# Patient Record
Sex: MALE | Race: BLACK OR AFRICAN AMERICAN | NOT HISPANIC OR LATINO
[De-identification: names, ages, dates, MRNs, and addresses within clinical notes are randomized per-mention and may not be internally consistent; named-entity substitution may affect disease eponyms.]

---

## 2018-06-19 ENCOUNTER — APPOINTMENT (OUTPATIENT)
Dept: UROLOGY | Facility: CLINIC | Age: 68
End: 2018-06-19
Payer: MEDICARE

## 2018-06-19 VITALS
SYSTOLIC BLOOD PRESSURE: 138 MMHG | HEART RATE: 68 BPM | OXYGEN SATURATION: 98 % | DIASTOLIC BLOOD PRESSURE: 84 MMHG | TEMPERATURE: 98.5 F

## 2018-06-19 PROCEDURE — 99204 OFFICE O/P NEW MOD 45 MIN: CPT

## 2018-06-28 ENCOUNTER — MESSAGE (OUTPATIENT)
Age: 68
End: 2018-06-28

## 2018-06-28 LAB
APPEARANCE: ABNORMAL
BACTERIA UR CULT: ABNORMAL
BACTERIA: ABNORMAL
BILIRUBIN URINE: NEGATIVE
BLOOD URINE: NEGATIVE
COLOR: YELLOW
GLUCOSE QUALITATIVE U: NEGATIVE MG/DL
HYALINE CASTS: 1 /LPF
KETONES URINE: NEGATIVE
LEUKOCYTE ESTERASE URINE: NEGATIVE
MICROSCOPIC-UA: NORMAL
NITRITE URINE: POSITIVE
PH URINE: 5.5
PROTEIN URINE: 30 MG/DL
RED BLOOD CELLS URINE: 1 /HPF
SPECIFIC GRAVITY URINE: 1.02
SQUAMOUS EPITHELIAL CELLS: 1 /HPF
URINE COMMENTS: NORMAL
UROBILINOGEN URINE: 1 MG/DL
WHITE BLOOD CELLS URINE: 6 /HPF

## 2018-06-29 ENCOUNTER — MESSAGE (OUTPATIENT)
Age: 68
End: 2018-06-29

## 2018-09-05 ENCOUNTER — APPOINTMENT (OUTPATIENT)
Dept: UROLOGY | Facility: CLINIC | Age: 68
End: 2018-09-05
Payer: MEDICARE

## 2018-09-05 VITALS
SYSTOLIC BLOOD PRESSURE: 134 MMHG | DIASTOLIC BLOOD PRESSURE: 76 MMHG | TEMPERATURE: 98.1 F | HEART RATE: 84 BPM | OXYGEN SATURATION: 98 %

## 2018-09-05 LAB
APPEARANCE: CLEAR
BACTERIA: ABNORMAL
BILIRUBIN URINE: NEGATIVE
BLOOD URINE: NEGATIVE
COLOR: YELLOW
GLUCOSE QUALITATIVE U: NEGATIVE MG/DL
HYALINE CASTS: 3 /LPF
KETONES URINE: NEGATIVE
LEUKOCYTE ESTERASE URINE: ABNORMAL
MICROSCOPIC-UA: NORMAL
NITRITE URINE: POSITIVE
PH URINE: 5.5
PROTEIN URINE: 30 MG/DL
RED BLOOD CELLS URINE: 7 /HPF
SPECIFIC GRAVITY URINE: 1.02
SQUAMOUS EPITHELIAL CELLS: 0 /HPF
UROBILINOGEN URINE: NEGATIVE MG/DL
WHITE BLOOD CELLS URINE: 97 /HPF

## 2018-09-05 PROCEDURE — 99214 OFFICE O/P EST MOD 30 MIN: CPT

## 2018-09-12 LAB — BACTERIA UR CULT: ABNORMAL

## 2019-03-12 ENCOUNTER — APPOINTMENT (OUTPATIENT)
Dept: UROLOGY | Facility: CLINIC | Age: 69
End: 2019-03-12
Payer: MEDICARE

## 2019-03-12 VITALS
HEIGHT: 67 IN | WEIGHT: 193 LBS | DIASTOLIC BLOOD PRESSURE: 80 MMHG | HEART RATE: 70 BPM | BODY MASS INDEX: 30.29 KG/M2 | SYSTOLIC BLOOD PRESSURE: 140 MMHG

## 2019-03-12 PROCEDURE — 99214 OFFICE O/P EST MOD 30 MIN: CPT

## 2019-03-12 NOTE — ASSESSMENT
[FreeTextEntry1] : Very pleasant 68-year-old gentleman presents for followup of BPH with urinary obstruction, urinary tract infections, dysuria\par -prior labs reviewed\par -Urine culture\par -Urinalysis\par -We discussed different potential etiologies of his symptoms\par -Cystoscopy at next visit\par -Will call with results of urine culture

## 2019-03-12 NOTE — HISTORY OF PRESENT ILLNESS
[FreeTextEntry1] : Very pleasant 68-year-old gentleman who presents for followup of BPH with urinary obstruction, frequent urinary tract infections, dysuria. Patient has been treated with antibiotics multiple times in the past which resolved his symptoms. Soon after treatment, however, he again experienced his dysuria. He currently denies fevers or chills. No hematuria. No flank pain or suprapubic pain. No other complaints. [Urinary Retention] : no urinary retention [Urinary Urgency] : no urinary urgency [Urinary Frequency] : urinary frequency [Nocturia] : nocturia [Straining] : no straining [Weak Stream] : no weak stream [Dysuria] : no dysuria [Hematuria - Gross] : no gross hematuria [Bladder Spasm] : no bladder spasm [Abdominal Pain] : no abdominal pain [Flank Pain] : no flank pain [Fever] : no fever [Fatigue] : no fatigue [Nausea] : no nausea [Anorexia] : no anorexia

## 2019-03-18 LAB — BACTERIA UR CULT: NORMAL

## 2019-04-01 ENCOUNTER — RX RENEWAL (OUTPATIENT)
Age: 69
End: 2019-04-01

## 2019-05-07 ENCOUNTER — APPOINTMENT (OUTPATIENT)
Dept: UROLOGY | Facility: CLINIC | Age: 69
End: 2019-05-07

## 2019-06-03 ENCOUNTER — CLINICAL ADVICE (OUTPATIENT)
Age: 69
End: 2019-06-03

## 2019-06-07 LAB
APPEARANCE: ABNORMAL
BACTERIA UR CULT: ABNORMAL
BACTERIA: ABNORMAL
BILIRUBIN URINE: NEGATIVE
BLOOD URINE: ABNORMAL
COLOR: YELLOW
GLUCOSE QUALITATIVE U: NEGATIVE
HYALINE CASTS: 0 /LPF
KETONES URINE: NEGATIVE
LEUKOCYTE ESTERASE URINE: ABNORMAL
MICROSCOPIC-UA: NORMAL
NITRITE URINE: POSITIVE
PH URINE: 6
PROTEIN URINE: ABNORMAL
RED BLOOD CELLS URINE: 76 /HPF
SPECIFIC GRAVITY URINE: 1.02
SQUAMOUS EPITHELIAL CELLS: 3 /HPF
UROBILINOGEN URINE: NORMAL
WHITE BLOOD CELLS URINE: >720 /HPF

## 2019-07-09 ENCOUNTER — APPOINTMENT (OUTPATIENT)
Dept: UROLOGY | Facility: CLINIC | Age: 69
End: 2019-07-09

## 2019-08-08 ENCOUNTER — RX RENEWAL (OUTPATIENT)
Age: 69
End: 2019-08-08

## 2019-08-09 ENCOUNTER — RX RENEWAL (OUTPATIENT)
Age: 69
End: 2019-08-09

## 2019-08-16 ENCOUNTER — APPOINTMENT (OUTPATIENT)
Dept: UROLOGY | Facility: CLINIC | Age: 69
End: 2019-08-16
Payer: MEDICARE

## 2019-08-16 VITALS
HEIGHT: 67 IN | RESPIRATION RATE: 16 BRPM | WEIGHT: 190 LBS | BODY MASS INDEX: 29.82 KG/M2 | SYSTOLIC BLOOD PRESSURE: 136 MMHG | DIASTOLIC BLOOD PRESSURE: 78 MMHG | HEART RATE: 92 BPM

## 2019-08-16 PROCEDURE — 99214 OFFICE O/P EST MOD 30 MIN: CPT

## 2019-08-16 NOTE — HISTORY OF PRESENT ILLNESS
[FreeTextEntry1] : Very pleasant 68-year-old gentleman who presents for followup of BPH, recurrent urinary tract infections, detrusor dysfunction. Patient currently reports dysuria and rectal pain. That when he uses antibiotics history for urinary tract infection that his symptoms significantly improved. Symptoms are exacerbated by urination. He denies flank pain or suprapubic pain. He denies fevers or chills. No specific timing to his symptoms. No aggravating or alleviating factors that he knows of. Today, PVR is 80 cc. He reports that VESIcare has helped.

## 2019-08-16 NOTE — ASSESSMENT
[FreeTextEntry1] : Very pleasant 68-year-old gentleman who presents for followup of BPH, detrusor dysfunction, recurrent urinary tract infections\par -Will attempt to obtain approval for VESIcare, as insurance company reportedly will no longer cover this\par -He has tried other anticholinergics in the past which have not worked, wears VESIcare has worked for him.\par -Urine culture\par -Will treat urinary tract infection based off of urine culture\par -I have recommended that he see Dr. Cervantes for evaluation given recurrent symptoms and recurrent UTIs

## 2019-08-19 LAB — BACTERIA UR CULT: NORMAL

## 2021-02-16 ENCOUNTER — RX RENEWAL (OUTPATIENT)
Age: 71
End: 2021-02-16

## 2021-02-23 ENCOUNTER — APPOINTMENT (OUTPATIENT)
Dept: UROLOGY | Facility: CLINIC | Age: 71
End: 2021-02-23
Payer: MEDICARE

## 2021-02-23 VITALS
DIASTOLIC BLOOD PRESSURE: 80 MMHG | HEIGHT: 60 IN | SYSTOLIC BLOOD PRESSURE: 140 MMHG | RESPIRATION RATE: 15 BRPM | HEART RATE: 82 BPM

## 2021-02-23 PROCEDURE — 99072 ADDL SUPL MATRL&STAF TM PHE: CPT

## 2021-02-23 PROCEDURE — 99213 OFFICE O/P EST LOW 20 MIN: CPT

## 2021-02-23 NOTE — ASSESSMENT
[FreeTextEntry1] : Very pleasant 70-year-old gentleman presents for follow-up of BPH, incomplete bladder emptying, nocturnal incontinence\par -Continue tamsulosin\par -Start finasteride\par -Discussed the risks and benefits of tamsulosin and finasteride\par -We will attempt to obtain insurance approval for diapers\par -Follow-up in 6 months

## 2021-02-23 NOTE — HISTORY OF PRESENT ILLNESS
[FreeTextEntry1] : Very pleasant 70-year-old gentleman who presents for follow-up of BPH with urinary obstruction, urinary incontinence, nocturia.  He reports increased urinary frequency and nocturia.  He also reports nocturnal enuresis.  He has been wearing a diaper to change it frequently at night.  Today PVR was 146.  He is using tamsulosin but has not started using finasteride yet.

## 2021-02-23 NOTE — PHYSICAL EXAM
[General Appearance - Well Developed] : well developed [General Appearance - Well Nourished] : well nourished [Normal Appearance] : normal appearance [Well Groomed] : well groomed [General Appearance - In No Acute Distress] : no acute distress [Abdomen Soft] : soft [Abdomen Tenderness] : non-tender [Costovertebral Angle Tenderness] : no ~M costovertebral angle tenderness [Urinary Bladder Findings] : the bladder was normal on palpation [Urethral Meatus] : meatus normal [Scrotum] : the scrotum was normal [Testes Mass (___cm)] : there were no testicular masses [FreeTextEntry1] :  [Edema] : no peripheral edema [] : no respiratory distress [Respiration, Rhythm And Depth] : normal respiratory rhythm and effort [Exaggerated Use Of Accessory Muscles For Inspiration] : no accessory muscle use [Oriented To Time, Place, And Person] : oriented to person, place, and time [Affect] : the affect was normal [Mood] : the mood was normal [Not Anxious] : not anxious [Normal Station and Gait] : the gait and station were normal for the patient's age [No Focal Deficits] : no focal deficits [No Palpable Adenopathy] : no palpable adenopathy

## 2021-03-01 LAB — BACTERIA UR CULT: ABNORMAL

## 2021-09-11 ENCOUNTER — APPOINTMENT (OUTPATIENT)
Dept: DISASTER EMERGENCY | Facility: OTHER | Age: 71
End: 2021-09-11
Payer: MEDICARE

## 2021-09-11 PROCEDURE — 0001A: CPT

## 2021-10-02 ENCOUNTER — APPOINTMENT (OUTPATIENT)
Dept: DISASTER EMERGENCY | Facility: OTHER | Age: 71
End: 2021-10-02
Payer: MEDICARE

## 2021-10-02 ENCOUNTER — RX RENEWAL (OUTPATIENT)
Age: 71
End: 2021-10-02

## 2021-10-02 PROCEDURE — 0002A: CPT

## 2021-10-26 ENCOUNTER — TRANSCRIPTION ENCOUNTER (OUTPATIENT)
Age: 71
End: 2021-10-26

## 2021-10-26 ENCOUNTER — APPOINTMENT (OUTPATIENT)
Dept: UROLOGY | Facility: CLINIC | Age: 71
End: 2021-10-26
Payer: MEDICARE

## 2021-10-26 DIAGNOSIS — E11.9 TYPE 2 DIABETES MELLITUS W/OUT COMPLICATIONS: ICD-10-CM

## 2021-10-26 DIAGNOSIS — N40.0 BENIGN PROSTATIC HYPERPLASIA WITHOUT LOWER URINARY TRACT SYMPMS: ICD-10-CM

## 2021-10-26 PROCEDURE — 99213 OFFICE O/P EST LOW 20 MIN: CPT | Mod: 95

## 2021-10-26 RX ORDER — SULFAMETHOXAZOLE AND TRIMETHOPRIM 800; 160 MG/1; MG/1
800-160 TABLET ORAL TWICE DAILY
Qty: 28 | Refills: 0 | Status: DISCONTINUED | COMMUNITY
Start: 2018-09-12 | End: 2021-10-26

## 2021-10-26 RX ORDER — SOLIFENACIN SUCCINATE 10 MG/1
10 TABLET ORAL
Qty: 90 | Refills: 1 | Status: DISCONTINUED | COMMUNITY
Start: 2019-08-19 | End: 2021-10-26

## 2021-10-26 RX ORDER — CIPROFLOXACIN HYDROCHLORIDE 500 MG/1
500 TABLET, FILM COATED ORAL TWICE DAILY
Qty: 14 | Refills: 0 | Status: DISCONTINUED | COMMUNITY
Start: 2021-03-01 | End: 2021-10-26

## 2021-10-26 RX ORDER — SULFAMETHOXAZOLE AND TRIMETHOPRIM 800; 160 MG/1; MG/1
800-160 TABLET ORAL TWICE DAILY
Qty: 14 | Refills: 0 | Status: DISCONTINUED | COMMUNITY
Start: 2019-06-05 | End: 2021-10-26

## 2021-10-26 RX ORDER — AMOXICILLIN AND CLAVULANATE POTASSIUM 875; 125 MG/1; MG/1
875-125 TABLET, COATED ORAL
Qty: 14 | Refills: 0 | Status: DISCONTINUED | COMMUNITY
Start: 2018-06-29 | End: 2021-10-26

## 2021-10-26 NOTE — HISTORY OF PRESENT ILLNESS
[Home] : at home, [unfilled] , at the time of the visit. [Medical Office: ([facility])___] : at the medical office located in  [FreeTextEntry1] : The patient-doctor relationship has been established in a face to face fashion via real time video/audio HIPAA compliant communication using telemedicine software.  He has requested care to be assessed and treated through telemedicine. He understands that there maybe limitations in this process and that he may need further follow up care in the office and/or hospital setting.\par \par Very pleasant 71-year-old gentleman who presents for follow-up of BPH with urinary obstruction, urinary incontinence, nocturia.  He reports that his urinary symptoms are improved with tamsulosin and finasteride. Reports persistent nocturia but improved after the addition of finasteride.

## 2021-10-26 NOTE — ASSESSMENT
[FreeTextEntry1] : Very pleasant 71 year old gentleman who presents for follow up of BPH with urinary obstruction, nocturia\par -continue tamsulosin- refill sent to the pharmacy\par -continue finasteride- refill sent to the pharmacy\par -f/u in 6 months

## 2021-11-26 ENCOUNTER — INPATIENT (INPATIENT)
Facility: HOSPITAL | Age: 71
LOS: 2 days | Discharge: HOME | End: 2021-11-29
Attending: GENERAL ACUTE CARE HOSPITAL | Admitting: GENERAL ACUTE CARE HOSPITAL
Payer: MEDICARE

## 2021-11-26 VITALS
RESPIRATION RATE: 18 BRPM | OXYGEN SATURATION: 100 % | TEMPERATURE: 99 F | HEIGHT: 67 IN | DIASTOLIC BLOOD PRESSURE: 80 MMHG | SYSTOLIC BLOOD PRESSURE: 148 MMHG | HEART RATE: 104 BPM

## 2021-11-26 DIAGNOSIS — I82.409 ACUTE EMBOLISM AND THROMBOSIS OF UNSPECIFIED DEEP VEINS OF UNSPECIFIED LOWER EXTREMITY: ICD-10-CM

## 2021-11-26 DIAGNOSIS — Z98.89 OTHER SPECIFIED POSTPROCEDURAL STATES: Chronic | ICD-10-CM

## 2021-11-26 DIAGNOSIS — E11.9 TYPE 2 DIABETES MELLITUS WITHOUT COMPLICATIONS: ICD-10-CM

## 2021-11-26 DIAGNOSIS — D64.9 ANEMIA, UNSPECIFIED: ICD-10-CM

## 2021-11-26 DIAGNOSIS — I10 ESSENTIAL (PRIMARY) HYPERTENSION: ICD-10-CM

## 2021-11-26 LAB
ALBUMIN SERPL ELPH-MCNC: 3.2 G/DL — LOW (ref 3.3–5)
ALP SERPL-CCNC: 71 U/L — SIGNIFICANT CHANGE UP (ref 40–120)
ALT FLD-CCNC: 39 U/L — SIGNIFICANT CHANGE UP (ref 4–41)
ANION GAP SERPL CALC-SCNC: 4 MMOL/L — LOW (ref 7–14)
ANION GAP SERPL CALC-SCNC: 6 MMOL/L — LOW (ref 7–14)
ANISOCYTOSIS BLD QL: SIGNIFICANT CHANGE UP
AST SERPL-CCNC: 85 U/L — HIGH (ref 4–40)
BASOPHILS # BLD AUTO: 0 K/UL — SIGNIFICANT CHANGE UP (ref 0–0.2)
BASOPHILS NFR BLD AUTO: 0 % — SIGNIFICANT CHANGE UP (ref 0–2)
BILIRUB SERPL-MCNC: <0.2 MG/DL — SIGNIFICANT CHANGE UP (ref 0.2–1.2)
BLD GP AB SCN SERPL QL: NEGATIVE — SIGNIFICANT CHANGE UP
BUN SERPL-MCNC: 23 MG/DL — SIGNIFICANT CHANGE UP (ref 7–23)
BUN SERPL-MCNC: 24 MG/DL — HIGH (ref 7–23)
CALCIUM SERPL-MCNC: 8.5 MG/DL — SIGNIFICANT CHANGE UP (ref 8.4–10.5)
CALCIUM SERPL-MCNC: 8.8 MG/DL — SIGNIFICANT CHANGE UP (ref 8.4–10.5)
CHLORIDE SERPL-SCNC: 104 MMOL/L — SIGNIFICANT CHANGE UP (ref 98–107)
CHLORIDE SERPL-SCNC: 107 MMOL/L — SIGNIFICANT CHANGE UP (ref 98–107)
CO2 SERPL-SCNC: 23 MMOL/L — SIGNIFICANT CHANGE UP (ref 22–31)
CO2 SERPL-SCNC: 25 MMOL/L — SIGNIFICANT CHANGE UP (ref 22–31)
CREAT SERPL-MCNC: 1.15 MG/DL — SIGNIFICANT CHANGE UP (ref 0.5–1.3)
CREAT SERPL-MCNC: 1.24 MG/DL — SIGNIFICANT CHANGE UP (ref 0.5–1.3)
EOSINOPHIL # BLD AUTO: 0.04 K/UL — SIGNIFICANT CHANGE UP (ref 0–0.5)
EOSINOPHIL NFR BLD AUTO: 0.9 % — SIGNIFICANT CHANGE UP (ref 0–6)
GIANT PLATELETS BLD QL SMEAR: PRESENT — SIGNIFICANT CHANGE UP
GLUCOSE SERPL-MCNC: 128 MG/DL — HIGH (ref 70–99)
GLUCOSE SERPL-MCNC: 175 MG/DL — HIGH (ref 70–99)
HCT VFR BLD CALC: 21.4 % — LOW (ref 39–50)
HGB BLD-MCNC: 6.3 G/DL — CRITICAL LOW (ref 13–17)
HYPOCHROMIA BLD QL: SLIGHT — SIGNIFICANT CHANGE UP
IANC: 1.81 K/UL — SIGNIFICANT CHANGE UP (ref 1.5–8.5)
LYMPHOCYTES # BLD AUTO: 1.55 K/UL — SIGNIFICANT CHANGE UP (ref 1–3.3)
LYMPHOCYTES # BLD AUTO: 38.1 % — SIGNIFICANT CHANGE UP (ref 13–44)
MACROCYTES BLD QL: SLIGHT — SIGNIFICANT CHANGE UP
MAGNESIUM SERPL-MCNC: 2.2 MG/DL — SIGNIFICANT CHANGE UP (ref 1.6–2.6)
MANUAL SMEAR VERIFICATION: SIGNIFICANT CHANGE UP
MCHC RBC-ENTMCNC: 25.2 PG — LOW (ref 27–34)
MCHC RBC-ENTMCNC: 29.4 GM/DL — LOW (ref 32–36)
MCV RBC AUTO: 85.6 FL — SIGNIFICANT CHANGE UP (ref 80–100)
MICROCYTES BLD QL: SLIGHT — SIGNIFICANT CHANGE UP
MONOCYTES # BLD AUTO: 0.12 K/UL — SIGNIFICANT CHANGE UP (ref 0–0.9)
MONOCYTES NFR BLD AUTO: 2.9 % — SIGNIFICANT CHANGE UP (ref 2–14)
NEUTROPHILS # BLD AUTO: 2.01 K/UL — SIGNIFICANT CHANGE UP (ref 1.8–7.4)
NEUTROPHILS NFR BLD AUTO: 45.7 % — SIGNIFICANT CHANGE UP (ref 43–77)
NEUTS BAND # BLD: 3.8 % — SIGNIFICANT CHANGE UP (ref 0–6)
NRBC # BLD: 11 /100 — HIGH (ref 0–0)
PHOSPHATE SERPL-MCNC: 4.1 MG/DL — SIGNIFICANT CHANGE UP (ref 2.5–4.5)
PLAT MORPH BLD: ABNORMAL
PLATELET # BLD AUTO: 209 K/UL — SIGNIFICANT CHANGE UP (ref 150–400)
PLATELET COUNT - ESTIMATE: NORMAL — SIGNIFICANT CHANGE UP
POLYCHROMASIA BLD QL SMEAR: SLIGHT — SIGNIFICANT CHANGE UP
POTASSIUM SERPL-MCNC: 4.1 MMOL/L — SIGNIFICANT CHANGE UP (ref 3.5–5.3)
POTASSIUM SERPL-MCNC: 5.8 MMOL/L — HIGH (ref 3.5–5.3)
POTASSIUM SERPL-SCNC: 4.1 MMOL/L — SIGNIFICANT CHANGE UP (ref 3.5–5.3)
POTASSIUM SERPL-SCNC: 5.8 MMOL/L — HIGH (ref 3.5–5.3)
PROT SERPL-MCNC: 12.4 G/DL — HIGH (ref 6–8.3)
RBC # BLD: 2.5 M/UL — LOW (ref 4.2–5.8)
RBC # BLD: 2.5 M/UL — LOW (ref 4.2–5.8)
RBC # FLD: 23 % — HIGH (ref 10.3–14.5)
RBC BLD AUTO: ABNORMAL
RETICS #: 28.7 K/UL — SIGNIFICANT CHANGE UP (ref 25–125)
RETICS/RBC NFR: 1.1 % — SIGNIFICANT CHANGE UP (ref 0.5–2.5)
RH IG SCN BLD-IMP: POSITIVE — SIGNIFICANT CHANGE UP
RH IG SCN BLD-IMP: POSITIVE — SIGNIFICANT CHANGE UP
ROULEAUX BLD QL SMEAR: PRESENT
SARS-COV-2 RNA SPEC QL NAA+PROBE: SIGNIFICANT CHANGE UP
SMUDGE CELLS # BLD: PRESENT — SIGNIFICANT CHANGE UP
SODIUM SERPL-SCNC: 133 MMOL/L — LOW (ref 135–145)
SODIUM SERPL-SCNC: 136 MMOL/L — SIGNIFICANT CHANGE UP (ref 135–145)
VARIANT LYMPHS # BLD: 8.6 % — HIGH (ref 0–6)
WBC # BLD: 4.06 K/UL — SIGNIFICANT CHANGE UP (ref 3.8–10.5)
WBC # FLD AUTO: 4.06 K/UL — SIGNIFICANT CHANGE UP (ref 3.8–10.5)

## 2021-11-26 PROCEDURE — 99223 1ST HOSP IP/OBS HIGH 75: CPT

## 2021-11-26 PROCEDURE — 71046 X-RAY EXAM CHEST 2 VIEWS: CPT | Mod: 26

## 2021-11-26 PROCEDURE — 99285 EMERGENCY DEPT VISIT HI MDM: CPT | Mod: 25

## 2021-11-26 PROCEDURE — 93010 ELECTROCARDIOGRAM REPORT: CPT

## 2021-11-26 RX ORDER — ACETAMINOPHEN 500 MG
650 TABLET ORAL EVERY 6 HOURS
Refills: 0 | Status: DISCONTINUED | OUTPATIENT
Start: 2021-11-26 | End: 2021-11-29

## 2021-11-26 RX ORDER — DEXTROSE 50 % IN WATER 50 %
25 SYRINGE (ML) INTRAVENOUS ONCE
Refills: 0 | Status: DISCONTINUED | OUTPATIENT
Start: 2021-11-26 | End: 2021-11-29

## 2021-11-26 RX ORDER — LANOLIN ALCOHOL/MO/W.PET/CERES
3 CREAM (GRAM) TOPICAL AT BEDTIME
Refills: 0 | Status: DISCONTINUED | OUTPATIENT
Start: 2021-11-26 | End: 2021-11-29

## 2021-11-26 RX ORDER — GLUCAGON INJECTION, SOLUTION 0.5 MG/.1ML
1 INJECTION, SOLUTION SUBCUTANEOUS ONCE
Refills: 0 | Status: DISCONTINUED | OUTPATIENT
Start: 2021-11-26 | End: 2021-11-29

## 2021-11-26 RX ORDER — DEXTROSE 50 % IN WATER 50 %
15 SYRINGE (ML) INTRAVENOUS ONCE
Refills: 0 | Status: DISCONTINUED | OUTPATIENT
Start: 2021-11-26 | End: 2021-11-29

## 2021-11-26 RX ORDER — DEXTROSE 50 % IN WATER 50 %
12.5 SYRINGE (ML) INTRAVENOUS ONCE
Refills: 0 | Status: DISCONTINUED | OUTPATIENT
Start: 2021-11-26 | End: 2021-11-29

## 2021-11-26 RX ORDER — ONDANSETRON 8 MG/1
4 TABLET, FILM COATED ORAL EVERY 8 HOURS
Refills: 0 | Status: DISCONTINUED | OUTPATIENT
Start: 2021-11-26 | End: 2021-11-29

## 2021-11-26 RX ORDER — INSULIN LISPRO 100/ML
VIAL (ML) SUBCUTANEOUS AT BEDTIME
Refills: 0 | Status: DISCONTINUED | OUTPATIENT
Start: 2021-11-26 | End: 2021-11-29

## 2021-11-26 RX ORDER — INSULIN LISPRO 100/ML
VIAL (ML) SUBCUTANEOUS
Refills: 0 | Status: DISCONTINUED | OUTPATIENT
Start: 2021-11-26 | End: 2021-11-29

## 2021-11-26 RX ORDER — SODIUM CHLORIDE 9 MG/ML
1000 INJECTION, SOLUTION INTRAVENOUS
Refills: 0 | Status: DISCONTINUED | OUTPATIENT
Start: 2021-11-26 | End: 2021-11-29

## 2021-11-26 NOTE — ED PROVIDER NOTE - NSICDXPASTMEDICALHX_GEN_ALL_CORE_FT
PAST MEDICAL HISTORY:  Anemia of unknown etiology in 2012     Benign essential hypertension     BPH (benign prostatic hypertrophy)     CVA (cerebral vascular accident)     Diabetes mellitus     DVT (deep venous thrombosis)     Hx of hyperlipidemia     Obesity     Obstructive uropathy

## 2021-11-26 NOTE — ED PROVIDER NOTE - PROGRESS NOTE DETAILS
Manny Haque M.D. (PGY-1): hgb = 6.3, consent and transfuse. k elevated on hemolyzed sample, will repeat. admission for acute anemia. Dr. Mckoy endorses admission.

## 2021-11-26 NOTE — H&P ADULT - NSHPREVIEWOFSYSTEMS_GEN_ALL_CORE
REVIEW OF SYSTEMS:    CONSTITUTIONAL: No weakness, fevers or chills  EYES/ENT: No visual changes;  No dysphagia; No sore throat; No rhinorrhea; No sinus pain/pressure  NECK: No pain or stiffness  RESPIRATORY: No cough, wheezing, hemoptysis; +MURPHY  CARDIOVASCULAR: No chest pain or palpitations; No lower extremity edema  GASTROINTESTINAL: No abdominal or epigastric pain. No nausea, vomiting, or hematemesis; No diarrhea or constipation. No melena or hematochezia.- reports dark stool but not black  GENITOURINARY: No dysuria, frequency or hematuria  NEUROLOGICAL: No numbness or weakness, + dizziness/lightheadedness  MSK: ambulates independently   SKIN: No itching, burning, rashes, or lesions   All other review of systems is negative unless indicated above.

## 2021-11-26 NOTE — ED PROVIDER NOTE - PHYSICAL EXAMINATION
Gen: NAD, non-toxic appearing  Head: normal appearing  HEENT: normal conjunctiva  Lung: no respiratory distress, speaking in full sentences     CV: regular rate and rhythm, no murmurs  Abd: soft, non distended, non tender  Chaperone: Dr. Gosia Lau MD. There is no ulceration of the soft tissue over the sacrum. The anus is visually unremarkable. There is no blood or discharge. A lubricated digit is introduced into the rectum. There is no impacted stool. No palpable internal hemorrhoids.   MSK: no visible deformities, there is a bandaid over the L hip posterior from an injection done at the PCP's office. no midline tenderness. no CVA tenderness.    Neuro: alert and grossly oriented, no gross motor deficits  Skin: No kaitlyn rashes

## 2021-11-26 NOTE — ED ADULT NURSE NOTE - OBJECTIVE STATEMENT
received pt in room 22, 71 yr/o male A+Ox4 creole speaking/ able to make needs known in english, ambulatory at baseline. PMH HTN, HLD, CVA, CKD, not on dialysis, DMII , DVT on Eliquis. sent to ED by PCP for low hgb. pt states that he feels intermittent SOB and has been passing black stools. VSS, denies chest pain and SOB at this time, RR even and unlabored. denies abdominal pain N/V/D. abdomen is soft nontender. skin is clean dry and intact. left had 20g IV placed, labs drawn and sent. pending MD orders will continue to monitor.

## 2021-11-26 NOTE — H&P ADULT - PROBLEM SELECTOR PLAN 2
Chronic moderate exacerbation  /85  Patient unsure of medication  pharmacy emailed for [employer]

## 2021-11-26 NOTE — ED PROVIDER NOTE - CLINICAL SUMMARY MEDICAL DECISION MAKING FREE TEXT BOX
70 yo male on AC presenting w/ anemia per lab work done at PCP's office, tachycardia here. physical exam unremarkable. will repeat cbc and sent for occult stool testing.

## 2021-11-26 NOTE — ED PROVIDER NOTE - OBJECTIVE STATEMENT
72 yo M, hx of DVT on Eliquis, CKD not on dialysis, CVA w/ R sided deficit and DM, presenting after referral from PCP for anemia. Pt w/ exertional dyspnea and dark stools over the past few weeks. Seen by PCP today, hgb = 6.6, referred here. Denies other symptomology. Denies episodes of kaitlyn bleeding. No recent changes to medications.

## 2021-11-26 NOTE — ED PROVIDER NOTE - ATTENDING CONTRIBUTION TO CARE
Attending Statement: I have personally seen and examined this patient. I have fully participated in the care of this patient. I have reviewed all pertinent clinical information, including history physical exam, plan and the Resident's note and agree except as noted    used   72 yo M, hx of DVT on Eliquis, CKD not on dialysis, CVA w/ R sided deficit and DM sent in for anemia. States labs showed a hemoglobin 6.6  Endorse  dark stool without any bright red blood. Endorse MURPHY and SOB. no chest pain. no abdominal pain. no n/v/d   Vital signs noted. well appearing male.   no distress. no work of breathing. soft nt abdomen. rectal done by resident no pedal edema. no calf tenderness. normal pulses bilateral feet.  plan labs, ekg, cxr, occult stool, re assess

## 2021-11-26 NOTE — ED ADULT NURSE REASSESSMENT NOTE - NS ED NURSE REASSESS COMMENT FT1
Transfusion complete. Pt resting comfortable. Denies CP, SOB, N/V, itching, any pain. Resp even and unlabored.

## 2021-11-26 NOTE — ED PROVIDER NOTE - NS ED ROS FT
GENERAL: no fever  EYES: no eye pain  HEENT: no neck pain  CARDIAC: no chest pain  PULMONARY: + SOB  GI: no abdominal pain  : no dysuria  SKIN: no rashes  NEURO: no headache  MSK: no new joint pain

## 2021-11-26 NOTE — ED ADULT TRIAGE NOTE - CHIEF COMPLAINT QUOTE
pt sent by MD Goldberg for blood transfusion. no hx blood transfusion. pt c/o fatigue found to have HgB 6.6. denies abnormal bleeding. PMH HTN, HLD, CVA, CKD, T2DM, DVT on Eliquis.

## 2021-11-26 NOTE — ED ADULT NURSE REASSESSMENT NOTE - NS ED NURSE REASSESS COMMENT FT1
Report given to floor. CBC label attached. Awaiting txp. Pt A&OX4, no acute distress. Resp even and unlabored. Denies CP, SOB, N/V, BMs, abd/ back pain.

## 2021-11-26 NOTE — H&P ADULT - PROBLEM SELECTOR PLAN 1
New  Hb 6.3 on admission with dizziness/lightheadedness/MURPHY  morphology: rouleaux formation   BM biopsy today  s/p 1 unit PRBC- repeat CBC q6 for 4 occurances  reports dark stools - near black- POCT FOBT result not found- will reorder  Hematology consult in AM  In the meantime will hold eliquis until above results- will likely be able to restart in AM

## 2021-11-26 NOTE — ED ADULT NURSE NOTE - NSIMPLEMENTINTERV_GEN_ALL_ED
Implemented All Fall Risk Interventions:  [place] to call system. Call bell, personal items and telephone within reach. Instruct patient to call for assistance. Room bathroom lighting operational. Non-slip footwear when patient is off stretcher. Physically safe environment: no spills, clutter or unnecessary equipment. Stretcher in lowest position, wheels locked, appropriate side rails in place. Provide visual cue, wrist band, yellow gown, etc. Monitor gait and stability. Monitor for mental status changes and reorient to person, place, and time. Review medications for side effects contributing to fall risk. Reinforce activity limits and safety measures with patient and family.

## 2021-11-26 NOTE — H&P ADULT - PROBLEM SELECTOR PLAN 3
Chronic moderate exacerbation    in ED  Holding home oral agents  LDCS with diabetic diet Chronic moderate exacerbation    in ED  Holding home oral agents  LDCS with diabetic diet  a1c and lipid panel in ED

## 2021-11-26 NOTE — H&P ADULT - HISTORY OF PRESENT ILLNESS
71 yr old male with a pmh of DVT on Eliquis, CKD not on dialysis, CVA w/ R sided deficit and T2DM who presents from his Hematologist office with anemia Hb 6.6 - of note he had a bone marrow biopsy today for persistently low Hb/WBC levels  Patient reports over the past week he has felt lightheaded and dizzy with MURPHY. He reports dark brown stool denies black stools.     Denies  headache, chest pain, palpitations,  abdominal pain, joint pain, diarrhea/constipation, urinary symptoms.     Vitals in the ED: T 98.6, HR 88, /85, RR 17 satting 100% RA

## 2021-11-26 NOTE — ED ADULT NURSE REASSESSMENT NOTE - NS ED NURSE REASSESS COMMENT FT1
blood transfusion- spoke with ACP. transfusion was initially started at a rate of 100 to infuse over 3 hours. Order is for 30min as per provider rate was increased to complete transfusion over a half hour. rate is now 300cc/hr. pt is tolerating transfusion, VSS. no s/s of reaction noted.

## 2021-11-26 NOTE — H&P ADULT - NSHPLABSRESULTS_GEN_ALL_CORE
6.3    4.06  )-----------( 209      ( 26 Nov 2021 14:40 )             21.4       11-26    136  |  107  |  24<H>  ----------------------------<  128<H>  4.1   |  25  |  1.24    Ca    8.5      26 Nov 2021 16:06  Phos  4.1     11-26  Mg     2.20     11-26    TPro  12.4<H>  /  Alb  3.2<L>  /  TBili  <0.2  /  DBili  x   /  AST  85<H>  /  ALT  39  /  AlkPhos  71  11-26    EKG interpreted by myself: NSR  CXR interpreted by myself: clear lungs

## 2021-11-27 LAB
A1C WITH ESTIMATED AVERAGE GLUCOSE RESULT: 6.2 % — HIGH (ref 4–5.6)
ANION GAP SERPL CALC-SCNC: 5 MMOL/L — LOW (ref 7–14)
BASOPHILS # BLD AUTO: 0.01 K/UL — SIGNIFICANT CHANGE UP (ref 0–0.2)
BASOPHILS NFR BLD AUTO: 0.2 % — SIGNIFICANT CHANGE UP (ref 0–2)
BUN SERPL-MCNC: 23 MG/DL — SIGNIFICANT CHANGE UP (ref 7–23)
CALCIUM SERPL-MCNC: 8.7 MG/DL — SIGNIFICANT CHANGE UP (ref 8.4–10.5)
CHLORIDE SERPL-SCNC: 104 MMOL/L — SIGNIFICANT CHANGE UP (ref 98–107)
CHOLEST SERPL-MCNC: 141 MG/DL — SIGNIFICANT CHANGE UP
CO2 SERPL-SCNC: 24 MMOL/L — SIGNIFICANT CHANGE UP (ref 22–31)
COVID-19 NUCLEOCAPSID GAM AB INTERP: POSITIVE
COVID-19 NUCLEOCAPSID TOTAL GAM ANTIBODY RESULT: 9.43 INDEX — HIGH
COVID-19 SPIKE DOMAIN AB INTERP: POSITIVE
COVID-19 SPIKE DOMAIN ANTIBODY RESULT: >250 U/ML — HIGH
CREAT SERPL-MCNC: 1.25 MG/DL — SIGNIFICANT CHANGE UP (ref 0.5–1.3)
EOSINOPHIL # BLD AUTO: 0.02 K/UL — SIGNIFICANT CHANGE UP (ref 0–0.5)
EOSINOPHIL NFR BLD AUTO: 0.5 % — SIGNIFICANT CHANGE UP (ref 0–6)
ESTIMATED AVERAGE GLUCOSE: 131 — SIGNIFICANT CHANGE UP
GLUCOSE SERPL-MCNC: 119 MG/DL — HIGH (ref 70–99)
HCT VFR BLD CALC: 23.1 % — LOW (ref 39–50)
HCT VFR BLD CALC: 24.7 % — LOW (ref 39–50)
HCT VFR BLD CALC: 26.3 % — LOW (ref 39–50)
HDLC SERPL-MCNC: 27 MG/DL — LOW
HGB BLD-MCNC: 7.2 G/DL — LOW (ref 13–17)
HGB BLD-MCNC: 7.3 G/DL — LOW (ref 13–17)
HGB BLD-MCNC: 8 G/DL — LOW (ref 13–17)
IANC: 1.52 K/UL — SIGNIFICANT CHANGE UP (ref 1.5–8.5)
IMM GRANULOCYTES NFR BLD AUTO: 1 % — SIGNIFICANT CHANGE UP (ref 0–1.5)
LIPID PNL WITH DIRECT LDL SERPL: 90 MG/DL — SIGNIFICANT CHANGE UP
LYMPHOCYTES # BLD AUTO: 2.05 K/UL — SIGNIFICANT CHANGE UP (ref 1–3.3)
LYMPHOCYTES # BLD AUTO: 50.7 % — HIGH (ref 13–44)
MCHC RBC-ENTMCNC: 24.9 PG — LOW (ref 27–34)
MCHC RBC-ENTMCNC: 25.7 PG — LOW (ref 27–34)
MCHC RBC-ENTMCNC: 26 PG — LOW (ref 27–34)
MCHC RBC-ENTMCNC: 29.6 GM/DL — LOW (ref 32–36)
MCHC RBC-ENTMCNC: 30.4 GM/DL — LOW (ref 32–36)
MCHC RBC-ENTMCNC: 31.2 GM/DL — LOW (ref 32–36)
MCV RBC AUTO: 83.4 FL — SIGNIFICANT CHANGE UP (ref 80–100)
MCV RBC AUTO: 84.3 FL — SIGNIFICANT CHANGE UP (ref 80–100)
MCV RBC AUTO: 84.6 FL — SIGNIFICANT CHANGE UP (ref 80–100)
MONOCYTES # BLD AUTO: 0.4 K/UL — SIGNIFICANT CHANGE UP (ref 0–0.9)
MONOCYTES NFR BLD AUTO: 9.9 % — SIGNIFICANT CHANGE UP (ref 2–14)
NEUTROPHILS # BLD AUTO: 1.52 K/UL — LOW (ref 1.8–7.4)
NEUTROPHILS NFR BLD AUTO: 37.7 % — LOW (ref 43–77)
NON HDL CHOLESTEROL: 114 MG/DL — SIGNIFICANT CHANGE UP
NRBC # BLD: 8 /100 WBCS — SIGNIFICANT CHANGE UP
NRBC # BLD: 9 /100 WBCS — SIGNIFICANT CHANGE UP
NRBC # BLD: 9 /100 WBCS — SIGNIFICANT CHANGE UP
NRBC # FLD: 0.32 K/UL — HIGH
NRBC # FLD: 0.32 K/UL — HIGH
NRBC # FLD: 0.34 K/UL — HIGH
PLATELET # BLD AUTO: 171 K/UL — SIGNIFICANT CHANGE UP (ref 150–400)
PLATELET # BLD AUTO: 177 K/UL — SIGNIFICANT CHANGE UP (ref 150–400)
PLATELET # BLD AUTO: 186 K/UL — SIGNIFICANT CHANGE UP (ref 150–400)
POTASSIUM SERPL-MCNC: 4 MMOL/L — SIGNIFICANT CHANGE UP (ref 3.5–5.3)
POTASSIUM SERPL-SCNC: 4 MMOL/L — SIGNIFICANT CHANGE UP (ref 3.5–5.3)
RBC # BLD: 2.77 M/UL — LOW (ref 4.2–5.8)
RBC # BLD: 2.93 M/UL — LOW (ref 4.2–5.8)
RBC # BLD: 3.11 M/UL — LOW (ref 4.2–5.8)
RBC # FLD: 21 % — HIGH (ref 10.3–14.5)
RBC # FLD: 21.1 % — HIGH (ref 10.3–14.5)
RBC # FLD: 21.2 % — HIGH (ref 10.3–14.5)
SARS-COV-2 IGG+IGM SERPL QL IA: 9.43 INDEX — HIGH
SARS-COV-2 IGG+IGM SERPL QL IA: >250 U/ML — HIGH
SARS-COV-2 IGG+IGM SERPL QL IA: POSITIVE
SARS-COV-2 IGG+IGM SERPL QL IA: POSITIVE
SODIUM SERPL-SCNC: 133 MMOL/L — LOW (ref 135–145)
TRIGL SERPL-MCNC: 121 MG/DL — SIGNIFICANT CHANGE UP
WBC # BLD: 3.55 K/UL — LOW (ref 3.8–10.5)
WBC # BLD: 3.72 K/UL — LOW (ref 3.8–10.5)
WBC # BLD: 4.04 K/UL — SIGNIFICANT CHANGE UP (ref 3.8–10.5)
WBC # FLD AUTO: 3.55 K/UL — LOW (ref 3.8–10.5)
WBC # FLD AUTO: 3.72 K/UL — LOW (ref 3.8–10.5)
WBC # FLD AUTO: 4.04 K/UL — SIGNIFICANT CHANGE UP (ref 3.8–10.5)

## 2021-11-27 RX ADMIN — Medication 650 MILLIGRAM(S): at 21:20

## 2021-11-27 RX ADMIN — Medication 650 MILLIGRAM(S): at 11:51

## 2021-11-27 RX ADMIN — Medication 650 MILLIGRAM(S): at 20:37

## 2021-11-27 RX ADMIN — Medication 650 MILLIGRAM(S): at 12:51

## 2021-11-27 NOTE — CHART NOTE - NSCHARTNOTEFT_GEN_A_CORE
Med list Provided to me by pt's family, meds were reviewed, Pt listed to be on Eliquis 2.5 mg BID.  H&P has coumadin listed. spoke to Pt and confirmed that he was switched from Coumadin to Eliquis by his doctor.

## 2021-11-27 NOTE — CONSULT NOTE ADULT - ASSESSMENT
1) anemia and neutropenia. Elevated Total protein level and monoclonal paraprotein.  Bone marrow biopsy done in office on [place]- Sample was small but hopefully adequate.  Patient was on eliquis for bx, and bone was brittle, dry tap.  Hope for initial results by monday  - myeloma panel sent from office.  transfusional support as needed.

## 2021-11-27 NOTE — CONSULT NOTE ADULT - SUBJECTIVE AND OBJECTIVE BOX
LENNY CHILDERS  MRN-[id number]    Patient is a 71y old  Male who presents with a chief complaint of anemia (26 Nov 2021 21:27)      HPI:  71 yr old male with a pmh of DVT on Eliquis, CKD not on dialysis, CVA w/ R sided deficit and T2DM who presents from my office with anemia Hb 6.6 - of note he had a bone marrow biopsy on friday for persistently low Hb/WBC levels  Patient reports over the past weak he has felt lightheaded and dizzy with MURPHY. He reports dark brown stool denies black stools.     Denies  headache, chest pain, palpitations,  abdominal pain, joint pain, diarrhea/constipation, urinary symptoms.     Vitals in the ED: T 98.6, HR 88, /85, RR 17 satting 100% RA (26 Nov 2021 21:27)      Past Medical History  PAST MEDICAL & SURGICAL HISTORY:  Benign essential hypertension  Hx of hyperlipidemia  DVT (deep venous thrombosis)  Obstructive uropathy  Obesity  Anemia of unknown etiology in 2012  BPH (benign prostatic hypertrophy)  Diabetes mellitus  CVA (cerebral vascular accident)  H/O tooth extraction  2012  S/P prostatectomy        Current Meds  MEDICATIONS  (STANDING):  dextrose 40% Gel 15 Gram(s) Oral once  dextrose 5%. 1000 milliLiter(s) (50 mL/Hr) IV Continuous <Continuous>  dextrose 5%. 1000 milliLiter(s) (100 mL/Hr) IV Continuous <Continuous>  dextrose 50% Injectable 25 Gram(s) IV Push once  dextrose 50% Injectable 12.5 Gram(s) IV Push once  dextrose 50% Injectable 25 Gram(s) IV Push once  glucagon  Injectable 1 milliGRAM(s) IntraMuscular once  insulin lispro (ADMELOG) corrective regimen sliding scale   SubCutaneous three times a day before meals  insulin lispro (ADMELOG) corrective regimen sliding scale   SubCutaneous at bedtime    MEDICATIONS  (PRN):  acetaminophen     Tablet .. 650 milliGRAM(s) Oral every 6 hours PRN Temp greater or equal to 38C (100.4F), Mild Pain (1 - 3)  aluminum hydroxide/magnesium hydroxide/simethicone Suspension 30 milliLiter(s) Oral every 4 hours PRN Dyspepsia  melatonin 3 milliGRAM(s) Oral at bedtime PRN Insomnia  ondansetron Injectable 4 milliGRAM(s) IV Push every 8 hours PRN Nausea and/or Vomiting      Allergies  Allergies    No Known Allergies    Intolerances        Social History  , Retired. No tob.  No etoh    Family History  FAMILY HISTORY:  Family history of hypertension (Father)        Review of System  REVIEW OF SYSTEMS      General:	Denies fatigue, fevers, chills, sweats, decreased appetite.    Skin/Breast: denies pruritis, rash  	  Ophthalmologic: no change in vision or blurring  	  HEENT	Denies dry mouth, oral sores, dysphagia,  change in hearing.    Respiratory and Thorax:  cough, sob, wheeze, hemoptysis  	  Cardiovascular:	no cp , palp, orthopnea    Gastrointestinal:	no n/v/d constipation    Genitourinary:	no dysuria of frequency, no hematuria, no flank pain    Musculoskeletal:	no bone or joint pain. no muscle aches.     Neurological:	no change in sensory or motor function. no headache. no weakness.     Psychiatric:	no depression, no anxiety, insomnia.     Hematology/Lymphatics:	no bleeding or bruising        Vitals  Vital Signs Last 24 Hrs  T(C): 36.7 (27 Nov 2021 17:58), Max: 37.2 (26 Nov 2021 20:31)  T(F): 98 (27 Nov 2021 17:58), Max: 99 (26 Nov 2021 20:31)  HR: 99 (27 Nov 2021 17:58) (87 - 99)  BP: 143/85 (27 Nov 2021 17:58) (140/90 - 162/90)  BP(mean): --  RR: 18 (27 Nov 2021 17:58) (17 - 18)  SpO2: 100% (27 Nov 2021 17:58) (100% - 100%)    Physical Exam  PHYSICAL EXAM:      Constitutional: NAD    Eyes: PERRLA EOMI, anicteric sclera    Heent :No oral sores, no pharyngeal injection. moist mucosa.    Neck: supple, no jvd, no LAD    Respiratory: CTA b/l     Cardiovascular: s1s2, no m/g/r    Gastrointestinal: soft, nt, nd, + BS    Extremities: no c/c/e    Neurological:A&O x 3 moves all ext.    Skin: no rash on exposed skin    Lymph Nodes: no lymphadenopathy.              Lab  CBC Full  -  ( 27 Nov 2021 17:22 )  WBC Count : 3.55 K/uL  RBC Count : 2.77 M/uL  Hemoglobin : 7.2 g/dL  Hematocrit : 23.1 %  Platelet Count - Automated : 177 K/uL  Mean Cell Volume : 83.4 fL  Mean Cell Hemoglobin : 26.0 pg  Mean Cell Hemoglobin Concentration : 31.2 gm/dL  Auto Neutrophil # : x  Auto Lymphocyte # : x  Auto Monocyte # : x  Auto Eosinophil # : x  Auto Basophil # : x  Auto Neutrophil % : x  Auto Lymphocyte % : x  Auto Monocyte % : x  Auto Eosinophil % : x  Auto Basophil % : x    11-27    133<L>  |  104  |  23  ----------------------------<  119<H>  4.0   |  24  |  1.25    Ca    8.7      27 Nov 2021 02:59  Phos  4.1     11-26  Mg     2.20     11-26    TPro  12.4<H>  /  Alb  3.2<L>  /  TBili  <0.2  /  DBili  x   /  AST  85<H>  /  ALT  39  /  AlkPhos  71  11-26        Rad:    Assessment/Plan

## 2021-11-27 NOTE — PHARMACOTHERAPY INTERVENTION NOTE - COMMENTS
Medication history is complete. Medication list updated in Outpatient Medication Record (OMR). Please call spectra [id number] if you have any questions. Medication history is complete. Medication list updated in Outpatient Medication Record (OMR). Please call spectra [id number] if you have any questions.  Spoke with ACP provider to notify OMR updated.

## 2021-11-28 LAB
ANION GAP SERPL CALC-SCNC: 3 MMOL/L — LOW (ref 7–14)
BUN SERPL-MCNC: 28 MG/DL — HIGH (ref 7–23)
CALCIUM SERPL-MCNC: 9.1 MG/DL — SIGNIFICANT CHANGE UP (ref 8.4–10.5)
CHLORIDE SERPL-SCNC: 103 MMOL/L — SIGNIFICANT CHANGE UP (ref 98–107)
CO2 SERPL-SCNC: 26 MMOL/L — SIGNIFICANT CHANGE UP (ref 22–31)
CREAT SERPL-MCNC: 1.21 MG/DL — SIGNIFICANT CHANGE UP (ref 0.5–1.3)
GLUCOSE SERPL-MCNC: 137 MG/DL — HIGH (ref 70–99)
HCT VFR BLD CALC: 28.2 % — LOW (ref 39–50)
HCT VFR BLD CALC: 30.3 % — LOW (ref 39–50)
HGB BLD-MCNC: 8.5 G/DL — LOW (ref 13–17)
HGB BLD-MCNC: 8.8 G/DL — LOW (ref 13–17)
IRON SATN MFR SERPL: 43 % — SIGNIFICANT CHANGE UP (ref 14–50)
IRON SATN MFR SERPL: 84 UG/DL — SIGNIFICANT CHANGE UP (ref 45–165)
MAGNESIUM SERPL-MCNC: 2 MG/DL — SIGNIFICANT CHANGE UP (ref 1.6–2.6)
MCHC RBC-ENTMCNC: 25.3 PG — LOW (ref 27–34)
MCHC RBC-ENTMCNC: 26.1 PG — LOW (ref 27–34)
MCHC RBC-ENTMCNC: 29 GM/DL — LOW (ref 32–36)
MCHC RBC-ENTMCNC: 30.1 GM/DL — LOW (ref 32–36)
MCV RBC AUTO: 86.5 FL — SIGNIFICANT CHANGE UP (ref 80–100)
MCV RBC AUTO: 87.1 FL — SIGNIFICANT CHANGE UP (ref 80–100)
NRBC # BLD: 8 /100 WBCS — SIGNIFICANT CHANGE UP
NRBC # BLD: 8 /100 WBCS — SIGNIFICANT CHANGE UP
NRBC # FLD: 0.31 K/UL — HIGH
NRBC # FLD: 0.36 K/UL — HIGH
OB PNL STL: NEGATIVE — SIGNIFICANT CHANGE UP
PHOSPHATE SERPL-MCNC: 4.2 MG/DL — SIGNIFICANT CHANGE UP (ref 2.5–4.5)
PLATELET # BLD AUTO: 151 K/UL — SIGNIFICANT CHANGE UP (ref 150–400)
PLATELET # BLD AUTO: 183 K/UL — SIGNIFICANT CHANGE UP (ref 150–400)
POTASSIUM SERPL-MCNC: 4.1 MMOL/L — SIGNIFICANT CHANGE UP (ref 3.5–5.3)
POTASSIUM SERPL-SCNC: 4.1 MMOL/L — SIGNIFICANT CHANGE UP (ref 3.5–5.3)
RBC # BLD: 3.26 M/UL — LOW (ref 4.2–5.8)
RBC # BLD: 3.48 M/UL — LOW (ref 4.2–5.8)
RBC # FLD: 19.6 % — HIGH (ref 10.3–14.5)
RBC # FLD: 19.8 % — HIGH (ref 10.3–14.5)
SODIUM SERPL-SCNC: 132 MMOL/L — LOW (ref 135–145)
TIBC SERPL-MCNC: 194 UG/DL — LOW (ref 220–430)
UIBC SERPL-MCNC: 110 UG/DL — SIGNIFICANT CHANGE UP (ref 110–370)
WBC # BLD: 4.16 K/UL — SIGNIFICANT CHANGE UP (ref 3.8–10.5)
WBC # BLD: 4.58 K/UL — SIGNIFICANT CHANGE UP (ref 3.8–10.5)
WBC # FLD AUTO: 4.16 K/UL — SIGNIFICANT CHANGE UP (ref 3.8–10.5)
WBC # FLD AUTO: 4.58 K/UL — SIGNIFICANT CHANGE UP (ref 3.8–10.5)

## 2021-11-28 RX ORDER — AMLODIPINE BESYLATE 2.5 MG/1
5 TABLET ORAL DAILY
Refills: 0 | Status: DISCONTINUED | OUTPATIENT
Start: 2021-11-28 | End: 2021-11-29

## 2021-11-28 RX ORDER — METOPROLOL TARTRATE 50 MG
25 TABLET ORAL
Refills: 0 | Status: DISCONTINUED | OUTPATIENT
Start: 2021-11-28 | End: 2021-11-29

## 2021-11-28 RX ORDER — HYDRALAZINE HCL 50 MG
25 TABLET ORAL THREE TIMES A DAY
Refills: 0 | Status: DISCONTINUED | OUTPATIENT
Start: 2021-11-28 | End: 2021-11-29

## 2021-11-28 RX ADMIN — Medication 25 MILLIGRAM(S): at 22:20

## 2021-11-28 RX ADMIN — AMLODIPINE BESYLATE 5 MILLIGRAM(S): 2.5 TABLET ORAL at 09:47

## 2021-11-28 RX ADMIN — Medication 25 MILLIGRAM(S): at 17:41

## 2021-11-28 RX ADMIN — Medication 25 MILLIGRAM(S): at 12:47

## 2021-11-29 ENCOUNTER — TRANSCRIPTION ENCOUNTER (OUTPATIENT)
Age: 71
End: 2021-11-29

## 2021-11-29 VITALS
SYSTOLIC BLOOD PRESSURE: 129 MMHG | DIASTOLIC BLOOD PRESSURE: 73 MMHG | HEART RATE: 75 BPM | OXYGEN SATURATION: 99 % | TEMPERATURE: 98 F | RESPIRATION RATE: 18 BRPM

## 2021-11-29 LAB
ALBUMIN SERPL ELPH-MCNC: 3.1 G/DL — LOW (ref 3.3–5)
ALP SERPL-CCNC: 77 U/L — SIGNIFICANT CHANGE UP (ref 40–120)
ALT FLD-CCNC: 24 U/L — SIGNIFICANT CHANGE UP (ref 4–41)
ANION GAP SERPL CALC-SCNC: 5 MMOL/L — LOW (ref 7–14)
AST SERPL-CCNC: 31 U/L — SIGNIFICANT CHANGE UP (ref 4–40)
BILIRUB SERPL-MCNC: 0.3 MG/DL — SIGNIFICANT CHANGE UP (ref 0.2–1.2)
BLD GP AB SCN SERPL QL: NEGATIVE — SIGNIFICANT CHANGE UP
BUN SERPL-MCNC: 29 MG/DL — HIGH (ref 7–23)
CALCIUM SERPL-MCNC: 8.8 MG/DL — SIGNIFICANT CHANGE UP (ref 8.4–10.5)
CHLORIDE SERPL-SCNC: 103 MMOL/L — SIGNIFICANT CHANGE UP (ref 98–107)
CO2 SERPL-SCNC: 23 MMOL/L — SIGNIFICANT CHANGE UP (ref 22–31)
CREAT SERPL-MCNC: 1.32 MG/DL — HIGH (ref 0.5–1.3)
GLUCOSE SERPL-MCNC: 129 MG/DL — HIGH (ref 70–99)
HCT VFR BLD CALC: 26.5 % — LOW (ref 39–50)
HCT VFR BLD CALC: 28.3 % — LOW (ref 39–50)
HGB BLD-MCNC: 8.1 G/DL — LOW (ref 13–17)
HGB BLD-MCNC: 8.4 G/DL — LOW (ref 13–17)
INR BLD: 1.16 RATIO — SIGNIFICANT CHANGE UP (ref 0.88–1.16)
MAGNESIUM SERPL-MCNC: 2 MG/DL — SIGNIFICANT CHANGE UP (ref 1.6–2.6)
MCHC RBC-ENTMCNC: 25.7 PG — LOW (ref 27–34)
MCHC RBC-ENTMCNC: 26.1 PG — LOW (ref 27–34)
MCHC RBC-ENTMCNC: 29.7 GM/DL — LOW (ref 32–36)
MCHC RBC-ENTMCNC: 30.6 GM/DL — LOW (ref 32–36)
MCV RBC AUTO: 84.1 FL — SIGNIFICANT CHANGE UP (ref 80–100)
MCV RBC AUTO: 87.9 FL — SIGNIFICANT CHANGE UP (ref 80–100)
NRBC # BLD: 7 /100 WBCS — SIGNIFICANT CHANGE UP
NRBC # BLD: 8 /100 WBCS — SIGNIFICANT CHANGE UP
NRBC # FLD: 0.32 K/UL — HIGH
NRBC # FLD: 0.35 K/UL — HIGH
PHOSPHATE SERPL-MCNC: 4.1 MG/DL — SIGNIFICANT CHANGE UP (ref 2.5–4.5)
PLATELET # BLD AUTO: 161 K/UL — SIGNIFICANT CHANGE UP (ref 150–400)
PLATELET # BLD AUTO: 199 K/UL — SIGNIFICANT CHANGE UP (ref 150–400)
POTASSIUM SERPL-MCNC: 4.1 MMOL/L — SIGNIFICANT CHANGE UP (ref 3.5–5.3)
POTASSIUM SERPL-SCNC: 4.1 MMOL/L — SIGNIFICANT CHANGE UP (ref 3.5–5.3)
PROT SERPL-MCNC: 11.9 G/DL — HIGH (ref 6–8.3)
PROTHROM AB SERPL-ACNC: 13.3 SEC — SIGNIFICANT CHANGE UP (ref 10.6–13.6)
RBC # BLD: 3.15 M/UL — LOW (ref 4.2–5.8)
RBC # BLD: 3.22 M/UL — LOW (ref 4.2–5.8)
RBC # FLD: 19.9 % — HIGH (ref 10.3–14.5)
RBC # FLD: 20.2 % — HIGH (ref 10.3–14.5)
RH IG SCN BLD-IMP: POSITIVE — SIGNIFICANT CHANGE UP
SODIUM SERPL-SCNC: 131 MMOL/L — LOW (ref 135–145)
VIT B12 SERPL-MCNC: 902 PG/ML — HIGH (ref 200–900)
WBC # BLD: 4.32 K/UL — SIGNIFICANT CHANGE UP (ref 3.8–10.5)
WBC # BLD: 4.39 K/UL — SIGNIFICANT CHANGE UP (ref 3.8–10.5)
WBC # FLD AUTO: 4.32 K/UL — SIGNIFICANT CHANGE UP (ref 3.8–10.5)
WBC # FLD AUTO: 4.39 K/UL — SIGNIFICANT CHANGE UP (ref 3.8–10.5)

## 2021-11-29 RX ORDER — METOPROLOL TARTRATE 50 MG
1 TABLET ORAL
Qty: 60 | Refills: 0
Start: 2021-11-29 | End: 2021-12-28

## 2021-11-29 RX ORDER — AMLODIPINE BESYLATE 2.5 MG/1
1 TABLET ORAL
Qty: 30 | Refills: 0
Start: 2021-11-29 | End: 2021-12-28

## 2021-11-29 RX ORDER — ACETAMINOPHEN 500 MG
2 TABLET ORAL
Qty: 0 | Refills: 0 | DISCHARGE
Start: 2021-11-29

## 2021-11-29 RX ORDER — APIXABAN 2.5 MG/1
2 TABLET, FILM COATED ORAL
Qty: 120 | Refills: 0
Start: 2021-11-29 | End: 2021-12-28

## 2021-11-29 RX ORDER — TAMSULOSIN HYDROCHLORIDE 0.4 MG/1
1 CAPSULE ORAL
Qty: 30 | Refills: 0
Start: 2021-11-29 | End: 2021-12-28

## 2021-11-29 RX ORDER — FINASTERIDE 5 MG/1
1 TABLET, FILM COATED ORAL
Qty: 30 | Refills: 0
Start: 2021-11-29 | End: 2021-12-28

## 2021-11-29 RX ORDER — METOPROLOL TARTRATE 50 MG
1 TABLET ORAL
Qty: 0 | Refills: 0 | DISCHARGE

## 2021-11-29 RX ORDER — FINASTERIDE 5 MG/1
1 TABLET, FILM COATED ORAL
Qty: 0 | Refills: 0 | DISCHARGE

## 2021-11-29 RX ORDER — HYDRALAZINE HCL 50 MG
1 TABLET ORAL
Qty: 90 | Refills: 0
Start: 2021-11-29 | End: 2021-12-28

## 2021-11-29 RX ORDER — AMLODIPINE BESYLATE 2.5 MG/1
1 TABLET ORAL
Qty: 0 | Refills: 0 | DISCHARGE

## 2021-11-29 RX ORDER — HYDRALAZINE HCL 50 MG
1 TABLET ORAL
Qty: 0 | Refills: 0 | DISCHARGE

## 2021-11-29 RX ORDER — TAMSULOSIN HYDROCHLORIDE 0.4 MG/1
1 CAPSULE ORAL
Qty: 0 | Refills: 0 | DISCHARGE

## 2021-11-29 RX ORDER — APIXABAN 2.5 MG/1
2 TABLET, FILM COATED ORAL
Qty: 0 | Refills: 0 | DISCHARGE

## 2021-11-29 RX ADMIN — Medication 25 MILLIGRAM(S): at 05:29

## 2021-11-29 RX ADMIN — AMLODIPINE BESYLATE 5 MILLIGRAM(S): 2.5 TABLET ORAL at 05:29

## 2021-11-29 RX ADMIN — Medication 25 MILLIGRAM(S): at 13:26

## 2021-11-29 NOTE — DISCHARGE NOTE PROVIDER - HOSPITAL COURSE
71 yr old male presenting with symptomatic anemia; Hx Of DVT ( eliquis on Hold for Now). Pt received 1u PRBC on 11/26 and Hg remained stable after. Pt already had bone marrow bx prior to hospital and will f/u with heme/onc outpt for results. Eliquis was on hold however Discussed with Dr. Mckoy that patient will be discharged on Eliquis 5mg BID since hg has been stable. Pt will f/u with hematologist/oncologist Dr. Mcdaniel.     Case discussed with Dr. Mckoy on 11/29, pt medically stable for discharge home.

## 2021-11-29 NOTE — DISCHARGE NOTE PROVIDER - NSFOLLOWUPCLINICS_GEN_ALL_ED_FT
[facility] General Internal Medicine  General Internal Medicine  [address] [place] [address]  Phone: [phone]  Fax:

## 2021-11-29 NOTE — DISCHARGE NOTE NURSING/CASE MANAGEMENT/SOCIAL WORK - PATIENT PORTAL LINK FT
You can access the FollowMyHealth Patient Portal offered by [facility] by registering at the following website: [email or web address]. By joining [employer]’s FollowMyHealth portal, you will also be able to view your health information using other applications (apps) compatible with our system.

## 2021-11-29 NOTE — DISCHARGE NOTE PROVIDER - NSDCCPCAREPLAN_GEN_ALL_CORE_FT
PRINCIPAL DISCHARGE DIAGNOSIS  Diagnosis: Anemia  Assessment and Plan of Treatment: You were treated for anemia. Your hemoglobin was 6.3 upon admission. You received 1 unit of blood on 11/26. Bone marrow biopsy was completed. Eliquis was held. but you may resume upon discharge because your blood level has been stable. Please monitor for bleeding. If anty bleeding noted, please return to ER. Please follow up with Dr. Mcdaniel.      SECONDARY DISCHARGE DIAGNOSES  Diagnosis: Benign essential HTN  Assessment and Plan of Treatment: You were treated for high blood pressure with Amlodipine, Hydralazine and Metoprolol.    Diagnosis: DVT (deep venous thrombosis)  Assessment and Plan of Treatment: In light of recent symptoms, your Eliquis, which you typically take for DVT prophylaxis, was held but you may continue upon discharge.    Diagnosis: Pre-diabetes  Assessment and Plan of Treatment: Your hemoglobin A1c is 6.2. Please monitor and maintain diabetic diet.

## 2021-11-29 NOTE — DISCHARGE NOTE PROVIDER - CARE PROVIDER_API CALL
Fish Mcdaniel  HEMATOLOGY  [address], [address]  [place] [address]  Phone: [phone]  Fax: [phone]  Established Patient  Follow Up Time: 1 week

## 2021-11-29 NOTE — DISCHARGE NOTE PROVIDER - NSDCMRMEDTOKEN_GEN_ALL_CORE_FT
acetaminophen 325 mg oral tablet: 2 tab(s) orally every 6 hours, As needed, Temp greater or equal to 38C (100.4F), Mild Pain (1 - 3)  amLODIPine 5 mg oral tablet: 1 tab(s) orally once a day   Eliquis 2.5 mg oral tablet: 2 tab(s) orally 2 times a day  finasteride 5 mg oral tablet: 1 tab(s) orally once a day (at bedtime)  hydrALAZINE 25 mg oral tablet: 1 tab(s) orally 3 times a day  metoprolol tartrate 25 mg oral tablet: 1 tab(s) orally 2 times a day   tamsulosin 0.4 mg oral capsule: 1 cap(s) orally once a day (at bedtime)

## 2021-11-29 NOTE — DISCHARGE NOTE NURSING/CASE MANAGEMENT/SOCIAL WORK - NSTOBACCONEVERSMOKERY/N_GEN_A
No 64 y/o female presented to the ED c/o multiple episodes of diarrhea since taking bowel prep for Colonoscopy yesterday at 2pm.  Vomited x 3 today.  Feels weak, lightheaded.  Labs sent.  IVF started.  Will F/U results and re-eval.  raffy Jansen I Ivette Snell attest that this documentation has been prepared under the direction and in the presence of Doctor Blevins. Pt initial Na was 126 and K+ was 2.9.  EKG was ordered and reviewed by Dr. Blevins.  Neg acute changes.  PO KCl was ordered.  Repeat CMP following 1 liter of IVF.  Na increased to 131, K+ 3.3.  2nd dose of PO KCl given.  Pt color improved.  No further nausea, vomiting, or diarrhea in the ED.  tolerated sips of liquid, bite of sandwich and crackers.  Will dc home.  Send with PO zofran.  Cont. PO hydration.  F/U with PMD.  Rach Gurrola PA-C

## 2021-11-29 NOTE — PROGRESS NOTE ADULT - ASSESSMENT
71 yr old male presenting with symptomatic anemia    Problem/Plan - 1:  ·  Problem: Symptomatic anemia.   ·  Plan: New  Hb 6.3 on admission with dizziness/lightheadedness/MURPHY  morphology: rouleaux formation   Status post transfusion with stable H&H  reports dark stools - Negative  Discussed with Dr. Mcdaniel:Bone marrow biopsy done as an outpatientPreliminary biopsy results seem to be consistent with myeloma.  Will follow up as an outpatient    Problem/Plan - 2:  ·  Problem: Benign essential HTN.   ·  Plan: Chronic moderate exacerbation  /85  Patient unsure of medication  pharmacy emailed for medrec.    Problem/Plan - 3:  ·  Problem: T2DM (type 2 diabetes mellitus).   ·  Plan: Chronic moderate exacerbation    in ED  Holding home oral agents  LDCS with diabetic diet  a1c and lipid panel in ED.    Problem/Plan - 4:  ·  Problem: DVT (deep venous thrombosis).   ·  Plan: Will restart Eliquis, Follow with Dr. Mcdaniel RegardingContinuation of anticoagulation    ACD discussed 'ull code
Anemia and monoclonal igG and likely has multiple myeloma. Had a bone marrow biopsy and those results will likely take a few days to be completed. he has been transfused and the Hgb is higher and adequate today.  Kidney function is normal today. He is not hypercalcemic.  No urgency to starting steroids at this time. Also per Dr. Mcdaniel bone marrow may have been an inadequate specimen and may need to be repeated so another reason why would not start steroids at this time.    If Hgb stable tomorrow then would consider c/c with out-pt f/u with Dr. Mcdaniel for further management.  Discussed plan with patient and his daughters and all questions answered.  
71 yr old male presenting with symptomatic anemia    Problem/Plan - 1:  ·  Problem: Symptomatic anemia.   ·  Plan: New  Hb 6.3 on admission with dizziness/lightheadedness/MURPHY  morphology: rouleaux formation   BM biopsy   s/p 1 unit PRBC- repeat CBC q6 for 4 occurances: will give one more unit   reports dark stools - near black- POCT FOBT result not found- will reorder  Hematology consult : dr. Mcdaniel appreciated   In the meantime will hold eliquis until above results- will likely be able to restart in AM.    Problem/Plan - 2:  ·  Problem: Benign essential HTN.   ·  Plan: Chronic moderate exacerbation  /85  Patient unsure of medication  pharmacy emailed for medrec.    Problem/Plan - 3:  ·  Problem: T2DM (type 2 diabetes mellitus).   ·  Plan: Chronic moderate exacerbation    in ED  Holding home oral agents  LDCS with diabetic diet  a1c and lipid panel in ED.    Problem/Plan - 4:  ·  Problem: DVT (deep venous thrombosis).   ·  Plan: Chronic stable  on eliquis  holding until above results.  
Anemia, CKD, paraprotein likely with MM. Await bone marrow biopsy results. S/p transfusion and Hgb stable last couple of days. Creatinine relatively stable as well. patient okay for d/c from hem/onc perspective and f/u with Dr. Mcdaniel as out-pt for further management.  
71 yr old male presenting with symptomatic anemia    Problem/Plan - 1:  ·  Problem: Symptomatic anemia.   ·  Plan: New  Hb 6.3 on admission with dizziness/lightheadedness/MURPHY  morphology: rouleaux formation   BM biopsy   s/p 1 unit PRBC- repeat CBC q6 for 4 occurances: will give one more unit   reports dark stools - near black- POCT FOBT result not found- will reorder  Hematology consult : dr. Mcdaniel appreciated   In the meantime will hold eliquis until above results- will likely be able to restart in AM.    Problem/Plan - 2:  ·  Problem: Benign essential HTN.   ·  Plan: Chronic moderate exacerbation  /85  Patient unsure of medication  pharmacy emailed for medrec.    Problem/Plan - 3:  ·  Problem: T2DM (type 2 diabetes mellitus).   ·  Plan: Chronic moderate exacerbation    in ED  Holding home oral agents  LDCS with diabetic diet  a1c and lipid panel in ED.    Problem/Plan - 4:  ·  Problem: DVT (deep venous thrombosis).   ·  Plan: Chronic stable  on eliquis  holding until above results.

## 2021-11-29 NOTE — PROGRESS NOTE ADULT - SUBJECTIVE AND OBJECTIVE BOX
Date of service: 11-28-21 @ 21:47      Patient is a 71y old  Male who presents with a chief complaint of anemia (28 Nov 2021 16:02)                                                               INTERVAL HPI/OVERNIGHT EVENTS:    REVIEW OF SYSTEMS:     CONSTITUTIONAL: No weakness, fevers or chills  EYES/ENT: No visual changes , no ear ache   NECK: No pain or stiffness  RESPIRATORY: No cough, wheezing,  No shortness of breath  CARDIOVASCULAR: No chest pain or palpitations  GASTROINTESTINAL: No abdominal pain  . No nausea, vomiting, or hematemesis; No diarrhea or constipation. No melena or hematochezia.  GENITOURINARY: No dysuria, frequency or hematuria  NEUROLOGICAL: No numbness or weakness  SKIN: No itching, burning, rashes, or lesions                                                                                                                                                                                                                                                                                 Medications:  MEDICATIONS  (STANDING):  amLODIPine   Tablet 5 milliGRAM(s) Oral daily  dextrose 40% Gel 15 Gram(s) Oral once  dextrose 5%. 1000 milliLiter(s) (50 mL/Hr) IV Continuous <Continuous>  dextrose 5%. 1000 milliLiter(s) (100 mL/Hr) IV Continuous <Continuous>  dextrose 50% Injectable 25 Gram(s) IV Push once  dextrose 50% Injectable 12.5 Gram(s) IV Push once  dextrose 50% Injectable 25 Gram(s) IV Push once  glucagon  Injectable 1 milliGRAM(s) IntraMuscular once  hydrALAZINE 25 milliGRAM(s) Oral three times a day  insulin lispro (ADMELOG) corrective regimen sliding scale   SubCutaneous three times a day before meals  insulin lispro (ADMELOG) corrective regimen sliding scale   SubCutaneous at bedtime  metoprolol tartrate 25 milliGRAM(s) Oral two times a day    MEDICATIONS  (PRN):  acetaminophen     Tablet .. 650 milliGRAM(s) Oral every 6 hours PRN Temp greater or equal to 38C (100.4F), Mild Pain (1 - 3)  aluminum hydroxide/magnesium hydroxide/simethicone Suspension 30 milliLiter(s) Oral every 4 hours PRN Dyspepsia  melatonin 3 milliGRAM(s) Oral at bedtime PRN Insomnia  ondansetron Injectable 4 milliGRAM(s) IV Push every 8 hours PRN Nausea and/or Vomiting       Allergies    No Known Allergies    Intolerances      Vital Signs Last 24 Hrs  T(C): 36.6 (28 Nov 2021 17:10), Max: 36.7 (28 Nov 2021 00:55)  T(F): 97.9 (28 Nov 2021 17:10), Max: 98.1 (28 Nov 2021 00:55)  HR: 88 (28 Nov 2021 17:10) (88 - 95)  BP: 138/64 (28 Nov 2021 17:10) (127/87 - 159/88)  BP(mean): --  RR: 17 (28 Nov 2021 17:10) (16 - 18)  SpO2: 98% (28 Nov 2021 17:10) (97% - 99%)  CAPILLARY BLOOD GLUCOSE      POCT Blood Glucose.: 121 mg/dL (28 Nov 2021 21:12)  POCT Blood Glucose.: 99 mg/dL (28 Nov 2021 17:51)  POCT Blood Glucose.: 118 mg/dL (28 Nov 2021 12:16)  POCT Blood Glucose.: 100 mg/dL (28 Nov 2021 08:45)      Physical Exam:    Daily     Daily   General:  Well appearing, NAD, not cachetic  HEENT:  Nonicteric, PERRLA  CV:  RRR, S1S2   Lungs:  CTA B/L, no wheezes, rales, rhonchi  Abdomen:  Soft, non-tender, no distended, positive BS  Extremities:  2+ pulses, no c/c, no edema  Skin:  Warm and dry, no rashes  :  No matute  Neuro:  AAOx3, non-focal, grossly intact                                                                                                                                                                                                                                                                                                LABS:                               8.8    4.58  )-----------( 183      ( 28 Nov 2021 10:02 )             30.3                      11-28    132<L>  |  103  |  28<H>  ----------------------------<  137<H>  4.1   |  26  |  1.21    Ca    9.1      28 Nov 2021 10:02  Phos  4.2     11-28  Mg     2.00     11-28                         RADIOLOGY & ADDITIONAL TESTS         I personally reviewed: [  ]EKG   [  ]CXR    [  ] CT      A/P:         Discussed with :     [facility] consultants' Notes   Time spent :  
note based upon encounter from this morning.      Patient is a 71y old  Male who presents with a chief complaint of anemia (29 Nov 2021 18:00)    says that he feels okay. No pain. Denies SOB and cough. No N/V/D. No bleeding. No HA or dizziness. No fevers or chills.    Medication:   acetaminophen     Tablet .. 650 milliGRAM(s) Oral every 6 hours PRN  aluminum hydroxide/magnesium hydroxide/simethicone Suspension 30 milliLiter(s) Oral every 4 hours PRN  amLODIPine   Tablet 5 milliGRAM(s) Oral daily  dextrose 40% Gel 15 Gram(s) Oral once  dextrose 5%. 1000 milliLiter(s) IV Continuous <Continuous>  dextrose 5%. 1000 milliLiter(s) IV Continuous <Continuous>  dextrose 50% Injectable 25 Gram(s) IV Push once  dextrose 50% Injectable 12.5 Gram(s) IV Push once  dextrose 50% Injectable 25 Gram(s) IV Push once  glucagon  Injectable 1 milliGRAM(s) IntraMuscular once  hydrALAZINE 25 milliGRAM(s) Oral three times a day  insulin lispro (ADMELOG) corrective regimen sliding scale   SubCutaneous three times a day before meals  insulin lispro (ADMELOG) corrective regimen sliding scale   SubCutaneous at bedtime  melatonin 3 milliGRAM(s) Oral at bedtime PRN  metoprolol tartrate 25 milliGRAM(s) Oral two times a day  ondansetron Injectable 4 milliGRAM(s) IV Push every 8 hours PRN      Physical exam    T(C): 36.5 (11-29-21 @ 13:25), Max: 36.8 (11-29-21 @ 10:31)  HR: 75 (11-29-21 @ 13:25) (67 - 86)  BP: 129/73 (11-29-21 @ 13:25) (113/68 - 145/79)  RR: 18 (11-29-21 @ 13:25) (16 - 18)  SpO2: 99% (11-29-21 @ 13:25) (96% - 99%)  Wt(kg): --    alert NAD  EOMI anicteric sclera  Cv RRR  Lungs clear B/L  abd soft NT   No LE edema or tenderness    Labs                      8.1    4.32  )-----------( 161      ( 29 Nov 2021 14:31 )             26.5       11-29    131<L>  |  103  |  29<H>  ----------------------------<  129<H>  4.1   |  23  |  1.32<H>    Ca    8.8      29 Nov 2021 10:21  Phos  4.1     11-29  Mg     2.00     11-29    TPro  11.9<H>  /  Alb  3.1<L>  /  TBili  0.3  /  DBili  x   /  AST  31  /  ALT  24  /  AlkPhos  77  11-29      LIVER FUNCTIONS - ( 29 Nov 2021 10:21 )  Alb: 3.1 g/dL / Pro: 11.9 g/dL / ALK PHOS: 77 U/L / ALT: 24 U/L / AST: 31 U/L / GGT: x             [id number]
  Patient is a 71y old  Male who presents with a chief complaint of anemia (27 Nov 2021 21:55)    patient seen and daughters at bedside. Denies pain. Appetite fair. No N/V/D/C or abd pain. No CP or SOB> No HA or dizziness.  No bleeding noted. No dysuria. No fevers or chills.    Medication:   acetaminophen     Tablet .. 650 milliGRAM(s) Oral every 6 hours PRN  aluminum hydroxide/magnesium hydroxide/simethicone Suspension 30 milliLiter(s) Oral every 4 hours PRN  amLODIPine   Tablet 5 milliGRAM(s) Oral daily  dextrose 40% Gel 15 Gram(s) Oral once  dextrose 5%. 1000 milliLiter(s) IV Continuous <Continuous>  dextrose 5%. 1000 milliLiter(s) IV Continuous <Continuous>  dextrose 50% Injectable 25 Gram(s) IV Push once  dextrose 50% Injectable 12.5 Gram(s) IV Push once  dextrose 50% Injectable 25 Gram(s) IV Push once  glucagon  Injectable 1 milliGRAM(s) IntraMuscular once  hydrALAZINE 25 milliGRAM(s) Oral three times a day  insulin lispro (ADMELOG) corrective regimen sliding scale   SubCutaneous three times a day before meals  insulin lispro (ADMELOG) corrective regimen sliding scale   SubCutaneous at bedtime  melatonin 3 milliGRAM(s) Oral at bedtime PRN  metoprolol tartrate 25 milliGRAM(s) Oral two times a day  ondansetron Injectable 4 milliGRAM(s) IV Push every 8 hours PRN      Physical exam    T(C): 36.7 (11-28-21 @ 12:30), Max: 36.7 (11-27-21 @ 17:58)  HR: 88 (11-28-21 @ 12:30) (88 - 99)  BP: 150/84 (11-28-21 @ 12:30) (127/87 - 159/88)  RR: 16 (11-28-21 @ 12:30) (16 - 18)  SpO2: 97% (11-28-21 @ 12:30) (97% - 100%)  Wt(kg): --    alert NAD  EOMI anicteric sclera  Neck Supple No LNA  Cv s1 S2 RRR  Respirations non labored  abd soft NT ND  No LE edema or tenderness    Labs                      8.8    4.58  )-----------( 183      ( 28 Nov 2021 10:02 )             30.3       11-28    132<L>  |  103  |  28<H>  ----------------------------<  137<H>  4.1   |  26  |  1.21    Ca    9.1      28 Nov 2021 10:02  Phos  4.2     11-28  Mg     2.00     11-28            [id number]
Addendum: Markedly elevated IgG level suggests Myeloma.  Await bx results.  Plan for outpt pulse dex, and then rx with rev/velcade/dex.  Case and plan d/w his dtr Linda at length on Sunday
Date of service: 11-27-21 @ 21:55      Patient is a 71y old  Male who presents with a chief complaint of anemia (27 Nov 2021 19:39)                                                               INTERVAL HPI/OVERNIGHT EVENTS:    REVIEW OF SYSTEMS:     CONSTITUTIONAL: No weakness, fevers or chills  RESPIRATORY: No cough, wheezing,  No shortness of breath  CARDIOVASCULAR: No chest pain or palpitations  GASTROINTESTINAL: No abdominal pain  . No nausea, vomiting, or hematemesis; No diarrhea or constipation. No melena or hematochezia.  GENITOURINARY: No dysuria, frequency or hematuria  NEUROLOGICAL: No numbness or weakness                                                                                                                                                                                                                                                                                Medications:  MEDICATIONS  (STANDING):  dextrose 40% Gel 15 Gram(s) Oral once  dextrose 5%. 1000 milliLiter(s) (50 mL/Hr) IV Continuous <Continuous>  dextrose 5%. 1000 milliLiter(s) (100 mL/Hr) IV Continuous <Continuous>  dextrose 50% Injectable 25 Gram(s) IV Push once  dextrose 50% Injectable 12.5 Gram(s) IV Push once  dextrose 50% Injectable 25 Gram(s) IV Push once  glucagon  Injectable 1 milliGRAM(s) IntraMuscular once  insulin lispro (ADMELOG) corrective regimen sliding scale   SubCutaneous three times a day before meals  insulin lispro (ADMELOG) corrective regimen sliding scale   SubCutaneous at bedtime    MEDICATIONS  (PRN):  acetaminophen     Tablet .. 650 milliGRAM(s) Oral every 6 hours PRN Temp greater or equal to 38C (100.4F), Mild Pain (1 - 3)  aluminum hydroxide/magnesium hydroxide/simethicone Suspension 30 milliLiter(s) Oral every 4 hours PRN Dyspepsia  melatonin 3 milliGRAM(s) Oral at bedtime PRN Insomnia  ondansetron Injectable 4 milliGRAM(s) IV Push every 8 hours PRN Nausea and/or Vomiting       Allergies    No Known Allergies    Intolerances      Vital Signs Last 24 Hrs  T(C): 36.7 (27 Nov 2021 21:30), Max: 37 (26 Nov 2021 22:28)  T(F): 98.1 (27 Nov 2021 21:30), Max: 98.6 (26 Nov 2021 22:28)  HR: 98 (27 Nov 2021 21:30) (87 - 99)  BP: 139/72 (27 Nov 2021 21:30) (139/72 - 162/90)  BP(mean): --  RR: 18 (27 Nov 2021 21:30) (17 - 18)  SpO2: 98% (27 Nov 2021 21:30) (98% - 100%)  CAPILLARY BLOOD GLUCOSE      POCT Blood Glucose.: 138 mg/dL (27 Nov 2021 21:09)  POCT Blood Glucose.: 92 mg/dL (27 Nov 2021 17:15)  POCT Blood Glucose.: 112 mg/dL (27 Nov 2021 12:20)  POCT Blood Glucose.: 99 mg/dL (27 Nov 2021 08:48)  POCT Blood Glucose.: 117 mg/dL (26 Nov 2021 23:40)  POCT Blood Glucose.: 119 mg/dL (26 Nov 2021 22:24)      Physical Exam:    Daily Height in cm: 170.18 (27 Nov 2021 21:13)    Daily   General:  NAD  HEENT:  Nonicteric, PERRLA  CV:  RRR, S1S2   Lungs:  CTA B/L, no wheezes, rales, rhonchi  Abdomen:  Soft, non-tender, no distended, positive BS  Extremities: no edema  Neuro:  AAOx3, non-focal, grossly intact                                                                                                                                                                                                                                                                                                LABS:                               7.2    3.55  )-----------( 177      ( 27 Nov 2021 17:22 )             23.1                      11-27    133<L>  |  104  |  23  ----------------------------<  119<H>  4.0   |  24  |  1.25    Ca    8.7      27 Nov 2021 02:59  Phos  4.1     11-26  Mg     2.20     11-26    TPro  12.4<H>  /  Alb  3.2<L>  /  TBili  <0.2  /  DBili  x   /  AST  85<H>  /  ALT  39  /  AlkPhos  71  11-26                       
Date of service: 11-29-21 @ 18:00      Patient is a 71y old  Male who presents with a chief complaint of anemia (29 Nov 2021 15:48)                                                               INTERVAL HPI/OVERNIGHT EVENTS:    REVIEW OF SYSTEMS:     CONSTITUTIONAL: No weakness, fevers or chills  EYES/ENT: No visual changes , no ear ache   NECK: No pain or stiffness  RESPIRATORY: No cough, wheezing,  No shortness of breath  CARDIOVASCULAR: No chest pain or palpitations  GASTROINTESTINAL: No abdominal pain  . No nausea, vomiting, or hematemesis; No diarrhea or constipation. No melena or hematochezia.  GENITOURINARY: No dysuria, frequency or hematuria  NEUROLOGICAL: No numbness or weakness  SKIN: No itching, burning, rashes, or lesions                                                                                                                                                                                                                                                                                 Medications:  MEDICATIONS  (STANDING):  amLODIPine   Tablet 5 milliGRAM(s) Oral daily  dextrose 40% Gel 15 Gram(s) Oral once  dextrose 5%. 1000 milliLiter(s) (50 mL/Hr) IV Continuous <Continuous>  dextrose 5%. 1000 milliLiter(s) (100 mL/Hr) IV Continuous <Continuous>  dextrose 50% Injectable 25 Gram(s) IV Push once  dextrose 50% Injectable 12.5 Gram(s) IV Push once  dextrose 50% Injectable 25 Gram(s) IV Push once  glucagon  Injectable 1 milliGRAM(s) IntraMuscular once  hydrALAZINE 25 milliGRAM(s) Oral three times a day  insulin lispro (ADMELOG) corrective regimen sliding scale   SubCutaneous three times a day before meals  insulin lispro (ADMELOG) corrective regimen sliding scale   SubCutaneous at bedtime  metoprolol tartrate 25 milliGRAM(s) Oral two times a day    MEDICATIONS  (PRN):  acetaminophen     Tablet .. 650 milliGRAM(s) Oral every 6 hours PRN Temp greater or equal to 38C (100.4F), Mild Pain (1 - 3)  aluminum hydroxide/magnesium hydroxide/simethicone Suspension 30 milliLiter(s) Oral every 4 hours PRN Dyspepsia  melatonin 3 milliGRAM(s) Oral at bedtime PRN Insomnia  ondansetron Injectable 4 milliGRAM(s) IV Push every 8 hours PRN Nausea and/or Vomiting       Allergies    No Known Allergies    Intolerances      Vital Signs Last 24 Hrs  T(C): 36.5 (29 Nov 2021 13:25), Max: 36.8 (29 Nov 2021 10:31)  T(F): 97.7 (29 Nov 2021 13:25), Max: 98.2 (29 Nov 2021 10:31)  HR: 75 (29 Nov 2021 13:25) (67 - 86)  BP: 129/73 (29 Nov 2021 13:25) (113/68 - 145/79)  BP(mean): --  RR: 18 (29 Nov 2021 13:25) (16 - 18)  SpO2: 99% (29 Nov 2021 13:25) (96% - 99%)  CAPILLARY BLOOD GLUCOSE      POCT Blood Glucose.: 123 mg/dL (29 Nov 2021 12:06)  POCT Blood Glucose.: 100 mg/dL (29 Nov 2021 08:40)  POCT Blood Glucose.: 121 mg/dL (28 Nov 2021 21:12)      Physical Exam:    Daily     Daily   General:  Well appearing, NAD, not cachetic  HEENT:  Nonicteric, PERRLA  CV:  RRR, S1S2   Lungs:  CTA B/L, no wheezes, rales, rhonchi  Abdomen:  Soft, non-tender, no distended, positive BS  Extremities:  2+ pulses, no c/c, no edema  Skin:  Warm and dry, no rashes  :  No matute  Neuro:  AAOx3, non-focal, grossly intact                                                                                                                                                                                                                                                                                                LABS:                               8.1    4.32  )-----------( 161      ( 29 Nov 2021 14:31 )             26.5                      11-29    131<L>  |  103  |  29<H>  ----------------------------<  129<H>  4.1   |  23  |  1.32<H>    Ca    8.8      29 Nov 2021 10:21  Phos  4.1     11-29  Mg     2.00     11-29    TPro  11.9<H>  /  Alb  3.1<L>  /  TBili  0.3  /  DBili  x   /  AST  31  /  ALT  24  /  AlkPhos  77  11-29                       RADIOLOGY & ADDITIONAL TESTS         I personally reviewed: [  ]EKG   [  ]CXR    [  ] CT      A/P:         Discussed with :     [facility] consultants' Notes   Time spent :

## 2022-01-08 ENCOUNTER — INPATIENT (INPATIENT)
Facility: HOSPITAL | Age: 72
LOS: 11 days | Discharge: HOME | End: 2022-01-20
Attending: GENERAL ACUTE CARE HOSPITAL | Admitting: GENERAL ACUTE CARE HOSPITAL
Payer: MEDICARE

## 2022-01-08 VITALS
RESPIRATION RATE: 22 BRPM | TEMPERATURE: 100 F | HEART RATE: 139 BPM | DIASTOLIC BLOOD PRESSURE: 48 MMHG | HEIGHT: 67 IN | SYSTOLIC BLOOD PRESSURE: 109 MMHG | OXYGEN SATURATION: 100 %

## 2022-01-08 DIAGNOSIS — Z86.718 PERSONAL HISTORY OF OTHER VENOUS THROMBOSIS AND EMBOLISM: ICD-10-CM

## 2022-01-08 DIAGNOSIS — A41.9 SEPSIS, UNSPECIFIED ORGANISM: ICD-10-CM

## 2022-01-08 DIAGNOSIS — C90.00 MULTIPLE MYELOMA NOT HAVING ACHIEVED REMISSION: ICD-10-CM

## 2022-01-08 DIAGNOSIS — Z86.73 PERSONAL HISTORY OF TRANSIENT ISCHEMIC ATTACK (TIA), AND CEREBRAL INFARCTION WITHOUT RESIDUAL DEFICITS: ICD-10-CM

## 2022-01-08 DIAGNOSIS — Z98.89 OTHER SPECIFIED POSTPROCEDURAL STATES: Chronic | ICD-10-CM

## 2022-01-08 DIAGNOSIS — I10 ESSENTIAL (PRIMARY) HYPERTENSION: ICD-10-CM

## 2022-01-08 DIAGNOSIS — R50.9 FEVER, UNSPECIFIED: ICD-10-CM

## 2022-01-08 DIAGNOSIS — U07.1 COVID-19: ICD-10-CM

## 2022-01-08 DIAGNOSIS — R74.01 ELEVATION OF LEVELS OF LIVER TRANSAMINASE LEVELS: ICD-10-CM

## 2022-01-08 DIAGNOSIS — R21 RASH AND OTHER NONSPECIFIC SKIN ERUPTION: ICD-10-CM

## 2022-01-08 DIAGNOSIS — N17.9 ACUTE KIDNEY FAILURE, UNSPECIFIED: ICD-10-CM

## 2022-01-08 LAB
ALBUMIN SERPL ELPH-MCNC: 3.1 G/DL — LOW (ref 3.3–5)
ALP SERPL-CCNC: 171 U/L — HIGH (ref 40–120)
ALT FLD-CCNC: 245 U/L — HIGH (ref 4–41)
ANION GAP SERPL CALC-SCNC: 15 MMOL/L — HIGH (ref 7–14)
ANISOCYTOSIS BLD QL: SLIGHT — SIGNIFICANT CHANGE UP
APTT BLD: 39.4 SEC — HIGH (ref 27–36.3)
AST SERPL-CCNC: 178 U/L — HIGH (ref 4–40)
B PERT DNA SPEC QL NAA+PROBE: SIGNIFICANT CHANGE UP
B PERT+PARAPERT DNA PNL SPEC NAA+PROBE: SIGNIFICANT CHANGE UP
BASE EXCESS BLDV CALC-SCNC: -3.2 MMOL/L — LOW (ref -2–3)
BASE EXCESS BLDV CALC-SCNC: -5.4 MMOL/L — LOW (ref -2–3)
BASOPHILS # BLD AUTO: 0 K/UL — SIGNIFICANT CHANGE UP (ref 0–0.2)
BASOPHILS NFR BLD AUTO: 0 % — SIGNIFICANT CHANGE UP (ref 0–2)
BILIRUB SERPL-MCNC: 0.6 MG/DL — SIGNIFICANT CHANGE UP (ref 0.2–1.2)
BLOOD GAS VENOUS COMPREHENSIVE RESULT: SIGNIFICANT CHANGE UP
BLOOD GAS VENOUS COMPREHENSIVE RESULT: SIGNIFICANT CHANGE UP
BORDETELLA PARAPERTUSSIS (RAPRVP): SIGNIFICANT CHANGE UP
BUN SERPL-MCNC: 50 MG/DL — HIGH (ref 7–23)
C PNEUM DNA SPEC QL NAA+PROBE: SIGNIFICANT CHANGE UP
CALCIUM SERPL-MCNC: 8.5 MG/DL — SIGNIFICANT CHANGE UP (ref 8.4–10.5)
CHLORIDE BLDV-SCNC: 101 MMOL/L — SIGNIFICANT CHANGE UP (ref 96–108)
CHLORIDE BLDV-SCNC: 104 MMOL/L — SIGNIFICANT CHANGE UP (ref 96–108)
CHLORIDE SERPL-SCNC: 99 MMOL/L — SIGNIFICANT CHANGE UP (ref 98–107)
CO2 BLDV-SCNC: 21.9 MMOL/L — LOW (ref 22–26)
CO2 BLDV-SCNC: 21.9 MMOL/L — LOW (ref 22–26)
CO2 SERPL-SCNC: 19 MMOL/L — LOW (ref 22–31)
CREAT SERPL-MCNC: 2.63 MG/DL — HIGH (ref 0.5–1.3)
EOSINOPHIL # BLD AUTO: 0.56 K/UL — HIGH (ref 0–0.5)
EOSINOPHIL NFR BLD AUTO: 6.2 % — HIGH (ref 0–6)
FLUAV SUBTYP SPEC NAA+PROBE: SIGNIFICANT CHANGE UP
FLUBV RNA SPEC QL NAA+PROBE: SIGNIFICANT CHANGE UP
GAS PNL BLDV: 132 MMOL/L — LOW (ref 136–145)
GAS PNL BLDV: 132 MMOL/L — LOW (ref 136–145)
GAS PNL BLDV: SIGNIFICANT CHANGE UP
GAS PNL BLDV: SIGNIFICANT CHANGE UP
GIANT PLATELETS BLD QL SMEAR: PRESENT — SIGNIFICANT CHANGE UP
GLUCOSE BLDV-MCNC: 157 MG/DL — HIGH (ref 70–99)
GLUCOSE BLDV-MCNC: 181 MG/DL — HIGH (ref 70–99)
GLUCOSE SERPL-MCNC: 174 MG/DL — HIGH (ref 70–99)
HADV DNA SPEC QL NAA+PROBE: DETECTED
HCO3 BLDV-SCNC: 21 MMOL/L — LOW (ref 22–29)
HCO3 BLDV-SCNC: 21 MMOL/L — LOW (ref 22–29)
HCOV 229E RNA SPEC QL NAA+PROBE: SIGNIFICANT CHANGE UP
HCOV HKU1 RNA SPEC QL NAA+PROBE: SIGNIFICANT CHANGE UP
HCOV NL63 RNA SPEC QL NAA+PROBE: SIGNIFICANT CHANGE UP
HCOV OC43 RNA SPEC QL NAA+PROBE: SIGNIFICANT CHANGE UP
HCT VFR BLD CALC: 32.8 % — LOW (ref 39–50)
HCT VFR BLDA CALC: 30 % — LOW (ref 39–51)
HCT VFR BLDA CALC: 41 % — SIGNIFICANT CHANGE UP (ref 39–51)
HGB BLD CALC-MCNC: 10 G/DL — LOW (ref 13–17)
HGB BLD CALC-MCNC: 13.7 G/DL — SIGNIFICANT CHANGE UP (ref 13–17)
HGB BLD-MCNC: 10 G/DL — LOW (ref 13–17)
HMPV RNA SPEC QL NAA+PROBE: SIGNIFICANT CHANGE UP
HPIV1 RNA SPEC QL NAA+PROBE: SIGNIFICANT CHANGE UP
HPIV2 RNA SPEC QL NAA+PROBE: SIGNIFICANT CHANGE UP
HPIV3 RNA SPEC QL NAA+PROBE: SIGNIFICANT CHANGE UP
HPIV4 RNA SPEC QL NAA+PROBE: SIGNIFICANT CHANGE UP
HYPOCHROMIA BLD QL: SLIGHT — SIGNIFICANT CHANGE UP
IANC: 4.33 K/UL — SIGNIFICANT CHANGE UP (ref 1.5–8.5)
INR BLD: 2.33 RATIO — HIGH (ref 0.88–1.16)
LACTATE BLDV-MCNC: 2.6 MMOL/L — HIGH (ref 0.5–2)
LACTATE BLDV-MCNC: 4.2 MMOL/L — CRITICAL HIGH (ref 0.5–2)
LYMPHOCYTES # BLD AUTO: 2.87 K/UL — SIGNIFICANT CHANGE UP (ref 1–3.3)
LYMPHOCYTES # BLD AUTO: 31.9 % — SIGNIFICANT CHANGE UP (ref 13–44)
M PNEUMO DNA SPEC QL NAA+PROBE: SIGNIFICANT CHANGE UP
MACROCYTES BLD QL: SLIGHT — SIGNIFICANT CHANGE UP
MAGNESIUM SERPL-MCNC: 2 MG/DL — SIGNIFICANT CHANGE UP (ref 1.6–2.6)
MCHC RBC-ENTMCNC: 25.7 PG — LOW (ref 27–34)
MCHC RBC-ENTMCNC: 30.5 GM/DL — LOW (ref 32–36)
MCV RBC AUTO: 84.3 FL — SIGNIFICANT CHANGE UP (ref 80–100)
MONOCYTES # BLD AUTO: 0.32 K/UL — SIGNIFICANT CHANGE UP (ref 0–0.9)
MONOCYTES NFR BLD AUTO: 3.5 % — SIGNIFICANT CHANGE UP (ref 2–14)
NEUTROPHILS # BLD AUTO: 4.38 K/UL — SIGNIFICANT CHANGE UP (ref 1.8–7.4)
NEUTROPHILS NFR BLD AUTO: 40.7 % — LOW (ref 43–77)
NEUTS BAND # BLD: 8 % — HIGH (ref 0–6)
NRBC # BLD: 4 /100 — HIGH (ref 0–0)
OVALOCYTES BLD QL SMEAR: SLIGHT — SIGNIFICANT CHANGE UP
PCO2 BLDV: 33 MMHG — LOW (ref 42–55)
PCO2 BLDV: 41 MMHG — LOW (ref 42–55)
PH BLDV: 7.31 — LOW (ref 7.32–7.43)
PH BLDV: 7.41 — SIGNIFICANT CHANGE UP (ref 7.32–7.43)
PHOSPHATE SERPL-MCNC: 2.3 MG/DL — LOW (ref 2.5–4.5)
PLAT MORPH BLD: NORMAL — SIGNIFICANT CHANGE UP
PLATELET # BLD AUTO: 386 K/UL — SIGNIFICANT CHANGE UP (ref 150–400)
PLATELET COUNT - ESTIMATE: NORMAL — SIGNIFICANT CHANGE UP
PO2 BLDV: 22 MMHG — SIGNIFICANT CHANGE UP
PO2 BLDV: 39 MMHG — SIGNIFICANT CHANGE UP
POIKILOCYTOSIS BLD QL AUTO: SLIGHT — SIGNIFICANT CHANGE UP
POLYCHROMASIA BLD QL SMEAR: SLIGHT — SIGNIFICANT CHANGE UP
POTASSIUM BLDV-SCNC: 4.6 MMOL/L — SIGNIFICANT CHANGE UP (ref 3.5–5.1)
POTASSIUM BLDV-SCNC: 4.8 MMOL/L — SIGNIFICANT CHANGE UP (ref 3.5–5.1)
POTASSIUM SERPL-MCNC: 4.9 MMOL/L — SIGNIFICANT CHANGE UP (ref 3.5–5.3)
POTASSIUM SERPL-SCNC: 4.9 MMOL/L — SIGNIFICANT CHANGE UP (ref 3.5–5.3)
PROT SERPL-MCNC: 7 G/DL — SIGNIFICANT CHANGE UP (ref 6–8.3)
PROTHROM AB SERPL-ACNC: 25.5 SEC — HIGH (ref 10.6–13.6)
RAPID RVP RESULT: DETECTED
RBC # BLD: 3.89 M/UL — LOW (ref 4.2–5.8)
RBC # FLD: 21.9 % — HIGH (ref 10.3–14.5)
RBC BLD AUTO: ABNORMAL
RSV RNA SPEC QL NAA+PROBE: SIGNIFICANT CHANGE UP
RV+EV RNA SPEC QL NAA+PROBE: SIGNIFICANT CHANGE UP
SAO2 % BLDV: 32.4 % — SIGNIFICANT CHANGE UP
SAO2 % BLDV: 62.7 % — SIGNIFICANT CHANGE UP
SARS-COV-2 RNA SPEC QL NAA+PROBE: DETECTED
SODIUM SERPL-SCNC: 133 MMOL/L — LOW (ref 135–145)
TROPONIN T, HIGH SENSITIVITY RESULT: 29 NG/L — SIGNIFICANT CHANGE UP
TROPONIN T, HIGH SENSITIVITY RESULT: 32 NG/L — SIGNIFICANT CHANGE UP
VARIANT LYMPHS # BLD: 9.7 % — HIGH (ref 0–6)
WBC # BLD: 9 K/UL — SIGNIFICANT CHANGE UP (ref 3.8–10.5)
WBC # FLD AUTO: 9 K/UL — SIGNIFICANT CHANGE UP (ref 3.8–10.5)

## 2022-01-08 PROCEDURE — 71045 X-RAY EXAM CHEST 1 VIEW: CPT | Mod: 26

## 2022-01-08 PROCEDURE — 99291 CRITICAL CARE FIRST HOUR: CPT

## 2022-01-08 PROCEDURE — 78580 LUNG PERFUSION IMAGING: CPT | Mod: 26

## 2022-01-08 PROCEDURE — 76705 ECHO EXAM OF ABDOMEN: CPT | Mod: 26

## 2022-01-08 PROCEDURE — 99223 1ST HOSP IP/OBS HIGH 75: CPT

## 2022-01-08 RX ORDER — METOPROLOL TARTRATE 50 MG
25 TABLET ORAL ONCE
Refills: 0 | Status: COMPLETED | OUTPATIENT
Start: 2022-01-08 | End: 2022-01-08

## 2022-01-08 RX ORDER — METOPROLOL TARTRATE 50 MG
25 TABLET ORAL
Refills: 0 | Status: DISCONTINUED | OUTPATIENT
Start: 2022-01-08 | End: 2022-01-10

## 2022-01-08 RX ORDER — TAMSULOSIN HYDROCHLORIDE 0.4 MG/1
0.4 CAPSULE ORAL AT BEDTIME
Refills: 0 | Status: DISCONTINUED | OUTPATIENT
Start: 2022-01-08 | End: 2022-01-20

## 2022-01-08 RX ORDER — CEFEPIME 1 G/1
1000 INJECTION, POWDER, FOR SOLUTION INTRAMUSCULAR; INTRAVENOUS EVERY 24 HOURS
Refills: 0 | Status: DISCONTINUED | OUTPATIENT
Start: 2022-01-09 | End: 2022-01-10

## 2022-01-08 RX ORDER — POTASSIUM PHOSPHATE, MONOBASIC POTASSIUM PHOSPHATE, DIBASIC 236; 224 MG/ML; MG/ML
15 INJECTION, SOLUTION INTRAVENOUS ONCE
Refills: 0 | Status: COMPLETED | OUTPATIENT
Start: 2022-01-08 | End: 2022-01-08

## 2022-01-08 RX ORDER — CEFEPIME 1 G/1
1000 INJECTION, POWDER, FOR SOLUTION INTRAMUSCULAR; INTRAVENOUS ONCE
Refills: 0 | Status: COMPLETED | OUTPATIENT
Start: 2022-01-08 | End: 2022-01-08

## 2022-01-08 RX ORDER — ACETAMINOPHEN 500 MG
650 TABLET ORAL EVERY 6 HOURS
Refills: 0 | Status: DISCONTINUED | OUTPATIENT
Start: 2022-01-08 | End: 2022-01-11

## 2022-01-08 RX ORDER — SODIUM CHLORIDE 9 MG/ML
1000 INJECTION, SOLUTION INTRAVENOUS ONCE
Refills: 0 | Status: DISCONTINUED | OUTPATIENT
Start: 2022-01-08 | End: 2022-01-08

## 2022-01-08 RX ORDER — HEPARIN SODIUM 5000 [USP'U]/ML
6500 INJECTION INTRAVENOUS; SUBCUTANEOUS ONCE
Refills: 0 | Status: COMPLETED | OUTPATIENT
Start: 2022-01-08 | End: 2022-01-08

## 2022-01-08 RX ORDER — HEPARIN SODIUM 5000 [USP'U]/ML
6500 INJECTION INTRAVENOUS; SUBCUTANEOUS EVERY 6 HOURS
Refills: 0 | Status: DISCONTINUED | OUTPATIENT
Start: 2022-01-08 | End: 2022-01-09

## 2022-01-08 RX ORDER — FINASTERIDE 5 MG/1
5 TABLET, FILM COATED ORAL DAILY
Refills: 0 | Status: DISCONTINUED | OUTPATIENT
Start: 2022-01-08 | End: 2022-01-20

## 2022-01-08 RX ORDER — HEPARIN SODIUM 5000 [USP'U]/ML
INJECTION INTRAVENOUS; SUBCUTANEOUS
Qty: 25000 | Refills: 0 | Status: DISCONTINUED | OUTPATIENT
Start: 2022-01-08 | End: 2022-01-09

## 2022-01-08 RX ORDER — DIPHENHYDRAMINE HCL 50 MG
50 CAPSULE ORAL ONCE
Refills: 0 | Status: COMPLETED | OUTPATIENT
Start: 2022-01-08 | End: 2022-01-08

## 2022-01-08 RX ORDER — HEPARIN SODIUM 5000 [USP'U]/ML
3000 INJECTION INTRAVENOUS; SUBCUTANEOUS EVERY 6 HOURS
Refills: 0 | Status: DISCONTINUED | OUTPATIENT
Start: 2022-01-08 | End: 2022-01-09

## 2022-01-08 RX ORDER — ACETAMINOPHEN 500 MG
1000 TABLET ORAL ONCE
Refills: 0 | Status: COMPLETED | OUTPATIENT
Start: 2022-01-08 | End: 2022-01-08

## 2022-01-08 RX ORDER — ACETAMINOPHEN 500 MG
975 TABLET ORAL ONCE
Refills: 0 | Status: DISCONTINUED | OUTPATIENT
Start: 2022-01-08 | End: 2022-01-08

## 2022-01-08 RX ORDER — SODIUM CHLORIDE 9 MG/ML
1000 INJECTION INTRAMUSCULAR; INTRAVENOUS; SUBCUTANEOUS ONCE
Refills: 0 | Status: COMPLETED | OUTPATIENT
Start: 2022-01-08 | End: 2022-01-08

## 2022-01-08 RX ORDER — ACETAMINOPHEN 500 MG
650 TABLET ORAL ONCE
Refills: 0 | Status: DISCONTINUED | OUTPATIENT
Start: 2022-01-08 | End: 2022-01-08

## 2022-01-08 RX ORDER — SODIUM CHLORIDE 9 MG/ML
1000 INJECTION, SOLUTION INTRAVENOUS ONCE
Refills: 0 | Status: COMPLETED | OUTPATIENT
Start: 2022-01-08 | End: 2022-01-08

## 2022-01-08 RX ORDER — SODIUM CHLORIDE 9 MG/ML
1000 INJECTION INTRAMUSCULAR; INTRAVENOUS; SUBCUTANEOUS
Refills: 0 | Status: DISCONTINUED | OUTPATIENT
Start: 2022-01-08 | End: 2022-01-10

## 2022-01-08 RX ORDER — CEFEPIME 1 G/1
INJECTION, POWDER, FOR SOLUTION INTRAMUSCULAR; INTRAVENOUS
Refills: 0 | Status: DISCONTINUED | OUTPATIENT
Start: 2022-01-08 | End: 2022-01-10

## 2022-01-08 RX ADMIN — FINASTERIDE 5 MILLIGRAM(S): 5 TABLET, FILM COATED ORAL at 21:59

## 2022-01-08 RX ADMIN — CEFEPIME 1000 MILLIGRAM(S): 1 INJECTION, POWDER, FOR SOLUTION INTRAMUSCULAR; INTRAVENOUS at 18:27

## 2022-01-08 RX ADMIN — TAMSULOSIN HYDROCHLORIDE 0.4 MILLIGRAM(S): 0.4 CAPSULE ORAL at 21:56

## 2022-01-08 RX ADMIN — HEPARIN SODIUM 1400 UNIT(S)/HR: 5000 INJECTION INTRAVENOUS; SUBCUTANEOUS at 15:06

## 2022-01-08 RX ADMIN — SODIUM CHLORIDE 1000 MILLILITER(S): 9 INJECTION INTRAMUSCULAR; INTRAVENOUS; SUBCUTANEOUS at 13:14

## 2022-01-08 RX ADMIN — Medication 400 MILLIGRAM(S): at 12:41

## 2022-01-08 RX ADMIN — SODIUM CHLORIDE 1000 MILLILITER(S): 9 INJECTION, SOLUTION INTRAVENOUS at 14:54

## 2022-01-08 RX ADMIN — HEPARIN SODIUM 6500 UNIT(S): 5000 INJECTION INTRAVENOUS; SUBCUTANEOUS at 15:06

## 2022-01-08 RX ADMIN — POTASSIUM PHOSPHATE, MONOBASIC POTASSIUM PHOSPHATE, DIBASIC 62.5 MILLIMOLE(S): 236; 224 INJECTION, SOLUTION INTRAVENOUS at 13:14

## 2022-01-08 RX ADMIN — Medication 50 MILLIGRAM(S): at 12:53

## 2022-01-08 RX ADMIN — Medication 25 MILLIGRAM(S): at 17:40

## 2022-01-08 RX ADMIN — SODIUM CHLORIDE 75 MILLILITER(S): 9 INJECTION INTRAMUSCULAR; INTRAVENOUS; SUBCUTANEOUS at 18:28

## 2022-01-08 NOTE — H&P ADULT - HISTORY OF PRESENT ILLNESS
Pt is 71M HTN remote Hx DVT (eliquis 2.5 bid), CKD2-3, hx CVA with R sided deficit, DM2, MM undergoing chemotherapy (scheduled for 1/4/22)  which has been delayed by COVID-19 positive test 12/31/21.  Vaccinated for COVID.  Pt developed diffuse itchy rash on arms, trunk, face, with lip swelling, no sensation of throat closing up or change in ability to swallow.  For past two days fevers and difficulty breathing, sensation of chest tightness.  Febrile to 104 in ED.  Pt is 71M HTN remote Hx DVT (eliquis 2.5 bid), CKD2-3, hx CVA with R sided deficit, prediabetes, MM undergoing chemotherapy (scheduled for 1/4/22)  which has been delayed by COVID-19 positive test 12/31/21.  Vaccinated for COVID.  Pt developed diffuse itchy rash on arms, trunk, face, with lip swelling, no sensation of throat closing up or change in ability to swallow.  For past two days fevers and difficulty breathing, sensation of chest tightness.  Reports poor appetite.  Febrile to 104 in ED. Treated with tylenol.

## 2022-01-08 NOTE — ED ADULT TRIAGE NOTE - CHIEF COMPLAINT QUOTE
tested + for covid on home kit Dec 31 st c/o some lip swelling top and bottom (pt on Eliquis ) has hx of multiple myeloma

## 2022-01-08 NOTE — ED PROVIDER NOTE - ATTENDING CONTRIBUTION TO CARE
Dr. Marquez:  I have personally performed a face to face bedside history and physical examination of this patient. I have discussed the history, examination, review of systems, assessment and plan of management with the resident. I have reviewed the electronic medical record and amended it to reflect my history, review of systems, physical exam, assessment and plan.    71M h/o recently diagnosed multiple myeloma, anemia, DVT on Eliquis, CKD, CVA with R deficit, DM, presents with worsening shortness of breath.  Home covid test positive 12/31/21.  +Associated chest pain with SOB.  Additionally, patient notes diffuse erythematous pruritis rash to upper extremities and torso, as well as two days of lip swelling.  Denies new substance/food.    Exam:  - nad  - tachy, regular  - ctab  - abd soft ntnd  - +erythematous non-blanching purpuric rash to forearms, +erythema to torso with some urticaria on hands; no palmar involvement    A/P  - fever, SOB, likely related to COVID; rash, possibly covid related, consider low platelet  - sepsis work up, CXR to eval superimposed pneumonia

## 2022-01-08 NOTE — ED PROVIDER NOTE - CLINICAL SUMMARY MEDICAL DECISION MAKING FREE TEXT BOX
71 yr old male with PMH of multiple myeloma, anemia, DVT on Eliquis, CKD not on dialysis, CVA w/ R sided deficit and T2DM presents with shortness of breath. Given diffuse papules UE, erythema trunk/back w lip swelling could consider thrombocytopenia vs complication of MM vs allergic reaction vs other etiology. Not on an ACE-i. Rectal temp 104. Plan to obtain basic blood work. give IVF, sepsis w/u, tylenol, trop, reassess.

## 2022-01-08 NOTE — H&P ADULT - PROBLEM SELECTOR PLAN 2
No hypoxia, does not meet criteria for dex/remdes  Vaccinated  Monitor O2 sats  Positive at least 1 week

## 2022-01-08 NOTE — H&P ADULT - NSHPREVIEWOFSYSTEMS_GEN_ALL_CORE
CONSTITUTIONAL: + weight loss, fever  EYES: No eye pain, visual disturbances, or discharge  ENMT:  No difficulty hearing, tinnitus, vertigo; No sinus or throat pain  NECK: No pain or stiffness  RESPIRATORY: see HPI  CARDIOVASCULAR: see HPI  GASTROINTESTINAL: No abdominal or epigastric pain. No nausea, vomiting, or hematemesis; No diarrhea or constipation. No melena or hematochezia.  GENITOURINARY: No dysuria, frequency, hematuria, or incontinence  NEUROLOGICAL: No headaches, memory loss, loss of strength, numbness, or tremors  SKIN: see HPI  LYMPH NODES: No enlarged glands  ENDOCRINE: No heat or cold intolerance; No hair loss  MUSCULOSKELETAL: No joint pain or swelling; No muscle, back, or extremity pain  PSYCHIATRIC: No depression, anxiety, mood swings, or difficulty sleeping  HEME/LYMPH: No easy bruising, or bleeding gums  ALLERY AND IMMUNOLOGIC: No hives or eczema

## 2022-01-08 NOTE — H&P ADULT - PROBLEM SELECTOR PLAN 6
On prophylactic dose eliquis  Heparin gtt pending r/o VTE  d-dimer not significantly elevated, VTE suspicion less likely  Can resume prophylactic dose heparin or lovenox if VTE ruled out, (choice depends on recovery of renal function)

## 2022-01-08 NOTE — H&P ADULT - ASSESSMENT
71M HTN remote Hx DVT (eliquis 2.5 bid), CKD2-3, hx CVA with R sided deficit, pre-diabetes, MM undergoing chemotherapy (scheduled for 1/4/22)  which has been delayed by COVID-19 positive test 12/31/21 presents with dyspnea without hypoxia, tachycardia, JULIA, transaminitis, sepsis due to viral etiology (COVID-19 with adenovirus) vs bacterial etiology

## 2022-01-08 NOTE — ED ADULT NURSE NOTE - OBJECTIVE STATEMENT
Pt received, Creole speaking, daughter at bedside translating per pt request. Per pt daughter, pt tested positive for Covid on 12/31. Swelling of lips began around time of covid diagnosis, no tongue/throat swelling, airway patent.  Pt also c/o weakness, fever. Per daughter, pt has been unable to care for self. Mottled skin discoloration noted to b/l upper extremities. Denies SOB but tachypnea noted. 18g ultrasound guided IV placed in R arm by MD but became infiltrated so was removed and warm compress applied. 20g ultrasound guided IV placed in L arm by MD, flushes well, +blood return. labs drawn as ordered. Meds administered as ordered see EMAR. Awaitng ultrasound.

## 2022-01-08 NOTE — ED PROVIDER NOTE - NS ED ROS FT
Gen: No C/NS; +fever   Head: No falls  Eyes: No changes in vision   Resp: No cough +trouble breathing  Cardiovascular: +chest pain +palpitation  Gastroenteric: No N/V/D  :  No change in urine output, dysuria or hematuria   MS: No joint or muscle pain  Neuro: No headache   Skin: +rash

## 2022-01-08 NOTE — ED PROVIDER NOTE - PHYSICAL EXAMINATION
G: NAD, cooperative with exam   H: NCAT  E: EOMI, no conjunctival pallor   M: Mucous membranes moist   R: CTABL, nWOB  C: Nl S1/S2, no mrg  A: Soft, NT/ND, no rebound/guarding   MSK: no calf tenderness, no LE edema  Skin: diffuse papular rash throughout BL UE, erythema in trunk/back

## 2022-01-08 NOTE — ED PROCEDURE NOTE - NS_EDPROVIDERDISPOUSERTYPE_ED_A_ED
Speech Therapy Daily Treatment    Visit Count: 6  Referred by: Beverly Salas MD, next visit (if known): 5/20/20  Medical Diagnosis (from order):  Aphasia, dysphagia  Treatment Diagnosis: Dysarthria and Anarthria  Dysphonia    Date of onset/injury: 10/5/18  Precautions: Cerebrovascular Accident (CVA) , Swallowing precautions  Relevant History/Medications: see details in OP ST eval of 1/16/20  Relevant Tests: Videofluoroscopic swallow study, Endoscopy (ENT/ laryngoscopy)-- all in early to mid 2019    SUBJECTIVE:Â    Pt and spouse report that pt did HEP exercises a few times at home since last session    Spouse Domitila Friedman present for session    Pain: patient reports pain is not an issue/concern    OBJECTIVE:Â    AAC= Augmentative and alternative communication  SGD= Speech Generating Device  VPI= Velopharyngeal incompetency    Treatment     Treatment of speech and voice for Dysarthria and dysphonia:    Potential AAC use:  Pt already has the speech assistant Ap on her smart phone that she uses periodically. Pt indicated that her primary goal is NOT in the direction of acquiring a dedicated  AAC device (Augmentative and Alternative Communication) at this time. However Pt and spouse ARE interested in instrumental re-assessment of vocal fold function. ENT endoscopy and/or videostroboscopy may be helpful; SLP to reach out to pt's prior ENT for opinion    Training/instruction provided in context of speech and breath flow/ breath support activities:    Vocal Intensity: see below; Overarticulation:  Recommended, particularly in counting activity/ HEP and conversation. Respiratory Support: see below  Hyolaryngeal massage:  Not addressed this session    Speech intelligibility:   Barriers to intelligibility include:  low volume/ inconsistent vocalization; hypernasality/ nasal airflow; high pitch, reduced articulatory precision.   All aspects addressed in current vs recent session(s) and related education and HEP has been "provided. Intelligibility estimated near 80% for pt's words and short utterances in known contexts IN A [address] LISTENER    Motor speech affected by Voice Impairments: Yes, Decreased Vocal Intensity, Impaired Phonation     Vocal Intensity Training:   Completed in context of other described activities. Some lower pitch phonation emerging with /a/ and conversation, with cues from SLP    Respiration; respiration and phonation:  Coordination of respiration and phonation:  impaired (moderately); Strategies taught/ discussed at this vs prior sessions:   deeper breath, breathing before phonation attempt; consistent use of breath support with speech; more frequent breathing while speaking (using 2-3 words per breath) and slow rate to allow this. This session completed previously developed SET OF EXERCISES to facilitate breath support and coordination of breath with voice/ speech  Pt needed moderate cues to recall the 4 exercises that were also added to her HEP. Exercises as listed below completed with mod-max cues  Â· Blowing bubbles into water x5 and count each trail number aloud:  pt did achieve 5 reps. Â· Loud /a/ prolonged for estimated ave near 7 x5 reps and counted each trail number aloud   Â· Blow tissue away from mouth and count each trail number aloud:  Minimal but some tissue movement was achieved for each trial  Â· Count aloud with voice (not whispered) 1-25, pause after every 5 numbers:  Gradually improving vocal endurance and loudness (although remains soft)    Carryover in context of addressing pt goal of ""speaking with friends\"" again discussed with pt and spouse; pt to consider it for future HEP tasks. Spouse reported that communication with/ phone call to pt's friend Ressie Grammes may be good means to approach pt's goal.    OROMOTOR EXERCISES: not completed in session    Pt should also continue with prior lingual and oral movement exercises (that were paired with a phonation/ speech task).  " "   Pt likely continues to need cues for accuracy and degree of effort; SO provides some cues at home as able    Communication Partner Training: yes    Current Home Program (not performed this date except as noted above):  Exercises: Oral Motor Exercises  Diaphragmatic Breathing  Vocal Loudness Exercises   Carryover activity    ASSESSMENT:Â    Mild improvements in achieving vocalization in short utterances and during dedicated exercises has been noted. Pt indicated that her primary goal is NOT in the direction of acquiring a dedicated  AAC device (Augmentative and Alternative Communication) at this time. However Pt and spouse ARE interested in instrumental re-assessment of vocal fold function. ENT endoscopy and/or videostroboscopy may be helpful; SLP to reach out to pt's prior ENT for opinion    Pt was NOT able to attend a planned social outing since last ST visit due to weather; other means for addressing pt's goal were proposed. ""Being able to speak with friends\"" was the main goal identified by pt at start of this course of ST.  Related HEP ideas provided. Pt appears to be good candidate for continued skilled ST    Result of above outlined education: Verbalizes understanding and Needs reinforcement    Goals: To be obtained by end of this plan of care:  1. PatientÂ will demonstrate and report measurable gains inÂ verbal/ vocalÂ self expression and use ofÂ compensatory strategies to communicate wants and needs effectively to family, friends, and caregivers for improved independence and socialization. 2. Patient willÂ demonstrate improved coordination of phonation and phonation + speech to adequate (audible at 3-6 feet in quiet room) on 80% of attempts via trained use of a variety of vocal facilitation strategies. 3. Pt will complete oral and motor speech/ articulation exercises on intensive basis and demonstrate improved range, force, and or precision of movement.   Â     PLAN:Â    Frequency/Duration: 2 times per " week for 12 weeks with tapering as the patient progresses for an estimated total of 20 visits  Suggestions for next session as indicated:  Check pt success with carryover activity and use of HEP; continue to add activities of communicating with friends into HEP for addressing pt goals and carryover. Check use of new HEP exercises. Investigate pt interest in AAC and/or SGD assessment. Speech Assistant; Text to talk, or other Ap  OTHER:  Visi-pitch, masking, voice games, encourage phonation, articulation, carryover, advance HEP    Procedures and total treatment time documented in Time Entry flowsheet. Attending Attestation (For Attendings USE Only)...

## 2022-01-08 NOTE — ED PROCEDURE NOTE - ATTENDING CONTRIBUTION TO CARE
Dr. Marquez:  I have personally performed a face to face bedside history and physical examination of this patient. I have discussed the history, examination, review of systems, assessment and plan of management with the resident. I have reviewed the electronic medical record and amended it to reflect my history, review of systems, physical exam, assessment and plan.

## 2022-01-08 NOTE — ED ADULT NURSE NOTE - NSIMPLEMENTINTERV_GEN_ALL_ED
Implemented All Fall with Harm Risk Interventions:  [place] to call system. Call bell, personal items and telephone within reach. Instruct patient to call for assistance. Room bathroom lighting operational. Non-slip footwear when patient is off stretcher. Physically safe environment: no spills, clutter or unnecessary equipment. Stretcher in lowest position, wheels locked, appropriate side rails in place. Provide visual cue, wrist band, yellow gown, etc. Monitor gait and stability. Monitor for mental status changes and reorient to person, place, and time. Review medications for side effects contributing to fall risk. Reinforce activity limits and safety measures with patient and family. Provide visual clues: red socks.

## 2022-01-08 NOTE — ED PROVIDER NOTE - OBJECTIVE STATEMENT
71 yr old male with PMH of multiple myeloma, anemia, DVT on Eliquis, CKD not on dialysis, CVA w/ R sided deficit and T2DM presents with shortness of breath. At home covid testing positive on 12/31/21. Two days of lip swelling and diffuse erythematous rash throughout upper ext, trunk, back. No new medications. Last tx for MM 12/22, scheduled for rpt tx 01/04 however unable to attend secondary to COVID. Pt endorsing palpitations, SOB and chest pain intermittent in nature. No prior h/o DVT/PE.

## 2022-01-08 NOTE — H&P ADULT - PROBLEM SELECTOR PLAN 1
Presents with fever, tachycardia and JULIA with COVID-19 (positive since 12/31) and adenovirus  May be viral sepsis, however with MM and recent chemo will cover with broad spectrum abx (renally dosed cefepime)  --f/u blood cx  --UA, urine cx have not been sent, if retaining urine, catheterize for sample  --s/p 2L IVF bolus, continue IV hydration

## 2022-01-08 NOTE — H&P ADULT - NSHPPHYSICALEXAM_GEN_ALL_CORE
Vital Signs Last 24 Hrs  T(C): 38.4 (08 Jan 2022 15:30), Max: 40.2 (08 Jan 2022 12:08)  T(F): 101.1 (08 Jan 2022 15:30), Max: 104.3 (08 Jan 2022 12:08)  HR: 109 (08 Jan 2022 15:30) (109 - 140)  BP: 102/54 (08 Jan 2022 15:30) (102/54 - 112/58)  BP(mean): --  RR: 21 (08 Jan 2022 15:30) (20 - 22)  SpO2: 99% (08 Jan 2022 15:30) (99% - 100%)  GENERAL: NAD, well-developed  HEAD:  Atraumatic, Normocephalic, lip swelling  EYES: EOMI, PERRLA, conjunctiva and sclera clear  NECK: Supple, No JVD  CHEST/LUNG: Clear to auscultation bilaterally; No wheeze  HEART: Regular rate and rhythm tachy; No murmurs, rubs, or gallops  ABDOMEN: Soft, Nontender, Nondistended; Bowel sounds present  EXTREMITIES:  2+ Peripheral Pulses, No clubbing, cyanosis, or edema  PSYCH: AAOx3  NEUROLOGY: non-focal  SKIN: extensive macular rash on UE, trunk, face

## 2022-01-08 NOTE — H&P ADULT - NSICDXPASTMEDICALHX_GEN_ALL_CORE_FT
PAST MEDICAL HISTORY:  Anemia of unknown etiology in 2012     Benign essential hypertension     BPH (benign prostatic hypertrophy)     CVA (cerebral vascular accident)     Diabetes mellitus pre-diabetes, diet controlled    DVT (deep venous thrombosis)     Hx of hyperlipidemia     Multiple myeloma     Obesity     Obstructive uropathy

## 2022-01-08 NOTE — H&P ADULT - PROBLEM SELECTOR PLAN 4
Possibly due to COVID-19  Oncologist Dr Mcdaniel consulted, ?reaction to chemo  Consider derm consult

## 2022-01-09 LAB
ALBUMIN SERPL ELPH-MCNC: 2.6 G/DL — LOW (ref 3.3–5)
ALP SERPL-CCNC: 159 U/L — HIGH (ref 40–120)
ALT FLD-CCNC: 473 U/L — HIGH (ref 4–41)
ANION GAP SERPL CALC-SCNC: 15 MMOL/L — HIGH (ref 7–14)
APPEARANCE UR: ABNORMAL
APTT BLD: 74.8 SEC — HIGH (ref 27–36.3)
APTT BLD: 94.6 SEC — HIGH (ref 27–36.3)
AST SERPL-CCNC: 490 U/L — HIGH (ref 4–40)
BACTERIA # UR AUTO: ABNORMAL
BASOPHILS # BLD AUTO: 0.07 K/UL — SIGNIFICANT CHANGE UP (ref 0–0.2)
BASOPHILS NFR BLD AUTO: 0.7 % — SIGNIFICANT CHANGE UP (ref 0–2)
BILIRUB SERPL-MCNC: 0.8 MG/DL — SIGNIFICANT CHANGE UP (ref 0.2–1.2)
BILIRUB UR-MCNC: NEGATIVE — SIGNIFICANT CHANGE UP
BUN SERPL-MCNC: 60 MG/DL — HIGH (ref 7–23)
CALCIUM SERPL-MCNC: 7.7 MG/DL — LOW (ref 8.4–10.5)
CHLORIDE SERPL-SCNC: 101 MMOL/L — SIGNIFICANT CHANGE UP (ref 98–107)
CHLORIDE UR-SCNC: <20 MMOL/L — SIGNIFICANT CHANGE UP
CHOLEST SERPL-MCNC: 122 MG/DL — SIGNIFICANT CHANGE UP
CO2 SERPL-SCNC: 19 MMOL/L — LOW (ref 22–31)
COLOR SPEC: YELLOW — SIGNIFICANT CHANGE UP
CREAT ?TM UR-MCNC: 200 MG/DL — SIGNIFICANT CHANGE UP
CREAT SERPL-MCNC: 2.32 MG/DL — HIGH (ref 0.5–1.3)
DIFF PNL FLD: ABNORMAL
EOSINOPHIL # BLD AUTO: 0.65 K/UL — HIGH (ref 0–0.5)
EOSINOPHIL NFR BLD AUTO: 6.8 % — HIGH (ref 0–6)
EPI CELLS # UR: >27 /HPF — HIGH (ref 0–5)
GLUCOSE SERPL-MCNC: 148 MG/DL — HIGH (ref 70–99)
GLUCOSE UR QL: NEGATIVE — SIGNIFICANT CHANGE UP
HCT VFR BLD CALC: 29 % — LOW (ref 39–50)
HCT VFR BLD CALC: 32.5 % — LOW (ref 39–50)
HDLC SERPL-MCNC: 23 MG/DL — LOW
HGB BLD-MCNC: 9 G/DL — LOW (ref 13–17)
HGB BLD-MCNC: 9.6 G/DL — LOW (ref 13–17)
HYALINE CASTS # UR AUTO: 0 /LPF — SIGNIFICANT CHANGE UP (ref 0–7)
IANC: 4.28 K/UL — SIGNIFICANT CHANGE UP (ref 1.5–8.5)
IMM GRANULOCYTES NFR BLD AUTO: 0.6 % — SIGNIFICANT CHANGE UP (ref 0–1.5)
KETONES UR-MCNC: ABNORMAL
LEUKOCYTE ESTERASE UR-ACNC: ABNORMAL
LIPID PNL WITH DIRECT LDL SERPL: 55 MG/DL — SIGNIFICANT CHANGE UP
LYMPHOCYTES # BLD AUTO: 3.21 K/UL — SIGNIFICANT CHANGE UP (ref 1–3.3)
LYMPHOCYTES # BLD AUTO: 33.7 % — SIGNIFICANT CHANGE UP (ref 13–44)
MCHC RBC-ENTMCNC: 25.9 PG — LOW (ref 27–34)
MCHC RBC-ENTMCNC: 26.2 PG — LOW (ref 27–34)
MCHC RBC-ENTMCNC: 29.5 GM/DL — LOW (ref 32–36)
MCHC RBC-ENTMCNC: 31 GM/DL — LOW (ref 32–36)
MCV RBC AUTO: 84.5 FL — SIGNIFICANT CHANGE UP (ref 80–100)
MCV RBC AUTO: 87.8 FL — SIGNIFICANT CHANGE UP (ref 80–100)
MONOCYTES # BLD AUTO: 1.25 K/UL — HIGH (ref 0–0.9)
MONOCYTES NFR BLD AUTO: 13.1 % — SIGNIFICANT CHANGE UP (ref 2–14)
NEUTROPHILS # BLD AUTO: 4.28 K/UL — SIGNIFICANT CHANGE UP (ref 1.8–7.4)
NEUTROPHILS NFR BLD AUTO: 45.1 % — SIGNIFICANT CHANGE UP (ref 43–77)
NITRITE UR-MCNC: NEGATIVE — SIGNIFICANT CHANGE UP
NON HDL CHOLESTEROL: 99 MG/DL — SIGNIFICANT CHANGE UP
NRBC # BLD: 3 /100 WBCS — SIGNIFICANT CHANGE UP
NRBC # BLD: 4 /100 WBCS — SIGNIFICANT CHANGE UP
NRBC # FLD: 0.32 K/UL — HIGH
NRBC # FLD: 0.35 K/UL — HIGH
PH UR: 6 — SIGNIFICANT CHANGE UP (ref 5–8)
PLATELET # BLD AUTO: 315 K/UL — SIGNIFICANT CHANGE UP (ref 150–400)
PLATELET # BLD AUTO: 385 K/UL — SIGNIFICANT CHANGE UP (ref 150–400)
POTASSIUM SERPL-MCNC: SIGNIFICANT CHANGE UP MMOL/L (ref 3.5–5.3)
POTASSIUM SERPL-SCNC: SIGNIFICANT CHANGE UP MMOL/L (ref 3.5–5.3)
POTASSIUM UR-SCNC: 56.6 MMOL/L — SIGNIFICANT CHANGE UP
PROT SERPL-MCNC: 6.5 G/DL — SIGNIFICANT CHANGE UP (ref 6–8.3)
PROT UR-MCNC: ABNORMAL
RBC # BLD: 3.43 M/UL — LOW (ref 4.2–5.8)
RBC # BLD: 3.7 M/UL — LOW (ref 4.2–5.8)
RBC # FLD: 22.3 % — HIGH (ref 10.3–14.5)
RBC # FLD: 22.6 % — HIGH (ref 10.3–14.5)
RBC CASTS # UR COMP ASSIST: 2 /HPF — SIGNIFICANT CHANGE UP (ref 0–4)
SODIUM SERPL-SCNC: 135 MMOL/L — SIGNIFICANT CHANGE UP (ref 135–145)
SODIUM UR-SCNC: <20 MMOL/L — SIGNIFICANT CHANGE UP
SP GR SPEC: 1.02 — SIGNIFICANT CHANGE UP (ref 1–1.05)
TRIGL SERPL-MCNC: 219 MG/DL — HIGH
UROBILINOGEN FLD QL: SIGNIFICANT CHANGE UP
WBC # BLD: 10.33 K/UL — SIGNIFICANT CHANGE UP (ref 3.8–10.5)
WBC # BLD: 9.52 K/UL — SIGNIFICANT CHANGE UP (ref 3.8–10.5)
WBC # FLD AUTO: 10.33 K/UL — SIGNIFICANT CHANGE UP (ref 3.8–10.5)
WBC # FLD AUTO: 9.52 K/UL — SIGNIFICANT CHANGE UP (ref 3.8–10.5)
WBC UR QL: 11 /HPF — HIGH (ref 0–5)

## 2022-01-09 PROCEDURE — 71045 X-RAY EXAM CHEST 1 VIEW: CPT | Mod: 26

## 2022-01-09 PROCEDURE — 93970 EXTREMITY STUDY: CPT | Mod: 26

## 2022-01-09 PROCEDURE — 76770 US EXAM ABDO BACK WALL COMP: CPT | Mod: 26

## 2022-01-09 RX ORDER — HEPARIN SODIUM 5000 [USP'U]/ML
6500 INJECTION INTRAVENOUS; SUBCUTANEOUS EVERY 6 HOURS
Refills: 0 | Status: DISCONTINUED | OUTPATIENT
Start: 2022-01-09 | End: 2022-01-09

## 2022-01-09 RX ORDER — APIXABAN 2.5 MG/1
5 TABLET, FILM COATED ORAL EVERY 12 HOURS
Refills: 0 | Status: DISCONTINUED | OUTPATIENT
Start: 2022-01-09 | End: 2022-01-20

## 2022-01-09 RX ORDER — VANCOMYCIN HCL 1 G
1000 VIAL (EA) INTRAVENOUS ONCE
Refills: 0 | Status: COMPLETED | OUTPATIENT
Start: 2022-01-09 | End: 2022-01-09

## 2022-01-09 RX ORDER — HEPARIN SODIUM 5000 [USP'U]/ML
INJECTION INTRAVENOUS; SUBCUTANEOUS
Qty: 25000 | Refills: 0 | Status: DISCONTINUED | OUTPATIENT
Start: 2022-01-09 | End: 2022-01-09

## 2022-01-09 RX ORDER — HEPARIN SODIUM 5000 [USP'U]/ML
3000 INJECTION INTRAVENOUS; SUBCUTANEOUS EVERY 6 HOURS
Refills: 0 | Status: DISCONTINUED | OUTPATIENT
Start: 2022-01-09 | End: 2022-01-09

## 2022-01-09 RX ADMIN — FINASTERIDE 5 MILLIGRAM(S): 5 TABLET, FILM COATED ORAL at 13:40

## 2022-01-09 RX ADMIN — SODIUM CHLORIDE 75 MILLILITER(S): 9 INJECTION INTRAMUSCULAR; INTRAVENOUS; SUBCUTANEOUS at 10:13

## 2022-01-09 RX ADMIN — HEPARIN SODIUM 1400 UNIT(S)/HR: 5000 INJECTION INTRAVENOUS; SUBCUTANEOUS at 23:46

## 2022-01-09 RX ADMIN — APIXABAN 5 MILLIGRAM(S): 2.5 TABLET, FILM COATED ORAL at 18:55

## 2022-01-09 RX ADMIN — Medication 250 MILLIGRAM(S): at 21:45

## 2022-01-09 RX ADMIN — CEFEPIME 100 MILLIGRAM(S): 1 INJECTION, POWDER, FOR SOLUTION INTRAMUSCULAR; INTRAVENOUS at 16:34

## 2022-01-09 RX ADMIN — TAMSULOSIN HYDROCHLORIDE 0.4 MILLIGRAM(S): 0.4 CAPSULE ORAL at 21:46

## 2022-01-09 RX ADMIN — Medication 650 MILLIGRAM(S): at 16:48

## 2022-01-09 RX ADMIN — Medication 25 MILLIGRAM(S): at 04:25

## 2022-01-09 RX ADMIN — Medication 650 MILLIGRAM(S): at 18:49

## 2022-01-09 NOTE — PROGRESS NOTE ADULT - PROBLEM SELECTOR PLAN 9
Check lipids, would expect antiplatelet and statin for secondary prevention,   hold statin for now given elevated LFT.

## 2022-01-09 NOTE — PROGRESS NOTE ADULT - ASSESSMENT
71M HTN remote Hx DVT (Eliquis 2.5 bid), CKD2-3, hx CVA with R sided deficit, pre-diabetes, MM undergoing chemotherapy (scheduled for 1/4/22)  which has been delayed by COVID-19 positive test 12/31/21 presents with dyspnea without hypoxia, tachycardia, JULIA, transaminitis, sepsis due to viral etiology (COVID-19 with adenovirus) vs bacterial etiology.

## 2022-01-09 NOTE — PROGRESS NOTE ADULT - SUBJECTIVE AND OBJECTIVE BOX
SUBJECTIVE / OVERNIGHT EVENTS:  --- Coverage for Dr. Mckoy ---   comfortable  Pt feels okay  Denied cp, sob, n/v/d.  no abdominal pain. No HA/dizziness.   remain stable on room air  no dysuria, no urinary urgency/frequency. no bowel/bladder incontinence.   no melena, no hematochezia. no BRBPR.     --------------------------------------------------------------------------------------------  LABS:                        9.0    9.52  )-----------( 315      ( 2022 09:41 )             29.0         135  |  101  |  60<H>  ----------------------------<  148<H>  TNP   |  19<L>  |  2.32<H>    Ca    7.7<L>      2022 09:41  Phos  2.3       Mg     2.00         TPro  6.5  /  Alb  2.6<L>  /  TBili  0.8  /  DBili  x   /  AST  490<H>  /  ALT  473<H>  /  AlkPhos  159<H>      PT/INR - ( 2022 13:22 )   PT: 25.5 sec;   INR: 2.33 ratio         PTT - ( 2022 09:41 )  PTT:94.6 sec  CAPILLARY BLOOD GLUCOSE            Urinalysis Basic - ( 2022 16:12 )    Color: Yellow / Appearance: Slightly Turbid / S.020 / pH: x  Gluc: x / Ketone: Trace  / Bili: Negative / Urobili: <2 mg/dL   Blood: x / Protein: 30 mg/dL / Nitrite: Negative   Leuk Esterase: Small / RBC: 2 /HPF / WBC 11 /HPF   Sq Epi: x / Non Sq Epi: >27 /HPF / Bacteria: Moderate        RADIOLOGY & ADDITIONAL TESTS:    Imaging Personally Reviewed:  [x] YES  [ ] NO    Consultant(s) Notes Reviewed:  [x] YES  [ ] NO    MEDICATIONS  (STANDING):  apixaban 5 milliGRAM(s) Oral every 12 hours  cefepime   IVPB 1000 milliGRAM(s) IV Intermittent every 24 hours  cefepime   IVPB      finasteride 5 milliGRAM(s) Oral daily  metoprolol tartrate 25 milliGRAM(s) Oral two times a day  sodium chloride 0.9%. 1000 milliLiter(s) (75 mL/Hr) IV Continuous <Continuous>  tamsulosin 0.4 milliGRAM(s) Oral at bedtime  vancomycin  IVPB 1000 milliGRAM(s) IV Intermittent once    MEDICATIONS  (PRN):  acetaminophen     Tablet .. 650 milliGRAM(s) Oral every 6 hours PRN Temp greater or equal to 38.5C (101.3F), Mild Pain (1 - 3), Moderate Pain (4 - 6), Severe Pain (7 - 10)      Care Discussed with Consultants/Other Providers [x] YES  [ ] NO    Vital Signs Last 24 Hrs  T(C): 39.4 (2022 16:36), Max: 39.4 (2022 16:36)  T(F): 103 (2022 16:36), Max: 103 (2022 16:36)  HR: 125 (2022 16:36) (89 - 125)  BP: 127/73 (2022 13:38) (127/73 - 132/74)  BP(mean): --  RR: 19 (2022 13:38) (19 - 22)  SpO2: 95% (2022 13:38) (95% - 99%)  I&O's Summary      PHYSICAL EXAM:  GENERAL: NAD, well-developed, comfortable on room air  HEAD:  Atraumatic, Normocephalic  EYES: EOMI, PERRLA, conjunctiva and sclera clear  NECK: Supple, No JVD  CHEST/LUNG: mild decrease breath sounds bilaterally; No wheeze   HEART: Regular rate and rhythm; No murmurs, rubs, or gallops  ABDOMEN: Soft, Nontender, Nondistended; Bowel sounds present  Neuro: AAOx3, no focal weakness  EXTREMITIES:  2+ Peripheral Pulses, No clubbing, cyanosis, or edema  SKIN: No rashes or lesions

## 2022-01-09 NOTE — PROGRESS NOTE ADULT - PROBLEM SELECTOR PLAN 4
Possibly due to COVID-19  Oncologist Dr Mcdaniel consulted, ?reaction to chemo  will consider derm consult if no improvement  V/Q scan neg in the ED. Possibly due to COVID-19  Oncologist Dr Mcdaniel consulted, ?reaction to chemo  will consider derm consult if no improvement

## 2022-01-09 NOTE — CHART NOTE - NSCHARTNOTEFT_GEN_A_CORE
Notified by RN that patient spiked fever 103. Pt seen and examined at bedside with RN translating. Pt AAOx4, asx. Pt states he feels chills but denies subjective fevers, chest pain, dizziness, weakness, abdominal pain, nausea, vomiting, any other symptoms. Pt was already on Cefepime empirically due to sepsis upon admission. Eladio added on, will get repeat random level in AM. Bcx testing from 1/8, repeat bcx sent 1/9. UA came back with moderate bacteria, small leukocyte esterase, negative nitrate. Ucx ordered. CXR ordered.   Pt noted w/ elevated LFTs from AM Labs however upon examination patient has no abdominal pain. Negative Barcenas's sign. CMP was severely hemolyzed so LFTs not accurate. Abdominal US was done yesterday that was unremarkable. Hepatitis panel ordered for AM. Will f/u LFTs in AM.  Pt also noted w/ b/l DVT on doppler today. Pt has hx of this for which he has been taking Eliquis 5mg BID. Heparin drip discontinued and pt was switched back to his home Eliquis.  Discussed plan in detail with Dr. Mora.

## 2022-01-09 NOTE — CONSULT NOTE ADULT - SUBJECTIVE AND OBJECTIVE BOX
LENNY CHILDERS  MRN-[id number]    Patient is a 71y old  Male who presents with a chief complaint of Shortness of breath (08 Jan 2022 15:30)      HPI:  Pt is 71M HTN remote Hx DVT (eliquis 2.5 bid), CKD2-3, hx CVA with R sided deficit, prediabetes, MM undergoing chemotherapy (scheduled for 1/4/22)  which has been delayed by COVID-19 positive test 12/31/21.  Vaccinated for COVID.  Pt developed diffuse itchy rash on arms, trunk, face, with lip swelling, no sensation of throat closing up or change in ability to swallow.  For past two days fevers and difficulty breathing, sensation of chest tightness.  Reports poor appetite.  Febrile to 104 in ED. Treated with tylenol. (08 Jan 2022 15:30)      71-year-old man with past medical history of hypertension, hyperlipidemia, diabetes, prior stroke as well as a history of kidney disease.  Patient has been having increased difficulty with fatigue.  He also has some difficulty with back pain.  Recent blood tests showed significant anemia, borderline microcytic.  Blood tests in the past that showed a ferritin level that was on the low side, ferritin level now up a bit.  Patient was therefore referred to my office for further evaluation and management.  Blood tests also showed a markedly elevated total protein level as well as an IgG kappa monoclonal paraprotein, over 8 Grams.  Patient s/p hospital stay with transfusion of packed red blood cells. Patient s/p PET scan which showed evidence of bone lesions.   A bone marrow biopsy confirmed a diagnosis of Multiple Myeloma, 70% plasma cells, 4 copies of CCND1 ( 11q13) were noted on fish.       PAST MEDICAL & SURGICAL HISTORY:  Benign essential hypertension    Hx of hyperlipidemia    DVT (deep venous thrombosis)    Obstructive uropathy    Obesity    Anemia of unknown etiology in 2012    BPH (benign prostatic hypertrophy)    Diabetes mellitus  pre-diabetes, diet controlled    CVA (cerebral vascular accident)    Multiple myeloma    H/O tooth extraction  2012    S/P prostatectomy      MEDICATIONS  (STANDING):  cefepime   IVPB      cefepime   IVPB 1000 milliGRAM(s) IV Intermittent every 24 hours  finasteride 5 milliGRAM(s) Oral daily  heparin  Infusion.  Unit(s)/Hr (14 mL/Hr) IV Continuous <Continuous>  metoprolol tartrate 25 milliGRAM(s) Oral two times a day  sodium chloride 0.9%. 1000 milliLiter(s) (75 mL/Hr) IV Continuous <Continuous>  tamsulosin 0.4 milliGRAM(s) Oral at bedtime    MEDICATIONS  (PRN):  acetaminophen     Tablet .. 650 milliGRAM(s) Oral every 6 hours PRN Temp greater or equal to 38.5C (101.3F), Mild Pain (1 - 3), Moderate Pain (4 - 6), Severe Pain (7 - 10)  heparin   Injectable 6500 Unit(s) IV Push every 6 hours PRN For aPTT less than 40  heparin   Injectable 3000 Unit(s) IV Push every 6 hours PRN For aPTT between 40 - 57    Allergies    No Known Allergies    Intolerances    Social History  , Retired. No tob.  No etoh      FAMILY HISTORY:  Family history of hypertension (Father)      Review of System  REVIEW OF SYSTEMS      General:	+ fever and decrease appetite    Skin/Breast: + rash  	  Ophthalmologic: no change in vision or blurring  	  HEENT	Denies dry mouth, oral sores, dysphagia,  change in hearing.    Respiratory and Thorax:  +sob  	  Cardiovascular:	no cp , palp, orthopnea    Gastrointestinal:	no n/v/d constipation    Genitourinary:	no dysuria of frequency, no hematuria, no flank pain    Musculoskeletal:	no bone or joint pain. no muscle aches.     Neurological:	no change in sensory or motor function. no headache. no weakness.     Psychiatric:	no depression, no anxiety, insomnia.     Hematology/Lymphatics:	no bleeding or bruising        Vitals  Vital Signs Last 24 Hrs  T(C): 36.9 (09 Jan 2022 04:10), Max: 40.2 (08 Jan 2022 12:08)  T(F): 98.5 (09 Jan 2022 04:10), Max: 104.3 (08 Jan 2022 12:08)  HR: 98 (09 Jan 2022 04:10) (89 - 140)  BP: 132/74 (09 Jan 2022 04:10) (102/54 - 132/74)  BP(mean): --  RR: 19 (09 Jan 2022 04:10) (19 - 22)  SpO2: 99% (09 Jan 2022 04:10) (98% - 100%)      PHYSICAL EXAM:    Constitutional: NAD    Eyes: PERRLA EOMI, anicteric sclera    Heent :No oral sores, no pharyngeal injection. moist mucosa.    Neck: supple, no jvd, no LAD    Respiratory: CTA b/l     Cardiovascular: s1s2, no m/g/r    Gastrointestinal: soft, nt, nd, + BS    Extremities: no c/c/e    Neurological:A&O x 3 moves all ext.    Skin: no rash on exposed skin    Lymph Nodes: no lymphadenopathy.      Lab  CBC Full  -  ( 08 Jan 2022 23:41 )  WBC Count : 10.33 K/uL  RBC Count : 3.70 M/uL  Hemoglobin : 9.6 g/dL  Hematocrit : 32.5 %  Platelet Count - Automated : 385 K/uL  Mean Cell Volume : 87.8 fL  Mean Cell Hemoglobin : 25.9 pg  Mean Cell Hemoglobin Concentration : 29.5 gm/dL  Auto Neutrophil # : x  Auto Lymphocyte # : x  Auto Monocyte # : x  Auto Eosinophil # : x  Auto Basophil # : x  Auto Neutrophil % : x  Auto Lymphocyte % : x  Auto Monocyte % : x  Auto Eosinophil % : x  Auto Basophil % : x    01-08    133<L>  |  99  |  50<H>  ----------------------------<  174<H>  4.9   |  19<L>  |  2.63<H>    Ca    8.5      08 Jan 2022 12:22  Phos  2.3     01-08  Mg     2.00     01-08    TPro  7.0  /  Alb  3.1<L>  /  TBili  0.6  /  DBili  x   /  AST  178<H>  /  ALT  245<H>  /  AlkPhos  171<H>  01-08    PT/INR - ( 08 Jan 2022 13:22 )   PT: 25.5 sec;   INR: 2.33 ratio         PTT - ( 08 Jan 2022 23:41 )  PTT:74.8 sec    Rad:    Assessment/Plan         LENNY CHILDERS  MRN-[id number]    Patient is a 71y old  Male who presents with a chief complaint of Shortness of breath (08 Jan 2022 15:30)      HPI:  Pt is 71M HTN remote Hx DVT (eliquis 2.5 bid), CKD2-3, hx CVA with R sided deficit, prediabetes, MM undergoing chemotherapy (scheduled for 1/4/22)  which has been delayed by COVID-19 positive test 12/31/21.  Vaccinated for COVID.  Pt developed diffuse itchy rash on arms, trunk, face, with lip swelling, no sensation of throat closing up or change in ability to swallow.  For past two days fevers and difficulty breathing, sensation of chest tightness.  Reports poor appetite.  Febrile to 104 in ED. Treated with tylenol. (08 Jan 2022 15:30).  Patient has been on outpt RVD. Rx now on hold.   Patient s/p hospital stay with transfusion of packed red blood cells. Patient s/p PET scan which showed evidence of bone lesions.   A bone marrow biopsy confirmed a diagnosis of Multiple Myeloma, 70% plasma cells, 4 copies of CCND1 ( 11q13) were noted on fish. Has been on outpt rx.      PAST MEDICAL & SURGICAL HISTORY:  Benign essential hypertension    Hx of hyperlipidemia    DVT (deep venous thrombosis)    Obstructive uropathy    Obesity    Anemia of unknown etiology in 2012    BPH (benign prostatic hypertrophy)    Diabetes mellitus  pre-diabetes, diet controlled    CVA (cerebral vascular accident)    Multiple myeloma    H/O tooth extraction  2012    S/P prostatectomy      MEDICATIONS  (STANDING):  cefepime   IVPB      cefepime   IVPB 1000 milliGRAM(s) IV Intermittent every 24 hours  finasteride 5 milliGRAM(s) Oral daily  heparin  Infusion.  Unit(s)/Hr (14 mL/Hr) IV Continuous <Continuous>  metoprolol tartrate 25 milliGRAM(s) Oral two times a day  sodium chloride 0.9%. 1000 milliLiter(s) (75 mL/Hr) IV Continuous <Continuous>  tamsulosin 0.4 milliGRAM(s) Oral at bedtime    MEDICATIONS  (PRN):  acetaminophen     Tablet .. 650 milliGRAM(s) Oral every 6 hours PRN Temp greater or equal to 38.5C (101.3F), Mild Pain (1 - 3), Moderate Pain (4 - 6), Severe Pain (7 - 10)  heparin   Injectable 6500 Unit(s) IV Push every 6 hours PRN For aPTT less than 40  heparin   Injectable 3000 Unit(s) IV Push every 6 hours PRN For aPTT between 40 - 57    Allergies    No Known Allergies    Intolerances    Social History  , Retired. No tob.  No etoh      FAMILY HISTORY:  Family history of hypertension (Father)      Review of System  REVIEW OF SYSTEMS      General:	+ fever and decrease appetite    Skin/Breast: + rash  	  Ophthalmologic: no change in vision or blurring  	  HEENT	Denies dry mouth, oral sores, dysphagia,  change in hearing.    Respiratory and Thorax:  +sob  	  Cardiovascular:	no cp , palp, orthopnea    Gastrointestinal:	no n/v/d constipation    Genitourinary:	no dysuria of frequency, no hematuria, no flank pain    Musculoskeletal:	no bone or joint pain. no muscle aches.     Neurological:	no change in sensory or motor function. no headache. no weakness.     Psychiatric:	no depression, no anxiety, insomnia.     Hematology/Lymphatics:	no bleeding or bruising        Vitals  Vital Signs Last 24 Hrs  T(C): 36.9 (09 Jan 2022 04:10), Max: 40.2 (08 Jan 2022 12:08)  T(F): 98.5 (09 Jan 2022 04:10), Max: 104.3 (08 Jan 2022 12:08)  HR: 98 (09 Jan 2022 04:10) (89 - 140)  BP: 132/74 (09 Jan 2022 04:10) (102/54 - 132/74)  BP(mean): --  RR: 19 (09 Jan 2022 04:10) (19 - 22)  SpO2: 99% (09 Jan 2022 04:10) (98% - 100%)      PHYSICAL EXAM:    Constitutional: NAD    Eyes: PERRLA EOMI, anicteric sclera    Heent :No oral sores, no pharyngeal injection. moist mucosa.    Neck: supple, no jvd, no LAD    Respiratory: CTA b/l     Cardiovascular: s1s2, no m/g/r    Gastrointestinal: soft, nt, nd, + BS    Extremities: no c/c/e    Neurological:A&O x 3 moves all ext.    Skin: no rash on exposed skin    Lymph Nodes: no lymphadenopathy.      Lab  CBC Full  -  ( 08 Jan 2022 23:41 )  WBC Count : 10.33 K/uL  RBC Count : 3.70 M/uL  Hemoglobin : 9.6 g/dL  Hematocrit : 32.5 %  Platelet Count - Automated : 385 K/uL  Mean Cell Volume : 87.8 fL  Mean Cell Hemoglobin : 25.9 pg  Mean Cell Hemoglobin Concentration : 29.5 gm/dL  Auto Neutrophil # : x  Auto Lymphocyte # : x  Auto Monocyte # : x  Auto Eosinophil # : x  Auto Basophil # : x  Auto Neutrophil % : x  Auto Lymphocyte % : x  Auto Monocyte % : x  Auto Eosinophil % : x  Auto Basophil % : x    01-08    133<L>  |  99  |  50<H>  ----------------------------<  174<H>  4.9   |  19<L>  |  2.63<H>    Ca    8.5      08 Jan 2022 12:22  Phos  2.3     01-08  Mg     2.00     01-08    TPro  7.0  /  Alb  3.1<L>  /  TBili  0.6  /  DBili  x   /  AST  178<H>  /  ALT  245<H>  /  AlkPhos  171<H>  01-08    PT/INR - ( 08 Jan 2022 13:22 )   PT: 25.5 sec;   INR: 2.33 ratio         PTT - ( 08 Jan 2022 23:41 )  PTT:74.8 sec    Rad:    Assessment/Plan

## 2022-01-09 NOTE — CONSULT NOTE ADULT - SUBJECTIVE AND OBJECTIVE BOX
22 @ 17:07    Patient is a 71y old  Male who presents with a chief complaint of Shortness of breath (2022 09:53)      HPI:  Pt is 71M HTN remote Hx DVT (eliquis 2.5 bid), CKD2-3, hx CVA with R sided deficit, prediabetes, MM undergoing chemotherapy (scheduled for 22)  which has been delayed by COVID-19 positive test 21.  Vaccinated for COVID.  Pt developed diffuse itchy rash on arms, trunk, face, with lip swelling, no sensation of throat closing up or change in ability to swallow.  For past two days fevers and difficulty breathing, sensation of chest tightness.  Reports poor appetite.  Febrile to 104 in ED. Treated with tylenol. (2022 15:30)    from hemon attending" 71-year-old man with past medical history of hypertension, hyperlipidemia, diabetes, prior stroke as well as a history of kidney disease.  Patient has been having increased difficulty with fatigue.  He also has some difficulty with back pain.  Recent blood tests showed significant anemia, borderline microcytic.  Blood tests in the past that showed a ferritin level that was on the low side, ferritin level now up a bit.  Patient was therefore referred to my office for further evaluation and management.  Blood tests also showed a markedly elevated total protein level as well as an IgG kappa monoclonal paraprotein, over 8 Grams.  Patient s/p hospital stay with transfusion of packed red blood cells. Patient s/p PET scan which showed evidence of bone lesions.   A bone marrow biopsy confirmed a diagnosis of Multiple Myeloma, 70% plasma cells, 4 copies of CCND1 ( 11q13) were noted on fish.   ?FOLLOWING PRESENT  [y ] Hx of PE/DVT, [ x] Hx COPD, [x ] Hx of Asthma, [ y] Hx of Hospitalization, [x ]  Hx of BiPAP/CPAP use, x[ ] Hx of SHERWIN    Allergies    No Known Allergies    Intolerances        PAST MEDICAL & SURGICAL HISTORY:  Benign essential hypertension    Hx of hyperlipidemia    DVT (deep venous thrombosis)    Obstructive uropathy    Obesity    Anemia of unknown etiology in     BPH (benign prostatic hypertrophy)    Diabetes mellitus  pre-diabetes, diet controlled    CVA (cerebral vascular accident)    Multiple myeloma    H/O tooth extraction  2012    S/P prostatectomy        FAMILY HISTORY:  Family history of hypertension (Father)        Social History: [ x ] TOBACCO                  [x  ] ETOH                                 [ x ] IVDA/DRUGS    REVIEW OF SYSTEMS      General:	itching+    Skin/Breast:  	  Ophthalmologic:  	  ENMT:	    Respiratory and Thorax: no sob,cough   	  Cardiovascular:	x    Gastrointestinal:	x    Genitourinary:	x    Musculoskeletal:	x    Neurological:	x  x  Psychiatric:	    Hematology/Lymphatics:	x    Endocrine:	x    Allergic/Immunologic:	x    MEDICATIONS  (STANDING):  apixaban 5 milliGRAM(s) Oral every 12 hours  cefepime   IVPB 1000 milliGRAM(s) IV Intermittent every 24 hours  cefepime   IVPB      finasteride 5 milliGRAM(s) Oral daily  metoprolol tartrate 25 milliGRAM(s) Oral two times a day  sodium chloride 0.9%. 1000 milliLiter(s) (75 mL/Hr) IV Continuous <Continuous>  tamsulosin 0.4 milliGRAM(s) Oral at bedtime  vancomycin  IVPB 1000 milliGRAM(s) IV Intermittent once    MEDICATIONS  (PRN):  acetaminophen     Tablet .. 650 milliGRAM(s) Oral every 6 hours PRN Temp greater or equal to 38.5C (101.3F), Mild Pain (1 - 3), Moderate Pain (4 - 6), Severe Pain (7 - 10)       Vital Signs Last 24 Hrs  T(C): 39.4 (2022 16:36), Max: 39.4 (2022 16:36)  T(F): 103 (2022 16:36), Max: 103 (2022 16:36)  HR: 125 (2022 16:36) (89 - 125)  BP: 127/73 (2022 13:38) (127/73 - 132/74)  BP(mean): --  RR: 19 (2022 13:38) (19 - 22)  SpO2: 95% (2022 13:38) (95% - 99%)Orthostatic VS          I&O's Summary      Physical Exam:   GENERAL: NAD, well-groomed, well-developed  HEENT: SAM/   Atraumatic, Normocephalic  ENMT: No tonsillar erythema, exudates, or enlargement; Moist mucous membranes, Good dentition, No lesions  NECK: Supple, No JVD, Normal thyroid  CHEST/LUNG: Clear to auscultation bilaterally  CVS: Regular rate and rhythm; No murmurs, rubs, or gallops  GI: : Soft, Nontender, Nondistended; Bowel sounds present  NERVOUS SYSTEM:  Alert & awake  EXTREMITIES: -edema  LYMPH: No lymphadenopathy noted  SKIN: No rashes or lesions  ENDOCRINOLOGY: No Thyromegaly  PSYCH: calm     Labs:  Venous<41<4>>39<<7.315>>Venous<<3><<4><<5<<399>>, Venous<33<4>>22<<7.415>>Venous<<3><<4><<5<<229>>SARS-CoV-2: Detected (2022 12:45)  COVID-19 PCR: NotDetec (2021 16:16)                              9.0    9.52  )-----------( 315      ( 2022 09:41 )             29.0                         9.6    10.33 )-----------( 385      ( 2022 23:41 )             32.5                         10.0   9.00  )-----------( 386      ( 2022 12:22 )             32.8         135  |  101  |  60<H>  ----------------------------<  148<H>  TNP   |  19<L>  |  2.32<H>      133<L>  |  99  |  50<H>  ----------------------------<  174<H>  4.9   |  19<L>  |  2.63<H>    Ca    7.7<L>      2022 09:41  Ca    8.5      2022 12:22  Phos  2.3       Mg     2.00         TPro  6.5  /  Alb  2.6<L>  /  TBili  0.8  /  DBili  x   /  AST  490<H>  /  ALT  473<H>  /  AlkPhos  159<H>    TPro  7.0  /  Alb  3.1<L>  /  TBili  0.6  /  DBili  x   /  AST  178<H>  /  ALT  245<H>  /  AlkPhos  171<H>      CAPILLARY BLOOD GLUCOSE        LIVER FUNCTIONS - ( 2022 09:41 )  Alb: 2.6 g/dL / Pro: 6.5 g/dL / ALK PHOS: 159 U/L / ALT: 473 U/L / AST: 490 U/L / GGT: x           PT/INR - ( 2022 13:22 )   PT: 25.5 sec;   INR: 2.33 ratio         PTT - ( 2022 09:41 )  PTT:94.6 sec  Urinalysis Basic - ( 2022 16:12 )    Color: Yellow / Appearance: Slightly Turbid / S.020 / pH: x  Gluc: x / Ketone: Trace  / Bili: Negative / Urobili: <2 mg/dL   Blood: x / Protein: 30 mg/dL / Nitrite: Negative   Leuk Esterase: Small / RBC: 2 /HPF / WBC 11 /HPF   Sq Epi: x / Non Sq Epi: >27 /HPF / Bacteria: Moderate      Culture - Blood (collected 2022 16:22)  Source: .Blood Blood  Preliminary Report (2022 17:02):    No growth to date.    Culture - Blood (collected 2022 16:22)  Source: .Blood Blood  Preliminary Report (2022 17:02):    No growth to date.      D DImer  D-Dimer Assay, Quantitative: 354 ng/mL DDU ( @ 13:22)      Studies  Chest X-RAY  CT SCAN Chest   CT Abdomen  Venous Dopplers: LE:   Others        rad< from: US Duplex Venous Lower Ext Complete, Bilateral (22 @ 15:44) >    ACC: [id number] EXAM:  US DPLX LWR EXT VEINS COMPL BI                          PROCEDURE DATE:  2022          INTERPRETATION:  CLINICAL INFORMATION: COVID positive. Shortness of   breath. Assess for deep vein thrombosis.    COMPARISON: Right lower extremity duplex sonogram 2016.    TECHNIQUE: Duplex sonography of the BILATERAL LOWER extremity veins with   color and spectral Doppler, with and without compression.    FINDINGS:    RIGHT:  Deep venous thrombosis of the right common femoraland popliteal veins.    LEFT:  Deep venous thrombosis of the left common femoral, femoral, and popliteal   veins.    IMPRESSION:  Bilateral lower extremity deep vein thromboses, as noted on prior studies.          --- End of Report ---          PRABHAKAR HARDEN MD; Resident Radiology  This document has been electronically signed.  MIRIAM ROACH MD; Att  < from: NM Pulmonary Perfusion Scan (22 @ 15:54) >    EXAM:  NM PULM PERFUSION IMG        PROCEDURE DATE:  2022       INTERPRETATION:  RADIOPHARMACEUTICAL:  3.0 mCi Tc-99m-MAA, I.V.    CLINICAL INFORMATION: 71 year old Covid positive male with tachycardia   and shortness of breath; referred to evaluate for pulmonary embolism.    TECHNIQUE:  Perfusion images of the lungs were obtained following   administration of Tc-99m-MAA. Images were obtained in the anterior,   posterior, both lateral, and all 4 oblique projections. The study was   interpreted in conjunction with a chest radiograph of 2022.    COMPARISON: No previous lung scans were available for comparison.    FINDINGS: There is a small perfusion defect in the apicoposterior segment   of the left upper lobe. There is heterogeneous distribution of   radiopharmaceutical in the remainder of both lungs on the perfusion   images.    IMPRESSION: Very low probability of pulmonary embolus.    --- End of Report ---    < end of copied text >    < end of copied text >

## 2022-01-09 NOTE — CONSULT NOTE ADULT - SUBJECTIVE AND OBJECTIVE BOX
[facility] NEPHROLOGY- CONSULTATION NOTE    Patient is a 71y Male with MM on velcade, last treated , then had covid +  who presented to the hospital with rash, fever, SOB, chest tightness.  Creatinine at baseline seems to be 1.2 given labs from 2021. No ACEi/ARBs/NSAIDs/Diuretics/IV Contrast.       PAST MEDICAL & SURGICAL HISTORY:  Benign essential hypertension    Hx of hyperlipidemia    DVT (deep venous thrombosis)    Obstructive uropathy    Obesity    Anemia of unknown etiology in     BPH (benign prostatic hypertrophy)    Diabetes mellitus  pre-diabetes, diet controlled    CVA (cerebral vascular accident)    Multiple myeloma    H/O tooth extraction      S/P prostatectomy      No Known Allergies    Home Medications Reviewed  Hospital Medications:   MEDICATIONS  (STANDING):  apixaban 5 milliGRAM(s) Oral every 12 hours  cefepime   IVPB 1000 milliGRAM(s) IV Intermittent every 24 hours  cefepime   IVPB      finasteride 5 milliGRAM(s) Oral daily  metoprolol tartrate 25 milliGRAM(s) Oral two times a day  sodium chloride 0.9%. 1000 milliLiter(s) (75 mL/Hr) IV Continuous <Continuous>  tamsulosin 0.4 milliGRAM(s) Oral at bedtime  vancomycin  IVPB 1000 milliGRAM(s) IV Intermittent once    SOCIAL HISTORY:  Denies ETOh,Smoking,   FAMILY HISTORY:  Family history of hypertension (Father)      REVIEW OF SYSTEMS: unable to obtain- pt weak/lethargic.  Does answer in the affirmative to feeling SOB, but is not able to maintain further conversation or answer other questions.      VITALS:  T(F): 103 (22 @ 16:36), Max: 103 (22 @ 16:36)  HR: 125 (22 @ 16:36)  BP: 127/73 (22 @ 13:38)  RR: 19 (22 @ 13:38)  SpO2: 95% (22 @ 13:38)  Wt(kg): --        PHYSICAL EXAM:  Constitutional: lethargic, SOB  HEENT: anicteric sclera, oropharynx clear, MMM  Neck: No JVD  Respiratory: coarse BS throughout, scattered crackles.  Cardiovascular: S1, S2, RRR  Gastrointestinal: BS+, soft, NT/ND  Extremities: No cyanosis or clubbing. No peripheral edema  Neurological: Awake, alert, unclear if oriented.  : No CVA tenderness. No matute.     LABS:      135  |  101  |  60<H>  ----------------------------<  148<H>  TNP   |  19<L>  |  2.32<H>    Ca    7.7<L>      2022 09:41  Phos  2.3       Mg     2.00         TPro  6.5  /  Alb  2.6<L>  /  TBili  0.8  /  DBili      /  AST  490<H>  /  ALT  473<H>  /  AlkPhos  159<H>      Creatinine Trend: 2.32 <--, 2.63 <--                        9.0    9.52  )-----------( 315      ( 2022 09:41 )             29.0     Urine Studies:  Urinalysis Basic - ( 2022 16:12 )    Color: Yellow / Appearance: Slightly Turbid / S.020 / pH:   Gluc:  / Ketone: Trace  / Bili: Negative / Urobili: <2 mg/dL   Blood:  / Protein: 30 mg/dL / Nitrite: Negative   Leuk Esterase: Small / RBC: 2 /HPF / WBC 11 /HPF   Sq Epi:  / Non Sq Epi: >27 /HPF / Bacteria: Moderate      Sodium, Random Urine: <20 mmol/L ( @ 16:12)  Potassium, Random Urine: 56.6 mmol/L ( @ 16:12)  Chloride, Random Urine: <20 mmol/L ( @ 16:12)  Creatinine, Random Urine: 200 mg/dL ( @ 16:12)    RADIOLOGY & ADDITIONAL STUDIES:

## 2022-01-09 NOTE — CONSULT NOTE ADULT - ASSESSMENT
1) MM- further rx as outpt.  last velcade was 12/22    2)anemia- chronic- multifactorial- ckd, MM, CTX, infxn  monitor cbc and provide transfusional support    3) Transaminitis. s/p sonogram  this is a new finding, I suspect due to infx.  monitor cmp,   if worsens can call GI.    4) ID- PNA, sob, + covid and adenovirus.  Would consult ID, pulm    5) CKD- Acute on chronic- suspect related to infxn.  would consult Renal   1) MM- further rx as outpt.  last velcade was 12/22    2)anemia- chronic- multifactorial- ckd, MM, CTX, infxn  monitor cbc and provide transfusional support    3) Transaminitis. s/p sonogram  this is a new finding, I suspect due to infx.  monitor cmp,   if worsens can call GI.    4) ID- PNA, sob, + covid and adenovirus.  Would consult ID, pulm    5) CKD- Acute on chronic- suspect related to infxn.  would consult Renal    6) DVT- On heparin. Ac as per med.

## 2022-01-09 NOTE — PROGRESS NOTE ADULT - PROBLEM SELECTOR PLAN 5
Possibly due to COVID vs. sepsis  ?reaction to chemo  follow LFTs  limited RUQ sono  caution with tylenol and fevers.   acute hepatitis panel Possibly due to COVID vs. sepsis  ?reaction to chemo  follow LFTs  limited RUQ sono  caution with tylenol and fevers.   acute hepatitis panel  V/Q scan neg in the ED. Possibly due to COVID vs. sepsis  ?reaction to chemo  follow LFTs (hemolyzed specimen today, will recheck LFTs tomorrow)  limited RUQ sono  caution with tylenol and fevers.   acute hepatitis panel  V/Q scan neg in the ED.

## 2022-01-09 NOTE — PATIENT PROFILE ADULT - FALL HARM RISK - HARM RISK INTERVENTIONS

## 2022-01-09 NOTE — CONSULT NOTE ADULT - ASSESSMENT
71 year-old man with JULIA due to sepsis in the setting of COVID-19.  Though pt has MM, there is no evidence of myeloma kidney disease.    1. JULIA- in the setting of sepsis due to COVID-19, though also likely dehydration with consistent urine studies. Continue IVF. Monitor renal fxn.  2. COVID-19- supportive care per primary team.  3. MM- mgmt per hematology.  No evidence of myeloma kidney disease, though if possible, would check urine protein/creatinine ratio noninvasively.  4. HTN- BP controlled. Continue with current anti-hypertensive medications. Monitor BP.      [facility] NEPHROLOGY  Wade Tong M.D.  Nitin Ni D.O.  Joanna Napier M.D.  Annie Casas, MSN, ANP-C    Telephone: [phone]  Facsimile: [phone]    [address] [place] [address]

## 2022-01-10 LAB
ALBUMIN SERPL ELPH-MCNC: 2.4 G/DL — LOW (ref 3.3–5)
ALP SERPL-CCNC: 165 U/L — HIGH (ref 40–120)
ALT FLD-CCNC: 709 U/L — HIGH (ref 4–41)
ANION GAP SERPL CALC-SCNC: 15 MMOL/L — HIGH (ref 7–14)
APTT BLD: 35.6 SEC — SIGNIFICANT CHANGE UP (ref 27–36.3)
AST SERPL-CCNC: 471 U/L — HIGH (ref 4–40)
BASOPHILS # BLD AUTO: 0 K/UL — SIGNIFICANT CHANGE UP (ref 0–0.2)
BASOPHILS NFR BLD AUTO: 0 % — SIGNIFICANT CHANGE UP (ref 0–2)
BILIRUB SERPL-MCNC: 0.8 MG/DL — SIGNIFICANT CHANGE UP (ref 0.2–1.2)
BLD GP AB SCN SERPL QL: NEGATIVE — SIGNIFICANT CHANGE UP
BUN SERPL-MCNC: 81 MG/DL — HIGH (ref 7–23)
CALCIUM SERPL-MCNC: 7.6 MG/DL — LOW (ref 8.4–10.5)
CHLORIDE SERPL-SCNC: 107 MMOL/L — SIGNIFICANT CHANGE UP (ref 98–107)
CO2 SERPL-SCNC: 16 MMOL/L — LOW (ref 22–31)
CREAT SERPL-MCNC: 2.94 MG/DL — HIGH (ref 0.5–1.3)
EOSINOPHIL # BLD AUTO: 1.02 K/UL — HIGH (ref 0–0.5)
EOSINOPHIL NFR BLD AUTO: 6.4 % — HIGH (ref 0–6)
GLUCOSE SERPL-MCNC: 127 MG/DL — HIGH (ref 70–99)
HCT VFR BLD CALC: 29.5 % — LOW (ref 39–50)
HGB BLD-MCNC: 9 G/DL — LOW (ref 13–17)
IANC: 6.12 K/UL — SIGNIFICANT CHANGE UP (ref 1.5–8.5)
INR BLD: 2.25 RATIO — HIGH (ref 0.88–1.16)
LYMPHOCYTES # BLD AUTO: 36.7 % — SIGNIFICANT CHANGE UP (ref 13–44)
LYMPHOCYTES # BLD AUTO: 5.83 K/UL — HIGH (ref 1–3.3)
MCHC RBC-ENTMCNC: 26.1 PG — LOW (ref 27–34)
MCHC RBC-ENTMCNC: 30.5 GM/DL — LOW (ref 32–36)
MCV RBC AUTO: 85.5 FL — SIGNIFICANT CHANGE UP (ref 80–100)
MONOCYTES # BLD AUTO: 0.3 K/UL — SIGNIFICANT CHANGE UP (ref 0–0.9)
MONOCYTES NFR BLD AUTO: 1.9 % — LOW (ref 2–14)
NEUTROPHILS # BLD AUTO: 7.58 K/UL — HIGH (ref 1.8–7.4)
NEUTROPHILS NFR BLD AUTO: 43.1 % — SIGNIFICANT CHANGE UP (ref 43–77)
PLATELET # BLD AUTO: 321 K/UL — SIGNIFICANT CHANGE UP (ref 150–400)
POTASSIUM SERPL-MCNC: 5 MMOL/L — SIGNIFICANT CHANGE UP (ref 3.5–5.3)
POTASSIUM SERPL-SCNC: 5 MMOL/L — SIGNIFICANT CHANGE UP (ref 3.5–5.3)
PROT SERPL-MCNC: 5.9 G/DL — LOW (ref 6–8.3)
PROTHROM AB SERPL-ACNC: 24.7 SEC — HIGH (ref 10.6–13.6)
RBC # BLD: 3.45 M/UL — LOW (ref 4.2–5.8)
RBC # FLD: 22.5 % — HIGH (ref 10.3–14.5)
RH IG SCN BLD-IMP: POSITIVE — SIGNIFICANT CHANGE UP
SODIUM SERPL-SCNC: 138 MMOL/L — SIGNIFICANT CHANGE UP (ref 135–145)
VANCOMYCIN FLD-MCNC: 10.5 UG/ML — SIGNIFICANT CHANGE UP
WBC # BLD: 15.89 K/UL — HIGH (ref 3.8–10.5)
WBC # FLD AUTO: 15.89 K/UL — HIGH (ref 3.8–10.5)

## 2022-01-10 PROCEDURE — 99223 1ST HOSP IP/OBS HIGH 75: CPT | Mod: GC

## 2022-01-10 RX ORDER — METOPROLOL TARTRATE 50 MG
25 TABLET ORAL
Refills: 0 | Status: DISCONTINUED | OUTPATIENT
Start: 2022-01-10 | End: 2022-01-20

## 2022-01-10 RX ORDER — CHOLECALCIFEROL (VITAMIN D3) 125 MCG
1000 CAPSULE ORAL DAILY
Refills: 0 | Status: DISCONTINUED | OUTPATIENT
Start: 2022-01-10 | End: 2022-01-20

## 2022-01-10 RX ORDER — CALCIUM CARBONATE 500(1250)
1 TABLET ORAL DAILY
Refills: 0 | Status: DISCONTINUED | OUTPATIENT
Start: 2022-01-10 | End: 2022-01-20

## 2022-01-10 RX ORDER — SODIUM CHLORIDE 9 MG/ML
1000 INJECTION INTRAMUSCULAR; INTRAVENOUS; SUBCUTANEOUS
Refills: 0 | Status: DISCONTINUED | OUTPATIENT
Start: 2022-01-10 | End: 2022-01-11

## 2022-01-10 RX ORDER — DIPHENHYDRAMINE HCL 50 MG
25 CAPSULE ORAL ONCE
Refills: 0 | Status: COMPLETED | OUTPATIENT
Start: 2022-01-10 | End: 2022-01-10

## 2022-01-10 RX ORDER — DIPHENHYDRAMINE HCL 50 MG
25 CAPSULE ORAL EVERY 6 HOURS
Refills: 0 | Status: DISCONTINUED | OUTPATIENT
Start: 2022-01-10 | End: 2022-01-10

## 2022-01-10 RX ORDER — FAMOTIDINE 10 MG/ML
10 INJECTION INTRAVENOUS
Refills: 0 | Status: DISCONTINUED | OUTPATIENT
Start: 2022-01-10 | End: 2022-01-20

## 2022-01-10 RX ORDER — FAMOTIDINE 10 MG/ML
20 INJECTION INTRAVENOUS DAILY
Refills: 0 | Status: DISCONTINUED | OUTPATIENT
Start: 2022-01-10 | End: 2022-01-10

## 2022-01-10 RX ADMIN — Medication 1 APPLICATION(S): at 21:39

## 2022-01-10 RX ADMIN — SODIUM CHLORIDE 70 MILLILITER(S): 9 INJECTION INTRAMUSCULAR; INTRAVENOUS; SUBCUTANEOUS at 14:00

## 2022-01-10 RX ADMIN — Medication 24 MILLIGRAM(S): at 18:15

## 2022-01-10 RX ADMIN — Medication 25 MILLIGRAM(S): at 09:10

## 2022-01-10 RX ADMIN — SODIUM CHLORIDE 75 MILLILITER(S): 9 INJECTION INTRAMUSCULAR; INTRAVENOUS; SUBCUTANEOUS at 00:25

## 2022-01-10 NOTE — CONSULT NOTE ADULT - SUBJECTIVE AND OBJECTIVE BOX
HPI:   Patient is a 71y male with multiple myeloma, has been on revlimid, dx covid infected on  so chemo on hold admitted yesterday with report of a diffuse pruritic rash, some dysphagia, chest pain and chest tightness. I cannot get a full history from the patient he is not answering my questions clearly. There is no report of vomiting or diarrhea, no pain in other areas.     REVIEW OF SYSTEMS:  All other review of systems negative (Comprehensive ROS)    PAST MEDICAL & SURGICAL HISTORY:  Benign essential hypertension    Hx of hyperlipidemia    DVT (deep venous thrombosis)    Obstructive uropathy    Obesity    Anemia of unknown etiology in     BPH (benign prostatic hypertrophy)    Diabetes mellitus  pre-diabetes, diet controlled    CVA (cerebral vascular accident)    Multiple myeloma    H/O tooth extraction      S/P prostatectomy        Allergies    No Known Allergies    Intolerances        Antimicrobials Day #  :    Other Medications:  acetaminophen     Tablet .. 650 milliGRAM(s) Oral every 6 hours PRN  apixaban 5 milliGRAM(s) Oral every 12 hours  diphenhydrAMINE Injectable 25 milliGRAM(s) IV Push every 6 hours PRN  finasteride 5 milliGRAM(s) Oral daily  metoprolol tartrate 25 milliGRAM(s) Oral two times a day  sodium chloride 0.9%. 1000 milliLiter(s) IV Continuous <Continuous>  tamsulosin 0.4 milliGRAM(s) Oral at bedtime      FAMILY HISTORY:  Family history of hypertension (Father)        SOCIAL HISTORY:  Smoking: [ ]Yes [ x]No  ETOH: [ ]Yes [ x]No  Drug Use: [ ]Yes [ x]No   [ ] Single[ ]    T(F): 97.9 (01-10-22 @ 09:28), Max: 103 (22 @ 16:36)  HR: 114 (01-10-22 @ 09:28)  BP: 91/55 (01-10-22 @ 09:28)  RR: 18 (01-10-22 @ 09:28)  SpO2: 98% (01-10-22 @ 09:28)  Wt(kg): --    PHYSICAL EXAM:  General: alert,some excessive saliva  no acute distress  Eyes:  anicteric, no conjunctival injection, no discharge  Oropharynx: no lesions or injection 	  Neck: supple, without adenopathy  Lungs: grossly clear to auscultation  Heart: regular rate and rhythm; no murmur, rubs or gallops  Abdomen: soft, nondistended, nontender, without mass or organomegaly  Skin: no lesions. diffuse erythroderma  Extremities: no clubbing, cyanosis, or edema  Neurologic: alert, oriented, moves all extremities    LAB RESULTS:                        9.0    9.52  )-----------( 315      ( 2022 09:41 )             29.0         135  |  101  |  60<H>  ----------------------------<  148<H>  TNP   |  19<L>  |  2.32<H>    Ca    7.7<L>      2022 09:41  Phos  2.3       Mg     2.00         TPro  6.5  /  Alb  2.6<L>  /  TBili  0.8  /  DBili  x   /  AST  490<H>  /  ALT  473<H>  /  AlkPhos  159<H>      LIVER FUNCTIONS - ( 2022 09:41 )  Alb: 2.6 g/dL / Pro: 6.5 g/dL / ALK PHOS: 159 U/L / ALT: 473 U/L / AST: 490 U/L / GGT: x           Urinalysis Basic - ( 2022 16:12 )    Color: Yellow / Appearance: Slightly Turbid / S.020 / pH: x  Gluc: x / Ketone: Trace  / Bili: Negative / Urobili: <2 mg/dL   Blood: x / Protein: 30 mg/dL / Nitrite: Negative   Leuk Esterase: Small / RBC: 2 /HPF / WBC 11 /HPF   Sq Epi: x / Non Sq Epi: >27 /HPF / Bacteria: Moderate        MICROBIOLOGY:  RECENT CULTURES:   @ 16:22 .Blood Blood     No growth to date.            RADIOLOGY REVIEWED:  < from: US Kidney and Bladder (22 @ 15:48) >  ACC: [id number] EXAM:  US KIDNEYS AND BLADDER                          PROCEDURE DATE:  2022          INTERPRETATION:  CLINICAL INFORMATION: Renal sufficiency.    COMPARISON: Abdominal CT dated 10/23/2013 and abdominal ultrasound dated   2022. Renal ultrasound dated 10/13/2016    TECHNIQUE: Sonography of the kidneys and bladder.    FINDINGS:    Right kidney: 9.1 cm. Normal in size and echogenicity without   hydronephrosis    Left kidney: 10.2 cm. Normal in size and echogenicity without   hydronephrosis. There is 2.2 cm simple appearing cyst in upper pole,   unchanged.    Urinary bladder: Mild bladder wall trabeculation consistent with bladder   wall hypertrophy..    IMPRESSION:    No hydronephrosis.    Simple appearing left renal cyst, unchanged.    < end of copied text >          Impression:    < from: US Abdomen Upper Quadrant Right (22 @ 15:56) >    ACC: [id number] EXAM:  US ABDOMEN RT UPR QUADRANT                          PROCEDURE DATE:  2022          INTERPRETATION:  CLINICAL INFORMATION: Elevated LFTs.    COMPARISON: 10/13/2016    TECHNIQUE: Sonography of the right upper quadrant.    FINDINGS:    Liver: Measures 16 cm. Within normal limits.  Bile ducts: Normal caliber. Common bile duct measures 3 mm.  Gallbladder: Within normal limits.  Pancreas: Not well visualized.  Right kidney: 8.7 cm. No hydronephrosis.  Ascites: None.  IVC: Visualized portions are within normal limits.    IMPRESSION:  No cholelithiasis or biliary ductal dilatation.    < from: NM Pulmonary Perfusion Scan (22 @ 15:54) >  EXAM:  NM PULM PERFUSION IMG        PROCEDURE DATE:  2022       INTERPRETATION:  RADIOPHARMACEUTICAL:  3.0 mCi Tc-99m-MAA, I.V.    CLINICAL INFORMATION: 71 year old Covid positive male with tachycardia   and shortness of breath; referred to evaluate for pulmonary embolism.    TECHNIQUE:  Perfusion images of the lungs were obtained following   administration of Tc-99m-MAA. Images were obtained in the anterior,   posterior, both lateral, and all 4 oblique projections. The study was   interpreted in conjunction with a chest radiograph of 2022.    COMPARISON: No previous lung scans were available for comparison.    FINDINGS: There is a small perfusion defect in the apicoposterior segment   of the left upper lobe. There is heterogeneous distribution of   radiopharmaceutical in the remainder of both lungs on the perfusion   images.    IMPRESSION: Very low probability of pulmonary embolus.    --- End of Report ---        < end of copied text >      --- End of Report ---    < end of copied text >    IMPRESSION: Patient with multiple myeloma diagnosed on bm bx, pet with bone lesions, on revlimid , dx with covid on , developed some dysphagia, red itchy rash, chest tighness starting past few days. Found febrile but not hypoxic, clear cxr, has dvt but no pe on vq scan on anticoagulation. He was placed on cefepime, he tested positive for adenovirus and covid. His fever could be viral, could be from whatever is causing the skin rash-concerned for drug reaction including DRESS or a viral exanthem. He cannot get remdesivir now due to level of liver enzymes but he is not hypoxic . He could be considered for sotruvimab for the coronavirus but he has not progressed to respiratory failure despite known infection for 10 days and now co infected with adenovirus. I would rather limit meds in him given current rash issue and concern for drug reaction. I dont see an indication for marginally effective and he is in renal failure too so suspect risk outweighs benefit. I dont find clear evidence of a bacterial infection, he is planned for ENT evaluation to see his throat which also may be sore from the viral process or drug reaction. If fever from dvt, he is on anticoags    Recommendations:  favor to monitor off antibiotics  follow up cultures  await ent evaluation  monitor oxygen saturations  on anticoagulation for the dvt  favor dermatology evaluation  will check strongyloides titer

## 2022-01-10 NOTE — SWALLOW BEDSIDE ASSESSMENT ADULT - COMMENTS
Patient seen at bedside for an initial assessment of the swallow function, at which time patient was alert. RN reporting patient is reporting that he "cannot swallow". Breakfast and lunch tray's present during assessment and have not been eaten. [employer]  utilized. Patient states that he "cannot swallow" food/liquid reporting it "stays in his mouth". Patient further states that food/liquid "gets stuck" in his throat. Patient is also stating that he has pain in his mouth but denies pain in his throat. Upon questioning patient also reporting difficulty swallowing his secretions. Per progress note 1/9/22, patient is a "71M HTN remote Hx DVT (Eliquis 2.5 bid), CKD2-3, hx CVA with R sided deficit, pre-diabetes, MM undergoing chemotherapy (scheduled for 1/4/22)  which has been delayed by COVID-19 positive test 12/31/21 presents with dyspnea without hypoxia, tachycardia, JULIA, transaminitis, sepsis due to viral etiology (COVID-19 with adenovirus) vs bacterial etiology."    WBC is WFL. Most recent CXR revealed "Clear lungs. No pleural effusions or pneumothorax."    Patient seen at bedside for an initial assessment of the swallow function, at which time patient was alert. RN reporting patient is reporting that he "cannot swallow". Breakfast and lunch trays present during assessment and have not been eaten. [employer]  utilized. Patient states that he "cannot swallow" food/liquid reporting it "stays in his mouth". Patient further states that food/liquid "gets stuck" in his throat. Patient is also stating that he has pain in his mouth but denies pain in his throat. Upon questioning patient also reporting difficulty swallowing his secretions.

## 2022-01-10 NOTE — PROGRESS NOTE ADULT - SUBJECTIVE AND OBJECTIVE BOX
[facility] NEPHROLOGY- PROGRESS NOTE    71y Male with history of MM presents with SOB. Nephrology consulted for elevated Scr.    REVIEW OF SYSTEMS:  Gen: no changes in weight  Cards: no chest pain  Resp: no dyspnea  GI: no nausea or vomiting or diarrhea  Vascular: no LE edema    No Known Allergies      Hospital Medications: Medications reviewed    VITALS:  T(F): 97.9 (01-10-22 @ 09:28), Max: 103 (22 @ 16:36)  HR: 114 (01-10-22 @ 09:28)  BP: 91/55 (01-10-22 @ 09:28)  RR: 18 (01-10-22 @ 09:28)  SpO2: 98% (01-10-22 @ 09:28)  Wt(kg): --  Height (cm): 170.2 ( @ 11:18)  Weight (kg): 78.8 ( @ 14:49)  BMI (kg/m2): 27.2 ( @ 14:49)  BSA (m2): 1.9 ( @ 14:49)      PHYSICAL EXAM:    Gen: NAD, calm  Cards: RRR, +S1/S2, no M/G/R  Resp: CTA B/L  GI: soft, NT/ND, NABS  Vascular: no LE edema B/L    LABS:  01-10    138  |  107  |  81<H>  ----------------------------<  127<H>  5.0   |  16<L>  |  2.94<H>    Ca    7.6<L>      10 Yared 2022 12:07    TPro  5.9<L>  /  Alb  2.4<L>  /  TBili  0.8  /  DBili      /  AST  471<H>  /  ALT  709<H>  /  AlkPhos  165<H>  01-10    Creatinine Trend: 2.94 <--, 2.32 <--, 2.63 <--                        9.0    9.52  )-----------( 315      ( 2022 09:41 )             29.0     Urine Studies:  Urinalysis Basic - ( 2022 16:12 )    Color: Yellow / Appearance: Slightly Turbid / S.020 / pH:   Gluc:  / Ketone: Trace  / Bili: Negative / Urobili: <2 mg/dL   Blood:  / Protein: 30 mg/dL / Nitrite: Negative   Leuk Esterase: Small / RBC: 2 /HPF / WBC 11 /HPF   Sq Epi:  / Non Sq Epi: >27 /HPF / Bacteria: Moderate      Sodium, Random Urine: <20 mmol/L ( @ 16:12)  Potassium, Random Urine: 56.6 mmol/L ( @ 16:12)  Chloride, Random Urine: <20 mmol/L ( @ 16:12)  Creatinine, Random Urine: 200 mg/dL ( @ 16:12)      RADIOLOGY & ADDITIONAL STUDIES:    < from: US Kidney and Bladder (22 @ 15:48) >  IMPRESSION:    No hydronephrosis.    Simple appearing left renal cyst, unchanged.    < end of copied text >

## 2022-01-10 NOTE — PROGRESS NOTE ADULT - SUBJECTIVE AND OBJECTIVE BOX
Called to assess patient for oral bleeding. Went to assess patient who states he hasn't had any bleeding today. Denies epistaxis. No other complaints. On exam there is no epistaxis or oral bleeding or evidence of previous bleeding. No ENT intervention at this time. Call ENT if patient is Actively bleeding. If no active bleeding no consult warranted at this time.

## 2022-01-10 NOTE — PROGRESS NOTE ADULT - SUBJECTIVE AND OBJECTIVE BOX
Patient is a 71y old  Male who presents with a chief complaint of Shortness of breath (10 Yared 2022 16:16)    having a matute placed    Medication:   acetaminophen     Tablet .. 650 milliGRAM(s) Oral every 6 hours PRN  apixaban 5 milliGRAM(s) Oral every 12 hours  calcium carbonate   1250 mG (OsCal) 1 Tablet(s) Oral daily  cholecalciferol 1000 Unit(s) Oral daily  famotidine    Tablet 10 milliGRAM(s) Oral every 48 hours  finasteride 5 milliGRAM(s) Oral daily  methylPREDNISolone sodium succinate Injectable 24 milliGRAM(s) IV Push two times a day  metoprolol tartrate 25 milliGRAM(s) Oral two times a day  sodium chloride 0.9%. 1000 milliLiter(s) IV Continuous <Continuous>  tamsulosin 0.4 milliGRAM(s) Oral at bedtime  triamcinolone 0.1% Ointment 1 Application(s) Topical every 12 hours      Physical exam    T(C): 37.5 (01-10-22 @ 18:21), Max: 38.1 (01-09-22 @ 23:30)  HR: 120 (01-10-22 @ 18:21) (101 - 120)  BP: 97/47 (01-10-22 @ 18:21) (91/55 - 113/64)  RR: 18 (01-10-22 @ 18:21) (18 - 20)  SpO2: 100% (01-10-22 @ 18:21) (97% - 100%)  Wt(kg): --    alert NAD  EOMI anicteric sclera  Cv s1 S2 RRR  Lungs clear B/L  abd soft NT ND +BS    Labs                      9.0    15.89 )-----------( 321      ( 10 Yared 2022 12:07 )             29.5       01-10    138  |  107  |  81<H>  ----------------------------<  127<H>  5.0   |  16<L>  |  2.94<H>    Ca    7.6<L>      10 Yared 2022 12:07    TPro  5.9<L>  /  Alb  2.4<L>  /  TBili  0.8  /  DBili  x   /  AST  471<H>  /  ALT  709<H>  /  AlkPhos  165<H>  01-10      LIVER FUNCTIONS - ( 10 Yared 2022 12:07 )  Alb: 2.4 g/dL / Pro: 5.9 g/dL / ALK PHOS: 165 U/L / ALT: 709 U/L / AST: 471 U/L / GGT: x             [id number]

## 2022-01-10 NOTE — CONSULT NOTE ADULT - ASSESSMENT
Favor DRESS/DIHS given findings of morbilliform eruption with peripheral eosinophilia, transaminitis, JULIA on CKD, and fevers in the setting of recently starting multiple medications including allopurinol (favored), bortezomib, and thalidomide, in the 4-5 weeks prior to cutaneous eruption. Given common culprit, allopurinol favored, however cannot rule out bortezomib or thalidomide as medication triggers, given reports of these medications also causing DRESS/DIHS. Drug-induced hypersensitivity syndrome (DIHS), or drug reaction with eosinophilia and systemic symptoms (DRESS), is a serious multisystem drug reaction. It is an idiosyncratic reaction consisting of fever, rash, eosinophilia, and internal organ involvement. It tends to occur between 2-6 weeks after starting a new medication but may develop months later. A rash is present in over 80% of cases. Additional clinical findings include pharyngitis, lymphadenopathy, and facial and hand edema, while internal organ involvement most commonly affects the liver and hematologic and renal systems. Per telephone conversation with private ID attending kendy Meredith to start systemic steroids at this time.   - please start IV solumedrol 48mg total divided into BID dosing (equivalent to prednisone 60mg; 0.76 mg/kg/day)  - please start topical triamcinolone 0.1% ointment BID to affected area on trunk/extremities   - please start oral Ca 1200mg + vitamin D 1000 IU daily given anticipated long course of steroids   - please start oral famotidine 20mg daily for GI prophylaxis  - please trend CBC with differential and CMP (including LFTs) daily   - HOLD allopurinol / chemotherapy medications     The patient's chart was reviewed in addition to being seen and examined at bedside with the dermatology attending Dr. Lei Beard.  Recommendations were communicated with the primary team.  Please page [phone] for further related questions (please leave 10 digit call back number because we cover several facilities).    Yelitza Cruz MD  Resident Physician, PGY3  [facility] Dermatology

## 2022-01-10 NOTE — PROGRESS NOTE ADULT - ASSESSMENT
Multiple myeloma recently started on Velcade based therapy. Here with Covid. Chemo on hold. Supportive management per primary team.    Anemia multifactorial AOCD process due to MM, kidney disease and infection. Hgb adequate for now and can monitor.

## 2022-01-10 NOTE — PROGRESS NOTE ADULT - PROBLEM SELECTOR PLAN 4
Possibly due to COVID-19  Oncologist Dr Mcdaniel consulted, ?reaction to chemo  will consider derm consult if no improvement

## 2022-01-10 NOTE — PROGRESS NOTE ADULT - PROBLEM SELECTOR PLAN 9
Check lipids, would expect antiplatelet and statin for secondary prevention,   hold statin for now given elevated LFT.      Dysphagia: Nothing by mouth for the time being and will reevaluate

## 2022-01-10 NOTE — CONSULT NOTE ADULT - ATTENDING COMMENTS
Favor DIHS most likely 2/2 allopurinol in the context of fever, morbilliform eruption, peripheral eosinophilia, severe transaminitis, and facial edema. He should not be re-challenged on allopurinol. Bortezomib and thalimoide are also possibilities although these are less likely to cause this type of drug eruption. No test can distinguish between drug etiologies. A daily cbc w/ diff and CMP will be necessary to monitor this condition. Systemic corticosteroids are typically efficacious for this condition, although dosing may need to be adjusted. He may end up needing 1 mg/kg of prednisone or equivalent, or higher dose pulsed steroids if liver enzymes continue to uptrend tomorrow.    Lei Beard MD, PharmD, MPH  Co-Director, Inpatient Dermatology Consultation Service, [facility]

## 2022-01-10 NOTE — PROGRESS NOTE ADULT - PROBLEM SELECTOR PLAN 5
Possibly due to COVID vs. sepsis  ?reaction to chemo  follow LFTs (hemolyzed specimen today, will recheck LFTs tomorrow)  limited RUQ sono  caution with tylenol and fevers.   acute hepatitis panel  V/Q scan neg in the ED.

## 2022-01-10 NOTE — PROVIDER CONTACT NOTE (OTHER) - ASSESSMENT
Patient is complaining of oral bleeding. Inspected patient oral mucous, can not see where bleeding is coming from

## 2022-01-10 NOTE — PROGRESS NOTE ADULT - SUBJECTIVE AND OBJECTIVE BOX
Date of Service: 01-10-22 @ 16:00    Patient is a 71y old  Male who presents with a chief complaint of Shortness of breath (10 Yared 2022 14:23)      Any change in ROS: seems OK: no SOB :       MEDICATIONS  (STANDING):  apixaban 5 milliGRAM(s) Oral every 12 hours  finasteride 5 milliGRAM(s) Oral daily  metoprolol tartrate 25 milliGRAM(s) Oral two times a day  sodium chloride 0.9%. 1000 milliLiter(s) (70 mL/Hr) IV Continuous <Continuous>  tamsulosin 0.4 milliGRAM(s) Oral at bedtime    MEDICATIONS  (PRN):  acetaminophen     Tablet .. 650 milliGRAM(s) Oral every 6 hours PRN Temp greater or equal to 38.5C (101.3F), Mild Pain (1 - 3), Moderate Pain (4 - 6), Severe Pain (7 - 10)  diphenhydrAMINE Injectable 25 milliGRAM(s) IV Push every 6 hours PRN Rash and/or Itching    Vital Signs Last 24 Hrs  T(C): 37.4 (10 Yared 2022 15:42), Max: 39.4 (2022 16:36)  T(F): 99.4 (10 Yared 2022 15:42), Max: 103 (2022 16:36)  HR: 116 (10 Yared 2022 15:42) (101 - 125)  BP: 93/48 (10 Yared 2022 15:42) (91/55 - 113/64)  BP(mean): --  RR: 18 (10 Yared 2022 15:42) (18 - 20)  SpO2: 97% (10 Yared 2022 15:42) (97% - 99%)    I&O's Summary        Physical Exam:   GENERAL: NAD, well-groomed, well-developed  HEENT: SAM/   Atraumatic, Normocephalic  ENMT: No tonsillar erythema, exudates, or enlargement; Moist mucous membranes, Good dentition, No lesions  NECK: Supple, No JVD, Normal thyroid  CHEST/LUNG: Clear to auscultaion  CVS: Regular rate and rhythm; No murmurs, rubs, or gallops  GI: : Soft, Nontender, Nondistended; Bowel sounds present  NERVOUS SYSTEM:  Alert & Oriented X3  EXTREMITIES: -edema  LYMPH: No lymphadenopathy noted  SKIN: No rashes or lesions  ENDOCRINOLOGY: No Thyromegaly  PSYCH: Appropriate    Labs:  21, 21                            9.0    15.89 )-----------( 321      ( 10 Yared 2022 12:07 )             29.5                         9.0    9.52  )-----------( 315      ( 2022 09:41 )             29.0                         9.6    10.33 )-----------( 385      ( 2022 23:41 )             32.5                         10.0   9.00  )-----------( 386      ( 2022 12:22 )             32.8     01-10    138  |  107  |  81<H>  ----------------------------<  127<H>  5.0   |  16<L>  |  2.94<H>      135  |  101  |  60<H>  ----------------------------<  148<H>  TNP   |  19<L>  |  2.32<H>  08    133<L>  |  99  |  50<H>  ----------------------------<  174<H>  4.9   |  19<L>  |  2.63<H>    Ca    7.6<L>      10 Yared 2022 12:07  Ca    7.7<L>      2022 09:41    TPro  5.9<L>  /  Alb  2.4<L>  /  TBili  0.8  /  DBili  x   /  AST  471<H>  /  ALT  709<H>  /  AlkPhos  165<H>  01-10  TPro  6.5  /  Alb  2.6<L>  /  TBili  0.8  /  DBili  x   /  AST  490<H>  /  ALT  473<H>  /  AlkPhos  159<H>    TPro  7.0  /  Alb  3.1<L>  /  TBili  0.6  /  DBili  x   /  AST  178<H>  /  ALT  245<H>  /  AlkPhos  171<H>  01-08    CAPILLARY BLOOD GLUCOSE          LIVER FUNCTIONS - ( 10 Yared 2022 12:07 )  Alb: 2.4 g/dL / Pro: 5.9 g/dL / ALK PHOS: 165 U/L / ALT: 709 U/L / AST: 471 U/L / GGT: x           PT/INR - ( 10 Yared 2022 12:07 )   PT: 24.7 sec;   INR: 2.25 ratio         PTT - ( 10 Yared 2022 12:07 )  PTT:35.6 sec  Urinalysis Basic - ( 2022 16:12 )    Color: Yellow / Appearance: Slightly Turbid / S.020 / pH: x  Gluc: x / Ketone: Trace  / Bili: Negative / Urobili: <2 mg/dL   Blood: x / Protein: 30 mg/dL / Nitrite: Negative   Leuk Esterase: Small / RBC: 2 /HPF / WBC 11 /HPF   Sq Epi: x / Non Sq Epi: >27 /HPF / Bacteria: Moderate      D-Dimer Assay, Quantitative: 354 ng/mL DDU ( @ 13:22)        RECENT CULTURES:   @ 16:22 .Blood Blood       < from: US Duplex Venous Lower Ext Complete, Bilateral (22 @ 15:44) >    INTERPRETATION:  CLINICAL INFORMATION: COVID positive. Shortness of   breath. Assess for deep vein thrombosis.    COMPARISON: Right lower extremity duplex sonogram 2016.    TECHNIQUE: Duplex sonography of the BILATERAL LOWER extremity veins with   color and spectral Doppler, with and without compression.    FINDINGS:    RIGHT:  Deep venous thrombosis of the right common femoraland popliteal veins.    LEFT:  Deep venous thrombosis of the left common femoral, femoral, and popliteal   veins.    IMPRESSION:  Bilateral lower extremity deep vein thromboses, as noted on prior studies.          --- End of Report ---          PRABHAKAR HARDEN MD; Resident Radiology  This document has been electronically signed.  MIRIAM ROACH MD; Attending Radiologist  This document has been electronically signed. 2022  3:33PM    < end of copied text >           No growth to date.          RESPIRATORY CULTURES:          Studies  Chest X-RAY  CT SCAN Chest   Venous Dopplers: LE:   CT Abdomen  Others

## 2022-01-10 NOTE — PROVIDER CONTACT NOTE (OTHER) - ASSESSMENT
Patient has a temperature of 100.5. Attempted to give patient acetaminophen with water, patient unable to shallow, tried to give acetaminophen with applesauce, patient unable to shallow.

## 2022-01-10 NOTE — CONSULT NOTE ADULT - SUBJECTIVE AND OBJECTIVE BOX
Patient is a 71y old  Male who presents with a chief complaint of Shortness of breath (10 Yared 2022 11:40)     .     HPI:    HPI:  Pt is 71M HTN remote Hx DVT (eliquis 2.5 bid), CKD2-3, hx CVA with R sided deficit, prediabetes, MM undergoing chemotherapy (scheduled for 1/4/22)  which has been delayed by COVID-19 positive test 12/31/21.  Vaccinated for COVID.  Pt developed diffuse itchy rash on arms, trunk, face, with lip swelling, no sensation of throat closing up or change in ability to swallow.  For past two days fevers and difficulty breathing, sensation of chest tightness.  Reports poor appetite.  Febrile to 104 in ED. Treated with tylenol. (08 Jan 2022 15:30)          REVIEW OF SYSTEMS  Constitutional:   No fever, no fatigue, no pallor, no night sweats, no weight loss.  HEENT:   No eye pain, no vision changes, no icterus, no mouth ulcers.  Respiratory:   No shortness of breath, no cough, no respiratory distress.   Cardiovascular:   No chest pain, no palpitations.   Gastrointestinal: No abdominal pain, no nausea, no vomiting , no diahrrea, no constipation, no hematochezia,no melena.  Skin:   No rashes, no jaundice, no eczema.   Musculoskeletal:   No joint pain, no swelling, no myalgia.   Neurologic:   No headache, no seizure, no weakness.   Genitourinary:   No dysuria, no decreased urine output.  Psychiatric:  No depression, no anxiety,   Endocrine:   No thyroid disease, no diabetes.  Heme/Lymphatic:   No anemia, no blood transfusions, no lymph node enlargement, no bleeding, no bruising.  ___________________________________________________________________________________________  Allergies    No Known Allergies    Intolerances      MEDICATIONS  (STANDING):  apixaban 5 milliGRAM(s) Oral every 12 hours  finasteride 5 milliGRAM(s) Oral daily  metoprolol tartrate 25 milliGRAM(s) Oral two times a day  sodium chloride 0.9%. 1000 milliLiter(s) (75 mL/Hr) IV Continuous <Continuous>  tamsulosin 0.4 milliGRAM(s) Oral at bedtime    MEDICATIONS  (PRN):  acetaminophen     Tablet .. 650 milliGRAM(s) Oral every 6 hours PRN Temp greater or equal to 38.5C (101.3F), Mild Pain (1 - 3), Moderate Pain (4 - 6), Severe Pain (7 - 10)  diphenhydrAMINE Injectable 25 milliGRAM(s) IV Push every 6 hours PRN Rash and/or Itching      PAST MEDICAL & SURGICAL HISTORY:  Benign essential hypertension    Hx of hyperlipidemia    DVT (deep venous thrombosis)    Obstructive uropathy    Obesity    Anemia of unknown etiology in 2012    BPH (benign prostatic hypertrophy)    Diabetes mellitus  pre-diabetes, diet controlled    CVA (cerebral vascular accident)    Multiple myeloma    H/O tooth extraction  2012    S/P prostatectomy      FAMILY HISTORY:  Family history of hypertension (Father)      Social History: No hsitory of : Tobacco use, IVDA, EToH  ______________________________________________________________________________________    PHYSICAL EXAM    Daily     Daily   BMI: 27.2 (01-08 @ 14:49)  Change in Weight:  Vital Signs Last 24 Hrs  T(C): 36.6 (10 Yared 2022 09:28), Max: 39.4 (09 Jan 2022 16:36)  T(F): 97.9 (10 Yared 2022 09:28), Max: 103 (09 Jan 2022 16:36)  HR: 114 (10 Yared 2022 09:28) (95 - 125)  BP: 91/55 (10 Yared 2022 09:28) (91/55 - 127/73)  BP(mean): --  RR: 18 (10 Yared 2022 09:28) (18 - 20)  SpO2: 98% (10 Yared 2022 09:28) (95% - 99%)    General:  NAD. Lethargic   HEENT:    Normal appearance of conjunctiva, ears, nose, lips, oropharynx, and oral mucosa, anicteric.  Neck:  No masses, no asymmetry.  Lymph Nodes:  No lymphadenopathy.   Cardiovascular:  RRR normal S1/S2, no murmur.  Respiratory:  CTA B/L, normal respiratory effort.   Abdominal:   soft, no masses or tenderness, normoactive BS, NT/ND, no HSM.  Extremities:   No clubbing or cyanosis, normal capillary refill, no edema.   Skin:   No rash, jaundice, lesions, eczema.   Musculoskeletal:  No joint swelling, erythema or tenderness.   Neuro: No focal deficits.   Other:   _______________________________________________________________________________________________  Lab Results:                          9.0    9.52  )-----------( 315      ( 09 Jan 2022 09:41 )             29.0     01-09    135  |  101  |  60<H>  ----------------------------<  148<H>  TNP   |  19<L>  |  2.32<H>    Ca    7.7<L>      09 Jan 2022 09:41  Phos  2.3     01-08  Mg     2.00     01-08    TPro  6.5  /  Alb  2.6<L>  /  TBili  0.8  /  DBili  x   /  AST  490<H>  /  ALT  473<H>  /  AlkPhos  159<H>  01-09    LIVER FUNCTIONS - ( 09 Jan 2022 09:41 )  Alb: 2.6 g/dL / Pro: 6.5 g/dL / ALK PHOS: 159 U/L / ALT: 473 U/L / AST: 490 U/L / GGT: x           PT/INR - ( 08 Jan 2022 13:22 )   PT: 25.5 sec;   INR: 2.33 ratio         PTT - ( 09 Jan 2022 09:41 )  PTT:94.6 sec        Stool Results:          RADIOLOGY RESULTS:    SURGICAL PATHOLOGY:

## 2022-01-10 NOTE — CONSULT NOTE ADULT - SUBJECTIVE AND OBJECTIVE BOX
HPI:  70 yo MM on chemotherapy, DVT on Eliquis, CKD 2-3, anemia, CVA w R sided deficit, T2DM, presented with SOB, found to be COVID+ (at home test positive on 21), also with lip swelling and rash.     Dermatology consulted for rash. Limited history obtained from patient given difficulty with speaking (later endorsed feeling short of breath). On ~1/3 (7 days ago per patient), he developed lip swelling and diffuse erythematous rash throughout upper extremity, trunk, back. Unclear if symptomatic from rash. Other symptoms include fever, SOB, and chest tightness per chart review. No new medications. Last treatment for MM was on  with velcade (bortezomib). Overnight noted to have intermittent oral bleeding on and off, with plan for further ENT evaluation.     Home meds (years long):   Hydralazine  Amlodipine  Metoprolol  Tamsulosin  Finasteride    Per nephrology: Creatinine at baseline seems to be 1.2 given labs from 2021.    While admitted, on renally dosed cefepime since sepsis (fever, tachycardia, JULIA, COVID+ and adenovirus), MM and recent chemotherapy    Per ID note 1-10, favor monitoring off abx and following up cultures given no clear evidence of bacterial infection     Per collateral obtained from patient’s outpatient private oncologist Dr. Mcdaniel:     Bortezomib injection first started on   Thalidomide prescribed    Allopurinol prescribed    Decadron once per week     Medications held due to covid+, unclear date of last dose but was prior to  as this chemotherapy was held in the setting of covid+    PAST MEDICAL & SURGICAL HISTORY:  Benign essential hypertension    Hx of hyperlipidemia    DVT (deep venous thrombosis)    Obstructive uropathy    Obesity    Anemia of unknown etiology in     BPH (benign prostatic hypertrophy)    Diabetes mellitus  pre-diabetes, diet controlled    CVA (cerebral vascular accident)    Multiple myeloma    H/O tooth extraction  2012    S/P prostatectomy    REVIEW OF SYSTEMS:  General: + fevers, no chills; no lethargy  Skin/Breast: see HPI  Ophthalmologic: no eye pain or changes in vision  ENT: no dysphagia or changes in hearing  Respiratory: no SOB or cough  Cardiovascular: no palpitations or chest pain  Gastrointestinal: no abdominal pain or blood in stool  Genitourinary: no dysuria or frequency  Musculoskeletal: no joint pains or weakness  Neurological: no weakness, numbness, or tingling    MEDICATIONS  (STANDING):  apixaban 5 milliGRAM(s) Oral every 12 hours  finasteride 5 milliGRAM(s) Oral daily  metoprolol tartrate 25 milliGRAM(s) Oral two times a day  sodium chloride 0.9%. 1000 milliLiter(s) (70 mL/Hr) IV Continuous <Continuous>  tamsulosin 0.4 milliGRAM(s) Oral at bedtime    MEDICATIONS  (PRN):  acetaminophen     Tablet .. 650 milliGRAM(s) Oral every 6 hours PRN Temp greater or equal to 38.5C (101.3F), Mild Pain (1 - 3), Moderate Pain (4 - 6), Severe Pain (7 - 10)  diphenhydrAMINE Injectable 25 milliGRAM(s) IV Push every 6 hours PRN Rash and/or Itching    Allergies    No Known Allergies    Intolerances    SOCIAL HISTORY:  Denies relevant SH    FAMILY HISTORY:  Family history of hypertension (Father)    Vital Signs Last 24 Hrs  T(C): 37.4 (10 Yared 2022 15:42), Max: 39.4 (2022 16:36)  T(F): 99.4 (10 Yared 2022 15:42), Max: 103 (2022 16:36)  HR: 116 (10 Yared 2022 15:42) (101 - 125)  BP: 93/48 (10 Yared 2022 15:42) (91/55 - 113/64)  BP(mean): --  RR: 18 (10 Yared 2022 15:42) (18 - 20)  SpO2: 97% (10 Yared 2022 15:42) (97% - 99%)    PHYSICAL EXAM:  The patient appeared uncomfortable.   OP showed no ulcerations.  There was no visible LAD.  Conjunctiva were non-injected.  There was no clubbing or edema of extremities.   The scalp, hair, face, eyebrows, lips, OP, neck, chest, back, extremities x4, and nails were examined.  There was no hyperhidrosis or bromhidrosis.     Of note on skin exam:   - diffuse red erythematous patch on trunk, upper extremities, lower extremities, with areas on upper thighs and trunk demonstrating smaller round morbilliform eruption   - no oral mucosal lesions visualized  - no overt facial swelling appreciated     LABS:                        9.0    15.89 )-----------( 321      ( 10 Yared 2022 12:07 )             29.5     01-10    138  |  107  |  81<H>  ----------------------------<  127<H>  5.0   |  16<L>  |  2.94<H>    Ca    7.6<L>      10 Yared 2022 12:07    TPro  5.9<L>  /  Alb  2.4<L>  /  TBili  0.8  /  DBili  x   /  AST  471<H>  /  ALT  709<H>  /  AlkPhos  165<H>  01-10    PT/INR - ( 10 Yared 2022 12:07 )   PT: 24.7 sec;   INR: 2.25 ratio         PTT - ( 10 Yared 2022 12:07 )  PTT:35.6 sec  Urinalysis Basic - ( 2022 16:12 )    Color: Yellow / Appearance: Slightly Turbid / S.020 / pH: x  Gluc: x / Ketone: Trace  / Bili: Negative / Urobili: <2 mg/dL   Blood: x / Protein: 30 mg/dL / Nitrite: Negative   Leuk Esterase: Small / RBC: 2 /HPF / WBC 11 /HPF   Sq Epi: x / Non Sq Epi: >27 /HPF / Bacteria: Moderate      RADIOLOGY & ADDITIONAL STUDIES:    Culture - Blood (collected )  Source: .Blood Blood  Preliminary Report ():    No growth to date.    Culture - Blood (collected )  Source: .Blood Blood  Preliminary Report ():    No growth to date.      CXR:   ACC: [id number] EXAM:  XR CHEST PORTABLE URGENT 1V                          PROCEDURE DATE:  2022      INTERPRETATION:  CLINICAL INDICATION: dyspnea    EXAM:  Portable frontal chest from 2022 at 1203. Compared to prior study   from 10/29/2016.    IMPRESSION:  Slight right hemidiaphragm elevation.    Clear lungs. No pleural effusions or pneumothorax.    Cardiac and mediastinal silhouette is grossly within normal limits for   the projection.    --- End of Report ---      ACC: [id number] EXAM:  US DPLX LWR EXT VEINS COMPL BI                          PROCEDURE DATE:  2022      INTERPRETATION:  CLINICAL INFORMATION: COVID positive. Shortness of   breath. Assess for deep vein thrombosis.    COMPARISON: Right lower extremity duplex sonogram 2016.    TECHNIQUE: Duplex sonography of the BILATERAL LOWER extremity veins with   color and spectral Doppler, with and without compression.    FINDINGS:    RIGHT:  Deep venous thrombosis of the right common femoraland popliteal veins.    LEFT:  Deep venous thrombosis of the left common femoral, femoral, and popliteal   veins.    IMPRESSION:  Bilateral lower extremity deep vein thromboses, as noted on prior studies.  --- End of Report ---      PRABHAKAR HARDEN MD; Resident Radiology  This document has been electronically signed.  MIRIAM ROACH MD; Att  < from: NM Pulmonary Perfusion Scan (22 @ 15:54) >    EXAM:  NM PULM PERFUSION IMG        PROCEDURE DATE:  2022       INTERPRETATION:  RADIOPHARMACEUTICAL:  3.0 mCi Tc-99m-MAA, I.V.    CLINICAL INFORMATION: 71 year old Covid positive male with tachycardia   and shortness of breath; referred to evaluate for pulmonary embolism.    TECHNIQUE:  Perfusion images of the lungs were obtained following   administration of Tc-99m-MAA. Images were obtained in the anterior,   posterior, both lateral, and all 4 oblique projections. The study was   interpreted in conjunction with a chest radiograph of 2022.    COMPARISON: No previous lung scans were available for comparison.    FINDINGS: There is a small perfusion defect in the apicoposterior segment   of the left upper lobe. There is heterogeneous distribution of   radiopharmaceutical in the remainder of both lungs on the perfusion   images.    IMPRESSION: Very low probability of pulmonary embolus.    ACC: [id number] EXAM:  US ABDOMEN RT UPR QUADRANT                          PROCEDURE DATE:  2022      INTERPRETATION:  CLINICAL INFORMATION: Elevated LFTs.    COMPARISON: 10/13/2016    TECHNIQUE: Sonography of the right upper quadrant.    FINDINGS:    Liver: Measures 16 cm. Within normal limits.  Bile ducts: Normal caliber. Common bile duct measures 3 mm.  Gallbladder: Within normal limits.  Pancreas: Not well visualized.  Right kidney: 8.7 cm. No hydronephrosis.  Ascites: None.  IVC: Visualized portions are within normal limits.    IMPRESSION:  No cholelithiasis or biliary ductal dilatation.    ACC: [id number] EXAM:  US KIDNEYS AND BLADDER                          PROCEDURE DATE:  2022          INTERPRETATION:  CLINICAL INFORMATION: Renal sufficiency.    COMPARISON: Abdominal CT dated 10/23/2013 and abdominal ultrasound dated   2022. Renal ultrasound dated 10/13/2016    TECHNIQUE: Sonography of the kidneys and bladder.    FINDINGS:    Right kidney: 9.1 cm. Normal in size and echogenicity without   hydronephrosis    Left kidney: 10.2 cm. Normal in size and echogenicity without   hydronephrosis. There is 2.2 cm simple appearing cyst in upper pole,   unchanged.    Urinary bladder: Mild bladder wall trabeculation consistent with bladder   wall hypertrophy..    IMPRESSION:    No hydronephrosis.    Simple appearing left renal cyst, unchanged.

## 2022-01-10 NOTE — CONSULT NOTE ADULT - ASSESSMENT
71 year old male with newly diagnosed COVD past medical history of MM, presents with rash, fever and chest tightness.       Elevated lFts   Likely related to acute infection   Non obstructive patterns   Would continue to monitor daily   Consider alternative anti-pyritic the acetaminophen for now   Avoid hepatotoxins     Dysphagia   Patient states he isn't hungry  Monitor for now in the setting of acute illness     DVT   COntinue AC as per alina team

## 2022-01-10 NOTE — CHART NOTE - NSCHARTNOTEFT_GEN_A_CORE
Pt seen and examined at bedside for rash. Rash appears macular, non-blanching, dark purple b/l UE, Stomach and back. Admits to itchiness. Pt states this rash started 1 week ago. Denies trouble breathing. Denies allergies to any medications or food. Pt had this rash prior to admission which was presumed to be from chemo. Pt did receive Vanco x1 yesterday for fever. Rash appears to be the same from my exam yesterday therefore low concern for Red Man Syndrome. Benadryl ordered PRN. If rash worsens, will consider derm cs. Pt seen and examined at bedside for rash. Rash appears macular, non-blanching, dark purple b/l UE, abdomen and back. Admits to itchiness. Pt states this rash started 1 week ago. Denies trouble breathing. Denies allergies to any medications or food. Pt had this rash prior to admission which was presumed to be from chemo. Pt did receive Vanco x1 yesterday for fever. Rash appears to be the same from my exam yesterday therefore low concern for Red Man Syndrome. Benadryl ordered PRN. If rash worsens, will consider derm cs. Pt seen and examined at bedside for rash. Rash appears macular, non-blanching, dark purple b/l UE, abdomen and back. Admits to itchiness. Pt states this rash started 1 week ago. Denies trouble breathing. Denies allergies to any medications or food. Pt had this rash prior to admission which was presumed to be from chemo. Pt did receive Vanco x1 yesterday for fever. Rash appears to be the same from my exam yesterday therefore low concern for Red Man Syndrome. Derm consulted. R/O DRESS, Elevated LFTS/Cr.   ENT consulted for ?mouth bleeding. Pt reports having dysphagia however not cooperating with S&S. GI was consulted - will f/u recs. Changed to pureed diet for now. Discussed plan with Dr. Mckoy.

## 2022-01-10 NOTE — PROGRESS NOTE ADULT - SUBJECTIVE AND OBJECTIVE BOX
Date of service: 01-10-22 @ 21:50      Patient is a 71y old  Male who presents with a chief complaint of Shortness of breath (10 Yared 2022 18:34)                                                               INTERVAL HPI/OVERNIGHT EVENTS:    REVIEW OF SYSTEMS:   Poor historian unable to give Reliable  Review of systems       Report of difficulty swallowing                                                                                                                                                                                                                                                                    Medications:  MEDICATIONS  (STANDING):  apixaban 5 milliGRAM(s) Oral every 12 hours  calcium carbonate   1250 mG (OsCal) 1 Tablet(s) Oral daily  cholecalciferol 1000 Unit(s) Oral daily  famotidine    Tablet 10 milliGRAM(s) Oral every 48 hours  finasteride 5 milliGRAM(s) Oral daily  methylPREDNISolone sodium succinate Injectable 24 milliGRAM(s) IV Push two times a day  metoprolol tartrate 25 milliGRAM(s) Oral two times a day  sodium chloride 0.9%. 1000 milliLiter(s) (70 mL/Hr) IV Continuous <Continuous>  tamsulosin 0.4 milliGRAM(s) Oral at bedtime  triamcinolone 0.1% Ointment 1 Application(s) Topical every 12 hours    MEDICATIONS  (PRN):  acetaminophen     Tablet .. 650 milliGRAM(s) Oral every 6 hours PRN Temp greater or equal to 38.5C (101.3F), Mild Pain (1 - 3), Moderate Pain (4 - 6), Severe Pain (7 - 10)       Allergies    No Known Allergies    Intolerances      Vital Signs Last 24 Hrs  T(C): 37.5 (10 Yared 2022 18:21), Max: 38.1 (09 Jan 2022 23:30)  T(F): 99.5 (10 Yared 2022 18:21), Max: 100.5 (09 Jan 2022 23:30)  HR: 120 (10 Yared 2022 18:21) (101 - 120)  BP: 97/47 (10 Yared 2022 18:21) (91/55 - 113/64)  BP(mean): --  RR: 18 (10 Yared 2022 18:21) (18 - 20)  SpO2: 100% (10 Yared 2022 18:21) (97% - 100%)  CAPILLARY BLOOD GLUCOSE          01-10 @ 07:01  -  01-10 @ 21:50  --------------------------------------------------------  IN: 0 mL / OUT: 200 mL / NET: -200 mL      Physical Exam:    Daily     Daily   General:  Elderly no acute distress  HEENT:  Unable to due to Lack of participation  CV:  RRR, S1S2   Lungs:  CTA B/L, no wheezes, rales, rhonchi  Abdomen:  Soft, non-tender, no distended, positive BS  Extremities:  no edema   Diffuse rash  Neuro: Grossly nonfocal        LABS:                               9.0    15.89 )-----------( 321      ( 10 Yared 2022 12:07 )             29.5                      01-10    138  |  107  |  81<H>  ----------------------------<  127<H>  5.0   |  16<L>  |  2.94<H>    Ca    7.6<L>      10 Yared 2022 12:07    TPro  5.9<L>  /  Alb  2.4<L>  /  TBili  0.8  /  DBili  x   /  AST  471<H>  /  ALT  709<H>  /  AlkPhos  165<H>  01-10                       RADIOLOGY & ADDITIONAL TESTS         I personally reviewed: [  ]EKG   [  ]CXR    [  ] CT      A/P:         Discussed with :     [facility] consultants' Notes   Time spent :

## 2022-01-10 NOTE — PROGRESS NOTE ADULT - ASSESSMENT
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 Nephrology consulted for elevated Scr.    1. JULIA: suspect hemodynamically mediated in setting of infection. Scr increased today however unclear if patient has been receiving IVF (no fluids running at bedside). Continue with IVF given poor PO intake. Differential diagnosis also includes HRS given acute liver failure for which will consider HRS treatment if Scr continues to increase despite IVF. UA with mild pyuria however do not suspect interstitial kidney disease. FeNa low. Renal US without hydro. Avoid nephrotoxins.    2. CKD-3a: Baseline Scr 1.2 as per prior records. Outpatient CKD work up. Monitor electrolytes.    3. HTN with CKD: BP low normal with tachycardia on metoprolol. As per primary team. Monitor BP.    4. Metabolic acidosis: Gap and non-gap acidosis in setting of lactic acidosis and renal failure. Will consider addition of sodium bicarbonate if pH decreases. Monitor pH/lactate.    5. COVID-19: As per ID.      [facility]  Wade Tong M.D.  Nitin Ni D.O.  Joanna Napier M.D.  Annie Casas, MSN, ANP-C    Telephone: [phone]  Facsimile: [phone]    [address] [place] [address]

## 2022-01-10 NOTE — PROVIDER CONTACT NOTE (OTHER) - ASSESSMENT
Patient skin is redden and patient reports feelings itchy. Patient received vancomycin 1/09 Patient skin is redden and patient reports feelings itchy. Patient received vancomycin 1/09 2145

## 2022-01-11 DIAGNOSIS — R19.8 OTHER SPECIFIED SYMPTOMS AND SIGNS INVOLVING THE DIGESTIVE SYSTEM AND ABDOMEN: ICD-10-CM

## 2022-01-11 LAB
ALBUMIN SERPL ELPH-MCNC: 2.4 G/DL — LOW (ref 3.3–5)
ALP SERPL-CCNC: 149 U/L — HIGH (ref 40–120)
ALT FLD-CCNC: 550 U/L — HIGH (ref 4–41)
ANION GAP SERPL CALC-SCNC: 16 MMOL/L — HIGH (ref 7–14)
AST SERPL-CCNC: 168 U/L — HIGH (ref 4–40)
BASE EXCESS BLDV CALC-SCNC: -10.2 MMOL/L — LOW (ref -2–3)
BASOPHILS # BLD AUTO: 0.11 K/UL — SIGNIFICANT CHANGE UP (ref 0–0.2)
BASOPHILS NFR BLD AUTO: 0.6 % — SIGNIFICANT CHANGE UP (ref 0–2)
BILIRUB SERPL-MCNC: 0.7 MG/DL — SIGNIFICANT CHANGE UP (ref 0.2–1.2)
BUN SERPL-MCNC: 102 MG/DL — HIGH (ref 7–23)
CALCIUM SERPL-MCNC: 7.7 MG/DL — LOW (ref 8.4–10.5)
CHLORIDE SERPL-SCNC: 108 MMOL/L — HIGH (ref 98–107)
CO2 BLDV-SCNC: 16.5 MMOL/L — LOW (ref 22–26)
CO2 SERPL-SCNC: 15 MMOL/L — LOW (ref 22–31)
CREAT SERPL-MCNC: 3.1 MG/DL — HIGH (ref 0.5–1.3)
EOSINOPHIL # BLD AUTO: 1 K/UL — HIGH (ref 0–0.5)
EOSINOPHIL NFR BLD AUTO: 5.4 % — SIGNIFICANT CHANGE UP (ref 0–6)
GLUCOSE SERPL-MCNC: 265 MG/DL — HIGH (ref 70–99)
HAV IGM SER-ACNC: SIGNIFICANT CHANGE UP
HBV CORE IGM SER-ACNC: SIGNIFICANT CHANGE UP
HBV SURFACE AG SER-ACNC: SIGNIFICANT CHANGE UP
HCO3 BLDV-SCNC: 16 MMOL/L — LOW (ref 22–29)
HCT VFR BLD CALC: 27 % — LOW (ref 39–50)
HCV AB S/CO SERPL IA: 0.26 S/CO — SIGNIFICANT CHANGE UP (ref 0–0.99)
HCV AB SERPL-IMP: SIGNIFICANT CHANGE UP
HGB BLD-MCNC: 8.1 G/DL — LOW (ref 13–17)
IANC: 6.19 K/UL — SIGNIFICANT CHANGE UP (ref 1.5–8.5)
IMM GRANULOCYTES NFR BLD AUTO: 1 % — SIGNIFICANT CHANGE UP (ref 0–1.5)
LACTATE SERPL-SCNC: 2.6 MMOL/L — HIGH (ref 0.5–2)
LYMPHOCYTES # BLD AUTO: 37.6 % — SIGNIFICANT CHANGE UP (ref 13–44)
LYMPHOCYTES # BLD AUTO: 6.95 K/UL — HIGH (ref 1–3.3)
MAGNESIUM SERPL-MCNC: 2.3 MG/DL — SIGNIFICANT CHANGE UP (ref 1.6–2.6)
MCHC RBC-ENTMCNC: 25.6 PG — LOW (ref 27–34)
MCHC RBC-ENTMCNC: 30 GM/DL — LOW (ref 32–36)
MCV RBC AUTO: 85.2 FL — SIGNIFICANT CHANGE UP (ref 80–100)
MONOCYTES # BLD AUTO: 4.07 K/UL — HIGH (ref 0–0.9)
MONOCYTES NFR BLD AUTO: 22 % — HIGH (ref 2–14)
NEUTROPHILS # BLD AUTO: 6.19 K/UL — SIGNIFICANT CHANGE UP (ref 1.8–7.4)
NEUTROPHILS NFR BLD AUTO: 33.4 % — LOW (ref 43–77)
NRBC # BLD: 2 /100 WBCS — SIGNIFICANT CHANGE UP
NRBC # FLD: 0.42 K/UL — HIGH
PCO2 BLDV: 33 MMHG — LOW (ref 42–55)
PH BLDV: 7.28 — LOW (ref 7.32–7.43)
PHOSPHATE SERPL-MCNC: 4.6 MG/DL — HIGH (ref 2.5–4.5)
PLATELET # BLD AUTO: 322 K/UL — SIGNIFICANT CHANGE UP (ref 150–400)
PO2 BLDV: 83 MMHG — SIGNIFICANT CHANGE UP
POTASSIUM SERPL-MCNC: 5 MMOL/L — SIGNIFICANT CHANGE UP (ref 3.5–5.3)
POTASSIUM SERPL-SCNC: 5 MMOL/L — SIGNIFICANT CHANGE UP (ref 3.5–5.3)
PROT SERPL-MCNC: 5.2 G/DL — LOW (ref 6–8.3)
RBC # BLD: 3.17 M/UL — LOW (ref 4.2–5.8)
RBC # FLD: 22.8 % — HIGH (ref 10.3–14.5)
SAO2 % BLDV: 96.4 % — SIGNIFICANT CHANGE UP
SODIUM SERPL-SCNC: 139 MMOL/L — SIGNIFICANT CHANGE UP (ref 135–145)
WBC # BLD: 18.5 K/UL — HIGH (ref 3.8–10.5)
WBC # FLD AUTO: 18.5 K/UL — HIGH (ref 3.8–10.5)

## 2022-01-11 PROCEDURE — 99232 SBSQ HOSP IP/OBS MODERATE 35: CPT | Mod: 25

## 2022-01-11 PROCEDURE — 31575 DIAGNOSTIC LARYNGOSCOPY: CPT

## 2022-01-11 RX ORDER — PANTOPRAZOLE SODIUM 20 MG/1
40 TABLET, DELAYED RELEASE ORAL
Refills: 0 | Status: DISCONTINUED | OUTPATIENT
Start: 2022-01-11 | End: 2022-01-18

## 2022-01-11 RX ORDER — ALBUMIN HUMAN 25 %
100 VIAL (ML) INTRAVENOUS EVERY 6 HOURS
Refills: 0 | Status: COMPLETED | OUTPATIENT
Start: 2022-01-11 | End: 2022-01-13

## 2022-01-11 RX ORDER — SODIUM BICARBONATE 1 MEQ/ML
650 SYRINGE (ML) INTRAVENOUS THREE TIMES A DAY
Refills: 0 | Status: DISCONTINUED | OUTPATIENT
Start: 2022-01-11 | End: 2022-01-20

## 2022-01-11 RX ADMIN — Medication 1 TABLET(S): at 11:10

## 2022-01-11 RX ADMIN — Medication 24 MILLIGRAM(S): at 05:27

## 2022-01-11 RX ADMIN — TAMSULOSIN HYDROCHLORIDE 0.4 MILLIGRAM(S): 0.4 CAPSULE ORAL at 22:38

## 2022-01-11 RX ADMIN — APIXABAN 5 MILLIGRAM(S): 2.5 TABLET, FILM COATED ORAL at 17:53

## 2022-01-11 RX ADMIN — Medication 50 MILLILITER(S): at 11:03

## 2022-01-11 RX ADMIN — PANTOPRAZOLE SODIUM 40 MILLIGRAM(S): 20 TABLET, DELAYED RELEASE ORAL at 17:53

## 2022-01-11 RX ADMIN — Medication 1000 UNIT(S): at 11:19

## 2022-01-11 RX ADMIN — Medication 50 MILLILITER(S): at 17:30

## 2022-01-11 RX ADMIN — Medication 25 MILLIGRAM(S): at 17:39

## 2022-01-11 RX ADMIN — Medication 25 MILLIGRAM(S): at 05:18

## 2022-01-11 RX ADMIN — FINASTERIDE 5 MILLIGRAM(S): 5 TABLET, FILM COATED ORAL at 11:10

## 2022-01-11 RX ADMIN — Medication 650 MILLIGRAM(S): at 14:54

## 2022-01-11 RX ADMIN — Medication 1 APPLICATION(S): at 17:32

## 2022-01-11 RX ADMIN — Medication 24 MILLIGRAM(S): at 17:31

## 2022-01-11 RX ADMIN — Medication 650 MILLIGRAM(S): at 22:42

## 2022-01-11 RX ADMIN — Medication 1 APPLICATION(S): at 05:18

## 2022-01-11 RX ADMIN — APIXABAN 5 MILLIGRAM(S): 2.5 TABLET, FILM COATED ORAL at 05:18

## 2022-01-11 NOTE — CHART NOTE - NSCHARTNOTEFT_GEN_A_CORE
Dermatology Chart Note    The patient's chart and labs were reviewed and discussed with the dermatology attending Dr. Beard.  Recommend continuing IV solumedrol 24mg BID at this time.   Please page [phone] for further related questions (please leave 10 digit call back number because we cover several facilities).    Yelitza Cruz MD  Resident Physician, PGY3  [facility]

## 2022-01-11 NOTE — CHART NOTE - NSCHARTNOTEFT_GEN_A_CORE
Pt w/ dysphagia, refusing to take anything PO. GI was consulted yesterday. ENT reconsulted for need for scope. Will f/u recs.

## 2022-01-11 NOTE — PROGRESS NOTE ADULT - SUBJECTIVE AND OBJECTIVE BOX
INTERVAL HPI/OVERNIGHT EVENTS:    events noted  has c/o feeling something in his throat     MEDICATIONS  (STANDING):  albumin human 25% IVPB 100 milliLiter(s) IV Intermittent every 6 hours  apixaban 5 milliGRAM(s) Oral every 12 hours  calcium carbonate   1250 mG (OsCal) 1 Tablet(s) Oral daily  cholecalciferol 1000 Unit(s) Oral daily  famotidine    Tablet 10 milliGRAM(s) Oral every 48 hours  finasteride 5 milliGRAM(s) Oral daily  methylPREDNISolone sodium succinate Injectable 24 milliGRAM(s) IV Push two times a day  metoprolol tartrate 25 milliGRAM(s) Oral two times a day  pantoprazole    Tablet 40 milliGRAM(s) Oral two times a day  sodium bicarbonate 650 milliGRAM(s) Oral three times a day  tamsulosin 0.4 milliGRAM(s) Oral at bedtime  triamcinolone 0.1% Ointment 1 Application(s) Topical every 12 hours    MEDICATIONS  (PRN):  acetaminophen     Tablet .. 650 milliGRAM(s) Oral every 6 hours PRN Temp greater or equal to 38.5C (101.3F), Mild Pain (1 - 3), Moderate Pain (4 - 6), Severe Pain (7 - 10)      Allergies    allopurinol (Other)    Intolerances         Review of Systems: *poor historian    General:  No wt loss, fevers, chills, night sweats, fatigue   Eyes:  Good vision, no reported pain  ENT:  No sore throat, pain, runny nose, dysphagia  CV:  No pain, palpitations, hypo/hypertension  Resp:  No dyspnea, cough, tachypnea, wheezing  GI:  No pain, No nausea, No vomiting, No diarrhea, No constipation, No weight loss, No fever, No pruritis, No rectal bleeding, No melena, No dysphagia  :  No pain, bleeding, incontinence, nocturia  Muscle:  No pain, weakness  Neuro:  No weakness, tingling, memory problems  Psych:  No fatigue, insomnia, mood problems, depression  Endocrine:  No polyuria, polydypsia, cold/heat intolerance  Heme:  No petechiae, ecchymosis, easy bruisability  Skin:  No rash, tattoos, scars, edema      Vital Signs Last 24 Hrs  T(C): 36.6 (2022 09:10), Max: 37.5 (10 Yared 2022 18:21)  T(F): 97.9 (2022 09:10), Max: 99.5 (10 Yared 2022 18:21)  HR: 93 (2022 09:10) (93 - 120)  BP: 114/61 (2022 09:10) (90/49 - 119/62)  BP(mean): --  RR: 18 (2022 09:10) (18 - 19)  SpO2: 100% (2022 09:10) (97% - 100%)    PHYSICAL EXAM:    Constitutional: NAD  HEENT: EOMI, throat clear  Neck: No LAD, supple  Respiratory: CTA and P  Cardiovascular: S1 and S2, RRR, no M  Gastrointestinal: BS+, soft, NT/ND, neg HSM,  Extremities: No peripheral edema, neg clubbing, cyanosis  Vascular: 2+ peripheral pulses  Neurological: A/O x 3 (poor historian), no focal deficits  Psychiatric: Normal mood, normal affect  Skin: No rashes      LABS:                        8.1    18.50 )-----------( 322      ( 2022 07:32 )             27.0     01-11    139  |  108<H>  |  102<H>  ----------------------------<  265<H>  5.0   |  15<L>  |  3.10<H>    Ca    7.7<L>      2022 07:32  Phos  4.6     11  Mg     2.30         TPro  5.2<L>  /  Alb  2.4<L>  /  TBili  0.7  /  DBili  x   /  AST  168<H>  /  ALT  550<H>  /  AlkPhos  149<H>  -11    PT/INR - ( 10 Yared 2022 12:07 )   PT: 24.7 sec;   INR: 2.25 ratio         PTT - ( 10 Yared 2022 12:07 )  PTT:35.6 sec  Urinalysis Basic - ( 2022 16:12 )    Color: Yellow / Appearance: Slightly Turbid / S.020 / pH: x  Gluc: x / Ketone: Trace  / Bili: Negative / Urobili: <2 mg/dL   Blood: x / Protein: 30 mg/dL / Nitrite: Negative   Leuk Esterase: Small / RBC: 2 /HPF / WBC 11 /HPF   Sq Epi: x / Non Sq Epi: >27 /HPF / Bacteria: Moderate        RADIOLOGY & ADDITIONAL TESTS:

## 2022-01-11 NOTE — PROGRESS NOTE ADULT - ASSESSMENT
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 Nephrology consulted for elevated Scr.    1. 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 JULIA secondary to DRESS can be due to AIN for which patient already on steroids versus ATN. Scr increased today. Continue with IV steroids as per dermatology. Differential diagnosis also includes HRS given acute liver failure for which will change IVF to IV albumin. UA with mild pyuria. FeNa low. Renal US without hydro. Avoid nephrotoxins.    2. CKD-3a: Baseline Scr 1.2 as per prior records. Outpatient CKD work up. Monitor electrolytes.    3. HTN with CKD: BP low normal. Continue with metoprolol with hold parameters. Monitor BP.    4. Metabolic acidosis: Gap and non-gap acidosis in setting of lactic acidosis and renal failure which is compensated as per blood gas. Start sodium bicarbonate 650 mg PO TID. Monitor pH/lactate.    5. COVID-19: As per ID.      [facility]  Wade Tong M.D.  Nitin Ni D.O.  Joanna Napier M.D.  Annie Casas, MSN, ANP-C    Telephone: [phone]  Facsimile: [phone]    [id number] [place] [address]

## 2022-01-11 NOTE — PROGRESS NOTE ADULT - SUBJECTIVE AND OBJECTIVE BOX
LENNY CHILDERS  MRN-[id number]    Patient is a 71y old  Male who presents with a chief complaint of Shortness of breath (11 Jan 2022 15:54)      Review of System      General:	Denies fatigue, fevers, chills, sweats, decreased appetite.    Skin/Breast: denies pruritis, rash  	  Ophthalmologic: no change in vision or blurring  	  HEENT	Denies dry mouth, oral sores, dysphagia,  change in hearing.    Respiratory and Thorax:  cough, sob, wheeze, hemoptysis  	  Cardiovascular:	no cp , palp, orthopnea    Gastrointestinal:	no n/v/d constipation    Genitourinary:	no dysuria of frequency, no hematuria, no flank pain    Musculoskeletal:	no bone or joint pain. no muscle aches.     Neurological:	no change in sensory or motor function. no headache. no weakness.     Psychiatric:	no depression, no anxiety, insomnia.     Hematology/Lymphatics:	no bleeding or bruising        Current Meds  MEDICATIONS  (STANDING):  albumin human 25% IVPB 100 milliLiter(s) IV Intermittent every 6 hours  apixaban 5 milliGRAM(s) Oral every 12 hours  calcium carbonate   1250 mG (OsCal) 1 Tablet(s) Oral daily  cholecalciferol 1000 Unit(s) Oral daily  famotidine    Tablet 10 milliGRAM(s) Oral every 48 hours  finasteride 5 milliGRAM(s) Oral daily  methylPREDNISolone sodium succinate Injectable 24 milliGRAM(s) IV Push two times a day  metoprolol tartrate 25 milliGRAM(s) Oral two times a day  pantoprazole    Tablet 40 milliGRAM(s) Oral two times a day  sodium bicarbonate 650 milliGRAM(s) Oral three times a day  tamsulosin 0.4 milliGRAM(s) Oral at bedtime  triamcinolone 0.1% Ointment 1 Application(s) Topical every 12 hours    MEDICATIONS  (PRN):      Vitals  Vital Signs Last 24 Hrs  T(C): 36.4 (11 Jan 2022 17:10), Max: 37.4 (10 Yared 2022 22:26)  T(F): 97.5 (11 Jan 2022 17:10), Max: 99.3 (10 Yared 2022 22:26)  HR: 92 (11 Jan 2022 17:10) (89 - 112)  BP: 129/64 (11 Jan 2022 17:10) (90/49 - 135/69)  BP(mean): --  RR: 18 (11 Jan 2022 17:10) (18 - 19)  SpO2: 100% (11 Jan 2022 17:10) (99% - 100%)    Physical Exam  PHYSICAL EXAM:      Constitutional: NAD    Eyes: PERRLA EOMI, anicteric sclera    Heent :No oral sores, no pharyngeal injection. moist mucosa.    Neck: supple, no jvd, no LAD    Respiratory: CTA b/l     Cardiovascular: s1s2, no m/g/r    Gastrointestinal: soft, nt, nd, + BS    Extremities: no c/c/e    Neurological:A&O x 3 moves all ext.    Skin: no rash on exposed skin    Lymph Nodes: no lymphadenopathy.              Lab  CBC Full  -  ( 11 Jan 2022 07:32 )  WBC Count : 18.50 K/uL  RBC Count : 3.17 M/uL  Hemoglobin : 8.1 g/dL  Hematocrit : 27.0 %  Platelet Count - Automated : 322 K/uL  Mean Cell Volume : 85.2 fL  Mean Cell Hemoglobin : 25.6 pg  Mean Cell Hemoglobin Concentration : 30.0 gm/dL  Auto Neutrophil # : 6.19 K/uL  Auto Lymphocyte # : 6.95 K/uL  Auto Monocyte # : 4.07 K/uL  Auto Eosinophil # : 1.00 K/uL  Auto Basophil # : 0.11 K/uL  Auto Neutrophil % : 33.4 %  Auto Lymphocyte % : 37.6 %  Auto Monocyte % : 22.0 %  Auto Eosinophil % : 5.4 %  Auto Basophil % : 0.6 %    01-11    139  |  108<H>  |  102<H>  ----------------------------<  265<H>  5.0   |  15<L>  |  3.10<H>    Ca    7.7<L>      11 Jan 2022 07:32  Phos  4.6     01-11  Mg     2.30     01-11    TPro  5.2<L>  /  Alb  2.4<L>  /  TBili  0.7  /  DBili  x   /  AST  168<H>  /  ALT  550<H>  /  AlkPhos  149<H>  01-11    PT/INR - ( 10 Yared 2022 12:07 )   PT: 24.7 sec;   INR: 2.25 ratio         PTT - ( 10 Yared 2022 12:07 )  PTT:35.6 sec    Rad:    Assessment/Plan   LENNY CHILDERS  [id number]    Patient is a 71y old  Male who presents with a chief complaint of Shortness of breath (11 Jan 2022 15:54)      Review of System      General:	+ fatigue, decreased appetite    Skin/Breast: + rash  	  Ophthalmologic: no change in vision or blurring  	  HEENT	Denies dry mouth, oral sores, dysphagia,  change in hearing.    Respiratory and Thorax:  no cough, sob, wheeze, hemoptysis  	  Cardiovascular:	no cp , palp, orthopnea    Gastrointestinal:	no n/v/d constipation    Genitourinary:	no dysuria of frequency, no hematuria, no flank pain    Musculoskeletal:	no bone or joint pain. no muscle aches.     Neurological:	no change in sensory or motor function. no headache. no weakness.     Psychiatric:	no depression, no anxiety, insomnia.     Hematology/Lymphatics:	no bleeding or bruising        Current Meds  MEDICATIONS  (STANDING):  albumin human 25% IVPB 100 milliLiter(s) IV Intermittent every 6 hours  apixaban 5 milliGRAM(s) Oral every 12 hours  calcium carbonate   1250 mG (OsCal) 1 Tablet(s) Oral daily  cholecalciferol 1000 Unit(s) Oral daily  famotidine    Tablet 10 milliGRAM(s) Oral every 48 hours  finasteride 5 milliGRAM(s) Oral daily  methylPREDNISolone sodium succinate Injectable 24 milliGRAM(s) IV Push two times a day  metoprolol tartrate 25 milliGRAM(s) Oral two times a day  pantoprazole    Tablet 40 milliGRAM(s) Oral two times a day  sodium bicarbonate 650 milliGRAM(s) Oral three times a day  tamsulosin 0.4 milliGRAM(s) Oral at bedtime  triamcinolone 0.1% Ointment 1 Application(s) Topical every 12 hours    MEDICATIONS  (PRN):      Vitals  Vital Signs Last 24 Hrs  T(C): 36.4 (11 Jan 2022 17:10), Max: 37.4 (10 Yared 2022 22:26)  T(F): 97.5 (11 Jan 2022 17:10), Max: 99.3 (10 Yared 2022 22:26)  HR: 92 (11 Jan 2022 17:10) (89 - 112)  BP: 129/64 (11 Jan 2022 17:10) (90/49 - 135/69)  BP(mean): --  RR: 18 (11 Jan 2022 17:10) (18 - 19)  SpO2: 100% (11 Jan 2022 17:10) (99% - 100%)    Physical Exam      Constitutional: NAD    Eyes: PERRLA EOMI, anicteric sclera    Heent : + lip swelling, no pharyngeal injection. moist mucosa.    Neck: supple, no jvd, no LAD    Respiratory: CTA b/l     Cardiovascular: s1s2, no m/g/r    Gastrointestinal: soft, nt, nd, + BS    Extremities: no c/c/e    Neurological:A&O x 3 moves all ext.    Skin: + rash, palmar erythema    Lymph Nodes: no lymphadenopathy.      Lab  CBC Full  -  ( 11 Jan 2022 07:32 )  WBC Count : 18.50 K/uL  RBC Count : 3.17 M/uL  Hemoglobin : 8.1 g/dL  Hematocrit : 27.0 %  Platelet Count - Automated : 322 K/uL  Mean Cell Volume : 85.2 fL  Mean Cell Hemoglobin : 25.6 pg  Mean Cell Hemoglobin Concentration : 30.0 gm/dL  Auto Neutrophil # : 6.19 K/uL  Auto Lymphocyte # : 6.95 K/uL  Auto Monocyte # : 4.07 K/uL  Auto Eosinophil # : 1.00 K/uL  Auto Basophil # : 0.11 K/uL  Auto Neutrophil % : 33.4 %  Auto Lymphocyte % : 37.6 %  Auto Monocyte % : 22.0 %  Auto Eosinophil % : 5.4 %  Auto Basophil % : 0.6 %    01-11    139  |  108<H>  |  102<H>  ----------------------------<  265<H>  5.0   |  15<L>  |  3.10<H>    Ca    7.7<L>      11 Jan 2022 07:32  Phos  4.6     01-11  Mg     2.30     01-11    TPro  5.2<L>  /  Alb  2.4<L>  /  TBili  0.7  /  DBili  x   /  AST  168<H>  /  ALT  550<H>  /  AlkPhos  149<H>  01-11    PT/INR - ( 10 Yared 2022 12:07 )   PT: 24.7 sec;   INR: 2.25 ratio         PTT - ( 10 Yared 2022 12:07 )  PTT:35.6 sec    Rad:    Assessment/Plan

## 2022-01-11 NOTE — PROGRESS NOTE ADULT - SUBJECTIVE AND OBJECTIVE BOX
Date of Service: 22 @ 15:54    Patient is a 71y old  Male who presents with a chief complaint of Shortness of breath (2022 11:27)      Any change in ROS: doing ok :no SOB:     MEDICATIONS  (STANDING):  albumin human 25% IVPB 100 milliLiter(s) IV Intermittent every 6 hours  apixaban 5 milliGRAM(s) Oral every 12 hours  calcium carbonate   1250 mG (OsCal) 1 Tablet(s) Oral daily  cholecalciferol 1000 Unit(s) Oral daily  famotidine    Tablet 10 milliGRAM(s) Oral every 48 hours  finasteride 5 milliGRAM(s) Oral daily  methylPREDNISolone sodium succinate Injectable 24 milliGRAM(s) IV Push two times a day  metoprolol tartrate 25 milliGRAM(s) Oral two times a day  pantoprazole    Tablet 40 milliGRAM(s) Oral two times a day  sodium bicarbonate 650 milliGRAM(s) Oral three times a day  tamsulosin 0.4 milliGRAM(s) Oral at bedtime  triamcinolone 0.1% Ointment 1 Application(s) Topical every 12 hours    MEDICATIONS  (PRN):    Vital Signs Last 24 Hrs  T(C): 36.4 (2022 13:10), Max: 37.5 (10 Yared 2022 18:21)  T(F): 97.6 (2022 13:10), Max: 99.5 (10 Yared 2022 18:21)  HR: 89 (2022 13:10) (89 - 120)  BP: 135/69 (2022 13:10) (90/49 - 135/69)  BP(mean): --  RR: 18 (2022 13:10) (18 - 19)  SpO2: 100% (2022 13:10) (99% - 100%)    I&O's Summary    10 Yared 2022 07:01  -  2022 07:00  --------------------------------------------------------  IN: 0 mL / OUT: 200 mL / NET: -200 mL    2022 07:01  -  2022 15:54  --------------------------------------------------------  IN: 450 mL / OUT: 400 mL / NET: 50 mL          Physical Exam:   GENERAL: NAD, well-groomed, well-developed  HEENT: SAM/   Atraumatic, Normocephalic  ENMT: No tonsillar erythema, exudates, or enlargement; Moist mucous membranes, Good dentition, No lesions  NECK: Supple, No JVD, Normal thyroid  CHEST/LUNG: Clear to auscultaion  CVS: Regular rate and rhythm; No murmurs, rubs, or gallops  GI: : Soft, Nontender, Nondistended; Bowel sounds present  NERVOUS SYSTEM:  Alert & Oriented X3  EXTREMITIES: - edema  LYMPH: No lymphadenopathy noted  SKIN: No rashes or lesions  ENDOCRINOLOGY: No Thyromegaly  PSYCH: Appropriate    Labs:  16, 21, 21                            8.1    18.50 )-----------( 322      ( 2022 07:32 )             27.0                         9.0    15.89 )-----------( 321      ( 10 Yared 2022 12:07 )             29.5                         9.0    9.52  )-----------( 315      ( 2022 09:41 )             29.0                         9.6    10.33 )-----------( 385      ( 2022 23:41 )             32.5                         10.0   9.00  )-----------( 386      ( 2022 12:22 )             32.8     01-11    139  |  108<H>  |  102<H>  ----------------------------<  265<H>  5.0   |  15<L>  |  3.10<H>  01-10    138  |  107  |  81<H>  ----------------------------<  127<H>  5.0   |  16<L>  |  2.94<H>  01-    135  |  101  |  60<H>  ----------------------------<  148<H>  TNP   |  19<L>  |  2.32<H>      133<L>  |  99  |  50<H>  ----------------------------<  174<H>  4.9   |  19<L>  |  2.63<H>    Ca    7.7<L>      2022 07:32  Ca    7.6<L>      10 Yared 2022 12:07  Phos  4.6       Mg     2.30         TPro  5.2<L>  /  Alb  2.4<L>  /  TBili  0.7  /  DBili  x   /  AST  168<H>  /  ALT  550<H>  /  AlkPhos  149<H>    TPro  5.9<L>  /  Alb  2.4<L>  /  TBili  0.8  /  DBili  x   /  AST  471<H>  /  ALT  709<H>  /  AlkPhos  165<H>  01-10  TPro  6.5  /  Alb  2.6<L>  /  TBili  0.8  /  DBili  x   /  AST  490<H>  /  ALT  473<H>  /  AlkPhos  159<H>    TPro  7.0  /  Alb  3.1<L>  /  TBili  0.6  /  DBili  x   /  AST  178<H>  /  ALT  245<H>  /  AlkPhos  171<H>      CAPILLARY BLOOD GLUCOSE          LIVER FUNCTIONS - ( 2022 07:32 )  Alb: 2.4 g/dL / Pro: 5.2 g/dL / ALK PHOS: 149 U/L / ALT: 550 U/L / AST: 168 U/L / GGT: x           PT/INR - ( 10 Yared 2022 12:07 )   PT: 24.7 sec;   INR: 2.25 ratio         PTT - ( 10 Yared 2022 12:07 )  PTT:35.6 sec  Urinalysis Basic - ( 2022 16:12 )    Color: Yellow / Appearance: Slightly Turbid / S.020 / pH: x  Gluc: x / Ketone: Trace  / Bili: Negative / Urobili: <2 mg/dL   Blood: x / Protein: 30 mg/dL / Nitrite: Negative   Leuk Esterase: Small / RBC: 2 /HPF / WBC 11 /HPF   Sq Epi: x / Non Sq Epi: >27 /HPF / Bacteria: Moderate      D-Dimer Assay, Quantitative: 354 ng/mL DDU ( @ 13:22)  Lactate, Blood: 2.6 mmol/L ( @ 07:32)        RECENT CULTURES:   @ 20:51 .Blood Blood            < from: Xray Chest 1 View- PORTABLE-Urgent (Xray Chest 1 View- PORTABLE-Urgent .) (22 @ 18:02) >    ACC: [id number] EXAM:  XR CHEST PORTABLE URGENT 1V                          PROCEDURE DATE:  2022          INTERPRETATION:  EXAMINATION: XR CHEST URGENT    CLINICAL INDICATION: fever r/o infxn    TECHNIQUE: Single frontal, portable view of the chest was obtained.    COMPARISON: Chest x-ray 2022.    FINDINGS:  The heart is normal in size.  The lungs are clear.  There is no pneumothorax or pleural effusion.  There are no acute osseous abnormalities.    IMPRESSION:  Clear lungs.    --- Endof Report ---          ALICE RASCON MD; Resident Radiologist  This document has been electronically signed.  CHRISTOPHER BOSCH MD; Attending Radiologist  This document has been electronically signed. Yared 10 2022  6:19PM    < end of copied text >      No growth to date.     @ 16:22 .Blood Blood                No growth to date.          RESPIRATORY CULTURES:          Studies  Chest X-RAY  CT SCAN Chest   Venous Dopplers: LE:   CT Abdomen  Others

## 2022-01-11 NOTE — PROVIDER CONTACT NOTE (OTHER) - ACTION/TREATMENT ORDERED:
Provider was made aware. No new orders at this time, will come to bedside to assess. Will continue to monitor.

## 2022-01-11 NOTE — PROGRESS NOTE ADULT - PROBLEM SELECTOR PLAN 9
Check lipids, would expect antiplatelet and statin for secondary prevention,   hold statin for now given elevated LFT.      Dysphagia: Follow-up speech and swallow, ENT  NP0 at this time

## 2022-01-11 NOTE — PROGRESS NOTE ADULT - PROBLEM SELECTOR PLAN 5
Possibly due to COVID vs. sepsis vs DRESS   limited RUQ sono  caution with tylenol and fevers.   V/Q scan neg in the ED.  \

## 2022-01-11 NOTE — PROGRESS NOTE ADULT - SUBJECTIVE AND OBJECTIVE BOX
Date of service: 01-11-22 @ 22:04      Patient is a 71y old  Male who presents with a chief complaint of Shortness of breath (11 Jan 2022 19:19)                                                               INTERVAL HPI/OVERNIGHT EVENTS:  Still complaining of difficulty swallowing at the time of the interview  REVIEW OF SYSTEMS:     CONSTITUTIONAL: No weakness, fevers or chillsRESPIRATORY: No cough, wheezing,  No shortness of breath  CARDIOVASCULAR: No chest pain or palpitations  GASTROINTESTINAL: No abdominal pain  . No nausea, vomiting, or hematemesis; No diarrhea or constipation. No melena or hematochezia.  GENITOURINARY: No dysuria, frequency or hematuria  NEUROLOGICAL: No numbness or weakness                                                                                                                                                                                                                                                                             Medications:  MEDICATIONS  (STANDING):  albumin human 25% IVPB 100 milliLiter(s) IV Intermittent every 6 hours  apixaban 5 milliGRAM(s) Oral every 12 hours  calcium carbonate   1250 mG (OsCal) 1 Tablet(s) Oral daily  cholecalciferol 1000 Unit(s) Oral daily  famotidine    Tablet 10 milliGRAM(s) Oral every 48 hours  finasteride 5 milliGRAM(s) Oral daily  methylPREDNISolone sodium succinate Injectable 24 milliGRAM(s) IV Push two times a day  metoprolol tartrate 25 milliGRAM(s) Oral two times a day  pantoprazole    Tablet 40 milliGRAM(s) Oral two times a day  sodium bicarbonate 650 milliGRAM(s) Oral three times a day  tamsulosin 0.4 milliGRAM(s) Oral at bedtime  triamcinolone 0.1% Ointment 1 Application(s) Topical every 12 hours    MEDICATIONS  (PRN):       Allergies    allopurinol (Other)    Intolerances      Vital Signs Last 24 Hrs  T(C): 36.4 (11 Jan 2022 17:10), Max: 37.4 (10 Yared 2022 22:26)  T(F): 97.5 (11 Jan 2022 17:10), Max: 99.3 (10 Yared 2022 22:26)  HR: 92 (11 Jan 2022 17:10) (89 - 112)  BP: 129/64 (11 Jan 2022 17:10) (90/49 - 135/69)  BP(mean): --  RR: 18 (11 Jan 2022 17:10) (18 - 19)  SpO2: 100% (11 Jan 2022 17:10) (99% - 100%)  CAPILLARY BLOOD GLUCOSE          01-10 @ 07:01 - 01-11 @ 07:00  --------------------------------------------------------  IN: 0 mL / OUT: 200 mL / NET: -200 mL    01-11 @ 07:01 - 01-11 @ 22:04  --------------------------------------------------------  IN: 450 mL / OUT: 400 mL / NET: 50 mL      Physical Exam:    Daily     Daily   General:  No acute distress  HEENT:  Nonicteric, PERRLA  CV:  RRR, S1S2   Lungs:  CTA B/L, no wheezes, rales, rhonchi  Abdomen:  Soft, non-tender, no distended, positive BS  Extremities: , no edema  Skin: Diffuse rash  Neuro:  Grossly nonfocal                                                                                                                                                                                                                                                                                          LABS:                               8.1    18.50 )-----------( 322      ( 11 Jan 2022 07:32 )             27.0                      01-11    139  |  108<H>  |  102<H>  ----------------------------<  265<H>  5.0   |  15<L>  |  3.10<H>    Ca    7.7<L>      11 Jan 2022 07:32  Phos  4.6     01-11  Mg     2.30     01-11    TPro  5.2<L>  /  Alb  2.4<L>  /  TBili  0.7  /  DBili  x   /  AST  168<H>  /  ALT  550<H>  /  AlkPhos  149<H>  01-11                       RADIOLOGY & ADDITIONAL TESTS         I personally reviewed: [  ]EKG   [  ]CXR    [  ] CT      A/P:         Discussed with :     [facility] consultants' Notes   Time spent :

## 2022-01-11 NOTE — PROGRESS NOTE ADULT - ASSESSMENT
71 year old male with newly diagnosed COVD past medical history of MM, presents with rash, fever and chest tightness.     Dysphagia   unclear etiology   Protonix BID added   ENT eval appreciated; Agree w/CT soft tissue Neck w/contrast to r/o underlying pathology  defer endoscopic eval until Covid-19 resolves given increased anesthesia risk in these pts     Elevated Liver Enzymes    multifactorial; medication induced (Allopurinol) and Covid-19 Infxn (improving)   Non obstructive pattern  continue to monitor daily and avoid hepatotoxins   Consider alternative anti-pyritic the acetaminophen for now     DVT   a/c per primary team with gi ppx    I reviewed the overnight course of events on the unit, re-confirming the patient history. I discussed the care with the patient and their family. The plan of care was discussed with the physician assistant and modifications were made to the notation where appropriate. Differential diagnosis and plan of care discussed with patient after the evaluation. Advanced care planning was discussed with patient and family.  Advanced care planning forms were reviewed and discussed.  Risks, benefits and alternatives of gastroenterologic procedures were discussed in detail and all questions were answered. 35 minutes spent on total encounter of which more than fifty percent of the encounter was spent counseling and/or coordinating care by the attending physician.  71 year old male with newly diagnosed COVD past medical history of MM, presents with rash, fever and chest tightness.     Dysphagia   unclear etiology   Protonix BID added   check Esophagram   ENT eval appreciated; Agree w/CT soft tissue Neck w/contrast to r/o underlying pathology  defer endoscopic eval until Covid-19 resolves given increased anesthesia risk in these pts     Elevated Liver Enzymes    multifactorial; medication induced (Allopurinol) and Covid-19 Infxn (improving)   Non obstructive pattern  continue to monitor daily and avoid hepatotoxins   Consider alternative anti-pyritic the acetaminophen for now     DVT   a/c per primary team with gi ppx    I reviewed the overnight course of events on the unit, re-confirming the patient history. I discussed the care with the patient and their family. The plan of care was discussed with the physician assistant and modifications were made to the notation where appropriate. Differential diagnosis and plan of care discussed with patient after the evaluation. Advanced care planning was discussed with patient and family.  Advanced care planning forms were reviewed and discussed.  Risks, benefits and alternatives of gastroenterologic procedures were discussed in detail and all questions were answered. 35 minutes spent on total encounter of which more than fifty percent of the encounter was spent counseling and/or coordinating care by the attending physician.  71 year old male with newly diagnosed COVD past medical history of MM, presents with rash, fever and chest tightness.     Dysphagia   unclear etiology   Protonix BID added   Esophagram pending   Agree w/ENT for CT soft tissue Neck w/cont to r/o underlying pathology when Cr better  defer endoscopic eval until Covid-19 resolves given increased anesthesia risk in these pts     Elevated Liver Enzymes    2/2 DRESS Syndrome (Allopurinol) and Covid-19 Infxn   Non obstructive pattern; LFTs improving  continue to monitor daily and avoid hepatotoxins   Consider alternative anti-pyritic the acetaminophen for now     DVT   a/c per primary team with gi ppx    I reviewed the overnight course of events on the unit, re-confirming the patient history. I discussed the care with the patient and their family. The plan of care was discussed with the physician assistant and modifications were made to the notation where appropriate. Differential diagnosis and plan of care discussed with patient after the evaluation. Advanced care planning was discussed with patient and family.  Advanced care planning forms were reviewed and discussed.  Risks, benefits and alternatives of gastroenterologic procedures were discussed in detail and all questions were answered. 35 minutes spent on total encounter of which more than fifty percent of the encounter was spent counseling and/or coordinating care by the attending physician.  71 year old male with newly diagnosed COVD past medical history of MM, presents with rash, fever and chest tightness.     Dysphagia   could this be related to DRESS/DIHS  Protonix BID added   Esophagram pending   Agree w/ENT for CT soft tissue Neck w/cont to r/o underlying pathology when Cr better  defer endoscopic eval until Covid-19 and DRESS resolves as increased anesthesia risk     Elevated Liver Enzymes    2/2 DRESS Syndrome (Allopurinol) and Covid-19 Infxn   Non obstructive pattern; LFTs improving  continue to monitor daily and avoid hepatotoxins   Consider alternative anti-pyritic the acetaminophen for now     DVT   a/c per primary team with gi ppx    I reviewed the overnight course of events on the unit, re-confirming the patient history. I discussed the care with the patient and their family. The plan of care was discussed with the physician assistant and modifications were made to the notation where appropriate. Differential diagnosis and plan of care discussed with patient after the evaluation. Advanced care planning was discussed with patient and family.  Advanced care planning forms were reviewed and discussed.  Risks, benefits and alternatives of gastroenterologic procedures were discussed in detail and all questions were answered. 35 minutes spent on total encounter of which more than fifty percent of the encounter was spent counseling and/or coordinating care by the attending physician.  71 year old male with newly diagnosed COVD past medical history of MM, presents with rash, fever and chest tightness.     Dysphagia   could this be related to DRESS/DIHS  Protonix BID added   Esophagram pending   Agree w/ENT for CT soft tissue Neck w/cont to r/o underlying pathology when Cr better  defer EGD eval until Covid-19 & DRESS sx's improve as incr risk of anesthesia/scope trauma    Elevated Liver Enzymes    2/2 DRESS Syndrome (Allopurinol) and Covid-19 Infxn   Non obstructive pattern; LFTs improving  continue to monitor daily and avoid hepatotoxins   Consider alternative anti-pyritic the acetaminophen for now     DVT   a/c per primary team with gi ppx    I reviewed the overnight course of events on the unit, re-confirming the patient history. I discussed the care with the patient and their family. The plan of care was discussed with the physician assistant and modifications were made to the notation where appropriate. Differential diagnosis and plan of care discussed with patient after the evaluation. Advanced care planning was discussed with patient and family.  Advanced care planning forms were reviewed and discussed.  Risks, benefits and alternatives of gastroenterologic procedures were discussed in detail and all questions were answered. 35 minutes spent on total encounter of which more than fifty percent of the encounter was spent counseling and/or coordinating care by the attending physician.  71 year old male with newly diagnosed COVD past medical history of MM, presents with rash, fever and chest tightness.     Dysphagia   could this be related to DRESS/DIHS  Protonix BID added   Esophagram pending   Agree w/ENT for CT soft tissue Neck w/cont to r/o underlying pathology when Cr better  defer EGD eval until Covid-19 & DRESS sx's improve as incr risk of anesthesia/scope trauma    Elevated Liver Enzymes    2/2 DRESS/DIHS Syndrome (Allopurinol) and Covid-19 Infxn   Non obstructive pattern; LFTs improving  continue to monitor daily and avoid hepatotoxins   Consider alternative anti-pyritic the acetaminophen for now     DVT   a/c per primary team with gi ppx    I reviewed the overnight course of events on the unit, re-confirming the patient history. I discussed the care with the patient and their family. The plan of care was discussed with the physician assistant and modifications were made to the notation where appropriate. Differential diagnosis and plan of care discussed with patient after the evaluation. Advanced care planning was discussed with patient and family.  Advanced care planning forms were reviewed and discussed.  Risks, benefits and alternatives of gastroenterologic procedures were discussed in detail and all questions were answered. 35 minutes spent on total encounter of which more than fifty percent of the encounter was spent counseling and/or coordinating care by the attending physician.

## 2022-01-11 NOTE — CONSULT NOTE ADULT - PROBLEM SELECTOR RECOMMENDATION 9
1. Consider CT soft tissue Neck with contrast to rule out any underlying pathology  2. Encourage SLP for cinesophagram to assess swallow function   3. Consider GI consult  4. Continue pepcid, consider Nexium   5. Will discuss with DR. Gallardo ENT attending. No ENT intervention at this time. Call ENT if CT showing ENT pathology.
on broad spectrum antibiotic:: heis febrile to 103 today : on broad spectrum antibtiocs: follow all cultures: aditya likely need ct scan chest

## 2022-01-11 NOTE — CONSULT NOTE ADULT - SUBJECTIVE AND OBJECTIVE BOX
CC: sore throat     HPI: 71 year old male with a history of MM, DM, DVA, anemia, HTN and HLD that is admitted to [facility] for Covid-19. Patient having complaints of dysphagia and globus sensation with swallowing liquids and solids. C/O intermittent SOB. Refused to have SLP assessment. Denies Dyspnea, hoarseness or other complaints.       PAST MEDICAL & SURGICAL HISTORY:  Benign essential hypertension    Hx of hyperlipidemia    DVT (deep venous thrombosis)    Obstructive uropathy    Obesity    Anemia of unknown etiology in 2012    BPH (benign prostatic hypertrophy)    Diabetes mellitus  pre-diabetes, diet controlled    CVA (cerebral vascular accident)    Multiple myeloma    H/O tooth extraction  2012    S/P prostatectomy      Allergies    allopurinol (Other)    Intolerances      MEDICATIONS  (STANDING):  apixaban 5 milliGRAM(s) Oral every 12 hours  calcium carbonate   1250 mG (OsCal) 1 Tablet(s) Oral daily  cholecalciferol 1000 Unit(s) Oral daily  famotidine    Tablet 10 milliGRAM(s) Oral every 48 hours  finasteride 5 milliGRAM(s) Oral daily  methylPREDNISolone sodium succinate Injectable 24 milliGRAM(s) IV Push two times a day  metoprolol tartrate 25 milliGRAM(s) Oral two times a day  sodium chloride 0.9%. 1000 milliLiter(s) (70 mL/Hr) IV Continuous <Continuous>  tamsulosin 0.4 milliGRAM(s) Oral at bedtime  triamcinolone 0.1% Ointment 1 Application(s) Topical every 12 hours    MEDICATIONS  (PRN):  acetaminophen     Tablet .. 650 milliGRAM(s) Oral every 6 hours PRN Temp greater or equal to 38.5C (101.3F), Mild Pain (1 - 3), Moderate Pain (4 - 6), Severe Pain (7 - 10)      ROS:   ENT: all negative except as noted in HPI   CV: denies palpitations  Pulm: denies SOB, cough, hemoptysis  GI: denies change in apetite, indigestion, n/v  : denies pertinent urinary symptoms, urgency  Neuro: denies numbness/tingling, loss of sensation  Psych: denies anxiety  MS: denies muscle weakness, instability  Heme: denies easy bruising or bleeding  Endo: denies heat/cold intolerance, excessive sweating  Vascular: denies LE edema    Vital Signs Last 24 Hrs  T(C): 37.2 (11 Jan 2022 02:20), Max: 37.5 (10 Yared 2022 18:21)  T(F): 98.9 (11 Jan 2022 02:20), Max: 99.5 (10 Yared 2022 18:21)  HR: 97 (11 Jan 2022 05:18) (97 - 120)  BP: 119/62 (11 Jan 2022 05:18) (90/49 - 119/62)  BP(mean): --  RR: 19 (11 Jan 2022 05:18) (18 - 19)  SpO2: 99% (11 Jan 2022 05:18) (97% - 100%)                          8.1    18.50 )-----------( 322      ( 11 Jan 2022 07:32 )             27.0    01-11    139  |  108<H>  |  102<H>  ----------------------------<  265<H>  5.0   |  15<L>  |  3.10<H>    Ca    7.7<L>      11 Jan 2022 07:32  Phos  4.6     01-11  Mg     2.30     01-11    TPro  5.2<L>  /  Alb  2.4<L>  /  TBili  0.7  /  DBili  x   /  AST  168<H>  /  ALT  550<H>  /  AlkPhos  149<H>  01-11   PT/INR - ( 10 Yared 2022 12:07 )   PT: 24.7 sec;   INR: 2.25 ratio         PTT - ( 10 Yared 2022 12:07 )  PTT:35.6 sec    PHYSICAL EXAM:  Gen: NAD  Skin: No rashes, bruises, or lesions  Head: Normocephalic, Atraumatic  Face: no edema, erythema, or fluctuance. Parotid glands soft without mass  Eyes: no scleral injection  Ears: Right - ear canal clear, TM intact without effusion or erythema. No evidence of any fluid drainage. No mastoid tenderness, erythema, or ear bulging            Left - ear canal clear, TM intact without effusion or erythema. No evidence of any fluid drainage. No mastoid tenderness, erythema, or ear bulging  Nose: Nares bilaterally patent, no discharge  Mouth: No Stridor / Drooling / Trismus.  Mucosa moist, tongue/uvula midline, oropharynx clear  Neck: Flat, supple, no lymphadenopathy, trachea midline, no masses  Lymphatic: No lymphadenopathy  Resp: breathing easily, no stridor  CV: no peripheral edema/cyanosis  GI: nondistended   Peripheral vascular: no JVD or edema  Neuro: facial nerve intact, no facial droop      Diagnostic Nasal Endoscopy: no nasal polyps appreciated    Fiberoptic Indirect laryngoscopy:  No base of tongue masses, No edema, epiglottis sharp, Vocal cords mobile and widely patent.

## 2022-01-11 NOTE — PROGRESS NOTE ADULT - ASSESSMENT
1) MM- further rx as outpt. has been on RVD, allopurinol  last velcade was 12/22  rx now on hold.     2)anemia- chronic- multifactorial- ckd, MM, CTX, infxn  monitor cbc and provide transfusional support    3) Transaminitis. s/p sonogram  this is a new finding, I suspect due to infx., drug rxn  monitor cmp,   gi following    4) ID- PNA, sob, + covid and adenovirus.  Would consult ID, pulm    5) CKD- Acute on chronic- suspect related to infxn.  would consult Renal    6) DVT- On heparin. Ac as per med.     7) skin rash- derm input noted. R/V/D on hold, though allopurinol is felt to eb the most likely culprit    8) dysphagia- mgmt as per gi, med

## 2022-01-11 NOTE — PROGRESS NOTE ADULT - SUBJECTIVE AND OBJECTIVE BOX
[facility] NEPHROLOGY- PROGRESS NOTE    71y Male with history of MM presents with SOB. Nephrology consulted for elevated Scr.    REVIEW OF SYSTEMS:  Gen: no changes in weight  Cards: no chest pain  Resp: no dyspnea  GI: no nausea or vomiting or diarrhea  Vascular: no LE edema    No Known Allergies      Hospital Medications: Medications reviewed      VITALS:  T(F): 98.9 (22 @ 02:20), Max: 99.5 (01-10-22 @ 18:21)  HR: 97 (22 @ 05:18)  BP: 119/62 (22 @ 05:18)  RR: 19 (22 @ 05:18)  SpO2: 99% (22 @ 05:18)  Wt(kg): --    01-10 @ 07:  -   @ 07:00  --------------------------------------------------------  IN: 0 mL / OUT: 200 mL / NET: -200 mL     @ 07:  -   @ 09:31  --------------------------------------------------------  IN: 0 mL / OUT: 400 mL / NET: -400 mL        PHYSICAL EXAM:    Gen: NAD, calm  Cards: RRR, +S1/S2, no M/G/R  Resp: CTA B/L  GI: soft, NT/ND, NABS  Vascular: no LE edema B/L      LABS:      139  |  108<H>  |  102<H>  ----------------------------<  265<H>  5.0   |  15<L>  |  3.10<H>    Ca    7.7<L>      2022 07:32  Phos  4.6       Mg     2.30         TPro  5.2<L>  /  Alb  2.4<L>  /  TBili  0.7  /  DBili      /  AST  168<H>  /  ALT  550<H>  /  AlkPhos  149<H>      Creatinine Trend: 3.10 <--, 2.94 <--, 2.32 <--, 2.63 <--                        8.1    18.50 )-----------( 322      ( 2022 07:32 )             27.0     Urine Studies:  Urinalysis Basic - ( 2022 16:12 )    Color: Yellow / Appearance: Slightly Turbid / S.020 / pH:   Gluc:  / Ketone: Trace  / Bili: Negative / Urobili: <2 mg/dL   Blood:  / Protein: 30 mg/dL / Nitrite: Negative   Leuk Esterase: Small / RBC: 2 /HPF / WBC 11 /HPF   Sq Epi:  / Non Sq Epi: >27 /HPF / Bacteria: Moderate      Sodium, Random Urine: <20 mmol/L ( @ 16:12)  Potassium, Random Urine: 56.6 mmol/L ( @ 16:12)  Chloride, Random Urine: <20 mmol/L ( @ 16:12)  Creatinine, Random Urine: 200 mg/dL ( @ 16:12)

## 2022-01-12 LAB
ALBUMIN SERPL ELPH-MCNC: 3.5 G/DL — SIGNIFICANT CHANGE UP (ref 3.3–5)
ALBUMIN SERPL ELPH-MCNC: 3.6 G/DL — SIGNIFICANT CHANGE UP (ref 3.3–5)
ALP SERPL-CCNC: 127 U/L — HIGH (ref 40–120)
ALP SERPL-CCNC: 131 U/L — HIGH (ref 40–120)
ALT FLD-CCNC: 299 U/L — HIGH (ref 4–41)
ALT FLD-CCNC: 332 U/L — HIGH (ref 4–41)
ANION GAP SERPL CALC-SCNC: 18 MMOL/L — HIGH (ref 7–14)
APTT BLD: 34.6 SEC — SIGNIFICANT CHANGE UP (ref 27–36.3)
APTT BLD: 37.4 SEC — HIGH (ref 27–36.3)
AST SERPL-CCNC: 62 U/L — HIGH (ref 4–40)
AST SERPL-CCNC: 62 U/L — HIGH (ref 4–40)
BASOPHILS # BLD AUTO: 0.07 K/UL — SIGNIFICANT CHANGE UP (ref 0–0.2)
BASOPHILS NFR BLD AUTO: 0.5 % — SIGNIFICANT CHANGE UP (ref 0–2)
BILIRUB DIRECT SERPL-MCNC: 0.4 MG/DL — HIGH (ref 0–0.3)
BILIRUB DIRECT SERPL-MCNC: 0.4 MG/DL — HIGH (ref 0–0.3)
BILIRUB INDIRECT FLD-MCNC: 0.5 MG/DL — SIGNIFICANT CHANGE UP (ref 0–1)
BILIRUB INDIRECT FLD-MCNC: 0.5 MG/DL — SIGNIFICANT CHANGE UP (ref 0–1)
BILIRUB SERPL-MCNC: 0.9 MG/DL — SIGNIFICANT CHANGE UP (ref 0.2–1.2)
BUN SERPL-MCNC: 110 MG/DL — HIGH (ref 7–23)
CALCIUM SERPL-MCNC: 8.4 MG/DL — SIGNIFICANT CHANGE UP (ref 8.4–10.5)
CHLORIDE SERPL-SCNC: 112 MMOL/L — HIGH (ref 98–107)
CK MB BLD-MCNC: 2.9 % — HIGH (ref 0–2.5)
CK MB CFR SERPL CALC: 2.4 NG/ML — SIGNIFICANT CHANGE UP
CK SERPL-CCNC: 83 U/L — SIGNIFICANT CHANGE UP (ref 30–200)
CO2 SERPL-SCNC: 16 MMOL/L — LOW (ref 22–31)
CREAT SERPL-MCNC: 2.33 MG/DL — HIGH (ref 0.5–1.3)
CREAT SERPL-MCNC: 2.55 MG/DL — HIGH (ref 0.5–1.3)
EOSINOPHIL # BLD AUTO: 0.44 K/UL — SIGNIFICANT CHANGE UP (ref 0–0.5)
EOSINOPHIL NFR BLD AUTO: 3.1 % — SIGNIFICANT CHANGE UP (ref 0–6)
FERRITIN SERPL-MCNC: 3387 NG/ML — HIGH (ref 30–400)
FOLATE SERPL-MCNC: 15.8 NG/ML — SIGNIFICANT CHANGE UP (ref 3.1–17.5)
GLUCOSE SERPL-MCNC: 337 MG/DL — HIGH (ref 70–99)
HAPTOGLOB SERPL-MCNC: 95 MG/DL — SIGNIFICANT CHANGE UP (ref 34–200)
HCT VFR BLD CALC: 25 % — LOW (ref 39–50)
HCT VFR BLD CALC: 31.9 % — LOW (ref 39–50)
HGB BLD-MCNC: 7.5 G/DL — LOW (ref 13–17)
HGB BLD-MCNC: 9.5 G/DL — LOW (ref 13–17)
IANC: 5.99 K/UL — SIGNIFICANT CHANGE UP (ref 1.5–8.5)
IMM GRANULOCYTES NFR BLD AUTO: 1.2 % — SIGNIFICANT CHANGE UP (ref 0–1.5)
INR BLD: 1.91 RATIO — HIGH (ref 0.88–1.16)
INR BLD: 2.23 RATIO — HIGH (ref 0.88–1.16)
INR BLD: 2.28 RATIO — HIGH (ref 0.88–1.16)
IRON SATN MFR SERPL: 99 UG/DL — SIGNIFICANT CHANGE UP (ref 45–165)
LDH SERPL L TO P-CCNC: 594 U/L — HIGH (ref 135–225)
LYMPHOCYTES # BLD AUTO: 37.6 % — SIGNIFICANT CHANGE UP (ref 13–44)
LYMPHOCYTES # BLD AUTO: 5.4 K/UL — HIGH (ref 1–3.3)
MAGNESIUM SERPL-MCNC: 2.7 MG/DL — HIGH (ref 1.6–2.6)
MCHC RBC-ENTMCNC: 25.8 PG — LOW (ref 27–34)
MCHC RBC-ENTMCNC: 25.9 PG — LOW (ref 27–34)
MCHC RBC-ENTMCNC: 29.8 GM/DL — LOW (ref 32–36)
MCHC RBC-ENTMCNC: 30 GM/DL — LOW (ref 32–36)
MCV RBC AUTO: 86.2 FL — SIGNIFICANT CHANGE UP (ref 80–100)
MCV RBC AUTO: 86.7 FL — SIGNIFICANT CHANGE UP (ref 80–100)
MONOCYTES # BLD AUTO: 2.3 K/UL — HIGH (ref 0–0.9)
MONOCYTES NFR BLD AUTO: 16 % — HIGH (ref 2–14)
NEUTROPHILS # BLD AUTO: 5.99 K/UL — SIGNIFICANT CHANGE UP (ref 1.8–7.4)
NEUTROPHILS NFR BLD AUTO: 41.6 % — LOW (ref 43–77)
NRBC # BLD: 2 /100 WBCS — SIGNIFICANT CHANGE UP
NRBC # BLD: 3 /100 WBCS — SIGNIFICANT CHANGE UP
NRBC # FLD: 0.35 K/UL — HIGH
NRBC # FLD: 0.39 K/UL — HIGH
PHOSPHATE SERPL-MCNC: 4.4 MG/DL — SIGNIFICANT CHANGE UP (ref 2.5–4.5)
PLATELET # BLD AUTO: 214 K/UL — SIGNIFICANT CHANGE UP (ref 150–400)
PLATELET # BLD AUTO: 291 K/UL — SIGNIFICANT CHANGE UP (ref 150–400)
POTASSIUM SERPL-MCNC: 5.1 MMOL/L — SIGNIFICANT CHANGE UP (ref 3.5–5.3)
POTASSIUM SERPL-SCNC: 5.1 MMOL/L — SIGNIFICANT CHANGE UP (ref 3.5–5.3)
PROT SERPL-MCNC: 6.2 G/DL — SIGNIFICANT CHANGE UP (ref 6–8.3)
PROT SERPL-MCNC: 6.6 G/DL — SIGNIFICANT CHANGE UP (ref 6–8.3)
PROTHROM AB SERPL-ACNC: 21.1 SEC — HIGH (ref 10.6–13.6)
PROTHROM AB SERPL-ACNC: 24.7 SEC — HIGH (ref 10.6–13.6)
PROTHROM AB SERPL-ACNC: 25 SEC — HIGH (ref 10.6–13.6)
RBC # BLD: 2.77 M/UL — LOW (ref 4.2–5.8)
RBC # BLD: 2.9 M/UL — LOW (ref 4.2–5.8)
RBC # BLD: 3.68 M/UL — LOW (ref 4.2–5.8)
RBC # FLD: 23 % — HIGH (ref 10.3–14.5)
RBC # FLD: 23 % — HIGH (ref 10.3–14.5)
RETICS #: 30.2 K/UL — SIGNIFICANT CHANGE UP (ref 25–125)
RETICS/RBC NFR: 1.1 % — SIGNIFICANT CHANGE UP (ref 0.5–2.5)
SODIUM SERPL-SCNC: 146 MMOL/L — HIGH (ref 135–145)
TIBC SERPL-MCNC: <129 UG/DL — LOW (ref 220–430)
TROPONIN T, HIGH SENSITIVITY RESULT: 17 NG/L — SIGNIFICANT CHANGE UP
UIBC SERPL-MCNC: <30 UG/DL — LOW (ref 110–370)
VIT B12 SERPL-MCNC: 2000 PG/ML — HIGH (ref 200–900)
WBC # BLD: 12.31 K/UL — HIGH (ref 3.8–10.5)
WBC # BLD: 14.37 K/UL — HIGH (ref 3.8–10.5)
WBC # FLD AUTO: 12.31 K/UL — HIGH (ref 3.8–10.5)
WBC # FLD AUTO: 14.37 K/UL — HIGH (ref 3.8–10.5)

## 2022-01-12 PROCEDURE — 99233 SBSQ HOSP IP/OBS HIGH 50: CPT | Mod: GC

## 2022-01-12 PROCEDURE — 93010 ELECTROCARDIOGRAM REPORT: CPT

## 2022-01-12 PROCEDURE — 74220 X-RAY XM ESOPHAGUS 1CNTRST: CPT | Mod: 26

## 2022-01-12 RX ORDER — VALACYCLOVIR 500 MG/1
500 TABLET, FILM COATED ORAL
Refills: 0 | Status: DISCONTINUED | OUTPATIENT
Start: 2022-01-12 | End: 2022-01-14

## 2022-01-12 RX ORDER — SODIUM CHLORIDE 9 MG/ML
1000 INJECTION, SOLUTION INTRAVENOUS
Refills: 0 | Status: DISCONTINUED | OUTPATIENT
Start: 2022-01-12 | End: 2022-01-20

## 2022-01-12 RX ORDER — REMDESIVIR 5 MG/ML
100 INJECTION INTRAVENOUS EVERY 24 HOURS
Refills: 0 | Status: DISCONTINUED | OUTPATIENT
Start: 2022-01-13 | End: 2022-01-15

## 2022-01-12 RX ORDER — DEXTROSE 50 % IN WATER 50 %
15 SYRINGE (ML) INTRAVENOUS ONCE
Refills: 0 | Status: DISCONTINUED | OUTPATIENT
Start: 2022-01-12 | End: 2022-01-20

## 2022-01-12 RX ORDER — REMDESIVIR 5 MG/ML
200 INJECTION INTRAVENOUS EVERY 24 HOURS
Refills: 0 | Status: COMPLETED | OUTPATIENT
Start: 2022-01-12 | End: 2022-01-12

## 2022-01-12 RX ORDER — INSULIN LISPRO 100/ML
VIAL (ML) SUBCUTANEOUS
Refills: 0 | Status: DISCONTINUED | OUTPATIENT
Start: 2022-01-12 | End: 2022-01-20

## 2022-01-12 RX ORDER — GLUCAGON INJECTION, SOLUTION 0.5 MG/.1ML
1 INJECTION, SOLUTION SUBCUTANEOUS ONCE
Refills: 0 | Status: DISCONTINUED | OUTPATIENT
Start: 2022-01-12 | End: 2022-01-20

## 2022-01-12 RX ORDER — DEXTROSE 50 % IN WATER 50 %
25 SYRINGE (ML) INTRAVENOUS ONCE
Refills: 0 | Status: DISCONTINUED | OUTPATIENT
Start: 2022-01-12 | End: 2022-01-20

## 2022-01-12 RX ORDER — REMDESIVIR 5 MG/ML
INJECTION INTRAVENOUS
Refills: 0 | Status: DISCONTINUED | OUTPATIENT
Start: 2022-01-12 | End: 2022-01-15

## 2022-01-12 RX ORDER — DEXTROSE 50 % IN WATER 50 %
12.5 SYRINGE (ML) INTRAVENOUS ONCE
Refills: 0 | Status: DISCONTINUED | OUTPATIENT
Start: 2022-01-12 | End: 2022-01-20

## 2022-01-12 RX ADMIN — Medication 25 MILLIGRAM(S): at 05:36

## 2022-01-12 RX ADMIN — APIXABAN 5 MILLIGRAM(S): 2.5 TABLET, FILM COATED ORAL at 05:36

## 2022-01-12 RX ADMIN — Medication 1 TABLET(S): at 11:44

## 2022-01-12 RX ADMIN — Medication 1000 UNIT(S): at 11:44

## 2022-01-12 RX ADMIN — APIXABAN 5 MILLIGRAM(S): 2.5 TABLET, FILM COATED ORAL at 17:15

## 2022-01-12 RX ADMIN — Medication 1 APPLICATION(S): at 17:15

## 2022-01-12 RX ADMIN — TAMSULOSIN HYDROCHLORIDE 0.4 MILLIGRAM(S): 0.4 CAPSULE ORAL at 21:24

## 2022-01-12 RX ADMIN — Medication 650 MILLIGRAM(S): at 21:24

## 2022-01-12 RX ADMIN — Medication 10: at 18:16

## 2022-01-12 RX ADMIN — PANTOPRAZOLE SODIUM 40 MILLIGRAM(S): 20 TABLET, DELAYED RELEASE ORAL at 05:52

## 2022-01-12 RX ADMIN — REMDESIVIR 500 MILLIGRAM(S): 5 INJECTION INTRAVENOUS at 17:27

## 2022-01-12 RX ADMIN — VALACYCLOVIR 500 MILLIGRAM(S): 500 TABLET, FILM COATED ORAL at 17:27

## 2022-01-12 RX ADMIN — FINASTERIDE 5 MILLIGRAM(S): 5 TABLET, FILM COATED ORAL at 11:45

## 2022-01-12 RX ADMIN — PANTOPRAZOLE SODIUM 40 MILLIGRAM(S): 20 TABLET, DELAYED RELEASE ORAL at 17:15

## 2022-01-12 RX ADMIN — FAMOTIDINE 10 MILLIGRAM(S): 10 INJECTION INTRAVENOUS at 21:25

## 2022-01-12 RX ADMIN — Medication 1 APPLICATION(S): at 05:38

## 2022-01-12 RX ADMIN — Medication 25 MILLIGRAM(S): at 17:16

## 2022-01-12 RX ADMIN — Medication 50 MILLILITER(S): at 05:51

## 2022-01-12 RX ADMIN — Medication 24 MILLIGRAM(S): at 17:16

## 2022-01-12 RX ADMIN — Medication 650 MILLIGRAM(S): at 13:34

## 2022-01-12 RX ADMIN — Medication 650 MILLIGRAM(S): at 05:51

## 2022-01-12 RX ADMIN — Medication 50 MILLILITER(S): at 18:09

## 2022-01-12 RX ADMIN — Medication 50 MILLILITER(S): at 11:45

## 2022-01-12 RX ADMIN — Medication 24 MILLIGRAM(S): at 05:37

## 2022-01-12 RX ADMIN — Medication 50 MILLILITER(S): at 00:25

## 2022-01-12 NOTE — PROGRESS NOTE ADULT - ATTENDING COMMENTS
DIHS/DRESS improving. Rash appears inactive. Transaminitis, peripheral eosinophilia, and SCr downtrending. Will start to taper his corticosteroid tomorrow pending labs. Allopurinol in some cases leads to a prolonged course of DIHS and as such his taper may be slow over 4-8 weeks.    eLi Beard MD, PharmD, MPH  Co-Director, Inpatient Dermatology Consultation Service, [facility] [place]

## 2022-01-12 NOTE — PROGRESS NOTE ADULT - ASSESSMENT
Multiple myeloma - treatment with RVD on hold.  Creatine trending down.    Anemia AOCD process. Hgb higher today and adequate and can monitor.    care per primary team. From hem/onc perspective management at this time is supportive

## 2022-01-12 NOTE — PROGRESS NOTE ADULT - SUBJECTIVE AND OBJECTIVE BOX
CC: f/u for covid, adenovirus    Patient reports feels better, able to eat a bit, less itchy    REVIEW OF SYSTEMS:  All other review of systems negative (Comprehensive ROS)    Antimicrobials Day #  :    Other Medications Reviewed    T(F): 97.4 (01-12-22 @ 05:10), Max: 97.6 (01-11-22 @ 13:10)  HR: 97 (01-12-22 @ 05:10)  BP: 136/67 (01-12-22 @ 05:10)  RR: 18 (01-12-22 @ 05:10)  SpO2: 100% (01-12-22 @ 05:10)  Wt(kg): --    PHYSICAL EXAM:  General: alert, no acute distress  Eyes:  anicteric, no conjunctival injection, no discharge  Oropharynx: lips ulcerated, inside mouth I dont see any lesions	  Neck: supple, without adenopathy  Lungs: clear to auscultation  Heart: regular rate and rhythm; no murmur, rubs or gallops  Abdomen: soft, nondistended, nontender, without mass or organomegaly  Skin: no lesions, skin less red some flaking  Extremities: no clubbing, cyanosis, or edema  Neurologic: alert, oriented, moves all extremities    LAB RESULTS:                        7.5    14.37 )-----------( 291      ( 12 Jan 2022 09:24 )             25.0     01-12    146<H>  |  112<H>  |  110<H>  ----------------------------<  337<H>  5.1   |  16<L>  |  2.55<H>    Ca    8.4      12 Jan 2022 09:24  Phos  4.4     01-12  Mg     2.70     01-12    TPro  6.6  /  Alb  3.6  /  TBili  0.9  /  DBili  x   /  AST  62<H>  /  ALT  332<H>  /  AlkPhos  131<H>  01-12    LIVER FUNCTIONS - ( 12 Jan 2022 09:24 )  Alb: 3.6 g/dL / Pro: 6.6 g/dL / ALK PHOS: 131 U/L / ALT: 332 U/L / AST: 62 U/L / GGT: x             MICROBIOLOGY:  RECENT CULTURES:  01-09 @ 20:51 .Blood Blood     No growth to date.      01-08 @ 16:22 .Blood Blood     No growth to date.          RADIOLOGY REVIEWED:    < from: Xray Chest 1 View- PORTABLE-Urgent (Xray Chest 1 View- PORTABLE-Urgent .) (01.09.22 @ 18:02) >    ACC: [id number] EXAM:  XR CHEST PORTABLE URGENT 1V                          PROCEDURE DATE:  01/09/2022          INTERPRETATION:  EXAMINATION: XR CHEST URGENT    CLINICAL INDICATION: fever r/o infxn    TECHNIQUE: Single frontal, portable view of the chest was obtained.    COMPARISON: Chest x-ray 1/8/2022.    FINDINGS:  The heart is normal in size.  The lungs are clear.  There is no pneumothorax or pleural effusion.  There are no acute osseous abnormalities.    IMPRESSION:  Clear lungs.    --- Endof Report ---    < from: US Kidney and Bladder (01.09.22 @ 15:48) >    ACC: [id number] EXAM:  US KIDNEYS AND BLADDER                          PROCEDURE DATE:  01/09/2022          INTERPRETATION:  CLINICAL INFORMATION: Renal sufficiency.    COMPARISON: Abdominal CT dated 10/23/2013 and abdominal ultrasound dated   01/08/2022. Renal ultrasound dated 10/13/2016    TECHNIQUE: Sonography of the kidneys and bladder.    FINDINGS:    Right kidney: 9.1 cm. Normal in size and echogenicity without   hydronephrosis    Left kidney: 10.2 cm. Normal in size and echogenicity without   hydronephrosis. There is 2.2 cm simple appearing cyst in upper pole,   unchanged.    Urinary bladder: Mild bladder wall trabeculation consistent with bladder   wall hypertrophy..    IMPRESSION:    No hydronephrosis.    Simple appearing left renal cyst, unchanged.        --- End of Report ---  < from: US Duplex Venous Lower Ext Complete, Bilateral (01.09.22 @ 15:44) >    ACC: [id number] EXAM:  US DPLX LWR EXT VEINS COMPL BI                          PROCEDURE DATE:  01/09/2022          INTERPRETATION:  CLINICAL INFORMATION: COVID positive. Shortness of   breath. Assess for deep vein thrombosis.    COMPARISON: Right lower extremity duplex sonogram 9/16/2016.    TECHNIQUE: Duplex sonography of the BILATERAL LOWER extremity veins with   color and spectral Doppler, with and without compression.    FINDINGS:    RIGHT:  Deep venous thrombosis of the right common femoraland popliteal veins.    LEFT:  Deep venous thrombosis of the left common femoral, femoral, and popliteal   veins.    IMPRESSION:  Bilateral lower extremity deep vein thromboses, as noted on prior studies.    < from: US Abdomen Upper Quadrant Right (01.08.22 @ 15:56) >  ACC: [id number] EXAM:  US ABDOMEN RT UPR QUADRANT                          PROCEDURE DATE:  01/08/2022          INTERPRETATION:  CLINICAL INFORMATION: Elevated LFTs.    COMPARISON: 10/13/2016    TECHNIQUE: Sonography of the right upper quadrant.    FINDINGS:    Liver: Measures 16 cm. Within normal limits.  Bile ducts: Normal caliber. Common bile duct measures 3 mm.  Gallbladder: Within normal limits.  Pancreas: Not well visualized.  Right kidney: 8.7 cm. No hydronephrosis.  Ascites: None.  IVC: Visualized portions are within normal limits.    IMPRESSION:  No cholelithiasis or biliary ductal dilatation.    < end of copied text >      < end of copied text >            < end of copied text >          < end of copied text >            Assessment:  patient with mm on revlimid, allopurinol, dx with covid 2 weeks ago, came in a few days ago with fever, rash, dysphagia/odynophagia, not hypoxic, melanie and hepatitis. DX with DRESS, tested positive for covid and adenovirus . Patient now on steroids, has improved liver enzymes. I am not convinced that the viral infections are causeing the fever and mouth findings but could be playing some role. I can now give him a few days of remdesivir since liver enzymes are better, I wish to avert covid causing respiratory failure and since he is immunosuppressed, he could get symptomatic later than most. I am also concerned for an hsv outbreak in the mouth. He also has a dvt  Plan:  will give 3 d of remdesivir  steroids per derm  start valtrex for mouth, swab for hsv   await esophogram and further ent evaluation of the mouth and throat.

## 2022-01-12 NOTE — PROGRESS NOTE ADULT - SUBJECTIVE AND OBJECTIVE BOX
Date of Service: 01-12-22 @ 20:10    Patient is a 71y old  Male who presents with a chief complaint of Shortness of breath (12 Jan 2022 19:55)      Any change in ROS: heis alert and awke; no SOB    MEDICATIONS  (STANDING):  albumin human 25% IVPB 100 milliLiter(s) IV Intermittent every 6 hours  apixaban 5 milliGRAM(s) Oral every 12 hours  calcium carbonate   1250 mG (OsCal) 1 Tablet(s) Oral daily  cholecalciferol 1000 Unit(s) Oral daily  dextrose 40% Gel 15 Gram(s) Oral once  dextrose 5%. 1000 milliLiter(s) (50 mL/Hr) IV Continuous <Continuous>  dextrose 5%. 1000 milliLiter(s) (100 mL/Hr) IV Continuous <Continuous>  dextrose 50% Injectable 25 Gram(s) IV Push once  dextrose 50% Injectable 12.5 Gram(s) IV Push once  dextrose 50% Injectable 25 Gram(s) IV Push once  famotidine    Tablet 10 milliGRAM(s) Oral every 48 hours  finasteride 5 milliGRAM(s) Oral daily  glucagon  Injectable 1 milliGRAM(s) IntraMuscular once  insulin lispro (ADMELOG) corrective regimen sliding scale   SubCutaneous three times a day before meals  methylPREDNISolone sodium succinate Injectable 24 milliGRAM(s) IV Push two times a day  metoprolol tartrate 25 milliGRAM(s) Oral two times a day  pantoprazole    Tablet 40 milliGRAM(s) Oral two times a day  remdesivir  IVPB   IV Intermittent   sodium bicarbonate 650 milliGRAM(s) Oral three times a day  tamsulosin 0.4 milliGRAM(s) Oral at bedtime  triamcinolone 0.1% Ointment 1 Application(s) Topical every 12 hours  valACYclovir 500 milliGRAM(s) Oral two times a day    MEDICATIONS  (PRN):    Vital Signs Last 24 Hrs  T(C): 36.7 (12 Jan 2022 17:15), Max: 36.7 (12 Jan 2022 17:15)  T(F): 98 (12 Jan 2022 17:15), Max: 98 (12 Jan 2022 17:15)  HR: 90 (12 Jan 2022 17:15) (78 - 97)  BP: 151/71 (12 Jan 2022 17:15) (116/57 - 151/71)  BP(mean): --  RR: 18 (12 Jan 2022 17:15) (18 - 18)  SpO2: 100% (12 Jan 2022 17:15) (100% - 100%)    I&O's Summary    11 Jan 2022 07:01  -  12 Jan 2022 07:00  --------------------------------------------------------  IN: 450 mL / OUT: 400 mL / NET: 50 mL          Physical Exam:   GENERAL: NAD, well-groomed, well-developed  HEENT: SAM/   Atraumatic, Normocephalic  ENMT: No tonsillar erythema, exudates, or enlargement; Moist mucous membranes, Good dentition, No lesions  NECK: Supple, No JVD, Normal thyroid  CHEST/LUNG: Clear to auscultaion  CVS: Regular rate and rhythm; No murmurs, rubs, or gallops  GI: : Soft, Nontender, Nondistended; Bowel sounds present  NERVOUS SYSTEM:  Alert & Oriented X3  EXTREMITIES:- edema  LYMPH: No lymphadenopathy noted  SKIN: No rashes or lesions  ENDOCRINOLOGY: No Thyromegaly  PSYCH: Appropriate    Labs:  16, 21, 21  CARDIAC MARKERS ( 12 Jan 2022 09:24 )  x     / x     / 83 U/L / x     / 2.4 ng/mL                            9.5    12.31 )-----------( 214      ( 12 Jan 2022 17:43 )             31.9                         7.5    14.37 )-----------( 291      ( 12 Jan 2022 09:24 )             25.0                         8.1    18.50 )-----------( 322      ( 11 Jan 2022 07:32 )             27.0                         9.0    15.89 )-----------( 321      ( 10 Yared 2022 12:07 )             29.5                         9.0    9.52  )-----------( 315      ( 09 Jan 2022 09:41 )             29.0                         9.6    10.33 )-----------( 385      ( 08 Jan 2022 23:41 )             32.5     01-12    x   |  x   |  x   ----------------------------<  x   x    |  x   |  2.33<H>  01-12    146<H>  |  112<H>  |  110<H>  ----------------------------<  337<H>  5.1   |  16<L>  |  2.55<H>  01-11    139  |  108<H>  |  102<H>  ----------------------------<  265<H>  5.0   |  15<L>  |  3.10<H>  01-10    138  |  107  |  81<H>  ----------------------------<  127<H>  5.0   |  16<L>  |  2.94<H>  01-09    135  |  101  |  60<H>  ----------------------------<  148<H>  TNP   |  19<L>  |  2.32<H>    Ca    8.4      12 Jan 2022 09:24  Ca    7.7<L>      11 Jan 2022 07:32  Phos  4.4     01-12  Phos  4.6     01-11  Mg     2.70     01-12  Mg     2.30     01-11    TPro  6.2  /  Alb  3.5  /  TBili  0.9  /  DBili  0.4<H>  /  AST  62<H>  /  ALT  299<H>  /  AlkPhos  127<H>  01-12  TPro  6.6  /  Alb  3.6  /  TBili  0.9  /  DBili  x   /  AST  62<H>  /  ALT  332<H>  /  AlkPhos  131<H>  01-12  TPro  5.2<L>  /  Alb  2.4<L>  /  TBili  0.7  /  DBili  x   /  AST  168<H>  /  ALT  550<H>  /  AlkPhos  149<H>  01-11  TPro  5.9<L>  /  Alb  2.4<L>  /  TBili  0.8  /  DBili  x   /  AST  471<H>  /  ALT  709<H>  /  AlkPhos  165<H>  01-10  TPro  6.5  /  Alb  2.6<L>  /  TBili  0.8  /  DBili  x   /  AST  490<H>  /  ALT  473<H>  /  AlkPhos  159<H>  01-09    CAPILLARY BLOOD GLUCOSE      POCT Blood Glucose.: 351 mg/dL (12 Jan 2022 18:02)  POCT Blood Glucose.: 365 mg/dL (12 Jan 2022 13:33)      LIVER FUNCTIONS - ( 12 Jan 2022 12:55 )  Alb: 3.5 g/dL / Pro: 6.2 g/dL / ALK PHOS: 127 U/L / ALT: 299 U/L / AST: 62 U/L / GGT: x           PT/INR - ( 12 Jan 2022 12:55 )   PT: 25.0 sec;   INR: 2.28 ratio         PTT - ( 12 Jan 2022 09:24 )  PTT:37.4 sec    D-Dimer Assay, Quantitative: 354 ng/mL DDU (01-08 @ 13:22)  Lactate, Blood: 2.6 mmol/L (01-11 @ 07:32)        RECENT CULTURES:  01-09 @ 20:51 .Blood Blood       < from: Xray Chest 1 View- PORTABLE-Urgent (Xray Chest 1 View- PORTABLE-Urgent .) (01.09.22 @ 18:02) >  FINDINGS:  The heart is normal in size.  The lungs are clear.  There is no pneumothorax or pleural effusion.  There are no acute osseous abnormalities.    IMPRESSION:  Clear lungs.    --- Endof Report ---    < end of copied text >           No growth to date.    01-08 @ 16:22 .Blood Blood                No growth to date.          RESPIRATORY CULTURES:          Studies  Chest X-RAY  CT SCAN Chest   Venous Dopplers: LE:   CT Abdomen  Others

## 2022-01-12 NOTE — PROGRESS NOTE ADULT - SUBJECTIVE AND OBJECTIVE BOX
INTERVAL HPI/OVERNIGHT EVENTS:  1/10: Started IV solumedrol 24mg BID (equiv to pred 60mg, ~0.76 mg/kg/day) on 1/10 PM-present   Patient overall endorses feeling better  Denies any symptoms of rash     MEDICATIONS  (STANDING):  albumin human 25% IVPB 100 milliLiter(s) IV Intermittent every 6 hours  apixaban 5 milliGRAM(s) Oral every 12 hours  calcium carbonate   1250 mG (OsCal) 1 Tablet(s) Oral daily  cholecalciferol 1000 Unit(s) Oral daily  dextrose 40% Gel 15 Gram(s) Oral once  dextrose 5%. 1000 milliLiter(s) (50 mL/Hr) IV Continuous <Continuous>  dextrose 5%. 1000 milliLiter(s) (100 mL/Hr) IV Continuous <Continuous>  dextrose 50% Injectable 25 Gram(s) IV Push once  dextrose 50% Injectable 12.5 Gram(s) IV Push once  dextrose 50% Injectable 25 Gram(s) IV Push once  famotidine    Tablet 10 milliGRAM(s) Oral every 48 hours  finasteride 5 milliGRAM(s) Oral daily  glucagon  Injectable 1 milliGRAM(s) IntraMuscular once  insulin lispro (ADMELOG) corrective regimen sliding scale   SubCutaneous three times a day before meals  methylPREDNISolone sodium succinate Injectable 24 milliGRAM(s) IV Push two times a day  metoprolol tartrate 25 milliGRAM(s) Oral two times a day  pantoprazole    Tablet 40 milliGRAM(s) Oral two times a day  remdesivir  IVPB   IV Intermittent   remdesivir  IVPB 200 milliGRAM(s) IV Intermittent every 24 hours  sodium bicarbonate 650 milliGRAM(s) Oral three times a day  tamsulosin 0.4 milliGRAM(s) Oral at bedtime  triamcinolone 0.1% Ointment 1 Application(s) Topical every 12 hours  valACYclovir 500 milliGRAM(s) Oral two times a day    MEDICATIONS  (PRN):      Allergies    allopurinol (Other)    Intolerances    REVIEW OF SYSTEMS    General: no fevers/chills, no NS	  Skin: see HPI  Ophthalmologic: no eye pain or change in vision  Genitourinary: no dysuria or hematuria  Musculoskeletal: no joint pains or weakness	  Neurological:no weakness or tingling    Vital Signs Last 24 Hrs  T(C): 36.3 (12 Jan 2022 11:56), Max: 36.4 (12 Jan 2022 01:10)  T(F): 97.4 (12 Jan 2022 11:56), Max: 97.5 (12 Jan 2022 01:10)  HR: 89 (12 Jan 2022 11:56) (78 - 97)  BP: 147/72 (12 Jan 2022 11:56) (116/57 - 147/72)  BP(mean): --  RR: 18 (12 Jan 2022 11:56) (18 - 18)  SpO2: 100% (12 Jan 2022 11:56) (100% - 100%)    PHYSICAL EXAM:   The patient was alert and oriented X 3, well nourished, and in no apparent distress.  There was no visible lymphadenopathy.  Conjunctiva were non injected  There was no clubbing or edema of extremities.    Of note on skin exam:   - diffuse red erythematous patch on trunk, upper extremities, lower extremities, with areas on upper thighs and trunk demonstrating smaller round morbilliform eruption. sparse collarettes of scale on back  - no oral mucosal lesions visualized    LABS:                        7.5    14.37 )-----------( 291      ( 12 Jan 2022 09:24 )             25.0     01-12    x   |  x   |  x   ----------------------------<  x   x    |  x   |  2.33<H>    Ca    8.4      12 Jan 2022 09:24  Phos  4.4     01-12  Mg     2.70     01-12    TPro  6.2  /  Alb  3.5  /  TBili  0.9  /  DBili  0.4<H>  /  AST  62<H>  /  ALT  299<H>  /  AlkPhos  127<H>  01-12    PT/INR - ( 12 Jan 2022 12:55 )   PT: 25.0 sec;   INR: 2.28 ratio         PTT - ( 12 Jan 2022 09:24 )  PTT:37.4 sec

## 2022-01-12 NOTE — PROGRESS NOTE ADULT - ASSESSMENT
Favor DRESS/DIHS given findings of morbilliform eruption with peripheral eosinophilia, transaminitis, JULIA on CKD, and fevers in the setting of recently starting multiple medications including allopurinol (favored), bortezomib, and lenalidomide, in the 4-5 weeks prior to cutaneous eruption. Given common culprit, allopurinol favored, however cannot rule out bortezomib or lenalidomide as medication triggers, given reports of these medications also causing DRESS/DIHS. Drug-induced hypersensitivity syndrome (DIHS), or drug reaction with eosinophilia and systemic symptoms (DRESS), is a serious multisystem drug reaction. It is an idiosyncratic reaction consisting of fever, rash, eosinophilia, and internal organ involvement. It tends to occur between 2-6 weeks after starting a new medication but may develop months later. A rash is present in over 80% of cases. Additional clinical findings include pharyngitis, lymphadenopathy, and facial and hand edema, while internal organ involvement most commonly affects the liver and hematologic and renal systems. Per telephone conversation with private ID attending kendy Meredith to start systemic steroids  - c/w IV solumedrol 48mg total divided into BID dosing (equivalent to prednisone 60mg; 0.76 mg/kg/day)  - c/w topical triamcinolone 0.1% ointment BID to affected area on trunk/extremities   - c/w oral Ca 1200mg + vitamin D 1000 IU daily given anticipated long course of steroids   - c/w start oral famotidine 20mg daily for GI prophylaxis  - please trend CBC with differential and CMP (including LFTs) daily   - HOLD allopurinol / chemotherapy medications     The patient's chart was reviewed in addition to being seen and examined at bedside with the dermatology attending Dr. Lei Beard.  Recommendations were communicated with the primary team.  Please page [phone] for further related questions (please leave 10 digit call back number because we cover several facilities).    Yelitza rCuz MD  Resident Physician, PGY3  [facility]    Favor DRESS/DIHS given findings of morbilliform eruption with peripheral eosinophilia, transaminitis, JULIA on CKD, and fevers in the setting of recently starting multiple medications including allopurinol (favored), bortezomib, and lenalidomide, in the 4-5 weeks prior to cutaneous eruption. Given common culprit, allopurinol favored, however cannot rule out bortezomib or lenalidomide as medication triggers, given reports of these medications also causing DRESS/DIHS. Drug-induced hypersensitivity syndrome (DIHS), or drug reaction with eosinophilia and systemic symptoms (DRESS), is a serious multisystem drug reaction. It is an idiosyncratic reaction consisting of fever, rash, eosinophilia, and internal organ involvement. It tends to occur between 2-6 weeks after starting a new medication but may develop months later. A rash is present in over 80% of cases. Additional clinical findings include pharyngitis, lymphadenopathy, and facial and hand edema, while internal organ involvement most commonly affects the liver and hematologic and renal systems. Per telephone conversation with private ID attending kendy Meredith to start systemic steroids. Since starting IV solumedrol on 1/10, patient has been afebrile and labs improving   - c/w IV solumedrol 24mg BID (equivalent to prednisone 60mg; 0.76 mg/kg/day), started 1/10 PM-present   - c/w topical triamcinolone 0.1% ointment BID to affected area on trunk/extremities   - c/w oral Ca 1200mg + vitamin D 1000 IU daily given anticipated long course of steroids   - c/w start oral famotidine 20mg daily for GI prophylaxis  - please trend CBC with differential and CMP (including LFTs) daily   - HOLD allopurinol / chemotherapy medications     The patient's chart was reviewed in addition to being seen and examined at bedside with the dermatology attending Dr. Lei Beard.  Recommendations were communicated with the primary team.  Please page [phone] for further related questions (please leave 10 digit call back number because we cover several facilities).    Yelitza Cruz MD  Resident Physician, PGY3  [facility]    Favor DRESS/DIHS given findings of morbilliform eruption with peripheral eosinophilia, transaminitis, JULIA on CKD, and fevers in the setting of recently starting multiple medications including allopurinol (favored), bortezomib, and lenalidomide, in the 4-5 weeks prior to cutaneous eruption. Given common culprit, allopurinol favored, however cannot rule out bortezomib or lenalidomide as medication triggers, given reports of these medications also causing DRESS/DIHS. Drug-induced hypersensitivity syndrome (DIHS), or drug reaction with eosinophilia and systemic symptoms (DRESS), is a serious multisystem drug reaction. It is an idiosyncratic reaction consisting of fever, rash, eosinophilia, and internal organ involvement. It tends to occur between 2-6 weeks after starting a new medication but may develop months later. A rash is present in over 80% of cases. Additional clinical findings include pharyngitis, lymphadenopathy, and facial and hand edema, while internal organ involvement most commonly affects the liver and hematologic and renal systems. Per telephone conversation with private ID attending kendy Meredith to start systemic steroids. Since starting IV solumedrol on 1/10, patient has been afebrile and labs improving   - c/w IV solumedrol 24mg BID (equivalent to prednisone 60mg; 0.76 mg/kg/day), started 1/10 PM-present   - c/w topical triamcinolone 0.1% ointment BID to affected area on trunk/extremities   - c/w oral Ca 1200mg + vitamin D 1000 IU daily given anticipated long course of steroids   - c/w start oral famotidine 20mg daily for GI prophylaxis  - please trend CBC with differential and CMP (including LFTs) daily   - Should avoid allopurinol going forward.     The patient's chart was reviewed in addition to being seen and examined at bedside with the dermatology attending Dr. Lei Beard.  Recommendations were communicated with the primary team.  Please page [phone] for further related questions (please leave 10 digit call back number because we cover several facilities).    Yelitza Cruz MD  Resident Physician, PGY3  [facility] Dermat[facility]

## 2022-01-12 NOTE — PROGRESS NOTE ADULT - ASSESSMENT
71 year old male with newly diagnosed COVD past medical history of MM, presents with rash, fever and chest tightness.     Dysphagia   could this be related to DRESS/DIHS  Continue Protonix BID   Esophagram pending   Agree w/ENT for CT soft tissue Neck w/cont to r/o underlying pathology when Cr better  defer EGD eval until Covid-19 & DRESS sx's improve as incr risk of anesthesia/scope trauma    Elevated Liver Enzymes    2/2 DRESS/DIHS Syndrome (Allopurinol) and Covid-19 Infxn   Non obstructive pattern; LFTs improving  continue to monitor daily and avoid hepatotoxins   Consider alternative anti-pyritic the acetaminophen for now   On Steroids; Derm recs appreciated     DVT   a/c per primary team with gi ppx    I reviewed the overnight course of events on the unit, re-confirming the patient history. I discussed the care with the patient and their family. The plan of care was discussed with the physician assistant and modifications were made to the notation where appropriate. Differential diagnosis and plan of care discussed with patient after the evaluation. Advanced care planning was discussed with patient and family.  Advanced care planning forms were reviewed and discussed.  Risks, benefits and alternatives of gastroenterologic procedures were discussed in detail and all questions were answered. 35 minutes spent on total encounter of which more than fifty percent of the encounter was spent counseling and/or coordinating care by the attending physician.

## 2022-01-12 NOTE — CHART NOTE - NSCHARTNOTEFT_GEN_A_CORE
Chart/Event Note  [facility] [address]  LENNY CHILDERS, 71y, Male  [id number]    Notified by RN for episode of chest pain. Pt seen and evaluated at bedside. Pt endorses 8/10 chest pain, located midsternal w/out any radiation. Pt denies headache, palpitations, diaphoresis, sob, n/v/d, weakness, dizziness.    T(C): 36.3 (01-12-22 @ 05:10), Max: 36.6 (01-11-22 @ 09:10)  HR: 97 (01-12-22 @ 05:10) (78 - 97)  BP: 136/67 (01-12-22 @ 05:10) (114/61 - 136/67)  RR: 18 (01-12-22 @ 05:10) (18 - 18)  SpO2: 100% (01-12-22 @ 05:10) (100% - 100%)                        8.1    18.50 )-----------( 322      ( 11 Jan 2022 07:32 )             27.0      Physical Examination:  GENERAL: No acute distress noted during examination. Patient is well-groomed and developed.  NECK: Supple with no JVD.   CHEST: Clear to ascultation bilaterally. No rales, rhonchi, wheezing, or rales heard. Symmetrical/bilateral chest wall rise. No tenderness to palpation.  HEART: Regular rate and rhythm with no murmurs, rubs, or gallops.  ABDOMEN: Soft, nontender, and nondistended. BS heard in all 4 quadrants  EXTREMITIES:  2+ Peripheral Pulses without clubbing, cyanosis, or edema.  NERVOUS SYSTEM:  Alert & oriented X3 with appropriate concentration. Motor strength is 5/5 in both bilateral upper and lower extremities. No focal or lateralizing neurologic deficits noted.   SKIN: No rashes or lesions seen.     Medical Decision Making/Assessment/Plan:  71y-year-old Male with a past medical history of multiple myeloma, anemia, DVT on Eliquis, CKD not on dialysis, CVA w/ R sided deficit and pre-DM, admitted for SOB 2/2 COVID, now with acute chest pain.    Plan:   #Chest Pain 2/2  - STAT EKG obtained and reviewed, compared to previous EKG. No acute ST/T changes noted.   - STAT cardiac enzymes ordered.   - Continue cardiac monitoring.   - Will endorse the above diagnostics and findings to the day medicine team for review and follow up. Chart/Event Note  [facility] [address]  LENNY CHILDERS, 71y, Male  [id number]    Notified by RN for episode of chest pain. Pt seen and evaluated at bedside. Pt endorses 8/10 chest pain, located midsternal w/out any radiation. Pt denies headache, palpitations, diaphoresis, sob, n/v/d, weakness, dizziness.    T(C): 36.3 (01-12-22 @ 05:10), Max: 36.6 (01-11-22 @ 09:10)  HR: 97 (01-12-22 @ 05:10) (78 - 97)  BP: 136/67 (01-12-22 @ 05:10) (114/61 - 136/67)  RR: 18 (01-12-22 @ 05:10) (18 - 18)  SpO2: 100% (01-12-22 @ 05:10) (100% - 100%)                        8.1    18.50 )-----------( 322      ( 11 Jan 2022 07:32 )             27.0      Physical Examination:  GENERAL: No acute distress noted during examination. Patient is well-groomed and developed.  NECK: Supple with no JVD.   CHEST: Clear to ascultation bilaterally. No rales, rhonchi, wheezing, or rales heard. Symmetrical/bilateral chest wall rise. No tenderness to palpation.  HEART: Regular rate and rhythm with no murmurs, rubs, or gallops.  ABDOMEN: Soft, nontender, and nondistended. BS heard in all 4 quadrants  EXTREMITIES:  2+ Peripheral Pulses without clubbing, cyanosis, or edema.  NERVOUS SYSTEM:  Alert & oriented X3 with appropriate concentration. Motor strength is 5/5 in both bilateral upper and lower extremities. No focal or lateralizing neurologic deficits noted.   SKIN: No rashes or lesions seen.     Medical Decision Making/Assessment/Plan:  71y-year-old Male with a past medical history of multiple myeloma, anemia, DVT on Eliquis, CKD not on dialysis, CVA w/ R sided deficit and pre-DM, admitted for SOB 2/2 COVID, now with acute chest pain.    Plan:   #Chest Pain  - STAT EKG obtained and reviewed, compared to previous EKG. No acute ST/T changes noted.   - STAT cardiac enzymes ordered.   - Continue cardiac monitoring.   - Will endorse the above diagnostics and findings to the day medicine team for review and follow up.

## 2022-01-12 NOTE — PROGRESS NOTE ADULT - SUBJECTIVE AND OBJECTIVE BOX
[facility] NEPHROLOGY- PROGRESS NOTE    71y Male with history of MM presents with SOB. Nephrology consulted for elevated Scr.    REVIEW OF SYSTEMS:  Gen: no changes in weight  Cards: no chest pain  Resp: no dyspnea  GI: no nausea or vomiting or diarrhea  Vascular: no LE edema    No Known Allergies      Hospital Medications: Medications reviewed      VITALS:  T(F): 97.4 (22 @ 05:10), Max: 97.6 (22 @ 13:10)  HR: 97 (22 @ 05:10)  BP: 136/67 (22 @ 05:10)  RR: 18 (22 @ 05:10)  SpO2: 100% (22 @ 05:10)  Wt(kg): --     @ 07:01  -   @ 07:00  --------------------------------------------------------  IN: 450 mL / OUT: 400 mL / NET: 50 mL      PHYSICAL EXAM:    Gen: NAD, calm  Cards: RRR, +S1/S2, no M/G/R  Resp: CTA B/L  GI: soft, NT/ND, NABS  Vascular: no LE edema B/L      LABS:      146<H>  |  112<H>  |  110<H>  ----------------------------<  337<H>  5.1   |  16<L>  |  2.55<H>    Ca    8.4      2022 09:24  Phos  4.4       Mg     2.70         TPro  6.6  /  Alb  3.6  /  TBili  0.9  /  DBili      /  AST  62<H>  /  ALT  332<H>  /  AlkPhos  131<H>      Creatinine Trend: 2.55 <--, 3.10 <--, 2.94 <--, 2.32 <--, 2.63 <--                        7.5    14.37 )-----------( 291      ( 2022 09:24 )             25.0     Urine Studies:  Urinalysis Basic - ( 2022 16:12 )    Color: Yellow / Appearance: Slightly Turbid / S.020 / pH:   Gluc:  / Ketone: Trace  / Bili: Negative / Urobili: <2 mg/dL   Blood:  / Protein: 30 mg/dL / Nitrite: Negative   Leuk Esterase: Small / RBC: 2 /HPF / WBC 11 /HPF   Sq Epi:  / Non Sq Epi: >27 /HPF / Bacteria: Moderate      Sodium, Random Urine: <20 mmol/L ( @ 16:12)  Potassium, Random Urine: 56.6 mmol/L ( @ 16:12)  Chloride, Random Urine: <20 mmol/L ( @ 16:12)  Creatinine, Random Urine: 200 mg/dL ( @ 16:12)

## 2022-01-12 NOTE — PROGRESS NOTE ADULT - PROBLEM SELECTOR PLAN 9
Check lipids, would expect antiplatelet and statin for secondary prevention,   hold statin for now given elevated LFT.      Dysphagia: Seems to be improving, etiology unclear.  Empiric Valtrex was added Given skin breakdown of oral mucosa    Anemia : We will check workup and monitor.  Consider transfusion if needed

## 2022-01-12 NOTE — PROGRESS NOTE ADULT - SUBJECTIVE AND OBJECTIVE BOX
INTERVAL HPI/OVERNIGHT EVENTS:    lfts improving   still w/ some globus sensation w/swallowing but feeling 'little better'   able to tolerate PO and pills       MEDICATIONS  (STANDING):  albumin human 25% IVPB 100 milliLiter(s) IV Intermittent every 6 hours  apixaban 5 milliGRAM(s) Oral every 12 hours  calcium carbonate   1250 mG (OsCal) 1 Tablet(s) Oral daily  cholecalciferol 1000 Unit(s) Oral daily  famotidine    Tablet 10 milliGRAM(s) Oral every 48 hours  finasteride 5 milliGRAM(s) Oral daily  methylPREDNISolone sodium succinate Injectable 24 milliGRAM(s) IV Push two times a day  metoprolol tartrate 25 milliGRAM(s) Oral two times a day  pantoprazole    Tablet 40 milliGRAM(s) Oral two times a day  sodium bicarbonate 650 milliGRAM(s) Oral three times a day  tamsulosin 0.4 milliGRAM(s) Oral at bedtime  triamcinolone 0.1% Ointment 1 Application(s) Topical every 12 hours    MEDICATIONS  (PRN):      Allergies    allopurinol (Other)    Intolerances        Review of Systems:    General:  No wt loss, fevers, chills, night sweats, fatigue   Eyes:  Good vision, no reported pain  ENT:  No sore throat, pain, runny nose, +dysphagia  CV:  No pain, palpitations, hypo/hypertension  Resp:  No dyspnea, cough, tachypnea, wheezing  GI:  No pain, No nausea, No vomiting, No diarrhea, No constipation, No weight loss, No fever, No pruritis, No rectal bleeding, No melena, +dysphagia  :  No pain, bleeding, incontinence, nocturia  Muscle:  No pain, weakness  Neuro:  No weakness, tingling, memory problems  Psych:  No fatigue, insomnia, mood problems, depression  Endocrine:  No polyuria, polydypsia, cold/heat intolerance  Heme:  No petechiae, ecchymosis, easy bruisability  Skin:  No rash, tattoos, scars, edema      Vital Signs Last 24 Hrs  T(C): 36.3 (12 Jan 2022 05:10), Max: 36.4 (11 Jan 2022 13:10)  T(F): 97.4 (12 Jan 2022 05:10), Max: 97.6 (11 Jan 2022 13:10)  HR: 97 (12 Jan 2022 05:10) (78 - 97)  BP: 136/67 (12 Jan 2022 05:10) (116/57 - 136/67)  BP(mean): --  RR: 18 (12 Jan 2022 05:10) (18 - 18)  SpO2: 100% (12 Jan 2022 05:10) (100% - 100%)    PHYSICAL EXAM:    Constitutional: NAD  HEENT: EOMI, throat clear  Neck: No LAD, supple  Respiratory: CTA and P  Cardiovascular: S1 and S2, RRR, no M  Gastrointestinal: BS+, soft, NT/ND, neg HSM,  Extremities: No peripheral edema, neg clubbing, cyanosis  Vascular: 2+ peripheral pulses  Neurological: A/O x 3, no focal deficits  Psychiatric: Normal mood, normal affect  Skin: No rashes      LABS:                        7.5    14.37 )-----------( 291      ( 12 Jan 2022 09:24 )             25.0     01-12    146<H>  |  112<H>  |  110<H>  ----------------------------<  337<H>  5.1   |  16<L>  |  2.55<H>    Ca    8.4      12 Jan 2022 09:24  Phos  4.4     01-12  Mg     2.70     01-12    TPro  6.6  /  Alb  3.6  /  TBili  0.9  /  DBili  x   /  AST  62<H>  /  ALT  332<H>  /  AlkPhos  131<H>  01-12    PT/INR - ( 12 Jan 2022 09:24 )   PT: 24.7 sec;   INR: 2.23 ratio         PTT - ( 12 Jan 2022 09:24 )  PTT:37.4 sec      RADIOLOGY & ADDITIONAL TESTS:   INTERVAL HPI/OVERNIGHT EVENTS:    lfts improving   swallowing improving; Seen while eating lunch without event  able to tolerate PO and pills       MEDICATIONS  (STANDING):  albumin human 25% IVPB 100 milliLiter(s) IV Intermittent every 6 hours  apixaban 5 milliGRAM(s) Oral every 12 hours  calcium carbonate   1250 mG (OsCal) 1 Tablet(s) Oral daily  cholecalciferol 1000 Unit(s) Oral daily  famotidine    Tablet 10 milliGRAM(s) Oral every 48 hours  finasteride 5 milliGRAM(s) Oral daily  methylPREDNISolone sodium succinate Injectable 24 milliGRAM(s) IV Push two times a day  metoprolol tartrate 25 milliGRAM(s) Oral two times a day  pantoprazole    Tablet 40 milliGRAM(s) Oral two times a day  sodium bicarbonate 650 milliGRAM(s) Oral three times a day  tamsulosin 0.4 milliGRAM(s) Oral at bedtime  triamcinolone 0.1% Ointment 1 Application(s) Topical every 12 hours    MEDICATIONS  (PRN):      Allergies    allopurinol (Other)    Intolerances        Review of Systems:    General:  No wt loss, fevers, chills, night sweats, fatigue   Eyes:  Good vision, no reported pain  ENT:  No sore throat, pain, runny nose, +dysphagia  CV:  No pain, palpitations, hypo/hypertension  Resp:  No dyspnea, cough, tachypnea, wheezing  GI:  No pain, No nausea, No vomiting, No diarrhea, No constipation, No weight loss, No fever, No pruritis, No rectal bleeding, No melena, +dysphagia  :  No pain, bleeding, incontinence, nocturia  Muscle:  No pain, weakness  Neuro:  No weakness, tingling, memory problems  Psych:  No fatigue, insomnia, mood problems, depression  Endocrine:  No polyuria, polydypsia, cold/heat intolerance  Heme:  No petechiae, ecchymosis, easy bruisability  Skin:  No rash, tattoos, scars, edema      Vital Signs Last 24 Hrs  T(C): 36.3 (12 Jan 2022 05:10), Max: 36.4 (11 Jan 2022 13:10)  T(F): 97.4 (12 Jan 2022 05:10), Max: 97.6 (11 Jan 2022 13:10)  HR: 97 (12 Jan 2022 05:10) (78 - 97)  BP: 136/67 (12 Jan 2022 05:10) (116/57 - 136/67)  BP(mean): --  RR: 18 (12 Jan 2022 05:10) (18 - 18)  SpO2: 100% (12 Jan 2022 05:10) (100% - 100%)    PHYSICAL EXAM:    Constitutional: NAD  HEENT: EOMI, throat clear  Neck: No LAD, supple  Respiratory: CTA and P  Cardiovascular: S1 and S2, RRR, no M  Gastrointestinal: BS+, soft, NT/ND, neg HSM,  Extremities: No peripheral edema, neg clubbing, cyanosis  Vascular: 2+ peripheral pulses  Neurological: A/O x 3, no focal deficits  Psychiatric: Normal mood, normal affect  Skin: No rashes      LABS:                        7.5    14.37 )-----------( 291      ( 12 Jan 2022 09:24 )             25.0     01-12    146<H>  |  112<H>  |  110<H>  ----------------------------<  337<H>  5.1   |  16<L>  |  2.55<H>    Ca    8.4      12 Jan 2022 09:24  Phos  4.4     01-12  Mg     2.70     01-12    TPro  6.6  /  Alb  3.6  /  TBili  0.9  /  DBili  x   /  AST  62<H>  /  ALT  332<H>  /  AlkPhos  131<H>  01-12    PT/INR - ( 12 Jan 2022 09:24 )   PT: 24.7 sec;   INR: 2.23 ratio         PTT - ( 12 Jan 2022 09:24 )  PTT:37.4 sec      RADIOLOGY & ADDITIONAL TESTS:   INTERVAL HPI/OVERNIGHT EVENTS:    lfts improving   swallowing improving; Seen while eating without event  able to tolerate PO and pills       MEDICATIONS  (STANDING):  albumin human 25% IVPB 100 milliLiter(s) IV Intermittent every 6 hours  apixaban 5 milliGRAM(s) Oral every 12 hours  calcium carbonate   1250 mG (OsCal) 1 Tablet(s) Oral daily  cholecalciferol 1000 Unit(s) Oral daily  famotidine    Tablet 10 milliGRAM(s) Oral every 48 hours  finasteride 5 milliGRAM(s) Oral daily  methylPREDNISolone sodium succinate Injectable 24 milliGRAM(s) IV Push two times a day  metoprolol tartrate 25 milliGRAM(s) Oral two times a day  pantoprazole    Tablet 40 milliGRAM(s) Oral two times a day  sodium bicarbonate 650 milliGRAM(s) Oral three times a day  tamsulosin 0.4 milliGRAM(s) Oral at bedtime  triamcinolone 0.1% Ointment 1 Application(s) Topical every 12 hours    MEDICATIONS  (PRN):      Allergies    allopurinol (Other)    Intolerances        Review of Systems:    General:  No wt loss, fevers, chills, night sweats, fatigue   Eyes:  Good vision, no reported pain  ENT:  No sore throat, pain, runny nose, +dysphagia  CV:  No pain, palpitations, hypo/hypertension  Resp:  No dyspnea, cough, tachypnea, wheezing  GI:  No pain, No nausea, No vomiting, No diarrhea, No constipation, No weight loss, No fever, No pruritis, No rectal bleeding, No melena, +dysphagia  :  No pain, bleeding, incontinence, nocturia  Muscle:  No pain, weakness  Neuro:  No weakness, tingling, memory problems  Psych:  No fatigue, insomnia, mood problems, depression  Endocrine:  No polyuria, polydypsia, cold/heat intolerance  Heme:  No petechiae, ecchymosis, easy bruisability  Skin:  No rash, tattoos, scars, edema      Vital Signs Last 24 Hrs  T(C): 36.3 (12 Jan 2022 05:10), Max: 36.4 (11 Jan 2022 13:10)  T(F): 97.4 (12 Jan 2022 05:10), Max: 97.6 (11 Jan 2022 13:10)  HR: 97 (12 Jan 2022 05:10) (78 - 97)  BP: 136/67 (12 Jan 2022 05:10) (116/57 - 136/67)  BP(mean): --  RR: 18 (12 Jan 2022 05:10) (18 - 18)  SpO2: 100% (12 Jan 2022 05:10) (100% - 100%)    PHYSICAL EXAM:    Constitutional: NAD  HEENT: EOMI, throat clear  Neck: No LAD, supple  Respiratory: CTA and P  Cardiovascular: S1 and S2, RRR, no M  Gastrointestinal: BS+, soft, NT/ND, neg HSM,  Extremities: No peripheral edema, neg clubbing, cyanosis  Vascular: 2+ peripheral pulses  Neurological: A/O x 3, no focal deficits  Psychiatric: Normal mood, normal affect  Skin: No rashes      LABS:                        7.5    14.37 )-----------( 291      ( 12 Jan 2022 09:24 )             25.0     01-12    146<H>  |  112<H>  |  110<H>  ----------------------------<  337<H>  5.1   |  16<L>  |  2.55<H>    Ca    8.4      12 Jan 2022 09:24  Phos  4.4     01-12  Mg     2.70     01-12    TPro  6.6  /  Alb  3.6  /  TBili  0.9  /  DBili  x   /  AST  62<H>  /  ALT  332<H>  /  AlkPhos  131<H>  01-12    PT/INR - ( 12 Jan 2022 09:24 )   PT: 24.7 sec;   INR: 2.23 ratio         PTT - ( 12 Jan 2022 09:24 )  PTT:37.4 sec      RADIOLOGY & ADDITIONAL TESTS:

## 2022-01-12 NOTE — PROGRESS NOTE ADULT - SUBJECTIVE AND OBJECTIVE BOX
Date of service: 01-12-22 @ 21:01      Patient is a 71y old  Male who presents with a chief complaint of Shortness of breath (12 Jan 2022 20:10)                                                               INTERVAL HPI/OVERNIGHT EVENTS:    REVIEW OF SYSTEMS:     CONSTITUTIONAL: No weakness, fevers or chills  EYES/ENT: No visual changes , no ear ache   NECK: No pain or stiffness  RESPIRATORY: No cough, wheezing,  No shortness of breath  CARDIOVASCULAR: No chest pain or palpitations  GASTROINTESTINAL: No abdominal pain  . No nausea, vomiting, Improving swallowing difficulty  GENITOURINARY: No dysuria, frequency or hematuria  NEUROLOGICAL: No numbness or weakness                                                                                                                                                                                                                                                                              Medications:  MEDICATIONS  (STANDING):  albumin human 25% IVPB 100 milliLiter(s) IV Intermittent every 6 hours  apixaban 5 milliGRAM(s) Oral every 12 hours  calcium carbonate   1250 mG (OsCal) 1 Tablet(s) Oral daily  cholecalciferol 1000 Unit(s) Oral daily  dextrose 40% Gel 15 Gram(s) Oral once  dextrose 5%. 1000 milliLiter(s) (50 mL/Hr) IV Continuous <Continuous>  dextrose 5%. 1000 milliLiter(s) (100 mL/Hr) IV Continuous <Continuous>  dextrose 50% Injectable 25 Gram(s) IV Push once  dextrose 50% Injectable 12.5 Gram(s) IV Push once  dextrose 50% Injectable 25 Gram(s) IV Push once  famotidine    Tablet 10 milliGRAM(s) Oral every 48 hours  finasteride 5 milliGRAM(s) Oral daily  glucagon  Injectable 1 milliGRAM(s) IntraMuscular once  insulin lispro (ADMELOG) corrective regimen sliding scale   SubCutaneous three times a day before meals  methylPREDNISolone sodium succinate Injectable 24 milliGRAM(s) IV Push two times a day  metoprolol tartrate 25 milliGRAM(s) Oral two times a day  pantoprazole    Tablet 40 milliGRAM(s) Oral two times a day  remdesivir  IVPB   IV Intermittent   sodium bicarbonate 650 milliGRAM(s) Oral three times a day  tamsulosin 0.4 milliGRAM(s) Oral at bedtime  triamcinolone 0.1% Ointment 1 Application(s) Topical every 12 hours  valACYclovir 500 milliGRAM(s) Oral two times a day    MEDICATIONS  (PRN):       Allergies    allopurinol (Other)    Intolerances      Vital Signs Last 24 Hrs  T(C): 36.7 (12 Jan 2022 17:15), Max: 36.7 (12 Jan 2022 17:15)  T(F): 98 (12 Jan 2022 17:15), Max: 98 (12 Jan 2022 17:15)  HR: 90 (12 Jan 2022 17:15) (78 - 97)  BP: 151/71 (12 Jan 2022 17:15) (116/57 - 151/71)  BP(mean): --  RR: 18 (12 Jan 2022 17:15) (18 - 18)  SpO2: 100% (12 Jan 2022 17:15) (100% - 100%)  CAPILLARY BLOOD GLUCOSE      POCT Blood Glucose.: 351 mg/dL (12 Jan 2022 18:02)  POCT Blood Glucose.: 365 mg/dL (12 Jan 2022 13:33)      01-11 @ 07:01  -  01-12 @ 07:00  --------------------------------------------------------  IN: 450 mL / OUT: 400 mL / NET: 50 mL      Physical Exam:    Daily     Daily   General:  No acute distress  HEENT:  Nonicteric, PERRLA  CV:  RRR, S1S2   Lungs:  CTA B/L, no wheezes, rales, rhonchi  Abdomen:  Soft, non-tender, no distended, positive BS  Extremities:  No edema  Neuro:  Nonfocal  Rash improving                                                                                                                                                                                                                                                      LABS:                               9.5    12.31 )-----------( 214      ( 12 Jan 2022 17:43 )             31.9                      01-12    x   |  x   |  x   ----------------------------<  x   x    |  x   |  2.33<H>    Ca    8.4      12 Jan 2022 09:24  Phos  4.4     01-12  Mg     2.70     01-12    TPro  6.2  /  Alb  3.5  /  TBili  0.9  /  DBili  0.4<H>  /  AST  62<H>  /  ALT  299<H>  /  AlkPhos  127<H>  01-12                       RADIOLOGY & ADDITIONAL TESTS         I personally reviewed: [  ]EKG   [  ]CXR    [  ] CT      A/P:         Discussed with :     [facility] consultants' Notes   Time spent :

## 2022-01-12 NOTE — PROGRESS NOTE ADULT - ASSESSMENT
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 Nephrology consulted for elevated Scr.    1. 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 JULIA secondary to DRESS can be due to AIN for which patient already on steroids with improving Scr. Continue with IV steroids as per dermatology. Continue with IV albumin and encourage PO intake to thirst given mild hypernatremia. UA with mild pyuria. FeNa low. Renal US without hydro. Avoid nephrotoxins.    2. CKD-3a: Baseline Scr 1.2 as per prior records. Outpatient CKD work up. Monitor electrolytes.    3. HTN with CKD: BP controlled. Continue with current medications. Monitor BP.    4. Metabolic acidosis: Gap and non-gap acidosis in setting of lactic acidosis and renal failure which is compensated as per blood gas. Continue with sodium bicarbonate 650 mg PO TID. Defer increasing dose given improving renal function. Monitor pH/lactate.    5. COVID-19: As per ID.      [facility] NEPHROLOG[facility]  Wade Tong M.D.  Nitin Ni D.O.  Joanna Napier M.D.  Annie Casas, MSN, ANP-C    Telephone: [phone]  Facsimile: [phone]    [address] [place] [address]

## 2022-01-12 NOTE — PROGRESS NOTE ADULT - SUBJECTIVE AND OBJECTIVE BOX
Patient is a 71y old  Male who presents with a chief complaint of Shortness of breath (12 Jan 2022 17:09)      Medication:   albumin human 25% IVPB 100 milliLiter(s) IV Intermittent every 6 hours  apixaban 5 milliGRAM(s) Oral every 12 hours  calcium carbonate   1250 mG (OsCal) 1 Tablet(s) Oral daily  cholecalciferol 1000 Unit(s) Oral daily  dextrose 40% Gel 15 Gram(s) Oral once  dextrose 5%. 1000 milliLiter(s) IV Continuous <Continuous>  dextrose 5%. 1000 milliLiter(s) IV Continuous <Continuous>  dextrose 50% Injectable 25 Gram(s) IV Push once  dextrose 50% Injectable 12.5 Gram(s) IV Push once  dextrose 50% Injectable 25 Gram(s) IV Push once  famotidine    Tablet 10 milliGRAM(s) Oral every 48 hours  finasteride 5 milliGRAM(s) Oral daily  glucagon  Injectable 1 milliGRAM(s) IntraMuscular once  insulin lispro (ADMELOG) corrective regimen sliding scale   SubCutaneous three times a day before meals  methylPREDNISolone sodium succinate Injectable 24 milliGRAM(s) IV Push two times a day  metoprolol tartrate 25 milliGRAM(s) Oral two times a day  pantoprazole    Tablet 40 milliGRAM(s) Oral two times a day  remdesivir  IVPB   IV Intermittent   sodium bicarbonate 650 milliGRAM(s) Oral three times a day  tamsulosin 0.4 milliGRAM(s) Oral at bedtime  triamcinolone 0.1% Ointment 1 Application(s) Topical every 12 hours  valACYclovir 500 milliGRAM(s) Oral two times a day      Physical exam    T(C): 36.7 (01-12-22 @ 17:15), Max: 36.7 (01-12-22 @ 17:15)  HR: 90 (01-12-22 @ 17:15) (78 - 97)  BP: 151/71 (01-12-22 @ 17:15) (116/57 - 151/71)  RR: 18 (01-12-22 @ 17:15) (18 - 18)  SpO2: 100% (01-12-22 @ 17:15) (100% - 100%)  Wt(kg): --    resting NAD    Labs                        9.5    12.31 )-----------( 214      ( 12 Jan 2022 17:43 )             31.9       01-12    x   |  x   |  x   ----------------------------<  x   x    |  x   |  2.33<H>    Ca    8.4      12 Jan 2022 09:24  Phos  4.4     01-12  Mg     2.70     01-12    TPro  6.2  /  Alb  3.5  /  TBili  0.9  /  DBili  0.4<H>  /  AST  62<H>  /  ALT  299<H>  /  AlkPhos  127<H>  01-12      LIVER FUNCTIONS - ( 12 Jan 2022 12:55 )  Alb: 3.5 g/dL / Pro: 6.2 g/dL / ALK PHOS: 127 U/L / ALT: 299 U/L / AST: 62 U/L / GGT: x             [id number]

## 2022-01-13 LAB
A1C WITH ESTIMATED AVERAGE GLUCOSE RESULT: 7.1 % — HIGH (ref 4–5.6)
ALBUMIN SERPL ELPH-MCNC: 4.3 G/DL — SIGNIFICANT CHANGE UP (ref 3.3–5)
ALBUMIN SERPL ELPH-MCNC: 4.4 G/DL — SIGNIFICANT CHANGE UP (ref 3.3–5)
ALP SERPL-CCNC: 112 U/L — SIGNIFICANT CHANGE UP (ref 40–120)
ALP SERPL-CCNC: 115 U/L — SIGNIFICANT CHANGE UP (ref 40–120)
ALT FLD-CCNC: 226 U/L — HIGH (ref 4–41)
ALT FLD-CCNC: 232 U/L — HIGH (ref 4–41)
ANION GAP SERPL CALC-SCNC: 15 MMOL/L — HIGH (ref 7–14)
AST SERPL-CCNC: 40 U/L — SIGNIFICANT CHANGE UP (ref 4–40)
AST SERPL-CCNC: 43 U/L — HIGH (ref 4–40)
B-OH-BUTYR SERPL-SCNC: 0.5 MMOL/L — HIGH (ref 0–0.4)
BASE EXCESS BLDV CALC-SCNC: -3.9 MMOL/L — LOW (ref -2–3)
BASOPHILS # BLD AUTO: 0.04 K/UL — SIGNIFICANT CHANGE UP (ref 0–0.2)
BASOPHILS NFR BLD AUTO: 0.4 % — SIGNIFICANT CHANGE UP (ref 0–2)
BILIRUB DIRECT SERPL-MCNC: 0.3 MG/DL — SIGNIFICANT CHANGE UP (ref 0–0.3)
BILIRUB INDIRECT FLD-MCNC: 0.7 MG/DL — SIGNIFICANT CHANGE UP (ref 0–1)
BILIRUB SERPL-MCNC: 0.9 MG/DL — SIGNIFICANT CHANGE UP (ref 0.2–1.2)
BILIRUB SERPL-MCNC: 1 MG/DL — SIGNIFICANT CHANGE UP (ref 0.2–1.2)
BUN SERPL-MCNC: 93 MG/DL — HIGH (ref 7–23)
CALCIUM SERPL-MCNC: 8.8 MG/DL — SIGNIFICANT CHANGE UP (ref 8.4–10.5)
CHLORIDE SERPL-SCNC: 111 MMOL/L — HIGH (ref 98–107)
CO2 BLDV-SCNC: 22.8 MMOL/L — SIGNIFICANT CHANGE UP (ref 22–26)
CO2 SERPL-SCNC: 20 MMOL/L — LOW (ref 22–31)
CREAT SERPL-MCNC: 1.86 MG/DL — HIGH (ref 0.5–1.3)
CREAT SERPL-MCNC: 1.87 MG/DL — HIGH (ref 0.5–1.3)
CRP SERPL-MCNC: 35.4 MG/L — HIGH
CULTURE RESULTS: SIGNIFICANT CHANGE UP
CULTURE RESULTS: SIGNIFICANT CHANGE UP
D DIMER BLD IA.RAPID-MCNC: 174 NG/ML DDU — SIGNIFICANT CHANGE UP
EOSINOPHIL # BLD AUTO: 0.25 K/UL — SIGNIFICANT CHANGE UP (ref 0–0.5)
EOSINOPHIL NFR BLD AUTO: 2.3 % — SIGNIFICANT CHANGE UP (ref 0–6)
ESTIMATED AVERAGE GLUCOSE: 157 — SIGNIFICANT CHANGE UP
GLUCOSE SERPL-MCNC: 329 MG/DL — HIGH (ref 70–99)
HCO3 BLDV-SCNC: 22 MMOL/L — SIGNIFICANT CHANGE UP (ref 22–29)
HCT VFR BLD CALC: 22.7 % — LOW (ref 39–50)
HCT VFR BLD CALC: 25.4 % — LOW (ref 39–50)
HERPES SIMPLEX VIRUS 1/2 SURVEILLANCE PCR RESULT: SIGNIFICANT CHANGE UP
HERPES SIMPLEX VIRUS 1/2 SURVEILLANCE PCR SOURCE: SIGNIFICANT CHANGE UP
HGB BLD-MCNC: 7 G/DL — CRITICAL LOW (ref 13–17)
HGB BLD-MCNC: 7.8 G/DL — LOW (ref 13–17)
HSV1+2 DNA SPEC QL NAA+PROBE: SIGNIFICANT CHANGE UP
IANC: 4.1 K/UL — SIGNIFICANT CHANGE UP (ref 1.5–8.5)
IMM GRANULOCYTES NFR BLD AUTO: 1.8 % — HIGH (ref 0–1.5)
INR BLD: 2.33 RATIO — HIGH (ref 0.88–1.16)
LACTATE SERPL-SCNC: 2.1 MMOL/L — HIGH (ref 0.5–2)
LYMPHOCYTES # BLD AUTO: 4.74 K/UL — HIGH (ref 1–3.3)
LYMPHOCYTES # BLD AUTO: 44.1 % — HIGH (ref 13–44)
MAGNESIUM SERPL-MCNC: 2.7 MG/DL — HIGH (ref 1.6–2.6)
MCHC RBC-ENTMCNC: 26 PG — LOW (ref 27–34)
MCHC RBC-ENTMCNC: 26.3 PG — LOW (ref 27–34)
MCHC RBC-ENTMCNC: 30.7 GM/DL — LOW (ref 32–36)
MCHC RBC-ENTMCNC: 30.8 GM/DL — LOW (ref 32–36)
MCV RBC AUTO: 84.7 FL — SIGNIFICANT CHANGE UP (ref 80–100)
MCV RBC AUTO: 85.3 FL — SIGNIFICANT CHANGE UP (ref 80–100)
MONOCYTES # BLD AUTO: 1.44 K/UL — HIGH (ref 0–0.9)
MONOCYTES NFR BLD AUTO: 13.4 % — SIGNIFICANT CHANGE UP (ref 2–14)
NEUTROPHILS # BLD AUTO: 4.1 K/UL — SIGNIFICANT CHANGE UP (ref 1.8–7.4)
NEUTROPHILS NFR BLD AUTO: 38 % — LOW (ref 43–77)
NRBC # BLD: 3 /100 WBCS — SIGNIFICANT CHANGE UP
NRBC # BLD: 3 /100 WBCS — SIGNIFICANT CHANGE UP
NRBC # FLD: 0.33 K/UL — HIGH
NRBC # FLD: 0.35 K/UL — HIGH
PCO2 BLDV: 40 MMHG — LOW (ref 42–55)
PH BLDV: 7.34 — SIGNIFICANT CHANGE UP (ref 7.32–7.43)
PHOSPHATE SERPL-MCNC: 3.7 MG/DL — SIGNIFICANT CHANGE UP (ref 2.5–4.5)
PLATELET # BLD AUTO: 250 K/UL — SIGNIFICANT CHANGE UP (ref 150–400)
PLATELET # BLD AUTO: 284 K/UL — SIGNIFICANT CHANGE UP (ref 150–400)
PO2 BLDV: 37 MMHG — SIGNIFICANT CHANGE UP
POTASSIUM SERPL-MCNC: 4.8 MMOL/L — SIGNIFICANT CHANGE UP (ref 3.5–5.3)
POTASSIUM SERPL-SCNC: 4.8 MMOL/L — SIGNIFICANT CHANGE UP (ref 3.5–5.3)
PROCALCITONIN SERPL-MCNC: 4.08 NG/ML — HIGH (ref 0.02–0.1)
PROT SERPL-MCNC: 6.7 G/DL — SIGNIFICANT CHANGE UP (ref 6–8.3)
PROT SERPL-MCNC: 6.8 G/DL — SIGNIFICANT CHANGE UP (ref 6–8.3)
PROTHROM AB SERPL-ACNC: 25.7 SEC — HIGH (ref 10.6–13.6)
RBC # BLD: 2.66 M/UL — LOW (ref 4.2–5.8)
RBC # BLD: 3 M/UL — LOW (ref 4.2–5.8)
RBC # FLD: 22.8 % — HIGH (ref 10.3–14.5)
RBC # FLD: 23.2 % — HIGH (ref 10.3–14.5)
SAO2 % BLDV: 56.4 % — SIGNIFICANT CHANGE UP
SODIUM SERPL-SCNC: 146 MMOL/L — HIGH (ref 135–145)
SPECIMEN SOURCE: SIGNIFICANT CHANGE UP
SPECIMEN SOURCE: SIGNIFICANT CHANGE UP
STRONGYLOIDES AB SER-ACNC: NEGATIVE — SIGNIFICANT CHANGE UP
WBC # BLD: 10.76 K/UL — HIGH (ref 3.8–10.5)
WBC # BLD: 10.94 K/UL — HIGH (ref 3.8–10.5)
WBC # FLD AUTO: 10.76 K/UL — HIGH (ref 3.8–10.5)
WBC # FLD AUTO: 10.94 K/UL — HIGH (ref 3.8–10.5)

## 2022-01-13 RX ORDER — SODIUM CHLORIDE 9 MG/ML
1000 INJECTION, SOLUTION INTRAVENOUS
Refills: 0 | Status: DISCONTINUED | OUTPATIENT
Start: 2022-01-13 | End: 2022-01-14

## 2022-01-13 RX ADMIN — Medication 1 APPLICATION(S): at 17:30

## 2022-01-13 RX ADMIN — Medication 650 MILLIGRAM(S): at 12:30

## 2022-01-13 RX ADMIN — Medication 50 MILLILITER(S): at 05:15

## 2022-01-13 RX ADMIN — Medication 650 MILLIGRAM(S): at 05:14

## 2022-01-13 RX ADMIN — FINASTERIDE 5 MILLIGRAM(S): 5 TABLET, FILM COATED ORAL at 12:30

## 2022-01-13 RX ADMIN — Medication 650 MILLIGRAM(S): at 21:52

## 2022-01-13 RX ADMIN — REMDESIVIR 500 MILLIGRAM(S): 5 INJECTION INTRAVENOUS at 17:50

## 2022-01-13 RX ADMIN — APIXABAN 5 MILLIGRAM(S): 2.5 TABLET, FILM COATED ORAL at 17:29

## 2022-01-13 RX ADMIN — Medication 25 MILLIGRAM(S): at 17:30

## 2022-01-13 RX ADMIN — Medication 8: at 09:29

## 2022-01-13 RX ADMIN — SODIUM CHLORIDE 50 MILLILITER(S): 9 INJECTION, SOLUTION INTRAVENOUS at 14:38

## 2022-01-13 RX ADMIN — Medication 1000 UNIT(S): at 12:30

## 2022-01-13 RX ADMIN — Medication 8: at 12:30

## 2022-01-13 RX ADMIN — Medication 24 MILLIGRAM(S): at 05:13

## 2022-01-13 RX ADMIN — PANTOPRAZOLE SODIUM 40 MILLIGRAM(S): 20 TABLET, DELAYED RELEASE ORAL at 05:15

## 2022-01-13 RX ADMIN — Medication 25 MILLIGRAM(S): at 05:14

## 2022-01-13 RX ADMIN — Medication 50 MILLILITER(S): at 00:33

## 2022-01-13 RX ADMIN — Medication 1 TABLET(S): at 12:29

## 2022-01-13 RX ADMIN — Medication 1 APPLICATION(S): at 05:13

## 2022-01-13 RX ADMIN — APIXABAN 5 MILLIGRAM(S): 2.5 TABLET, FILM COATED ORAL at 05:14

## 2022-01-13 RX ADMIN — Medication 4: at 17:50

## 2022-01-13 RX ADMIN — TAMSULOSIN HYDROCHLORIDE 0.4 MILLIGRAM(S): 0.4 CAPSULE ORAL at 21:52

## 2022-01-13 RX ADMIN — PANTOPRAZOLE SODIUM 40 MILLIGRAM(S): 20 TABLET, DELAYED RELEASE ORAL at 17:29

## 2022-01-13 RX ADMIN — VALACYCLOVIR 500 MILLIGRAM(S): 500 TABLET, FILM COATED ORAL at 17:29

## 2022-01-13 RX ADMIN — VALACYCLOVIR 500 MILLIGRAM(S): 500 TABLET, FILM COATED ORAL at 05:12

## 2022-01-13 RX ADMIN — Medication 24 MILLIGRAM(S): at 17:29

## 2022-01-13 NOTE — PROGRESS NOTE ADULT - ASSESSMENT
71 year old male with newly diagnosed COVD past medical history of MM, presents with rash, fever and chest tightness.     Dysphagia   could this be related to DRESS/DIHS  Continue Protonix BID   Esophagram reviewed; ?upper pharyngeal pathology   would repeat swallow eval now that he can participate +/- Cine  Agree w/ENT for CT soft tissue Neck w/cont to r/o underlying pathology when Cr better  defer nonurgent EGD until Covid-19 & DRESS sx's improve; incr risk of anesthesia/scope trauma  may see some melena as pt is reporting swallowing blood in mouth     Elevated Liver Enzymes    2/2 DRESS/DIHS Syndrome (Allopurinol) and Covid-19 Infxn   Non obstructive pattern; LFTs improving  continue to monitor daily and avoid hepatotoxins   Consider alternative anti-pyritic the acetaminophen for now   On Steroids; Derm recs appreciated     DRESS  steroid taper per Derm appreciated  lfts improving   ID following; appreciated; on Valtrex for HSV     DVT   a/c per primary team with gi protonix ppx    I reviewed the overnight course of events on the unit, re-confirming the patient history. I discussed the care with the patient and their family. The plan of care was discussed with the physician assistant and modifications were made to the notation where appropriate. Differential diagnosis and plan of care discussed with patient after the evaluation. Advanced care planning was discussed with patient and family.  Advanced care planning forms were reviewed and discussed.  Risks, benefits and alternatives of gastroenterologic procedures were discussed in detail and all questions were answered. 35 minutes spent on total encounter of which more than fifty percent of the encounter was spent counseling and/or coordinating care by the attending physician.

## 2022-01-13 NOTE — PROGRESS NOTE ADULT - SUBJECTIVE AND OBJECTIVE BOX
[facility] NEPHROLOGY- PROGRESS NOTE    71y Male with history of MM presents with SOB. Nephrology consulted for elevated Scr.    REVIEW OF SYSTEMS:  Gen: no changes in weight  Cards: no chest pain  Resp: no dyspnea  GI: no nausea or vomiting or diarrhea  Vascular: no LE edema    No Known Allergies      Hospital Medications: Medications reviewed      VITALS:  T(F): 97.7 (22 @ 13:08), Max: 98.2 (22 @ 01:15)  HR: 99 (22 @ 13:08)  BP: 140/78 (22 @ 13:08)  RR: 18 (22 @ 13:08)  SpO2: 100% (22 @ 13:08)  Wt(kg): --     @ 07:  -   @ 07:00  --------------------------------------------------------  IN: 240 mL / OUT: 500 mL / NET: -260 mL     @ 07: @ 14:26  --------------------------------------------------------  IN: 0 mL / OUT: 700 mL / NET: -700 mL        PHYSICAL EXAM:    Gen: NAD, calm  Cards: RRR, +S1/S2, no M/G/R  Resp: CTA B/L  GI: soft, NT/ND, NABS  Vascular: no LE edema B/L      LABS:      146<H>  |  111<H>  |  93<H>  ----------------------------<  329<H>  4.8   |  20<L>  |  1.86<H>    Ca    8.8      2022 07:26  Phos  3.7       Mg     2.70         TPro  6.7  /  Alb  4.3  /  TBili  0.9  /  DBili  0.3  /  AST  40  /  ALT  226<H>  /  AlkPhos  112      Creatinine Trend: 1.86 <--, 2.33 <--, 2.55 <--, 3.10 <--, 2.94 <--, 2.32 <--, 2.63 <--                        7.8    10.94 )-----------( 284      ( 2022 11:08 )             25.4     Urine Studies:  Urinalysis Basic - ( 2022 16:12 )    Color: Yellow / Appearance: Slightly Turbid / S.020 / pH:   Gluc:  / Ketone: Trace  / Bili: Negative / Urobili: <2 mg/dL   Blood:  / Protein: 30 mg/dL / Nitrite: Negative   Leuk Esterase: Small / RBC: 2 /HPF / WBC 11 /HPF   Sq Epi:  / Non Sq Epi: >27 /HPF / Bacteria: Moderate      Sodium, Random Urine: <20 mmol/L ( @ 16:12)  Potassium, Random Urine: 56.6 mmol/L ( @ 16:12)  Chloride, Random Urine: <20 mmol/L ( @ 16:12)  Creatinine, Random Urine: 200 mg/dL ( @ 16:12)

## 2022-01-13 NOTE — PROGRESS NOTE ADULT - SUBJECTIVE AND OBJECTIVE BOX
CC: f/u for  covid, dress syndrome, mucositis  Patient reports his mouth hurts  REVIEW OF SYSTEMS:  All other review of systems negative (Comprehensive ROS)    Antimicrobials Day #  :2  remdesivir  IVPB   IV Intermittent   remdesivir  IVPB 100 milliGRAM(s) IV Intermittent every 24 hours  valACYclovir 500 milliGRAM(s) Oral two times a day    Other Medications Reviewed    T(F): 97.5 (01-13-22 @ 09:10), Max: 98.2 (01-13-22 @ 01:15)  HR: 108 (01-13-22 @ 09:10)  BP: 145/92 (01-13-22 @ 09:10)  RR: 18 (01-13-22 @ 09:10)  SpO2: 100% (01-13-22 @ 09:10)  Wt(kg): --    PHYSICAL EXAM:  General: alert, no acute distress  Eyes:  anicteric, no conjunctival injection, no discharge  Oropharynx:. Lips with mucositis  Neck: supple, without adenopathy  Lungs: clear to auscultation  Heart: regular rate and rhythm; no murmur, rubs or gallops  Abdomen: soft, nondistended, nontender, without mass or organomegaly  Skin:  lesions  Extremities: no clubbing, cyanosis, or edema  Neurologic: alert, oriented, moves all extremities    LAB RESULTS:                        7.0    10.76 )-----------( 250      ( 13 Jan 2022 07:26 )             22.7     01-12    x   |  x   |  x   ----------------------------<  x   x    |  x   |  2.33<H>    Ca    8.4      12 Jan 2022 09:24  Phos  4.4     01-12  Mg     2.70     01-12    TPro  6.2  /  Alb  3.5  /  TBili  0.9  /  DBili  0.4<H>  /  AST  62<H>  /  ALT  299<H>  /  AlkPhos  127<H>  01-12    LIVER FUNCTIONS - ( 12 Jan 2022 12:55 )  Alb: 3.5 g/dL / Pro: 6.2 g/dL / ALK PHOS: 127 U/L / ALT: 299 U/L / AST: 62 U/L / GGT: x             MICROBIOLOGY:  RECENT CULTURES:  01-09 @ 20:51 .Blood Blood     No growth to date.      01-08 @ 16:22 .Blood Blood     No growth to date.          RADIOLOGY REVIEWED:    < from: Xray Esophagram Single Contrast (01.12.22 @ 14:48) >  ACC: [id number] EXAM:  XR ESRITA SNGL CON STUDY                          PROCEDURE DATE:  01/12/2022          INTERPRETATION:  CLINICAL INFORMATION: Dysphagia/odynophagia to solids    TECHNIQUE: An esophagram was performed under fluoroscopy utilizing barium   as the contrast agent and multiple spot fluoroscopic images were taken in   the AP, oblique and lateral projections.    COMPARISON: No similar examinations were available for comparison.    FLUORO TIME: 3.3 minutes    FINDINGS:  Preliminary  radiograph of the chest is unremarkable.    The patient swallowed thick and thin barium contrast without difficulty.  The esophagus demonstrates normal distensibility, contour and course.   There is no abnormal extrinsic mass effect. Contrast passes freely into   the stomach. No gastroesophageal reflux was demonstrated during this   examination.    While swallowing the barium pill, patient exhibited vigorous coughing.   Subsequent fluoroscopic images show the barium pill in the middle   esophagus. A repeat thin barium swallow relatively pushes the barium pill   into the stomach.    Focused views of the proximal esophagus appear to show trace laryngeal   penetration with retrieval with thick liquids.    IMPRESSION:  Unremarkable esophagram.    Coughing episode exhibited with barium pill swallow and trace laryngeal   penetration with thick barium contrast may indicate upper pharyngeal   pathology. Recommend further speech and swallow evaluation as clinically   warranted and per patient tolerance.    Mild delay in transit of barium pill that was resolved with subsequent   administration of liquid oral contrast.    --- End of Report ---          ALICE RASCON MD; Resident Radiologist  This document has been electronically signed.  CHRISTOPHER BOSCH MD; Attending Radiologist    < end of copied text >  `          Assessment: patient with multiple myeloma admitted a few days ago with pruritic rash all over, some pain in the mouth, some sob. He had known covid but not hypoxic. He was with fever , cultures are negative. He has melanie and some liver enzyme elevation. He is dx with DRESS from probably allopurinol but also got velcade and revlimid within past month. He is now on steroids. He has covid, not hypoxic but is now on rdv for 3 days in hopes of averting progression since he is immunosuppressed. He now has bad mucositis which may explain his problem opening the mouth upon admission, today it is bleeding, he is now on valtrex and hsv pcr swab was requested.     Plan:  1 2 more day of remdesivir  2 swab for hsv as requested, on valtrex for now  3 steroids per derm, not sure what was inciting drug for the dress and if the mouth is related to drug rxn  4 ent/dental evaluation  5 f/u final cultures. He has melanie so procal is not reliable test so it being high still not raising my suspicion for bacterial infection at present  r/w dr garza, dr castaneda and daughter at bedside.

## 2022-01-13 NOTE — PROGRESS NOTE ADULT - SUBJECTIVE AND OBJECTIVE BOX
INTERVAL HPI/OVERNIGHT EVENTS:    tolerating his diet better  c/o lips and cheek bleeding   c/o mouth 'pain and burning'   passing flatus    MEDICATIONS  (STANDING):  apixaban 5 milliGRAM(s) Oral every 12 hours  calcium carbonate   1250 mG (OsCal) 1 Tablet(s) Oral daily  cholecalciferol 1000 Unit(s) Oral daily  dextrose 40% Gel 15 Gram(s) Oral once  dextrose 5%. 1000 milliLiter(s) (50 mL/Hr) IV Continuous <Continuous>  dextrose 5%. 1000 milliLiter(s) (100 mL/Hr) IV Continuous <Continuous>  dextrose 50% Injectable 25 Gram(s) IV Push once  dextrose 50% Injectable 12.5 Gram(s) IV Push once  dextrose 50% Injectable 25 Gram(s) IV Push once  famotidine    Tablet 10 milliGRAM(s) Oral every 48 hours  finasteride 5 milliGRAM(s) Oral daily  glucagon  Injectable 1 milliGRAM(s) IntraMuscular once  insulin lispro (ADMELOG) corrective regimen sliding scale   SubCutaneous three times a day before meals  methylPREDNISolone sodium succinate Injectable 24 milliGRAM(s) IV Push two times a day  metoprolol tartrate 25 milliGRAM(s) Oral two times a day  pantoprazole    Tablet 40 milliGRAM(s) Oral two times a day  remdesivir  IVPB 100 milliGRAM(s) IV Intermittent every 24 hours  remdesivir  IVPB   IV Intermittent   sodium bicarbonate 650 milliGRAM(s) Oral three times a day  tamsulosin 0.4 milliGRAM(s) Oral at bedtime  triamcinolone 0.1% Ointment 1 Application(s) Topical every 12 hours  valACYclovir 500 milliGRAM(s) Oral two times a day    MEDICATIONS  (PRN):      Allergies    allopurinol (Other)    Intolerances        Review of Systems:    General:  No wt loss, fevers, chills, night sweats, fatigue   Eyes:  Good vision, no reported pain  ENT:  No sore throat, pain, runny nose, dysphagia  CV:  No pain, palpitations, hypo/hypertension  Resp:  No dyspnea, cough, tachypnea, wheezing  GI:  No pain, No nausea, No vomiting, No diarrhea, No constipation, No weight loss, No fever, No pruritis, No rectal bleeding, No melena, No dysphagia  :  No pain, bleeding, incontinence, nocturia  Muscle:  No pain, weakness  Neuro:  No weakness, tingling, memory problems  Psych:  No fatigue, insomnia, mood problems, depression  Endocrine:  No polyuria, polydypsia, cold/heat intolerance  Heme:  No petechiae, ecchymosis, easy bruisability  Skin:  No rash, tattoos, scars, edema      Vital Signs Last 24 Hrs  T(C): 36.4 (13 Jan 2022 09:10), Max: 36.8 (13 Jan 2022 01:15)  T(F): 97.5 (13 Jan 2022 09:10), Max: 98.2 (13 Jan 2022 01:15)  HR: 108 (13 Jan 2022 09:10) (83 - 108)  BP: 145/92 (13 Jan 2022 09:10) (126/64 - 151/71)  BP(mean): --  RR: 18 (13 Jan 2022 09:10) (18 - 19)  SpO2: 100% (13 Jan 2022 09:10) (97% - 100%)    PHYSICAL EXAM:    Constitutional: NAD  HEENT: EOMI, throat clear  Neck: No LAD, supple  Respiratory: CTA and P  Cardiovascular: S1 and S2, RRR, no M  Gastrointestinal: BS+, soft, NT/ND, neg HSM,  Extremities: No peripheral edema, neg clubbing, cyanosis  Vascular: 2+ peripheral pulses  Neurological: A/O x 3, no focal deficits  Psychiatric: Normal mood, normal affect  Skin: No rashes      LABS:                        7.8    10.94 )-----------( 284      ( 13 Jan 2022 11:08 )             25.4     01-13    146<H>  |  111<H>  |  93<H>  ----------------------------<  329<H>  4.8   |  20<L>  |  1.86<H>    Ca    8.8      13 Jan 2022 07:26  Phos  3.7     01-13  Mg     2.70     01-13    TPro  6.7  /  Alb  4.3  /  TBili  0.9  /  DBili  0.3  /  AST  40  /  ALT  226<H>  /  AlkPhos  112  01-13    PT/INR - ( 13 Jan 2022 07:26 )   PT: 25.7 sec;   INR: 2.33 ratio         PTT - ( 12 Jan 2022 09:24 )  PTT:37.4 sec      RADIOLOGY & ADDITIONAL TESTS:

## 2022-01-13 NOTE — PROGRESS NOTE ADULT - ASSESSMENT
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 Nephrology consulted for elevated Scr.    1. 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 JULIA secondary to DRESS can be due to AIN for which patient already on steroids with improving Scr. Continue with IV steroids as per dermatology. Start 1/2 NS and encourage PO intake to thirst given mild hypernatremia. UA with mild pyuria. FeNa low. Renal US without hydro. Avoid nephrotoxins.    2. CKD-3a: Baseline Scr 1.2 as per prior records. Outpatient CKD work up. Monitor electrolytes.    3. HTN with CKD: BP controlled. Continue with current medications. Monitor BP.    4. Metabolic acidosis: Improving with mild lactic acidosis and ketoacidosis likely due to hyperglycemia. blood gas this morning unremarkable. Continue with sodium bicarbonate 650 mg PO TID. Monitor pH.    5. COVID-19: As per ID.      [facility] NEPHROLOG[facility]  Wade Tong M.D.  Nitin Ni D.O.  Joanna Napier M.D.  Annie Casas, MSN, ANP-C    Telephone: [phone]  Facsimile: [phone]    [address] [place] [address]

## 2022-01-13 NOTE — CHART NOTE - NSCHARTNOTEFT_GEN_A_CORE
Dermatology Chart Note    DRESS/DIHS, with laboratory values (liver, kidney, peripheral eosinophilia) continuing to improve.     At this time, recommend:   - taper down to IV solumedrol 40mg qAM (equivalent to prednisone 50mg; IV solumedrol first started 1/10-present)  - continue with topical triamcinolone 0.1% ointment BID to affected area on trunk/extremities.   - c/w oral Ca 1200mg + vitamin D 1000 IU daily given anticipated long course of steroids   - c/w oral famotidine 20mg daily for GI prophylaxis  - please continue to trend CBC with differential and CMP (including LFTs) daily   - HOLD allopurinol / chemotherapy medications     The patient's chart and labs were reviewed and discussed remotely with the dermatology attending Dr. Beard.  Please page [phone] for further related questions (please leave 10 digit call back number because we cover several facilities).    Yelitza Cruz MD  Resident Physician, PGY3  [facility]ermat[facility]

## 2022-01-13 NOTE — PROGRESS NOTE ADULT - SUBJECTIVE AND OBJECTIVE BOX
Date of service: 01-13-22 @ 10:11      Patient is a 71y old  Male who presents with a chief complaint of Shortness of breath (13 Jan 2022 10:07)                                                               INTERVAL HPI/OVERNIGHT EVENTS:    REVIEW OF SYSTEMS:     CONSTITUTIONAL: No weakness, fevers or chills  EYES/ENT: No visual changes , no ear ache   NECK: No pain or stiffness  RESPIRATORY: No cough, wheezing,  No shortness of breath  CARDIOVASCULAR: No chest pain or palpitations  GASTROINTESTINAL: No abdominal pain  . No nausea, vomiting, or hematemesis; No diarrhea or constipation. No melena or hematochezia.  GENITOURINARY: No dysuria, frequency or hematuria  NEUROLOGICAL: No numbness or weakness                                                                                                                                                                                                                                                                                   Medications:  MEDICATIONS  (STANDING):  apixaban 5 milliGRAM(s) Oral every 12 hours  calcium carbonate   1250 mG (OsCal) 1 Tablet(s) Oral daily  cholecalciferol 1000 Unit(s) Oral daily  dextrose 40% Gel 15 Gram(s) Oral once  dextrose 5%. 1000 milliLiter(s) (50 mL/Hr) IV Continuous <Continuous>  dextrose 5%. 1000 milliLiter(s) (100 mL/Hr) IV Continuous <Continuous>  dextrose 50% Injectable 25 Gram(s) IV Push once  dextrose 50% Injectable 12.5 Gram(s) IV Push once  dextrose 50% Injectable 25 Gram(s) IV Push once  famotidine    Tablet 10 milliGRAM(s) Oral every 48 hours  finasteride 5 milliGRAM(s) Oral daily  glucagon  Injectable 1 milliGRAM(s) IntraMuscular once  insulin glargine Injectable (LANTUS) 5 Unit(s) SubCutaneous at bedtime  insulin lispro (ADMELOG) corrective regimen sliding scale   SubCutaneous three times a day before meals  methylPREDNISolone sodium succinate Injectable 40 milliGRAM(s) IV Push daily  metoprolol tartrate 25 milliGRAM(s) Oral two times a day  pantoprazole    Tablet 40 milliGRAM(s) Oral two times a day  remdesivir  IVPB   IV Intermittent   remdesivir  IVPB 100 milliGRAM(s) IV Intermittent every 24 hours  sodium bicarbonate 650 milliGRAM(s) Oral three times a day  tamsulosin 0.4 milliGRAM(s) Oral at bedtime  triamcinolone 0.1% Ointment 1 Application(s) Topical every 12 hours  valACYclovir 500 milliGRAM(s) Oral two times a day    MEDICATIONS  (PRN):       Allergies    allopurinol (Other)    Intolerances      Vital Signs Last 24 Hrs  T(F): 97.5 (01-13-22 @ 09:10), Max: 98.2 (01-13-22 @ 01:15)  HR: 108 (01-13-22 @ 09:10)  BP: 145/92 (01-13-22 @ 09:10)  RR: 18 (01-13-22 @ 09:10)  SpO2: 100% (01-13-22 @ 09:10)    Physical Exam:    Daily     Daily   General:  Well appearing, NAD, not cachetic  HEENT:  lip ulceration  CV:  RRR, S1S2   Lungs:  CTA B/L, no wheezes, rales, rhonchi  Abdomen:  Soft, non-tender, no distended, positive BS  Extremities:  2+ pulses, no c/c, no edema  Skin:  Warm and dry, no rashes  :  No matute  Neuro:  AAOx3, non-focal, grossly intact                                                                                                                                                                                                                                                                                                LABS:                              7.0    10.76 )-----------( 250      ( 13 Jan 2022 07:26 )             22.7                        RADIOLOGY & ADDITIONAL TESTS         I personally reviewed: [  ]EKG   [  ]CXR    [  ] CT      A/P:         Discussed with :     [facility] consultants' Notes   Time spent :

## 2022-01-13 NOTE — PROGRESS NOTE ADULT - ASSESSMENT
Multiple myeloma - has been treated with RVD and here with covid. No treatment planned as in patient.    Anemia - AOCD process. Hgb fluctuating. Monitor and transfuse as needed.    Creatinine is slowly improving.    Mucositis unlikely related to Velcade.

## 2022-01-13 NOTE — PROGRESS NOTE ADULT - SUBJECTIVE AND OBJECTIVE BOX
note based upon visit done this AM    Patient is a 71y old  Male who presents with a chief complaint of Shortness of breath (13 Jan 2022 16:38)      Medication:   apixaban 5 milliGRAM(s) Oral every 12 hours  calcium carbonate   1250 mG (OsCal) 1 Tablet(s) Oral daily  cholecalciferol 1000 Unit(s) Oral daily  dextrose 40% Gel 15 Gram(s) Oral once  dextrose 5%. 1000 milliLiter(s) IV Continuous <Continuous>  dextrose 5%. 1000 milliLiter(s) IV Continuous <Continuous>  dextrose 50% Injectable 25 Gram(s) IV Push once  dextrose 50% Injectable 12.5 Gram(s) IV Push once  dextrose 50% Injectable 25 Gram(s) IV Push once  famotidine    Tablet 10 milliGRAM(s) Oral every 48 hours  finasteride 5 milliGRAM(s) Oral daily  glucagon  Injectable 1 milliGRAM(s) IntraMuscular once  insulin lispro (ADMELOG) corrective regimen sliding scale   SubCutaneous three times a day before meals  metoprolol tartrate 25 milliGRAM(s) Oral two times a day  pantoprazole    Tablet 40 milliGRAM(s) Oral two times a day  remdesivir  IVPB 100 milliGRAM(s) IV Intermittent every 24 hours  remdesivir  IVPB   IV Intermittent   sodium bicarbonate 650 milliGRAM(s) Oral three times a day  sodium chloride 0.45%. 1000 milliLiter(s) IV Continuous <Continuous>  tamsulosin 0.4 milliGRAM(s) Oral at bedtime  triamcinolone 0.1% Ointment 1 Application(s) Topical every 12 hours  valACYclovir 500 milliGRAM(s) Oral two times a day      Physical exam    T(C): 36.2 (01-13-22 @ 17:08), Max: 36.8 (01-13-22 @ 01:15)  HR: 103 (01-13-22 @ 17:08) (83 - 108)  BP: 138/74 (01-13-22 @ 17:08) (126/64 - 145/92)  RR: 18 (01-13-22 @ 17:08) (18 - 19)  SpO2: 100% (01-13-22 @ 17:08) (97% - 100%)  Wt(kg): --    Cv  RRR    Labs                        7.8    10.94 )-----------( 284      ( 13 Jan 2022 11:08 )             25.4       01-13    146<H>  |  111<H>  |  93<H>  ----------------------------<  329<H>  4.8   |  20<L>  |  1.86<H>    Ca    8.8      13 Jan 2022 07:26  Phos  3.7     01-13  Mg     2.70     01-13    TPro  6.7  /  Alb  4.3  /  TBili  0.9  /  DBili  0.3  /  AST  40  /  ALT  226<H>  /  AlkPhos  112  01-13      LIVER FUNCTIONS - ( 13 Jan 2022 07:26 )  Alb: 4.3 g/dL / Pro: 6.7 g/dL / ALK PHOS: 112 U/L / ALT: 226 U/L / AST: 40 U/L / GGT: x             [id number]

## 2022-01-13 NOTE — PROVIDER CONTACT NOTE (FALL NOTIFICATION) - ASSESSMENT
pt AOx4, no acute distress noted. pt in bed when I arrived to the room. pt assisted to bed by PCA. pt denies head trauma. skin tear noted to left knee. no other signs of injury noted. pt denies any pain at this time.

## 2022-01-13 NOTE — CHART NOTE - NSCHARTNOTEFT_GEN_A_CORE
Writer informed by primary nurse that patient had questionable fall at 9:00Am today. Per nurse patient was witness by aide squatting. When primary nurse presented to the room patient was found sitting on stretcher and denied falling. Patient was later witnessed on facetime with daughter reporting a fall. Patient denied hitting his head, only the left knee. Patient in no acute distress. Small scrape noted to left knee. Patient refusing x-ray of knee. Will discuss with daughter.   :

## 2022-01-13 NOTE — PROGRESS NOTE ADULT - SUBJECTIVE AND OBJECTIVE BOX
Date of Service: 01-13-22 @ 16:38    Patient is a 71y old  Male who presents with a chief complaint of Shortness of breath (13 Jan 2022 14:25)      Any change in ROS: doing ok: no SOB; no ocugh :       MEDICATIONS  (STANDING):  apixaban 5 milliGRAM(s) Oral every 12 hours  calcium carbonate   1250 mG (OsCal) 1 Tablet(s) Oral daily  cholecalciferol 1000 Unit(s) Oral daily  dextrose 40% Gel 15 Gram(s) Oral once  dextrose 5%. 1000 milliLiter(s) (50 mL/Hr) IV Continuous <Continuous>  dextrose 5%. 1000 milliLiter(s) (100 mL/Hr) IV Continuous <Continuous>  dextrose 50% Injectable 25 Gram(s) IV Push once  dextrose 50% Injectable 12.5 Gram(s) IV Push once  dextrose 50% Injectable 25 Gram(s) IV Push once  famotidine    Tablet 10 milliGRAM(s) Oral every 48 hours  finasteride 5 milliGRAM(s) Oral daily  glucagon  Injectable 1 milliGRAM(s) IntraMuscular once  insulin lispro (ADMELOG) corrective regimen sliding scale   SubCutaneous three times a day before meals  methylPREDNISolone sodium succinate Injectable 24 milliGRAM(s) IV Push two times a day  metoprolol tartrate 25 milliGRAM(s) Oral two times a day  pantoprazole    Tablet 40 milliGRAM(s) Oral two times a day  remdesivir  IVPB 100 milliGRAM(s) IV Intermittent every 24 hours  remdesivir  IVPB   IV Intermittent   sodium bicarbonate 650 milliGRAM(s) Oral three times a day  sodium chloride 0.45%. 1000 milliLiter(s) (50 mL/Hr) IV Continuous <Continuous>  tamsulosin 0.4 milliGRAM(s) Oral at bedtime  triamcinolone 0.1% Ointment 1 Application(s) Topical every 12 hours  valACYclovir 500 milliGRAM(s) Oral two times a day    MEDICATIONS  (PRN):    Vital Signs Last 24 Hrs  T(C): 36.5 (13 Jan 2022 13:08), Max: 36.8 (13 Jan 2022 01:15)  T(F): 97.7 (13 Jan 2022 13:08), Max: 98.2 (13 Jan 2022 01:15)  HR: 99 (13 Jan 2022 13:08) (83 - 108)  BP: 140/78 (13 Jan 2022 13:08) (126/64 - 151/71)  BP(mean): --  RR: 18 (13 Jan 2022 13:08) (18 - 19)  SpO2: 100% (13 Jan 2022 13:08) (97% - 100%)    I&O's Summary    12 Jan 2022 07:01  -  13 Jan 2022 07:00  --------------------------------------------------------  IN: 240 mL / OUT: 500 mL / NET: -260 mL    13 Jan 2022 07:01  -  13 Jan 2022 16:38  --------------------------------------------------------  IN: 0 mL / OUT: 700 mL / NET: -700 mL          Physical Exam:   GENERAL: NAD, well-groomed, well-developed  HEENT: SAM/   Atraumatic, Normocephalic  ENMT: No tonsillar erythema, exudates, or enlargement; Moist mucous membranes, Good dentition, No lesions  NECK: Supple, No JVD, Normal thyroid  CHEST/LUNG: Clear to auscultaion  CVS: Regular rate and rhythm; No murmurs, rubs, or gallops  GI: : Soft, Nontender, Nondistended; Bowel sounds present  NERVOUS SYSTEM:  Alert & Oriented X3  EXTREMITIES:  - edema  LYMPH: No lymphadenopathy noted  SKIN: No rashes or lesions  < from: Xray Chest 1 View- PORTABLE-Urgent (Xray Chest 1 View- PORTABLE-Urgent .) (01.09.22 @ 18:02) >  CLINICAL INDICATION: fever r/o infxn    TECHNIQUE: Single frontal, portable view of the chest was obtained.    COMPARISON: Chest x-ray 1/8/2022.    FINDINGS:  The heart is normal in size.  The lungs are clear.  There is no pneumothorax or pleural effusion.  There are no acute osseous abnormalities.    IMPRESSION:  Clear lungs.    --- Endof Report ---    < end of copied text >  ENDOCRINOLOGY: No Thyromegaly  PSYCH: Appropriate    Labs:  22, 16, 21, 21  CARDIAC MARKERS ( 12 Jan 2022 09:24 )  x     / x     / 83 U/L / x     / 2.4 ng/mL                            7.8    10.94 )-----------( 284      ( 13 Jan 2022 11:08 )             25.4                         7.0    10.76 )-----------( 250      ( 13 Jan 2022 07:26 )             22.7                         9.5    12.31 )-----------( 214      ( 12 Jan 2022 17:43 )             31.9                         7.5    14.37 )-----------( 291      ( 12 Jan 2022 09:24 )             25.0                         8.1    18.50 )-----------( 322      ( 11 Jan 2022 07:32 )             27.0                         9.0    15.89 )-----------( 321      ( 10 Yared 2022 12:07 )             29.5     01-13    146<H>  |  111<H>  |  93<H>  ----------------------------<  329<H>  4.8   |  20<L>  |  1.86<H>  01-12    x   |  x   |  x   ----------------------------<  x   x    |  x   |  2.33<H>  01-12    146<H>  |  112<H>  |  110<H>  ----------------------------<  337<H>  5.1   |  16<L>  |  2.55<H>  01-11    139  |  108<H>  |  102<H>  ----------------------------<  265<H>  5.0   |  15<L>  |  3.10<H>  01-10    138  |  107  |  81<H>  ----------------------------<  127<H>  5.0   |  16<L>  |  2.94<H>    Ca    8.8      13 Jan 2022 07:26  Ca    8.4      12 Jan 2022 09:24  Phos  3.7     01-13  Phos  4.4     01-12  Mg     2.70     01-13  Mg     2.70     01-12    TPro  6.7  /  Alb  4.3  /  TBili  0.9  /  DBili  0.3  /  AST  40  /  ALT  226<H>  /  AlkPhos  112  01-13  TPro  6.2  /  Alb  3.5  /  TBili  0.9  /  DBili  0.4<H>  /  AST  62<H>  /  ALT  299<H>  /  AlkPhos  127<H>  01-12  TPro  6.6  /  Alb  3.6  /  TBili  0.9  /  DBili  x   /  AST  62<H>  /  ALT  332<H>  /  AlkPhos  131<H>  01-12  TPro  5.2<L>  /  Alb  2.4<L>  /  TBili  0.7  /  DBili  x   /  AST  168<H>  /  ALT  550<H>  /  AlkPhos  149<H>  01-11  TPro  5.9<L>  /  Alb  2.4<L>  /  TBili  0.8  /  DBili  x   /  AST  471<H>  /  ALT  709<H>  /  AlkPhos  165<H>  01-10    CAPILLARY BLOOD GLUCOSE      POCT Blood Glucose.: 323 mg/dL (13 Jan 2022 12:22)  POCT Blood Glucose.: 325 mg/dL (13 Jan 2022 09:21)  POCT Blood Glucose.: 185 mg/dL (12 Jan 2022 21:32)  POCT Blood Glucose.: 351 mg/dL (12 Jan 2022 18:02)      LIVER FUNCTIONS - ( 13 Jan 2022 07:26 )  Alb: 4.3 g/dL / Pro: 6.7 g/dL / ALK PHOS: 112 U/L / ALT: 226 U/L / AST: 40 U/L / GGT: x           PT/INR - ( 13 Jan 2022 07:26 )   PT: 25.7 sec;   INR: 2.33 ratio         PTT - ( 12 Jan 2022 09:24 )  PTT:37.4 sec    D-Dimer Assay, Quantitative: 174 ng/mL DDU (01-13 @ 07:26)  D-Dimer Assay, Quantitative: 354 ng/mL DDU (01-08 @ 13:22)  Procalcitonin, Serum: 4.08 ng/mL (01-13 @ 07:26)  < from: Xray Chest 1 View- PORTABLE-Urgent (Xray Chest 1 View- PORTABLE-Urgent .) (01.09.22 @ 18:02) >    COMPARISON: Chest x-ray 1/8/2022.    FINDINGS:  The heart is normal in size.  The lungs are clear.  There is no pneumothorax or pleural effusion.  There are no acute osseous abnormalities.    IMPRESSION:  Clear lungs.    < end of copied text >  Lactate, Blood: 2.1 mmol/L (01-13 @ 07:26)  Lactate, Blood: 2.6 mmol/L (01-11 @ 07:32)        RECENT CULTURES:  01-09 @ 20:51 .Blood Blood       r< from: Xray Esophagram Single Contrast (01.12.22 @ 14:48) >    IMPRESSION:  Unremarkable esophagram.    Coughing episode exhibited with barium pill swallow and trace laryngeal   penetration with thick barium contrast may indicate upper pharyngeal   pathology. Recommend further speech and swallow evaluation as clinically   warranted and per patient tolerance.    Mild delay in transit of barium pill that was resolved with subsequent   administration of liquid oral contrast.    --- End of Report ---    < end of copied text >           No growth to date.    01-08 @ 16:22 .Blood Blood                No growth to date.          RESPIRATORY CULTURES:          Studies  Chest X-RAY  CT SCAN Chest   Venous Dopplers: LE:   CT Abdomen  Others

## 2022-01-13 NOTE — PROGRESS NOTE ADULT - ASSESSMENT
71M HTN remote Hx DVT (Eliquis 2.5 bid), CKD2-3, hx CVA with R sided deficit, pre-diabetes, MM undergoing chemotherapy (scheduled for 1/4/22)  which has been delayed by COVID-19 positive test 12/31/21 presents with dyspnea without hypoxia, tachycardia, JULIA, transaminitis, sepsis due to viral etiology (COVID-19 with adenovirus) vs bacterial etiology.    Problem/Plan - 1:  ·  Problem: Sepsis.   ·  Plan: Discussed with Dr. Wolf infectious disease: No evidence of bacterial infection, will discontinue antibiotics at this time and observe.  Fever and elevated LFTs possibly secondary to viral infection including COVID and adenovirus  The constellation of fever, rash and systemic involvement including liver and kidney might very well be secondary to DRESS Secondary to allopurinol and less likely to other medications.  Appreciate ID and dermatology input.  Continue steroids Twice daily dosing, continue famotidine and will likely need long standing steroids.  discussed on 1/14 with derm : not gabriela albarran and more consistent w DRESS.. findings on lips can happen in either entity   c.    Problem/Plan - 2:  ·  Problem: 2019 novel coronavirus disease (COVID-19).   ·  Plan: No hypoxia, does not meet criteria for dex/remdes  Vaccinated  Monitor O2 sats  Positive at least 1 week  CXR clear  Discussed with Dr. Wolf: LFT improving   remdesivir started.    Problem/Plan - 3:  ·  Problem: JULIA (acute kidney injury).   ·  Plan: Appreciate nephrology input  Continue hydration and albumin  Bicarb for metabolic acidosis.    Problem/Plan - 4:  ·  Problem: Rash.   ·  Plan: Possibly secondary to DRESS  Follow up with dermatology.    Problem/Plan - 5:  ·  Problem: Transaminitis.   ·  Plan: Possibly due to COVID vs. sepsis vs DRESS   limited RUQ sono  caution with tylenol and fevers.    Problem/Plan - 6:  ·  Problem: History of deep vein thrombosis (DVT) of lower extremity.   ·  Plan: US : Deep venous thrombosis of the right common femoraland popliteal veins.    LEFT:  Deep venous thrombosis of the left common femoral, femoral, and popliteal   veins.  cont eliquis /.    Problem/Plan - 7:  ·  Problem: Multiple myeloma.   ·  Plan: Oncologist Dr Mcdaniel.    Problem/Plan - 8:  ·  Problem: Essential hypertension.   ·  Plan: Monitor on current medications.    Problem/Plan - 9:  ·  Problem: History of CVA (cerebrovascular accident).   ·  Plan: Check lipids, would expect antiplatelet and statin for secondary prevention,   hold statin for now given elevated LFT.      Dysphagia: Seems to be improving, etiology unclear.  Empiric Valtrex was added Given skin breakdown of oral mucosa    Anemia : consider transfusion   fu with onc.

## 2022-01-13 NOTE — PROVIDER CONTACT NOTE (FALL NOTIFICATION) - RECOMMENDATIONS
yellow fall wrist band placed on pt. fall risk sign hung on door. bed alarm turned on. pt made aware to call for assistance when needed. call bell within reach.

## 2022-01-14 LAB
ALBUMIN SERPL ELPH-MCNC: 4.1 G/DL — SIGNIFICANT CHANGE UP (ref 3.3–5)
ALP SERPL-CCNC: 117 U/L — SIGNIFICANT CHANGE UP (ref 40–120)
ALT FLD-CCNC: 190 U/L — HIGH (ref 4–41)
ANION GAP SERPL CALC-SCNC: 14 MMOL/L — SIGNIFICANT CHANGE UP (ref 7–14)
AST SERPL-CCNC: 41 U/L — HIGH (ref 4–40)
BASOPHILS # BLD AUTO: 0.08 K/UL — SIGNIFICANT CHANGE UP (ref 0–0.2)
BASOPHILS NFR BLD AUTO: 0.9 % — SIGNIFICANT CHANGE UP (ref 0–2)
BILIRUB SERPL-MCNC: 0.8 MG/DL — SIGNIFICANT CHANGE UP (ref 0.2–1.2)
BUN SERPL-MCNC: 73 MG/DL — HIGH (ref 7–23)
CALCIUM SERPL-MCNC: 9 MG/DL — SIGNIFICANT CHANGE UP (ref 8.4–10.5)
CHLORIDE SERPL-SCNC: 112 MMOL/L — HIGH (ref 98–107)
CO2 SERPL-SCNC: 21 MMOL/L — LOW (ref 22–31)
CREAT SERPL-MCNC: 1.52 MG/DL — HIGH (ref 0.5–1.3)
CULTURE RESULTS: SIGNIFICANT CHANGE UP
CULTURE RESULTS: SIGNIFICANT CHANGE UP
EOSINOPHIL # BLD AUTO: 0.23 K/UL — SIGNIFICANT CHANGE UP (ref 0–0.5)
EOSINOPHIL NFR BLD AUTO: 2.6 % — SIGNIFICANT CHANGE UP (ref 0–6)
GLUCOSE SERPL-MCNC: 323 MG/DL — HIGH (ref 70–99)
HCT VFR BLD CALC: 25.1 % — LOW (ref 39–50)
HGB BLD-MCNC: 7.5 G/DL — LOW (ref 13–17)
IANC: 3.04 K/UL — SIGNIFICANT CHANGE UP (ref 1.5–8.5)
LYMPHOCYTES # BLD AUTO: 3.36 K/UL — HIGH (ref 1–3.3)
LYMPHOCYTES # BLD AUTO: 37.7 % — SIGNIFICANT CHANGE UP (ref 13–44)
MCHC RBC-ENTMCNC: 25.3 PG — LOW (ref 27–34)
MCHC RBC-ENTMCNC: 29.9 GM/DL — LOW (ref 32–36)
MCV RBC AUTO: 84.8 FL — SIGNIFICANT CHANGE UP (ref 80–100)
MONOCYTES # BLD AUTO: 1.33 K/UL — HIGH (ref 0–0.9)
MONOCYTES NFR BLD AUTO: 14.9 % — HIGH (ref 2–14)
NEUTROPHILS # BLD AUTO: 3.44 K/UL — SIGNIFICANT CHANGE UP (ref 1.8–7.4)
NEUTROPHILS NFR BLD AUTO: 38.6 % — LOW (ref 43–77)
PLATELET # BLD AUTO: 297 K/UL — SIGNIFICANT CHANGE UP (ref 150–400)
POTASSIUM SERPL-MCNC: 4.9 MMOL/L — SIGNIFICANT CHANGE UP (ref 3.5–5.3)
POTASSIUM SERPL-SCNC: 4.9 MMOL/L — SIGNIFICANT CHANGE UP (ref 3.5–5.3)
PROT SERPL-MCNC: 6.7 G/DL — SIGNIFICANT CHANGE UP (ref 6–8.3)
RBC # BLD: 2.96 M/UL — LOW (ref 4.2–5.8)
RBC # FLD: 23.3 % — HIGH (ref 10.3–14.5)
SODIUM SERPL-SCNC: 147 MMOL/L — HIGH (ref 135–145)
SPECIMEN SOURCE: SIGNIFICANT CHANGE UP
SPECIMEN SOURCE: SIGNIFICANT CHANGE UP
WBC # BLD: 8.91 K/UL — SIGNIFICANT CHANGE UP (ref 3.8–10.5)
WBC # FLD AUTO: 8.91 K/UL — SIGNIFICANT CHANGE UP (ref 3.8–10.5)

## 2022-01-14 PROCEDURE — 93306 TTE W/DOPPLER COMPLETE: CPT | Mod: 26

## 2022-01-14 RX ORDER — SODIUM CHLORIDE 9 MG/ML
1000 INJECTION, SOLUTION INTRAVENOUS
Refills: 0 | Status: DISCONTINUED | OUTPATIENT
Start: 2022-01-14 | End: 2022-01-20

## 2022-01-14 RX ORDER — INSULIN GLARGINE 100 [IU]/ML
5 INJECTION, SOLUTION SUBCUTANEOUS AT BEDTIME
Refills: 0 | Status: DISCONTINUED | OUTPATIENT
Start: 2022-01-14 | End: 2022-01-20

## 2022-01-14 RX ADMIN — Medication 1000 UNIT(S): at 12:42

## 2022-01-14 RX ADMIN — Medication 40 MILLIGRAM(S): at 05:30

## 2022-01-14 RX ADMIN — Medication 650 MILLIGRAM(S): at 15:37

## 2022-01-14 RX ADMIN — FAMOTIDINE 10 MILLIGRAM(S): 10 INJECTION INTRAVENOUS at 21:43

## 2022-01-14 RX ADMIN — Medication 8: at 09:37

## 2022-01-14 RX ADMIN — TAMSULOSIN HYDROCHLORIDE 0.4 MILLIGRAM(S): 0.4 CAPSULE ORAL at 21:43

## 2022-01-14 RX ADMIN — PANTOPRAZOLE SODIUM 40 MILLIGRAM(S): 20 TABLET, DELAYED RELEASE ORAL at 17:01

## 2022-01-14 RX ADMIN — Medication 25 MILLIGRAM(S): at 05:30

## 2022-01-14 RX ADMIN — SODIUM CHLORIDE 50 MILLILITER(S): 9 INJECTION, SOLUTION INTRAVENOUS at 12:41

## 2022-01-14 RX ADMIN — REMDESIVIR 500 MILLIGRAM(S): 5 INJECTION INTRAVENOUS at 12:41

## 2022-01-14 RX ADMIN — Medication 1 TABLET(S): at 12:42

## 2022-01-14 RX ADMIN — Medication 1 APPLICATION(S): at 05:29

## 2022-01-14 RX ADMIN — APIXABAN 5 MILLIGRAM(S): 2.5 TABLET, FILM COATED ORAL at 17:00

## 2022-01-14 RX ADMIN — PANTOPRAZOLE SODIUM 40 MILLIGRAM(S): 20 TABLET, DELAYED RELEASE ORAL at 05:29

## 2022-01-14 RX ADMIN — Medication 1 APPLICATION(S): at 17:00

## 2022-01-14 RX ADMIN — Medication 650 MILLIGRAM(S): at 21:43

## 2022-01-14 RX ADMIN — FINASTERIDE 5 MILLIGRAM(S): 5 TABLET, FILM COATED ORAL at 12:41

## 2022-01-14 RX ADMIN — VALACYCLOVIR 500 MILLIGRAM(S): 500 TABLET, FILM COATED ORAL at 05:29

## 2022-01-14 RX ADMIN — Medication 6: at 12:48

## 2022-01-14 RX ADMIN — Medication 650 MILLIGRAM(S): at 05:30

## 2022-01-14 RX ADMIN — INSULIN GLARGINE 5 UNIT(S): 100 INJECTION, SOLUTION SUBCUTANEOUS at 21:48

## 2022-01-14 RX ADMIN — Medication 25 MILLIGRAM(S): at 17:05

## 2022-01-14 RX ADMIN — APIXABAN 5 MILLIGRAM(S): 2.5 TABLET, FILM COATED ORAL at 05:29

## 2022-01-14 NOTE — PROGRESS NOTE ADULT - SUBJECTIVE AND OBJECTIVE BOX
Patient is a 71y old  Male who presents with a chief complaint of Shortness of breath (14 Jan 2022 12:17)      Medication:   apixaban 5 milliGRAM(s) Oral every 12 hours  calcium carbonate   1250 mG (OsCal) 1 Tablet(s) Oral daily  cholecalciferol 1000 Unit(s) Oral daily  dextrose 40% Gel 15 Gram(s) Oral once  dextrose 5%. 1000 milliLiter(s) IV Continuous <Continuous>  dextrose 5%. 1000 milliLiter(s) IV Continuous <Continuous>  dextrose 50% Injectable 25 Gram(s) IV Push once  dextrose 50% Injectable 12.5 Gram(s) IV Push once  dextrose 50% Injectable 25 Gram(s) IV Push once  famotidine    Tablet 10 milliGRAM(s) Oral every 48 hours  finasteride 5 milliGRAM(s) Oral daily  glucagon  Injectable 1 milliGRAM(s) IntraMuscular once  insulin glargine Injectable (LANTUS) 5 Unit(s) SubCutaneous at bedtime  insulin lispro (ADMELOG) corrective regimen sliding scale   SubCutaneous three times a day before meals  methylPREDNISolone sodium succinate Injectable 40 milliGRAM(s) IV Push daily  metoprolol tartrate 25 milliGRAM(s) Oral two times a day  pantoprazole    Tablet 40 milliGRAM(s) Oral two times a day  remdesivir  IVPB   IV Intermittent   remdesivir  IVPB 100 milliGRAM(s) IV Intermittent every 24 hours  sodium bicarbonate 650 milliGRAM(s) Oral three times a day  sodium chloride 0.45%. 1000 milliLiter(s) IV Continuous <Continuous>  tamsulosin 0.4 milliGRAM(s) Oral at bedtime  triamcinolone 0.1% Ointment 1 Application(s) Topical every 12 hours  valACYclovir 500 milliGRAM(s) Oral two times a day      Physical exam    T(C): 36.4 (01-14-22 @ 09:25), Max: 36.4 (01-14-22 @ 09:25)  HR: 94 (01-14-22 @ 09:25) (93 - 103)  BP: 154/79 (01-14-22 @ 09:25) (138/74 - 159/85)  RR: 18 (01-14-22 @ 09:25) (18 - 18)  SpO2: 99% (01-14-22 @ 09:25) (99% - 100%)  Wt(kg): --    resting NAD  Cv RRR    Labs                      7.5    8.91  )-----------( 297      ( 14 Jan 2022 07:56 )             25.1       01-14    147<H>  |  112<H>  |  73<H>  ----------------------------<  323<H>  4.9   |  21<L>  |  1.52<H>    Ca    9.0      14 Jan 2022 07:56  Phos  3.7     01-13  Mg     2.70     01-13    TPro  6.7  /  Alb  4.1  /  TBili  0.8  /  DBili  x   /  AST  41<H>  /  ALT  190<H>  /  AlkPhos  117  01-14      LIVER FUNCTIONS - ( 14 Jan 2022 07:56 )  Alb: 4.1 g/dL / Pro: 6.7 g/dL / ALK PHOS: 117 U/L / ALT: 190 U/L / AST: 41 U/L / GGT: x             [id number]

## 2022-01-14 NOTE — PROGRESS NOTE ADULT - ASSESSMENT
71 year old male with newly diagnosed COVD past medical history of MM, presents with rash, fever and chest tightness.     Dysphagia   could this be related to DRESS/DIHS  Continue Protonix BID   Esophagram w/?upper pharyngeal pathology   rec to repeat swallow eval now that he can participate +/- Cine  Agree w/ENT re: CT soft tissue Neck w/cont   defer EGD until Covid-19 & DRESS sx's improve  may see some melena as pt is reporting swallowing blood in mouth     Elevated Liver Enzymes    2/2 DRESS/DIHS Syndrome (Allopurinol) and Covid-19 Infxn   Non obstructive pattern; LFTs improving  continue to monitor daily and avoid hepatotoxins   Derm recs appreciated     DRESS  steroid taper per Derm appreciated  lfts improving   ID following; appreciated; on Valtrex for HSV     DVT   a/c per primary team with gi protonix ppx    I reviewed the overnight course of events on the unit, re-confirming the patient history. I discussed the care with the patient and their family. The plan of care was discussed with the physician assistant and modifications were made to the notation where appropriate. Differential diagnosis and plan of care discussed with patient after the evaluation. Advanced care planning was discussed with patient and family.  Advanced care planning forms were reviewed and discussed.  Risks, benefits and alternatives of gastroenterologic procedures were discussed in detail and all questions were answered. 35 minutes spent on total encounter of which more than fifty percent of the encounter was spent counseling and/or coordinating care by the attending physician.  71 year old male with newly diagnosed COVD past medical history of MM, presents with rash, fever and chest tightness.     Dysphagia   improving; ?could this be related to DRESS/DIHS  Continue Protonix BID   Esophagram w/?upper pharyngeal pathology   rec to repeat swallow eval now that he can participate +/- Cine  Agree w/ENT re: CT soft tissue Neck w/cont   defer EGD until Covid-19 & DRESS sx's improve  may see some melena as pt is swallowing blood from mouth     Elevated Liver Enzymes    2/2 DRESS/DIHS Syndrome (Allopurinol) and Covid-19 Infxn   Non obstructive pattern; LFTs improving  continue to monitor daily and avoid hepatotoxins   Derm recs appreciated     DRESS  steroid taper per Derm appreciated  lfts improving   ID following; appreciated; on Valtrex for HSV     DVT   a/c per primary team with gi protonix ppx    I reviewed the overnight course of events on the unit, re-confirming the patient history. I discussed the care with the patient and their family. The plan of care was discussed with the physician assistant and modifications were made to the notation where appropriate. Differential diagnosis and plan of care discussed with patient after the evaluation. Advanced care planning was discussed with patient and family.  Advanced care planning forms were reviewed and discussed.  Risks, benefits and alternatives of gastroenterologic procedures were discussed in detail and all questions were answered. 35 minutes spent on total encounter of which more than fifty percent of the encounter was spent counseling and/or coordinating care by the attending physician.

## 2022-01-14 NOTE — PROGRESS NOTE ADULT - SUBJECTIVE AND OBJECTIVE BOX
CC: 72 Yo male referral to dental for "Bleeding gums"    HPI: Pt reports his lips has been bloody and crusty but are not causing him pain. Pt reports that he has had his lips looking like this for about 2 weeks and he also pointed to his ear saying they feel like they feel rough.    Med HX: Fever due to unspecified condition    No pertinent family history in first degree relatives    Family history of hypertension (Father)    Handoff    High Risk    MEWS Score    Benign essential hypertension    Hx of hyperlipidemia    DVT (deep venous thrombosis)    Obstructive uropathy    Acute renal failure    Hearing loss right ear    Obesity    Anemia of unknown etiology in 2012    BPH (Benign Prostatic Hyperplasia) vs. &quot;prostate cancer&quot;--will require future prostate bx    BPH (benign prostatic hypertrophy)    Diabetes mellitus    CVA (cerebral vascular accident)    Multiple myeloma    Sepsis    Fever    Sepsis    JULIA (acute kidney injury)    Rash    Multiple myeloma    History of deep vein thrombosis (DVT) of lower extremity    Essential hypertension    History of CVA (cerebrovascular accident)    2019 novel coronavirus disease (COVID-19)    Transaminitis    Globus sensation    Globus sensation    H/O tooth extraction    S/P prostatectomy    DIFFICULTY BREATHING    37    [email or web address]        RX:     PSHx: acetaminophen 325 mg oral tablet: 2 tab(s) orally every 6 hours, As needed, Temp greater or equal to 38C (100.4F), Mild Pain (1 - 3)  amLODIPine 5 mg oral tablet: 1 tab(s) orally once a day   Eliquis 2.5 mg oral tablet: 2 tab(s) orally 2 times a day  finasteride 5 mg oral tablet: 1 tab(s) orally once a day (at bedtime)  hydrALAZINE 25 mg oral tablet: 1 tab(s) orally 3 times a day  metoprolol tartrate 25 mg oral tablet: 1 tab(s) orally 2 times a day   tamsulosin 0.4 mg oral capsule: 1 cap(s) orally once a day (at bedtime)  apixaban 5 milliGRAM(s) Oral every 12 hours  calcium carbonate   1250 mG (OsCal) 1 Tablet(s) Oral daily  cholecalciferol 1000 Unit(s) Oral daily  dextrose 40% Gel 15 Gram(s) Oral once  dextrose 5%. 1000 milliLiter(s) IV Continuous <Continuous>  dextrose 5%. 1000 milliLiter(s) IV Continuous <Continuous>  dextrose 50% Injectable 25 Gram(s) IV Push once  dextrose 50% Injectable 12.5 Gram(s) IV Push once  dextrose 50% Injectable 25 Gram(s) IV Push once  famotidine    Tablet 10 milliGRAM(s) Oral every 48 hours  finasteride 5 milliGRAM(s) Oral daily  glucagon  Injectable 1 milliGRAM(s) IntraMuscular once  insulin glargine Injectable (LANTUS) 5 Unit(s) SubCutaneous at bedtime  insulin lispro (ADMELOG) corrective regimen sliding scale   SubCutaneous three times a day before meals  methylPREDNISolone sodium succinate Injectable 40 milliGRAM(s) IV Push daily  metoprolol tartrate 25 milliGRAM(s) Oral two times a day  pantoprazole    Tablet 40 milliGRAM(s) Oral two times a day  remdesivir  IVPB   IV Intermittent   remdesivir  IVPB 100 milliGRAM(s) IV Intermittent every 24 hours  sodium bicarbonate 650 milliGRAM(s) Oral three times a day  sodium chloride 0.45%. 1000 milliLiter(s) IV Continuous <Continuous>  tamsulosin 0.4 milliGRAM(s) Oral at bedtime  triamcinolone 0.1% Ointment 1 Application(s) Topical every 12 hours      Social Hx:    EOE:   (+)Blood crusted lips  TMJ (WNL)  Lacerations (-)  Trismus (-)  LAD (-)  Swelling (-)  LOC (-)  Dysphagia (-)    IOE: Permanent dentition, missing multiple teeth,  Hard/Soft palate (WNL)  Tongue/Floor of Mouth (WNL)  Lacerations (-)  Labial Mucosa (WNL)  Buccal Mucosa (WNL)  Percussion (-)  Palpation (-)  Swelling (-)    Assessment: No signs of abscess, fistula or swelling. No signs of visible bleeding of the gums at this time. Lips appear bloody and crusty. 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    Treatment: EOE, IOE - follow up with dental/oral pathology if findings do not resolve.      Recommendations:   1. Follow up with dental team / oral pathology if symptoms of the lips persist  2. F/U with outside comprehensive dentist.    Sebastian Hidalgo DDS #[id number]

## 2022-01-14 NOTE — PROGRESS NOTE ADULT - SUBJECTIVE AND OBJECTIVE BOX
INTERVAL HPI/OVERNIGHT EVENTS:    no abd pain or n/v  reports today he does not like food given to him or the texture   lips still bleeding, "sore"      MEDICATIONS  (STANDING):  apixaban 5 milliGRAM(s) Oral every 12 hours  calcium carbonate   1250 mG (OsCal) 1 Tablet(s) Oral daily  cholecalciferol 1000 Unit(s) Oral daily  dextrose 40% Gel 15 Gram(s) Oral once  dextrose 5%. 1000 milliLiter(s) (50 mL/Hr) IV Continuous <Continuous>  dextrose 5%. 1000 milliLiter(s) (100 mL/Hr) IV Continuous <Continuous>  dextrose 50% Injectable 25 Gram(s) IV Push once  dextrose 50% Injectable 12.5 Gram(s) IV Push once  dextrose 50% Injectable 25 Gram(s) IV Push once  famotidine    Tablet 10 milliGRAM(s) Oral every 48 hours  finasteride 5 milliGRAM(s) Oral daily  glucagon  Injectable 1 milliGRAM(s) IntraMuscular once  insulin glargine Injectable (LANTUS) 5 Unit(s) SubCutaneous at bedtime  insulin lispro (ADMELOG) corrective regimen sliding scale   SubCutaneous three times a day before meals  methylPREDNISolone sodium succinate Injectable 40 milliGRAM(s) IV Push daily  metoprolol tartrate 25 milliGRAM(s) Oral two times a day  pantoprazole    Tablet 40 milliGRAM(s) Oral two times a day  remdesivir  IVPB   IV Intermittent   remdesivir  IVPB 100 milliGRAM(s) IV Intermittent every 24 hours  sodium bicarbonate 650 milliGRAM(s) Oral three times a day  sodium chloride 0.45%. 1000 milliLiter(s) (50 mL/Hr) IV Continuous <Continuous>  tamsulosin 0.4 milliGRAM(s) Oral at bedtime  triamcinolone 0.1% Ointment 1 Application(s) Topical every 12 hours  valACYclovir 500 milliGRAM(s) Oral two times a day    MEDICATIONS  (PRN):      Allergies    allopurinol (Other)    Intolerances        Review of Systems:    General:  No wt loss, fevers, chills, night sweats, fatigue   Eyes:  Good vision, no reported pain  ENT:  No sore throat, pain, runny nose, dysphagia  CV:  No pain, palpitations, hypo/hypertension  Resp:  No dyspnea, cough, tachypnea, wheezing  GI:  No pain, No nausea, No vomiting, No diarrhea, No constipation, No weight loss, No fever, No pruritis, No rectal bleeding, No melena, No dysphagia  :  No pain, bleeding, incontinence, nocturia  Muscle:  No pain, weakness  Neuro:  No weakness, tingling, memory problems  Psych:  No fatigue, insomnia, mood problems, depression  Endocrine:  No polyuria, polydypsia, cold/heat intolerance  Heme:  No petechiae, ecchymosis, easy bruisability  Skin:  No rash, tattoos, scars, edema      Vital Signs Last 24 Hrs  T(C): 36.4 (14 Jan 2022 09:25), Max: 36.5 (13 Jan 2022 13:08)  T(F): 97.5 (14 Jan 2022 09:25), Max: 97.7 (13 Jan 2022 13:08)  HR: 94 (14 Jan 2022 09:25) (93 - 103)  BP: 154/79 (14 Jan 2022 09:25) (138/74 - 159/85)  BP(mean): --  RR: 18 (14 Jan 2022 09:25) (18 - 18)  SpO2: 99% (14 Jan 2022 09:25) (99% - 100%)    PHYSICAL EXAM:    Constitutional: NAD  HEENT: EOMI, throat clear  Neck: No LAD, supple  Respiratory: CTA and P  Cardiovascular: S1 and S2, RRR, no M  Gastrointestinal: BS+, soft, NT/ND, neg HSM,  Extremities: No peripheral edema, neg clubbing, cyanosis  Vascular: 2+ peripheral pulses  Neurological: A/O x 3, no focal deficits  Psychiatric: Normal mood, normal affect  Skin: No rashes      LABS:                        7.5    8.91  )-----------( 297      ( 14 Jan 2022 07:56 )             25.1     01-14    147<H>  |  112<H>  |  73<H>  ----------------------------<  323<H>  4.9   |  21<L>  |  1.52<H>    Ca    9.0      14 Jan 2022 07:56  Phos  3.7     01-13  Mg     2.70     01-13    TPro  6.7  /  Alb  4.1  /  TBili  0.8  /  DBili  x   /  AST  41<H>  /  ALT  190<H>  /  AlkPhos  117  01-14    PT/INR - ( 13 Jan 2022 07:26 )   PT: 25.7 sec;   INR: 2.33 ratio               RADIOLOGY & ADDITIONAL TESTS:

## 2022-01-14 NOTE — PROGRESS NOTE ADULT - ASSESSMENT
multiple myeloma - treatment with RVD on hold as he has covid and treatment per primary team, infectious disease, etc. Anemia an AOCD process and Hgb stable. Transfuse for Hgb < 7. Creatine continues to improve.

## 2022-01-14 NOTE — CHART NOTE - NSCHARTNOTESELECT_GEN_ALL_CORE
Dermatology/Event Note
Event Note
Dermatology/Event Note
Event Note

## 2022-01-14 NOTE — PROGRESS NOTE ADULT - ASSESSMENT
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 Nephrology consulted for elevated Scr.    1. 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 JULIA secondary to DRESS can be due to AIN for which patient already on steroids with improving Scr. Continue with IV steroids as per dermatology. Gentle 1/2 NS and encourage PO intake to thirst given mild hypernatremia. UA with mild pyuria. FeNa low. Renal US without hydro. Avoid nephrotoxins.    2. CKD-3a: Baseline Scr 1.2 as per prior records. Outpatient CKD work up. Monitor electrolytes.    3. HTN with CKD: BP borderline. Continue with current medications. Monitor BP.    4. Metabolic acidosis: Improving. Continue with sodium bicarbonate 650 mg PO TID. Monitor pH.    5. COVID-19: As per ID.      [facility] NEPHROLOGY  Wade Tong M.D.  Nitni Ni D.O.  Joanna Napier M.D.  Annie Casas, SHAMAR, ANP-C    Telephone: [phone]  Facsimile: [phone]    [address] [place] [address]

## 2022-01-14 NOTE — PROGRESS NOTE ADULT - SUBJECTIVE AND OBJECTIVE BOX
[facility] NEPHROLOGY- PROGRESS NOTE    71y Male with history of MM presents with SOB. Nephrology consulted for elevated Scr.    REVIEW OF SYSTEMS:  Gen: no changes in weight  Cards: no chest pain  Resp: no dyspnea  GI: no nausea or vomiting or diarrhea, + decreased PO intake  Vascular: no LE edema    No Known Allergies      Hospital Medications: Medications reviewed      VITALS:  T(F): 97.5 (22 @ 09:25), Max: 97.7 (22 @ 13:08)  HR: 94 (22 @ 09:25)  BP: 154/79 (22 @ 09:25)  RR: 18 (22 @ 09:25)  SpO2: 99% (22 @ 09:25)  Wt(kg): --     @ 07:01  -   @ 07:00  --------------------------------------------------------  IN: 1250 mL / OUT: 2425 mL / NET: -1175 mL        PHYSICAL EXAM:    Gen: NAD, calm  Cards: RRR, +S1/S2, no M/G/R  Resp: CTA B/L  GI: soft, NT/ND, NABS  Vascular: no LE edema B/L        LABS:      147<H>  |  112<H>  |  73<H>  ----------------------------<  323<H>  4.9   |  21<L>  |  1.52<H>    Ca    9.0      2022 07:56  Phos  3.7       Mg     2.70         TPro  6.7  /  Alb  4.1  /  TBili  0.8  /  DBili      /  AST  41<H>  /  ALT  190<H>  /  AlkPhos  117      Creatinine Trend: 1.52 <--, 1.86 <--, 2.33 <--, 2.55 <--, 3.10 <--, 2.94 <--, 2.32 <--, 2.63 <--                        7.5    8.91  )-----------( 297      ( 2022 07:56 )             25.1     Urine Studies:  Urinalysis Basic - ( 2022 16:12 )    Color: Yellow / Appearance: Slightly Turbid / S.020 / pH:   Gluc:  / Ketone: Trace  / Bili: Negative / Urobili: <2 mg/dL   Blood:  / Protein: 30 mg/dL / Nitrite: Negative   Leuk Esterase: Small / RBC: 2 /HPF / WBC 11 /HPF   Sq Epi:  / Non Sq Epi: >27 /HPF / Bacteria: Moderate      Sodium, Random Urine: <20 mmol/L ( @ 16:12)  Potassium, Random Urine: 56.6 mmol/L ( @ 16:12)  Chloride, Random Urine: <20 mmol/L ( @ 16:12)  Creatinine, Random Urine: 200 mg/dL ( @ 16:12)

## 2022-01-14 NOTE — PROGRESS NOTE ADULT - SUBJECTIVE AND OBJECTIVE BOX
CC: f/u for  fever, covid, dress  Patient reports  mouth is still sore  REVIEW OF SYSTEMS:  All other review of systems negative (Comprehensive ROS)    Antimicrobials Day #  :3/3  remdesivir  IVPB   IV Intermittent   remdesivir  IVPB 100 milliGRAM(s) IV Intermittent every 24 hours    Other Medications Reviewed    T(F): 97.6 (01-14-22 @ 13:25), Max: 97.6 (01-14-22 @ 13:25)  HR: 89 (01-14-22 @ 13:25)  BP: 142/76 (01-14-22 @ 13:25)  RR: 18 (01-14-22 @ 13:25)  SpO2: 100% (01-14-22 @ 13:25)  Wt(kg): --    PHYSICAL EXAM:  General: alert, no acute distress  Eyes:  anicteric, no conjunctival injection, no discharge  Oropharynx: lip mucositis a bit better, no intraoral lesions as best I can tell  Neck: supple, without adenopathy  Lungs: clear to auscultation  Heart: regular rate and rhythm; no murmur, rubs or gallops  Abdomen: soft, nondistended, nontender, without mass or organomegaly  Skin: no lesions, flaking , rash resolved  Extremities: no clubbing, cyanosis, or edema  Neurologic: alert, oriented, moves all extremities    LAB RESULTS:                        7.5    8.91  )-----------( 297      ( 14 Jan 2022 07:56 )             25.1     01-14    147<H>  |  112<H>  |  73<H>  ----------------------------<  323<H>  4.9   |  21<L>  |  1.52<H>    Ca    9.0      14 Jan 2022 07:56  Phos  3.7     01-13  Mg     2.70     01-13    TPro  6.7  /  Alb  4.1  /  TBili  0.8  /  DBili  x   /  AST  41<H>  /  ALT  190<H>  /  AlkPhos  117  01-14    LIVER FUNCTIONS - ( 14 Jan 2022 07:56 )  Alb: 4.1 g/dL / Pro: 6.7 g/dL / ALK PHOS: 117 U/L / ALT: 190 U/L / AST: 41 U/L / GGT: x             MICROBIOLOGY:  RECENT CULTURES:  01-09 @ 20:51 .Blood Blood     No growth to date.          RADIOLOGY REVIEWED:    < from: Xray Esophagram Single Contrast (01.12.22 @ 14:48) >    ACC: [id number] EXAM:  XR DELIA SNGL CON STUDY                          PROCEDURE DATE:  01/12/2022          INTERPRETATION:  CLINICAL INFORMATION: Dysphagia/odynophagia to solids    TECHNIQUE: An esophagram was performed under fluoroscopy utilizing barium   as the contrast agent and multiple spot fluoroscopic images were taken in   the AP, oblique and lateral projections.    COMPARISON: No similar examinations were available for comparison.    FLUORO TIME: 3.3 minutes    FINDINGS:  Preliminary  radiograph of the chest is unremarkable.    The patient swallowed thick and thin barium contrast without difficulty.  The esophagus demonstrates normal distensibility, contour and course.   There is no abnormal extrinsic mass effect. Contrast passes freely into   the stomach. No gastroesophageal reflux was demonstrated during this   examination.    While swallowing the barium pill, patient exhibited vigorous coughing.   Subsequent fluoroscopic images show the barium pill in the middle   esophagus. A repeat thin barium swallow relatively pushes the barium pill   into the stomach.    Focused views of the proximal esophagus appear to show trace laryngeal   penetration with retrieval with thick liquids.    IMPRESSION:  Unremarkable esophagram.    Coughing episode exhibited with barium pill swallow and trace laryngeal   penetration with thick barium contrast may indicate upper pharyngeal   pathology. Recommend further speech and swallow evaluation as clinically   warranted and per patient tolerance.    Mild delay in transit of barium pill that was resolved with subsequent   administration of liquid oral contrast.    --- End of Report ---      < end of copied text >            Assessment:  Patient with multiple myeloma on velcade and revlimid with allopurinol admitted a few days ago with fever, erythroderma, mouth pain, julia and elevated liver enzymes, dx with DRESS likely to allopurinol now improved on steroids. Evolved severe mucositis of the lips, hsv is neg, suspect related to drug reaction. JULIA and liver enzymes are better. Known covid infected since 12/31, not hypoxic and ct chest no infiltrates. He now finishes 3rd day of rdv to avert progression to symptomatic covid since immunosuppressed. No additional infection is apparent. strongy titer is neg  Plan:  stop remdesivir after today's dose  monitor skin and mucous membranes  steroid taper per derm

## 2022-01-14 NOTE — PROGRESS NOTE ADULT - SUBJECTIVE AND OBJECTIVE BOX
Date of service: 01-14-22 @ 10:07      Patient is a 71y old  Male who presents with a chief complaint of Shortness of breath (13 Jan 2022 20:04)                                                               INTERVAL HPI/OVERNIGHT EVENTS:    REVIEW OF SYSTEMS:     CONSTITUTIONAL: No weakness, fevers or chills  RESPIRATORY: No cough, wheezing,  No shortness of breath  CARDIOVASCULAR: No chest pain or palpitations  GASTROINTESTINAL: No abdominal pain  . No nausea, vomiting, or hematemesis; No diarrhea or constipation. No melena or hematochezia.  GENITOURINARY: No dysuria, frequency or hematuria  NEUROLOGICAL: No numbness or weakness                                                                                                                                                                                                                                                                                 Medications:  MEDICATIONS  (STANDING):  apixaban 5 milliGRAM(s) Oral every 12 hours  calcium carbonate   1250 mG (OsCal) 1 Tablet(s) Oral daily  cholecalciferol 1000 Unit(s) Oral daily  dextrose 40% Gel 15 Gram(s) Oral once  dextrose 5%. 1000 milliLiter(s) (50 mL/Hr) IV Continuous <Continuous>  dextrose 5%. 1000 milliLiter(s) (100 mL/Hr) IV Continuous <Continuous>  dextrose 50% Injectable 25 Gram(s) IV Push once  dextrose 50% Injectable 12.5 Gram(s) IV Push once  dextrose 50% Injectable 25 Gram(s) IV Push once  famotidine    Tablet 10 milliGRAM(s) Oral every 48 hours  finasteride 5 milliGRAM(s) Oral daily  glucagon  Injectable 1 milliGRAM(s) IntraMuscular once  insulin glargine Injectable (LANTUS) 5 Unit(s) SubCutaneous at bedtime  insulin lispro (ADMELOG) corrective regimen sliding scale   SubCutaneous three times a day before meals  methylPREDNISolone sodium succinate Injectable 40 milliGRAM(s) IV Push daily  metoprolol tartrate 25 milliGRAM(s) Oral two times a day  pantoprazole    Tablet 40 milliGRAM(s) Oral two times a day  remdesivir  IVPB   IV Intermittent   remdesivir  IVPB 100 milliGRAM(s) IV Intermittent every 24 hours  sodium bicarbonate 650 milliGRAM(s) Oral three times a day  tamsulosin 0.4 milliGRAM(s) Oral at bedtime  triamcinolone 0.1% Ointment 1 Application(s) Topical every 12 hours  valACYclovir 500 milliGRAM(s) Oral two times a day    MEDICATIONS  (PRN):       Allergies    allopurinol (Other)    Intolerances      Vital Signs Last 24 Hrs  T(C): 36.4 (14 Jan 2022 09:25), Max: 36.5 (13 Jan 2022 13:08)  T(F): 97.5 (14 Jan 2022 09:25), Max: 97.7 (13 Jan 2022 13:08)  HR: 94 (14 Jan 2022 09:25) (93 - 103)  BP: 154/79 (14 Jan 2022 09:25) (138/74 - 159/85)  BP(mean): --  RR: 18 (14 Jan 2022 09:25) (18 - 18)  SpO2: 99% (14 Jan 2022 09:25) (99% - 100%)  CAPILLARY BLOOD GLUCOSE      POCT Blood Glucose.: 344 mg/dL (14 Jan 2022 09:25)  POCT Blood Glucose.: 213 mg/dL (13 Jan 2022 21:23)  POCT Blood Glucose.: 203 mg/dL (13 Jan 2022 17:49)  POCT Blood Glucose.: 323 mg/dL (13 Jan 2022 12:22)      01-13 @ 07:01  -  01-14 @ 07:00  --------------------------------------------------------  IN: 1250 mL / OUT: 2425 mL / NET: -1175 mL      Physical Exam:    Daily     Daily   General:  Well appearing, NAD, not cachetic  HEENT:  crusting of lips   Lungs:  CTA B/L, no wheezes, rales, rhonchi  Abdomen:  Soft, non-tender, no distended, positive BS  Extremities:  2+ pulses, no c/c, no edema  Skin:  Warm and dry, no rashes  :  No matute  Neuro:  AAOx3, non-focal, grossly intact                                                                                                                                                                                                                                                                                                LABS:                               7.5    8.91  )-----------( 297      ( 14 Jan 2022 07:56 )             25.1                      01-14    147<H>  |  112<H>  |  73<H>  ----------------------------<  323<H>  4.9   |  21<L>  |  1.52<H>    Ca    9.0      14 Jan 2022 07:56  Phos  3.7     01-13  Mg     2.70     01-13    TPro  6.7  /  Alb  4.1  /  TBili  0.8  /  DBili  x   /  AST  41<H>  /  ALT  190<H>  /  AlkPhos  117  01-14                       RADIOLOGY & ADDITIONAL TESTS         I personally reviewed: [  ]EKG   [  ]CXR    [  ] CT      A/P:         Discussed with :     [facility] consultants' Notes   Time spent :

## 2022-01-14 NOTE — PROGRESS NOTE ADULT - SUBJECTIVE AND OBJECTIVE BOX
Date of Service: 01-14-22 @ 15:25    Patient is a 71y old  Male who presents with a chief complaint of Shortness of breath (14 Jan 2022 13:08)      Any change in ROS: he is alert and awke: no SOB: cough:       MEDICATIONS  (STANDING):  apixaban 5 milliGRAM(s) Oral every 12 hours  calcium carbonate   1250 mG (OsCal) 1 Tablet(s) Oral daily  cholecalciferol 1000 Unit(s) Oral daily  dextrose 40% Gel 15 Gram(s) Oral once  dextrose 5%. 1000 milliLiter(s) (50 mL/Hr) IV Continuous <Continuous>  dextrose 5%. 1000 milliLiter(s) (100 mL/Hr) IV Continuous <Continuous>  dextrose 50% Injectable 25 Gram(s) IV Push once  dextrose 50% Injectable 12.5 Gram(s) IV Push once  dextrose 50% Injectable 25 Gram(s) IV Push once  famotidine    Tablet 10 milliGRAM(s) Oral every 48 hours  finasteride 5 milliGRAM(s) Oral daily  glucagon  Injectable 1 milliGRAM(s) IntraMuscular once  insulin glargine Injectable (LANTUS) 5 Unit(s) SubCutaneous at bedtime  insulin lispro (ADMELOG) corrective regimen sliding scale   SubCutaneous three times a day before meals  methylPREDNISolone sodium succinate Injectable 40 milliGRAM(s) IV Push daily  metoprolol tartrate 25 milliGRAM(s) Oral two times a day  pantoprazole    Tablet 40 milliGRAM(s) Oral two times a day  remdesivir  IVPB   IV Intermittent   remdesivir  IVPB 100 milliGRAM(s) IV Intermittent every 24 hours  sodium bicarbonate 650 milliGRAM(s) Oral three times a day  sodium chloride 0.45%. 1000 milliLiter(s) (50 mL/Hr) IV Continuous <Continuous>  tamsulosin 0.4 milliGRAM(s) Oral at bedtime  triamcinolone 0.1% Ointment 1 Application(s) Topical every 12 hours  valACYclovir 500 milliGRAM(s) Oral two times a day    MEDICATIONS  (PRN):    Vital Signs Last 24 Hrs  T(C): 36.4 (14 Jan 2022 13:25), Max: 36.4 (14 Jan 2022 09:25)  T(F): 97.6 (14 Jan 2022 13:25), Max: 97.6 (14 Jan 2022 13:25)  HR: 89 (14 Jan 2022 13:25) (89 - 103)  BP: 142/76 (14 Jan 2022 13:25) (138/74 - 159/85)  BP(mean): --  RR: 18 (14 Jan 2022 13:25) (18 - 18)  SpO2: 100% (14 Jan 2022 13:25) (99% - 100%)    I&O's Summary    13 Jan 2022 07:01  -  14 Jan 2022 07:00  --------------------------------------------------------  IN: 1250 mL / OUT: 2425 mL / NET: -1175 mL          Physical Exam:   GENERAL: NAD, well-groomed, well-developed  HEENT: SAM/   Atraumatic, Normocephalic  ENMT: No tonsillar erythema, exudates, or enlargement; Moist mucous membranes, Good dentition, No lesions  NECK: Supple, No JVD, Normal thyroid  CHEST/LUNG: Clear to auscultaion  CVS: Regular rate and rhythm; No murmurs, rubs, or gallops  GI: : Soft, Nontender, Nondistended; Bowel sounds present  NERVOUS SYSTEM:  Alert & Oriented X3  EXTREMITIES:- edema: rash+   LYMPH: No lymphadenopathy noted  SKIN: No rashes or lesions  ENDOCRINOLOGY: No Thyromegaly  PSYCH: Appropriate    Labs:  22, 16, 21, 21                            7.5    8.91  )-----------( 297      ( 14 Jan 2022 07:56 )             25.1                         7.8    10.94 )-----------( 284      ( 13 Jan 2022 11:08 )             25.4                         7.0    10.76 )-----------( 250      ( 13 Jan 2022 07:26 )             22.7                         9.5    12.31 )-----------( 214      ( 12 Jan 2022 17:43 )             31.9                         7.5    14.37 )-----------( 291      ( 12 Jan 2022 09:24 )             25.0                         8.1    18.50 )-----------( 322      ( 11 Jan 2022 07:32 )             27.0     01-14    147<H>  |  112<H>  |  73<H>  ----------------------------<  323<H>  4.9   |  21<L>  |  1.52<H>  01-13    146<H>  |  111<H>  |  93<H>  ----------------------------<  329<H>  4.8   |  20<L>  |  1.86<H>  01-12    x   |  x   |  x   ----------------------------<  x   x    |  x   |  2.33<H>  01-12    146<H>  |  112<H>  |  110<H>  ----------------------------<  337<H>  5.1   |  16<L>  |  2.55<H>  01-11    139  |  108<H>  |  102<H>  ----------------------------<  265<H>  5.0   |  15<L>  |  3.10<H>    Ca    9.0      14 Jan 2022 07:56  Ca    8.8      13 Jan 2022 07:26  Phos  3.7     01-13  Mg     2.70     01-13    TPro  6.7  /  Alb  4.1  /  TBili  0.8  /  DBili  x   /  AST  41<H>  /  ALT  190<H>  /  AlkPhos  117  01-14  TPro  6.7  /  Alb  4.3  /  TBili  0.9  /  DBili  0.3  /  AST  40  /  ALT  226<H>  /  AlkPhos  112  01-13  TPro  6.2  /  Alb  3.5  /  TBili  0.9  /  DBili  0.4<H>  /  AST  62<H>  /  ALT  299<H>  /  AlkPhos  127<H>  01-12  TPro  6.6  /  Alb  3.6  /  TBili  0.9  /  DBili  x   /  AST  62<H>  /  ALT  332<H>  /  AlkPhos  131<H>  01-12  TPro  5.2<L>  /  Alb  2.4<L>  /  TBili  0.7  /  DBili  x   /  AST  168<H>  /  ALT  550<H>  /  AlkPhos  149<H>  01-11    CAPILLARY BLOOD GLUCOSE      POCT Blood Glucose.: 287 mg/dL (14 Jan 2022 12:46)  POCT Blood Glucose.: 344 mg/dL (14 Jan 2022 09:25)  POCT Blood Glucose.: 213 mg/dL (13 Jan 2022 21:23)  POCT Blood Glucose.: 203 mg/dL (13 Jan 2022 17:49)      LIVER FUNCTIONS - ( 14 Jan 2022 07:56 )  Alb: 4.1 g/dL / Pro: 6.7 g/dL / ALK PHOS: 117 U/L / ALT: 190 U/L / AST: 41 U/L / GGT: x           PT/INR - ( 13 Jan 2022 07:26 )   PT: 25.7 sec;   INR: 2.33 ratio             D-Dimer Assay, Quantitative: 174 ng/mL DDU (01-13 @ 07:26)  D-Dimer Assay, Quantitative: 354 ng/mL DDU (01-08 @ 13:22)  Procalcitonin, Serum: 4.08 ng/mL (01-13 @ 07:26)  Lactate, Blood: 2.1 mmol/L (01-13 @ 07:26)  Lactate, Blood: 2.6 mmol/L (01-11 @ 07:32)        RECENT CULTURES:  01-09 @ 20:51 .Blood Blood       < from: Xray Esophagram Single Contrast (01.12.22 @ 14:48) >  There is no abnormal extrinsic mass effect. Contrast passes freely into   the stomach. No gastroesophageal reflux was demonstrated during this   examination.    While swallowing the barium pill, patient exhibited vigorous coughing.   Subsequent fluoroscopic images show the barium pill in the middle   esophagus. A repeat thin barium swallow relatively pushes the barium pill   into the stomach.    Focused views of the proximal esophagus appear to show trace laryngeal   penetration with retrieval with thick liquids.    IMPRESSION:  Unremarkable esophagram.    Coughing episode exhibited with barium pill swallow and trace laryngeal   penetration with thick barium contrast may indicate upper pharyngeal   pathology. Recommend further speech and swallow evaluation as clinically   warranted and per patient tolerance.    Mild delay in transit of barium pill that was resolved with subsequent   administration of liquid oral contrast.    --- End of Report ---          ALICE RASCON MD; Resident Radiologist  This document has been electronically signed.  CHRISTOPHER BOSCH MD; Attending Radiologist  This document has been electronically signed. Jan 13 2022  6:29AM    < end of copied text >           No growth to date.    01-08 @ 12:30 .Blood Blood            < from: Xray Chest 1 View- PORTABLE-Urgent (Xray Chest 1 View- PORTABLE-Urgent .) (01.09.22 @ 18:02) >  INTERPRETATION:  EXAMINATION: XR CHEST URGENT    CLINICAL INDICATION: fever r/o infxn    TECHNIQUE: Single frontal, portable view of the chest was obtained.    COMPARISON: Chest x-ray 1/8/2022.    FINDINGS:  The heart is normal in size.  The lungs are clear.  There is no pneumothorax or pleural effusion.  There are no acute osseous abnormalities.    IMPRESSION:  Clear lungs.    --- Endof Report ---          ALICE RASCON MD; Resident Radiologist  This document has been electronically signed.  CHRISTOPHER BOSCH MD; Attending Radiologist  This document has been electronically signed. Yared 10 2022  6:19PM    < end of copied text >      No Growth Final    01-08 @ 12:25 .Blood Blood                No Growth Final    < from: US Duplex Venous Lower Ext Complete, Bilateral (01.09.22 @ 15:44) >    ACC: [id number] EXAM:  US DPLX LWR EXT VEINS COMPL BI                          PROCEDURE DATE:  01/09/2022          INTERPRETATION:  CLINICAL INFORMATION: COVID positive. Shortness of   breath. Assess for deep vein thrombosis.    COMPARISON: Right lower extremity duplex sonogram 9/16/2016.    TECHNIQUE: Duplex sonography of the BILATERAL LOWER extremity veins with   color and spectral Doppler, with and without compression.    FINDINGS:    RIGHT:  Deep venous thrombosis of the right common femoraland popliteal veins.    LEFT:  Deep venous thrombosis of the left common femoral, femoral, and popliteal   veins.    IMPRESSION:  Bilateral lower extremity deep vein thromboses, as noted on prior studies.          --- End of Report ---          PRABHAKAR HARDEN MD; Resident Radiology  This document has been electronically signed.  MIRIAM ROACH MD; Attending Radiologist  This document has been electronically signed. Jan 9 2022  3:33PM    < end of copied text >        RESPIRATORY CULTURES:          Studies  Chest X-RAY  CT SCAN Chest   Venous Dopplers: LE:   CT Abdomen  Others

## 2022-01-14 NOTE — PROGRESS NOTE ADULT - PROBLEM SELECTOR PLAN 9
Check lipids, would expect antiplatelet and statin for secondary prevention,   hold statin for now given elevated LFT.      Dysphagia: Seems to be improving, etiology unclear.  Empiric Valtrex was added Given skin breakdown of oral mucosa    Anemia : consider transfusion   fu with onc Check lipids, would expect antiplatelet and statin for secondary prevention,   hold statin for now given elevated LFT.      Dysphagia: Seems to be improving, etiology unclear.  Empiric Valtrex was added Given skin breakdown of oral mucosa    Anemia : consider transfusion   fu with onc    steriod induced hyperglycemia : endo input   add lantus

## 2022-01-15 LAB
ALBUMIN SERPL ELPH-MCNC: 4 G/DL — SIGNIFICANT CHANGE UP (ref 3.3–5)
ALBUMIN SERPL ELPH-MCNC: 4.1 G/DL — SIGNIFICANT CHANGE UP (ref 3.3–5)
ALP SERPL-CCNC: 113 U/L — SIGNIFICANT CHANGE UP (ref 40–120)
ALP SERPL-CCNC: 115 U/L — SIGNIFICANT CHANGE UP (ref 40–120)
ALT FLD-CCNC: 156 U/L — HIGH (ref 4–41)
ALT FLD-CCNC: 157 U/L — HIGH (ref 4–41)
ANION GAP SERPL CALC-SCNC: 10 MMOL/L — SIGNIFICANT CHANGE UP (ref 7–14)
AST SERPL-CCNC: 37 U/L — SIGNIFICANT CHANGE UP (ref 4–40)
AST SERPL-CCNC: 37 U/L — SIGNIFICANT CHANGE UP (ref 4–40)
BASOPHILS # BLD AUTO: 0.03 K/UL — SIGNIFICANT CHANGE UP (ref 0–0.2)
BASOPHILS NFR BLD AUTO: 0.4 % — SIGNIFICANT CHANGE UP (ref 0–2)
BILIRUB DIRECT SERPL-MCNC: 0.4 MG/DL — HIGH (ref 0–0.3)
BILIRUB INDIRECT FLD-MCNC: 0.5 MG/DL — SIGNIFICANT CHANGE UP (ref 0–1)
BILIRUB SERPL-MCNC: 0.9 MG/DL — SIGNIFICANT CHANGE UP (ref 0.2–1.2)
BILIRUB SERPL-MCNC: 1 MG/DL — SIGNIFICANT CHANGE UP (ref 0.2–1.2)
BUN SERPL-MCNC: 56 MG/DL — HIGH (ref 7–23)
CALCIUM SERPL-MCNC: 8.9 MG/DL — SIGNIFICANT CHANGE UP (ref 8.4–10.5)
CHLORIDE SERPL-SCNC: 116 MMOL/L — HIGH (ref 98–107)
CO2 SERPL-SCNC: 24 MMOL/L — SIGNIFICANT CHANGE UP (ref 22–31)
CREAT SERPL-MCNC: 1.37 MG/DL — HIGH (ref 0.5–1.3)
CREAT SERPL-MCNC: 1.37 MG/DL — HIGH (ref 0.5–1.3)
EOSINOPHIL # BLD AUTO: 0.43 K/UL — SIGNIFICANT CHANGE UP (ref 0–0.5)
EOSINOPHIL NFR BLD AUTO: 5.5 % — SIGNIFICANT CHANGE UP (ref 0–6)
GLUCOSE SERPL-MCNC: 206 MG/DL — HIGH (ref 70–99)
HCT VFR BLD CALC: 26.9 % — LOW (ref 39–50)
HGB BLD-MCNC: 8.1 G/DL — LOW (ref 13–17)
IANC: 2.44 K/UL — SIGNIFICANT CHANGE UP (ref 1.5–8.5)
IMM GRANULOCYTES NFR BLD AUTO: 1.8 % — HIGH (ref 0–1.5)
INR BLD: 2.09 RATIO — HIGH (ref 0.88–1.16)
LYMPHOCYTES # BLD AUTO: 3.26 K/UL — SIGNIFICANT CHANGE UP (ref 1–3.3)
LYMPHOCYTES # BLD AUTO: 41.6 % — SIGNIFICANT CHANGE UP (ref 13–44)
MCHC RBC-ENTMCNC: 26 PG — LOW (ref 27–34)
MCHC RBC-ENTMCNC: 30.1 GM/DL — LOW (ref 32–36)
MCV RBC AUTO: 86.2 FL — SIGNIFICANT CHANGE UP (ref 80–100)
MONOCYTES # BLD AUTO: 1.54 K/UL — HIGH (ref 0–0.9)
MONOCYTES NFR BLD AUTO: 19.6 % — HIGH (ref 2–14)
NEUTROPHILS # BLD AUTO: 2.44 K/UL — SIGNIFICANT CHANGE UP (ref 1.8–7.4)
NEUTROPHILS NFR BLD AUTO: 31.1 % — LOW (ref 43–77)
NRBC # BLD: 7 /100 WBCS — SIGNIFICANT CHANGE UP
NRBC # FLD: 0.58 K/UL — HIGH
PLATELET # BLD AUTO: 310 K/UL — SIGNIFICANT CHANGE UP (ref 150–400)
POTASSIUM SERPL-MCNC: 4.4 MMOL/L — SIGNIFICANT CHANGE UP (ref 3.5–5.3)
POTASSIUM SERPL-SCNC: 4.4 MMOL/L — SIGNIFICANT CHANGE UP (ref 3.5–5.3)
PROT SERPL-MCNC: 6.5 G/DL — SIGNIFICANT CHANGE UP (ref 6–8.3)
PROT SERPL-MCNC: 6.5 G/DL — SIGNIFICANT CHANGE UP (ref 6–8.3)
PROTHROM AB SERPL-ACNC: 23.2 SEC — HIGH (ref 10.6–13.6)
RBC # BLD: 3.12 M/UL — LOW (ref 4.2–5.8)
RBC # FLD: 23.6 % — HIGH (ref 10.3–14.5)
SODIUM SERPL-SCNC: 150 MMOL/L — HIGH (ref 135–145)
WBC # BLD: 7.84 K/UL — SIGNIFICANT CHANGE UP (ref 3.8–10.5)
WBC # FLD AUTO: 7.84 K/UL — SIGNIFICANT CHANGE UP (ref 3.8–10.5)

## 2022-01-15 RX ADMIN — Medication 40 MILLIGRAM(S): at 05:22

## 2022-01-15 RX ADMIN — Medication 8: at 12:31

## 2022-01-15 RX ADMIN — FINASTERIDE 5 MILLIGRAM(S): 5 TABLET, FILM COATED ORAL at 12:30

## 2022-01-15 RX ADMIN — INSULIN GLARGINE 5 UNIT(S): 100 INJECTION, SOLUTION SUBCUTANEOUS at 21:53

## 2022-01-15 RX ADMIN — Medication 4: at 09:05

## 2022-01-15 RX ADMIN — Medication 650 MILLIGRAM(S): at 21:53

## 2022-01-15 RX ADMIN — Medication 25 MILLIGRAM(S): at 05:22

## 2022-01-15 RX ADMIN — REMDESIVIR 500 MILLIGRAM(S): 5 INJECTION INTRAVENOUS at 12:29

## 2022-01-15 RX ADMIN — APIXABAN 5 MILLIGRAM(S): 2.5 TABLET, FILM COATED ORAL at 05:08

## 2022-01-15 RX ADMIN — Medication 650 MILLIGRAM(S): at 05:10

## 2022-01-15 RX ADMIN — Medication 1 APPLICATION(S): at 18:04

## 2022-01-15 RX ADMIN — Medication 1 TABLET(S): at 12:29

## 2022-01-15 RX ADMIN — TAMSULOSIN HYDROCHLORIDE 0.4 MILLIGRAM(S): 0.4 CAPSULE ORAL at 21:53

## 2022-01-15 RX ADMIN — Medication 25 MILLIGRAM(S): at 18:03

## 2022-01-15 RX ADMIN — APIXABAN 5 MILLIGRAM(S): 2.5 TABLET, FILM COATED ORAL at 18:03

## 2022-01-15 RX ADMIN — Medication 1 APPLICATION(S): at 05:19

## 2022-01-15 RX ADMIN — Medication 650 MILLIGRAM(S): at 13:22

## 2022-01-15 RX ADMIN — PANTOPRAZOLE SODIUM 40 MILLIGRAM(S): 20 TABLET, DELAYED RELEASE ORAL at 05:19

## 2022-01-15 RX ADMIN — PANTOPRAZOLE SODIUM 40 MILLIGRAM(S): 20 TABLET, DELAYED RELEASE ORAL at 18:04

## 2022-01-15 RX ADMIN — Medication 1000 UNIT(S): at 13:22

## 2022-01-15 NOTE — PROGRESS NOTE ADULT - SUBJECTIVE AND OBJECTIVE BOX
Patient is a 71y old  Male who presents with a chief complaint of Shortness of breath (15 Yared 2022 17:49)      Medication:   apixaban 5 milliGRAM(s) Oral every 12 hours  calcium carbonate   1250 mG (OsCal) 1 Tablet(s) Oral daily  cholecalciferol 1000 Unit(s) Oral daily  dextrose 40% Gel 15 Gram(s) Oral once  dextrose 5%. 1000 milliLiter(s) IV Continuous <Continuous>  dextrose 5%. 1000 milliLiter(s) IV Continuous <Continuous>  dextrose 50% Injectable 12.5 Gram(s) IV Push once  dextrose 50% Injectable 25 Gram(s) IV Push once  dextrose 50% Injectable 25 Gram(s) IV Push once  famotidine    Tablet 10 milliGRAM(s) Oral every 48 hours  finasteride 5 milliGRAM(s) Oral daily  glucagon  Injectable 1 milliGRAM(s) IntraMuscular once  insulin glargine Injectable (LANTUS) 5 Unit(s) SubCutaneous at bedtime  insulin lispro (ADMELOG) corrective regimen sliding scale   SubCutaneous three times a day before meals  methylPREDNISolone sodium succinate Injectable 40 milliGRAM(s) IV Push daily  metoprolol tartrate 25 milliGRAM(s) Oral two times a day  pantoprazole    Tablet 40 milliGRAM(s) Oral two times a day  sodium bicarbonate 650 milliGRAM(s) Oral three times a day  sodium chloride 0.45%. 1000 milliLiter(s) IV Continuous <Continuous>  tamsulosin 0.4 milliGRAM(s) Oral at bedtime  triamcinolone 0.1% Ointment 1 Application(s) Topical every 12 hours      Physical exam    T(C): 36.6 (01-15-22 @ 19:30), Max: 36.6 (01-15-22 @ 15:30)  HR: 73 (01-15-22 @ 19:30) (73 - 86)  BP: 135/73 (01-15-22 @ 19:30) (135/73 - 155/88)  RR: 18 (01-15-22 @ 19:30) (18 - 18)  SpO2: 100% (01-15-22 @ 19:30) (100% - 100%)  Wt(kg): --      Cv s1 S2 RRR    Labs                        8.1    7.84  )-----------( 310      ( 15 Yared 2022 07:20 )             26.9       01-15    150<H>  |  116<H>  |  56<H>  ----------------------------<  206<H>  4.4   |  24  |  1.37<H>    Ca    8.9      15 Yared 2022 07:20    TPro  6.5  /  Alb  4.0  /  TBili  1.0  /  DBili  0.4<H>  /  AST  37  /  ALT  156<H>  /  AlkPhos  113  01-15      LIVER FUNCTIONS - ( 15 Yared 2022 07:20 )  Alb: 4.0 g/dL / Pro: 6.5 g/dL / ALK PHOS: 113 U/L / ALT: 156 U/L / AST: 37 U/L / GGT: x             [id number]

## 2022-01-15 NOTE — PROGRESS NOTE ADULT - ASSESSMENT
Anemia AOCD process - Hgb higher and adequate at this time.    MM - treatment with RVD on hold in setting of covid. JULIA is continuing to improve. calcium is normal.

## 2022-01-15 NOTE — PROVIDER CONTACT NOTE (OTHER) - SITUATION
Patient is complaining of oral bleeding
Patient had 5 beats of V-tach
critical lab value Hgb 7 as per lab Gina at 9:41 am
Patient pocketing medications and spitting them out. Unable to swallow.
Patient reported chest pain of 8/10
Patient refusing to take anything by mouth including his eliquis. Patient has had a bloody mouth throughout shift, swabs being used for mouth care
Patient has a temperature of 100.5. Attempted to give patient acetaminophen with water, patient unable to shallow, tried to give acetaminophen with applesauce, patient unable to shallow.
Patient skin is redden and patient reports feelings itchy

## 2022-01-15 NOTE — PROGRESS NOTE ADULT - PROBLEM SELECTOR PLAN 9
Check lipids, would expect antiplatelet and statin for secondary prevention,   hold statin for now given elevated LFT.      Dysphagia: Seems to be improving, etiology unclear.  Empiric Valtrex was added Given skin breakdown of oral mucosa    Anemia : consider transfusion   fu with onc    steriod induced hyperglycemia : endo input   add lantus

## 2022-01-15 NOTE — PROVIDER CONTACT NOTE (OTHER) - BACKGROUND
71M PMHx multiple myeloma, anemia, DVT on eliquis, CKD not on dialysis admitted for fever due to covid 19.
71y M phm multiple myeloma, anemia, DVT on Eliquis, CKD not on dialysis, CVA with R sided deficit and pre-DM p/w SOB 2/2 covid
71y M phm multiple myeloma, anemia, DVT on Eliquis, CKD not on dialysis, CVA with R sided deficit and pre-DM p/w SOB 2/2 covid
Covid 19
71M PMHx multiple myeloma, anemia, DVT on eliquis, CKD not on dialysis admitted for fever due to covid 19.
Covid 19
71M PMHx multiple myeloma, anemia, DVT on eliquis, CKD not on dialysis admitted for fever due to covid 19.
71y M phm multiple myeloma, anemia, DVT on Eliquis, CKD not on dialysis, CVA with R sided deficit and pre-DM p/w SOB 2/2 covid

## 2022-01-15 NOTE — PROVIDER CONTACT NOTE (OTHER) - DATE AND TIME:
12-Jan-2022 05:55
13-Jan-2022 09:42
09-Jan-2022 23:45
10-Yared-2022 07:00
10-Yared-2022 18:28
15-Yared-2022 01:10
09-Jan-2022 22:30
10-Yared-2022 22:00

## 2022-01-15 NOTE — PROGRESS NOTE ADULT - ASSESSMENT
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 Nephrology consulted for elevated Scr.    1. 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 JULIA secondary to DRESS can be due to AIN for which patient already on steroids with improving Scr. Continue with IV steroids as per dermatology. Pt with worsening hypernatremia- encourage PO free water intake. If worsening Na will consider D5 IVF but will need improved diabetes management. UA with mild pyuria. FeNa low. Renal US without hydro. Avoid nephrotoxins.    2. CKD-3a: Baseline Scr 1.2 as per prior records. Outpatient CKD work up. Monitor electrolytes.    3. HTN with CKD: BP acceptable. Continue with current medications. Monitor BP.    4. Metabolic acidosis: Improving. Continue with sodium bicarbonate 650 mg PO TID. Monitor pH.    5. COVID-19: As per ID.      [facility] NEPHROLOGY  Wade Tong M.D.  Nitin Ni D.O.  Joanna Napier M.D.  Annie Casas, SHAMAR, ANP-C    Telephone: [phone]  Facsimile: [phone]    [address] [place] [address]

## 2022-01-15 NOTE — PROGRESS NOTE ADULT - SUBJECTIVE AND OBJECTIVE BOX
[facility] NEPHROLOGY- PROGRESS NOTE    71y Male with history of MM presents with SOB. Nephrology consulted for elevated Scr.    REVIEW OF SYSTEMS:  Gen: no changes in weight  Cards: no chest pain  Resp: no dyspnea  GI: no nausea or vomiting or diarrhea, + decreased PO intake  Vascular: no LE edema    No Known Allergies      Hospital Medications: Medications reviewed      VITALS:  T(F): 96.8 (01-15-22 @ 05:05), Max: 97.9 (22 @ 21:05)  HR: 78 (01-15-22 @ 09:05)  BP: 138/75 (01-15-22 @ 09:05)  RR: 18 (01-15-22 @ 09:05)  SpO2: 100% (01-15-22 @ 09:05)  Wt(kg): --        PHYSICAL EXAM:    Gen: NAD, calm  Cards: RRR, +S1/S2, no M/G/R  Resp: CTA B/L  GI: soft, NT/ND, NABS  Vascular: no LE edema B/L        LABS:  01-15    150<H>  |  116<H>  |  56<H>  ----------------------------<  206<H>  4.4   |  24  |  1.37<H>    Ca    8.9      15 Yared 2022 07:20    TPro  6.5  /  Alb  4.0  /  TBili  1.0  /  DBili  0.4<H>  /  AST  37  /  ALT  156<H>  /  AlkPhos  113  01-15    Creatinine Trend: 1.37 <--, 1.52 <--, 1.86 <--, 2.33 <--, 2.55 <--, 3.10 <--, 2.94 <--, 2.32 <--                        8.1    7.84  )-----------( 310      ( 15 Yared 2022 07:20 )             26.9     Urine Studies:  Urinalysis Basic - ( 2022 16:12 )    Color: Yellow / Appearance: Slightly Turbid / S.020 / pH:   Gluc:  / Ketone: Trace  / Bili: Negative / Urobili: <2 mg/dL   Blood:  / Protein: 30 mg/dL / Nitrite: Negative   Leuk Esterase: Small / RBC: 2 /HPF / WBC 11 /HPF   Sq Epi:  / Non Sq Epi: >27 /HPF / Bacteria: Moderate      Sodium, Random Urine: <20 mmol/L ( @ 16:12)  Potassium, Random Urine: 56.6 mmol/L ( @ 16:12)  Chloride, Random Urine: <20 mmol/L ( @ 16:12)  Creatinine, Random Urine: 200 mg/dL ( @ 16:12)

## 2022-01-15 NOTE — PROVIDER CONTACT NOTE (OTHER) - RECOMMENDATIONS
notify provider
Provider was made aware. No new orders at this time, will come to bedside to assess. Will continue to monitor.
Provider made aware
Provider made aware
Provider made aware. Requested for provider to come to bedside
Provider made aware
notify provider

## 2022-01-15 NOTE — PROVIDER CONTACT NOTE (OTHER) - NAME OF MD/NP/PA/DO NOTIFIED:
Angeline Leo
Mariam Moise
Sandra Renee
Sandra Renee
Xi, Adeline
Joyce Barber ACP
Sarah De León
Víctor Flores
4 weeks

## 2022-01-15 NOTE — PROGRESS NOTE ADULT - SUBJECTIVE AND OBJECTIVE BOX
Date of service: 01-15-22 @ 23:10      Patient is a 71y old  Male who presents with a chief complaint of Shortness of breath (15 Yared 2022 21:57)                                                               INTERVAL HPI/OVERNIGHT EVENTS:    REVIEW OF SYSTEMS:     CONSTITUTIONAL: No weakness, fevers or chills  EYES/ENT: No visual changes , no ear ache   NECK: No pain or stiffness  RESPIRATORY: No cough, wheezing,  No shortness of breath  CARDIOVASCULAR: No chest pain or palpitations  GASTROINTESTINAL: No abdominal pain  . No nausea, vomiting, or hematemesis; No diarrhea or constipation. No melena or hematochezia.  GENITOURINARY: No dysuria, frequency or hematuria  NEUROLOGICAL: No numbness or weakness  SKIN: No itching, burning, rashes, or lesions                                                                                                                                                                                                                                                                                 Medications:  MEDICATIONS  (STANDING):  apixaban 5 milliGRAM(s) Oral every 12 hours  calcium carbonate   1250 mG (OsCal) 1 Tablet(s) Oral daily  cholecalciferol 1000 Unit(s) Oral daily  dextrose 40% Gel 15 Gram(s) Oral once  dextrose 5%. 1000 milliLiter(s) (50 mL/Hr) IV Continuous <Continuous>  dextrose 5%. 1000 milliLiter(s) (100 mL/Hr) IV Continuous <Continuous>  dextrose 50% Injectable 25 Gram(s) IV Push once  dextrose 50% Injectable 12.5 Gram(s) IV Push once  dextrose 50% Injectable 25 Gram(s) IV Push once  famotidine    Tablet 10 milliGRAM(s) Oral every 48 hours  finasteride 5 milliGRAM(s) Oral daily  glucagon  Injectable 1 milliGRAM(s) IntraMuscular once  insulin glargine Injectable (LANTUS) 5 Unit(s) SubCutaneous at bedtime  insulin lispro (ADMELOG) corrective regimen sliding scale   SubCutaneous three times a day before meals  methylPREDNISolone sodium succinate Injectable 40 milliGRAM(s) IV Push daily  metoprolol tartrate 25 milliGRAM(s) Oral two times a day  pantoprazole    Tablet 40 milliGRAM(s) Oral two times a day  sodium bicarbonate 650 milliGRAM(s) Oral three times a day  sodium chloride 0.45%. 1000 milliLiter(s) (50 mL/Hr) IV Continuous <Continuous>  tamsulosin 0.4 milliGRAM(s) Oral at bedtime  triamcinolone 0.1% Ointment 1 Application(s) Topical every 12 hours    MEDICATIONS  (PRN):       Allergies    allopurinol (Other)    Intolerances      Vital Signs Last 24 Hrs  T(C): 36.6 (15 Yared 2022 19:30), Max: 36.6 (15 Yared 2022 15:30)  T(F): 97.8 (15 Yared 2022 19:30), Max: 97.8 (15 Yared 2022 15:30)  HR: 73 (15 Yared 2022 19:30) (73 - 86)  BP: 135/73 (15 Yared 2022 19:30) (135/73 - 155/88)  BP(mean): --  RR: 18 (15 Yared 2022 19:30) (18 - 18)  SpO2: 100% (15 Yared 2022 19:30) (100% - 100%)  CAPILLARY BLOOD GLUCOSE      POCT Blood Glucose.: 173 mg/dL (15 Yared 2022 21:39)  POCT Blood Glucose.: 128 mg/dL (15 Yared 2022 17:57)  POCT Blood Glucose.: 309 mg/dL (15 Yared 2022 12:21)  POCT Blood Glucose.: 224 mg/dL (15 Yared 2022 08:53)      Physical Exam:    Daily     Daily   General:  crusting over the lips   HEENT:  Nonicteric, PERRLA  CV:  RRR, S1S2   Lungs:  CTA B/L, no wheezes, rales, rhonchi  Abdomen:  Soft, non-tender, no distended, positive BS  Extremities:  2+ pulses, no c/c, no edema  Skin:  Warm and dry, no rashes  :  No matute  Neuro:  AAOx3, non-focal, grossly intact                                                                                                                                                                                                                                                                                                LABS:                               8.1    7.84  )-----------( 310      ( 15 Yared 2022 07:20 )             26.9                      01-15    150<H>  |  116<H>  |  56<H>  ----------------------------<  206<H>  4.4   |  24  |  1.37<H>    Ca    8.9      15 Yared 2022 07:20    TPro  6.5  /  Alb  4.0  /  TBili  1.0  /  DBili  0.4<H>  /  AST  37  /  ALT  156<H>  /  AlkPhos  113  01-15                       RADIOLOGY & ADDITIONAL TESTS         I personally reviewed: [  ]EKG   [  ]CXR    [  ] CT      A/P:         Discussed with :     Arm[facility] consultants' Notes   Time spent :

## 2022-01-15 NOTE — PROVIDER CONTACT NOTE (OTHER) - REASON
Patient reported chest pain
critical lab value Hgb 7
Patient refusing PO medications including eliquis
Patient is complaining of oral bleeding
Patient pocketing medications and spitting them out
Patient has a temperature of 100.5
Patient skin is redden and patient reports feelings itchy
Patient had 5 beats of V-tach

## 2022-01-16 LAB
ALBUMIN SERPL ELPH-MCNC: 3.5 G/DL — SIGNIFICANT CHANGE UP (ref 3.3–5)
ALP SERPL-CCNC: 105 U/L — SIGNIFICANT CHANGE UP (ref 40–120)
ALT FLD-CCNC: 124 U/L — HIGH (ref 4–41)
ANION GAP SERPL CALC-SCNC: 9 MMOL/L — SIGNIFICANT CHANGE UP (ref 7–14)
AST SERPL-CCNC: 38 U/L — SIGNIFICANT CHANGE UP (ref 4–40)
BASOPHILS # BLD AUTO: 0.01 K/UL — SIGNIFICANT CHANGE UP (ref 0–0.2)
BASOPHILS NFR BLD AUTO: 0.1 % — SIGNIFICANT CHANGE UP (ref 0–2)
BILIRUB SERPL-MCNC: 0.9 MG/DL — SIGNIFICANT CHANGE UP (ref 0.2–1.2)
BUN SERPL-MCNC: 45 MG/DL — HIGH (ref 7–23)
CALCIUM SERPL-MCNC: 8.3 MG/DL — LOW (ref 8.4–10.5)
CHLORIDE SERPL-SCNC: 113 MMOL/L — HIGH (ref 98–107)
CO2 SERPL-SCNC: 25 MMOL/L — SIGNIFICANT CHANGE UP (ref 22–31)
CREAT SERPL-MCNC: 1.23 MG/DL — SIGNIFICANT CHANGE UP (ref 0.5–1.3)
EOSINOPHIL # BLD AUTO: 0.15 K/UL — SIGNIFICANT CHANGE UP (ref 0–0.5)
EOSINOPHIL NFR BLD AUTO: 2.1 % — SIGNIFICANT CHANGE UP (ref 0–6)
GLUCOSE SERPL-MCNC: 168 MG/DL — HIGH (ref 70–99)
HCT VFR BLD CALC: 25.6 % — LOW (ref 39–50)
HGB BLD-MCNC: 7.8 G/DL — LOW (ref 13–17)
IANC: 2.27 K/UL — SIGNIFICANT CHANGE UP (ref 1.5–8.5)
IMM GRANULOCYTES NFR BLD AUTO: 1.6 % — HIGH (ref 0–1.5)
INR BLD: 1.84 RATIO — HIGH (ref 0.88–1.16)
LYMPHOCYTES # BLD AUTO: 3.32 K/UL — HIGH (ref 1–3.3)
LYMPHOCYTES # BLD AUTO: 47.4 % — HIGH (ref 13–44)
MCHC RBC-ENTMCNC: 26.2 PG — LOW (ref 27–34)
MCHC RBC-ENTMCNC: 30.5 GM/DL — LOW (ref 32–36)
MCV RBC AUTO: 85.9 FL — SIGNIFICANT CHANGE UP (ref 80–100)
MONOCYTES # BLD AUTO: 1.15 K/UL — HIGH (ref 0–0.9)
MONOCYTES NFR BLD AUTO: 16.4 % — HIGH (ref 2–14)
NEUTROPHILS # BLD AUTO: 2.27 K/UL — SIGNIFICANT CHANGE UP (ref 1.8–7.4)
NEUTROPHILS NFR BLD AUTO: 32.4 % — LOW (ref 43–77)
NRBC # BLD: 5 /100 WBCS — SIGNIFICANT CHANGE UP
NRBC # FLD: 0.36 K/UL — HIGH
PLATELET # BLD AUTO: 327 K/UL — SIGNIFICANT CHANGE UP (ref 150–400)
POTASSIUM SERPL-MCNC: 4.7 MMOL/L — SIGNIFICANT CHANGE UP (ref 3.5–5.3)
POTASSIUM SERPL-SCNC: 4.7 MMOL/L — SIGNIFICANT CHANGE UP (ref 3.5–5.3)
PROT SERPL-MCNC: 6 G/DL — SIGNIFICANT CHANGE UP (ref 6–8.3)
PROTHROM AB SERPL-ACNC: 20.4 SEC — HIGH (ref 10.6–13.6)
RBC # BLD: 2.98 M/UL — LOW (ref 4.2–5.8)
RBC # FLD: 24.2 % — HIGH (ref 10.3–14.5)
SODIUM SERPL-SCNC: 147 MMOL/L — HIGH (ref 135–145)
WBC # BLD: 7.01 K/UL — SIGNIFICANT CHANGE UP (ref 3.8–10.5)
WBC # FLD AUTO: 7.01 K/UL — SIGNIFICANT CHANGE UP (ref 3.8–10.5)

## 2022-01-16 RX ORDER — BACITRACIN ZINC 500 UNIT/G
1 OINTMENT IN PACKET (EA) TOPICAL
Refills: 0 | Status: DISCONTINUED | OUTPATIENT
Start: 2022-01-16 | End: 2022-01-17

## 2022-01-16 RX ADMIN — Medication 650 MILLIGRAM(S): at 15:43

## 2022-01-16 RX ADMIN — FAMOTIDINE 10 MILLIGRAM(S): 10 INJECTION INTRAVENOUS at 21:46

## 2022-01-16 RX ADMIN — Medication 6: at 12:06

## 2022-01-16 RX ADMIN — Medication 650 MILLIGRAM(S): at 21:45

## 2022-01-16 RX ADMIN — Medication 25 MILLIGRAM(S): at 06:00

## 2022-01-16 RX ADMIN — Medication 25 MILLIGRAM(S): at 17:10

## 2022-01-16 RX ADMIN — Medication 4: at 18:26

## 2022-01-16 RX ADMIN — Medication 4: at 09:17

## 2022-01-16 RX ADMIN — Medication 40 MILLIGRAM(S): at 05:59

## 2022-01-16 RX ADMIN — APIXABAN 5 MILLIGRAM(S): 2.5 TABLET, FILM COATED ORAL at 17:07

## 2022-01-16 RX ADMIN — Medication 1 APPLICATION(S): at 17:07

## 2022-01-16 RX ADMIN — Medication 1 TABLET(S): at 12:06

## 2022-01-16 RX ADMIN — FINASTERIDE 5 MILLIGRAM(S): 5 TABLET, FILM COATED ORAL at 12:06

## 2022-01-16 RX ADMIN — Medication 650 MILLIGRAM(S): at 06:01

## 2022-01-16 RX ADMIN — INSULIN GLARGINE 5 UNIT(S): 100 INJECTION, SOLUTION SUBCUTANEOUS at 21:46

## 2022-01-16 RX ADMIN — APIXABAN 5 MILLIGRAM(S): 2.5 TABLET, FILM COATED ORAL at 06:00

## 2022-01-16 RX ADMIN — Medication 1 APPLICATION(S): at 06:01

## 2022-01-16 RX ADMIN — PANTOPRAZOLE SODIUM 40 MILLIGRAM(S): 20 TABLET, DELAYED RELEASE ORAL at 06:00

## 2022-01-16 RX ADMIN — PANTOPRAZOLE SODIUM 40 MILLIGRAM(S): 20 TABLET, DELAYED RELEASE ORAL at 17:08

## 2022-01-16 RX ADMIN — TAMSULOSIN HYDROCHLORIDE 0.4 MILLIGRAM(S): 0.4 CAPSULE ORAL at 21:45

## 2022-01-16 RX ADMIN — Medication 1000 UNIT(S): at 12:06

## 2022-01-16 NOTE — SWALLOW BEDSIDE ASSESSMENT ADULT - SWALLOW EVAL: RECOMMENDED DIET
1) Defer PO diet to MD given patient refusal as stated above 2) Consider short-term non-oral means of nutrition 3) Consider RD consult given patient is at an increased nutritional risk
soft and bite sized / thin liquids

## 2022-01-16 NOTE — PROGRESS NOTE ADULT - SUBJECTIVE AND OBJECTIVE BOX
Date of Service: 01-16-22 @ 16:55    Patient is a 71y old  Male who presents with a chief complaint of Shortness of breath (16 Jan 2022 14:08)      Any change in ROS:  he seems OK: no bleeding:       MEDICATIONS  (STANDING):  apixaban 5 milliGRAM(s) Oral every 12 hours  BACItracin   Ointment 1 Application(s) Topical two times a day  calcium carbonate   1250 mG (OsCal) 1 Tablet(s) Oral daily  cholecalciferol 1000 Unit(s) Oral daily  dextrose 40% Gel 15 Gram(s) Oral once  dextrose 5%. 1000 milliLiter(s) (50 mL/Hr) IV Continuous <Continuous>  dextrose 5%. 1000 milliLiter(s) (100 mL/Hr) IV Continuous <Continuous>  dextrose 50% Injectable 12.5 Gram(s) IV Push once  dextrose 50% Injectable 25 Gram(s) IV Push once  dextrose 50% Injectable 25 Gram(s) IV Push once  famotidine    Tablet 10 milliGRAM(s) Oral every 48 hours  finasteride 5 milliGRAM(s) Oral daily  glucagon  Injectable 1 milliGRAM(s) IntraMuscular once  insulin glargine Injectable (LANTUS) 5 Unit(s) SubCutaneous at bedtime  insulin lispro (ADMELOG) corrective regimen sliding scale   SubCutaneous three times a day before meals  methylPREDNISolone sodium succinate Injectable 40 milliGRAM(s) IV Push daily  metoprolol tartrate 25 milliGRAM(s) Oral two times a day  pantoprazole    Tablet 40 milliGRAM(s) Oral two times a day  sodium bicarbonate 650 milliGRAM(s) Oral three times a day  sodium chloride 0.45%. 1000 milliLiter(s) (50 mL/Hr) IV Continuous <Continuous>  tamsulosin 0.4 milliGRAM(s) Oral at bedtime  triamcinolone 0.1% Ointment 1 Application(s) Topical every 12 hours    MEDICATIONS  (PRN):    Vital Signs Last 24 Hrs  T(C): 36.4 (16 Jan 2022 14:00), Max: 37 (16 Jan 2022 06:00)  T(F): 97.5 (16 Jan 2022 14:00), Max: 98.6 (16 Jan 2022 06:00)  HR: 79 (16 Jan 2022 14:00) (65 - 92)  BP: 120/78 (16 Jan 2022 14:00) (120/78 - 138/87)  BP(mean): --  RR: 18 (16 Jan 2022 14:00) (18 - 18)  SpO2: 100% (16 Jan 2022 14:00) (100% - 100%)    I&O's Summary        Physical Exam:   GENERAL: NAD, well-groomed, well-developed  HEENT: SAM/   Atraumatic, Normocephalic  ENMT: No tonsillar erythema, exudates, or enlargement; Moist mucous membranes, Good dentition, No lesions  NECK: Supple, No JVD, Normal thyroid  CHEST/LUNG: Clear to auscultaion  CVS: Regular rate and rhythm; No murmurs, rubs, or gallops  GI: : Soft, Nontender, Nondistended; Bowel sounds present  NERVOUS SYSTEM:  Alert & Oriented X3,   EXTREMITIES: rash+   LYMPH: No lymphadenopathy noted  SKIN: No rashes or lesions  ENDOCRINOLOGY: No Thyromegaly  PSYCH: calm +    Labs:  22, 16                            7.8    7.01  )-----------( 327      ( 16 Jan 2022 05:10 )             25.6                         8.1    7.84  )-----------( 310      ( 15 Yared 2022 07:20 )             26.9                         7.5    8.91  )-----------( 297      ( 14 Jan 2022 07:56 )             25.1                         7.8    10.94 )-----------( 284      ( 13 Jan 2022 11:08 )             25.4                         7.0    10.76 )-----------( 250      ( 13 Jan 2022 07:26 )             22.7                         9.5    12.31 )-----------( 214      ( 12 Jan 2022 17:43 )             31.9     01-16    147<H>  |  113<H>  |  45<H>  ----------------------------<  168<H>  4.7   |  25  |  1.23  01-15    150<H>  |  116<H>  |  56<H>  ----------------------------<  206<H>  4.4   |  24  |  1.37<H>  01-14    147<H>  |  112<H>  |  73<H>  ----------------------------<  323<H>  4.9   |  21<L>  |  1.52<H>  01-13    146<H>  |  111<H>  |  93<H>  ----------------------------<  329<H>  4.8   |  20<L>  |  1.86<H>    Ca    8.3<L>      16 Jan 2022 05:10  Ca    8.9      15 Yared 2022 07:20    TPro  6.0  /  Alb  3.5  /  TBili  0.9  /  DBili  x   /  AST  38  /  ALT  124<H>  /  AlkPhos  105  01-16  TPro  6.5  /  Alb  4.0  /  TBili  1.0  /  DBili  0.4<H>  /  AST  37  /  ALT  156<H>  /  AlkPhos  113  01-15  TPro  6.7  /  Alb  4.1  /  TBili  0.8  /  DBili  x   /  AST  41<H>  /  ALT  190<H>  /  AlkPhos  117  01-14  TPro  6.7  /  Alb  4.3  /  TBili  0.9  /  DBili  0.3  /  AST  40  /  ALT  226<H>  /  AlkPhos  112  01-13    CAPILLARY BLOOD GLUCOSE      POCT Blood Glucose.: 276 mg/dL (16 Jan 2022 12:29)  POCT Blood Glucose.: 285 mg/dL (16 Jan 2022 12:04)  POCT Blood Glucose.: 202 mg/dL (16 Jan 2022 09:00)  POCT Blood Glucose.: 173 mg/dL (15 Yared 2022 21:39)  POCT Blood Glucose.: 128 mg/dL (15 Yared 2022 17:57)      LIVER FUNCTIONS - ( 16 Jan 2022 05:10 )  Alb: 3.5 g/dL / Pro: 6.0 g/dL / ALK PHOS: 105 U/L / ALT: 124 U/L / AST: 38 U/L / GGT: x           PT/INR - ( 16 Jan 2022 05:10 )   PT: 20.4 sec;   INR: 1.84 ratio             D-Dimer Assay, Quantitative: 174 ng/mL DDU (01-13 @ 07:26)  Procalcitonin, Serum: 4.08 ng/mL (01-13 @ 07:26)  Lactate, Blood: 2.1 mmol/L (01-13 @ 07:26)        RECENT CULTURES:  01-09 @ 17:15 .Blood Blood         r       No Growth Final  < from: Xray Chest 1 View- PORTABLE-Urgent (Xray Chest 1 View- PORTABLE-Urgent .) (01.09.22 @ 18:02) >  FINDINGS:  The heart is normal in size.  The lungs are clear.  There is no pneumothorax or pleural effusion.  There are no acute osseous abnormalities.    IMPRESSION:  Clear lungs.    --- Endof Report ---    < end of copied text >    01-09 @ 17:10 .Blood Blood                No Growth Final          RESPIRATORY CULTURES:          Studies  Chest X-RAY  CT SCAN Chest   Venous Dopplers: LE:   CT Abdomen  Others      < from: Xray Chest 1 View- PORTABLE-Urgent (Xray Chest 1 View- PORTABLE-Urgent .) (01.09.22 @ 18:02) >  COMPARISON: Chest x-ray 1/8/2022.    FINDINGS:  The heart is normal in size.  The lungs are clear.  There is no pneumothorax or pleural effusion.  There are no acute osseous abnormalities.    IMPRESSION:  Clear lungs.    --- Endof Report ---    < end of copied text >

## 2022-01-16 NOTE — PROGRESS NOTE ADULT - SUBJECTIVE AND OBJECTIVE BOX
Patient is a 71y old  Male who presents with a chief complaint of Shortness of breath (15 Yared 2022 23:10)    says that he is breathing better. No pain.     Medication:   apixaban 5 milliGRAM(s) Oral every 12 hours  calcium carbonate   1250 mG (OsCal) 1 Tablet(s) Oral daily  cholecalciferol 1000 Unit(s) Oral daily  dextrose 40% Gel 15 Gram(s) Oral once  dextrose 5%. 1000 milliLiter(s) IV Continuous <Continuous>  dextrose 5%. 1000 milliLiter(s) IV Continuous <Continuous>  dextrose 50% Injectable 12.5 Gram(s) IV Push once  dextrose 50% Injectable 25 Gram(s) IV Push once  dextrose 50% Injectable 25 Gram(s) IV Push once  famotidine    Tablet 10 milliGRAM(s) Oral every 48 hours  finasteride 5 milliGRAM(s) Oral daily  glucagon  Injectable 1 milliGRAM(s) IntraMuscular once  insulin glargine Injectable (LANTUS) 5 Unit(s) SubCutaneous at bedtime  insulin lispro (ADMELOG) corrective regimen sliding scale   SubCutaneous three times a day before meals  methylPREDNISolone sodium succinate Injectable 40 milliGRAM(s) IV Push daily  metoprolol tartrate 25 milliGRAM(s) Oral two times a day  pantoprazole    Tablet 40 milliGRAM(s) Oral two times a day  sodium bicarbonate 650 milliGRAM(s) Oral three times a day  sodium chloride 0.45%. 1000 milliLiter(s) IV Continuous <Continuous>  tamsulosin 0.4 milliGRAM(s) Oral at bedtime  triamcinolone 0.1% Ointment 1 Application(s) Topical every 12 hours      Physical exam    T(C): 36.7 (01-16-22 @ 10:00), Max: 37 (01-16-22 @ 06:00)  HR: 92 (01-16-22 @ 10:00) (65 - 92)  BP: 138/87 (01-16-22 @ 10:00) (131/67 - 155/88)  RR: 18 (01-16-22 @ 10:00) (18 - 18)  SpO2: 100% (01-16-22 @ 10:00) (100% - 100%)  Wt(kg): --    alert NAD  EOMI anicteric sclera  Cv s1 S2 RRR  Lungs clear B/L  abd soft NT ND +BS  No LE edema or tenderness    Labs                        7.8    7.01  )-----------( 327      ( 16 Jan 2022 05:10 )             25.6       01-16    147<H>  |  113<H>  |  45<H>  ----------------------------<  168<H>  4.7   |  25  |  1.23    Ca    8.3<L>      16 Jan 2022 05:10    TPro  6.0  /  Alb  3.5  /  TBili  0.9  /  DBili  x   /  AST  38  /  ALT  124<H>  /  AlkPhos  105  01-16      LIVER FUNCTIONS - ( 16 Jan 2022 05:10 )  Alb: 3.5 g/dL / Pro: 6.0 g/dL / ALK PHOS: 105 U/L / ALT: 124 U/L / AST: 38 U/L / GGT: x             [id number]

## 2022-01-16 NOTE — PROGRESS NOTE ADULT - PROBLEM SELECTOR PLAN 5
Possibly due to COVID vs. sepsis vs DRESS   limited RUQ sono  caution with tylenol and fevers.    1/16: cont to be high:

## 2022-01-16 NOTE — SWALLOW BEDSIDE ASSESSMENT ADULT - ASR SWALLOW RECOMMEND DIAG
Cinesophagram not indicated due to clinical presentation described above
Objective testing NOT warranted given clinical presentation/patient's behavior of refusal of clinical assessment.

## 2022-01-16 NOTE — PROGRESS NOTE ADULT - ASSESSMENT
Multiple myeloma has started treatment with RVD as out-pt but now with covid so treatment on hold.  He had JULIA, but now creatinine is better. Anemia is an AOCD process. Hgb low but adequate at this time and can monitor.      Care per primary team. Spoke with daughter on phone and she would like him to get Bacitracin ointment for his lips. Relayed information to nurse and covering provider.

## 2022-01-16 NOTE — SWALLOW BEDSIDE ASSESSMENT ADULT - COMMENTS
Orders received and chart reviewed. Patient is known to this service from previous swallow evaluation 1/10 at which time patient refused and stated "I can't swallow" The patient was received in bed today, alert, following commands and agreeable to evaluation. Of note, the patient is bale to utilize English effectively to participate.  Per internal medicine charting - 71M HTN remote Hx DVT (Eliquis 2.5 bid), CKD2-3, hx CVA with R sided deficit, pre-diabetes, MM undergoing chemotherapy (scheduled for 1/4/22)  which has been delayed by COVID-19 positive test 12/31/21 presents with dyspnea without hypoxia, tachycardia, JULIA, transaminitis, sepsis due to viral etiology (COVID-19 with adenovirus) vs bacterial etiology.    Esophagram 1/12 - unremarkable  CXR 1/9 clear lungs

## 2022-01-16 NOTE — SWALLOW BEDSIDE ASSESSMENT ADULT - ADDITIONAL RECOMMENDATIONS
Reconsult this department as patient medically optimizes for reassessment of the swallow function/patient is more acceptable of PO trials
1. continue to monitor patient and reconsult SLP as indicated

## 2022-01-16 NOTE — PROGRESS NOTE ADULT - ASSESSMENT
71 year old male with newly diagnosed COVD past medical history of MM, presents with rash, fever and chest tightness.     Dysphagia   improving; ?could this be related to DRESS/DIHS  Continue Protonix BID   Agree w/ENT re: CT soft tissue Neck w/cont   defer EGD until Covid-19 & DRESS sx's improve  may see some melena as pt is swallowing blood from mouth   s & s noted -- diet per their recs    Elevated Liver Enzymes    2/2 DRESS/DIHS Syndrome (Allopurinol) and Covid-19 Infxn   Non obstructive pattern; LFTs improving  continue to monitor daily and avoid hepatotoxins   Derm recs appreciated     DRESS  steroid taper per Derm appreciated  lfts improving   ID following; appreciated; on Valtrex for HSV     DVT   a/c per primary team with gi protonix ppx    I reviewed the overnight course of events on the unit, re-confirming the patient history. I discussed the care with the patient and their family. The plan of care was discussed with the physician assistant and modifications were made to the notation where appropriate. Differential diagnosis and plan of care discussed with patient after the evaluation. Advanced care planning was discussed with patient and family.  Advanced care planning forms were reviewed and discussed.  Risks, benefits and alternatives of gastroenterologic procedures were discussed in detail and all questions were answered. 35 minutes spent on total encounter of which more than fifty percent of the encounter was spent counseling and/or coordinating care by the attending physician.

## 2022-01-16 NOTE — SWALLOW BEDSIDE ASSESSMENT ADULT - SWALLOW EVAL: DIAGNOSIS
1. The patient presents with functional oropharyngeal swallow for puree, minced/moist, soft and bite sized solids and thin liquids. 2. Patient demonstrates mild oral stage dysphagia for regular solids marked by prolonged, effortful mastication and lingual residue post swallow. 3. Patient presents with functional pharyngeal stage of swallow for regular solids.
Patient presented/offered PO trials of puree and liquids, however, patient refused stating "I cannot swallow it" despite maximum encouragement and education of purpose of assessment. Oral/pharyngeal phase could not be adequately assessed.

## 2022-01-16 NOTE — PROGRESS NOTE ADULT - SUBJECTIVE AND OBJECTIVE BOX
INTERVAL HPI/OVERNIGHT EVENTS:  s & s noted    MEDICATIONS  (STANDING):  apixaban 5 milliGRAM(s) Oral every 12 hours  BACItracin   Ointment 1 Application(s) Topical two times a day  calcium carbonate   1250 mG (OsCal) 1 Tablet(s) Oral daily  cholecalciferol 1000 Unit(s) Oral daily  dextrose 40% Gel 15 Gram(s) Oral once  dextrose 5%. 1000 milliLiter(s) (50 mL/Hr) IV Continuous <Continuous>  dextrose 5%. 1000 milliLiter(s) (100 mL/Hr) IV Continuous <Continuous>  dextrose 50% Injectable 25 Gram(s) IV Push once  dextrose 50% Injectable 12.5 Gram(s) IV Push once  dextrose 50% Injectable 25 Gram(s) IV Push once  famotidine    Tablet 10 milliGRAM(s) Oral every 48 hours  finasteride 5 milliGRAM(s) Oral daily  glucagon  Injectable 1 milliGRAM(s) IntraMuscular once  insulin glargine Injectable (LANTUS) 5 Unit(s) SubCutaneous at bedtime  insulin lispro (ADMELOG) corrective regimen sliding scale   SubCutaneous three times a day before meals  methylPREDNISolone sodium succinate Injectable 40 milliGRAM(s) IV Push daily  metoprolol tartrate 25 milliGRAM(s) Oral two times a day  pantoprazole    Tablet 40 milliGRAM(s) Oral two times a day  sodium bicarbonate 650 milliGRAM(s) Oral three times a day  sodium chloride 0.45%. 1000 milliLiter(s) (50 mL/Hr) IV Continuous <Continuous>  tamsulosin 0.4 milliGRAM(s) Oral at bedtime  triamcinolone 0.1% Ointment 1 Application(s) Topical every 12 hours    MEDICATIONS  (PRN):      Allergies    allopurinol (Other)    Intolerances        Review of Systems:    General:  No wt loss, fevers, chills, night sweats,fatigue,   Eyes:  Good vision, no reported pain  ENT:  No sore throat, pain, runny nose, dysphagia  CV:  No pain, palpitatioins, hypo/hypertension  Resp:  No dyspnea, cough, tachypnea, wheezing  GI:  No pain, No nausea, No vomiting, No diarrhea, No constipatiion, No weight loss, No fever, No pruritis, No rectal bleeding, No tarry stools, No dysphagia,  :  No pain, bleeding, incontinence, nocturia  Muscle:  No pain, weakness  Neuro:  No weakness, tingling, memory problems  Psych:  No fatigue, insomnia, mood problems, depression  Endocrine:  No polyuria, polydypsia, cold/heat intolerance  Heme:  No petechiae, ecchymosis, easy bruisability  Skin:  No rash, tattoos, scars, edema      Vital Signs Last 24 Hrs  T(C): 36.4 (16 Jan 2022 17:05), Max: 37 (16 Jan 2022 06:00)  T(F): 97.6 (16 Jan 2022 17:05), Max: 98.6 (16 Jan 2022 06:00)  HR: 80 (16 Jan 2022 17:05) (65 - 92)  BP: 126/82 (16 Jan 2022 17:05) (120/78 - 138/87)  BP(mean): --  RR: 18 (16 Jan 2022 17:05) (18 - 18)  SpO2: 100% (16 Jan 2022 17:05) (100% - 100%)    PHYSICAL EXAM:    Constitutional: NAD, well-developed  HEENT: EOMI, throat clear  Neck: No LAD, supple  Respiratory: CTA and P  Cardiovascular: S1 and S2, RRR, no M  Gastrointestinal: BS+, soft, NT/ND, neg HSM,  Extremities: No peripheral edema, neg clubing, cyanosis  Vascular: 2+ peripheral pulses  Neurological: A/O x 3, no focal deficits  Psychiatric: Normal mood, normal affect  Skin: No rashes      LABS:                        7.8    7.01  )-----------( 327      ( 16 Jan 2022 05:10 )             25.6     01-16    147<H>  |  113<H>  |  45<H>  ----------------------------<  168<H>  4.7   |  25  |  1.23    Ca    8.3<L>      16 Jan 2022 05:10    TPro  6.0  /  Alb  3.5  /  TBili  0.9  /  DBili  x   /  AST  38  /  ALT  124<H>  /  AlkPhos  105  01-16    PT/INR - ( 16 Jan 2022 05:10 )   PT: 20.4 sec;   INR: 1.84 ratio               RADIOLOGY & ADDITIONAL TESTS:

## 2022-01-16 NOTE — PROGRESS NOTE ADULT - SUBJECTIVE AND OBJECTIVE BOX
Date of service: 01-16-22 @ 22:06      Patient is a 71y old  Male who presents with a chief complaint of Shortness of breath (16 Jan 2022 21:36)                                                               INTERVAL HPI/OVERNIGHT EVENTS:    REVIEW OF SYSTEMS:     CONSTITUTIONAL: No weakness, fevers or chills  RESPIRATORY: No cough, wheezing,  No shortness of breath  CARDIOVASCULAR: No chest pain or palpitations  GASTROINTESTINAL: No abdominal pain  . No nausea, vomiting, or hematemesis; No diarrhea or constipation. No melena or hematochezia.  GENITOURINARY: No dysuria, frequency or hematuria  NEUROLOGICAL: No numbness or weakness  SKIN: No itching, burning, rashes, or lesions                                                                                                                                                                                                                                                                                 Medications:  MEDICATIONS  (STANDING):  apixaban 5 milliGRAM(s) Oral every 12 hours  BACItracin   Ointment 1 Application(s) Topical two times a day  calcium carbonate   1250 mG (OsCal) 1 Tablet(s) Oral daily  cholecalciferol 1000 Unit(s) Oral daily  dextrose 40% Gel 15 Gram(s) Oral once  dextrose 5%. 1000 milliLiter(s) (50 mL/Hr) IV Continuous <Continuous>  dextrose 5%. 1000 milliLiter(s) (100 mL/Hr) IV Continuous <Continuous>  dextrose 50% Injectable 25 Gram(s) IV Push once  dextrose 50% Injectable 12.5 Gram(s) IV Push once  dextrose 50% Injectable 25 Gram(s) IV Push once  famotidine    Tablet 10 milliGRAM(s) Oral every 48 hours  finasteride 5 milliGRAM(s) Oral daily  glucagon  Injectable 1 milliGRAM(s) IntraMuscular once  insulin glargine Injectable (LANTUS) 5 Unit(s) SubCutaneous at bedtime  insulin lispro (ADMELOG) corrective regimen sliding scale   SubCutaneous three times a day before meals  methylPREDNISolone sodium succinate Injectable 40 milliGRAM(s) IV Push daily  metoprolol tartrate 25 milliGRAM(s) Oral two times a day  pantoprazole    Tablet 40 milliGRAM(s) Oral two times a day  sodium bicarbonate 650 milliGRAM(s) Oral three times a day  sodium chloride 0.45%. 1000 milliLiter(s) (50 mL/Hr) IV Continuous <Continuous>  tamsulosin 0.4 milliGRAM(s) Oral at bedtime  triamcinolone 0.1% Ointment 1 Application(s) Topical every 12 hours    MEDICATIONS  (PRN):       Allergies    allopurinol (Other)    Intolerances      Vital Signs Last 24 Hrs  T(C): 36.4 (16 Jan 2022 17:05), Max: 37 (16 Jan 2022 06:00)  T(F): 97.6 (16 Jan 2022 17:05), Max: 98.6 (16 Jan 2022 06:00)  HR: 80 (16 Jan 2022 17:05) (65 - 92)  BP: 126/82 (16 Jan 2022 17:05) (120/78 - 138/87)  BP(mean): --  RR: 18 (16 Jan 2022 17:05) (18 - 18)  SpO2: 100% (16 Jan 2022 17:05) (100% - 100%)  CAPILLARY BLOOD GLUCOSE      POCT Blood Glucose.: 111 mg/dL (16 Jan 2022 21:45)  POCT Blood Glucose.: 215 mg/dL (16 Jan 2022 18:22)  POCT Blood Glucose.: 276 mg/dL (16 Jan 2022 12:29)  POCT Blood Glucose.: 285 mg/dL (16 Jan 2022 12:04)  POCT Blood Glucose.: 202 mg/dL (16 Jan 2022 09:00)      Physical Exam:    Daily     Daily   General:  Well appearing, NAD, not cachetic  HEENT: crusted lips   CV:  RRR, S1S2   Lungs:  CTA B/L, no wheezes, rales, rhonchi  Abdomen:  Soft, non-tender, no distended, positive BS  Extremities:  2+ pulses, no c/c, no edema  Skin:  Warm and dry, no rashes  :  No matute  Neuro:  AAOx3, non-focal, grossly intact                                                                                                                                                                                                                                                                                                LABS:                               7.8    7.01  )-----------( 327      ( 16 Jan 2022 05:10 )             25.6                      01-16    147<H>  |  113<H>  |  45<H>  ----------------------------<  168<H>  4.7   |  25  |  1.23    Ca    8.3<L>      16 Jan 2022 05:10    TPro  6.0  /  Alb  3.5  /  TBili  0.9  /  DBili  x   /  AST  38  /  ALT  124<H>  /  AlkPhos  105  01-16                       RADIOLOGY & ADDITIONAL TESTS         I personally reviewed: [  ]EKG   [  ]CXR    [  ] CT      A/P:         Discussed with :     [facility] consultants' Notes   Time spent :

## 2022-01-17 LAB
ALBUMIN SERPL ELPH-MCNC: 3.7 G/DL — SIGNIFICANT CHANGE UP (ref 3.3–5)
ALP SERPL-CCNC: 128 U/L — HIGH (ref 40–120)
ALT FLD-CCNC: 104 U/L — HIGH (ref 4–41)
ANION GAP SERPL CALC-SCNC: 12 MMOL/L — SIGNIFICANT CHANGE UP (ref 7–14)
AST SERPL-CCNC: 29 U/L — SIGNIFICANT CHANGE UP (ref 4–40)
BASOPHILS # BLD AUTO: 0.02 K/UL — SIGNIFICANT CHANGE UP (ref 0–0.2)
BASOPHILS NFR BLD AUTO: 0.4 % — SIGNIFICANT CHANGE UP (ref 0–2)
BILIRUB SERPL-MCNC: 0.8 MG/DL — SIGNIFICANT CHANGE UP (ref 0.2–1.2)
BUN SERPL-MCNC: 33 MG/DL — HIGH (ref 7–23)
CALCIUM SERPL-MCNC: 8.7 MG/DL — SIGNIFICANT CHANGE UP (ref 8.4–10.5)
CHLORIDE SERPL-SCNC: 107 MMOL/L — SIGNIFICANT CHANGE UP (ref 98–107)
CO2 SERPL-SCNC: 20 MMOL/L — LOW (ref 22–31)
CREAT SERPL-MCNC: 1 MG/DL — SIGNIFICANT CHANGE UP (ref 0.5–1.3)
CRP SERPL-MCNC: 13.9 MG/L — HIGH
D DIMER BLD IA.RAPID-MCNC: 242 NG/ML DDU — HIGH
EOSINOPHIL # BLD AUTO: 0.01 K/UL — SIGNIFICANT CHANGE UP (ref 0–0.5)
EOSINOPHIL NFR BLD AUTO: 0.2 % — SIGNIFICANT CHANGE UP (ref 0–6)
FERRITIN SERPL-MCNC: 1863 NG/ML — HIGH (ref 30–400)
GLUCOSE SERPL-MCNC: 179 MG/DL — HIGH (ref 70–99)
HCT VFR BLD CALC: 28.8 % — LOW (ref 39–50)
HGB BLD-MCNC: 8.7 G/DL — LOW (ref 13–17)
IANC: 2.55 K/UL — SIGNIFICANT CHANGE UP (ref 1.5–8.5)
IMM GRANULOCYTES NFR BLD AUTO: 0.7 % — SIGNIFICANT CHANGE UP (ref 0–1.5)
INR BLD: 1.58 RATIO — HIGH (ref 0.88–1.16)
LYMPHOCYTES # BLD AUTO: 2.22 K/UL — SIGNIFICANT CHANGE UP (ref 1–3.3)
LYMPHOCYTES # BLD AUTO: 40.8 % — SIGNIFICANT CHANGE UP (ref 13–44)
MCHC RBC-ENTMCNC: 26 PG — LOW (ref 27–34)
MCHC RBC-ENTMCNC: 30.2 GM/DL — LOW (ref 32–36)
MCV RBC AUTO: 86.2 FL — SIGNIFICANT CHANGE UP (ref 80–100)
MONOCYTES # BLD AUTO: 0.6 K/UL — SIGNIFICANT CHANGE UP (ref 0–0.9)
MONOCYTES NFR BLD AUTO: 11 % — SIGNIFICANT CHANGE UP (ref 2–14)
NEUTROPHILS # BLD AUTO: 2.55 K/UL — SIGNIFICANT CHANGE UP (ref 1.8–7.4)
NEUTROPHILS NFR BLD AUTO: 46.9 % — SIGNIFICANT CHANGE UP (ref 43–77)
NRBC # BLD: 2 /100 WBCS — SIGNIFICANT CHANGE UP
NRBC # FLD: 0.09 K/UL — HIGH
PLATELET # BLD AUTO: 378 K/UL — SIGNIFICANT CHANGE UP (ref 150–400)
POTASSIUM SERPL-MCNC: 5.4 MMOL/L — HIGH (ref 3.5–5.3)
POTASSIUM SERPL-SCNC: 5.4 MMOL/L — HIGH (ref 3.5–5.3)
PROT SERPL-MCNC: 6.5 G/DL — SIGNIFICANT CHANGE UP (ref 6–8.3)
PROTHROM AB SERPL-ACNC: 17.8 SEC — HIGH (ref 10.6–13.6)
RBC # BLD: 3.34 M/UL — LOW (ref 4.2–5.8)
RBC # FLD: 24.1 % — HIGH (ref 10.3–14.5)
SODIUM SERPL-SCNC: 139 MMOL/L — SIGNIFICANT CHANGE UP (ref 135–145)
WBC # BLD: 5.44 K/UL — SIGNIFICANT CHANGE UP (ref 3.8–10.5)
WBC # FLD AUTO: 5.44 K/UL — SIGNIFICANT CHANGE UP (ref 3.8–10.5)

## 2022-01-17 RX ADMIN — Medication 6: at 12:21

## 2022-01-17 RX ADMIN — Medication 650 MILLIGRAM(S): at 12:18

## 2022-01-17 RX ADMIN — Medication 2: at 08:54

## 2022-01-17 RX ADMIN — Medication 1 APPLICATION(S): at 06:19

## 2022-01-17 RX ADMIN — APIXABAN 5 MILLIGRAM(S): 2.5 TABLET, FILM COATED ORAL at 06:18

## 2022-01-17 RX ADMIN — Medication 1 APPLICATION(S): at 06:18

## 2022-01-17 RX ADMIN — Medication 650 MILLIGRAM(S): at 21:09

## 2022-01-17 RX ADMIN — Medication 1 APPLICATION(S): at 17:20

## 2022-01-17 RX ADMIN — PANTOPRAZOLE SODIUM 40 MILLIGRAM(S): 20 TABLET, DELAYED RELEASE ORAL at 17:22

## 2022-01-17 RX ADMIN — INSULIN GLARGINE 5 UNIT(S): 100 INJECTION, SOLUTION SUBCUTANEOUS at 21:10

## 2022-01-17 RX ADMIN — TAMSULOSIN HYDROCHLORIDE 0.4 MILLIGRAM(S): 0.4 CAPSULE ORAL at 21:09

## 2022-01-17 RX ADMIN — Medication 40 MILLIGRAM(S): at 06:19

## 2022-01-17 RX ADMIN — PANTOPRAZOLE SODIUM 40 MILLIGRAM(S): 20 TABLET, DELAYED RELEASE ORAL at 06:18

## 2022-01-17 RX ADMIN — Medication 650 MILLIGRAM(S): at 06:18

## 2022-01-17 RX ADMIN — Medication 1 APPLICATION(S): at 18:49

## 2022-01-17 RX ADMIN — Medication 25 MILLIGRAM(S): at 17:21

## 2022-01-17 RX ADMIN — Medication 1000 UNIT(S): at 12:19

## 2022-01-17 RX ADMIN — FINASTERIDE 5 MILLIGRAM(S): 5 TABLET, FILM COATED ORAL at 12:18

## 2022-01-17 RX ADMIN — APIXABAN 5 MILLIGRAM(S): 2.5 TABLET, FILM COATED ORAL at 17:20

## 2022-01-17 RX ADMIN — Medication 1 TABLET(S): at 12:19

## 2022-01-17 RX ADMIN — Medication 2: at 17:40

## 2022-01-17 RX ADMIN — Medication 25 MILLIGRAM(S): at 06:19

## 2022-01-17 NOTE — PROGRESS NOTE ADULT - ASSESSMENT
Multiple myeloma - systemic therapy on hold due to infection. plan to resume as out-pt. Had JULIA and it resolved as of yesterday. No labs today. Anemia AOCd process. Monitor Hgb and transfuse as needed. Other care per other providers.

## 2022-01-17 NOTE — PROGRESS NOTE ADULT - SUBJECTIVE AND OBJECTIVE BOX
Patient is a 71y old  Male who presents with a chief complaint of Shortness of breath (17 Jan 2022 10:31)    Breathing okay. Mouth sores improving.     Medication:   apixaban 5 milliGRAM(s) Oral every 12 hours  BACItracin   Ointment 1 Application(s) Topical two times a day  calcium carbonate   1250 mG (OsCal) 1 Tablet(s) Oral daily  cholecalciferol 1000 Unit(s) Oral daily  dextrose 40% Gel 15 Gram(s) Oral once  dextrose 5%. 1000 milliLiter(s) IV Continuous <Continuous>  dextrose 5%. 1000 milliLiter(s) IV Continuous <Continuous>  dextrose 50% Injectable 25 Gram(s) IV Push once  dextrose 50% Injectable 12.5 Gram(s) IV Push once  dextrose 50% Injectable 25 Gram(s) IV Push once  famotidine    Tablet 10 milliGRAM(s) Oral every 48 hours  finasteride 5 milliGRAM(s) Oral daily  glucagon  Injectable 1 milliGRAM(s) IntraMuscular once  insulin glargine Injectable (LANTUS) 5 Unit(s) SubCutaneous at bedtime  insulin lispro (ADMELOG) corrective regimen sliding scale   SubCutaneous three times a day before meals  methylPREDNISolone sodium succinate Injectable 40 milliGRAM(s) IV Push daily  metoprolol tartrate 25 milliGRAM(s) Oral two times a day  pantoprazole    Tablet 40 milliGRAM(s) Oral two times a day  sodium bicarbonate 650 milliGRAM(s) Oral three times a day  sodium chloride 0.45%. 1000 milliLiter(s) IV Continuous <Continuous>  tamsulosin 0.4 milliGRAM(s) Oral at bedtime  triamcinolone 0.1% Ointment 1 Application(s) Topical every 12 hours      Physical exam    T(C): 36.8 (01-17-22 @ 06:15), Max: 36.8 (01-17-22 @ 06:15)  HR: 79 (01-17-22 @ 06:15) (62 - 81)  BP: 130/83 (01-17-22 @ 06:15) (120/78 - 131/75)  RR: 18 (01-17-22 @ 06:15) (18 - 18)  SpO2: 100% (01-17-22 @ 06:15) (97% - 100%)  Wt(kg): --    alert NAD  EOMI anicteric sclera  Cv s1 S2 RRR  Lungs clear B/L anteriorly    Labs                              7.8    7.01  )-----------( 327      ( 16 Jan 2022 05:10 )             25.6       01-16    147<H>  |  113<H>  |  45<H>  ----------------------------<  168<H>  4.7   |  25  |  1.23    Ca    8.3<L>      16 Jan 2022 05:10    TPro  6.0  /  Alb  3.5  /  TBili  0.9  /  DBili  x   /  AST  38  /  ALT  124<H>  /  AlkPhos  105  01-16      LIVER FUNCTIONS - ( 16 Jan 2022 05:10 )  Alb: 3.5 g/dL / Pro: 6.0 g/dL / ALK PHOS: 105 U/L / ALT: 124 U/L / AST: 38 U/L / GGT: x             [id number]

## 2022-01-17 NOTE — PROGRESS NOTE ADULT - SUBJECTIVE AND OBJECTIVE BOX
Date of Service: 01-17-22 @ 14:02    Patient is a 71y old  Male who presents with a chief complaint of Shortness of breath (17 Jan 2022 12:34)      Any change in ROS: alert and awake: no SOB: on room air:     MEDICATIONS  (STANDING):  apixaban 5 milliGRAM(s) Oral every 12 hours  BACItracin   Ointment 1 Application(s) Topical two times a day  calcium carbonate   1250 mG (OsCal) 1 Tablet(s) Oral daily  cholecalciferol 1000 Unit(s) Oral daily  dextrose 40% Gel 15 Gram(s) Oral once  dextrose 5%. 1000 milliLiter(s) (50 mL/Hr) IV Continuous <Continuous>  dextrose 5%. 1000 milliLiter(s) (100 mL/Hr) IV Continuous <Continuous>  dextrose 50% Injectable 25 Gram(s) IV Push once  dextrose 50% Injectable 12.5 Gram(s) IV Push once  dextrose 50% Injectable 25 Gram(s) IV Push once  famotidine    Tablet 10 milliGRAM(s) Oral every 48 hours  finasteride 5 milliGRAM(s) Oral daily  glucagon  Injectable 1 milliGRAM(s) IntraMuscular once  insulin glargine Injectable (LANTUS) 5 Unit(s) SubCutaneous at bedtime  insulin lispro (ADMELOG) corrective regimen sliding scale   SubCutaneous three times a day before meals  methylPREDNISolone sodium succinate Injectable 40 milliGRAM(s) IV Push daily  metoprolol tartrate 25 milliGRAM(s) Oral two times a day  pantoprazole    Tablet 40 milliGRAM(s) Oral two times a day  sodium bicarbonate 650 milliGRAM(s) Oral three times a day  sodium chloride 0.45%. 1000 milliLiter(s) (50 mL/Hr) IV Continuous <Continuous>  tamsulosin 0.4 milliGRAM(s) Oral at bedtime  triamcinolone 0.1% Ointment 1 Application(s) Topical every 12 hours    MEDICATIONS  (PRN):    Vital Signs Last 24 Hrs  T(C): 36.7 (17 Jan 2022 10:30), Max: 36.8 (17 Jan 2022 06:15)  T(F): 98 (17 Jan 2022 10:30), Max: 98.2 (17 Jan 2022 06:15)  HR: 80 (17 Jan 2022 10:30) (62 - 81)  BP: 131/78 (17 Jan 2022 10:30) (126/81 - 131/78)  BP(mean): --  RR: 17 (17 Jan 2022 10:30) (17 - 18)  SpO2: 99% (17 Jan 2022 10:30) (97% - 100%)    I&O's Summary        Physical Exam:   GENERAL: NAD, well-groomed, well-developed  HEENT: SAM/   Atraumatic, Normocephalic  ENMT: No tonsillar erythema, exudates, or enlargement; Moist mucous membranes, Good dentition, No lesions  NECK: Supple, No JVD, Normal thyroid  CHEST/LUNG: Clear to auscultaion  CVS: Regular rate and rhythm; No murmurs, rubs, or gallops  GI: : Soft, Nontender, Nondistended; Bowel sounds present  NERVOUS SYSTEM:  Alert & Oriented X3  EXTREMITIES:  rash+ : skinbreakdown  LYMPH: No lymphadenopathy noted  SKIN: No rashes or lesions  ENDOCRINOLOGY: No Thyromegaly  PSYCH: Appropriate    Labs:  22, 16                            7.8    7.01  )-----------( 327      ( 16 Jan 2022 05:10 )             25.6                         8.1    7.84  )-----------( 310      ( 15 Yared 2022 07:20 )             26.9                         7.5    8.91  )-----------( 297      ( 14 Jan 2022 07:56 )             25.1     01-16    147<H>  |  113<H>  |  45<H>  ----------------------------<  168<H>  4.7   |  25  |  1.23  01-15    150<H>  |  116<H>  |  56<H>  ----------------------------<  206<H>  4.4   |  24  |  1.37<H>  01-14    147<H>  |  112<H>  |  73<H>  ----------------------------<  323<H>  4.9   |  21<L>  |  1.52<H>    Ca    8.3<L>      16 Jan 2022 05:10    TPro  6.0  /  Alb  3.5  /  TBili  0.9  /  DBili  x   /  AST  38  /  ALT  124<H>  /  AlkPhos  105  01-16  TPro  6.5  /  Alb  4.0  /  TBili  1.0  /  DBili  0.4<H>  /  AST  37  /  ALT  156<H>  /  AlkPhos  113  01-15  TPro  6.7  /  Alb  4.1  /  TBili  0.8  /  DBili  x   /  AST  41<H>  /  ALT  190<H>  /  AlkPhos  117  01-14    CAPILLARY BLOOD GLUCOSE      POCT Blood Glucose.: 266 mg/dL (17 Jan 2022 12:20)  POCT Blood Glucose.: 249 mg/dL (17 Jan 2022 12:19)  POCT Blood Glucose.: 165 mg/dL (17 Jan 2022 08:38)  POCT Blood Glucose.: 111 mg/dL (16 Jan 2022 21:45)  POCT Blood Glucose.: 215 mg/dL (16 Jan 2022 18:22)      LIVER FUNCTIONS - ( 16 Jan 2022 05:10 )  Alb: 3.5 g/dL / Pro: 6.0 g/dL / ALK PHOS: 105 U/L / ALT: 124 U/L / AST: 38 U/L / GGT: x           PT/INR - ( 17 Jan 2022 06:58 )   PT: 17.8 sec;   INR: 1.58 ratio             D-Dimer Assay, Quantitative: 174 ng/mL DDU (01-13 @ 07:26)        RECENT CULTURES:  < from: Xray Chest 1 View- PORTABLE-Urgent (Xray Chest 1 View- PORTABLE-Urgent .) (01.09.22 @ 18:02) >  ACC: [id number] EXAM:  XR CHEST PORTABLE URGENT 1V                          PROCEDURE DATE:  01/09/2022          INTERPRETATION:  EXAMINATION: XR CHEST URGENT    CLINICAL INDICATION: fever r/o infxn    TECHNIQUE: Single frontal, portable view of the chest was obtained.    COMPARISON: Chest x-ray 1/8/2022.    FINDINGS:  The heart is normal in size.  The lungs are clear.  There is no pneumothorax or pleural effusion.  There are no acute osseous abnormalities.    IMPRESSION:  Clear lungs.    --- Endof Report ---      < end of copied text >        RESPIRATORY CULTURES:          Studies  Chest X-RAY  CT SCAN Chest   Venous Dopplers: LE:   CT Abdomen  Others

## 2022-01-17 NOTE — PROGRESS NOTE ADULT - PROBLEM SELECTOR PLAN 9
Check lipids, would expect antiplatelet and statin for secondary prevention,   hold statin for now given elevated LFT.      Dysphagia: Continue diet speech and swallow    Anemia : consider transfusion   fu with onc    steriod induced hyperglycemia : Follow up with endocrinology ... Patient will need Long-term steroids

## 2022-01-17 NOTE — PROGRESS NOTE ADULT - ASSESSMENT
71 year old male with newly diagnosed COVD past medical history of MM, presents with rash, fever and chest tightness.     Dysphagia   improved; diet now upgraded  favor related to DRESS/DIHS  Continue Protonix BID   may see some melena as pt is swallowing blood from mouth     Elevated Liver Enzymes    2/2 DRESS/DIHS Syndrome (Allopurinol) and Covid-19 Infxn   Non obstructive pattern; LFTs improving  continue to monitor daily and avoid hepatotoxins   Derm recs appreciated     DRESS  steroid taper per Derm appreciated  lfts improving   ID following; appreciated    DVT   a/c per primary team with gi protonix ppx    I reviewed the overnight course of events on the unit, re-confirming the patient history. I discussed the care with the patient and their family. The plan of care was discussed with the physician assistant and modifications were made to the notation where appropriate. Differential diagnosis and plan of care discussed with patient after the evaluation. Advanced care planning was discussed with patient and family.  Advanced care planning forms were reviewed and discussed.  Risks, benefits and alternatives of gastroenterologic procedures were discussed in detail and all questions were answered. 35 minutes spent on total encounter of which more than fifty percent of the encounter was spent counseling and/or coordinating care by the attending physician.

## 2022-01-17 NOTE — PROGRESS NOTE ADULT - SUBJECTIVE AND OBJECTIVE BOX
INTERVAL HPI/OVERNIGHT EVENTS:    tolerating PO     MEDICATIONS  (STANDING):  apixaban 5 milliGRAM(s) Oral every 12 hours  BACItracin   Ointment 1 Application(s) Topical two times a day  calcium carbonate   1250 mG (OsCal) 1 Tablet(s) Oral daily  cholecalciferol 1000 Unit(s) Oral daily  dextrose 40% Gel 15 Gram(s) Oral once  dextrose 5%. 1000 milliLiter(s) (50 mL/Hr) IV Continuous <Continuous>  dextrose 5%. 1000 milliLiter(s) (100 mL/Hr) IV Continuous <Continuous>  dextrose 50% Injectable 25 Gram(s) IV Push once  dextrose 50% Injectable 12.5 Gram(s) IV Push once  dextrose 50% Injectable 25 Gram(s) IV Push once  famotidine    Tablet 10 milliGRAM(s) Oral every 48 hours  finasteride 5 milliGRAM(s) Oral daily  glucagon  Injectable 1 milliGRAM(s) IntraMuscular once  insulin glargine Injectable (LANTUS) 5 Unit(s) SubCutaneous at bedtime  insulin lispro (ADMELOG) corrective regimen sliding scale   SubCutaneous three times a day before meals  methylPREDNISolone sodium succinate Injectable 40 milliGRAM(s) IV Push daily  metoprolol tartrate 25 milliGRAM(s) Oral two times a day  pantoprazole    Tablet 40 milliGRAM(s) Oral two times a day  sodium bicarbonate 650 milliGRAM(s) Oral three times a day  sodium chloride 0.45%. 1000 milliLiter(s) (50 mL/Hr) IV Continuous <Continuous>  tamsulosin 0.4 milliGRAM(s) Oral at bedtime  triamcinolone 0.1% Ointment 1 Application(s) Topical every 12 hours    MEDICATIONS  (PRN):      Allergies    allopurinol (Other)    Intolerances        Review of Systems:    General:  No wt loss, fevers, chills, night sweats, fatigue   Eyes:  Good vision, no reported pain  ENT:  No sore throat, pain, runny nose, dysphagia  CV:  No pain, palpitations, hypo/hypertension  Resp:  No dyspnea, cough, tachypnea, wheezing  GI:  No pain, No nausea, No vomiting, No diarrhea, No constipation, No weight loss, No fever, No pruritis, No rectal bleeding, No melena, No dysphagia  :  No pain, bleeding, incontinence, nocturia  Muscle:  No pain, weakness  Neuro:  No weakness, tingling, memory problems  Psych:  No fatigue, insomnia, mood problems, depression  Endocrine:  No polyuria, polydypsia, cold/heat intolerance  Heme:  No petechiae, ecchymosis, easy bruisability  Skin:  No rash, tattoos, scars, edema      Vital Signs Last 24 Hrs  T(C): 36.8 (17 Jan 2022 06:15), Max: 36.8 (17 Jan 2022 06:15)  T(F): 98.2 (17 Jan 2022 06:15), Max: 98.2 (17 Jan 2022 06:15)  HR: 79 (17 Jan 2022 06:15) (62 - 81)  BP: 130/83 (17 Jan 2022 06:15) (120/78 - 131/75)  BP(mean): --  RR: 18 (17 Jan 2022 06:15) (18 - 18)  SpO2: 100% (17 Jan 2022 06:15) (97% - 100%)    PHYSICAL EXAM:    Constitutional: NAD  HEENT: EOMI, throat clear  Neck: No LAD, supple  Respiratory: CTA and P  Cardiovascular: S1 and S2, RRR, no M  Gastrointestinal: BS+, soft, NT/ND, neg HSM,  Extremities: No peripheral edema, neg clubbing, cyanosis  Vascular: 2+ peripheral pulses  Neurological: A/O x 3, no focal deficits  Psychiatric: Normal mood, normal affect  Skin: No rashes      LABS:                        7.8    7.01  )-----------( 327      ( 16 Jan 2022 05:10 )             25.6     01-16    147<H>  |  113<H>  |  45<H>  ----------------------------<  168<H>  4.7   |  25  |  1.23    Ca    8.3<L>      16 Jan 2022 05:10    TPro  6.0  /  Alb  3.5  /  TBili  0.9  /  DBili  x   /  AST  38  /  ALT  124<H>  /  AlkPhos  105  01-16    PT/INR - ( 17 Jan 2022 06:58 )   PT: 17.8 sec;   INR: 1.58 ratio               RADIOLOGY & ADDITIONAL TESTS:

## 2022-01-17 NOTE — PROGRESS NOTE ADULT - SUBJECTIVE AND OBJECTIVE BOX
Date of service: 01-17-22 @ 22:53      Patient is a 71y old  Male who presents with a chief complaint of Shortness of breath (17 Jan 2022 14:01)                                                               INTERVAL HPI/OVERNIGHT EVENTS:    REVIEW OF SYSTEMS:     CONSTITUTIONAL: No weakness, fevers or chills  RESPIRATORY: No cough, wheezing,  No shortness of breath  CARDIOVASCULAR: No chest pain or palpitations  GASTROINTESTINAL: No abdominal pain  . No nausea, vomiting, or hematemesis; No diarrhea or constipation. No melena or hematochezia.  GENITOURINARY: No dysuria, frequency or hematuria  NEUROLOGICAL: No numbness or weakness  SKIN: Improving rash                                                                                                                                                                                                                                                                                 Medications:  MEDICATIONS  (STANDING):  apixaban 5 milliGRAM(s) Oral every 12 hours  BACItracin   Ointment 1 Application(s) Topical two times a day  calcium carbonate   1250 mG (OsCal) 1 Tablet(s) Oral daily  cholecalciferol 1000 Unit(s) Oral daily  dextrose 40% Gel 15 Gram(s) Oral once  dextrose 5%. 1000 milliLiter(s) (50 mL/Hr) IV Continuous <Continuous>  dextrose 5%. 1000 milliLiter(s) (100 mL/Hr) IV Continuous <Continuous>  dextrose 50% Injectable 25 Gram(s) IV Push once  dextrose 50% Injectable 12.5 Gram(s) IV Push once  dextrose 50% Injectable 25 Gram(s) IV Push once  famotidine    Tablet 10 milliGRAM(s) Oral every 48 hours  finasteride 5 milliGRAM(s) Oral daily  glucagon  Injectable 1 milliGRAM(s) IntraMuscular once  insulin glargine Injectable (LANTUS) 5 Unit(s) SubCutaneous at bedtime  insulin lispro (ADMELOG) corrective regimen sliding scale   SubCutaneous three times a day before meals  methylPREDNISolone sodium succinate Injectable 40 milliGRAM(s) IV Push daily  metoprolol tartrate 25 milliGRAM(s) Oral two times a day  pantoprazole    Tablet 40 milliGRAM(s) Oral two times a day  sodium bicarbonate 650 milliGRAM(s) Oral three times a day  sodium chloride 0.45%. 1000 milliLiter(s) (50 mL/Hr) IV Continuous <Continuous>  tamsulosin 0.4 milliGRAM(s) Oral at bedtime  triamcinolone 0.1% Ointment 1 Application(s) Topical every 12 hours    MEDICATIONS  (PRN):       Allergies    allopurinol (Other)    Intolerances      Vital Signs Last 24 Hrs  T(C): 36.2 (17 Jan 2022 20:40), Max: 36.8 (17 Jan 2022 06:15)  T(F): 97.2 (17 Jan 2022 20:40), Max: 98.2 (17 Jan 2022 06:15)  HR: 70 (17 Jan 2022 20:40) (68 - 81)  BP: 142/84 (17 Jan 2022 20:40) (126/81 - 151/82)  BP(mean): --  RR: 18 (17 Jan 2022 20:40) (17 - 18)  SpO2: 98% (17 Jan 2022 20:40) (98% - 100%)  CAPILLARY BLOOD GLUCOSE      POCT Blood Glucose.: 183 mg/dL (17 Jan 2022 20:56)  POCT Blood Glucose.: 167 mg/dL (17 Jan 2022 17:30)  POCT Blood Glucose.: 266 mg/dL (17 Jan 2022 12:20)  POCT Blood Glucose.: 249 mg/dL (17 Jan 2022 12:19)  POCT Blood Glucose.: 165 mg/dL (17 Jan 2022 08:38)      01-17 @ 07:01  -  01-17 @ 22:53  --------------------------------------------------------  IN: 500 mL / OUT: 200 mL / NET: 300 mL      Physical Exam:    Daily     Daily   General:  No acute distress  HEENT:  Crusting of the lips  CV:  RRR, S1S2   Lungs:  CTA B/L, no wheezes, rales, rhonchi  Abdomen:  Soft, non-tender, no distended, positive BS  Extremities: No edema  Neuro:  AAOx3, non-focal, grossly intact      Skin: Small ulceration and the left cheek and left knee  Exfoliation of the skin on the upper extremities                                                                                                                                                                                                                                                                                            LABS:                               8.7    5.44  )-----------( 378      ( 17 Jan 2022 17:34 )             28.8                      01-17    139  |  107  |  33<H>  ----------------------------<  179<H>  5.4<H>   |  20<L>  |  1.00    Ca    8.7      17 Jan 2022 17:34    TPro  6.5  /  Alb  3.7  /  TBili  0.8  /  DBili  x   /  AST  29  /  ALT  104<H>  /  AlkPhos  128<H>  01-17                       RADIOLOGY & ADDITIONAL TESTS         I personally reviewed: [  ]EKG   [  ]CXR    [  ] CT      A/P:         Discussed with :     [facility] consultants' Notes   Time spent :

## 2022-01-18 LAB
ALBUMIN SERPL ELPH-MCNC: 3.4 G/DL — SIGNIFICANT CHANGE UP (ref 3.3–5)
ALP SERPL-CCNC: 110 U/L — SIGNIFICANT CHANGE UP (ref 40–120)
ALT FLD-CCNC: 84 U/L — HIGH (ref 4–41)
ANION GAP SERPL CALC-SCNC: 6 MMOL/L — LOW (ref 7–14)
AST SERPL-CCNC: 21 U/L — SIGNIFICANT CHANGE UP (ref 4–40)
BILIRUB SERPL-MCNC: 1 MG/DL — SIGNIFICANT CHANGE UP (ref 0.2–1.2)
BUN SERPL-MCNC: 27 MG/DL — HIGH (ref 7–23)
CALCIUM SERPL-MCNC: 8.5 MG/DL — SIGNIFICANT CHANGE UP (ref 8.4–10.5)
CHLORIDE SERPL-SCNC: 110 MMOL/L — HIGH (ref 98–107)
CO2 SERPL-SCNC: 26 MMOL/L — SIGNIFICANT CHANGE UP (ref 22–31)
CREAT SERPL-MCNC: 1.06 MG/DL — SIGNIFICANT CHANGE UP (ref 0.5–1.3)
GLUCOSE SERPL-MCNC: 142 MG/DL — HIGH (ref 70–99)
HCT VFR BLD CALC: 25.9 % — LOW (ref 39–50)
HCT VFR BLD CALC: 25.9 % — LOW (ref 39–50)
HGB BLD-MCNC: 7.6 G/DL — LOW (ref 13–17)
HGB BLD-MCNC: 7.7 G/DL — LOW (ref 13–17)
MAGNESIUM SERPL-MCNC: 1.9 MG/DL — SIGNIFICANT CHANGE UP (ref 1.6–2.6)
MCHC RBC-ENTMCNC: 25.5 PG — LOW (ref 27–34)
MCHC RBC-ENTMCNC: 25.7 PG — LOW (ref 27–34)
MCHC RBC-ENTMCNC: 29.3 GM/DL — LOW (ref 32–36)
MCHC RBC-ENTMCNC: 29.7 GM/DL — LOW (ref 32–36)
MCV RBC AUTO: 86.3 FL — SIGNIFICANT CHANGE UP (ref 80–100)
MCV RBC AUTO: 86.9 FL — SIGNIFICANT CHANGE UP (ref 80–100)
NRBC # BLD: 2 /100 WBCS — SIGNIFICANT CHANGE UP
NRBC # BLD: 2 /100 WBCS — SIGNIFICANT CHANGE UP
NRBC # FLD: 0.13 K/UL — HIGH
NRBC # FLD: 0.13 K/UL — HIGH
PHOSPHATE SERPL-MCNC: 2.8 MG/DL — SIGNIFICANT CHANGE UP (ref 2.5–4.5)
PLATELET # BLD AUTO: 342 K/UL — SIGNIFICANT CHANGE UP (ref 150–400)
PLATELET # BLD AUTO: 363 K/UL — SIGNIFICANT CHANGE UP (ref 150–400)
POTASSIUM SERPL-MCNC: 4.4 MMOL/L — SIGNIFICANT CHANGE UP (ref 3.5–5.3)
POTASSIUM SERPL-SCNC: 4.4 MMOL/L — SIGNIFICANT CHANGE UP (ref 3.5–5.3)
PROCALCITONIN SERPL-MCNC: 0.25 NG/ML — HIGH (ref 0.02–0.1)
PROT SERPL-MCNC: 5.8 G/DL — LOW (ref 6–8.3)
RBC # BLD: 2.98 M/UL — LOW (ref 4.2–5.8)
RBC # BLD: 3 M/UL — LOW (ref 4.2–5.8)
RBC # FLD: 23.9 % — HIGH (ref 10.3–14.5)
RBC # FLD: 24.3 % — HIGH (ref 10.3–14.5)
SODIUM SERPL-SCNC: 142 MMOL/L — SIGNIFICANT CHANGE UP (ref 135–145)
STRONGYLOIDES AB SER-ACNC: NEGATIVE — SIGNIFICANT CHANGE UP
WBC # BLD: 5.51 K/UL — SIGNIFICANT CHANGE UP (ref 3.8–10.5)
WBC # BLD: 5.55 K/UL — SIGNIFICANT CHANGE UP (ref 3.8–10.5)
WBC # FLD AUTO: 5.51 K/UL — SIGNIFICANT CHANGE UP (ref 3.8–10.5)
WBC # FLD AUTO: 5.55 K/UL — SIGNIFICANT CHANGE UP (ref 3.8–10.5)

## 2022-01-18 PROCEDURE — 99233 SBSQ HOSP IP/OBS HIGH 50: CPT | Mod: GC

## 2022-01-18 RX ORDER — PANTOPRAZOLE SODIUM 20 MG/1
40 TABLET, DELAYED RELEASE ORAL
Refills: 0 | Status: DISCONTINUED | OUTPATIENT
Start: 2022-01-18 | End: 2022-01-20

## 2022-01-18 RX ADMIN — Medication 1000 UNIT(S): at 12:35

## 2022-01-18 RX ADMIN — PANTOPRAZOLE SODIUM 40 MILLIGRAM(S): 20 TABLET, DELAYED RELEASE ORAL at 05:07

## 2022-01-18 RX ADMIN — Medication 40 MILLIGRAM(S): at 05:05

## 2022-01-18 RX ADMIN — Medication 2: at 08:50

## 2022-01-18 RX ADMIN — Medication 650 MILLIGRAM(S): at 11:17

## 2022-01-18 RX ADMIN — FINASTERIDE 5 MILLIGRAM(S): 5 TABLET, FILM COATED ORAL at 11:17

## 2022-01-18 RX ADMIN — APIXABAN 5 MILLIGRAM(S): 2.5 TABLET, FILM COATED ORAL at 05:07

## 2022-01-18 RX ADMIN — Medication 25 MILLIGRAM(S): at 05:07

## 2022-01-18 RX ADMIN — Medication 1 APPLICATION(S): at 05:08

## 2022-01-18 RX ADMIN — Medication 2: at 17:44

## 2022-01-18 RX ADMIN — FAMOTIDINE 10 MILLIGRAM(S): 10 INJECTION INTRAVENOUS at 22:20

## 2022-01-18 RX ADMIN — APIXABAN 5 MILLIGRAM(S): 2.5 TABLET, FILM COATED ORAL at 17:43

## 2022-01-18 RX ADMIN — Medication 1 TABLET(S): at 11:16

## 2022-01-18 RX ADMIN — TAMSULOSIN HYDROCHLORIDE 0.4 MILLIGRAM(S): 0.4 CAPSULE ORAL at 22:19

## 2022-01-18 RX ADMIN — Medication 650 MILLIGRAM(S): at 22:19

## 2022-01-18 RX ADMIN — Medication 1 APPLICATION(S): at 18:16

## 2022-01-18 RX ADMIN — Medication 25 MILLIGRAM(S): at 17:43

## 2022-01-18 RX ADMIN — Medication 6: at 12:34

## 2022-01-18 RX ADMIN — INSULIN GLARGINE 5 UNIT(S): 100 INJECTION, SOLUTION SUBCUTANEOUS at 22:17

## 2022-01-18 RX ADMIN — Medication 650 MILLIGRAM(S): at 05:08

## 2022-01-18 NOTE — PROGRESS NOTE ADULT - ASSESSMENT
MM - hopefully to resume treatment with [facility] as out-pt.  plan per primary hem/onc dr lopez. Recovering from covid.    had Steve and that has resolved.    Anemia an AOCD process - Hgb low but relatively stable and adequate and would not transfuse at this time.

## 2022-01-18 NOTE — PROGRESS NOTE ADULT - SUBJECTIVE AND OBJECTIVE BOX
HPI:  Pt is 71M HTN remote Hx DVT (eliquis 2.5 bid), CKD2-3, hx CVA with R sided deficit, prediabetes, MM undergoing chemotherapy (scheduled for 1/4/22)  which has been delayed by COVID-19 positive test 12/31/21.  Vaccinated for COVID.  Pt developed diffuse itchy rash on arms, trunk, face, with lip swelling, no sensation of throat closing up or change in ability to swallow.  For past two days fevers and difficulty breathing, sensation of chest tightness.  Reports poor appetite.  Febrile to 104 in ED. Treated with tylenol. (08 Jan 2022 15:30)    Patient reports overall feeling well, able to eat more and swallow pills. Denies itchiness.     PAST MEDICAL & SURGICAL HISTORY:  Benign essential hypertension    Hx of hyperlipidemia    DVT (deep venous thrombosis)    Obstructive uropathy    Obesity    Anemia of unknown etiology in 2012    BPH (benign prostatic hypertrophy)    Diabetes mellitus  pre-diabetes, diet controlled    CVA (cerebral vascular accident)    Multiple myeloma    H/O tooth extraction  2012    S/P prostatectomy      REVIEW OF SYSTEMS:  General: no fevers/chills; no lethargy  Skin/Breast: see HPI  Ophthalmologic: no eye pain or changes in vision  ENT: no dysphagia or changes in hearing  Respiratory: no SOB or cough  Cardiovascular: no palpitations or chest pain  Gastrointestinal: no abdominal pain or blood in stool  Genitourinary: no dysuria or frequency  Musculoskeletal: no joint pains or weakness  Neurological: no weakness, numbness, or tingling    MEDICATIONS  (STANDING):  apixaban 5 milliGRAM(s) Oral every 12 hours  calcium carbonate   1250 mG (OsCal) 1 Tablet(s) Oral daily  cholecalciferol 1000 Unit(s) Oral daily  dextrose 40% Gel 15 Gram(s) Oral once  dextrose 5%. 1000 milliLiter(s) (50 mL/Hr) IV Continuous <Continuous>  dextrose 5%. 1000 milliLiter(s) (100 mL/Hr) IV Continuous <Continuous>  dextrose 50% Injectable 25 Gram(s) IV Push once  dextrose 50% Injectable 12.5 Gram(s) IV Push once  dextrose 50% Injectable 25 Gram(s) IV Push once  famotidine    Tablet 10 milliGRAM(s) Oral every 48 hours  finasteride 5 milliGRAM(s) Oral daily  glucagon  Injectable 1 milliGRAM(s) IntraMuscular once  insulin glargine Injectable (LANTUS) 5 Unit(s) SubCutaneous at bedtime  insulin lispro (ADMELOG) corrective regimen sliding scale   SubCutaneous three times a day before meals  metoprolol tartrate 25 milliGRAM(s) Oral two times a day  pantoprazole    Tablet 40 milliGRAM(s) Oral before breakfast  sodium bicarbonate 650 milliGRAM(s) Oral three times a day  sodium chloride 0.45%. 1000 milliLiter(s) (50 mL/Hr) IV Continuous <Continuous>  tamsulosin 0.4 milliGRAM(s) Oral at bedtime  triamcinolone 0.1% Ointment 1 Application(s) Topical every 12 hours    MEDICATIONS  (PRN):      Allergies    allopurinol (Other)    Intolerances      SOCIAL HISTORY:  No relevant SH    FAMILY HISTORY:  Family history of hypertension (Father)      Vital Signs Last 24 Hrs  T(C): 37.1 (18 Jan 2022 12:45), Max: 37.1 (18 Jan 2022 12:45)  T(F): 98.8 (18 Jan 2022 12:45), Max: 98.8 (18 Jan 2022 12:45)  HR: 71 (18 Jan 2022 12:45) (70 - 77)  BP: 116/58 (18 Jan 2022 12:45) (116/58 - 151/82)  BP(mean): --  RR: 18 (18 Jan 2022 12:45) (18 - 18)  SpO2: 100% (18 Jan 2022 12:45) (98% - 100%)    PHYSICAL EXAM:  The patient was well nourished, and in NAD.  There was no visible LAD.  Conjunctiva were non-injected.  There was no clubbing or edema of extremities.   The scalp, hair, face, eyebrows, lips, OP, neck, chest, back, extremities x4, and nails were examined.  There was no hyperhidrosis or bromhidrosis.     Of note on skin exam:   - superficial desquamation over trunk and extremities     LABS:                        7.6    5.51  )-----------( 363      ( 18 Jan 2022 08:02 )             25.9     01-18    142  |  110<H>  |  27<H>  ----------------------------<  142<H>  4.4   |  26  |  1.06    Ca    8.5      18 Jan 2022 08:02  Phos  2.8     01-18  Mg     1.90     01-18    TPro  5.8<L>  /  Alb  3.4  /  TBili  1.0  /  DBili  x   /  AST  21  /  ALT  84<H>  /  AlkPhos  110  01-18    PT/INR - ( 17 Jan 2022 06:58 )   PT: 17.8 sec;   INR: 1.58 ratio          RADIOLOGY & ADDITIONAL STUDIES:  TTE 1/14/22  CONCLUSIONS:  1. Mitral annular calcification, otherwise normal mitral  valve. Mild mitral regurgitation.  2. Calcified trileaflet aortic valve with normal opening.  Mild aortic regurgitation.  3. Normal left ventricular internal dimensions and wall  thicknesses.  4. Normal left ventricular systolic function. No segmental  wall motion abnormalities.  5. Normal right ventricular size and function.  6. Estimated right ventricular systolic pressure equals 48  mm Hg, assuming right atrial pressure equals 10 mm Hg,  consistent with mild pulmonary hypertension.

## 2022-01-18 NOTE — PROGRESS NOTE ADULT - SUBJECTIVE AND OBJECTIVE BOX
Patient is a 71y old  Male who presents with a chief complaint of Shortness of breath (18 Jan 2022 16:47)      SUBJECTIVE / OVERNIGHT EVENTS:    MEDICATIONS  (STANDING):  apixaban 5 milliGRAM(s) Oral every 12 hours  calcium carbonate   1250 mG (OsCal) 1 Tablet(s) Oral daily  cholecalciferol 1000 Unit(s) Oral daily  dextrose 40% Gel 15 Gram(s) Oral once  dextrose 5%. 1000 milliLiter(s) (50 mL/Hr) IV Continuous <Continuous>  dextrose 5%. 1000 milliLiter(s) (100 mL/Hr) IV Continuous <Continuous>  dextrose 50% Injectable 25 Gram(s) IV Push once  dextrose 50% Injectable 12.5 Gram(s) IV Push once  dextrose 50% Injectable 25 Gram(s) IV Push once  famotidine    Tablet 10 milliGRAM(s) Oral every 48 hours  finasteride 5 milliGRAM(s) Oral daily  glucagon  Injectable 1 milliGRAM(s) IntraMuscular once  insulin glargine Injectable (LANTUS) 5 Unit(s) SubCutaneous at bedtime  insulin lispro (ADMELOG) corrective regimen sliding scale   SubCutaneous three times a day before meals  metoprolol tartrate 25 milliGRAM(s) Oral two times a day  pantoprazole    Tablet 40 milliGRAM(s) Oral before breakfast  sodium bicarbonate 650 milliGRAM(s) Oral three times a day  sodium chloride 0.45%. 1000 milliLiter(s) (50 mL/Hr) IV Continuous <Continuous>  tamsulosin 0.4 milliGRAM(s) Oral at bedtime  triamcinolone 0.1% Ointment 1 Application(s) Topical every 12 hours    MEDICATIONS  (PRN):      Vital Signs Last 24 Hrs  T(F): 98.1 (01-18-22 @ 17:45), Max: 98.8 (01-18-22 @ 12:45)  HR: 67 (01-18-22 @ 17:45) (67 - 76)  BP: 134/70 (01-18-22 @ 17:45) (116/58 - 136/64)  RR: 16 (01-18-22 @ 17:45) (16 - 18)  SpO2: 100% (01-18-22 @ 17:45) (99% - 100%)  Telemetry:   CAPILLARY BLOOD GLUCOSE      POCT Blood Glucose.: 143 mg/dL (18 Jan 2022 20:47)  POCT Blood Glucose.: 182 mg/dL (18 Jan 2022 17:08)  POCT Blood Glucose.: 288 mg/dL (18 Jan 2022 12:17)  POCT Blood Glucose.: 169 mg/dL (18 Jan 2022 08:37)    I&O's Summary    17 Jan 2022 07:01  -  18 Jan 2022 07:00  --------------------------------------------------------  IN: 500 mL / OUT: 200 mL / NET: 300 mL        PHYSICAL EXAM:  GENERAL: NAD, well-developed  HEAD:  Atraumatic, Normocephalic  EYES: EOMI, PERRLA, conjunctiva and sclera clear  NECK: Supple, No JVD  CHEST/LUNG: Clear to auscultation bilaterally; No wheeze  HEART: Regular rate and rhythm; No murmurs, rubs, or gallops  ABDOMEN: Soft, Nontender, Nondistended; Bowel sounds present  EXTREMITIES:  2+ Peripheral Pulses, No clubbing, cyanosis, or edema  PSYCH: AAOx3  NEUROLOGY: non-focal  SKIN: No rashes or lesions    LABS:                        7.6    5.51  )-----------( 363      ( 18 Jan 2022 08:02 )             25.9     01-18    142  |  110<H>  |  27<H>  ----------------------------<  142<H>  4.4   |  26  |  1.06    Ca    8.5      18 Jan 2022 08:02  Phos  2.8     01-18  Mg     1.90     01-18    TPro  5.8<L>  /  Alb  3.4  /  TBili  1.0  /  DBili  x   /  AST  21  /  ALT  84<H>  /  AlkPhos  110  01-18    PT/INR - ( 17 Jan 2022 06:58 )   PT: 17.8 sec;   INR: 1.58 ratio                   RADIOLOGY & ADDITIONAL TESTS:    Imaging Personally Reviewed:    Consultant(s) Notes Reviewed:      Care Discussed with Consultants/Other Providers:

## 2022-01-18 NOTE — PROGRESS NOTE ADULT - SUBJECTIVE AND OBJECTIVE BOX
Date of Service: 01-18-22 @ 14:23    Patient is a 71y old  Male who presents with a chief complaint of Shortness of breath (18 Jan 2022 13:10)      Any change in ROS:  dongok:no SOB: no sob: on behzad nitish:     MEDICATIONS  (STANDING):  apixaban 5 milliGRAM(s) Oral every 12 hours  calcium carbonate   1250 mG (OsCal) 1 Tablet(s) Oral daily  cholecalciferol 1000 Unit(s) Oral daily  dextrose 40% Gel 15 Gram(s) Oral once  dextrose 5%. 1000 milliLiter(s) (50 mL/Hr) IV Continuous <Continuous>  dextrose 5%. 1000 milliLiter(s) (100 mL/Hr) IV Continuous <Continuous>  dextrose 50% Injectable 25 Gram(s) IV Push once  dextrose 50% Injectable 12.5 Gram(s) IV Push once  dextrose 50% Injectable 25 Gram(s) IV Push once  famotidine    Tablet 10 milliGRAM(s) Oral every 48 hours  finasteride 5 milliGRAM(s) Oral daily  glucagon  Injectable 1 milliGRAM(s) IntraMuscular once  insulin glargine Injectable (LANTUS) 5 Unit(s) SubCutaneous at bedtime  insulin lispro (ADMELOG) corrective regimen sliding scale   SubCutaneous three times a day before meals  metoprolol tartrate 25 milliGRAM(s) Oral two times a day  pantoprazole    Tablet 40 milliGRAM(s) Oral before breakfast  sodium bicarbonate 650 milliGRAM(s) Oral three times a day  sodium chloride 0.45%. 1000 milliLiter(s) (50 mL/Hr) IV Continuous <Continuous>  tamsulosin 0.4 milliGRAM(s) Oral at bedtime  triamcinolone 0.1% Ointment 1 Application(s) Topical every 12 hours    MEDICATIONS  (PRN):    Vital Signs Last 24 Hrs  T(C): 37.1 (18 Jan 2022 12:45), Max: 37.1 (18 Jan 2022 12:45)  T(F): 98.8 (18 Jan 2022 12:45), Max: 98.8 (18 Jan 2022 12:45)  HR: 71 (18 Jan 2022 12:45) (70 - 77)  BP: 116/58 (18 Jan 2022 12:45) (116/58 - 151/82)  BP(mean): --  RR: 18 (18 Jan 2022 12:45) (18 - 18)  SpO2: 100% (18 Jan 2022 12:45) (98% - 100%)    I&O's Summary    17 Jan 2022 07:01  -  18 Jan 2022 07:00  --------------------------------------------------------  IN: 500 mL / OUT: 200 mL / NET: 300 mL          Physical Exam:   GENERAL: NAD, well-groomed, well-developed  HEENT: SAM/   Atraumatic, Normocephalic  ENMT: No tonsillar erythema, exudates, or enlargement; Moist mucous membranes, Good dentition, No lesions  NECK: Supple, No JVD, Normal thyroid  CHEST/LUNG: Clear to auscultaion  CVS: Regular rate and rhythm; No murmurs, rubs, or gallops  GI: : Soft, Nontender, Nondistended; Bowel sounds present  NERVOUS SYSTEM:  Alert & Oriented X3  EXTREMITIES: -r edema  LYMPH: No lymphadenopathy noted  SKIN: No rashes or lesions  ENDOCRINOLOGY: No Thyromegaly  PSYCH: Appropriate    Labs:  22                            7.6    5.51  )-----------( 363      ( 18 Jan 2022 08:02 )             25.9                         8.7    5.44  )-----------( 378      ( 17 Jan 2022 17:34 )             28.8                         7.8    7.01  )-----------( 327      ( 16 Jan 2022 05:10 )             25.6                         8.1    7.84  )-----------( 310      ( 15 Yared 2022 07:20 )             26.9     01-18    142  |  110<H>  |  27<H>  ----------------------------<  142<H>  4.4   |  26  |  1.06  01-17    139  |  107  |  33<H>  ----------------------------<  179<H>  5.4<H>   |  20<L>  |  1.00  01-16    147<H>  |  113<H>  |  45<H>  ----------------------------<  168<H>  4.7   |  25  |  1.23  01-15    150<H>  |  116<H>  |  56<H>  ----------------------------<  206<H>  4.4   |  24  |  1.37<H>    Ca    8.5      18 Jan 2022 08:02  Ca    8.7      17 Jan 2022 17:34  Phos  2.8     01-18  Mg     1.90     01-18    TPro  5.8<L>  /  Alb  3.4  /  TBili  1.0  /  DBili  x   /  AST  21  /  ALT  84<H>  /  AlkPhos  110  01-18  TPro  6.5  /  Alb  3.7  /  TBili  0.8  /  DBili  x   /  AST  29  /  ALT  104<H>  /  AlkPhos  128<H>  01-17  TPro  6.0  /  Alb  3.5  /  TBili  0.9  /  DBili  x   /  AST  38  /  ALT  124<H>  /  AlkPhos  105  01-16  TPro  6.5  /  Alb  4.0  /  TBili  1.0  /  DBili  0.4<H>  /  AST  37  /  ALT  156<H>  /  AlkPhos  113  01-15    CAPILLARY BLOOD GLUCOSE      POCT Blood Glucose.: 288 mg/dL (18 Jan 2022 12:17)  POCT Blood Glucose.: 169 mg/dL (18 Jan 2022 08:37)  POCT Blood Glucose.: 183 mg/dL (17 Jan 2022 20:56)  POCT Blood Glucose.: 167 mg/dL (17 Jan 2022 17:30)      LIVER FUNCTIONS - ( 18 Jan 2022 08:02 )  Alb: 3.4 g/dL / Pro: 5.8 g/dL / ALK PHOS: 110 U/L / ALT: 84 U/L / AST: 21 U/L / GGT: x           PT/INR - ( 17 Jan 2022 06:58 )   PT: 17.8 sec;   INR: 1.58 ratio             D-Dimer Assay, Quantitative: 242 ng/mL DDU (01-17 @ 17:34)  D-Dimer Assay, Quantitative: 174 ng/mL DDU (01-13 @ 07:26)  Procalcitonin, Serum: 0.25 ng/mL (01-17 @ 17:34)        RECENT CULTURES:    rad< from: Xray Esophagram Single Contrast (01.12.22 @ 14:48) >  Preliminary  radiograph of the chest is unremarkable.    The patient swallowed thick and thin barium contrast without difficulty.  The esophagus demonstrates normal distensibility, contour and course.   There is no abnormal extrinsic mass effect. Contrast passes freely into   the stomach. No gastroesophageal reflux was demonstrated during this   examination.    While swallowing the barium pill, patient exhibited vigorous coughing.   Subsequent fluoroscopic images show the barium pill in the middle   esophagus. A repeat thin barium swallow relatively pushes the barium pill   into the stomach.    Focused views of the proximal esophagus appear to show trace laryngeal   penetration with retrieval with thick liquids.    IMPRESSION:  Unremarkable esophagram.    Coughing episode exhibited with barium pill swallow and trace laryngeal   penetration with thick barium contrast may indicate upper pharyngeal   pathology. Recommend further speech and swallow evaluation as clinically   warranted and per patient tolerance.    Mild delay in transit of barium pill that was resolved with subsequent   administration of liquid oral contrast.    --- End of Report ---          ALICE RASCON MD; Resident Radiologist  This document has been electronically signed.  CHRISTOPHER BOSCH MD; Attending Radiologist  This document has been electronically signed. Jan 13 2022  6:29AM    < end of copied text >  < from: Xray Chest 1 View- PORTABLE-Urgent (Xray Chest 1 View- PORTABLE-Urgent .) (01.09.22 @ 18:02) >  FINDINGS:  The heart is normal in size.  The lungs are clear.  There is no pneumothorax or pleural effusion.  There are no acute osseous abnormalities.    IMPRESSION:  Clear lungs.    --- Endof Report ---          ALICE RASCON MD; Resident Radiologist  This document has been electronically signed.  CHRISTOPHER BOSCH MD; Attending Radiologist  This document has been electronically signed. Yared 10 2022  6:19PM    < end of copied text >      RESPIRATORY CULTURES:          Studies  Chest X-RAY  CT SCAN Chest   Venous Dopplers: LE:   CT Abdomen  Others

## 2022-01-18 NOTE — PROGRESS NOTE ADULT - SUBJECTIVE AND OBJECTIVE BOX
INTERVAL HPI/OVERNIGHT EVENTS:    eating better   no longer w/dysphagia or odynophagia    MEDICATIONS  (STANDING):  apixaban 5 milliGRAM(s) Oral every 12 hours  calcium carbonate   1250 mG (OsCal) 1 Tablet(s) Oral daily  cholecalciferol 1000 Unit(s) Oral daily  dextrose 40% Gel 15 Gram(s) Oral once  dextrose 5%. 1000 milliLiter(s) (50 mL/Hr) IV Continuous <Continuous>  dextrose 5%. 1000 milliLiter(s) (100 mL/Hr) IV Continuous <Continuous>  dextrose 50% Injectable 25 Gram(s) IV Push once  dextrose 50% Injectable 12.5 Gram(s) IV Push once  dextrose 50% Injectable 25 Gram(s) IV Push once  famotidine    Tablet 10 milliGRAM(s) Oral every 48 hours  finasteride 5 milliGRAM(s) Oral daily  glucagon  Injectable 1 milliGRAM(s) IntraMuscular once  insulin glargine Injectable (LANTUS) 5 Unit(s) SubCutaneous at bedtime  insulin lispro (ADMELOG) corrective regimen sliding scale   SubCutaneous three times a day before meals  methylPREDNISolone sodium succinate Injectable 40 milliGRAM(s) IV Push daily  metoprolol tartrate 25 milliGRAM(s) Oral two times a day  pantoprazole    Tablet 40 milliGRAM(s) Oral two times a day  sodium bicarbonate 650 milliGRAM(s) Oral three times a day  sodium chloride 0.45%. 1000 milliLiter(s) (50 mL/Hr) IV Continuous <Continuous>  tamsulosin 0.4 milliGRAM(s) Oral at bedtime  triamcinolone 0.1% Ointment 1 Application(s) Topical every 12 hours    MEDICATIONS  (PRN):      Allergies    allopurinol (Other)    Intolerances        Review of Systems:    General:  No wt loss, fevers, chills, night sweats, fatigue   Eyes:  Good vision, no reported pain  ENT:  No sore throat, pain, runny nose, dysphagia  CV:  No pain, palpitations, hypo/hypertension  Resp:  No dyspnea, cough, tachypnea, wheezing  GI:  No pain, No nausea, No vomiting, No diarrhea, No constipation, No weight loss, No fever, No pruritis, No rectal bleeding, No melena, No dysphagia  :  No pain, bleeding, incontinence, nocturia  Muscle:  No pain, weakness  Neuro:  No weakness, tingling, memory problems  Psych:  No fatigue, insomnia, mood problems, depression  Endocrine:  No polyuria, polydypsia, cold/heat intolerance  Heme:  No petechiae, ecchymosis, easy bruisability  Skin:  No rash, tattoos, scars, edema      Vital Signs Last 24 Hrs  T(C): 37.1 (18 Jan 2022 12:45), Max: 37.1 (18 Jan 2022 12:45)  T(F): 98.8 (18 Jan 2022 12:45), Max: 98.8 (18 Jan 2022 12:45)  HR: 71 (18 Jan 2022 12:45) (70 - 77)  BP: 116/58 (18 Jan 2022 12:45) (116/58 - 151/82)  BP(mean): --  RR: 18 (18 Jan 2022 12:45) (18 - 18)  SpO2: 100% (18 Jan 2022 12:45) (98% - 100%)    PHYSICAL EXAM:    Constitutional: NAD  HEENT: EOMI, throat clear  Neck: No LAD, supple  Respiratory: CTA and P  Cardiovascular: S1 and S2, RRR, no M  Gastrointestinal: BS+, soft, NT/ND, neg HSM,  Extremities: No peripheral edema, neg clubbing, cyanosis  Vascular: 2+ peripheral pulses  Neurological: A/O x 3, no focal deficits  Psychiatric: Normal mood, normal affect  Skin: No rashes      LABS:                        7.6    5.51  )-----------( 363      ( 18 Jan 2022 08:02 )             25.9     01-18    142  |  110<H>  |  27<H>  ----------------------------<  142<H>  4.4   |  26  |  1.06    Ca    8.5      18 Jan 2022 08:02  Phos  2.8     01-18  Mg     1.90     01-18    TPro  5.8<L>  /  Alb  3.4  /  TBili  1.0  /  DBili  x   /  AST  21  /  ALT  84<H>  /  AlkPhos  110  01-18    PT/INR - ( 17 Jan 2022 06:58 )   PT: 17.8 sec;   INR: 1.58 ratio               RADIOLOGY & ADDITIONAL TESTS:

## 2022-01-18 NOTE — PHYSICAL THERAPY INITIAL EVALUATION ADULT - PERTINENT HX OF CURRENT PROBLEM, REHAB EVAL
Per documentation, pt. admitted for dyspnea without hypoxia, tachycardia, JULIA, transaminitis, sepsis due to viral etiology.

## 2022-01-18 NOTE — PROGRESS NOTE ADULT - SUBJECTIVE AND OBJECTIVE BOX
Patient is a 71y old  Male who presents with a chief complaint of Shortness of breath (18 Jan 2022 14:36)    transferred out of isolation from covid.     Medication:   apixaban 5 milliGRAM(s) Oral every 12 hours  calcium carbonate   1250 mG (OsCal) 1 Tablet(s) Oral daily  cholecalciferol 1000 Unit(s) Oral daily  dextrose 40% Gel 15 Gram(s) Oral once  dextrose 5%. 1000 milliLiter(s) IV Continuous <Continuous>  dextrose 5%. 1000 milliLiter(s) IV Continuous <Continuous>  dextrose 50% Injectable 25 Gram(s) IV Push once  dextrose 50% Injectable 12.5 Gram(s) IV Push once  dextrose 50% Injectable 25 Gram(s) IV Push once  famotidine    Tablet 10 milliGRAM(s) Oral every 48 hours  finasteride 5 milliGRAM(s) Oral daily  glucagon  Injectable 1 milliGRAM(s) IntraMuscular once  insulin glargine Injectable (LANTUS) 5 Unit(s) SubCutaneous at bedtime  insulin lispro (ADMELOG) corrective regimen sliding scale   SubCutaneous three times a day before meals  metoprolol tartrate 25 milliGRAM(s) Oral two times a day  pantoprazole    Tablet 40 milliGRAM(s) Oral before breakfast  sodium bicarbonate 650 milliGRAM(s) Oral three times a day  sodium chloride 0.45%. 1000 milliLiter(s) IV Continuous <Continuous>  tamsulosin 0.4 milliGRAM(s) Oral at bedtime  triamcinolone 0.1% Ointment 1 Application(s) Topical every 12 hours      Physical exam    T(C): 37.1 (01-18-22 @ 12:45), Max: 37.1 (01-18-22 @ 12:45)  HR: 71 (01-18-22 @ 12:45) (70 - 76)  BP: 116/58 (01-18-22 @ 12:45) (116/58 - 142/84)  RR: 18 (01-18-22 @ 12:45) (18 - 18)  SpO2: 100% (01-18-22 @ 12:45) (98% - 100%)  Wt(kg): --    CV RRR    Labs                              7.6    5.51  )-----------( 363      ( 18 Jan 2022 08:02 )             25.9       01-18    142  |  110<H>  |  27<H>  ----------------------------<  142<H>  4.4   |  26  |  1.06    Ca    8.5      18 Jan 2022 08:02  Phos  2.8     01-18  Mg     1.90     01-18    TPro  5.8<L>  /  Alb  3.4  /  TBili  1.0  /  DBili  x   /  AST  21  /  ALT  84<H>  /  AlkPhos  110  01-18      LIVER FUNCTIONS - ( 18 Jan 2022 08:02 )  Alb: 3.4 g/dL / Pro: 5.8 g/dL / ALK PHOS: 110 U/L / ALT: 84 U/L / AST: 21 U/L / GGT: x             [id number]

## 2022-01-18 NOTE — DIETITIAN INITIAL EVALUATION ADULT. - PERTINENT LABORATORY DATA
01-18 Na142 mmol/L Glu 142 mg/dL<H> K+ 4.4 mmol/L Cr  1.06 mg/dL BUN 27 mg/dL<H> 01-18 Phos 2.8 mg/dL 01-18 Alb 3.4 g/dL 01-09 Chol 122 mg/dL LDL --    HDL 23 mg/dL<L> Trig 219 mg/dL<H>    CAPILLARY BLOOD GLUCOSE  POCT Blood Glucose.: 169 mg/dL (18 Jan 2022 08:37)  POCT Blood Glucose.: 183 mg/dL (17 Jan 2022 20:56)  POCT Blood Glucose.: 167 mg/dL (17 Jan 2022 17:30)  POCT Blood Glucose.: 266 mg/dL (17 Jan 2022 12:20)  POCT Blood Glucose.: 249 mg/dL (17 Jan 2022 12:19)    A1C with Estimated Average Glucose in AM (01.13.22 @ 07:26)    A1C with Estimated Average Glucose Result: 7.1: High Risk (prediabetic)    5.7 - 6.4 %  Diabetic, diagnostic           > 6.5 %  ADA diabetic treatment goal    < 7.0 %  HbA1C values may not accurately reflect mean blood glucose in patients  with Hb variants.  Suggest clinical correlation. %    Estimated Average Glucose: 157

## 2022-01-18 NOTE — DIETITIAN INITIAL EVALUATION ADULT. - OTHER INFO
Patient assessed for length of stay nutrition assessment. 71M HTN remote Hx DVT (Eliquis 2.5 bid), CKD2-3, hx CVA with R sided deficit, pre-diabetes, MM undergoing chemotherapy (scheduled for 1/4/22)  which has been delayed by COVID-19 positive test 12/31/21 presents with dyspnea without hypoxia, tachycardia, JULIA, transaminitis, sepsis due to viral etiology (COVID-19 with adenovirus) vs bacterial etiology.     Patient with steroid induced hyperglycemia.  Last BM noted 1/16/22.  No edema noted at present, previously with 2+ edema to arms, hands. Patient assessed for length of stay nutrition assessment. Patient a 71M HTN remote Hx DVT (Eliquis 2.5 bid), CKD2-3, hx CVA with R sided deficit, pre-diabetes, MM undergoing chemotherapy (scheduled for 1/4/22)  which has been delayed by COVID-19 positive test 12/31/21 presents with dyspnea without hypoxia, tachycardia, JULIA, transaminitis, sepsis due to viral etiology (COVID-19 with adenovirus) vs bacterial etiology.     Patient with steroid induced hyperglycemia.  Last BM noted 1/16/22.  No edema noted at present, previously with 2+ edema to arms, hands. Appetite noted to be improving.  No GI distress reported i.e. nausea, vomiting, diarrhea. No chewing or swallowing difficulties noted on current consistency - soft and bite sized.

## 2022-01-18 NOTE — PROGRESS NOTE ADULT - ATTENDING COMMENTS
DIHS stable. Patient denies significant itch (aside from several desquamating areas) and there is no rash consistent with active DIHS. Transaminases trending down as well. Can continue to cautiously taper systemic corticosteroids. Flaring during the taper may occur, particularly when DIHS is associated with allopurinol.     Lei Beard MD, PharmD, MPH  Co-Director, Inpatient Dermatology Consultation Service, [facility]

## 2022-01-18 NOTE — DIETITIAN INITIAL EVALUATION ADULT. - PERTINENT MEDS FT
MEDICATIONS  (STANDING):  apixaban 5 milliGRAM(s) Oral every 12 hours  calcium carbonate   1250 mG (OsCal) 1 Tablet(s) Oral daily  cholecalciferol 1000 Unit(s) Oral daily  dextrose 40% Gel 15 Gram(s) Oral once  dextrose 5%. 1000 milliLiter(s) (50 mL/Hr) IV Continuous <Continuous>  dextrose 5%. 1000 milliLiter(s) (100 mL/Hr) IV Continuous <Continuous>  dextrose 50% Injectable 25 Gram(s) IV Push once  dextrose 50% Injectable 12.5 Gram(s) IV Push once  dextrose 50% Injectable 25 Gram(s) IV Push once  famotidine    Tablet 10 milliGRAM(s) Oral every 48 hours  finasteride 5 milliGRAM(s) Oral daily  glucagon  Injectable 1 milliGRAM(s) IntraMuscular once  insulin glargine Injectable (LANTUS) 5 Unit(s) SubCutaneous at bedtime  insulin lispro (ADMELOG) corrective regimen sliding scale   SubCutaneous three times a day before meals  methylPREDNISolone sodium succinate Injectable 40 milliGRAM(s) IV Push daily  metoprolol tartrate 25 milliGRAM(s) Oral two times a day  pantoprazole    Tablet 40 milliGRAM(s) Oral two times a day  sodium bicarbonate 650 milliGRAM(s) Oral three times a day  sodium chloride 0.45%. 1000 milliLiter(s) (50 mL/Hr) IV Continuous <Continuous>  tamsulosin 0.4 milliGRAM(s) Oral at bedtime  triamcinolone 0.1% Ointment 1 Application(s) Topical every 12 hours    MEDICATIONS  (PRN):

## 2022-01-18 NOTE — PROGRESS NOTE ADULT - ASSESSMENT
71 year old male with newly diagnosed COVD past medical history of MM, presents with rash, fever and chest tightness.     Dysphagia   improved; diet now upgraded  favor related to DRESS/DIHS  Continue Protonix Daily while on steroids     Elevated Liver Enzymes    2/2 DRESS/DIHS Syndrome (Allopurinol) and Covid-19 Infxn   Non obstructive pattern; LFTs improving  continue to monitor daily and avoid hepatotoxins   Derm recs appreciated     DRESS  steroid taper per Derm recs   lfts improving   ID following    DVT   a/c per primary team with gi protonix ppx    I reviewed the overnight course of events on the unit, re-confirming the patient history. I discussed the care with the patient and their family. The plan of care was discussed with the physician assistant and modifications were made to the notation where appropriate. Differential diagnosis and plan of care discussed with patient after the evaluation. Advanced care planning was discussed with patient and family.  Advanced care planning forms were reviewed and discussed.  Risks, benefits and alternatives of gastroenterologic procedures were discussed in detail and all questions were answered. 35 minutes spent on total encounter of which more than fifty percent of the encounter was spent counseling and/or coordinating care by the attending physician.

## 2022-01-18 NOTE — DIETITIAN INITIAL EVALUATION ADULT. - PROBLEM SELECTOR PLAN 3
In setting of sepsis  Continue IV hydration  renal US, bladder scan r/o obstruction  urine Na, creat  renally dose meds

## 2022-01-18 NOTE — PROGRESS NOTE ADULT - TIME BILLING
Patient, NP, ID
Patient, nurse, PA,  Called daughter 3 different occasions with no answer. left messages
Patient, nurse, PA,  Called daughter 3 different occasions with no answer. left messages
Nurse, NP , Oncology, ID

## 2022-01-18 NOTE — PROGRESS NOTE ADULT - SUBJECTIVE AND OBJECTIVE BOX
CC: f/u for  DRESS< covid<mucositis  Patient reports  he feels ok, wants to go home  REVIEW OF SYSTEMS:  All other review of systems negative (Comprehensive ROS)    Antimicrobials Day #  :    Other Medications Reviewed    T(F): 98.6 (01-18-22 @ 08:45), Max: 98.6 (01-18-22 @ 08:45)  HR: 73 (01-18-22 @ 08:45)  BP: 134/74 (01-18-22 @ 08:45)  RR: 148 (01-18-22 @ 08:45)  SpO2: 99% (01-18-22 @ 08:45)  Wt(kg): --    PHYSICAL EXAM:  General: alert, no acute distress  Eyes:  anicteric, no conjunctival injection, no discharge  Oropharynx: lips with small amount of scabbing , almost full resolution of mucositis	  Neck: supple, without adenopathy  Lungs: clear to auscultation  Heart: regular rate and rhythm; no murmur, rubs or gallops  Abdomen: soft, nondistended, nontender, without mass or organomegaly  Skin: no lesions, some sloughing  Extremities: no clubbing, cyanosis, or edema  Neurologic: alert, oriented, moves all extremities    LAB RESULTS:                        7.6    5.51  )-----------( 363      ( 18 Jan 2022 08:02 )             25.9     01-18    142  |  110<H>  |  27<H>  ----------------------------<  142<H>  4.4   |  26  |  1.06    Ca    8.5      18 Jan 2022 08:02  Phos  2.8     01-18  Mg     1.90     01-18    TPro  5.8<L>  /  Alb  3.4  /  TBili  1.0  /  DBili  x   /  AST  21  /  ALT  84<H>  /  AlkPhos  110  01-18    LIVER FUNCTIONS - ( 18 Jan 2022 08:02 )  Alb: 3.4 g/dL / Pro: 5.8 g/dL / ALK PHOS: 110 U/L / ALT: 84 U/L / AST: 21 U/L / GGT: x             MICROBIOLOGY:  RECENT CULTURES:      RADIOLOGY REVIEWED:    < from: Xray Esophagram Single Contrast (01.12.22 @ 14:48) >    ACC: [id number] EXAM:  XR ESOPH SNGL CON STUDY                          PROCEDURE DATE:  01/12/2022          INTERPRETATION:  CLINICAL INFORMATION: Dysphagia/odynophagia to solids    TECHNIQUE: An esophagram was performed under fluoroscopy utilizing barium   as the contrast agent and multiple spot fluoroscopic images were taken in   the AP, oblique and lateral projections.    COMPARISON: No similar examinations were available for comparison.    FLUORO TIME: 3.3 minutes    FINDINGS:  Preliminary  radiograph of the chest is unremarkable.    The patient swallowed thick and thin barium contrast without difficulty.  The esophagus demonstrates normal distensibility, contour and course.   There is no abnormal extrinsic mass effect. Contrast passes freely into   the stomach. No gastroesophageal reflux was demonstrated during this   examination.    While swallowing the barium pill, patient exhibited vigorous coughing.   Subsequent fluoroscopic images show the barium pill in the middle   esophagus. A repeat thin barium swallow relatively pushes the barium pill   into the stomach.    Focused views of the proximal esophagus appear to show trace laryngeal   penetration with retrieval with thick liquids.    IMPRESSION:  Unremarkable esophagram.    Coughing episode exhibited with barium pill swallow and trace laryngeal   penetration with thick barium contrast may indicate upper pharyngeal   pathology. Recommend further speech and swallow evaluation as clinically   warranted and per patient tolerance.    Mild delay in transit of barium pill that was resolved with subsequent   administration of liquid oral contrast.    --- End of Report ---          ALICE RASCON MD; Resident Radiologist  This document has been electronically signed.  CHRISTOPHER BOSCH MD; Attending Radiologist    < end of copied text >            Assessment:  patient with multiple myeloma on velcade and revlimid admitted a couple of weeks ago with known recent covid dx from 3 weeks ago and rash, fever, odynophagia, evolved to bad mucositis of lips, melanie, hepatitis, dx with dress likely from allopurinol now with resolved melanie, mucositis near gone, rash resolved just some desquamation in area, normalizing liver enzymes. He had 3d of remdesivir to prevent progression to active covid, No current infection is apparent at present.   Plan:  monitor off antibiotics  steroid taper per derm  if to be on over 20mg prednisone for over 3 weeks going forward, will consider pcp prophylaxis

## 2022-01-18 NOTE — PHYSICAL THERAPY INITIAL EVALUATION ADULT - ADDITIONAL COMMENTS
Pt. was left in bed post PT Evaluation, no apparent distress, all lines intact, call bell within reach. RN made aware of pt. status and participation in PT.

## 2022-01-18 NOTE — PROGRESS NOTE ADULT - ASSESSMENT
Favor DRESS/DIHS given findings of morbilliform eruption with peripheral eosinophilia, transaminitis, JULIA on CKD, and fevers in the setting of recently starting multiple medications including allopurinol (favored), bortezomib, and lenalidomide, in the 4-5 weeks prior to cutaneous eruption. Given common culprit, allopurinol favored, however cannot rule out bortezomib or lenalidomide as medication triggers, given reports of these medications also causing DRESS/DIHS. Drug-induced hypersensitivity syndrome (DIHS), or drug reaction with eosinophilia and systemic symptoms (DRESS), is a serious multisystem drug reaction. It is an idiosyncratic reaction consisting of fever, rash, eosinophilia, and internal organ involvement. It tends to occur between 2-6 weeks after starting a new medication but may develop months later. A rash is present in over 80% of cases. Additional clinical findings include pharyngitis, lymphadenopathy, and facial and hand edema, while internal organ involvement most commonly affects the liver and hematologic and renal systems. Per telephone conversation with private ID attending kendy Meredith to start systemic steroids.   - DOWNTITRATE to oral prednisone 40mg qAM starting 1/19 (s/p IV solumedrol 24mg BID 1/10-1/13, 40mg qAM 1/14-1/18)  - c/w topical triamcinolone 0.1% ointment BID to affected area on trunk/extremities   - c/w oral Ca 1200mg + vitamin D 1000 IU daily given anticipated long course of steroids   - c/w oral famotidine 20mg daily for GI prophylaxis  - please trend CBC with differential and CMP (including LFTs) daily   - Should avoid allopurinol going forward.   - when patient discharged, recommend follow up at our clinic located at [address] [place] [address] [address], [place] ([phone])     The patient's chart was reviewed in addition to being seen and examined at bedside with the dermatology attending Dr. Lei Beard.  Recommendations were communicated with the primary team.  Please page [phone] for further related questions (please leave 10 digit call back number because we cover several facilities).    Yelitza Cruz MD  Resident Physician, PGY3  [facility]  Favor DRESS/DIHS given findings of morbilliform eruption with peripheral eosinophilia, transaminitis, JULIA on CKD, and fevers in the setting of recently starting multiple medications including allopurinol (favored), bortezomib, and lenalidomide, in the 4-5 weeks prior to cutaneous eruption. Given common culprit, allopurinol favored, however cannot rule out bortezomib or lenalidomide as medication triggers, given reports of these medications also causing DRESS/DIHS. Drug-induced hypersensitivity syndrome (DIHS), or drug reaction with eosinophilia and systemic symptoms (DRESS), is a serious multisystem drug reaction. It is an idiosyncratic reaction consisting of fever, rash, eosinophilia, and internal organ involvement. It tends to occur between 2-6 weeks after starting a new medication but may develop months later. A rash is present in over 80% of cases. Additional clinical findings include pharyngitis, lymphadenopathy, and facial and hand edema, while internal organ involvement most commonly affects the liver and hematologic and renal systems. Per telephone conversation with private ID attending kendy Meredith to start systemic steroids. Patient has been improving while on systemic steroids, with superficial desquamation on skin exam, as would be expected in resolving DRESS/DIHS.  - DOWNTITRATE to oral prednisone 40mg qAM starting 1/19 (s/p IV solumedrol 24mg BID 1/10-1/13, 40mg qAM 1/14-1/18)  - c/w topical triamcinolone 0.1% ointment BID to affected area on trunk/extremities   - c/w oral Ca 1200mg + vitamin D 1000 IU daily given anticipated long course of steroids   - c/w oral famotidine 20mg daily for GI prophylaxis  - please trend CBC with differential and CMP (including LFTs) daily   - Should avoid allopurinol going forward.   - when patient discharged, recommend follow up at our clinic located at [address] [place] [address], [address], [place], [place] ([phone])     The patient's chart was reviewed in addition to being seen and examined at bedside with the dermatology attending Dr. Lei Beard.  Recommendations were communicated with the primary team.  Please page [phone] for further related questions (please leave 10 digit call back number because we cover several facilities).    Yelitza Cruz MD  Resident Physician, PGY3  [facility]at[facility]  Favor DRESS/DIHS given findings of morbilliform eruption with peripheral eosinophilia, transaminitis, JULIA on CKD, and fevers in the setting of recently starting multiple medications including allopurinol (favored), bortezomib, and lenalidomide, in the 4-5 weeks prior to cutaneous eruption. Given common culprit, allopurinol favored, however cannot rule out bortezomib or lenalidomide as medication triggers, given reports of these medications also causing DRESS/DIHS. Drug-induced hypersensitivity syndrome (DIHS), or drug reaction with eosinophilia and systemic symptoms (DRESS), is a serious multisystem drug reaction. It is an idiosyncratic reaction consisting of fever, rash, eosinophilia, and internal organ involvement. It tends to occur between 2-6 weeks after starting a new medication but may develop months later. A rash is present in over 80% of cases. Additional clinical findings include pharyngitis, lymphadenopathy, and facial and hand edema, while internal organ involvement most commonly affects the liver and hematologic and renal systems. Per telephone conversation with private ID attending kendy Meredith to start systemic steroids. Patient has been improving while on systemic steroids, with superficial desquamation on skin exam, as would be expected in resolving DRESS/DIHS.  - DOWNTITRATE to oral prednisone 40mg qAM starting 1/19 (s/p IV solumedrol 24mg BID 1/10-1/13, 40mg qAM 1/14-1/18)  - c/w topical triamcinolone 0.1% ointment BID to affected area on trunk/extremities   - c/w oral Ca 1200mg + vitamin D 1000 IU daily given anticipated long course of steroids   - c/w oral famotidine 20mg daily for GI prophylaxis  - please trend CBC with differential and CMP (including LFTs) daily   - Must avoid allopurinol going forward.   - when patient discharged, recommend follow up at our clinic located at [address] [place] [address], [address], [place], [place] ([phone])     The patient's chart was reviewed in addition to being seen and examined at bedside with the dermatology attending Dr. Lei Beard.  Recommendations were communicated with the primary team.  Please page [phone] for further related questions (please leave 10 digit call back number because we cover several facilities).    Yelitza Cruz MD  Resident Physician, PGY3  [facility] Dermat[facility]

## 2022-01-19 ENCOUNTER — TRANSCRIPTION ENCOUNTER (OUTPATIENT)
Age: 72
End: 2022-01-19

## 2022-01-19 LAB
ALBUMIN SERPL ELPH-MCNC: 3.1 G/DL — LOW (ref 3.3–5)
ALP SERPL-CCNC: 105 U/L — SIGNIFICANT CHANGE UP (ref 40–120)
ALT FLD-CCNC: 69 U/L — HIGH (ref 4–41)
ANION GAP SERPL CALC-SCNC: 7 MMOL/L — SIGNIFICANT CHANGE UP (ref 7–14)
AST SERPL-CCNC: 22 U/L — SIGNIFICANT CHANGE UP (ref 4–40)
BASOPHILS # BLD AUTO: 0.01 K/UL — SIGNIFICANT CHANGE UP (ref 0–0.2)
BASOPHILS NFR BLD AUTO: 0.2 % — SIGNIFICANT CHANGE UP (ref 0–2)
BILIRUB SERPL-MCNC: 0.8 MG/DL — SIGNIFICANT CHANGE UP (ref 0.2–1.2)
BUN SERPL-MCNC: 23 MG/DL — SIGNIFICANT CHANGE UP (ref 7–23)
CALCIUM SERPL-MCNC: 8.2 MG/DL — LOW (ref 8.4–10.5)
CHLORIDE SERPL-SCNC: 105 MMOL/L — SIGNIFICANT CHANGE UP (ref 98–107)
CO2 SERPL-SCNC: 26 MMOL/L — SIGNIFICANT CHANGE UP (ref 22–31)
CREAT SERPL-MCNC: 0.97 MG/DL — SIGNIFICANT CHANGE UP (ref 0.5–1.3)
EOSINOPHIL # BLD AUTO: 0.04 K/UL — SIGNIFICANT CHANGE UP (ref 0–0.5)
EOSINOPHIL NFR BLD AUTO: 0.8 % — SIGNIFICANT CHANGE UP (ref 0–6)
GLUCOSE SERPL-MCNC: 95 MG/DL — SIGNIFICANT CHANGE UP (ref 70–99)
HCT VFR BLD CALC: 24 % — LOW (ref 39–50)
HGB BLD-MCNC: 7.1 G/DL — LOW (ref 13–17)
IANC: 1.94 K/UL — SIGNIFICANT CHANGE UP (ref 1.5–8.5)
IMM GRANULOCYTES NFR BLD AUTO: 0.4 % — SIGNIFICANT CHANGE UP (ref 0–1.5)
LYMPHOCYTES # BLD AUTO: 2.1 K/UL — SIGNIFICANT CHANGE UP (ref 1–3.3)
LYMPHOCYTES # BLD AUTO: 44.3 % — HIGH (ref 13–44)
MAGNESIUM SERPL-MCNC: 1.8 MG/DL — SIGNIFICANT CHANGE UP (ref 1.6–2.6)
MCHC RBC-ENTMCNC: 26 PG — LOW (ref 27–34)
MCHC RBC-ENTMCNC: 29.6 GM/DL — LOW (ref 32–36)
MCV RBC AUTO: 87.9 FL — SIGNIFICANT CHANGE UP (ref 80–100)
MONOCYTES # BLD AUTO: 0.63 K/UL — SIGNIFICANT CHANGE UP (ref 0–0.9)
MONOCYTES NFR BLD AUTO: 13.3 % — SIGNIFICANT CHANGE UP (ref 2–14)
NEUTROPHILS # BLD AUTO: 1.94 K/UL — SIGNIFICANT CHANGE UP (ref 1.8–7.4)
NEUTROPHILS NFR BLD AUTO: 41 % — LOW (ref 43–77)
NRBC # BLD: 2 /100 WBCS — SIGNIFICANT CHANGE UP
NRBC # FLD: 0.12 K/UL — HIGH
PHOSPHATE SERPL-MCNC: 2.4 MG/DL — LOW (ref 2.5–4.5)
PLATELET # BLD AUTO: 356 K/UL — SIGNIFICANT CHANGE UP (ref 150–400)
POTASSIUM SERPL-MCNC: 4.1 MMOL/L — SIGNIFICANT CHANGE UP (ref 3.5–5.3)
POTASSIUM SERPL-SCNC: 4.1 MMOL/L — SIGNIFICANT CHANGE UP (ref 3.5–5.3)
PROT SERPL-MCNC: 5.7 G/DL — LOW (ref 6–8.3)
RBC # BLD: 2.73 M/UL — LOW (ref 4.2–5.8)
RBC # FLD: 24.1 % — HIGH (ref 10.3–14.5)
SODIUM SERPL-SCNC: 138 MMOL/L — SIGNIFICANT CHANGE UP (ref 135–145)
WBC # BLD: 4.74 K/UL — SIGNIFICANT CHANGE UP (ref 3.8–10.5)
WBC # FLD AUTO: 4.74 K/UL — SIGNIFICANT CHANGE UP (ref 3.8–10.5)

## 2022-01-19 RX ORDER — APIXABAN 2.5 MG/1
1 TABLET, FILM COATED ORAL
Qty: 0 | Refills: 0 | DISCHARGE
Start: 2022-01-19

## 2022-01-19 RX ORDER — SODIUM,POTASSIUM PHOSPHATES 278-250MG
1 POWDER IN PACKET (EA) ORAL ONCE
Refills: 0 | Status: COMPLETED | OUTPATIENT
Start: 2022-01-19 | End: 2022-01-19

## 2022-01-19 RX ADMIN — Medication 25 MILLIGRAM(S): at 06:56

## 2022-01-19 RX ADMIN — INSULIN GLARGINE 5 UNIT(S): 100 INJECTION, SOLUTION SUBCUTANEOUS at 22:56

## 2022-01-19 RX ADMIN — Medication 650 MILLIGRAM(S): at 21:26

## 2022-01-19 RX ADMIN — APIXABAN 5 MILLIGRAM(S): 2.5 TABLET, FILM COATED ORAL at 18:58

## 2022-01-19 RX ADMIN — Medication 1 PACKET(S): at 12:35

## 2022-01-19 RX ADMIN — FINASTERIDE 5 MILLIGRAM(S): 5 TABLET, FILM COATED ORAL at 12:35

## 2022-01-19 RX ADMIN — Medication 25 MILLIGRAM(S): at 18:58

## 2022-01-19 RX ADMIN — TAMSULOSIN HYDROCHLORIDE 0.4 MILLIGRAM(S): 0.4 CAPSULE ORAL at 21:26

## 2022-01-19 RX ADMIN — Medication 1000 UNIT(S): at 12:36

## 2022-01-19 RX ADMIN — Medication 1 APPLICATION(S): at 18:57

## 2022-01-19 RX ADMIN — Medication 650 MILLIGRAM(S): at 06:54

## 2022-01-19 RX ADMIN — Medication 1 TABLET(S): at 12:36

## 2022-01-19 RX ADMIN — Medication 1 APPLICATION(S): at 06:58

## 2022-01-19 RX ADMIN — Medication 4: at 13:17

## 2022-01-19 RX ADMIN — PANTOPRAZOLE SODIUM 40 MILLIGRAM(S): 20 TABLET, DELAYED RELEASE ORAL at 06:55

## 2022-01-19 RX ADMIN — APIXABAN 5 MILLIGRAM(S): 2.5 TABLET, FILM COATED ORAL at 06:56

## 2022-01-19 RX ADMIN — Medication 650 MILLIGRAM(S): at 13:18

## 2022-01-19 RX ADMIN — Medication 40 MILLIGRAM(S): at 06:56

## 2022-01-19 NOTE — DISCHARGE NOTE PROVIDER - NSDCCPCAREPLAN_GEN_ALL_CORE_FT
PRINCIPAL DISCHARGE DIAGNOSIS  Diagnosis: Fever  Assessment and Plan of Treatment: You were found to have DRESS syndrome. You are to continue with steroids as outpatient and follow up with dermatology as outpatient.      SECONDARY DISCHARGE DIAGNOSES  Diagnosis: Transaminitis  Assessment and Plan of Treatment: Your liver function tests were elevated. You are to follow up with PCP as outpatient.    Diagnosis: Essential hypertension  Assessment and Plan of Treatment: Low sodium and fat diet, continue anti-hypertensive medications, and follow up with primary care physician.      Diagnosis: History of deep vein thrombosis (DVT) of lower extremity  Assessment and Plan of Treatment: Continue with eliquis as prescribed.    Diagnosis: JULIA (acute kidney injury)  Assessment and Plan of Treatment: Your kidney funcitons were found to be elevated. You are to follow up with PCP as outpatient for further montiored of kidney functions as outpatient.     PRINCIPAL DISCHARGE DIAGNOSIS  Diagnosis: Fever  Assessment and Plan of Treatment: You were found to have DRESS syndrome. You are to continue with steroids as outpatient and follow up with dermatology as outpatient.  Avoid Allopurinol      SECONDARY DISCHARGE DIAGNOSES  Diagnosis: Multiple myeloma  Assessment and Plan of Treatment: Follow up with your hematologist/Oncologist as an outpatient    Diagnosis: JULIA (acute kidney injury)  Assessment and Plan of Treatment: Your kidney funcitons were found to be elevated. You are to follow up with PCP as outpatient for further montiored of kidney functions as outpatient.    Diagnosis: Essential hypertension  Assessment and Plan of Treatment: Low sodium and fat diet, continue anti-hypertensive medications, and follow up with primary care physician.      Diagnosis: History of deep vein thrombosis (DVT) of lower extremity  Assessment and Plan of Treatment: Continue with eliquis as prescribed.    Diagnosis: Transaminitis  Assessment and Plan of Treatment: Your liver function tests were elevated. You are to follow up with PCP as outpatient.     PRINCIPAL DISCHARGE DIAGNOSIS  Diagnosis: Fever  Assessment and Plan of Treatment: You were found to have DRESS syndrome. You are to continue with steroids as outpatient and follow up with dermatology as outpatient within 1 week of discharge  Avoid Allopurinol      SECONDARY DISCHARGE DIAGNOSES  Diagnosis: Newly diagnosed diabetes  Assessment and Plan of Treatment: Your blood sugars were elevated while in the hospital.  You were seen by the diabetes team and it is recommended that you take Jardiance 25mg once daily WHILE ON STEROIDS.  When your steroids are complete decrease the dose of Jardiance to 10mg daily,  Check your blood sugar in the morning, before meals and at bedtime.  Keep a log of your blood sugars and take it to all doctor appointments.  Follow up with endocrinology [address] [place] [address] or PCP for further management of Diabetes       Diagnosis: Multiple myeloma  Assessment and Plan of Treatment: Follow up with your hematologist/Oncologist as an outpatient    Diagnosis: JULIA (acute kidney injury)  Assessment and Plan of Treatment: Your kidney funcitons were found to be elevated. You are to follow up with PCP as outpatient for further montiored of kidney functions as outpatient.    Diagnosis: Essential hypertension  Assessment and Plan of Treatment: Low sodium and fat diet, continue anti-hypertensive medications, and follow up with primary care physician.      Diagnosis: History of deep vein thrombosis (DVT) of lower extremity  Assessment and Plan of Treatment: Continue with eliquis as prescribed.    Diagnosis: Transaminitis  Assessment and Plan of Treatment: Your liver function tests were elevated. You are to follow up with PCP as outpatient.

## 2022-01-19 NOTE — PROGRESS NOTE ADULT - ASSESSMENT
71 year old male with newly diagnosed COVD past medical history of MM, presents with rash, fever and chest tightness.     Dysphagia   resolved; diet now upgraded  favor related to DRESS/DIHS  Continue Protonix Daily while on steroids     Elevated Liver Enzymes    2/2 DRESS/DIHS Syndrome (Allopurinol) and Covid-19 Infxn   Non obstructive pattern; LFTs improving  continue to monitor daily and avoid hepatotoxins   Derm recs appreciated     DRESS  steroid taper per Derm recs   lfts improving   ID following    DVT   a/c per primary team with gi protonix ppx    I reviewed the overnight course of events on the unit, re-confirming the patient history. I discussed the care with the patient and their family. The plan of care was discussed with the physician assistant and modifications were made to the notation where appropriate. Differential diagnosis and plan of care discussed with patient after the evaluation. Advanced care planning was discussed with patient and family.  Advanced care planning forms were reviewed and discussed.  Risks, benefits and alternatives of gastroenterologic procedures were discussed in detail and all questions were answered. 35 minutes spent on total encounter of which more than fifty percent of the encounter was spent counseling and/or coordinating care by the attending physician.

## 2022-01-19 NOTE — DISCHARGE NOTE NURSING/CASE MANAGEMENT/SOCIAL WORK - NSSCNAMETXT_GEN_ALL_CORE
[facility] [phone] initial visit will be day after discharge home. A nurse will call prior to the home visit.

## 2022-01-19 NOTE — DISCHARGE NOTE NURSING/CASE MANAGEMENT/SOCIAL WORK - PATIENT PORTAL LINK FT
You can access the FollowMyHealth Patient Portal offered by [facility] by registering at the following website: [email or web address]. By joining [employer]’s FollowMyHealth portal, you will also be able to view your health information using other applications (apps) compatible with our system.

## 2022-01-19 NOTE — PROGRESS NOTE ADULT - SUBJECTIVE AND OBJECTIVE BOX
Follow-up Pulm Progress Note    No new respiratory events overnight.  Denies SOB/CP.     Medications:  MEDICATIONS  (STANDING):  apixaban 5 milliGRAM(s) Oral every 12 hours  calcium carbonate   1250 mG (OsCal) 1 Tablet(s) Oral daily  cholecalciferol 1000 Unit(s) Oral daily  dextrose 40% Gel 15 Gram(s) Oral once  dextrose 5%. 1000 milliLiter(s) (50 mL/Hr) IV Continuous <Continuous>  dextrose 5%. 1000 milliLiter(s) (100 mL/Hr) IV Continuous <Continuous>  dextrose 50% Injectable 25 Gram(s) IV Push once  dextrose 50% Injectable 12.5 Gram(s) IV Push once  dextrose 50% Injectable 25 Gram(s) IV Push once  famotidine    Tablet 10 milliGRAM(s) Oral every 48 hours  finasteride 5 milliGRAM(s) Oral daily  glucagon  Injectable 1 milliGRAM(s) IntraMuscular once  insulin glargine Injectable (LANTUS) 5 Unit(s) SubCutaneous at bedtime  insulin lispro (ADMELOG) corrective regimen sliding scale   SubCutaneous three times a day before meals  metoprolol tartrate 25 milliGRAM(s) Oral two times a day  pantoprazole    Tablet 40 milliGRAM(s) Oral before breakfast  predniSONE   Tablet 40 milliGRAM(s) Oral daily  sodium bicarbonate 650 milliGRAM(s) Oral three times a day  sodium chloride 0.45%. 1000 milliLiter(s) (50 mL/Hr) IV Continuous <Continuous>  tamsulosin 0.4 milliGRAM(s) Oral at bedtime  triamcinolone 0.1% Ointment 1 Application(s) Topical every 12 hours    Vital Signs Last 24 Hrs  T(C): 37.1 (19 Jan 2022 11:30), Max: 37.1 (19 Jan 2022 11:30)  T(F): 98.8 (19 Jan 2022 11:30), Max: 98.8 (19 Jan 2022 11:30)  HR: 79 (19 Jan 2022 11:30) (67 - 79)  BP: 113/59 (19 Jan 2022 11:30) (113/59 - 136/74)  BP(mean): --  RR: 17 (19 Jan 2022 11:30) (16 - 17)  SpO2: 100% (19 Jan 2022 11:30) (100% - 100%)    01-18 @ 07:01  -  01-19 @ 07:00  --------------------------------------------------------  IN: 0 mL / OUT: 500 mL / NET: -500 mL    LABS:                        7.1    4.74  )-----------( 356      ( 19 Jan 2022 07:30 )             24.0     01-19    138  |  105  |  23  ----------------------------<  95  4.1   |  26  |  0.97    Ca    8.2<L>      19 Jan 2022 07:30  Phos  2.4     01-19  Mg     1.80     01-19    TPro  5.7<L>  /  Alb  3.1<L>  /  TBili  0.8  /  DBili  x   /  AST  22  /  ALT  69<H>  /  AlkPhos  105  01-19    CAPILLARY BLOOD GLUCOSE  POCT Blood Glucose.: 233 mg/dL (19 Jan 2022 13:05)    Procalcitonin, Serum: 0.25 ng/mL (01-17-22 @ 17:34)    CULTURES    Culture - Blood (collected 01-09-22 @ 17:15)  Source: .Blood Blood  Final Report (01-14-22 @ 21:00):    No Growth Final    Culture - Blood (collected 01-09-22 @ 17:10)  Source: .Blood Blood  Final Report (01-14-22 @ 21:00):    No Growth Final    Physical Examination:  PULM: Clear to auscultation bilaterally, no significant sputum production  CVS: RRR    RADIOLOGY REVIEWED  CXR:  < from: Xray Chest 1 View- PORTABLE-Urgent (Xray Chest 1 View- PORTABLE-Urgent .) (01.09.22 @ 18:02) >    INTERPRETATION:  EXAMINATION: XR CHEST URGENT    CLINICAL INDICATION: fever r/o infxn    TECHNIQUE: Single frontal, portable view of the chest was obtained.    COMPARISON: Chest x-ray 1/8/2022.    FINDINGS:  The heart is normal in size.  The lungs are clear.  There is no pneumothorax or pleural effusion.  There are no acute osseous abnormalities.    IMPRESSION:  Clear lungs.      < end of copied text >

## 2022-01-19 NOTE — PROGRESS NOTE ADULT - SUBJECTIVE AND OBJECTIVE BOX
Patient is a 71y old  Male who presents with a chief complaint of Shortness of breath (19 Jan 2022 21:27)      SUBJECTIVE / OVERNIGHT EVENTS: no new events, feeling better overall    MEDICATIONS  (STANDING):  apixaban 5 milliGRAM(s) Oral every 12 hours  calcium carbonate   1250 mG (OsCal) 1 Tablet(s) Oral daily  cholecalciferol 1000 Unit(s) Oral daily  dextrose 40% Gel 15 Gram(s) Oral once  dextrose 5%. 1000 milliLiter(s) (50 mL/Hr) IV Continuous <Continuous>  dextrose 5%. 1000 milliLiter(s) (100 mL/Hr) IV Continuous <Continuous>  dextrose 50% Injectable 25 Gram(s) IV Push once  dextrose 50% Injectable 12.5 Gram(s) IV Push once  dextrose 50% Injectable 25 Gram(s) IV Push once  famotidine    Tablet 10 milliGRAM(s) Oral every 48 hours  finasteride 5 milliGRAM(s) Oral daily  glucagon  Injectable 1 milliGRAM(s) IntraMuscular once  insulin glargine Injectable (LANTUS) 5 Unit(s) SubCutaneous at bedtime  insulin lispro (ADMELOG) corrective regimen sliding scale   SubCutaneous three times a day before meals  metoprolol tartrate 25 milliGRAM(s) Oral two times a day  pantoprazole    Tablet 40 milliGRAM(s) Oral before breakfast  predniSONE   Tablet 40 milliGRAM(s) Oral daily  sodium bicarbonate 650 milliGRAM(s) Oral three times a day  sodium chloride 0.45%. 1000 milliLiter(s) (50 mL/Hr) IV Continuous <Continuous>  tamsulosin 0.4 milliGRAM(s) Oral at bedtime  triamcinolone 0.1% Ointment 1 Application(s) Topical every 12 hours    MEDICATIONS  (PRN):      Vital Signs Last 24 Hrs  T(F): 97.2 (01-19-22 @ 21:36), Max: 98.8 (01-19-22 @ 11:30)  HR: 71 (01-19-22 @ 21:36) (70 - 79)  BP: 121/58 (01-19-22 @ 21:36) (113/59 - 136/74)  RR: 17 (01-19-22 @ 21:36) (17 - 17)  SpO2: 100% (01-19-22 @ 21:36) (99% - 100%)  Telemetry:   CAPILLARY BLOOD GLUCOSE      POCT Blood Glucose.: 163 mg/dL (19 Jan 2022 22:41)  POCT Blood Glucose.: 200 mg/dL (19 Jan 2022 17:26)  POCT Blood Glucose.: 233 mg/dL (19 Jan 2022 13:05)  POCT Blood Glucose.: 113 mg/dL (19 Jan 2022 08:57)    I&O's Summary    18 Jan 2022 07:01  -  19 Jan 2022 07:00  --------------------------------------------------------  IN: 0 mL / OUT: 500 mL / NET: -500 mL    19 Jan 2022 07:01  -  19 Jan 2022 22:58  --------------------------------------------------------  IN: 0 mL / OUT: 275 mL / NET: -275 mL        PHYSICAL EXAM:  GENERAL: NAD, well-developed  HEAD:  Atraumatic, Normocephalic  EYES: EOMI, PERRLA, conjunctiva and sclera clear  NECK: Supple, No JVD  CHEST/LUNG: Clear to auscultation bilaterally; No wheeze  HEART: Regular rate and rhythm; No murmurs, rubs, or gallops  ABDOMEN: Soft, Nontender, Nondistended; Bowel sounds present  EXTREMITIES:  2+ Peripheral Pulses, No clubbing, cyanosis, or edema  PSYCH: AAOx3  NEUROLOGY: non-focal  SKIN: No rashes or lesions    LABS:                        7.1    4.74  )-----------( 356      ( 19 Jan 2022 07:30 )             24.0     01-19    138  |  105  |  23  ----------------------------<  95  4.1   |  26  |  0.97    Ca    8.2<L>      19 Jan 2022 07:30  Phos  2.4     01-19  Mg     1.80     01-19    TPro  5.7<L>  /  Alb  3.1<L>  /  TBili  0.8  /  DBili  x   /  AST  22  /  ALT  69<H>  /  AlkPhos  105  01-19              RADIOLOGY & ADDITIONAL TESTS:    Imaging Personally Reviewed:    Consultant(s) Notes Reviewed:      Care Discussed with Consultants/Other Providers:

## 2022-01-19 NOTE — DISCHARGE NOTE PROVIDER - CARE PROVIDER_API CALL
Lei Beard)  Medicine  Dermatology  [address]  [place] [address]  Phone: [phone]  Fax: [phone]  Follow Up Time:

## 2022-01-19 NOTE — DISCHARGE NOTE PROVIDER - NSDCFUADDAPPT_GEN_ALL_CORE_FT
Follow up with your primary care physician for further monitoring in 1-2 weeks. Please call to arrange appointment.  Follow up with dermatology, Dr. Beard [facility] [address], [address], [place], [place] ([phone])

## 2022-01-19 NOTE — DISCHARGE NOTE NURSING/CASE MANAGEMENT/SOCIAL WORK - NSDCPEFALRISK_GEN_ALL_CORE
For information on Fall & Injury Prevention, visit: [email or web address] OR  [email or web address] OR  https://www.cdc.gov/steadi/patient.html

## 2022-01-19 NOTE — PROGRESS NOTE ADULT - SUBJECTIVE AND OBJECTIVE BOX
INTERVAL HPI/OVERNIGHT EVENTS:    "i feel fine"  no c/o rectal bleeding or constipation   no c/o abd pain or n/v  no further dysphagia or odynophagia    MEDICATIONS  (STANDING):  apixaban 5 milliGRAM(s) Oral every 12 hours  calcium carbonate   1250 mG (OsCal) 1 Tablet(s) Oral daily  cholecalciferol 1000 Unit(s) Oral daily  dextrose 40% Gel 15 Gram(s) Oral once  dextrose 5%. 1000 milliLiter(s) (50 mL/Hr) IV Continuous <Continuous>  dextrose 5%. 1000 milliLiter(s) (100 mL/Hr) IV Continuous <Continuous>  dextrose 50% Injectable 25 Gram(s) IV Push once  dextrose 50% Injectable 12.5 Gram(s) IV Push once  dextrose 50% Injectable 25 Gram(s) IV Push once  famotidine    Tablet 10 milliGRAM(s) Oral every 48 hours  finasteride 5 milliGRAM(s) Oral daily  glucagon  Injectable 1 milliGRAM(s) IntraMuscular once  insulin glargine Injectable (LANTUS) 5 Unit(s) SubCutaneous at bedtime  insulin lispro (ADMELOG) corrective regimen sliding scale   SubCutaneous three times a day before meals  metoprolol tartrate 25 milliGRAM(s) Oral two times a day  pantoprazole    Tablet 40 milliGRAM(s) Oral before breakfast  predniSONE   Tablet 40 milliGRAM(s) Oral daily  sodium bicarbonate 650 milliGRAM(s) Oral three times a day  sodium chloride 0.45%. 1000 milliLiter(s) (50 mL/Hr) IV Continuous <Continuous>  tamsulosin 0.4 milliGRAM(s) Oral at bedtime  triamcinolone 0.1% Ointment 1 Application(s) Topical every 12 hours    MEDICATIONS  (PRN):      Allergies    allopurinol (Other)    Intolerances        Review of Systems:    General:  No wt loss, fevers, chills, night sweats, fatigue   Eyes:  Good vision, no reported pain  ENT:  No sore throat, pain, runny nose, dysphagia  CV:  No pain, palpitations, hypo/hypertension  Resp:  No dyspnea, cough, tachypnea, wheezing  GI:  No pain, No nausea, No vomiting, No diarrhea, No constipation, No weight loss, No fever, No pruritis, No rectal bleeding, No melena, No dysphagia  :  No pain, bleeding, incontinence, nocturia  Muscle:  No pain, weakness  Neuro:  No weakness, tingling, memory problems  Psych:  No fatigue, insomnia, mood problems, depression  Endocrine:  No polyuria, polydypsia, cold/heat intolerance  Heme:  No petechiae, ecchymosis, easy bruisability  Skin:  No rash, tattoos, scars, edema      Vital Signs Last 24 Hrs  T(C): 37.1 (19 Jan 2022 11:30), Max: 37.1 (19 Jan 2022 11:30)  T(F): 98.8 (19 Jan 2022 11:30), Max: 98.8 (19 Jan 2022 11:30)  HR: 79 (19 Jan 2022 11:30) (67 - 79)  BP: 113/59 (19 Jan 2022 11:30) (113/59 - 136/74)  BP(mean): --  RR: 17 (19 Jan 2022 11:30) (16 - 17)  SpO2: 100% (19 Jan 2022 11:30) (100% - 100%)    PHYSICAL EXAM:    Constitutional: NAD  HEENT: EOMI, throat clear  Neck: No LAD, supple  Respiratory: CTA and P  Cardiovascular: S1 and S2, RRR, no M  Gastrointestinal: BS+, soft, NT/ND, neg HSM,  Extremities: No peripheral edema, neg clubbing, cyanosis  Vascular: 2+ peripheral pulses  Neurological: A/O x 3, no focal deficits  Psychiatric: Normal mood, normal affect  Skin: No rashes      LABS:                        7.1    4.74  )-----------( 356      ( 19 Jan 2022 07:30 )             24.0     01-19    138  |  105  |  23  ----------------------------<  95  4.1   |  26  |  0.97    Ca    8.2<L>      19 Jan 2022 07:30  Phos  2.4     01-19  Mg     1.80     01-19    TPro  5.7<L>  /  Alb  3.1<L>  /  TBili  0.8  /  DBili  x   /  AST  22  /  ALT  69<H>  /  AlkPhos  105  01-19          RADIOLOGY & ADDITIONAL TESTS:

## 2022-01-19 NOTE — DISCHARGE NOTE PROVIDER - NSDCMRMEDTOKEN_GEN_ALL_CORE_FT
acetaminophen 325 mg oral tablet: 2 tab(s) orally every 6 hours, As needed, Temp greater or equal to 38C (100.4F), Mild Pain (1 - 3)  amLODIPine 5 mg oral tablet: 1 tab(s) orally once a day   apixaban 5 mg oral tablet: 1 tab(s) orally every 12 hours  finasteride 5 mg oral tablet: 1 tab(s) orally once a day (at bedtime)  hydrALAZINE 25 mg oral tablet: 1 tab(s) orally 3 times a day  metoprolol tartrate 25 mg oral tablet: 1 tab(s) orally 2 times a day   tamsulosin 0.4 mg oral capsule: 1 cap(s) orally once a day (at bedtime)   amLODIPine 5 mg oral tablet: 1 tab(s) orally once a day   apixaban 5 mg oral tablet: 1 tab(s) orally every 12 hours  finasteride 5 mg oral tablet: 1 tab(s) orally once a day (at bedtime)  hydrALAZINE 25 mg oral tablet: 1 tab(s) orally 3 times a day  insulin detemir 100 units/mL subcutaneous solution: 5 unit(s) subcutaneous once a day (at bedtime)     Price check only--[facility] page [id number] w/price  metoprolol tartrate 25 mg oral tablet: 1 tab(s) orally 2 times a day   tamsulosin 0.4 mg oral capsule: 1 cap(s) orally once a day (at bedtime)   amLODIPine 5 mg oral tablet: 1 tab(s) orally once a day   apixaban 5 mg oral tablet: 1 tab(s) orally every 12 hours  empagliflozin 25 mg oral tablet: 1 tab(s) orally once a day   Price [facility] page [id number] w/price  finasteride 5 mg oral tablet: 1 tab(s) orally once a day (at bedtime)  hydrALAZINE 25 mg oral tablet: 1 tab(s) orally 3 times a day  metoprolol tartrate 25 mg oral tablet: 1 tab(s) orally 2 times a day   tamsulosin 0.4 mg oral capsule: 1 cap(s) orally once a day (at bedtime)   apixaban 5 mg oral tablet: 1 tab(s) orally every 12 hours  Bactrim  mg-160 mg oral tablet: 1 milligram(s) orally once a day   PCP prophylaxis  calcium carbonate 1250 mg (500 mg elemental calcium) oral tablet: 1 tab(s) orally once a day  cholecalciferol oral tablet: 1000 unit(s) orally once a day  empagliflozin 25 mg oral tablet: 1 tab(s) orally once a day   Fill now  famotidine 10 mg oral tablet: 1 tab(s) orally every 48 hours  finasteride 5 mg oral tablet: 1 tab(s) orally once a day (at bedtime)  Jardiance 10 mg oral tablet: 1 tab(s) orally once a day   Fill 2/20  -take when off steroids  metoprolol tartrate 25 mg oral tablet: 1 tab(s) orally 2 times a day   pantoprazole 40 mg oral delayed release tablet: 1 tab(s) orally once a day (before a meal)  predniSONE 20 mg oral tablet: 2 tab(s) orally once a day    follow up with dermatology for taper  sodium bicarbonate 650 mg oral tablet: 1 tab(s) orally 3 times a day  tamsulosin 0.4 mg oral capsule: 1 cap(s) orally once a day (at bedtime)  triamcinolone 0.1% topical ointment: 1 application topically every 12 hours

## 2022-01-19 NOTE — DISCHARGE NOTE PROVIDER - NSFOLLOWUPCLINICS_GEN_ALL_ED_FT
[facility] Endocrinology  Endocrinology  [address] [place] [address]  Phone: [phone]  Fax:   Follow Up Time: Routine

## 2022-01-19 NOTE — PROGRESS NOTE ADULT - SUBJECTIVE AND OBJECTIVE BOX
Patient is a 71y old  Male who presents with a chief complaint of Shortness of breath (19 Jan 2022 15:17)    says that he is doing better. Mouth sores improving. Breathing fine. No CP. Appetite good.    Medication:   apixaban 5 milliGRAM(s) Oral every 12 hours  calcium carbonate   1250 mG (OsCal) 1 Tablet(s) Oral daily  cholecalciferol 1000 Unit(s) Oral daily  dextrose 40% Gel 15 Gram(s) Oral once  dextrose 5%. 1000 milliLiter(s) IV Continuous <Continuous>  dextrose 5%. 1000 milliLiter(s) IV Continuous <Continuous>  dextrose 50% Injectable 25 Gram(s) IV Push once  dextrose 50% Injectable 12.5 Gram(s) IV Push once  dextrose 50% Injectable 25 Gram(s) IV Push once  famotidine    Tablet 10 milliGRAM(s) Oral every 48 hours  finasteride 5 milliGRAM(s) Oral daily  glucagon  Injectable 1 milliGRAM(s) IntraMuscular once  insulin glargine Injectable (LANTUS) 5 Unit(s) SubCutaneous at bedtime  insulin lispro (ADMELOG) corrective regimen sliding scale   SubCutaneous three times a day before meals  metoprolol tartrate 25 milliGRAM(s) Oral two times a day  pantoprazole    Tablet 40 milliGRAM(s) Oral before breakfast  predniSONE   Tablet 40 milliGRAM(s) Oral daily  sodium bicarbonate 650 milliGRAM(s) Oral three times a day  sodium chloride 0.45%. 1000 milliLiter(s) IV Continuous <Continuous>  tamsulosin 0.4 milliGRAM(s) Oral at bedtime  triamcinolone 0.1% Ointment 1 Application(s) Topical every 12 hours      Physical exam    T(C): 36.7 (01-19-22 @ 19:05), Max: 37.1 (01-19-22 @ 11:30)  HR: 74 (01-19-22 @ 19:05) (70 - 79)  BP: 114/59 (01-19-22 @ 19:05) (113/59 - 136/74)  RR: 17 (01-19-22 @ 19:05) (17 - 17)  SpO2: 99% (01-19-22 @ 19:05) (99% - 100%)  Wt(kg): --    alert NAD  EOMI anicteric sclera  Breathing non labored  CV  RRR    Labs                      7.1    4.74  )-----------( 356      ( 19 Jan 2022 07:30 )             24.0       01-19    138  |  105  |  23  ----------------------------<  95  4.1   |  26  |  0.97    Ca    8.2<L>      19 Jan 2022 07:30  Phos  2.4     01-19  Mg     1.80     01-19    TPro  5.7<L>  /  Alb  3.1<L>  /  TBili  0.8  /  DBili  x   /  AST  22  /  ALT  69<H>  /  AlkPhos  105  01-19      LIVER FUNCTIONS - ( 19 Jan 2022 07:30 )  Alb: 3.1 g/dL / Pro: 5.7 g/dL / ALK PHOS: 105 U/L / ALT: 69 U/L / AST: 22 U/L / GGT: x             [id number]

## 2022-01-19 NOTE — DISCHARGE NOTE PROVIDER - HOSPITAL COURSE
71M HTN remote Hx DVT (Eliquis 2.5 bid), CKD2-3, hx CVA with R sided deficit, pre-diabetes, MM undergoing chemotherapy (scheduled for 1/4/22)  which has been delayed by COVID-19 positive test 12/31/21 presents with dyspnea without hypoxia, tachycardia, JULIA, transaminitis, sepsis due to viral etiology (COVID-19 with adenovirus) vs bacterial etiology.    Sepsis.   - As per ID:  No evidence of bacterial infection, now off ABx  - Fever and elevated LFTs possibly secondary to viral infection including COVID and adenovirus  - The constellation of fever, rash and systemic involvement including liver and kidney might very well be secondary to DRESS Secondary to allopurinol and less likely to other medications.  - Appreciate ID and dermatology input, recommended steroids Twice daily dosing And now with daily dosing. continue famotidine and will likely need long standing steroids.  - discussed on 1/14 with derm : not gabriela albarran and more consistent w DRESS.. findings on lips can happen in either entity .  - Pt to follow up with Dermatology as outpatient     2019 novel coronavirus disease (COVID-19).   - Covid pcr positive 1/8  - Vaccinated  - Oxygen saturation monitored, no supplemental oxygen needed  - CXR clear  - SP remdesivir.     JULIA (acute kidney injury).   - Seen and evaluated by nephrology   - Creatinine much improved.  Etiology likely secondary to DRESS.  - JULIA secondary to DRESS can be due to AIN for which patient already on steroids with improving Scr.   - IV steroids as per dermatology. P  - UA with mild pyuria. FeNa low.   - Renal US without hydro.   - Pt to follow up with PCP as outpatient for further monitoring of kidney functions as outpatient     Rash.   - secondary to DRESS, due to allopurinol  - Allopurinol held  - Pt to follow up with dermatology as outpatient.    Transaminitis.   -  Likely secondary to COVID / adenovirus and  DRESS   - caution with tylenol and fevers.  - Pt to follow up with PCP as outpatient for further monitoring of liver functions     History of deep vein thrombosis (DVT) of lower extremity.   -US : Deep venous thrombosis of the right common femoral and popliteal veins.  Deep venous thrombosis of the left common femoral, femoral, and popliteal   veins.  - Eliquis continued     Multiple myeloma.   - Pt to follow up with oncologist Dr Mcdaniel    Essential hypertension.   - Continue with medications as prescribed   - Pt to follow up with PCP as outpatient for further blood pressure monitoring as outpatient     History of CVA (cerebrovascular accident).   - antiplatelet and statin for secondary prevention,   - statin held for now given elevated LFT.    Dysphagia:   - Continue diet per speech and swallow    Anemia  - Pt to follow up with hematology as outpatient for further monitoring of blood counts as outpatient     Steriod induced hyperglycemia   - Follow up with endocrinology  - Patient will need Long-term steroids.    On_________, discussed with __________, patient is medically cleared and optimized for discharge today. All medications were reviewed with attending, and sent to mutually agreed upon pharmacy.   71M HTN remote Hx DVT (Eliquis 2.5 bid), CKD2-3, hx CVA with R sided deficit, pre-diabetes, MM undergoing chemotherapy (scheduled for 1/4/22)  which has been delayed by COVID-19 positive test 12/31/21 presents with dyspnea without hypoxia, tachycardia, JULIA, transaminitis, sepsis due to viral etiology (COVID-19 with adenovirus) vs bacterial etiology.    Sepsis.   - As per ID:  No evidence of bacterial infection, now off ABx  - Fever and elevated LFTs possibly secondary to viral infection including COVID and adenovirus  - The constellation of fever, rash and systemic involvement including liver and kidney might very well be secondary to DRESS Secondary to allopurinol and less likely to other medications.  - Appreciate ID and dermatology input, recommended steroids Twice daily dosing And now with daily dosing. continue famotidine and will likely need long standing steroids.  - discussed on 1/14 with derm : not gabriela albarran and more consistent w DRESS.. findings on lips can happen in either entity .  - Pt to follow up with Dermatology as outpatient     2019 novel coronavirus disease (COVID-19).   - Covid pcr positive 1/8  - Vaccinated  - Oxygen saturation monitored, no supplemental oxygen needed  - CXR clear  - SP remdesivir.     JULIA (acute kidney injury).   - Seen and evaluated by nephrology   - Creatinine much improved.  Etiology likely secondary to DRESS.  - JULIA secondary to DRESS can be due to AIN for which patient already on steroids with improving Scr.   - IV steroids as per dermatology. P  - UA with mild pyuria. FeNa low.   - Renal US without hydro.   - Pt to follow up with PCP as outpatient for further monitoring of kidney functions as outpatient     Rash.   - secondary to DRESS, due to allopurinol  - Allopurinol held  - Pt to follow up with dermatology as outpatient.    Transaminitis.   -  Likely secondary to COVID / adenovirus and  DRESS   - caution with tylenol and fevers.  - Pt to follow up with PCP as outpatient for further monitoring of liver functions     History of deep vein thrombosis (DVT) of lower extremity.   -US : Deep venous thrombosis of the right common femoral and popliteal veins.  Deep venous thrombosis of the left common femoral, femoral, and popliteal   veins.  - Eliquis continued     Multiple myeloma.   - Pt to follow up with oncologist Dr Mcdaniel    Essential hypertension.   - Continue with medications as prescribed   - Pt to follow up with PCP as outpatient for further blood pressure monitoring as outpatient     History of CVA (cerebrovascular accident).   - antiplatelet and statin for secondary prevention,   - statin held for now given elevated LFT.    Dysphagia:   - Continue diet per speech and swallow    Anemia  - Pt to follow up with hematology as outpatient for further monitoring of blood counts as outpatient     Steriod induced hyperglycemia   - Follow up with endocrinology  - Patient will need Long-term steroids.    On 1/19/2022, discussed with Dr. Hamida Perez, patient is medically cleared and optimized for discharge today. All medications were reviewed with attending, and sent to mutually agreed upon pharmacy.   71M HTN remote Hx DVT (Eliquis 2.5 bid), CKD2-3, hx CVA with R sided deficit, pre-diabetes, MM undergoing chemotherapy (scheduled for 1/4/22)  which has been delayed by COVID-19 positive test 12/31/21 presents with dyspnea without hypoxia, tachycardia, JULIA, transaminitis, sepsis due to viral etiology (COVID-19 with adenovirus) vs bacterial etiology.    Sepsis.   - As per ID:  No evidence of bacterial infection, now off ABx  - Fever and elevated LFTs possibly secondary to viral infection including COVID and adenovirus  - The constellation of fever, rash and systemic involvement including liver and kidney might very well be secondary to DRESS Secondary to allopurinol and less likely to other medications.  - Appreciate ID and dermatology input, recommended steroids Twice daily dosing And now with daily dosing. continue famotidine and will likely need long standing steroids.  - discussed on 1/14 with derm : not gabriela albarran and more consistent w DRESS.. findings on lips can happen in either entity .  - Pt to follow up with Dermatology as outpatient     2019 novel coronavirus disease (COVID-19).   - Covid pcr positive 1/8  - Vaccinated  - Oxygen saturation monitored, no supplemental oxygen needed  - CXR clear  - SP remdesivir.     JULIA (acute kidney injury).   - Seen and evaluated by nephrology   - Creatinine much improved.  Etiology likely secondary to DRESS.  - JULIA secondary to DRESS can be due to AIN for which patient already on steroids with improving Scr.   - IV steroids as per dermatology. P  - UA with mild pyuria. FeNa low.   - Renal US without hydro.   - Pt to follow up with PCP as outpatient for further monitoring of kidney functions as outpatient     Rash.   - secondary to DRESS, due to allopurinol  - Allopurinol held  - Pt to follow up with dermatology as outpatient.    Transaminitis.   -  Likely secondary to COVID / adenovirus and  DRESS   - caution with tylenol and fevers.  - Pt to follow up with PCP as outpatient for further monitoring of liver functions     History of deep vein thrombosis (DVT) of lower extremity.   -US : Deep venous thrombosis of the right common femoral and popliteal veins.  Deep venous thrombosis of the left common femoral, femoral, and popliteal   veins.  - Eliquis continued     Multiple myeloma.   - Pt to follow up with oncologist Dr Mcdaniel    Essential hypertension.   - Continue with medications as prescribed   - Pt to follow up with PCP as outpatient for further blood pressure monitoring as outpatient     History of CVA (cerebrovascular accident).   - antiplatelet and statin for secondary prevention,   - statin held for now given elevated LFT.    Dysphagia:   - Continue diet per speech and swallow    Anemia  - Pt to follow up with hematology as outpatient for further monitoring of blood counts as outpatient     Steriod induced hyperglycemia   - Follow up with endocrinology  - Patient will need Long-term steroids  - Endocrine consulted recommend Jardiance 25mg once daily while on steroids and then decrease dose to 10mg daily when off steroids    On 1/19/2022, discussed with Dr. Hamida Perez, patient is medically cleared and optimized for discharge today. All medications were reviewed with attending, and sent to mutually agreed upon pharmacy.   71M HTN remote Hx DVT (Eliquis 2.5 bid), CKD2-3, hx CVA with R sided deficit, pre-diabetes, MM undergoing chemotherapy (scheduled for 1/4/22)  which has been delayed by COVID-19 positive test 12/31/21 presents with dyspnea without hypoxia, tachycardia, JULIA, transaminitis, sepsis due to viral etiology (COVID-19 with adenovirus) vs bacterial etiology.    Sepsis.   - As per ID:  No evidence of bacterial infection, now off ABx  - Fever and elevated LFTs possibly secondary to viral infection including COVID and adenovirus  - The constellation of fever, rash and systemic involvement including liver and kidney might very well be secondary to DRESS Secondary to allopurinol and less likely to other medications.  - Appreciate ID and dermatology input, recommended steroids Twice daily dosing And now with daily dosing. continue famotidine and will likely need long standing steroids.  - discussed on 1/14 with derm : not gabriela albarran and more consistent w DRESS.. findings on lips can happen in either entity .  - Pt to follow up with Dermatology as outpatient     2019 novel coronavirus disease (COVID-19).   - Covid pcr positive 1/8  - Vaccinated  - Oxygen saturation monitored, no supplemental oxygen needed  - CXR clear  - SP remdesivir.     JULIA (acute kidney injury).   - Seen and evaluated by nephrology   - Creatinine much improved.  Etiology likely secondary to DRESS.  - JULIA secondary to DRESS can be due to AIN for which patient already on steroids with improving Scr.   - IV steroids as per dermatology. P  - UA with mild pyuria. FeNa low.   - Renal US without hydro.   - Pt to follow up with PCP as outpatient for further monitoring of kidney functions as outpatient     Rash.   - secondary to DRESS, due to allopurinol  - Allopurinol held  - Pt to follow up with dermatology as outpatient.    Transaminitis.   -  Likely secondary to COVID / adenovirus and  DRESS   - caution with tylenol and fevers.  - Pt to follow up with PCP as outpatient for further monitoring of liver functions     History of deep vein thrombosis (DVT) of lower extremity.   -US : Deep venous thrombosis of the right common femoral and popliteal veins.  Deep venous thrombosis of the left common femoral, femoral, and popliteal   veins.  - Eliquis continued     Multiple myeloma.   - Pt to follow up with oncologist Dr Mcdaniel    Essential hypertension.   - Continue with medications as prescribed   - Pt to follow up with PCP as outpatient for further blood pressure monitoring as outpatient     History of CVA (cerebrovascular accident).   - antiplatelet and statin for secondary prevention,   - statin held for now given elevated LFT.    Dysphagia:   - Continue diet per speech and swallow    Anemia  - Pt to follow up with hematology as outpatient for further monitoring of blood counts as outpatient     Steriod induced hyperglycemia   - Follow up with endocrinology  - Patient will need Long-term steroids  - Endocrine consulted recommend Jardiance 25mg once daily while on steroids and then decrease dose to 10mg daily when off steroids    Discussed with daughter Jardiance dosing and she is aware of decreased dose when off steroids, patients wife checks his blood sugar and will continue to do so twice daily.  Patients wife has glucometer and testing supplies which daughter gets off of [employer].  Provider offered to send prescription for glucometer and all testing supplies however daughter declined and will continue to use shared supplies that she will supply.      On 1/19/2022, discussed with Dr. Hamida Perez, patient is medically cleared and optimized for discharge today. All medications were reviewed with attending, and sent to mutually agreed upon pharmacy.

## 2022-01-19 NOTE — DISCHARGE NOTE NURSING/CASE MANAGEMENT/SOCIAL WORK - NSDCFUADDAPPT_GEN_ALL_CORE_FT
Follow up with your primary care physician for further monitoring in 1-2 weeks. Please call to arrange appointment.  Follow up with dermatology, Dr. Beard [facility] [address], [address], [place], [place] ([phone])

## 2022-01-19 NOTE — PROGRESS NOTE ADULT - ASSESSMENT
MM - further out-pt therapy.  JULIA resolved.    Anemia AOCD process - Hgb lower. Monitor and transfuse for Hgb < 7 or symptoms of anemia.

## 2022-01-20 VITALS
TEMPERATURE: 99 F | SYSTOLIC BLOOD PRESSURE: 117 MMHG | HEART RATE: 76 BPM | RESPIRATION RATE: 17 BRPM | DIASTOLIC BLOOD PRESSURE: 60 MMHG | OXYGEN SATURATION: 100 %

## 2022-01-20 DIAGNOSIS — E11.9 TYPE 2 DIABETES MELLITUS WITHOUT COMPLICATIONS: ICD-10-CM

## 2022-01-20 LAB
ALBUMIN SERPL ELPH-MCNC: 3 G/DL — LOW (ref 3.3–5)
ALP SERPL-CCNC: 108 U/L — SIGNIFICANT CHANGE UP (ref 40–120)
ALT FLD-CCNC: 57 U/L — HIGH (ref 4–41)
ANION GAP SERPL CALC-SCNC: 5 MMOL/L — LOW (ref 7–14)
AST SERPL-CCNC: 17 U/L — SIGNIFICANT CHANGE UP (ref 4–40)
BILIRUB SERPL-MCNC: 0.7 MG/DL — SIGNIFICANT CHANGE UP (ref 0.2–1.2)
BUN SERPL-MCNC: 20 MG/DL — SIGNIFICANT CHANGE UP (ref 7–23)
CALCIUM SERPL-MCNC: 8 MG/DL — LOW (ref 8.4–10.5)
CHLORIDE SERPL-SCNC: 104 MMOL/L — SIGNIFICANT CHANGE UP (ref 98–107)
CO2 SERPL-SCNC: 28 MMOL/L — SIGNIFICANT CHANGE UP (ref 22–31)
CREAT SERPL-MCNC: 0.98 MG/DL — SIGNIFICANT CHANGE UP (ref 0.5–1.3)
D DIMER BLD IA.RAPID-MCNC: 150 NG/ML DDU — SIGNIFICANT CHANGE UP
FERRITIN SERPL-MCNC: 1268 NG/ML — HIGH (ref 30–400)
GLUCOSE SERPL-MCNC: 90 MG/DL — SIGNIFICANT CHANGE UP (ref 70–99)
HCT VFR BLD CALC: 22.9 % — LOW (ref 39–50)
HGB BLD-MCNC: 7.2 G/DL — LOW (ref 13–17)
LDH SERPL L TO P-CCNC: 302 U/L — HIGH (ref 135–225)
MAGNESIUM SERPL-MCNC: 1.7 MG/DL — SIGNIFICANT CHANGE UP (ref 1.6–2.6)
MCHC RBC-ENTMCNC: 26.7 PG — LOW (ref 27–34)
MCHC RBC-ENTMCNC: 31.4 GM/DL — LOW (ref 32–36)
MCV RBC AUTO: 84.8 FL — SIGNIFICANT CHANGE UP (ref 80–100)
NRBC # BLD: 4 /100 WBCS — SIGNIFICANT CHANGE UP
NRBC # FLD: 0.18 K/UL — HIGH
PHOSPHATE SERPL-MCNC: 2.5 MG/DL — SIGNIFICANT CHANGE UP (ref 2.5–4.5)
PLATELET # BLD AUTO: 354 K/UL — SIGNIFICANT CHANGE UP (ref 150–400)
POTASSIUM SERPL-MCNC: 4 MMOL/L — SIGNIFICANT CHANGE UP (ref 3.5–5.3)
POTASSIUM SERPL-SCNC: 4 MMOL/L — SIGNIFICANT CHANGE UP (ref 3.5–5.3)
PROT SERPL-MCNC: 5.4 G/DL — LOW (ref 6–8.3)
RBC # BLD: 2.7 M/UL — LOW (ref 4.2–5.8)
RBC # FLD: 23.8 % — HIGH (ref 10.3–14.5)
SODIUM SERPL-SCNC: 137 MMOL/L — SIGNIFICANT CHANGE UP (ref 135–145)
WBC # BLD: 4.52 K/UL — SIGNIFICANT CHANGE UP (ref 3.8–10.5)
WBC # FLD AUTO: 4.52 K/UL — SIGNIFICANT CHANGE UP (ref 3.8–10.5)

## 2022-01-20 PROCEDURE — 99223 1ST HOSP IP/OBS HIGH 75: CPT

## 2022-01-20 RX ORDER — CALCIUM CARBONATE 500(1250)
1 TABLET ORAL
Qty: 30 | Refills: 0
Start: 2022-01-20 | End: 2022-02-18

## 2022-01-20 RX ORDER — CHOLECALCIFEROL (VITAMIN D3) 125 MCG
1000 CAPSULE ORAL
Qty: 30000 | Refills: 0
Start: 2022-01-20 | End: 2022-02-18

## 2022-01-20 RX ORDER — INSULIN DETEMIR 100/ML (3)
5 INSULIN PEN (ML) SUBCUTANEOUS
Qty: 15 | Refills: 0
Start: 2022-01-20 | End: 2022-02-18

## 2022-01-20 RX ORDER — FAMOTIDINE 10 MG/ML
1 INJECTION INTRAVENOUS
Qty: 0 | Refills: 0 | DISCHARGE
Start: 2022-01-20

## 2022-01-20 RX ORDER — EMPAGLIFLOZIN 10 MG/1
1 TABLET, FILM COATED ORAL
Qty: 30 | Refills: 0
Start: 2022-01-20 | End: 2022-02-18

## 2022-01-20 RX ORDER — AZTREONAM 2 G
1 VIAL (EA) INJECTION
Qty: 30 | Refills: 0
Start: 2022-01-20 | End: 2022-02-18

## 2022-01-20 RX ORDER — PANTOPRAZOLE SODIUM 20 MG/1
1 TABLET, DELAYED RELEASE ORAL
Qty: 30 | Refills: 0
Start: 2022-01-20 | End: 2022-02-18

## 2022-01-20 RX ORDER — SODIUM BICARBONATE 1 MEQ/ML
1 SYRINGE (ML) INTRAVENOUS
Qty: 90 | Refills: 0
Start: 2022-01-20 | End: 2022-02-18

## 2022-01-20 RX ADMIN — APIXABAN 5 MILLIGRAM(S): 2.5 TABLET, FILM COATED ORAL at 05:40

## 2022-01-20 RX ADMIN — Medication 1 TABLET(S): at 12:11

## 2022-01-20 RX ADMIN — Medication 1000 UNIT(S): at 12:11

## 2022-01-20 RX ADMIN — Medication 6: at 13:36

## 2022-01-20 RX ADMIN — Medication 25 MILLIGRAM(S): at 05:41

## 2022-01-20 RX ADMIN — PANTOPRAZOLE SODIUM 40 MILLIGRAM(S): 20 TABLET, DELAYED RELEASE ORAL at 05:41

## 2022-01-20 RX ADMIN — Medication 1 APPLICATION(S): at 05:42

## 2022-01-20 RX ADMIN — FINASTERIDE 5 MILLIGRAM(S): 5 TABLET, FILM COATED ORAL at 12:11

## 2022-01-20 RX ADMIN — Medication 650 MILLIGRAM(S): at 05:40

## 2022-01-20 RX ADMIN — Medication 40 MILLIGRAM(S): at 05:41

## 2022-01-20 NOTE — PROGRESS NOTE ADULT - PROBLEM SELECTOR PROBLEM 2
JULIA (acute kidney injury)
Sepsis
2019 novel coronavirus disease (COVID-19)
JULIA (acute kidney injury)
2019 novel coronavirus disease (COVID-19)
2019 novel coronavirus disease (COVID-19)
Sepsis
2019 novel coronavirus disease (COVID-19)
JULIA (acute kidney injury)
2019 novel coronavirus disease (COVID-19)
2019 novel coronavirus disease (COVID-19)

## 2022-01-20 NOTE — PROGRESS NOTE ADULT - SUBJECTIVE AND OBJECTIVE BOX
Date of Service: 01-20-22 @ 09:58    Patient is a 71y old  Male who presents with a chief complaint of Shortness of breath (19 Jan 2022 22:58)    Any change in ROS:   O2 sats 100% on RA  Denies CP,SOB    MEDICATIONS  (STANDING):  apixaban 5 milliGRAM(s) Oral every 12 hours  calcium carbonate   1250 mG (OsCal) 1 Tablet(s) Oral daily  cholecalciferol 1000 Unit(s) Oral daily  dextrose 40% Gel 15 Gram(s) Oral once  dextrose 5%. 1000 milliLiter(s) (50 mL/Hr) IV Continuous <Continuous>  dextrose 5%. 1000 milliLiter(s) (100 mL/Hr) IV Continuous <Continuous>  dextrose 50% Injectable 25 Gram(s) IV Push once  dextrose 50% Injectable 12.5 Gram(s) IV Push once  dextrose 50% Injectable 25 Gram(s) IV Push once  famotidine    Tablet 10 milliGRAM(s) Oral every 48 hours  finasteride 5 milliGRAM(s) Oral daily  glucagon  Injectable 1 milliGRAM(s) IntraMuscular once  insulin glargine Injectable (LANTUS) 5 Unit(s) SubCutaneous at bedtime  insulin lispro (ADMELOG) corrective regimen sliding scale   SubCutaneous three times a day before meals  metoprolol tartrate 25 milliGRAM(s) Oral two times a day  pantoprazole    Tablet 40 milliGRAM(s) Oral before breakfast  predniSONE   Tablet 40 milliGRAM(s) Oral daily  sodium bicarbonate 650 milliGRAM(s) Oral three times a day  sodium chloride 0.45%. 1000 milliLiter(s) (50 mL/Hr) IV Continuous <Continuous>  tamsulosin 0.4 milliGRAM(s) Oral at bedtime  triamcinolone 0.1% Ointment 1 Application(s) Topical every 12 hours    Vital Signs Last 24 Hrs  T(C): 36.7 (20 Jan 2022 05:35), Max: 37.1 (19 Jan 2022 11:30)  T(F): 98 (20 Jan 2022 05:35), Max: 98.8 (19 Jan 2022 11:30)  HR: 75 (20 Jan 2022 05:35) (71 - 79)  BP: 115/60 (20 Jan 2022 05:35) (113/59 - 121/58)  BP(mean): --  RR: 17 (20 Jan 2022 05:35) (17 - 17)  SpO2: 100% (20 Jan 2022 05:35) (99% - 100%)    I&O's Summary    19 Jan 2022 07:01  -  20 Jan 2022 07:00  --------------------------------------------------------  IN: 0 mL / OUT: 425 mL / NET: -425 mL    Physical Exam:   GENERAL: NAD  HEENT: SAM  ENMT: No tonsillar erythema, exudates, or enlargement  NECK: Supple, No JVD  CHEST/LUNG: Decreased BS   CVS: Regular rate and rhythm  GI: : Soft, Nontender, Nondistended  NERVOUS SYSTEM:  Alert & Oriented X3  EXTREMITIES:  2+ Peripheral Pulses, No clubbing, cyanosis, or edema  SKIN: generalized rash, improving  PSYCH: Appropriate    Labs:                        7.2    4.52  )-----------( 354      ( 20 Jan 2022 07:06 )             22.9                         7.1    4.74  )-----------( 356      ( 19 Jan 2022 07:30 )             24.0                         7.6    5.51  )-----------( 363      ( 18 Jan 2022 08:02 )             25.9                         8.7    5.44  )-----------( 378      ( 17 Jan 2022 17:34 )             28.8     01-20    137  |  104  |  20  ----------------------------<  90  4.0   |  28  |  0.98  01-19    138  |  105  |  23  ----------------------------<  95  4.1   |  26  |  0.97  01-18    142  |  110<H>  |  27<H>  ----------------------------<  142<H>  4.4   |  26  |  1.06  01-17    139  |  107  |  33<H>  ----------------------------<  179<H>  5.4<H>   |  20<L>  |  1.00    Ca    8.0<L>      20 Jan 2022 07:06  Ca    8.2<L>      19 Jan 2022 07:30  Phos  2.5     01-20  Phos  2.4     01-19  Mg     1.70     01-20  Mg     1.80     01-19    TPro  5.4<L>  /  Alb  3.0<L>  /  TBili  0.7  /  DBili  x   /  AST  17  /  ALT  57<H>  /  AlkPhos  108  01-20  TPro  5.7<L>  /  Alb  3.1<L>  /  TBili  0.8  /  DBili  x   /  AST  22  /  ALT  69<H>  /  AlkPhos  105  01-19  TPro  5.8<L>  /  Alb  3.4  /  TBili  1.0  /  DBili  x   /  AST  21  /  ALT  84<H>  /  AlkPhos  110  01-18  TPro  6.5  /  Alb  3.7  /  TBili  0.8  /  DBili  x   /  AST  29  /  ALT  104<H>  /  AlkPhos  128<H>  01-17    CAPILLARY BLOOD GLUCOSE  POCT Blood Glucose.: 141 mg/dL (20 Jan 2022 09:34)  POCT Blood Glucose.: 163 mg/dL (19 Jan 2022 22:41)  POCT Blood Glucose.: 200 mg/dL (19 Jan 2022 17:26)  POCT Blood Glucose.: 233 mg/dL (19 Jan 2022 13:05)    LIVER FUNCTIONS - ( 20 Jan 2022 07:06 )  Alb: 3.0 g/dL / Pro: 5.4 g/dL / ALK PHOS: 108 U/L / ALT: 57 U/L / AST: 17 U/L / GGT: x           D-Dimer Assay, Quantitative: 150 ng/mL DDU (01-20 @ 07:06)  D-Dimer Assay, Quantitative: 242 ng/mL DDU (01-17 @ 17:34)  Procalcitonin, Serum: 0.25 ng/mL (01-17 @ 17:34)    Studies  Chest X-RAY< from: Xray Chest 1 View- PORTABLE-Urgent (Xray Chest 1 View- PORTABLE-Urgent .) (01.09.22 @ 18:02) >    FINDINGS:  The heart is normal in size.  The lungs are clear.  There is no pneumothorax or pleural effusion.  There are no acute osseous abnormalities.    IMPRESSION:  Clear lungs.    < end of copied text >    < from: NM Pulmonary Perfusion Scan (01.08.22 @ 15:54) >    INTERPRETATION:  RADIOPHARMACEUTICAL:  3.0 mCi Tc-99m-MAA, I.V.    CLINICAL INFORMATION: 71 year old Covid positive male with tachycardia   and shortness of breath; referred to evaluate for pulmonary embolism.    TECHNIQUE:  Perfusion images of the lungs were obtained following   administration of Tc-99m-MAA. Images were obtained in the anterior,   posterior, both lateral, and all 4 oblique projections. The study was   interpreted in conjunction with a chest radiograph of 1/8/2022.    COMPARISON: No previous lung scans were available for comparison.    FINDINGS: There is a small perfusion defect in the apicoposterior segment   of the left upper lobe. There is heterogeneous distribution of   radiopharmaceutical in the remainder of both lungs on the perfusion   images.    IMPRESSION: Very low probability of pulmonary embolus.    < end of copied text >    < from: US Duplex Venous Lower Ext Complete, Bilateral (01.09.22 @ 15:44) >    FINDINGS:    RIGHT:  Deep venous thrombosis of the right common femoraland popliteal veins.    LEFT:  Deep venous thrombosis of the left common femoral, femoral, and popliteal   veins.    IMPRESSION:  Bilateral lower extremity deep vein thromboses, as noted on prior studies.      < end of copied text >

## 2022-01-20 NOTE — PROGRESS NOTE ADULT - NSPROGADDITIONALINFOA_GEN_ALL_CORE
dw acp
randa acp    1/12: covid wise she sems to be doing better:no sob:on room air:
dw acp
dw acp
- Dr. IRENE Mora ([facility])  - [phone]
doing very good: afebrile: on behzad nitish: no bleeding noted: agree with above
he is stable from pulm point if view: no sob on roomair: agree with above
dw acp

## 2022-01-20 NOTE — PROGRESS NOTE ADULT - PROBLEM SELECTOR PROBLEM 1
Sepsis
Sepsis
2019 novel coronavirus disease (COVID-19)
Sepsis
2019 novel coronavirus disease (COVID-19)
Sepsis
2019 novel coronavirus disease (COVID-19)
2019 novel coronavirus disease (COVID-19)
Sepsis
2019 novel coronavirus disease (COVID-19)
Sepsis

## 2022-01-20 NOTE — CONSULT NOTE ADULT - SUBJECTIVE AND OBJECTIVE BOX
Reason For Consult:     HPI:  Pt is 71M HTN remote Hx DVT (eliquis 2.5 bid), CKD2-3, hx CVA with R sided deficit, prediabetes, MM undergoing chemotherapy (scheduled for 1/4/22)  which has been delayed by COVID-19 positive test 12/31/21.  Vaccinated for COVID.  Pt developed diffuse itchy rash on arms, trunk, face, with lip swelling, no sensation of throat closing up or change in ability to swallow.  For past two days fevers and difficulty breathing, sensation of chest tightness.  Reports poor appetite.  Febrile to 104 in ED. Treated with tylenol. (08 Jan 2022 15:30)      PAST MEDICAL & SURGICAL HISTORY:  Benign essential hypertension    Hx of hyperlipidemia    DVT (deep venous thrombosis)    Obstructive uropathy    Obesity    Anemia of unknown etiology in 2012    BPH (benign prostatic hypertrophy)    Diabetes mellitus  pre-diabetes, diet controlled    CVA (cerebral vascular accident)    Multiple myeloma    H/O tooth extraction  2012    S/P prostatectomy        FAMILY HISTORY:  Family history of hypertension (Father)        Social History:    Outpatient Medications:    MEDICATIONS  (STANDING):  apixaban 5 milliGRAM(s) Oral every 12 hours  calcium carbonate   1250 mG (OsCal) 1 Tablet(s) Oral daily  cholecalciferol 1000 Unit(s) Oral daily  dextrose 40% Gel 15 Gram(s) Oral once  dextrose 5%. 1000 milliLiter(s) (50 mL/Hr) IV Continuous <Continuous>  dextrose 5%. 1000 milliLiter(s) (100 mL/Hr) IV Continuous <Continuous>  dextrose 50% Injectable 25 Gram(s) IV Push once  dextrose 50% Injectable 12.5 Gram(s) IV Push once  dextrose 50% Injectable 25 Gram(s) IV Push once  famotidine    Tablet 10 milliGRAM(s) Oral every 48 hours  finasteride 5 milliGRAM(s) Oral daily  glucagon  Injectable 1 milliGRAM(s) IntraMuscular once  insulin glargine Injectable (LANTUS) 5 Unit(s) SubCutaneous at bedtime  insulin lispro (ADMELOG) corrective regimen sliding scale   SubCutaneous three times a day before meals  metoprolol tartrate 25 milliGRAM(s) Oral two times a day  pantoprazole    Tablet 40 milliGRAM(s) Oral before breakfast  predniSONE   Tablet 40 milliGRAM(s) Oral daily  sodium bicarbonate 650 milliGRAM(s) Oral three times a day  sodium chloride 0.45%. 1000 milliLiter(s) (50 mL/Hr) IV Continuous <Continuous>  tamsulosin 0.4 milliGRAM(s) Oral at bedtime  triamcinolone 0.1% Ointment 1 Application(s) Topical every 12 hours    MEDICATIONS  (PRN):      Allergies    allopurinol (Other)    Intolerances      Review of Systems:  Constitutional: No fever  Eyes: No blurry vision  Neuro: No tremors  HEENT: No pain  Cardiovascular: No chest pain, palpitations  Respiratory: No SOB, no cough  GI: No nausea, vomiting, abdominal pain  : No dysuria  Skin: no rash  Psych: no depression  Endocrine: no polyuria, polydipsia  Hem/lymph: no swelling  Osteoporosis: no fractures    ALL OTHER SYSTEMS REVIEWED AND NEGATIVE    UNABLE TO OBTAIN    PHYSICAL EXAM:  VITALS: T(C): 37 (01-20-22 @ 11:28)  T(F): 98.6 (01-20-22 @ 11:28), Max: 98.6 (01-20-22 @ 11:28)  HR: 76 (01-20-22 @ 11:28) (71 - 76)  BP: 117/60 (01-20-22 @ 11:28) (114/59 - 121/58)  RR:  (17 - 17)  SpO2:  (99% - 100%)  Wt(kg): --  GENERAL: NAD, well-groomed, well-developed  EYES: No proptosis, no lid lag, anicteric  HEENT:  Atraumatic, Normocephalic, moist mucous membranes  THYROID: Normal size, no palpable nodules  RESPIRATORY: Clear to auscultation bilaterally; No rales, rhonchi, wheezing, or rubs  CARDIOVASCULAR: Regular rate and rhythm; No murmurs; no peripheral edema  GI: Soft, nontender, non distended, normal bowel sounds  SKIN: Dry, intact, No rashes or lesions  MUSCULOSKELETAL: Full range of motion, normal strength  NEURO: sensation intact, extraocular movements intact, no tremor, normal reflexes  PSYCH: Alert and oriented x 3, normal affect, normal mood  CUSHING'S SIGNS: no striae    POCT Blood Glucose.: 253 mg/dL (01-20-22 @ 12:57)  POCT Blood Glucose.: 141 mg/dL (01-20-22 @ 09:34)  POCT Blood Glucose.: 163 mg/dL (01-19-22 @ 22:41)  POCT Blood Glucose.: 200 mg/dL (01-19-22 @ 17:26)  POCT Blood Glucose.: 233 mg/dL (01-19-22 @ 13:05)  POCT Blood Glucose.: 113 mg/dL (01-19-22 @ 08:57)  POCT Blood Glucose.: 143 mg/dL (01-18-22 @ 20:47)  POCT Blood Glucose.: 182 mg/dL (01-18-22 @ 17:08)  POCT Blood Glucose.: 288 mg/dL (01-18-22 @ 12:17)  POCT Blood Glucose.: 169 mg/dL (01-18-22 @ 08:37)  POCT Blood Glucose.: 183 mg/dL (01-17-22 @ 20:56)  POCT Blood Glucose.: 167 mg/dL (01-17-22 @ 17:30)                            7.2    4.52  )-----------( 354      ( 20 Jan 2022 07:06 )             22.9       01-20    137  |  104  |  20  ----------------------------<  90  4.0   |  28  |  0.98    EGFR if : 88  EGFR if non : 76    Ca    8.0<L>      01-20  Mg     1.70     01-20  Phos  2.5     01-20    TPro  5.4<L>  /  Alb  3.0<L>  /  TBili  0.7  /  DBili  x   /  AST  17  /  ALT  57<H>  /  AlkPhos  108  01-20 01-09 Chol 122 Direct LDL -- LDL calculated 55 HDL 23<L> Trig 219<H>, 11-27 Chol 141 Direct LDL -- LDL calculated 90 HDL 27<L> Trig 121   Reason For Consult: Hyperglycemia     HPI:  This is a 71 y.o M with HTN, CKD II, remote Hx DVT (eliquis 2.5 bid), hx CVA with R sided deficit, MM undergoing chemotherapy (scheduled for 1/4/22) which has been delayed by COVID-19 positive test 12/31/21 presents with dyspnea without hypoxia, tachycardia, JULIA, transaminitis, sepsis due to viral etiology (COVID-19 with adenovirus) vs bacterial etiology.  Patient is a poor historian, so history mainly obtained from the chart. Pt was started on steroid taper for DRESS during the hospitalization, and will be discharged home on steroid.  He is currently on prednisone 40 mg daily, and plan is to decreases Prednisone by 10 mg weekly over next 4 weeks.  Steroid taper to be managed by dermatology.  Endocrine team is consulted for elevated BG and A1c of 7.1%.  He is currently managed on Lantus 5 U and SS.  FBG have been at less than 180 and prandial BG have been in 200s.      Patient denies any family history of diabetes or thyroid disease.  Reports to drink diet ginger ale, but no regular soda/juice.      Today, offers no complaints.  Inquiring about discharge.    Tolerating diet, denies nausea, vomiting, AP.     PAST MEDICAL & SURGICAL HISTORY:  Benign essential hypertension    Hx of hyperlipidemia    DVT (deep venous thrombosis)    Obstructive uropathy    Obesity    Anemia of unknown etiology in 2012    BPH (benign prostatic hypertrophy)    Diabetes mellitus  pre-diabetes, diet controlled    CVA (cerebral vascular accident)    Multiple myeloma    H/O tooth extraction  2012    S/P prostatectomy    FAMILY HISTORY:  Family history of hypertension (Father)    Social History: lives with wife     Outpatient Medications:    MEDICATIONS  (STANDING):  apixaban 5 milliGRAM(s) Oral every 12 hours  calcium carbonate   1250 mG (OsCal) 1 Tablet(s) Oral daily  cholecalciferol 1000 Unit(s) Oral daily  dextrose 40% Gel 15 Gram(s) Oral once  dextrose 5%. 1000 milliLiter(s) (50 mL/Hr) IV Continuous <Continuous>  dextrose 5%. 1000 milliLiter(s) (100 mL/Hr) IV Continuous <Continuous>  dextrose 50% Injectable 25 Gram(s) IV Push once  dextrose 50% Injectable 12.5 Gram(s) IV Push once  dextrose 50% Injectable 25 Gram(s) IV Push once  famotidine    Tablet 10 milliGRAM(s) Oral every 48 hours  finasteride 5 milliGRAM(s) Oral daily  glucagon  Injectable 1 milliGRAM(s) IntraMuscular once  insulin glargine Injectable (LANTUS) 5 Unit(s) SubCutaneous at bedtime  insulin lispro (ADMELOG) corrective regimen sliding scale   SubCutaneous three times a day before meals  metoprolol tartrate 25 milliGRAM(s) Oral two times a day  pantoprazole    Tablet 40 milliGRAM(s) Oral before breakfast  predniSONE   Tablet 40 milliGRAM(s) Oral daily  sodium bicarbonate 650 milliGRAM(s) Oral three times a day  sodium chloride 0.45%. 1000 milliLiter(s) (50 mL/Hr) IV Continuous <Continuous>  tamsulosin 0.4 milliGRAM(s) Oral at bedtime  triamcinolone 0.1% Ointment 1 Application(s) Topical every 12 hours    MEDICATIONS  (PRN):      Allergies    allopurinol (Other)    Intolerances      Review of Systems:  Constitutional: No fever  Eyes: No blurry vision  Neuro: No tremors  HEENT: No pain  Cardiovascular: No chest pain, palpitations  Respiratory: No SOB, no cough  GI: No nausea, vomiting, abdominal pain  : No dysuria  Skin: no rash  Psych: no depression  Endocrine: no polyuria, polydipsia  Hem/lymph: no swelling  Osteoporosis: no fractures    ALL OTHER SYSTEMS REVIEWED AND NEGATIVE    UNABLE TO OBTAIN    PHYSICAL EXAM:  VITALS: T(C): 37 (01-20-22 @ 11:28)  T(F): 98.6 (01-20-22 @ 11:28), Max: 98.6 (01-20-22 @ 11:28)  HR: 76 (01-20-22 @ 11:28) (71 - 76)  BP: 117/60 (01-20-22 @ 11:28) (114/59 - 121/58)  RR:  (17 - 17)  SpO2:  (99% - 100%)  Wt(kg): --  GENERAL: NAD, well-groomed, well-developed  EYES: No proptosis, no lid lag, anicteric  HEENT:  Atraumatic, Normocephalic, moist mucous membranes  THYROID: Normal size, no palpable nodules  RESPIRATORY: Clear to auscultation bilaterally; No rales, rhonchi, wheezing, or rubs  CARDIOVASCULAR: Regular rate and rhythm; No murmurs; no peripheral edema  GI: Soft, nontender, non distended, normal bowel sounds  MUSCULOSKELETAL: Full range of motion, normal strength  NEURO: sensation intact, extraocular movements intact, no tremor  PSYCH: Alert and oriented x 2, normal affect, normal mood  CUSHING'S SIGNS: no striae    POCT Blood Glucose.: 253 mg/dL (01-20-22 @ 12:57)  POCT Blood Glucose.: 141 mg/dL (01-20-22 @ 09:34)  POCT Blood Glucose.: 163 mg/dL (01-19-22 @ 22:41)  POCT Blood Glucose.: 200 mg/dL (01-19-22 @ 17:26)  POCT Blood Glucose.: 233 mg/dL (01-19-22 @ 13:05)  POCT Blood Glucose.: 113 mg/dL (01-19-22 @ 08:57)  POCT Blood Glucose.: 143 mg/dL (01-18-22 @ 20:47)  POCT Blood Glucose.: 182 mg/dL (01-18-22 @ 17:08)  POCT Blood Glucose.: 288 mg/dL (01-18-22 @ 12:17)  POCT Blood Glucose.: 169 mg/dL (01-18-22 @ 08:37)  POCT Blood Glucose.: 183 mg/dL (01-17-22 @ 20:56)  POCT Blood Glucose.: 167 mg/dL (01-17-22 @ 17:30)                            7.2    4.52  )-----------( 354      ( 20 Jan 2022 07:06 )             22.9       01-20    137  |  104  |  20  ----------------------------<  90  4.0   |  28  |  0.98    EGFR if : 88  EGFR if non : 76    Ca    8.0<L>      01-20  Mg     1.70     01-20  Phos  2.5     01-20    TPro  5.4<L>  /  Alb  3.0<L>  /  TBili  0.7  /  DBili  x   /  AST  17  /  ALT  57<H>  /  AlkPhos  108  01-20 01-09 Chol 122 Direct LDL -- LDL calculated 55 HDL 23<L> Trig 219<H>, 11-27 Chol 141 Direct LDL -- LDL calculated 90 HDL 27<L> Trig 121   Reason For Consult: Hyperglycemia     HPI:  This is a 71 y.o M with HTN, CKD II, remote Hx DVT (eliquis 2.5 bid), hx CVA with R sided deficit, MM undergoing chemotherapy (scheduled for 1/4/22) which has been delayed by COVID-19 positive test 12/31/21 presents with dyspnea without hypoxia, tachycardia, JULIA, transaminitis, sepsis due to viral etiology (COVID-19 with adenovirus) vs bacterial etiology.  Patient is a poor historian, so history mainly obtained from the chart. Pt was started on steroid taper for DRESS during the hospitalization, and will be discharged home on steroid.  He is currently on prednisone 40 mg daily, and plan is to decreases Prednisone by 10 mg weekly over next 4 weeks.  Steroid taper to be managed by dermatology.  Endocrine team is consulted for newly dx DM with A1c of 7.1%.  He is currently managed on Lantus 5 U and SS.  FBG have been at less than 180 and prandial BG have been ~ 200s.      Patient denies any family history of diabetes or thyroid disease.  Reports to drink diet ginger ale, but no regular soda/juice.  Cannot elaborate further on his diet - states eats whatever his wife and daughter cook.     Today, offers no complaints.  Inquiring about discharge.    Tolerating diet, denies nausea, vomiting, AP.     PAST MEDICAL & SURGICAL HISTORY:  Benign essential hypertension    Hx of hyperlipidemia    DVT (deep venous thrombosis)    Obstructive uropathy    Obesity    Anemia of unknown etiology in 2012    BPH (benign prostatic hypertrophy)    Diabetes mellitus  pre-diabetes, diet controlled    CVA (cerebral vascular accident)    Multiple myeloma    H/O tooth extraction  2012    S/P prostatectomy    FAMILY HISTORY:  Family history of hypertension (Father)    Social History: lives with wife     Outpatient Medications:    MEDICATIONS  (STANDING):  apixaban 5 milliGRAM(s) Oral every 12 hours  calcium carbonate   1250 mG (OsCal) 1 Tablet(s) Oral daily  cholecalciferol 1000 Unit(s) Oral daily  dextrose 40% Gel 15 Gram(s) Oral once  dextrose 5%. 1000 milliLiter(s) (50 mL/Hr) IV Continuous <Continuous>  dextrose 5%. 1000 milliLiter(s) (100 mL/Hr) IV Continuous <Continuous>  dextrose 50% Injectable 25 Gram(s) IV Push once  dextrose 50% Injectable 12.5 Gram(s) IV Push once  dextrose 50% Injectable 25 Gram(s) IV Push once  famotidine    Tablet 10 milliGRAM(s) Oral every 48 hours  finasteride 5 milliGRAM(s) Oral daily  glucagon  Injectable 1 milliGRAM(s) IntraMuscular once  insulin glargine Injectable (LANTUS) 5 Unit(s) SubCutaneous at bedtime  insulin lispro (ADMELOG) corrective regimen sliding scale   SubCutaneous three times a day before meals  metoprolol tartrate 25 milliGRAM(s) Oral two times a day  pantoprazole    Tablet 40 milliGRAM(s) Oral before breakfast  predniSONE   Tablet 40 milliGRAM(s) Oral daily  sodium bicarbonate 650 milliGRAM(s) Oral three times a day  sodium chloride 0.45%. 1000 milliLiter(s) (50 mL/Hr) IV Continuous <Continuous>  tamsulosin 0.4 milliGRAM(s) Oral at bedtime  triamcinolone 0.1% Ointment 1 Application(s) Topical every 12 hours    MEDICATIONS  (PRN):      Allergies    allopurinol (Other)    Intolerances      Review of Systems:  Constitutional: No fever  Eyes: No blurry vision  Neuro: No tremors  HEENT: No pain  Cardiovascular: No chest pain, palpitations  Respiratory: No SOB, no cough  GI: No nausea, vomiting, abdominal pain  : No dysuria  Skin: no rash  Psych: no depression  Endocrine: no polyuria, polydipsia  Hem/lymph: no swelling  Osteoporosis: no fractures    ALL OTHER SYSTEMS REVIEWED AND NEGATIVE    UNABLE TO OBTAIN    PHYSICAL EXAM:  VITALS: T(C): 37 (01-20-22 @ 11:28)  T(F): 98.6 (01-20-22 @ 11:28), Max: 98.6 (01-20-22 @ 11:28)  HR: 76 (01-20-22 @ 11:28) (71 - 76)  BP: 117/60 (01-20-22 @ 11:28) (114/59 - 121/58)  RR:  (17 - 17)  SpO2:  (99% - 100%)  Wt(kg): --  GENERAL: NAD, well-groomed, well-developed  EYES: No proptosis, no lid lag, anicteric  HEENT:  Atraumatic, Normocephalic, moist mucous membranes  THYROID: Normal size, no palpable nodules  RESPIRATORY: Clear to auscultation bilaterally; No rales, rhonchi, wheezing, or rubs  CARDIOVASCULAR: Regular rate and rhythm; No murmurs; no peripheral edema  GI: Soft, nontender, non distended, normal bowel sounds  MUSCULOSKELETAL: Full range of motion, normal strength  NEURO: sensation intact, extraocular movements intact, no tremor  PSYCH: Alert and oriented x 2, normal affect, normal mood  CUSHING'S SIGNS: no striae    POCT Blood Glucose.: 253 mg/dL (01-20-22 @ 12:57)  POCT Blood Glucose.: 141 mg/dL (01-20-22 @ 09:34)  POCT Blood Glucose.: 163 mg/dL (01-19-22 @ 22:41)  POCT Blood Glucose.: 200 mg/dL (01-19-22 @ 17:26)  POCT Blood Glucose.: 233 mg/dL (01-19-22 @ 13:05)  POCT Blood Glucose.: 113 mg/dL (01-19-22 @ 08:57)  POCT Blood Glucose.: 143 mg/dL (01-18-22 @ 20:47)  POCT Blood Glucose.: 182 mg/dL (01-18-22 @ 17:08)  POCT Blood Glucose.: 288 mg/dL (01-18-22 @ 12:17)  POCT Blood Glucose.: 169 mg/dL (01-18-22 @ 08:37)  POCT Blood Glucose.: 183 mg/dL (01-17-22 @ 20:56)  POCT Blood Glucose.: 167 mg/dL (01-17-22 @ 17:30)                            7.2    4.52  )-----------( 354      ( 20 Jan 2022 07:06 )             22.9       01-20    137  |  104  |  20  ----------------------------<  90  4.0   |  28  |  0.98    EGFR if : 88  EGFR if non : 76    Ca    8.0<L>      01-20  Mg     1.70     01-20  Phos  2.5     01-20    TPro  5.4<L>  /  Alb  3.0<L>  /  TBili  0.7  /  DBili  x   /  AST  17  /  ALT  57<H>  /  AlkPhos  108  01-20 01-09 Chol 122 Direct LDL -- LDL calculated 55 HDL 23<L> Trig 219<H>, 11-27 Chol 141 Direct LDL -- LDL calculated 90 HDL 27<L> Trig 121

## 2022-01-20 NOTE — PROGRESS NOTE ADULT - PROBLEM SELECTOR PLAN 2
No hypoxia, does not meet criteria for dex/remdes  Vaccinated  Monitor O2 sats  Positive at least 1 week  CXR clear  Discussed with Dr. 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
No hypoxia, does not meet criteria for dex/remdes  Vaccinated  Monitor O2 sats  Positive at least 1 week  CXR clear  Discussed with Dr. Wolf: LFT improving   remdesivir started
No hypoxia, does not meet criteria for dex/remdes  Vaccinated  Monitor O2 sats  Positive at least 1 week  CXR clear  Discussed with Dr. Wolf: will consider short-term treatment remdisivir F liver function continues to improve    1/13: 2021; cont remdesivir as wellas steroids: he is on roomair:
Resolved  -Monitoring off ABX per ID   -BC with NGTD.
Resolved  -Monitoring off ABX per ID   -BC with NGTD.
No hypoxia, does not meet criteria for dex/remdes  Vaccinated  Monitor O2 sats  Positive at least 1 week  CXR clear  Discussed with Dr. 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
No hypoxia, does not meet criteria for dex/remdes  Vaccinated  Monitor O2 sats  Positive at least 1 week  CXR clear  Discussed with Dr. Wolf: LFT improving   remdesivir started
No hypoxia, does not meet criteria for dex/remdes  Vaccinated  Monitor O2 sats  Positive at least 1 week  CXR clear
No hypoxia, does not meet criteria for dex/remdes  Vaccinated  Monitor O2 sats  Positive at least 1 week  CXR clear
No hypoxia, does not meet criteria for dex/remdes  Vaccinated  Monitor O2 sats  Positive at least 1 week  CXR clear  Discussed with Dr. 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
doing pretty good from covid point of view: on no oxygen: no wheezin/18: stable
No hypoxia, does not meet criteria for dex/remdes  Vaccinated  Monitor O2 sats  Positive at least 1 week  CXR clear  Complete remdesivir
doing pretty good from covid point of view: on no oxygen: no wheezing:
No hypoxia, does not meet criteria for dex/remdes  Vaccinated  Monitor O2 sats  Positive at least 1 week  CXR clear  Complete remdesivir
No hypoxia, does not meet criteria for dex/remdes  Vaccinated  Monitor O2 sats  Positive at least 1 week  CXR clear  Discussed with Dr. Wolf: LFT improving   remdesivir started
No hypoxia, does not meet criteria for dex/remdes  Vaccinated  Monitor O2 sats  Positive at least 1 week  CXR clear  Complete remdesivir
No hypoxia, does not meet criteria for dex/remdes  Vaccinated  Monitor O2 sats  Positive at least 1 week  CXR clear
seems to be down trending
seems to be down trending    1/12: per renal
seems to be down trending
No hypoxia, does not meet criteria for dex/remdes  Vaccinated  Monitor O2 sats  Positive at least 1 week  CXR clear  Discussed with Dr. Wolf: LFT Improved  Complete remdesivir
No hypoxia, does not meet criteria for dex/remdes  Vaccinated  Monitor O2 sats  Positive at least 1 week  CXR clear  Discussed with Dr. Wolf: will consider short-term treatment remdisivir F liver function continues to improve

## 2022-01-20 NOTE — CONSULT NOTE ADULT - CONSULT REQUESTED DATE/TIME
09-Jan-2022 17:03
09-Jan-2022 17:14
10-Yared-2022 12:06
20-Jan-2022
09-Jan-2022 09:54
10-Yared-2022 11:40
10-Yared-2022 16:16
11-Jan-2022

## 2022-01-20 NOTE — PROGRESS NOTE ADULT - PROBLEM SELECTOR PROBLEM 7
Essential hypertension
Multiple myeloma
Essential hypertension
Multiple myeloma
Essential hypertension
Multiple myeloma

## 2022-01-20 NOTE — PROGRESS NOTE ADULT - PROBLEM SELECTOR PLAN 7
Oncologist Dr Mcdaniel
Per hx.  -Heme onc recs noted.
Oncologist Dr Mcdaniel
Oncologist Dr Mcdaniel
Controlled
Oncologist Dr Mcdaniel
Per hx.
Oncologist Dr Mcdaniel

## 2022-01-20 NOTE — PROGRESS NOTE ADULT - SUBJECTIVE AND OBJECTIVE BOX
INTERVAL HPI/OVERNIGHT EVENTS:    eating well   denies n/v/d/c, abdominal pain, melena or brbpr     MEDICATIONS  (STANDING):  apixaban 5 milliGRAM(s) Oral every 12 hours  calcium carbonate   1250 mG (OsCal) 1 Tablet(s) Oral daily  cholecalciferol 1000 Unit(s) Oral daily  dextrose 40% Gel 15 Gram(s) Oral once  dextrose 5%. 1000 milliLiter(s) (50 mL/Hr) IV Continuous <Continuous>  dextrose 5%. 1000 milliLiter(s) (100 mL/Hr) IV Continuous <Continuous>  dextrose 50% Injectable 25 Gram(s) IV Push once  dextrose 50% Injectable 12.5 Gram(s) IV Push once  dextrose 50% Injectable 25 Gram(s) IV Push once  famotidine    Tablet 10 milliGRAM(s) Oral every 48 hours  finasteride 5 milliGRAM(s) Oral daily  glucagon  Injectable 1 milliGRAM(s) IntraMuscular once  insulin glargine Injectable (LANTUS) 5 Unit(s) SubCutaneous at bedtime  insulin lispro (ADMELOG) corrective regimen sliding scale   SubCutaneous three times a day before meals  metoprolol tartrate 25 milliGRAM(s) Oral two times a day  pantoprazole    Tablet 40 milliGRAM(s) Oral before breakfast  predniSONE   Tablet 40 milliGRAM(s) Oral daily  sodium bicarbonate 650 milliGRAM(s) Oral three times a day  sodium chloride 0.45%. 1000 milliLiter(s) (50 mL/Hr) IV Continuous <Continuous>  tamsulosin 0.4 milliGRAM(s) Oral at bedtime  triamcinolone 0.1% Ointment 1 Application(s) Topical every 12 hours    MEDICATIONS  (PRN):      Allergies    allopurinol (Other)    Intolerances        Review of Systems:    General:  No wt loss, fevers, chills, night sweats, fatigue   Eyes:  Good vision, no reported pain  ENT:  No sore throat, pain, runny nose, dysphagia  CV:  No pain, palpitations, hypo/hypertension  Resp:  No dyspnea, cough, tachypnea, wheezing  GI:  No pain, No nausea, No vomiting, No diarrhea, No constipation, No weight loss, No fever, No pruritis, No rectal bleeding, No melena, No dysphagia  :  No pain, bleeding, incontinence, nocturia  Muscle:  No pain, weakness  Neuro:  No weakness, tingling, memory problems  Psych:  No fatigue, insomnia, mood problems, depression  Endocrine:  No polyuria, polydypsia, cold/heat intolerance  Heme:  No petechiae, ecchymosis, easy bruisability  Skin:  No rash, tattoos, scars, edema      Vital Signs Last 24 Hrs  T(C): 37 (20 Jan 2022 11:28), Max: 37 (20 Jan 2022 11:28)  T(F): 98.6 (20 Jan 2022 11:28), Max: 98.6 (20 Jan 2022 11:28)  HR: 76 (20 Jan 2022 11:28) (71 - 76)  BP: 117/60 (20 Jan 2022 11:28) (114/59 - 121/58)  BP(mean): --  RR: 17 (20 Jan 2022 11:28) (17 - 17)  SpO2: 100% (20 Jan 2022 11:28) (99% - 100%)    PHYSICAL EXAM:    Constitutional: NAD  HEENT: EOMI, throat clear  Neck: No LAD, supple  Respiratory: CTA and P  Cardiovascular: S1 and S2, RRR, no M  Gastrointestinal: BS+, soft, NT/ND, neg HSM,  Extremities: No peripheral edema, neg clubbing, cyanosis  Vascular: 2+ peripheral pulses  Neurological: A/O x 3, no focal deficits  Psychiatric: Normal mood, normal affect  Skin: No rashes      LABS:                        7.2    4.52  )-----------( 354      ( 20 Jan 2022 07:06 )             22.9     01-20    137  |  104  |  20  ----------------------------<  90  4.0   |  28  |  0.98    Ca    8.0<L>      20 Jan 2022 07:06  Phos  2.5     01-20  Mg     1.70     01-20    TPro  5.4<L>  /  Alb  3.0<L>  /  TBili  0.7  /  DBili  x   /  AST  17  /  ALT  57<H>  /  AlkPhos  108  01-20          RADIOLOGY & ADDITIONAL TESTS:

## 2022-01-20 NOTE — PROGRESS NOTE ADULT - PROBLEM SELECTOR PROBLEM 5
Transaminitis
History of deep vein thrombosis (DVT) of lower extremity
Transaminitis
History of deep vein thrombosis (DVT) of lower extremity
History of deep vein thrombosis (DVT) of lower extremity
Transaminitis

## 2022-01-20 NOTE — PROGRESS NOTE ADULT - PROBLEM SELECTOR PROBLEM 9
History of CVA (cerebrovascular accident)

## 2022-01-20 NOTE — PROGRESS NOTE ADULT - PROBLEM SELECTOR PLAN 6
LE duplex with b/l DVT  -AC with Eliquis  -VQ scan low probability of PE.
US : Deep venous thrombosis of the right common femoral and popliteal veins.    LEFT:  Deep venous thrombosis of the left common femoral, femoral, and popliteal   veins. cont eliquis /    1/14: cont ac
US : Deep venous thrombosis of the right common femoraland popliteal veins.    LEFT:  Deep venous thrombosis of the left common femoral, femoral, and popliteal   veins.  cont eliquis /.. Report of blood from the mouth .. However not actively bleeding. Monitor hemoglobin.  Will reconsult ENT given report of dysphagia
US : Deep venous thrombosis of the right common femoraland popliteal veins.    LEFT:  Deep venous thrombosis of the left common femoral, femoral, and popliteal   veins. cont eliquis /
US : Deep venous thrombosis of the right common femoraland popliteal veins.    LEFT:  Deep venous thrombosis of the left common femoral, femoral, and popliteal   veins.  cont eliquis /.. Report of blood from the mouth .. However not actively bleeding. Monitor hemoglobin.
US : Deep venous thrombosis of the right common femoraland popliteal veins.    LEFT:  Deep venous thrombosis of the left common femoral, femoral, and popliteal   veins.  cont eliquis /
US : Deep venous thrombosis of the right common femoral and popliteal veins.    LEFT:  Deep venous thrombosis of the left common femoral, femoral, and popliteal   veins. cont eliquis /    1/14: cont ac  1/15: cont ac:
US : Deep venous thrombosis of the right common femoral and popliteal veins.    LEFT:  Deep venous thrombosis of the left common femoral, femoral, and popliteal   veins. cont eliquis /    1/14: cont ac  1/15: cont ac:
US : Deep venous thrombosis of the right common femoraland popliteal veins.    LEFT:  Deep venous thrombosis of the left common femoral, femoral, and popliteal   veins.  cont eliquis /
per PMd
US : Deep venous thrombosis of the right common femoraland popliteal veins.    LEFT:  Deep venous thrombosis of the left common femoral, femoral, and popliteal   veins.  cont eliquis /
per PMd
US : Deep venous thrombosis of the right common femoraland popliteal veins.    LEFT:  Deep venous thrombosis of the left common femoral, femoral, and popliteal   veins.  cont eliquis /
LE duplex with b/l DVT  -AC with Eliquis  -VQ scan low probability of PE.
US : Deep venous thrombosis of the right common femoraland popliteal veins.    LEFT:  Deep venous thrombosis of the left common femoral, femoral, and popliteal   veins.  cont eliquis /
in Eliquis
per PMd
in Eliquis
On prophylactic dose Eliquis  Heparin gtt pending r/o VTE  d-dimer not significantly elevated   US LE dopplers still with DVT. resume Eliquis BID

## 2022-01-20 NOTE — PROGRESS NOTE ADULT - PROVIDER SPECIALTY LIST ADULT
Dental
Dermatology
ENT
Gastroenterology
Heme/Onc
Infectious Disease
Infectious Disease
Nephrology
Nephrology
Pulmonology
Dermatology
Gastroenterology
Gastroenterology
Heme/Onc
Infectious Disease
Infectious Disease
Internal Medicine
Internal Medicine
Gastroenterology
Gastroenterology
Heme/Onc
Internal Medicine
Nephrology
Nephrology
Pulmonology
Gastroenterology
Gastroenterology
Heme/Onc
Heme/Onc
Nephrology
Nephrology
Pulmonology
Pulmonology
Internal Medicine
Internal Medicine
Pulmonology
Internal Medicine
Pulmonology
Internal Medicine
Internal Medicine
Pulmonology
Pulmonology
Internal Medicine

## 2022-01-20 NOTE — PROGRESS NOTE ADULT - PROBLEM SELECTOR PROBLEM 6
History of deep vein thrombosis (DVT) of lower extremity
Multiple myeloma
History of deep vein thrombosis (DVT) of lower extremity
Multiple myeloma
Multiple myeloma
History of deep vein thrombosis (DVT) of lower extremity

## 2022-01-20 NOTE — CONSULT NOTE ADULT - ASSESSMENT
- IF This is a 71 y.o M with HTN, CKD II, remote Hx DVT (eliquis 2.5 bid), hx CVA with R sided deficit, MM undergoing chemotherapy (scheduled for 1/4/22) which has been delayed by COVID-19 positive test 12/31/21 presents with dyspnea without hypoxia, tachycardia, JULIA, transaminitis, sepsis due to viral etiology (COVID-19 with adenovirus) vs bacterial etiology.  Patient is a poor historian, so history mainly obtained from the chart. Pt was started on steroid taper for DRESS during the hospitalization, and will be discharged home on steroid.  He is currently on prednisone 40 mg daily, and plan is to decreases Prednisone by 10 mg weekly over next 4 weeks.  Steroid taper to be managed by dermatology.  Endocrine team is consulted for elevated BG and A1c of 7.1%.  He is currently managed on Lantus 5 U and SS.  FBG have been at less than 180 and prandial BG have been in 200s.      # Stress hyperglycemia due to steroids   # Newly dx DM type   A1c 7.1%, A1c goal < 7%    - pt is for discharge today and will be discharged on prednisone taper.   - pt is not requiring much insulin, received total of 9 U of insulin in last 24 hours.  FBG is at goal of less than 180, prandial BG is in low-mid 200s.  NPH would have been a good choice for BG which are mainly elevated during the day time.  However, pt wouldn't be able to take the insulin.    - JULIA has improved and GFR is 76 now.  BG are mainly in low 200s, would be comfortable discharging him on PO anti-diabetic medication.  Would recommend Jardiance 10 mg daily   - RN to teach pt how to check BG, and pt to check BG at least twice a day.   -  to follow a diabetic diet   - If pt is not discharged: c/w Lantus 5 U HS, START lispro 2 U TID with meals, and c/w mod ss with meals and low dose at night.  Hold Lispro if not eating or NPO.  Carb consistent diet.      POC d/w the primary team and pt.      *Please note a different provider maybe managing this patient tomorrow/daily*. Please contact our office at [phone] regarding follow-up queries about this patient. For any endocrine emergencies, please state urgency when calling [phone] during business hours and to speak with on-call fellow after 5pm and on weekends.    Donna Griggs MD  Cell: 9AM - 5PM (Mon-Fri): [phone]  After 5PM and on Weekends: [phone]     For nonurgent matters, please email [email or web address] for assistance.      - IF This is a 71 y.o M with HTN, CKD II, remote Hx DVT (eliquis 2.5 bid), hx CVA with R sided deficit, MM undergoing chemotherapy (scheduled for 1/4/22) which has been delayed by COVID-19 positive test 12/31/21 presents with dyspnea without hypoxia, tachycardia, JULIA, transaminitis, sepsis due to viral etiology (COVID-19 with adenovirus) vs bacterial etiology.  Patient is a poor historian, so history mainly obtained from the chart. Pt was started on steroid taper for DRESS during the hospitalization, and will be discharged home on steroid.  He is currently on prednisone 40 mg daily, and plan is to decreases Prednisone by 10 mg weekly over next 4 weeks.  Steroid taper to be managed by dermatology.  Endocrine team is consulted for elevated BG and A1c of 7.1%.  He is currently managed on Lantus 5 U and SS.  FBG have been at less than 180 and prandial BG have been in 200s.      # Stress hyperglycemia due to steroids   # Newly dx DM type   A1c 7.1%, A1c goal < 7%    - pt is for discharge today and will be discharged on prednisone taper.   - pt is not requiring much insulin, received total of 9 U of insulin in last 24 hours.  FBG is at goal of less than 180, prandial BG is in low-mid 200s.  NPH would have been a good choice for BG which are mainly elevated during the day time.  However, pt wouldn't be able to take the insulin.    - JULIA has improved and GFR is 76 now.  BG are mainly in low 200s, would be comfortable discharging him on PO anti-diabetic medication.  Would recommend Jardiance 25 mg daily while on steroids, AND then decrease it to 10 mg daily.  HE should follow with us in the clinic at [address] [place] [address] or PCP for further management of DM.    - RN to teach pt how to check BG, and pt to check BG at least twice a day.   -  to follow a diabetic diet   - If pt is not discharged: c/w Lantus 5 U HS, START lispro 2 U TID with meals, and c/w mod ss with meals and low dose at night.  Hold Lispro if not eating or NPO.  Carb consistent diet.      POC d/w the primary team and pt.      *Please note a different provider maybe managing this patient tomorrow/daily*. Please contact our office at [phone] regarding follow-up queries about this patient. For any endocrine emergencies, please state urgency when calling [phone] during business hours and to speak with on-call fellow after 5pm and on weekends.    Donna Griggs MD  Cell: 9AM - 5PM (Mon-Fri): [phone]  After 5PM and on Weekends: [phone]     For nonurgent matters, please email [email or web address] for assistance.      - IF This is a 71 y.o M with HTN, CKD II, remote Hx DVT (eliquis 2.5 bid), hx CVA with R sided deficit, MM undergoing chemotherapy (scheduled for 1/4/22) which has been delayed by COVID-19 positive test 12/31/21 presents with dyspnea without hypoxia, tachycardia, JULIA, transaminitis, sepsis due to viral etiology (COVID-19 with adenovirus) vs bacterial etiology.  Patient is a poor historian, so history mainly obtained from the chart. Pt was started on steroid taper for DRESS during the hospitalization, and will be discharged home on steroid.  He is currently on prednisone 40 mg daily, and plan is to decreases Prednisone by 10 mg weekly over next 4 weeks.  Steroid taper to be managed by dermatology.  Endocrine team is consulted for elevated BG and A1c of 7.1%.  He is currently managed on Lantus 5 U and SS.  FBG have been at less than 180 and prandial BG have been in 200s.      # Stress hyperglycemia due to steroids   # Newly dx DM   A1c 7.1%, A1c goal < 7%    - pt is for discharge today and will be discharged on prednisone taper.   - pt is not requiring much insulin, received total of 9 U of insulin in last 24 hours.  FBG is at goal of less than 180, prandial BG is in low-mid 200s.  NPH would have been a good choice for BG which are mainly elevated during the day likely due to steroids.  However, pt wouldn't be able to administer the insulin himself.  Unsure if wife would be able to do NPH insulin.      - JULIA has improved and GFR is 76 now.  BG are mainly in low 200s, would be comfortable discharging him on PO anti-diabetic medication.  Would recommend Jardiance 25 mg daily while on steroids, AND then decrease it to 10 mg daily.  HE should follow with us in the clinic at [address] [place] [address] or PCP for further management of DM.    - RN to teach pt how to check BG, and pt to check BG at least twice a day.   -  to follow a diabetic diet   - If pt is not discharged: c/w Lantus 5 U HS, START lispro 2 U TID with meals, and c/w mod ss with meals and low dose at night.  Hold Lispro if not eating or NPO.  Carb consistent diet.      POC d/w the primary team and pt.      *Please note a different provider maybe managing this patient tomorrow/daily*. Please contact our office at [phone] regarding follow-up queries about this patient. For any endocrine emergencies, please state urgency when calling [phone] during business hours and to speak with on-call fellow after 5pm and on weekends.    Donna Griggs MD  Cell: 9AM - 5PM (Mon-Fri): [phone]  After 5PM and on Weekends: [phone]     For nonurgent matters, please email [email or web address] for assistance.

## 2022-01-20 NOTE — PROGRESS NOTE ADULT - SUBJECTIVE AND OBJECTIVE BOX
Patient is a 71y old  Male who presents with a chief complaint of Shortness of breath (20 Jan 2022 14:28)      SUBJECTIVE / OVERNIGHT EVENTS:    MEDICATIONS  (STANDING):  apixaban 5 milliGRAM(s) Oral every 12 hours  calcium carbonate   1250 mG (OsCal) 1 Tablet(s) Oral daily  cholecalciferol 1000 Unit(s) Oral daily  dextrose 40% Gel 15 Gram(s) Oral once  dextrose 5%. 1000 milliLiter(s) (50 mL/Hr) IV Continuous <Continuous>  dextrose 5%. 1000 milliLiter(s) (100 mL/Hr) IV Continuous <Continuous>  dextrose 50% Injectable 25 Gram(s) IV Push once  dextrose 50% Injectable 12.5 Gram(s) IV Push once  dextrose 50% Injectable 25 Gram(s) IV Push once  famotidine    Tablet 10 milliGRAM(s) Oral every 48 hours  finasteride 5 milliGRAM(s) Oral daily  glucagon  Injectable 1 milliGRAM(s) IntraMuscular once  insulin glargine Injectable (LANTUS) 5 Unit(s) SubCutaneous at bedtime  insulin lispro (ADMELOG) corrective regimen sliding scale   SubCutaneous three times a day before meals  metoprolol tartrate 25 milliGRAM(s) Oral two times a day  pantoprazole    Tablet 40 milliGRAM(s) Oral before breakfast  predniSONE   Tablet 40 milliGRAM(s) Oral daily  sodium bicarbonate 650 milliGRAM(s) Oral three times a day  sodium chloride 0.45%. 1000 milliLiter(s) (50 mL/Hr) IV Continuous <Continuous>  tamsulosin 0.4 milliGRAM(s) Oral at bedtime  triamcinolone 0.1% Ointment 1 Application(s) Topical every 12 hours    MEDICATIONS  (PRN):      Vital Signs Last 24 Hrs  T(F): 98.6 (01-20-22 @ 11:28), Max: 98.6 (01-20-22 @ 11:28)  HR: 76 (01-20-22 @ 11:28) (71 - 76)  BP: 117/60 (01-20-22 @ 11:28) (115/60 - 121/58)  RR: 17 (01-20-22 @ 11:28) (17 - 17)  SpO2: 100% (01-20-22 @ 11:28) (100% - 100%)  Telemetry:   CAPILLARY BLOOD GLUCOSE      POCT Blood Glucose.: 180 mg/dL (20 Jan 2022 17:18)  POCT Blood Glucose.: 253 mg/dL (20 Jan 2022 12:57)  POCT Blood Glucose.: 141 mg/dL (20 Jan 2022 09:34)  POCT Blood Glucose.: 163 mg/dL (19 Jan 2022 22:41)    I&O's Summary    19 Jan 2022 07:01  -  20 Jan 2022 07:00  --------------------------------------------------------  IN: 0 mL / OUT: 425 mL / NET: -425 mL        PHYSICAL EXAM:  GENERAL: NAD, well-developed  HEAD:  Atraumatic, Normocephalic  EYES: EOMI, PERRLA, conjunctiva and sclera clear  NECK: Supple, No JVD  CHEST/LUNG: Clear to auscultation bilaterally; No wheeze  HEART: Regular rate and rhythm; No murmurs, rubs, or gallops  ABDOMEN: Soft, Nontender, Nondistended; Bowel sounds present  EXTREMITIES:  2+ Peripheral Pulses, No clubbing, cyanosis, or edema  PSYCH: AAOx3  NEUROLOGY: non-focal  SKIN: No rashes or lesions    LABS:                        7.2    4.52  )-----------( 354      ( 20 Jan 2022 07:06 )             22.9     01-20    137  |  104  |  20  ----------------------------<  90  4.0   |  28  |  0.98    Ca    8.0<L>      20 Jan 2022 07:06  Phos  2.5     01-20  Mg     1.70     01-20    TPro  5.4<L>  /  Alb  3.0<L>  /  TBili  0.7  /  DBili  x   /  AST  17  /  ALT  57<H>  /  AlkPhos  108  01-20              RADIOLOGY & ADDITIONAL TESTS:    Imaging Personally Reviewed:    Consultant(s) Notes Reviewed:      Care Discussed with Consultants/Other Providers:   Patient is a 71y old  Male who presents with a chief complaint of Shortness of breath (20 Jan 2022 14:28)      SUBJECTIVE / OVERNIGHT EVENTS: no new developments    MEDICATIONS  (STANDING):  apixaban 5 milliGRAM(s) Oral every 12 hours  calcium carbonate   1250 mG (OsCal) 1 Tablet(s) Oral daily  cholecalciferol 1000 Unit(s) Oral daily  dextrose 40% Gel 15 Gram(s) Oral once  dextrose 5%. 1000 milliLiter(s) (50 mL/Hr) IV Continuous <Continuous>  dextrose 5%. 1000 milliLiter(s) (100 mL/Hr) IV Continuous <Continuous>  dextrose 50% Injectable 25 Gram(s) IV Push once  dextrose 50% Injectable 12.5 Gram(s) IV Push once  dextrose 50% Injectable 25 Gram(s) IV Push once  famotidine    Tablet 10 milliGRAM(s) Oral every 48 hours  finasteride 5 milliGRAM(s) Oral daily  glucagon  Injectable 1 milliGRAM(s) IntraMuscular once  insulin glargine Injectable (LANTUS) 5 Unit(s) SubCutaneous at bedtime  insulin lispro (ADMELOG) corrective regimen sliding scale   SubCutaneous three times a day before meals  metoprolol tartrate 25 milliGRAM(s) Oral two times a day  pantoprazole    Tablet 40 milliGRAM(s) Oral before breakfast  predniSONE   Tablet 40 milliGRAM(s) Oral daily  sodium bicarbonate 650 milliGRAM(s) Oral three times a day  sodium chloride 0.45%. 1000 milliLiter(s) (50 mL/Hr) IV Continuous <Continuous>  tamsulosin 0.4 milliGRAM(s) Oral at bedtime  triamcinolone 0.1% Ointment 1 Application(s) Topical every 12 hours    MEDICATIONS  (PRN):      Vital Signs Last 24 Hrs  T(F): 98.6 (01-20-22 @ 11:28), Max: 98.6 (01-20-22 @ 11:28)  HR: 76 (01-20-22 @ 11:28) (71 - 76)  BP: 117/60 (01-20-22 @ 11:28) (115/60 - 121/58)  RR: 17 (01-20-22 @ 11:28) (17 - 17)  SpO2: 100% (01-20-22 @ 11:28) (100% - 100%)  Telemetry:   CAPILLARY BLOOD GLUCOSE      POCT Blood Glucose.: 180 mg/dL (20 Jan 2022 17:18)  POCT Blood Glucose.: 253 mg/dL (20 Jan 2022 12:57)  POCT Blood Glucose.: 141 mg/dL (20 Jan 2022 09:34)  POCT Blood Glucose.: 163 mg/dL (19 Jan 2022 22:41)    I&O's Summary    19 Jan 2022 07:01  -  20 Jan 2022 07:00  --------------------------------------------------------  IN: 0 mL / OUT: 425 mL / NET: -425 mL        PHYSICAL EXAM:  GENERAL: NAD, well-developed  HEAD:  Atraumatic, Normocephalic  EYES: EOMI, PERRLA, conjunctiva and sclera clear  NECK: Supple, No JVD  CHEST/LUNG: Clear to auscultation bilaterally; No wheeze  HEART: Regular rate and rhythm; No murmurs, rubs, or gallops  ABDOMEN: Soft, Nontender, Nondistended; Bowel sounds present  EXTREMITIES:  2+ Peripheral Pulses, No clubbing, cyanosis, or edema  PSYCH: AAOx3  NEUROLOGY: non-focal  SKIN: No rashes or lesions    LABS:                        7.2    4.52  )-----------( 354      ( 20 Jan 2022 07:06 )             22.9     01-20    137  |  104  |  20  ----------------------------<  90  4.0   |  28  |  0.98    Ca    8.0<L>      20 Jan 2022 07:06  Phos  2.5     01-20  Mg     1.70     01-20    TPro  5.4<L>  /  Alb  3.0<L>  /  TBili  0.7  /  DBili  x   /  AST  17  /  ALT  57<H>  /  AlkPhos  108  01-20              RADIOLOGY & ADDITIONAL TESTS:    Imaging Personally Reviewed:    Consultant(s) Notes Reviewed:      Care Discussed with Consultants/Other Providers:

## 2022-01-20 NOTE — PROGRESS NOTE ADULT - PROBLEM SELECTOR PLAN 3
Appreciate nephrology input  Continue hydration and albumin  Bicarb for metabolic acidosis
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
Appreciate nephrology input  Creatinine much improved.  Etiology likely secondary to DRESS
2nd to DRESS  -Renal recs noted  -Improving with steroids.
2nd to DRESS  -Renal recs noted  -Improving with steroids.
Appreciate nephrology input  Continue hydration and albumin  Bicarb for metabolic acidosis
per pmd
renal following  Creatinine much improved.  Etiology likely secondary to DRESS
Appreciate nephrology input  Continue hydration and albumin  Bicarb for metabolic acidosis
per pmd
renal following  Creatinine much improved.  Etiology likely secondary to DRESS
Appreciate nephrology input  Continue hydration and albumin  Bicarb for metabolic acidosis
per pmd      1/12: eiadeola treated for drress syndrome
In setting of sepsis: ATN vs. r/o post obstruction.  Continue IV hydration  Cr downtrending.   renal eval.  renal US, bladder scan r/o obstruction  renally dose meds
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
Appreciate nephrology input  Continue hydration and albumin  Bicarb for metabolic acidosis
Appreciate nephrology input  Continue hydration and albumin  Bicarb for metabolic acidosis    1/16: he is being treated for DRESS syndrome likely due to allopurinol:
renal following  Creatinine much improved.  Etiology likely secondary to DRESS
In setting of sepsis: ATN vs. r/o post obstruction.  Continue IV hydration  Cr downtrending.   renal eval.  renal US, bladder scan r/o obstruction  renally dose meds
Appreciate nephrology input  Continue hydration and albumin  Bicarb for metabolic acidosis

## 2022-01-20 NOTE — CONSULT NOTE ADULT - REASON FOR ADMISSION
Shortness of breath
Dominick
Shortness of breath

## 2022-01-20 NOTE — PROGRESS NOTE ADULT - REASON FOR ADMISSION

## 2022-01-20 NOTE — PROGRESS NOTE ADULT - PROBLEM SELECTOR PROBLEM 4
Rash
Transaminitis
Rash
Transaminitis
Rash
Transaminitis
Rash

## 2022-01-20 NOTE — PROGRESS NOTE ADULT - PROBLEM SELECTOR PLAN 1
Discussed with Dr. Wolf infectious disease: No evidence of bacterial infection, will discontinue antibiotics at this time and observe.  Fever and elevated LFTs possibly secondary to viral infection including COVID and adenovirus  The constellation of fever, rash and systemic involvement including liver and kidney might very well be secondary to DRESS Secondary to allopurinol and less likely to other medications.  Appreciate ID and dermatology input.  Continue steroids Twice daily dosing, continue famotidine and will likely need long standing steroids.  discussed on 1/14 with derm : not gabriela albarran and more consistent w DRESS.. findings on lips can happen in either entity   c
as per ID:  No evidence of bacterial infection,now off ABx  Fever and elevated LFTs possibly secondary to viral infection including COVID and adenovirus  The constellation of fever, rash and systemic involvement including liver and kidney might very well be secondary to DRESS Secondary to allopurinol and less likely to other medications.  Appreciate ID and dermatology input.   steroids Twice daily dosing And now with daily dosing. continue famotidine and will likely need long standing steroids.  discussed on 1/14 with derm : not gabriela albarran and more consistent w DRESS.. findings on lips can happen in either entity .  derm f/u appreciated
off rx: his oxygenation is pretty good:  not na [place] rx:   has bleeding from oral cavity: ent to see; on ac:    1/11: doing ok : on steroids per primary team :
Discussed with Dr. Wolf infectious disease: No evidence of bacterial infection, will discontinue antibiotics at this time and observe.  Fever and elevated LFTs possibly secondary to viral infection including COVID and adenovirus  The constellation of fever, rash and systemic involvement including liver and kidney might very well be secondary to DRESS Secondary to allopurinol and less likely to other medications.  Appreciate ID and dermatology input.  Continue steroids Twice daily dosing, continue famotidine and will likely need long standing steroids.    1/13:  Discussed with Dr. Wolf infectious disease: No evidence of bacterial infection, will discontinue antibiotics at this time and observe.  Fever and elevated LFTs possibly secondary to viral infection including COVID and adenovirus  The constellation of fever, rash and systemic involvement including liver and kidney might very well be secondary to DRESS Secondary to allopurinol and less likely to other medications.  Appreciate ID and dermatology input.  Continue steroids Twice daily dosing, continue famotidine and will likely need long standing steroids.    1/14: he says he still has bleeding: on ac: hb is trending down ? dental evaluation    1/15: he seems OK: no bleeding:    !/16: breathing wise he seems to be ok: no wheezing: no crackles+
Discussed with Dr. Wolf infectious disease: No evidence of bacterial infection, will discontinue antibiotics at this time and observe.  Fever and elevated LFTs possibly secondary to viral infection including COVID and adenovirus  The constellation of fever, rash and systemic involvement including liver and kidney might very well be secondary to DRESS Secondary to allopurinol and less likely to other medications.  Appreciate ID and dermatology input.  Will start steroids famotidine and will likely need long standing steroids.  6
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
Discussed with Dr. Wolf infectious disease: No evidence of bacterial infection, will discontinue antibiotics at this time and observe.  Fever and elevated LFTs possibly secondary to viral infection including COVID and adenovirus  The constellation of fever, rash and systemic involvement including liver and kidney might very well be secondary to DRESS Secondary to allopurinol and less likely to other medications.  Appreciate ID and dermatology input.  Continue steroids Twice daily dosing, continue famotidine and will likely need long standing steroids.    1/13:  Discussed with Dr. Wolf infectious disease: No evidence of bacterial infection, will discontinue antibiotics at this time and observe.  Fever and elevated LFTs possibly secondary to viral infection including COVID and adenovirus  The constellation of fever, rash and systemic involvement including liver and kidney might very well be secondary to DRESS Secondary to allopurinol and less likely to other medications.  Appreciate ID and dermatology input.  Continue steroids Twice daily dosing, continue famotidine and will likely need long standing steroids.    1/14: he says he still has bleeding: on ac: hb is trending down ? dental evaluation    1/15: he seems OK: no bleeding:
Discussed with Dr. Wolf infectious disease: No evidence of bacterial infection, will discontinue antibiotics at this time and observe.  Fever and elevated LFTs possibly secondary to viral infection including COVID and adenovirus  The constellation of fever, rash and systemic involvement including liver and kidney might very well be secondary to DRESS Secondary to allopurinol and less likely to other medications.  Appreciate ID and dermatology input.  Continue steroids Twice daily dosing, continue famotidine and will likely need long standing steroids.    1/13:  Discussed with Dr. Wolf infectious disease: No evidence of bacterial infection, will discontinue antibiotics at this time and observe.  Fever and elevated LFTs possibly secondary to viral infection including COVID and adenovirus  The constellation of fever, rash and systemic involvement including liver and kidney might very well be secondary to DRESS Secondary to allopurinol and less likely to other medications.  Appreciate ID and dermatology input.  Continue steroids Twice daily dosing, continue famotidine and will likely need long standing steroids.
RVP+ for COVID, adenovirus  -CXR grossly clear  -S/p Remdesivir x3 days  -Normoxic, no complaints of dyspnea   -Trend inflammatory markers  -VQ scan with low probability of PE, LE duplex 1/9 with b/l DVT  -AC with Eliquis  -D/c planning per primary team.
Discussed with Dr. Wolf infectious disease: No evidence of bacterial infection, will discontinue antibiotics at this time and observe.  Fever and elevated LFTs possibly secondary to viral infection including COVID and adenovirus  The constellation of fever, rash and systemic involvement including liver and kidney might very well be secondary to DRESS Secondary to allopurinol and less likely to other medications.  Appreciate ID and dermatology input.   steroids Twice daily dosing And now with daily dosing. continue famotidine and will likely need long standing steroids.  discussed on 1/14 with derm : not gabriela albarran and more consistent w DRESS.. findings on lips can happen in either entity .  Called and discussed with dermatology on 1/17: Awaiting follow-up
as per ID:  No evidence of bacterial infection,now off ABx  Fever and elevated LFTs possibly secondary to viral infection including COVID and adenovirus  The constellation of fever, rash and systemic involvement including liver and kidney might very well be secondary to DRESS Secondary to allopurinol and less likely to other medications.  Appreciate ID and dermatology input.   steroids Twice daily dosing And now with daily dosing. continue famotidine and will likely need long standing steroids.  discussed on 1/14 with derm : not gabriela albarran and more consistent w DRESS.. findings on lips can happen in either entity .  derm f/u appreciated
Discussed with Dr. Wolf infectious disease: No evidence of bacterial infection, will discontinue antibiotics at this time and observe.  Fever and elevated LFTs possibly secondary to viral infection including COVID and adenovirus  The constellation of fever, rash and systemic involvement including liver and kidney might very well be secondary to DRESS Secondary to allopurinol and less likely to other medications.  Appreciate ID and dermatology input.  Continue steroids Twice daily dosing, continue famotidine and will likely need long standing steroids.    1/13:  Discussed with Dr. Wolf infectious disease: No evidence of bacterial infection, will discontinue antibiotics at this time and observe.  Fever and elevated LFTs possibly secondary to viral infection including COVID and adenovirus  The constellation of fever, rash and systemic involvement including liver and kidney might very well be secondary to DRESS Secondary to allopurinol and less likely to other medications.  Appreciate ID and dermatology input.  Continue steroids Twice daily dosing, continue famotidine and will likely need long standing steroids.    1/14: he says he still has bleeding: on ac: hb is trending down ? dental evaluation
seems to be doing pretty good: afebrile and wbc count is normal: on high dose steroids for dress syndrome!    1/18: seems to be doing good: no SOB: on room air: no bleeding:
seems to be doing pretty good: afebrile and wbc count is normal: on high dose steroids for dress syndrome!
Discussed with Dr. Wolf infectious disease: No evidence of bacterial infection, will discontinue antibiotics at this time and observe.  Fever and elevated LFTs possibly secondary to viral infection including COVID and adenovirus  The constellation of fever, rash and systemic involvement including liver and kidney might very well be secondary to DRESS Secondary to allopurinol and less likely to other medications.  Appreciate ID and dermatology input.  Continue steroids Twice daily dosing, continue famotidine and will likely need long standing steroids.
Discussed with Dr. Wolf infectious disease: No evidence of bacterial infection, will discontinue antibiotics at this time and observe.  Fever and elevated LFTs possibly secondary to viral infection including COVID and adenovirus  The constellation of fever, rash and systemic involvement including liver and kidney might very well be secondary to DRESS Secondary to allopurinol and less likely to other medications.  Appreciate ID and dermatology input.  Continue steroids Twice daily dosing, continue famotidine and will likely need long standing steroids.  discussed on 1/14 with derm : not gabriela albarran and more consistent w DRESS.. findings on lips can happen in either entity   c
as per ID:  No evidence of bacterial infection,now off ABx  Fever and elevated LFTs possibly secondary to viral infection including COVID and adenovirus  The constellation of fever, rash and systemic involvement including liver and kidney might very well be secondary to DRESS Secondary to allopurinol and less likely to other medications.  Appreciate ID and dermatology input.   steroids Twice daily dosing And now with daily dosing. continue famotidine and will likely need long standing steroids.  discussed on 1/14 with derm : not gabriela albarran and more consistent w DRESS.. findings on lips can happen in either entity .  derm f/u appreciated
Presents with fever, tachycardia and JULIA with COVID-19 (positive since 12/31) and adenovirus  Likely viral sepsis, however with MM and recent chemo will cover with broad spectrum abx (renally dosed cefepime)  -- f/u blood cx  -- UA contaminated, not impressive. urine cx pending. no urinary symptoms.  -- s/p 2L IVF bolus, continue IV hydration  -- febrile 103F, will give 1 dose of Vanco while waiting for blood cultures  -- ID syed (Dr. Barton/[facility])
RVP+ for COVID, adenovirus  -CXR grossly clear  -S/p Remdesivir x3 days  -Normoxic, no complaints of dyspnea   -Trend inflammatory markers  -VQ scan with low probability of PE, LE duplex 1/9 with b/l DVT  -AC with Eliquis  -D/c planning per primary team.
off rx: his oxygenation is pretty good:  not na ny rx:   has bleeding from oral cavity: ent to see; on ac:
Discussed with Dr. Wolf infectious disease: No evidence of bacterial infection, will discontinue antibiotics at this time and observe.  Fever and elevated LFTs possibly secondary to viral infection including COVID and adenovirus  The constellation of fever, rash and systemic involvement including liver and kidney might very well be secondary to DRESS Secondary to allopurinol and less likely to other medications.  Appreciate ID and dermatology input.  Continue steroids Twice daily dosing, continue famotidine and will likely need long standing steroids.  discussed on 1/14 with derm : not gabriela albarran and more consistent w DRESS.. findings on lips can happen in either entity   c
Discussed with Dr. Wolf infectious disease: No evidence of bacterial infection, will discontinue antibiotics at this time and observe.  Fever and elevated LFTs possibly secondary to viral infection including COVID and adenovirus  The constellation of fever, rash and systemic involvement including liver and kidney might very well be secondary to DRESS Secondary to allopurinol and less likely to other medications.  Appreciate ID and dermatology input.  Continue steroids Twice daily dosing, continue famotidine and will likely need long standing steroids.

## 2022-01-20 NOTE — PROGRESS NOTE ADULT - PROBLEM SELECTOR PROBLEM 8
Essential hypertension
History of CVA (cerebrovascular accident)
Essential hypertension
History of CVA (cerebrovascular accident)
History of CVA (cerebrovascular accident)
Essential hypertension

## 2022-01-21 ENCOUNTER — FORM ENCOUNTER (OUTPATIENT)
Age: 72
End: 2022-01-21

## 2022-01-21 ENCOUNTER — NON-APPOINTMENT (OUTPATIENT)
Age: 72
End: 2022-01-21

## 2022-03-10 NOTE — PROGRESS NOTE ADULT - PROBLEM SELECTOR PROBLEM 3
JULIA (acute kidney injury)
JULIA (acute kidney injury)
Rash
Rash
JULIA (acute kidney injury)
JULIA (acute kidney injury)
Rash
JULIA (acute kidney injury)
show

## 2022-03-30 PROBLEM — C90.00 MULTIPLE MYELOMA NOT HAVING ACHIEVED REMISSION: Chronic | Status: ACTIVE | Noted: 2022-01-08

## 2022-04-01 ENCOUNTER — NON-APPOINTMENT (OUTPATIENT)
Age: 72
End: 2022-04-01

## 2022-04-04 ENCOUNTER — APPOINTMENT (OUTPATIENT)
Dept: INFECTIOUS DISEASE | Facility: CLINIC | Age: 72
End: 2022-04-04
Payer: MEDICARE

## 2022-04-04 ENCOUNTER — NON-APPOINTMENT (OUTPATIENT)
Age: 72
End: 2022-04-04

## 2022-04-04 VITALS
WEIGHT: 175 LBS | TEMPERATURE: 98.5 F | HEIGHT: 71 IN | DIASTOLIC BLOOD PRESSURE: 87 MMHG | HEART RATE: 86 BPM | RESPIRATION RATE: 15 BRPM | SYSTOLIC BLOOD PRESSURE: 162 MMHG | BODY MASS INDEX: 24.5 KG/M2 | OXYGEN SATURATION: 96 %

## 2022-04-04 PROCEDURE — 99203 OFFICE O/P NEW LOW 30 MIN: CPT

## 2022-04-04 NOTE — HISTORY OF PRESENT ILLNESS
[FreeTextEntry1] : 71 being treated for MM. asymptomatic but has a positve quantfer gold test for tb done by Dr. Mcdaniel  grew in [facility]  no history of TB or exposure  normal chest xray.\par noeth  limits tylenol to 500 mg bid  lft essentially normal in january at [facility]

## 2022-04-04 NOTE — ASSESSMENT
[FreeTextEntry1] : positve tb est on MM therapy  risk factor  grw up [facility]  nl chest xrray discussedwthe rsiks and bnefites with step dtg in detail  pt understands risks\par will have LFT done q month and stop immediately if symptoms develop,

## 2022-04-06 LAB
ALBUMIN SERPL ELPH-MCNC: 4.4 G/DL
ALP BLD-CCNC: 131 U/L
ALT SERPL-CCNC: 20 U/L
ANION GAP SERPL CALC-SCNC: 10 MMOL/L
AST SERPL-CCNC: 16 U/L
BILIRUB SERPL-MCNC: 0.2 MG/DL
BUN SERPL-MCNC: 16 MG/DL
CALCIUM SERPL-MCNC: 9.2 MG/DL
CHLORIDE SERPL-SCNC: 103 MMOL/L
CO2 SERPL-SCNC: 28 MMOL/L
CREAT SERPL-MCNC: 1.11 MG/DL
EGFR: 70 ML/MIN/1.73M2
GLUCOSE SERPL-MCNC: 213 MG/DL
POTASSIUM SERPL-SCNC: 4.3 MMOL/L
PROT SERPL-MCNC: 6.8 G/DL
SODIUM SERPL-SCNC: 141 MMOL/L

## 2022-07-16 ENCOUNTER — NON-APPOINTMENT (OUTPATIENT)
Age: 72
End: 2022-07-16

## 2022-07-18 ENCOUNTER — OUTPATIENT (OUTPATIENT)
Dept: OUTPATIENT SERVICES | Facility: HOSPITAL | Age: 72
LOS: 1 days | Discharge: HOME | End: 2022-07-18

## 2022-07-18 DIAGNOSIS — Z98.89 OTHER SPECIFIED POSTPROCEDURAL STATES: Chronic | ICD-10-CM

## 2022-07-18 DIAGNOSIS — C90.00 MULTIPLE MYELOMA NOT HAVING ACHIEVED REMISSION: ICD-10-CM

## 2022-07-21 DIAGNOSIS — Z00.00 ENCOUNTER FOR GENERAL ADULT MEDICAL EXAMINATION W/OUT ABNORMAL FINDINGS: ICD-10-CM

## 2022-07-25 ENCOUNTER — RESULT REVIEW (OUTPATIENT)
Age: 72
End: 2022-07-25

## 2022-07-26 LAB — HEMATOPATHOLOGY REPORT: SIGNIFICANT CHANGE UP

## 2022-07-28 ENCOUNTER — APPOINTMENT (OUTPATIENT)
Dept: HEMATOLOGY ONCOLOGY | Facility: CLINIC | Age: 72
End: 2022-07-28

## 2022-07-28 VITALS
TEMPERATURE: 97.1 F | HEART RATE: 97 BPM | RESPIRATION RATE: 16 BRPM | OXYGEN SATURATION: 99 % | DIASTOLIC BLOOD PRESSURE: 83 MMHG | SYSTOLIC BLOOD PRESSURE: 157 MMHG | BODY MASS INDEX: 27.87 KG/M2 | HEIGHT: 66.34 IN | WEIGHT: 175.49 LBS

## 2022-07-28 DIAGNOSIS — Z83.3 FAMILY HISTORY OF DIABETES MELLITUS: ICD-10-CM

## 2022-07-28 DIAGNOSIS — Z87.438 PERSONAL HISTORY OF OTHER DISEASES OF MALE GENITAL ORGANS: ICD-10-CM

## 2022-07-28 DIAGNOSIS — Z82.49 FAMILY HISTORY OF ISCHEMIC HEART DISEASE AND OTHER DISEASES OF THE CIRCULATORY SYSTEM: ICD-10-CM

## 2022-07-28 PROCEDURE — 99205 OFFICE O/P NEW HI 60 MIN: CPT

## 2022-07-29 LAB
APPEARANCE: ABNORMAL
BILIRUBIN URINE: NEGATIVE
BLOOD URINE: NEGATIVE
COLOR: ABNORMAL
GLUCOSE QUALITATIVE U: NEGATIVE
KETONES URINE: NORMAL
LEUKOCYTE ESTERASE URINE: ABNORMAL
NITRITE URINE: NEGATIVE
PH URINE: 6
PROTEIN URINE: ABNORMAL
SPECIFIC GRAVITY URINE: 1.02
UROBILINOGEN URINE: ABNORMAL

## 2022-08-01 LAB — BACTERIA UR CULT: ABNORMAL

## 2022-08-08 ENCOUNTER — TRANSCRIPTION ENCOUNTER (OUTPATIENT)
Age: 72
End: 2022-08-08

## 2022-08-24 ENCOUNTER — RESULT REVIEW (OUTPATIENT)
Age: 72
End: 2022-08-24

## 2022-08-30 ENCOUNTER — OUTPATIENT (OUTPATIENT)
Dept: OUTPATIENT SERVICES | Facility: HOSPITAL | Age: 72
LOS: 1 days | End: 2022-08-30

## 2022-08-30 ENCOUNTER — APPOINTMENT (OUTPATIENT)
Dept: HEMATOLOGY ONCOLOGY | Facility: CLINIC | Age: 72
End: 2022-08-30

## 2022-08-30 DIAGNOSIS — Z98.89 OTHER SPECIFIED POSTPROCEDURAL STATES: Chronic | ICD-10-CM

## 2022-08-30 DIAGNOSIS — C90.00 MULTIPLE MYELOMA NOT HAVING ACHIEVED REMISSION: ICD-10-CM

## 2022-09-16 ENCOUNTER — APPOINTMENT (OUTPATIENT)
Dept: CV DIAGNOSITCS | Facility: HOSPITAL | Age: 72
End: 2022-09-16

## 2022-09-19 ENCOUNTER — OUTPATIENT (OUTPATIENT)
Dept: OUTPATIENT SERVICES | Facility: HOSPITAL | Age: 72
LOS: 1 days | Discharge: HOME | End: 2022-09-19

## 2022-09-19 DIAGNOSIS — C90.00 MULTIPLE MYELOMA NOT HAVING ACHIEVED REMISSION: ICD-10-CM

## 2022-09-19 DIAGNOSIS — Z98.89 OTHER SPECIFIED POSTPROCEDURAL STATES: Chronic | ICD-10-CM

## 2022-09-21 ENCOUNTER — LABORATORY RESULT (OUTPATIENT)
Age: 72
End: 2022-09-21

## 2022-09-21 ENCOUNTER — RESULT REVIEW (OUTPATIENT)
Age: 72
End: 2022-09-21

## 2022-09-21 ENCOUNTER — APPOINTMENT (OUTPATIENT)
Dept: HEMATOLOGY ONCOLOGY | Facility: CLINIC | Age: 72
End: 2022-09-21

## 2022-09-21 VITALS
HEART RATE: 103 BPM | SYSTOLIC BLOOD PRESSURE: 129 MMHG | TEMPERATURE: 97.2 F | RESPIRATION RATE: 16 BRPM | OXYGEN SATURATION: 97 % | BODY MASS INDEX: 27.19 KG/M2 | DIASTOLIC BLOOD PRESSURE: 73 MMHG | WEIGHT: 170.2 LBS

## 2022-09-21 LAB
BASOPHILS # BLD AUTO: 0.02 K/UL — SIGNIFICANT CHANGE UP (ref 0–0.2)
BASOPHILS NFR BLD AUTO: 0.5 % — SIGNIFICANT CHANGE UP (ref 0–2)
EOSINOPHIL # BLD AUTO: 0.19 K/UL — SIGNIFICANT CHANGE UP (ref 0–0.5)
EOSINOPHIL NFR BLD AUTO: 4.4 % — SIGNIFICANT CHANGE UP (ref 0–6)
HCT VFR BLD CALC: 39.8 % — SIGNIFICANT CHANGE UP (ref 39–50)
HGB BLD-MCNC: 12.5 G/DL — LOW (ref 13–17)
IMM GRANULOCYTES NFR BLD AUTO: 0.2 % — SIGNIFICANT CHANGE UP (ref 0–0.9)
LYMPHOCYTES # BLD AUTO: 1.99 K/UL — SIGNIFICANT CHANGE UP (ref 1–3.3)
LYMPHOCYTES # BLD AUTO: 46.1 % — HIGH (ref 13–44)
MCHC RBC-ENTMCNC: 26.5 PG — LOW (ref 27–34)
MCHC RBC-ENTMCNC: 31.4 G/DL — LOW (ref 32–36)
MCV RBC AUTO: 84.5 FL — SIGNIFICANT CHANGE UP (ref 80–100)
MONOCYTES # BLD AUTO: 0.53 K/UL — SIGNIFICANT CHANGE UP (ref 0–0.9)
MONOCYTES NFR BLD AUTO: 12.3 % — SIGNIFICANT CHANGE UP (ref 2–14)
NEUTROPHILS # BLD AUTO: 1.58 K/UL — LOW (ref 1.8–7.4)
NEUTROPHILS NFR BLD AUTO: 36.5 % — LOW (ref 43–77)
NRBC # BLD: 0 /100 WBCS — SIGNIFICANT CHANGE UP (ref 0–0)
PLATELET # BLD AUTO: 287 K/UL — SIGNIFICANT CHANGE UP (ref 150–400)
RBC # BLD: 4.71 M/UL — SIGNIFICANT CHANGE UP (ref 4.2–5.8)
RBC # FLD: 14.9 % — HIGH (ref 10.3–14.5)
WBC # BLD: 4.32 K/UL — SIGNIFICANT CHANGE UP (ref 3.8–10.5)
WBC # FLD AUTO: 4.32 K/UL — SIGNIFICANT CHANGE UP (ref 3.8–10.5)

## 2022-09-21 PROCEDURE — 99215 OFFICE O/P EST HI 40 MIN: CPT

## 2022-09-22 ENCOUNTER — APPOINTMENT (OUTPATIENT)
Dept: NUCLEAR MEDICINE | Facility: HOSPITAL | Age: 72
End: 2022-09-22

## 2022-09-22 ENCOUNTER — APPOINTMENT (OUTPATIENT)
Dept: CV DIAGNOSITCS | Facility: HOSPITAL | Age: 72
End: 2022-09-22

## 2022-09-22 ENCOUNTER — OUTPATIENT (OUTPATIENT)
Dept: OUTPATIENT SERVICES | Facility: HOSPITAL | Age: 72
LOS: 1 days | End: 2022-09-22
Payer: COMMERCIAL

## 2022-09-22 DIAGNOSIS — Z98.89 OTHER SPECIFIED POSTPROCEDURAL STATES: Chronic | ICD-10-CM

## 2022-09-22 DIAGNOSIS — Z01.818 ENCOUNTER FOR OTHER PREPROCEDURAL EXAMINATION: ICD-10-CM

## 2022-09-22 LAB — SARS-COV-2 N GENE NPH QL NAA+PROBE: NOT DETECTED

## 2022-09-22 PROCEDURE — 70220 X-RAY EXAM OF SINUSES: CPT

## 2022-09-22 PROCEDURE — 78472 GATED HEART PLANAR SINGLE: CPT

## 2022-09-22 PROCEDURE — A9560: CPT

## 2022-09-22 PROCEDURE — 70220 X-RAY EXAM OF SINUSES: CPT | Mod: 26

## 2022-09-22 PROCEDURE — 78472 GATED HEART PLANAR SINGLE: CPT | Mod: 26

## 2022-09-23 ENCOUNTER — APPOINTMENT (OUTPATIENT)
Dept: PULMONOLOGY | Facility: CLINIC | Age: 72
End: 2022-09-23

## 2022-09-23 ENCOUNTER — NON-APPOINTMENT (OUTPATIENT)
Age: 72
End: 2022-09-23

## 2022-09-23 VITALS
HEART RATE: 106 BPM | HEIGHT: 67 IN | WEIGHT: 170 LBS | TEMPERATURE: 98.7 F | DIASTOLIC BLOOD PRESSURE: 75 MMHG | SYSTOLIC BLOOD PRESSURE: 130 MMHG | BODY MASS INDEX: 26.68 KG/M2 | OXYGEN SATURATION: 97 %

## 2022-09-23 PROCEDURE — ZZZZZ: CPT

## 2022-09-23 PROCEDURE — 82803 BLOOD GASES ANY COMBINATION: CPT

## 2022-09-23 PROCEDURE — 94010 BREATHING CAPACITY TEST: CPT

## 2022-09-23 PROCEDURE — 94729 DIFFUSING CAPACITY: CPT

## 2022-09-23 PROCEDURE — 36600 WITHDRAWAL OF ARTERIAL BLOOD: CPT | Mod: 59

## 2022-09-24 NOTE — ASSESSMENT
[FreeTextEntry1] : 73 yo male with hx of BPH, HTN, DM, DVT, CVA and MM who presents for f/u to initial eval for high dose chemo and auto stem cell transplant...receiving induction therapy with good response..BM BX from May reviewed...VGPR likely.....I had another  extensive discussion with the pt and his step daughter....he lives with her and his wife...regarding the risks, benefits, alternatives, logistics and rationale for auto stem cell transplant. Mobilization with GF vs chemo plus GF discussed...Goal for stem cell collection of approx 5 million per kg discussed...need for shiley catheter discussed...3 week hospital stay and 6 week recovery discussed..jose 200 prep....pre testing and orientation discussed...literature and consents provided for review....quests addressed...pt is leaning towards moving forward..I do believe he would be a good candidate pending vital oragn function...testing in progress...will arrange for orientation and sw eval.....he has good family support ....will f/u with pt upon appt for growth factor teaching.....will d/w Dr Goldberg...

## 2022-09-24 NOTE — HISTORY OF PRESENT ILLNESS
[de-identified] : Dx Multiple myeloma Dec 2021..presented with anemia and back pain nov 2021\par bm bx 70% involvement with monoclonal plasma cells IgG kappa...repeat bm bx May 2022 with 1 to 2 % involvement\par mspike 8 grams\par PET confirmed lytic lesions\par will confirmvcreat and calcium were normal upon dx\par s/p rvd x 6....now holding rev...pending decision on transplant [de-identified] : Patient here with his daughter for f/u to  initial eval for high dose chemotherapy and auto stem cell transplant for MM...currently off treatment with velcade and dex...rev also on hold pending decision for auto transplant...he returns for [place]... [FreeTextEntry1] : 73 yo also  being treated for asymptomatic  positve quantiferon gold test for tb done by Dr. Mcdaniel  grew up  in [place]  no history of TB or exposure .. normal chest xray.\par

## 2022-09-24 NOTE — REASON FOR VISIT
[Initial Consultation] : an initial consultation for [Family Member] : family member [FreeTextEntry2] : multiple myeloma

## 2022-09-24 NOTE — ASSESSMENT
[FreeTextEntry1] : 72 yo male with hx of BPH, HTN, DM, DVT, CVA and MM who presents for initial eval for high dose chemo and auto stem cell transplant...receiving induction therapy with good response....I had an extensive discussion with the pt and his step daughter....he lives with her and his wife...regarding the risks, benefits, alternatives, logistics and rationale for auto stem cell transplant. Mobilization with GF vs chemo plus GF discussed...Goal for stem cell collection of approx 5 million per kg discussed...need for shiley catheter discussed...3 week hospital stay and 6 week recovery discussed..jose 200 prep....pre testing and orientation discussed...literature and consents provided for review....quests addressed...pt is leaning towards moving forward..I do believe he would be a good candidate pending vital oragn function....he has good family support ....will f/u with pt next week...will d/w Dr Goldberg...ua c and s sent on urine...will order cipro..pt to f/u with urology

## 2022-09-24 NOTE — HISTORY OF PRESENT ILLNESS
[de-identified] : Dx Multiple myeloma Dec 2021..presented with anemia and back pain nov 2021\par bm bx 70% involvement with monoclonal plasma cells IgG kappa...repeat bm bx May 2022 with 1 to 2 % involvement\par mspike 8 grams\par PET confirmed lytic lesions\par will confirmvcreat and calcium were normal upon dx\par s/p rvd x 6....now holding rev...pending decision on transplant [de-identified] : Patient here with his daughter for initial eval for high dose chemotherapy and auto stem cell transplant for MM...currently on treatment with velcade and dex...rev on hold pending decision for auto transplant...he describes urinary frequency and dribbling... [FreeTextEntry1] : 71 being treated for MM. asymptomatic but has a positve quantiferon gold test for tb done by Dr. Mcdaniel  grew up  in [facility]  no history of TB or exposure .. normal chest xray.\par

## 2022-09-28 ENCOUNTER — LABORATORY RESULT (OUTPATIENT)
Age: 72
End: 2022-09-28

## 2022-09-28 ENCOUNTER — NON-APPOINTMENT (OUTPATIENT)
Age: 72
End: 2022-09-28

## 2022-09-29 LAB
CREAT 24H UR-MCNC: 0.6 G/24 H
CREAT 24H UR-MCNC: 0.6 G/24 H
CREAT ?TM UR-MCNC: 110 MG/DL
CREAT ?TM UR-MCNC: 110 MG/DL
PROT 24H UR-MRATE: 28 MG/DL
PROT ?TM UR-MCNC: 24 HR
PROT ?TM UR-MCNC: 24 HR
PROT UR-MCNC: 147 MG/24 H
SPECIMEN VOL 24H UR: 525 ML
SPECIMEN VOL 24H UR: 525 ML

## 2022-09-30 LAB
ALBUPE: 55 %
ALPHA1UPE: 19.5 %
ALPHA2UPE: 12.6 %
BETAUPE: 7.1 %
CREAT 24H UR-MCNC: 0.6 G/24 H
CREATININE UR (MAYO): 110 MG/DL
GAMMAUPE: 5.8 %
IGA 24H UR QL IFE: NORMAL
KAPPA LC 24H UR QL: PRESENT
PROT PATTERN 24H UR ELPH-IMP: NORMAL
PROT UR-MCNC: 28 MG/DL
PROT UR-MCNC: 28 MG/DL
SPECIMEN VOL 24H UR: NORMAL ML
U PROTEIN QNT CALCULATION: 147 MG/24 H

## 2022-10-03 ENCOUNTER — TRANSCRIPTION ENCOUNTER (OUTPATIENT)
Age: 72
End: 2022-10-03

## 2022-10-04 ENCOUNTER — NON-APPOINTMENT (OUTPATIENT)
Age: 72
End: 2022-10-04

## 2022-10-05 ENCOUNTER — NON-APPOINTMENT (OUTPATIENT)
Age: 72
End: 2022-10-05

## 2022-10-07 ENCOUNTER — APPOINTMENT (OUTPATIENT)
Dept: HEMATOLOGY ONCOLOGY | Facility: CLINIC | Age: 72
End: 2022-10-07
Payer: MEDICARE

## 2022-10-07 ENCOUNTER — RESULT REVIEW (OUTPATIENT)
Age: 72
End: 2022-10-07

## 2022-10-07 ENCOUNTER — APPOINTMENT (OUTPATIENT)
Dept: PULMONOLOGY | Facility: CLINIC | Age: 72
End: 2022-10-07
Payer: MEDICARE

## 2022-10-07 ENCOUNTER — NON-APPOINTMENT (OUTPATIENT)
Age: 72
End: 2022-10-07

## 2022-10-07 VITALS
DIASTOLIC BLOOD PRESSURE: 101 MMHG | WEIGHT: 167.55 LBS | OXYGEN SATURATION: 93 % | HEART RATE: 92 BPM | TEMPERATURE: 98.1 F | SYSTOLIC BLOOD PRESSURE: 164 MMHG | BODY MASS INDEX: 26.24 KG/M2 | RESPIRATION RATE: 16 BRPM

## 2022-10-07 VITALS
TEMPERATURE: 98.1 F | BODY MASS INDEX: 26.16 KG/M2 | WEIGHT: 167 LBS | HEART RATE: 86 BPM | SYSTOLIC BLOOD PRESSURE: 166 MMHG | DIASTOLIC BLOOD PRESSURE: 90 MMHG | OXYGEN SATURATION: 99 %

## 2022-10-07 LAB
BASOPHILS # BLD AUTO: 0.02 K/UL — SIGNIFICANT CHANGE UP (ref 0–0.2)
BASOPHILS NFR BLD AUTO: 0.4 % — SIGNIFICANT CHANGE UP (ref 0–2)
EOSINOPHIL # BLD AUTO: 0.12 K/UL — SIGNIFICANT CHANGE UP (ref 0–0.5)
EOSINOPHIL NFR BLD AUTO: 2.6 % — SIGNIFICANT CHANGE UP (ref 0–6)
HCT VFR BLD CALC: 41.5 % — SIGNIFICANT CHANGE UP (ref 39–50)
HGB BLD-MCNC: 12.8 G/DL — LOW (ref 13–17)
IMM GRANULOCYTES NFR BLD AUTO: 0.2 % — SIGNIFICANT CHANGE UP (ref 0–0.9)
LYMPHOCYTES # BLD AUTO: 2.24 K/UL — SIGNIFICANT CHANGE UP (ref 1–3.3)
LYMPHOCYTES # BLD AUTO: 49.1 % — HIGH (ref 13–44)
MCHC RBC-ENTMCNC: 26 PG — LOW (ref 27–34)
MCHC RBC-ENTMCNC: 30.8 G/DL — LOW (ref 32–36)
MCV RBC AUTO: 84.3 FL — SIGNIFICANT CHANGE UP (ref 80–100)
MONOCYTES # BLD AUTO: 0.6 K/UL — SIGNIFICANT CHANGE UP (ref 0–0.9)
MONOCYTES NFR BLD AUTO: 13.2 % — SIGNIFICANT CHANGE UP (ref 2–14)
NEUTROPHILS # BLD AUTO: 1.57 K/UL — LOW (ref 1.8–7.4)
NEUTROPHILS NFR BLD AUTO: 34.5 % — LOW (ref 43–77)
NRBC # BLD: 0 /100 WBCS — SIGNIFICANT CHANGE UP (ref 0–0)
PLATELET # BLD AUTO: 260 K/UL — SIGNIFICANT CHANGE UP (ref 150–400)
RBC # BLD: 4.92 M/UL — SIGNIFICANT CHANGE UP (ref 4.2–5.8)
RBC # FLD: 14.4 % — SIGNIFICANT CHANGE UP (ref 10.3–14.5)
WBC # BLD: 4.56 K/UL — SIGNIFICANT CHANGE UP (ref 3.8–10.5)
WBC # FLD AUTO: 4.56 K/UL — SIGNIFICANT CHANGE UP (ref 3.8–10.5)

## 2022-10-07 PROCEDURE — 94729 DIFFUSING CAPACITY: CPT

## 2022-10-07 PROCEDURE — 99215 OFFICE O/P EST HI 40 MIN: CPT

## 2022-10-07 PROCEDURE — 94010 BREATHING CAPACITY TEST: CPT

## 2022-10-07 PROCEDURE — 94726 PLETHYSMOGRAPHY LUNG VOLUMES: CPT

## 2022-10-07 NOTE — REASON FOR VISIT
[Family Member] : family member [Follow-Up Visit] : a follow-up visit for [FreeTextEntry2] : Multiple Myeloma;Teaching visit prior to Stem Cell Mobilization

## 2022-10-07 NOTE — ASSESSMENT
[FreeTextEntry1] : 71 yo male with hx of BPH, HTN, DM, DVT, CVA and MM who presents for f/u to initial eval for high dose chemo and auto stem cell transplant...receiving induction therapy with good response..BM BX from May reviewed...VGPR likely.....I had another  extensive discussion with the pt and his step daughter....he lives with her and his wife...regarding the risks, benefits, alternatives, logistics and rationale for auto stem cell transplant. Mobilization with GF vs chemo plus GF discussed...Goal for stem cell collection of approx 5 million per kg discussed...need for shiley catheter discussed...3 week hospital stay and 6 week recovery discussed..jose 200 prep....pre testing and orientation discussed...literature and consents provided for review....quests addressed...pt is leaning towards moving forward..I do believe he would be a good candidate pending vital oragn function...testing in progress...will arrange for orientation and sw eval.....he has good family support ....will f/u with pt upon appt for growth factor teaching.....will d/w Dr Goldberg...\par \par 10/7/22:\par Mr. Cespedes was seen for a teaching visit pre stem cell Mobilization\par CBC reviewed;counts stable\par He will receive Zarxio 780 mcg subcutaneous daily 10/7/22 -10/11/22\par Explained to patient and his daughter Brenda how to administer Zarxio.Patient stated that Zarxio will be administered by Brenda.Most common side effects explained.Today's dose of Zarxio administered by ELLIOT Cook\par \par Shiley catheter and stem cell collection has been scheduled on 10/11/22 at [facility]\par He will have COVID -19 PCR testing on 10/8/22\par \par Recommended Coy  to take Claritin daily for 5 days starting today (10/7/22)\par May take Tylenol PRN for pain or fever\par Avoid Advil, Aleve or Aspirin\par Encouraged to increase calcium in diet over the next few days\par Questions and concerns addressed\par Reassurance provided\par Tentative hospital admission on 10/24/22 and Auto stem cell transplant on 10/27/22\par \par I examined patient under Dr. Gonzales's supervision and Dr. Gonzales agrees to plan of care as listed above\par \par \par \par

## 2022-10-07 NOTE — HISTORY OF PRESENT ILLNESS
[de-identified] : Dx Multiple myeloma Dec 2021..presented with anemia and back pain nov 2021\par bm bx 70% involvement with monoclonal plasma cells IgG kappa...repeat bm bx May 2022 with 1 to 2 % involvement\par mspike 8 grams\par PET confirmed lytic lesions\par will confirmvcreat and calcium were normal upon dx\par s/p rvd x 6....now holding rev...pending decision on transplant [de-identified] : Patient here with his daughter for f/u to  initial eval for high dose chemotherapy and auto stem cell transplant for MM...currently off treatment with velcade and dex...rev also on hold pending decision for auto transplant...he returns for [place]...\par \par 10/7/22: Patient is here for a teaching visit prior to stem cell mobilization.He is accompanied by his daughter Brenda.Denies fever, chills,SOB,chest pain,edema,rash,mouth sores,nausea,vomiting, diarrhea or any signs of active bleeding.  [FreeTextEntry1] : 71 yo also  being treated for asymptomatic  positve quantiferon gold test for tb done by Dr. Mcdaniel  grew up  in [place]  no history of TB or exposure .. normal chest xray.\par

## 2022-10-08 ENCOUNTER — OUTPATIENT (OUTPATIENT)
Dept: OUTPATIENT SERVICES | Facility: HOSPITAL | Age: 72
LOS: 1 days | End: 2022-10-08
Payer: COMMERCIAL

## 2022-10-08 DIAGNOSIS — Z98.89 OTHER SPECIFIED POSTPROCEDURAL STATES: Chronic | ICD-10-CM

## 2022-10-08 DIAGNOSIS — Z11.52 ENCOUNTER FOR SCREENING FOR COVID-19: ICD-10-CM

## 2022-10-08 LAB — SARS-COV-2 RNA SPEC QL NAA+PROBE: SIGNIFICANT CHANGE UP

## 2022-10-08 PROCEDURE — C9803: CPT

## 2022-10-08 PROCEDURE — U0005: CPT

## 2022-10-08 PROCEDURE — U0003: CPT

## 2022-10-10 ENCOUNTER — NON-APPOINTMENT (OUTPATIENT)
Age: 72
End: 2022-10-10

## 2022-10-11 ENCOUNTER — NON-APPOINTMENT (OUTPATIENT)
Age: 72
End: 2022-10-11

## 2022-10-11 ENCOUNTER — RESULT REVIEW (OUTPATIENT)
Age: 72
End: 2022-10-11

## 2022-10-11 ENCOUNTER — OUTPATIENT (OUTPATIENT)
Dept: OUTPATIENT SERVICES | Facility: HOSPITAL | Age: 72
LOS: 1 days | End: 2022-10-11
Payer: COMMERCIAL

## 2022-10-11 ENCOUNTER — APPOINTMENT (OUTPATIENT)
Dept: INFUSION THERAPY | Facility: HOSPITAL | Age: 72
End: 2022-10-11

## 2022-10-11 ENCOUNTER — TRANSCRIPTION ENCOUNTER (OUTPATIENT)
Age: 72
End: 2022-10-11

## 2022-10-11 VITALS
SYSTOLIC BLOOD PRESSURE: 168 MMHG | HEART RATE: 86 BPM | OXYGEN SATURATION: 97 % | RESPIRATION RATE: 18 BRPM | DIASTOLIC BLOOD PRESSURE: 87 MMHG

## 2022-10-11 VITALS
WEIGHT: 177.91 LBS | DIASTOLIC BLOOD PRESSURE: 98 MMHG | SYSTOLIC BLOOD PRESSURE: 161 MMHG | HEIGHT: 67 IN | TEMPERATURE: 98 F | HEART RATE: 80 BPM | RESPIRATION RATE: 18 BRPM | OXYGEN SATURATION: 97 %

## 2022-10-11 DIAGNOSIS — C90.01 MULTIPLE MYELOMA IN REMISSION: ICD-10-CM

## 2022-10-11 DIAGNOSIS — Z98.89 OTHER SPECIFIED POSTPROCEDURAL STATES: Chronic | ICD-10-CM

## 2022-10-11 LAB
ALBUMIN SERPL ELPH-MCNC: 4.8 G/DL
ALP BLD-CCNC: 111 U/L
ALT SERPL-CCNC: 17 U/L
ANION GAP SERPL CALC-SCNC: 17 MMOL/L
APTT BLD: 33.1 SEC
AST SERPL-CCNC: 19 U/L
BILIRUB SERPL-MCNC: 0.4 MG/DL
BLD GP AB SCN SERPL QL: NEGATIVE — SIGNIFICANT CHANGE UP
BUN SERPL-MCNC: 25 MG/DL
CA-I BLD-SCNC: 1.23 MMOL/L — SIGNIFICANT CHANGE UP (ref 1.15–1.33)
CALCIUM SERPL-MCNC: 9.9 MG/DL
CD34 CELLS # FLD: 68 /UL — SIGNIFICANT CHANGE UP
CD34 VIABILITY: 100 % — SIGNIFICANT CHANGE UP
CHLORIDE SERPL-SCNC: 105 MMOL/L
CO2 SERPL-SCNC: 22 MMOL/L
CREAT SERPL-MCNC: 1.18 MG/DL
EGFR: 65 ML/MIN/1.73M2
EOSINOPHIL NFR BLD AUTO: 1 — SIGNIFICANT CHANGE UP
GLUCOSE SERPL-MCNC: 110 MG/DL
HCT VFR BLD CALC: 40.9 % — SIGNIFICANT CHANGE UP (ref 39–50)
HGB BLD-MCNC: 12.8 G/DL — LOW (ref 13–17)
INR PPP: 1.02 RATIO
LYMPHOCYTES # BLD AUTO: 8 % — LOW (ref 13–44)
MCHC RBC-ENTMCNC: 26.2 PG — LOW (ref 27–34)
MCHC RBC-ENTMCNC: 31.3 GM/DL — LOW (ref 32–36)
MCV RBC AUTO: 83.6 FL — SIGNIFICANT CHANGE UP (ref 80–100)
METAMYELOCYTES # FLD: 1 % — HIGH (ref 0–0)
MONOCYTES NFR BLD AUTO: 12 % — SIGNIFICANT CHANGE UP (ref 2–14)
NEUTROPHILS NFR BLD AUTO: 69 % — SIGNIFICANT CHANGE UP (ref 43–77)
NEUTS BAND # BLD: 7 % — SIGNIFICANT CHANGE UP (ref 0–8)
NRBC # BLD: 0 /100 WBCS — SIGNIFICANT CHANGE UP (ref 0–0)
PLAT MORPH BLD: NORMAL — SIGNIFICANT CHANGE UP
PLATELET # BLD AUTO: 231 K/UL — SIGNIFICANT CHANGE UP (ref 150–400)
POTASSIUM SERPL-SCNC: 4.3 MMOL/L
PROT SERPL-MCNC: 7.6 G/DL
PT BLD: 11.8 SEC
RBC # BLD: 4.89 M/UL — SIGNIFICANT CHANGE UP (ref 4.2–5.8)
RBC # FLD: 14.9 % — HIGH (ref 10.3–14.5)
RBC BLD AUTO: SIGNIFICANT CHANGE UP
RH IG SCN BLD-IMP: POSITIVE — SIGNIFICANT CHANGE UP
SODIUM SERPL-SCNC: 144 MMOL/L
VARIANT LYMPHS # BLD: 2 % — SIGNIFICANT CHANGE UP (ref 0–6)
WBC # BLD: 51.35 K/UL — CRITICAL HIGH (ref 3.8–10.5)
WBC # FLD AUTO: 51.35 K/UL — CRITICAL HIGH (ref 3.8–10.5)

## 2022-10-11 PROCEDURE — 85007 BL SMEAR W/DIFF WBC COUNT: CPT

## 2022-10-11 PROCEDURE — 99214 OFFICE O/P EST MOD 30 MIN: CPT | Mod: 25

## 2022-10-11 PROCEDURE — C1752: CPT

## 2022-10-11 PROCEDURE — 77001 FLUOROGUIDE FOR VEIN DEVICE: CPT

## 2022-10-11 PROCEDURE — 36556 INSERT NON-TUNNEL CV CATH: CPT

## 2022-10-11 PROCEDURE — 86367 STEM CELLS TOTAL COUNT: CPT

## 2022-10-11 PROCEDURE — 86900 BLOOD TYPING SEROLOGIC ABO: CPT

## 2022-10-11 PROCEDURE — 82330 ASSAY OF CALCIUM: CPT

## 2022-10-11 PROCEDURE — 86901 BLOOD TYPING SEROLOGIC RH(D): CPT

## 2022-10-11 PROCEDURE — 38206 HARVEST AUTO STEM CELLS: CPT

## 2022-10-11 PROCEDURE — 77001 FLUOROGUIDE FOR VEIN DEVICE: CPT | Mod: 26

## 2022-10-11 PROCEDURE — C1769: CPT

## 2022-10-11 PROCEDURE — 76937 US GUIDE VASCULAR ACCESS: CPT

## 2022-10-11 PROCEDURE — C1894: CPT

## 2022-10-11 PROCEDURE — 86905 BLOOD TYPING RBC ANTIGENS: CPT

## 2022-10-11 PROCEDURE — P9047: CPT

## 2022-10-11 PROCEDURE — 76937 US GUIDE VASCULAR ACCESS: CPT | Mod: 26

## 2022-10-11 PROCEDURE — 85027 COMPLETE CBC AUTOMATED: CPT

## 2022-10-11 PROCEDURE — 86850 RBC ANTIBODY SCREEN: CPT

## 2022-10-11 RX ORDER — ACYCLOVIR SODIUM 500 MG
1 VIAL (EA) INTRAVENOUS
Qty: 0 | Refills: 0 | DISCHARGE

## 2022-10-11 NOTE — ASU DISCHARGE PLAN (ADULT/PEDIATRIC) - ASU DC SPECIAL INSTRUCTIONSFT
Non-Tunneled Central Venous Catheter Placement    Discharge Instructions  - You have had a non-tunneled central venous catheter placed in your neck.  - The catheter is ready for use.  - You may shower as long as the catheter and dressing remains dry.  -  No soaking or swimming until the catheter is removed or when the site is completely healed.  - Keep the area covered and dry for as long as the catheter remains in. It may be removed and changed by a nurse as needed.  - Do not perform any heavy lifting or put tension on the area for the next week or until the site is healed.  - You may resume your normal diet.  - You may resume your normal medications however you should wait 48 hours before restarting aspirin, plavix, or blood thinners.  - It is normal to experience some pain over the site for the next few days. You may take apply ice to the area (20 minutes on, 20 minutes off) and take Tylenol for that pain. Do not take more frequently than every 6 hours and do not exceed more than 3000mg of Tylenol in a 24 hour period.    Notify your primary physician and/or Interventional Radiology IMMEDIATELY if you experience any of the following       - Fever of 100.4F or 38C       - Chills or Rigors/ Shakes       - Swelling and/or Redness in the area around the port       - Worsening Pain       - Blood soaked bandages or worsening bleeding       - Lightheadedness and/or dizziness upon standing       - Chest Pain/ Tightness       - Shortness of Breath       - Difficulty walking    If you have a problem that you believe requires IMMEDIATE attention, please go to your NEAREST Emergency Room. If you believe your problem can safely wait until you speak to a physician, please call Interventional Radiology for any concerns.    During Normal Weekday Business Hours- You can contact the Interventional Radiology department during normal business hours via telephone.  During Evenings and Weekends- If you need to contact Interventional Radiology during off hours, do so by calling the hospital and requesting to be connected to the Interventional Radiologist on call.

## 2022-10-11 NOTE — CHART NOTE - NSCHARTNOTEFT_GEN_A_CORE
Patient is s/p completion of stem cell collection now presents to IR for removal of shiley. Right neck and existing catheter prepped and draped under usual sterile condition. The catheter was removed, direct jugular pressure applied x10min, hemostasis achieved. A dry sterile dressing applied.    -Patient may be d/c home in 30 min  -Post catheter removal instructions discussed with the pt and included on discharge paper.

## 2022-10-11 NOTE — ASU PREOP CHECKLIST - BMI (KG/M2)
SW following for discharge planning.    Pt was denied by [facility] d/t no available bed.     [facility], [facility] and [facility] referrals still pending.     Updated clinicals sent to [facility], [facility] and [facility] via [facility].     Only accepting ECF at this time is [facility].    [employer] auth is pending. Updated clinicals sent to [facility] via [facility].    SW to continue to follow for discharge planning pending further medical management.    Bhargavi Mcbride, [facility]  Medical Social Worker  [phone]  Sunday Covering SW for Dianna Atnon, [facility], [phone]    ____________________    Addendum 14:17    Referrals still pending to [facility], [facility] and [facility].     Rec'd [employer] insurance auth:   Auth # [id number].   Authorized from 02/19/2022 to 02/21/2022.   SNF Level 1.   Current Authorization expires on 02/24/2022.   Accepting Facility: [facility]    Updated Dr. Rosales and pt medically cleared for discharge today.     Melissa from [facility] reported [facility] does not have a bed available for pt until tomorrow afternoon.     Met with pt, pt's daughter (Veda) and Veda's  at b/s. Introductions were made and SW role explained. Discussed ECF responses, [facility] only ECF that can accept at this time, and still waiting for responses from [facility], [facility] and [facility]. Informed Veda of [facility] not having a bed available for pt until tomorrow afternoon and she reported she would like to be here during discharge. Veda stated they are leaning towards [facility], as [facility] PMR's team follows at [facility], but would like SW to confirm discharge plan with her tomorrow and confirm no other ECFs have accepted pt. Veda stated she is going to call [facility] to confirm therapy minutes allowed for pt and also follow up with [facility] to ask some  further questions as well. Veda had no further questions for SW at this time.     Updated Dr. Rosales.     SW to continues to follow for discharge planning.     Bhargavi Mcbride, LSW  Medical Social Worker  [phone]  Sunday Covering SW for Dianna Walton, [facility], [phone]   27.9

## 2022-10-11 NOTE — ASU PATIENT PROFILE, ADULT - FALL HARM RISK - UNIVERSAL INTERVENTIONS
Bed in lowest position, wheels locked, appropriate side rails in place/Call bell, personal items and telephone in reach/Instruct patient to call for assistance before getting out of bed or chair/Non-slip footwear when patient is out of bed/[place] to call system/Physically safe environment - no spills, clutter or unnecessary equipment/Purposeful Proactive Rounding/Room/bathroom lighting operational, light cord in reach

## 2022-10-11 NOTE — PRE PROCEDURE NOTE - PRE PROCEDURE EVALUATION
Interventional Radiology    HPI:72M HTN remote Hx DVT (Eliquis 2.5 bid), CKD2-3, hx CVA with R sided deficit, pre-diabetes, MM undergoing chemotherapy (scheduled for 1/4/22)  which has been delayed by COVID-19 positive test 12/31/21 presents with dyspnea without hypoxia, tachycardia, JULIA, transaminitis, sepsis due to viral etiology (COVID-19 with adenovirus) vs bacterial etiology.  IR TODAY   10/11 non tunneled cvc for stem cell harvesting  NPO:  n/a    Allergies: allopurinol (Other)    Medications (Abx/Cardiac/Anticoagulation/Blood Products)      Data:  170.2  80.7  T(C): 36.9  HR: 80  BP: 161/98  RR: 18  SpO2: 97%       Exam  General: No acute distress  Chest: Non labored breathing    -WBC 4.56 / HgB 12.8 / Hct 41.5 / Plt 260      Imaging: reviewed     Plan: 72y Male presents for non tunneled cvc for stem cell harvesting  -Risks/Benefits/alternatives explained with the patient and/or healthcare proxy and witnessed informed consent obtained.

## 2022-10-11 NOTE — ASU DISCHARGE PLAN (ADULT/PEDIATRIC) - NS MD DC FALL RISK RISK
For information on Fall & Injury Prevention, visit: [email or web address] OR  [email or web address] OR  https://www.cdc.gov/steadi/patient.html

## 2022-10-11 NOTE — ASU DISCHARGE PLAN (ADULT/PEDIATRIC) - SIGNS AND SYMPTOMS OF INFECTION: FEVER, REDNESS, SWELLING, FOUL SMELLING DISCHARGE
Pt here for ostomy problems, seen here for same yesterday, c/o leaking from ostomy site into nearby surgical wound. Pt was dc w/ supplies and new bag last night but reports leaking again. sts she was told by PMD to come to hospital for placement into SNF/NH/rehab for continuous care.
Statement Selected

## 2022-10-12 ENCOUNTER — NON-APPOINTMENT (OUTPATIENT)
Age: 72
End: 2022-10-12

## 2022-10-12 ENCOUNTER — APPOINTMENT (OUTPATIENT)
Dept: INFUSION THERAPY | Facility: HOSPITAL | Age: 72
End: 2022-10-12

## 2022-10-13 ENCOUNTER — APPOINTMENT (OUTPATIENT)
Dept: INFUSION THERAPY | Facility: HOSPITAL | Age: 72
End: 2022-10-13

## 2022-10-17 ENCOUNTER — RESULT REVIEW (OUTPATIENT)
Age: 72
End: 2022-10-17

## 2022-10-17 ENCOUNTER — APPOINTMENT (OUTPATIENT)
Dept: HEMATOLOGY ONCOLOGY | Facility: CLINIC | Age: 72
End: 2022-10-17
Payer: MEDICARE

## 2022-10-17 VITALS
SYSTOLIC BLOOD PRESSURE: 142 MMHG | DIASTOLIC BLOOD PRESSURE: 76 MMHG | TEMPERATURE: 96.9 F | RESPIRATION RATE: 16 BRPM | HEART RATE: 107 BPM | OXYGEN SATURATION: 98 % | WEIGHT: 166.67 LBS | BODY MASS INDEX: 26.1 KG/M2

## 2022-10-17 DIAGNOSIS — Z01.818 ENCOUNTER FOR OTHER PREPROCEDURAL EXAMINATION: ICD-10-CM

## 2022-10-17 LAB
BASOPHILS # BLD AUTO: 0.03 K/UL — SIGNIFICANT CHANGE UP (ref 0–0.2)
BASOPHILS NFR BLD AUTO: 0.5 % — SIGNIFICANT CHANGE UP (ref 0–2)
EOSINOPHIL # BLD AUTO: 0.15 K/UL — SIGNIFICANT CHANGE UP (ref 0–0.5)
EOSINOPHIL NFR BLD AUTO: 2.4 % — SIGNIFICANT CHANGE UP (ref 0–6)
HCT VFR BLD CALC: 42.4 % — SIGNIFICANT CHANGE UP (ref 39–50)
HGB BLD-MCNC: 13.5 G/DL — SIGNIFICANT CHANGE UP (ref 13–17)
IMM GRANULOCYTES NFR BLD AUTO: 0.8 % — SIGNIFICANT CHANGE UP (ref 0–0.9)
LYMPHOCYTES # BLD AUTO: 1.41 K/UL — SIGNIFICANT CHANGE UP (ref 1–3.3)
LYMPHOCYTES # BLD AUTO: 22.2 % — SIGNIFICANT CHANGE UP (ref 13–44)
MCHC RBC-ENTMCNC: 26.2 PG — LOW (ref 27–34)
MCHC RBC-ENTMCNC: 31.8 G/DL — LOW (ref 32–36)
MCV RBC AUTO: 82.2 FL — SIGNIFICANT CHANGE UP (ref 80–100)
MONOCYTES # BLD AUTO: 0.69 K/UL — SIGNIFICANT CHANGE UP (ref 0–0.9)
MONOCYTES NFR BLD AUTO: 10.8 % — SIGNIFICANT CHANGE UP (ref 2–14)
NEUTROPHILS # BLD AUTO: 4.03 K/UL — SIGNIFICANT CHANGE UP (ref 1.8–7.4)
NEUTROPHILS NFR BLD AUTO: 63.3 % — SIGNIFICANT CHANGE UP (ref 43–77)
NRBC # BLD: 0 /100 WBCS — SIGNIFICANT CHANGE UP (ref 0–0)
PLATELET # BLD AUTO: 165 K/UL — SIGNIFICANT CHANGE UP (ref 150–400)
RBC # BLD: 5.16 M/UL — SIGNIFICANT CHANGE UP (ref 4.2–5.8)
RBC # FLD: 14.6 % — HIGH (ref 10.3–14.5)
WBC # BLD: 6.36 K/UL — SIGNIFICANT CHANGE UP (ref 3.8–10.5)
WBC # FLD AUTO: 6.36 K/UL — SIGNIFICANT CHANGE UP (ref 3.8–10.5)

## 2022-10-17 PROCEDURE — 99213 OFFICE O/P EST LOW 20 MIN: CPT

## 2022-10-17 RX ORDER — FILGRASTIM-SNDZ 480 UG/.8ML
480 INJECTION, SOLUTION INTRAVENOUS; SUBCUTANEOUS
Qty: 5 | Refills: 0 | Status: DISCONTINUED | COMMUNITY
Start: 2022-09-28 | End: 2022-10-17

## 2022-10-19 DIAGNOSIS — Z49.01 ENCOUNTER FOR FITTING AND ADJUSTMENT OF EXTRACORPOREAL DIALYSIS CATHETER: ICD-10-CM

## 2022-10-19 DIAGNOSIS — C90.00 MULTIPLE MYELOMA NOT HAVING ACHIEVED REMISSION: ICD-10-CM

## 2022-10-19 NOTE — ASSESSMENT
[FreeTextEntry1] : 71 yo male with hx of BPH, HTN, DM, DVT, CVA and MM who presents for f/u to initial eval for high dose chemo and auto stem cell transplant...receiving induction therapy with good response..BM BX from May reviewed...VGPR likely.....I had another  extensive discussion with the pt and his step daughter....he lives with her and his wife...regarding the risks, benefits, alternatives, logistics and rationale for auto stem cell transplant. Mobilization with GF vs chemo plus GF discussed...Goal for stem cell collection of approx 5 million per kg discussed...need for shiley catheter discussed...3 week hospital stay and 6 week recovery discussed..jose 200 prep....pre testing and orientation discussed...literature and consents provided for review....quests addressed...pt is leaning towards moving forward..I do believe he would be a good candidate pending vital oragn function...testing in progress...will arrange for orientation and sw eval.....he has good family support ....will f/u with pt upon appt for growth factor teaching.....will d/w Dr Goldberg...\par \par 10/7/22:\par Mr. Cespedes was seen for a teaching visit pre stem cell Mobilization\par CBC reviewed;counts stable\par He will receive Zarxio 780 mcg subcutaneous daily 10/7/22 -10/11/22\par Explained to patient and his daughter Brenda how to administer Zarxio.Patient stated that Zarxio will be administered by Brenda.Most common side effects explained.Today's dose of Zarxio administered by ELLIOT Cook\par Shiley catheter and stem cell collection has been scheduled on 10/11/22 at [facility]\par He will have COVID -19 PCR testing on 10/8/22\par Recommended Coy  to take Claritin daily for 5 days starting today (10/7/22)\par May take Tylenol PRN for pain or fever\par Avoid Advil, Aleve or Aspirin\par Encouraged to increase calcium in diet over the next few days\par Questions and concerns addressed\par Reassurance provided\par Tentative hospital admission on 10/24/22 and Auto stem cell transplant on 10/27/22\par \par 10/17/22\par Patient presents for a pre admission visit. Status post stem collection in preparation for Auto PBSCT. \par CBC stable and reviewed with patient. No indication for transfusion.\par Kepivance scheduled for 10/21/22 and 10/22/22. Admission labs and COVID 19 PCR will be sent on 10/21/22.\par Admission at [facility] on 10/24/22.\par Transplant date of 10/28/22.\par Disease time of transplant: IA\par \par Discontinue blood thinners on 10/21/22.\par Questions and concerns addressed. Reassurance provided.\par Medication list reconciled.\par Return on 10/21/22 for Kepivance and admission labs/COVID 19 PCR. \par \par I examined patient under Dr. Gonzales's supervision and Dr. Gonzales agrees to plan of care as listed above\par \par \par \par

## 2022-10-19 NOTE — HISTORY OF PRESENT ILLNESS
[de-identified] : Dx Multiple myeloma Dec 2021..presented with anemia and back pain nov 2021\par bm bx 70% involvement with monoclonal plasma cells IgG kappa...repeat bm bx May 2022 with 1 to 2 % involvement\par mspike 8 grams\par PET confirmed lytic lesions\par will confirmvcreat and calcium were normal upon dx\par s/p rvd x 6....now holding rev...pending decision on transplant [de-identified] : Patient here with his daughter for f/u to  initial eval for high dose chemotherapy and auto stem cell transplant for MM...currently off treatment with velcade and dex...rev also on hold pending decision for auto transplant...he returns for [place]...\par \par 10/7/22: Patient is here for a teaching visit prior to stem cell mobilization.He is accompanied by his daughter Brenda.Denies fever, chills,SOB,chest pain,edema,rash,mouth sores,nausea,vomiting, diarrhea or any signs of active bleeding. \par \par On 10/17/22, patient presents for a pre admission visit. Overall patient is well and offers no acute concerns. Denies fever, chills, nausea, vomiting, diarrhea, rash, mouth sores, dysuria or any signs of active bleeding. Denies SOB, chest pain or B/L LE edema.  [FreeTextEntry1] : 73 yo also  being treated for asymptomatic  positve quantiferon gold test for tb done by Dr. Mcdaniel  grew up  in [place]  no history of TB or exposure .. normal chest xray.\par

## 2022-10-19 NOTE — REASON FOR VISIT
[Follow-Up Visit] : a follow-up visit for [Family Member] : family member [FreeTextEntry2] : Multiple Myeloma

## 2022-10-21 ENCOUNTER — RESULT REVIEW (OUTPATIENT)
Age: 72
End: 2022-10-21

## 2022-10-21 ENCOUNTER — APPOINTMENT (OUTPATIENT)
Dept: INFUSION THERAPY | Facility: HOSPITAL | Age: 72
End: 2022-10-21

## 2022-10-21 LAB
ALBUMIN SERPL ELPH-MCNC: 4 G/DL — SIGNIFICANT CHANGE UP (ref 3.3–5)
ALP SERPL-CCNC: 130 U/L — HIGH (ref 40–120)
ALT FLD-CCNC: 19 U/L — SIGNIFICANT CHANGE UP (ref 10–45)
ANION GAP SERPL CALC-SCNC: 12 MMOL/L — SIGNIFICANT CHANGE UP (ref 5–17)
APTT BLD: 37.3 SEC — HIGH (ref 27.5–35.5)
AST SERPL-CCNC: 19 U/L — SIGNIFICANT CHANGE UP (ref 10–40)
BILIRUB SERPL-MCNC: 0.4 MG/DL — SIGNIFICANT CHANGE UP (ref 0.2–1.2)
BUN SERPL-MCNC: 18 MG/DL — SIGNIFICANT CHANGE UP (ref 7–23)
CALCIUM SERPL-MCNC: 9.7 MG/DL — SIGNIFICANT CHANGE UP (ref 8.4–10.5)
CHLORIDE SERPL-SCNC: 103 MMOL/L — SIGNIFICANT CHANGE UP (ref 96–108)
CO2 SERPL-SCNC: 25 MMOL/L — SIGNIFICANT CHANGE UP (ref 22–31)
CREAT SERPL-MCNC: 1.01 MG/DL — SIGNIFICANT CHANGE UP (ref 0.5–1.3)
EGFR: 78 ML/MIN/1.73M2 — SIGNIFICANT CHANGE UP
GLUCOSE SERPL-MCNC: 131 MG/DL — HIGH (ref 70–99)
HCT VFR BLD CALC: 37.5 % — LOW (ref 39–50)
HGB BLD-MCNC: 11.8 G/DL — LOW (ref 13–17)
INR BLD: 1.07 RATIO — SIGNIFICANT CHANGE UP (ref 0.88–1.16)
LDH SERPL L TO P-CCNC: 215 U/L — SIGNIFICANT CHANGE UP (ref 50–242)
MAGNESIUM SERPL-MCNC: 2.1 MG/DL — SIGNIFICANT CHANGE UP (ref 1.6–2.6)
MCHC RBC-ENTMCNC: 25.9 PG — LOW (ref 27–34)
MCHC RBC-ENTMCNC: 31.5 G/DL — LOW (ref 32–36)
MCV RBC AUTO: 82.4 FL — SIGNIFICANT CHANGE UP (ref 80–100)
PLATELET # BLD AUTO: 339 K/UL — SIGNIFICANT CHANGE UP (ref 150–400)
POTASSIUM SERPL-MCNC: 4.9 MMOL/L — SIGNIFICANT CHANGE UP (ref 3.5–5.3)
POTASSIUM SERPL-SCNC: 4.9 MMOL/L — SIGNIFICANT CHANGE UP (ref 3.5–5.3)
PROT SERPL-MCNC: 6.9 G/DL — SIGNIFICANT CHANGE UP (ref 6–8.3)
PROTHROM AB SERPL-ACNC: 12.6 SEC — SIGNIFICANT CHANGE UP (ref 10.5–13.4)
RBC # BLD: 4.55 M/UL — SIGNIFICANT CHANGE UP (ref 4.2–5.8)
RBC # FLD: 14.9 % — HIGH (ref 10.3–14.5)
SODIUM SERPL-SCNC: 140 MMOL/L — SIGNIFICANT CHANGE UP (ref 135–145)
WBC # BLD: 2.85 K/UL — LOW (ref 3.8–10.5)
WBC # FLD AUTO: 2.85 K/UL — LOW (ref 3.8–10.5)

## 2022-10-22 ENCOUNTER — APPOINTMENT (OUTPATIENT)
Dept: INFUSION THERAPY | Facility: HOSPITAL | Age: 72
End: 2022-10-22

## 2022-10-22 LAB
IGA FLD-MCNC: 96 MG/DL — SIGNIFICANT CHANGE UP (ref 84–499)
IGG FLD-MCNC: 1263 MG/DL — SIGNIFICANT CHANGE UP (ref 610–1660)
IGM SERPL-MCNC: 25 MG/DL — LOW (ref 35–242)
KAPPA LC SER QL IFE: 5.23 MG/DL — HIGH (ref 0.33–1.94)
KAPPA/LAMBDA FREE LIGHT CHAIN RATIO, SERUM: 2.05 RATIO — HIGH (ref 0.26–1.65)
LAMBDA LC SER QL IFE: 2.55 MG/DL — SIGNIFICANT CHANGE UP (ref 0.57–2.63)
SARS-COV-2 RNA SPEC QL NAA+PROBE: SIGNIFICANT CHANGE UP

## 2022-10-24 ENCOUNTER — INPATIENT (INPATIENT)
Facility: HOSPITAL | Age: 72
LOS: 21 days | Discharge: HOME HEALTH | DRG: 16 | End: 2022-11-15
Attending: INTERNAL MEDICINE | Admitting: INTERNAL MEDICINE
Payer: COMMERCIAL

## 2022-10-24 VITALS
DIASTOLIC BLOOD PRESSURE: 79 MMHG | SYSTOLIC BLOOD PRESSURE: 149 MMHG | HEART RATE: 82 BPM | RESPIRATION RATE: 18 BRPM | HEIGHT: 66.14 IN | TEMPERATURE: 98 F | OXYGEN SATURATION: 99 % | WEIGHT: 165.13 LBS

## 2022-10-24 DIAGNOSIS — Z94.84 STEM CELLS TRANSPLANT STATUS: ICD-10-CM

## 2022-10-24 DIAGNOSIS — C90.01 MULTIPLE MYELOMA IN REMISSION: ICD-10-CM

## 2022-10-24 DIAGNOSIS — Z98.89 OTHER SPECIFIED POSTPROCEDURAL STATES: Chronic | ICD-10-CM

## 2022-10-24 DIAGNOSIS — Z29.9 ENCOUNTER FOR PROPHYLACTIC MEASURES, UNSPECIFIED: ICD-10-CM

## 2022-10-24 DIAGNOSIS — C90.00 MULTIPLE MYELOMA NOT HAVING ACHIEVED REMISSION: ICD-10-CM

## 2022-10-24 DIAGNOSIS — Z01.818 ENCOUNTER FOR OTHER PREPROCEDURAL EXAMINATION: ICD-10-CM

## 2022-10-24 LAB
ALBUMIN SERPL ELPH-MCNC: 4 G/DL — SIGNIFICANT CHANGE UP (ref 3.3–5)
ALP SERPL-CCNC: 111 U/L — SIGNIFICANT CHANGE UP (ref 40–120)
ALT FLD-CCNC: 14 U/L — SIGNIFICANT CHANGE UP (ref 10–45)
ANION GAP SERPL CALC-SCNC: 11 MMOL/L — SIGNIFICANT CHANGE UP (ref 5–17)
APPEARANCE UR: CLEAR — SIGNIFICANT CHANGE UP
APTT BLD: 33.2 SEC — SIGNIFICANT CHANGE UP (ref 27.5–35.5)
AST SERPL-CCNC: 14 U/L — SIGNIFICANT CHANGE UP (ref 10–40)
BACTERIA # UR AUTO: ABNORMAL
BASOPHILS # BLD AUTO: 0.03 K/UL — SIGNIFICANT CHANGE UP (ref 0–0.2)
BASOPHILS NFR BLD AUTO: 0.8 % — SIGNIFICANT CHANGE UP (ref 0–2)
BILIRUB DIRECT SERPL-MCNC: 0.1 MG/DL — SIGNIFICANT CHANGE UP (ref 0–0.3)
BILIRUB INDIRECT FLD-MCNC: 0.4 MG/DL — SIGNIFICANT CHANGE UP (ref 0.2–1)
BILIRUB SERPL-MCNC: 0.5 MG/DL — SIGNIFICANT CHANGE UP (ref 0.2–1.2)
BILIRUB UR-MCNC: NEGATIVE — SIGNIFICANT CHANGE UP
BUN SERPL-MCNC: 13 MG/DL — SIGNIFICANT CHANGE UP (ref 7–23)
CALCIUM SERPL-MCNC: 9.1 MG/DL — SIGNIFICANT CHANGE UP (ref 8.4–10.5)
CHLORIDE SERPL-SCNC: 105 MMOL/L — SIGNIFICANT CHANGE UP (ref 96–108)
CO2 SERPL-SCNC: 25 MMOL/L — SIGNIFICANT CHANGE UP (ref 22–31)
COLOR SPEC: YELLOW — SIGNIFICANT CHANGE UP
CREAT SERPL-MCNC: 0.96 MG/DL — SIGNIFICANT CHANGE UP (ref 0.5–1.3)
DIFF PNL FLD: NEGATIVE — SIGNIFICANT CHANGE UP
EGFR: 83 ML/MIN/1.73M2 — SIGNIFICANT CHANGE UP
EOSINOPHIL # BLD AUTO: 0.12 K/UL — SIGNIFICANT CHANGE UP (ref 0–0.5)
EOSINOPHIL NFR BLD AUTO: 3 % — SIGNIFICANT CHANGE UP (ref 0–6)
EPI CELLS # UR: 0 /HPF — SIGNIFICANT CHANGE UP
FIBRINOGEN PPP-MCNC: 603 MG/DL — HIGH (ref 330–520)
GLUCOSE BLDC GLUCOMTR-MCNC: 102 MG/DL — HIGH (ref 70–99)
GLUCOSE BLDC GLUCOMTR-MCNC: 201 MG/DL — HIGH (ref 70–99)
GLUCOSE SERPL-MCNC: 108 MG/DL — HIGH (ref 70–99)
GLUCOSE UR QL: ABNORMAL
HCT VFR BLD CALC: 34.8 % — LOW (ref 39–50)
HGB BLD-MCNC: 11 G/DL — LOW (ref 13–17)
HYALINE CASTS # UR AUTO: 3 /LPF — HIGH (ref 0–2)
IMM GRANULOCYTES NFR BLD AUTO: 0.3 % — SIGNIFICANT CHANGE UP (ref 0–0.9)
INR BLD: 1.09 RATIO — SIGNIFICANT CHANGE UP (ref 0.88–1.16)
KETONES UR-MCNC: NEGATIVE — SIGNIFICANT CHANGE UP
LDH SERPL L TO P-CCNC: 159 U/L — SIGNIFICANT CHANGE UP (ref 50–242)
LEUKOCYTE ESTERASE UR-ACNC: ABNORMAL
LYMPHOCYTES # BLD AUTO: 1.24 K/UL — SIGNIFICANT CHANGE UP (ref 1–3.3)
LYMPHOCYTES # BLD AUTO: 31.1 % — SIGNIFICANT CHANGE UP (ref 13–44)
MAGNESIUM SERPL-MCNC: 2.1 MG/DL — SIGNIFICANT CHANGE UP (ref 1.6–2.6)
MCHC RBC-ENTMCNC: 25.8 PG — LOW (ref 27–34)
MCHC RBC-ENTMCNC: 31.6 GM/DL — LOW (ref 32–36)
MCV RBC AUTO: 81.5 FL — SIGNIFICANT CHANGE UP (ref 80–100)
MONOCYTES # BLD AUTO: 0.36 K/UL — SIGNIFICANT CHANGE UP (ref 0–0.9)
MONOCYTES NFR BLD AUTO: 9 % — SIGNIFICANT CHANGE UP (ref 2–14)
NEUTROPHILS # BLD AUTO: 2.23 K/UL — SIGNIFICANT CHANGE UP (ref 1.8–7.4)
NEUTROPHILS NFR BLD AUTO: 55.8 % — SIGNIFICANT CHANGE UP (ref 43–77)
NITRITE UR-MCNC: POSITIVE
NRBC # BLD: 0 /100 WBCS — SIGNIFICANT CHANGE UP (ref 0–0)
PH UR: 6 — SIGNIFICANT CHANGE UP (ref 5–8)
PHOSPHATE SERPL-MCNC: 2.9 MG/DL — SIGNIFICANT CHANGE UP (ref 2.5–4.5)
PLATELET # BLD AUTO: 408 K/UL — HIGH (ref 150–400)
POTASSIUM SERPL-MCNC: 3.5 MMOL/L — SIGNIFICANT CHANGE UP (ref 3.5–5.3)
POTASSIUM SERPL-SCNC: 3.5 MMOL/L — SIGNIFICANT CHANGE UP (ref 3.5–5.3)
PROT SERPL-MCNC: 7 G/DL — SIGNIFICANT CHANGE UP (ref 6–8.3)
PROT UR-MCNC: ABNORMAL
PROTHROM AB SERPL-ACNC: 12.6 SEC — SIGNIFICANT CHANGE UP (ref 10.5–13.4)
RBC # BLD: 4.27 M/UL — SIGNIFICANT CHANGE UP (ref 4.2–5.8)
RBC # BLD: 4.27 M/UL — SIGNIFICANT CHANGE UP (ref 4.2–5.8)
RBC # FLD: 15.1 % — HIGH (ref 10.3–14.5)
RBC CASTS # UR COMP ASSIST: 0 /HPF — SIGNIFICANT CHANGE UP (ref 0–4)
RETICS #: 23.5 K/UL — LOW (ref 25–125)
RETICS/RBC NFR: 0.6 % — SIGNIFICANT CHANGE UP (ref 0.5–2.5)
SODIUM SERPL-SCNC: 141 MMOL/L — SIGNIFICANT CHANGE UP (ref 135–145)
SP GR SPEC: 1.02 — SIGNIFICANT CHANGE UP (ref 1.01–1.02)
UROBILINOGEN FLD QL: NEGATIVE — SIGNIFICANT CHANGE UP
WBC # BLD: 3.99 K/UL — SIGNIFICANT CHANGE UP (ref 3.8–10.5)
WBC # FLD AUTO: 3.99 K/UL — SIGNIFICANT CHANGE UP (ref 3.8–10.5)
WBC UR QL: 20 /HPF — HIGH (ref 0–5)

## 2022-10-24 PROCEDURE — 36556 INSERT NON-TUNNEL CV CATH: CPT | Mod: RT

## 2022-10-24 PROCEDURE — 76937 US GUIDE VASCULAR ACCESS: CPT | Mod: 26

## 2022-10-24 PROCEDURE — 99291 CRITICAL CARE FIRST HOUR: CPT

## 2022-10-24 PROCEDURE — 93010 ELECTROCARDIOGRAM REPORT: CPT

## 2022-10-24 PROCEDURE — 77001 FLUOROGUIDE FOR VEIN DEVICE: CPT | Mod: 26

## 2022-10-24 PROCEDURE — 36556 INSERT NON-TUNNEL CV CATH: CPT

## 2022-10-24 RX ORDER — DIPHENHYDRAMINE HCL 50 MG
25 CAPSULE ORAL ONCE
Refills: 0 | Status: COMPLETED | OUTPATIENT
Start: 2022-10-27 | End: 2022-10-27

## 2022-10-24 RX ORDER — DEXTROSE 50 % IN WATER 50 %
15 SYRINGE (ML) INTRAVENOUS ONCE
Refills: 0 | Status: DISCONTINUED | OUTPATIENT
Start: 2022-10-24 | End: 2022-11-15

## 2022-10-24 RX ORDER — SENNA PLUS 8.6 MG/1
1 TABLET ORAL ONCE
Refills: 0 | Status: COMPLETED | OUTPATIENT
Start: 2022-10-24 | End: 2022-10-24

## 2022-10-24 RX ORDER — APREPITANT 80 MG/1
125 CAPSULE ORAL ONCE
Refills: 0 | Status: COMPLETED | OUTPATIENT
Start: 2022-10-24 | End: 2022-10-24

## 2022-10-24 RX ORDER — PALIFERMIN 6.25 MG
4440 VIAL (EA) INTRAVENOUS EVERY 24 HOURS
Refills: 0 | Status: COMPLETED | OUTPATIENT
Start: 2022-10-27 | End: 2022-10-29

## 2022-10-24 RX ORDER — HEPARIN SODIUM 5000 [USP'U]/ML
312 INJECTION INTRAVENOUS; SUBCUTANEOUS
Qty: 25000 | Refills: 0 | Status: DISCONTINUED | OUTPATIENT
Start: 2022-10-24 | End: 2022-11-13

## 2022-10-24 RX ORDER — HYDRALAZINE HCL 50 MG
25 TABLET ORAL THREE TIMES A DAY
Refills: 0 | Status: DISCONTINUED | OUTPATIENT
Start: 2022-10-24 | End: 2022-11-15

## 2022-10-24 RX ORDER — DEXTROSE 50 % IN WATER 50 %
12.5 SYRINGE (ML) INTRAVENOUS ONCE
Refills: 0 | Status: DISCONTINUED | OUTPATIENT
Start: 2022-10-24 | End: 2022-11-15

## 2022-10-24 RX ORDER — FOLIC ACID 0.8 MG
1 TABLET ORAL DAILY
Refills: 0 | Status: DISCONTINUED | OUTPATIENT
Start: 2022-10-24 | End: 2022-11-13

## 2022-10-24 RX ORDER — TAMSULOSIN HYDROCHLORIDE 0.4 MG/1
0.4 CAPSULE ORAL AT BEDTIME
Refills: 0 | Status: DISCONTINUED | OUTPATIENT
Start: 2022-10-24 | End: 2022-11-15

## 2022-10-24 RX ORDER — HYDROCORTISONE 20 MG
50 TABLET ORAL ONCE
Refills: 0 | Status: COMPLETED | OUTPATIENT
Start: 2022-10-27 | End: 2022-10-27

## 2022-10-24 RX ORDER — SODIUM BICARBONATE 1 MEQ/ML
10 SYRINGE (ML) INTRAVENOUS
Refills: 0 | Status: DISCONTINUED | OUTPATIENT
Start: 2022-10-24 | End: 2022-11-15

## 2022-10-24 RX ORDER — DEXTROSE 50 % IN WATER 50 %
25 SYRINGE (ML) INTRAVENOUS ONCE
Refills: 0 | Status: DISCONTINUED | OUTPATIENT
Start: 2022-10-24 | End: 2022-11-15

## 2022-10-24 RX ORDER — LIDOCAINE AND PRILOCAINE CREAM 25; 25 MG/G; MG/G
1 CREAM TOPICAL DAILY
Refills: 0 | Status: DISCONTINUED | OUTPATIENT
Start: 2022-10-27 | End: 2022-11-10

## 2022-10-24 RX ORDER — SALIVA SUBSTITUTE COMB NO.11 351 MG
5 POWDER IN PACKET (EA) MUCOUS MEMBRANE
Refills: 0 | Status: DISCONTINUED | OUTPATIENT
Start: 2022-10-24 | End: 2022-11-15

## 2022-10-24 RX ORDER — ONDANSETRON 8 MG/1
8 TABLET, FILM COATED ORAL EVERY 8 HOURS
Refills: 0 | Status: COMPLETED | OUTPATIENT
Start: 2022-10-24 | End: 2022-10-26

## 2022-10-24 RX ORDER — SENNA PLUS 8.6 MG/1
1 TABLET ORAL AT BEDTIME
Refills: 0 | Status: DISCONTINUED | OUTPATIENT
Start: 2022-10-24 | End: 2022-10-25

## 2022-10-24 RX ORDER — PANTOPRAZOLE SODIUM 20 MG/1
40 TABLET, DELAYED RELEASE ORAL
Refills: 0 | Status: DISCONTINUED | OUTPATIENT
Start: 2022-10-24 | End: 2022-11-15

## 2022-10-24 RX ORDER — GLUCAGON INJECTION, SOLUTION 0.5 MG/.1ML
1 INJECTION, SOLUTION SUBCUTANEOUS ONCE
Refills: 0 | Status: DISCONTINUED | OUTPATIENT
Start: 2022-10-24 | End: 2022-11-15

## 2022-10-24 RX ORDER — SODIUM CHLORIDE 9 MG/ML
1000 INJECTION INTRAMUSCULAR; INTRAVENOUS; SUBCUTANEOUS
Refills: 0 | Status: DISCONTINUED | OUTPATIENT
Start: 2022-10-24 | End: 2022-11-15

## 2022-10-24 RX ORDER — LORATADINE 10 MG/1
10 TABLET ORAL DAILY
Refills: 0 | Status: DISCONTINUED | OUTPATIENT
Start: 2022-10-24 | End: 2022-11-15

## 2022-10-24 RX ORDER — ACETAMINOPHEN 500 MG
650 TABLET ORAL EVERY 6 HOURS
Refills: 0 | Status: DISCONTINUED | OUTPATIENT
Start: 2022-10-24 | End: 2022-11-15

## 2022-10-24 RX ORDER — FLUCONAZOLE 150 MG/1
400 TABLET ORAL DAILY
Refills: 0 | Status: DISCONTINUED | OUTPATIENT
Start: 2022-10-24 | End: 2022-11-12

## 2022-10-24 RX ORDER — AMLODIPINE BESYLATE 2.5 MG/1
10 TABLET ORAL DAILY
Refills: 0 | Status: DISCONTINUED | OUTPATIENT
Start: 2022-10-24 | End: 2022-11-15

## 2022-10-24 RX ORDER — NYSTATIN CREAM 100000 [USP'U]/G
1 CREAM TOPICAL THREE TIMES A DAY
Refills: 0 | Status: DISCONTINUED | OUTPATIENT
Start: 2022-10-24 | End: 2022-11-15

## 2022-10-24 RX ORDER — MELPHALAN HYDROCHLORIDE 50 MG
124 KIT INTRAVENOUS DAILY
Refills: 0 | Status: COMPLETED | OUTPATIENT
Start: 2022-10-24 | End: 2022-10-25

## 2022-10-24 RX ORDER — SODIUM CHLORIDE 9 MG/ML
1000 INJECTION INTRAMUSCULAR; INTRAVENOUS; SUBCUTANEOUS
Refills: 0 | Status: DISCONTINUED | OUTPATIENT
Start: 2022-10-24 | End: 2022-11-04

## 2022-10-24 RX ORDER — CLOTRIMAZOLE 10 MG
1 TROCHE MUCOUS MEMBRANE
Refills: 0 | Status: DISCONTINUED | OUTPATIENT
Start: 2022-10-24 | End: 2022-11-15

## 2022-10-24 RX ORDER — INSULIN LISPRO 100/ML
VIAL (ML) SUBCUTANEOUS AT BEDTIME
Refills: 0 | Status: DISCONTINUED | OUTPATIENT
Start: 2022-10-24 | End: 2022-11-15

## 2022-10-24 RX ORDER — SODIUM CHLORIDE 9 MG/ML
1000 INJECTION, SOLUTION INTRAVENOUS
Refills: 0 | Status: DISCONTINUED | OUTPATIENT
Start: 2022-10-24 | End: 2022-11-15

## 2022-10-24 RX ORDER — SODIUM CHLORIDE 9 MG/ML
1000 INJECTION, SOLUTION INTRAVENOUS
Refills: 0 | Status: DISCONTINUED | OUTPATIENT
Start: 2022-10-26 | End: 2022-11-04

## 2022-10-24 RX ORDER — INSULIN LISPRO 100/ML
VIAL (ML) SUBCUTANEOUS
Refills: 0 | Status: DISCONTINUED | OUTPATIENT
Start: 2022-10-24 | End: 2022-11-15

## 2022-10-24 RX ORDER — CHLORHEXIDINE GLUCONATE 213 G/1000ML
1 SOLUTION TOPICAL
Refills: 0 | Status: DISCONTINUED | OUTPATIENT
Start: 2022-10-24 | End: 2022-11-15

## 2022-10-24 RX ORDER — FILGRASTIM 480MCG/1.6
480 VIAL (ML) INJECTION EVERY 24 HOURS
Refills: 0 | Status: DISCONTINUED | OUTPATIENT
Start: 2022-10-27 | End: 2022-11-10

## 2022-10-24 RX ORDER — URSODIOL 250 MG/1
300 TABLET, FILM COATED ORAL
Refills: 0 | Status: DISCONTINUED | OUTPATIENT
Start: 2022-10-24 | End: 2022-11-15

## 2022-10-24 RX ORDER — APREPITANT 80 MG/1
80 CAPSULE ORAL EVERY 24 HOURS
Refills: 0 | Status: COMPLETED | OUTPATIENT
Start: 2022-10-25 | End: 2022-10-27

## 2022-10-24 RX ORDER — METOCLOPRAMIDE HCL 10 MG
10 TABLET ORAL EVERY 6 HOURS
Refills: 0 | Status: DISCONTINUED | OUTPATIENT
Start: 2022-10-24 | End: 2022-11-15

## 2022-10-24 RX ORDER — ACYCLOVIR SODIUM 500 MG
400 VIAL (EA) INTRAVENOUS EVERY 8 HOURS
Refills: 0 | Status: DISCONTINUED | OUTPATIENT
Start: 2022-10-26 | End: 2022-11-15

## 2022-10-24 RX ORDER — FINASTERIDE 5 MG/1
5 TABLET, FILM COATED ORAL DAILY
Refills: 0 | Status: DISCONTINUED | OUTPATIENT
Start: 2022-10-24 | End: 2022-11-15

## 2022-10-24 RX ORDER — SODIUM CHLORIDE 9 MG/ML
10 INJECTION INTRAMUSCULAR; INTRAVENOUS; SUBCUTANEOUS
Refills: 0 | Status: DISCONTINUED | OUTPATIENT
Start: 2022-10-24 | End: 2022-11-15

## 2022-10-24 RX ORDER — INFLUENZA VIRUS VACCINE 15; 15; 15; 15 UG/.5ML; UG/.5ML; UG/.5ML; UG/.5ML
0.7 SUSPENSION INTRAMUSCULAR ONCE
Refills: 0 | Status: DISCONTINUED | OUTPATIENT
Start: 2022-10-24 | End: 2022-10-24

## 2022-10-24 RX ORDER — DEXAMETHASONE 0.5 MG/5ML
20 ELIXIR ORAL EVERY 24 HOURS
Refills: 0 | Status: COMPLETED | OUTPATIENT
Start: 2022-10-24 | End: 2022-10-25

## 2022-10-24 RX ORDER — FUROSEMIDE 40 MG
20 TABLET ORAL EVERY 24 HOURS
Refills: 0 | Status: COMPLETED | OUTPATIENT
Start: 2022-10-24 | End: 2022-10-26

## 2022-10-24 RX ORDER — ACETAMINOPHEN 500 MG
650 TABLET ORAL ONCE
Refills: 0 | Status: COMPLETED | OUTPATIENT
Start: 2022-10-27 | End: 2022-10-27

## 2022-10-24 RX ADMIN — LORATADINE 10 MILLIGRAM(S): 10 TABLET ORAL at 13:53

## 2022-10-24 RX ADMIN — Medication 5 MILLILITER(S): at 23:46

## 2022-10-24 RX ADMIN — Medication 110 MILLIGRAM(S): at 18:45

## 2022-10-24 RX ADMIN — SODIUM CHLORIDE 20 MILLILITER(S): 9 INJECTION INTRAMUSCULAR; INTRAVENOUS; SUBCUTANEOUS at 13:54

## 2022-10-24 RX ADMIN — MELPHALAN HYDROCHLORIDE 124 MILLIGRAM(S): KIT INTRAVENOUS at 20:04

## 2022-10-24 RX ADMIN — Medication 10 MILLILITER(S): at 16:50

## 2022-10-24 RX ADMIN — ONDANSETRON 8 MILLIGRAM(S): 8 TABLET, FILM COATED ORAL at 13:52

## 2022-10-24 RX ADMIN — Medication 25 MILLIGRAM(S): at 21:08

## 2022-10-24 RX ADMIN — Medication 1 LOZENGE: at 20:25

## 2022-10-24 RX ADMIN — SODIUM CHLORIDE 200 MILLILITER(S): 9 INJECTION INTRAMUSCULAR; INTRAVENOUS; SUBCUTANEOUS at 13:54

## 2022-10-24 RX ADMIN — SENNA PLUS 1 TABLET(S): 8.6 TABLET ORAL at 20:45

## 2022-10-24 RX ADMIN — NYSTATIN CREAM 1 APPLICATION(S): 100000 CREAM TOPICAL at 21:08

## 2022-10-24 RX ADMIN — URSODIOL 300 MILLIGRAM(S): 250 TABLET, FILM COATED ORAL at 17:57

## 2022-10-24 RX ADMIN — Medication 1 LOZENGE: at 16:50

## 2022-10-24 RX ADMIN — Medication 1 TABLET(S): at 13:53

## 2022-10-24 RX ADMIN — FLUCONAZOLE 400 MILLIGRAM(S): 150 TABLET ORAL at 13:52

## 2022-10-24 RX ADMIN — NYSTATIN CREAM 1 APPLICATION(S): 100000 CREAM TOPICAL at 13:53

## 2022-10-24 RX ADMIN — HEPARIN SODIUM 3.12 UNIT(S)/HR: 5000 INJECTION INTRAVENOUS; SUBCUTANEOUS at 13:53

## 2022-10-24 RX ADMIN — Medication 1 LOZENGE: at 23:45

## 2022-10-24 RX ADMIN — Medication 5 MILLILITER(S): at 16:50

## 2022-10-24 RX ADMIN — Medication 1 TABLET(S): at 17:57

## 2022-10-24 RX ADMIN — Medication 25 MILLIGRAM(S): at 14:14

## 2022-10-24 RX ADMIN — Medication 1 MILLIGRAM(S): at 13:53

## 2022-10-24 RX ADMIN — TAMSULOSIN HYDROCHLORIDE 0.4 MILLIGRAM(S): 0.4 CAPSULE ORAL at 21:08

## 2022-10-24 RX ADMIN — Medication 5 MILLILITER(S): at 20:30

## 2022-10-24 RX ADMIN — APREPITANT 125 MILLIGRAM(S): 80 CAPSULE ORAL at 13:52

## 2022-10-24 RX ADMIN — Medication 10 MILLILITER(S): at 20:25

## 2022-10-24 RX ADMIN — ONDANSETRON 8 MILLIGRAM(S): 8 TABLET, FILM COATED ORAL at 21:08

## 2022-10-24 RX ADMIN — Medication 10 MILLILITER(S): at 23:45

## 2022-10-24 RX ADMIN — FINASTERIDE 5 MILLIGRAM(S): 5 TABLET, FILM COATED ORAL at 13:53

## 2022-10-24 NOTE — H&P ADULT - CRITICAL CARE ATTENDING COMMENT
1. Admit to BMTU ..dx MM...consent signed  2. 3 hours prior to Melphalan start hydration: D5NS at 200ml /hr and continue 24 hours post Melphalan infusion  3. Day – 3 & day -2 - Melphalan 70mg/m2  IV   4. Strict I&O, daily weights, prn diuresis   5. Day -1 rest day. At 2200 on day – 1, start transplant hydration 1/2NS + 50mEq NaHCO3- (may substitute [id number] if bicarb not available)  6. Day 0 – infuse HPC product. 4 hours after infusion of cells start Zarxio 5 micrograms / kg (actual weight) . Continue through engraftment.   7. On day 0, + 1, + 2-give Kepivance 60micrograms / kg (actual weight)  1. VOD prophylaxis - low dose heparin gtt (dosed at 100 units / kg / day), glutamine supplementation, Actigall BID   2. PCP prophylaxis - Bactrim DS through day -2    3. Antiviral prophylaxis - Acyclovir 400mg po TID to start day -1   4. Antifungal prophylaxis- Diflucan 400 mg po daily.  5.. GI prophylaxis - Protonix po QD   6. Antibacterial prophylaxis - when ANC < 500, start Cipro 500mg po BID. If becomes febrile, pan cx, CXR and change Cipro to Cefepime 2g IV q 8 hours. Continue until count recovery  7. Kepivance for prevention of mucositis- days 0, +1, +2  8. Aggressive mouth care and skin care as per protocol

## 2022-10-24 NOTE — H&P ADULT - HISTORY OF PRESENT ILLNESS
This is a 72 year ld male with multiple myeloma admitted for an autologous pbsct with high dose Melphalan prep regimen. Hematologic history as follows: Initially presented in 11/2021 with anemia and back pain, diagnosed with multiple myeloma 12/2021.A bone marrow biopsy at that time showed 70% involvement with monoclonal plasma cells (IgG kappa), improved to 1-2% involvement with bone marrow biopsy 5/2022 after treatment with RVD x 6. He has no complaints on admission. Other pertinent medical history: BPH, HTN, DVT, DM, CVA, HTN.  This is a 72 year old male with multiple myeloma admitted for an autologous pbsct with high dose Melphalan prep regimen. Hematologic history as follows: Initially presented in 11/2021 with anemia and back pain, diagnosed with multiple myeloma 12/2021.A bone marrow biopsy at that time showed 70% involvement with monoclonal plasma cells (IgG kappa), improved to 1-2% involvement with bone marrow biopsy 5/2022 after treatment with RVD x 6. He has no complaints on admission. Other pertinent medical history: BPH, HTN, DVT, DM, CVA, HTN.

## 2022-10-24 NOTE — H&P ADULT - NSHPSOCIALHISTORY_GEN_ALL_CORE
Born in [facility]  Never smoker  No illicit drug use   Lives with wife   Daughter is emergency contact: Brenda Bustos [phone]

## 2022-10-24 NOTE — PRE PROCEDURE NOTE - PRE PROCEDURE EVALUATION
Interventional Radiology    HPI: 72M HTN remote Hx DVT (Eliquis 2.5 bid), CKD2-3, hx CVA with R sided deficit, pre-diabetes, MM undergoing chemotherapy (scheduled for 1/4/22)  which has been delayed by COVID-19 positive test 12/31/21 who is admitted for stem cell transplant. He presents to IR for TLC placement.     Allergies: allopurinol (Other)    Medications (Abx/Cardiac/Anticoagulation/Blood Products)      Data:  168  74.9  T(C): 36.8  HR: 82  BP: 149/79  RR: 18  SpO2: 99%    Exam  General: No acute distress  Chest: Non labored breathing    -WBC 2.85 / HgB 11.8 / Hct 37.5 / Plt 339  -Na 140 / Cl 103 / BUN 18 / Glucose 131  -K 4.9 / CO2 25 / Cr 1.01  -ALT 19 / Alk Phos 130 / T.Bili 0.4  -INR1.07      Plan: 72y Male presents for TLC placement   -Risks/Benefits/alternatives explained with the patient and/or healthcare proxy and witnessed informed consent obtained.

## 2022-10-24 NOTE — H&P ADULT - ASSESSMENT
72 year old male with multiple myeloma diagnosed 12/20/21 s/p RVD x 6 admitted for autologous pbsct with high dose melphalan prep regimen (dose reduced to 70mg / m2 for age)

## 2022-10-24 NOTE — H&P ADULT - PROBLEM SELECTOR PLAN 1
1. Admit to BMTU   2. 3 hours prior to Melphalan start hydration: D5NS at 200ml /hr and continue 24 hours post Melphalan infusion  3. Day – 3 & day -2 - Melphalan 100mg/m2  IV   4. Strict I&O, daily weights, prn diuresis   5. Day -1 rest day. At 2200 on day – 1, start transplant hydration 1/2NS + 50mEq NaHCO3- (may substitute [id number] if bicarb not available)  6. Day 0 – infuse HPC product. 4 hours after infusion of cells start Zarxio 5 micrograms / kg (actual weight) . Continue through engraftment.   7. On day 0, + 1, + 2-give Kepivance 60micrograms / kg (actual weight)

## 2022-10-25 LAB
A1C WITH ESTIMATED AVERAGE GLUCOSE RESULT: 6.8 % — HIGH (ref 4–5.6)
ALBUMIN SERPL ELPH-MCNC: 3.9 G/DL — SIGNIFICANT CHANGE UP (ref 3.3–5)
ALP SERPL-CCNC: 115 U/L — SIGNIFICANT CHANGE UP (ref 40–120)
ALT FLD-CCNC: 16 U/L — SIGNIFICANT CHANGE UP (ref 10–45)
ANION GAP SERPL CALC-SCNC: 15 MMOL/L — SIGNIFICANT CHANGE UP (ref 5–17)
AST SERPL-CCNC: 16 U/L — SIGNIFICANT CHANGE UP (ref 10–40)
BASOPHILS # BLD AUTO: 0 K/UL — SIGNIFICANT CHANGE UP (ref 0–0.2)
BASOPHILS NFR BLD AUTO: 0 % — SIGNIFICANT CHANGE UP (ref 0–2)
BILIRUB SERPL-MCNC: 0.4 MG/DL — SIGNIFICANT CHANGE UP (ref 0.2–1.2)
BUN SERPL-MCNC: 17 MG/DL — SIGNIFICANT CHANGE UP (ref 7–23)
CALCIUM SERPL-MCNC: 8.6 MG/DL — SIGNIFICANT CHANGE UP (ref 8.4–10.5)
CHLORIDE SERPL-SCNC: 107 MMOL/L — SIGNIFICANT CHANGE UP (ref 96–108)
CO2 SERPL-SCNC: 20 MMOL/L — LOW (ref 22–31)
CREAT SERPL-MCNC: 1.01 MG/DL — SIGNIFICANT CHANGE UP (ref 0.5–1.3)
EGFR: 78 ML/MIN/1.73M2 — SIGNIFICANT CHANGE UP
EOSINOPHIL # BLD AUTO: 0 K/UL — SIGNIFICANT CHANGE UP (ref 0–0.5)
EOSINOPHIL NFR BLD AUTO: 0 % — SIGNIFICANT CHANGE UP (ref 0–6)
ESTIMATED AVERAGE GLUCOSE: 148 MG/DL — HIGH (ref 68–114)
GLUCOSE BLDC GLUCOMTR-MCNC: 155 MG/DL — HIGH (ref 70–99)
GLUCOSE BLDC GLUCOMTR-MCNC: 178 MG/DL — HIGH (ref 70–99)
GLUCOSE BLDC GLUCOMTR-MCNC: 232 MG/DL — HIGH (ref 70–99)
GLUCOSE BLDC GLUCOMTR-MCNC: 285 MG/DL — HIGH (ref 70–99)
GLUCOSE SERPL-MCNC: 176 MG/DL — HIGH (ref 70–99)
HCT VFR BLD CALC: 35.6 % — LOW (ref 39–50)
HGB BLD-MCNC: 11.1 G/DL — LOW (ref 13–17)
IGA FLD-MCNC: 90 MG/DL — SIGNIFICANT CHANGE UP (ref 84–499)
IGD SER-MCNC: 2 MG/DL — SIGNIFICANT CHANGE UP
IGE SERPL-ACNC: 2 KU/L — SIGNIFICANT CHANGE UP
IGG FLD-MCNC: 1230 MG/DL — SIGNIFICANT CHANGE UP (ref 610–1660)
IGM SERPL-MCNC: 24 MG/DL — LOW (ref 35–242)
KAPPA LC SER QL IFE: 4.93 MG/DL — HIGH (ref 0.33–1.94)
KAPPA/LAMBDA FREE LIGHT CHAIN RATIO, SERUM: 2.01 RATIO — HIGH (ref 0.26–1.65)
LAMBDA LC SER QL IFE: 2.45 MG/DL — SIGNIFICANT CHANGE UP (ref 0.57–2.63)
LDH SERPL L TO P-CCNC: 179 U/L — SIGNIFICANT CHANGE UP (ref 50–242)
LYMPHOCYTES # BLD AUTO: 0.23 K/UL — LOW (ref 1–3.3)
LYMPHOCYTES # BLD AUTO: 5.2 % — LOW (ref 13–44)
MAGNESIUM SERPL-MCNC: 1.9 MG/DL — SIGNIFICANT CHANGE UP (ref 1.6–2.6)
MANUAL SMEAR VERIFICATION: SIGNIFICANT CHANGE UP
MCHC RBC-ENTMCNC: 25.9 PG — LOW (ref 27–34)
MCHC RBC-ENTMCNC: 31.2 GM/DL — LOW (ref 32–36)
MCV RBC AUTO: 83.2 FL — SIGNIFICANT CHANGE UP (ref 80–100)
MONOCYTES # BLD AUTO: 0 K/UL — SIGNIFICANT CHANGE UP (ref 0–0.9)
MONOCYTES NFR BLD AUTO: 0 % — LOW (ref 2–14)
NEUTROPHILS # BLD AUTO: 4.23 K/UL — SIGNIFICANT CHANGE UP (ref 1.8–7.4)
NEUTROPHILS NFR BLD AUTO: 94.8 % — HIGH (ref 43–77)
PHOSPHATE SERPL-MCNC: 3.3 MG/DL — SIGNIFICANT CHANGE UP (ref 2.5–4.5)
PLAT MORPH BLD: NORMAL — SIGNIFICANT CHANGE UP
PLATELET # BLD AUTO: 412 K/UL — HIGH (ref 150–400)
POTASSIUM SERPL-MCNC: 4.1 MMOL/L — SIGNIFICANT CHANGE UP (ref 3.5–5.3)
POTASSIUM SERPL-SCNC: 4.1 MMOL/L — SIGNIFICANT CHANGE UP (ref 3.5–5.3)
PROT SERPL-MCNC: 7 G/DL — SIGNIFICANT CHANGE UP (ref 6–8.3)
RBC # BLD: 4.28 M/UL — SIGNIFICANT CHANGE UP (ref 4.2–5.8)
RBC # FLD: 14.9 % — HIGH (ref 10.3–14.5)
RBC BLD AUTO: SIGNIFICANT CHANGE UP
SODIUM SERPL-SCNC: 142 MMOL/L — SIGNIFICANT CHANGE UP (ref 135–145)
WBC # BLD: 4.46 K/UL — SIGNIFICANT CHANGE UP (ref 3.8–10.5)
WBC # FLD AUTO: 4.46 K/UL — SIGNIFICANT CHANGE UP (ref 3.8–10.5)

## 2022-10-25 PROCEDURE — 99291 CRITICAL CARE FIRST HOUR: CPT

## 2022-10-25 PROCEDURE — 99231 SBSQ HOSP IP/OBS SF/LOW 25: CPT

## 2022-10-25 RX ORDER — POTASSIUM CHLORIDE 20 MEQ
20 PACKET (EA) ORAL
Refills: 0 | Status: COMPLETED | OUTPATIENT
Start: 2022-10-25 | End: 2022-10-25

## 2022-10-25 RX ORDER — SENNA PLUS 8.6 MG/1
2 TABLET ORAL AT BEDTIME
Refills: 0 | Status: DISCONTINUED | OUTPATIENT
Start: 2022-10-25 | End: 2022-11-15

## 2022-10-25 RX ORDER — FUROSEMIDE 40 MG
40 TABLET ORAL ONCE
Refills: 0 | Status: COMPLETED | OUTPATIENT
Start: 2022-10-25 | End: 2022-10-25

## 2022-10-25 RX ADMIN — SENNA PLUS 2 TABLET(S): 8.6 TABLET ORAL at 22:28

## 2022-10-25 RX ADMIN — Medication 110 MILLIGRAM(S): at 18:06

## 2022-10-25 RX ADMIN — URSODIOL 300 MILLIGRAM(S): 250 TABLET, FILM COATED ORAL at 18:06

## 2022-10-25 RX ADMIN — Medication 1 TABLET(S): at 18:07

## 2022-10-25 RX ADMIN — NYSTATIN CREAM 1 APPLICATION(S): 100000 CREAM TOPICAL at 13:51

## 2022-10-25 RX ADMIN — Medication 5 MILLILITER(S): at 16:15

## 2022-10-25 RX ADMIN — ONDANSETRON 8 MILLIGRAM(S): 8 TABLET, FILM COATED ORAL at 22:27

## 2022-10-25 RX ADMIN — SODIUM CHLORIDE 200 MILLILITER(S): 9 INJECTION INTRAMUSCULAR; INTRAVENOUS; SUBCUTANEOUS at 05:00

## 2022-10-25 RX ADMIN — Medication 10 MILLILITER(S): at 20:26

## 2022-10-25 RX ADMIN — APREPITANT 80 MILLIGRAM(S): 80 CAPSULE ORAL at 12:07

## 2022-10-25 RX ADMIN — Medication 1 TABLET(S): at 06:27

## 2022-10-25 RX ADMIN — Medication 3: at 12:10

## 2022-10-25 RX ADMIN — Medication 20 MILLIGRAM(S): at 22:30

## 2022-10-25 RX ADMIN — Medication 10 MILLILITER(S): at 08:15

## 2022-10-25 RX ADMIN — AMLODIPINE BESYLATE 10 MILLIGRAM(S): 2.5 TABLET ORAL at 06:27

## 2022-10-25 RX ADMIN — HEPARIN SODIUM 3.12 UNIT(S)/HR: 5000 INJECTION INTRAVENOUS; SUBCUTANEOUS at 12:06

## 2022-10-25 RX ADMIN — Medication 10 MILLILITER(S): at 12:11

## 2022-10-25 RX ADMIN — FINASTERIDE 5 MILLIGRAM(S): 5 TABLET, FILM COATED ORAL at 12:09

## 2022-10-25 RX ADMIN — Medication 1 LOZENGE: at 08:14

## 2022-10-25 RX ADMIN — PANTOPRAZOLE SODIUM 40 MILLIGRAM(S): 20 TABLET, DELAYED RELEASE ORAL at 06:27

## 2022-10-25 RX ADMIN — Medication 25 MILLIGRAM(S): at 06:27

## 2022-10-25 RX ADMIN — Medication 1 LOZENGE: at 16:15

## 2022-10-25 RX ADMIN — ONDANSETRON 8 MILLIGRAM(S): 8 TABLET, FILM COATED ORAL at 06:28

## 2022-10-25 RX ADMIN — SODIUM CHLORIDE 200 MILLILITER(S): 9 INJECTION INTRAMUSCULAR; INTRAVENOUS; SUBCUTANEOUS at 10:29

## 2022-10-25 RX ADMIN — Medication 1 TABLET(S): at 12:09

## 2022-10-25 RX ADMIN — Medication 5 MILLILITER(S): at 08:14

## 2022-10-25 RX ADMIN — SODIUM CHLORIDE 200 MILLILITER(S): 9 INJECTION INTRAMUSCULAR; INTRAVENOUS; SUBCUTANEOUS at 20:25

## 2022-10-25 RX ADMIN — LORATADINE 10 MILLIGRAM(S): 10 TABLET ORAL at 12:08

## 2022-10-25 RX ADMIN — URSODIOL 300 MILLIGRAM(S): 250 TABLET, FILM COATED ORAL at 06:27

## 2022-10-25 RX ADMIN — ONDANSETRON 8 MILLIGRAM(S): 8 TABLET, FILM COATED ORAL at 13:50

## 2022-10-25 RX ADMIN — MELPHALAN HYDROCHLORIDE 124 MILLIGRAM(S): KIT INTRAVENOUS at 18:33

## 2022-10-25 RX ADMIN — CHLORHEXIDINE GLUCONATE 1 APPLICATION(S): 213 SOLUTION TOPICAL at 06:28

## 2022-10-25 RX ADMIN — SODIUM CHLORIDE 20 MILLILITER(S): 9 INJECTION INTRAMUSCULAR; INTRAVENOUS; SUBCUTANEOUS at 06:30

## 2022-10-25 RX ADMIN — NYSTATIN CREAM 1 APPLICATION(S): 100000 CREAM TOPICAL at 06:28

## 2022-10-25 RX ADMIN — Medication 1 MILLIGRAM(S): at 12:08

## 2022-10-25 RX ADMIN — Medication 1 LOZENGE: at 12:10

## 2022-10-25 RX ADMIN — FLUCONAZOLE 400 MILLIGRAM(S): 150 TABLET ORAL at 12:09

## 2022-10-25 RX ADMIN — Medication 5 MILLILITER(S): at 12:09

## 2022-10-25 RX ADMIN — NYSTATIN CREAM 1 APPLICATION(S): 100000 CREAM TOPICAL at 22:30

## 2022-10-25 RX ADMIN — Medication 50 MILLIEQUIVALENT(S): at 10:28

## 2022-10-25 RX ADMIN — Medication 25 MILLIGRAM(S): at 13:50

## 2022-10-25 RX ADMIN — Medication 5 MILLILITER(S): at 20:25

## 2022-10-25 RX ADMIN — Medication 50 MILLIEQUIVALENT(S): at 12:11

## 2022-10-25 RX ADMIN — Medication 1: at 08:13

## 2022-10-25 RX ADMIN — TAMSULOSIN HYDROCHLORIDE 0.4 MILLIGRAM(S): 0.4 CAPSULE ORAL at 22:27

## 2022-10-25 RX ADMIN — Medication 1 LOZENGE: at 20:26

## 2022-10-25 RX ADMIN — Medication 40 MILLIGRAM(S): at 08:23

## 2022-10-25 RX ADMIN — Medication 1: at 16:50

## 2022-10-25 RX ADMIN — Medication 10 MILLILITER(S): at 16:16

## 2022-10-25 RX ADMIN — Medication 25 MILLIGRAM(S): at 22:27

## 2022-10-25 RX ADMIN — Medication 50 MILLIEQUIVALENT(S): at 08:23

## 2022-10-25 RX ADMIN — SODIUM CHLORIDE 200 MILLILITER(S): 9 INJECTION INTRAMUSCULAR; INTRAVENOUS; SUBCUTANEOUS at 00:30

## 2022-10-25 RX ADMIN — Medication 40 MILLIGRAM(S): at 16:20

## 2022-10-25 NOTE — DIETITIAN INITIAL EVALUATION ADULT - SIGNS/SYMPTOMS
as evidenced by <75% po intake > 1 month, mild to severe muscle/ fat wasting <75% EER > 1 month, mild-severe muscle/fat depletion, 6% weight loss x 1-2 months <75% EER > 1 month, mild-severe muscle/fat depletion, 3% weight loss x 1-2 months

## 2022-10-25 NOTE — DIETITIAN INITIAL EVALUATION ADULT - LAB (SPECIFY)
HbA1C lab to assess glycemic control HbA1C lab to assess glycemic control, other nutrition related lab trends

## 2022-10-25 NOTE — DIETITIAN INITIAL EVALUATION ADULT - REASON FOR ADMISSION
Multiple myeloma not having achieved remission    Per chart, "This is a 72 year old male with multiple myeloma admitted for an autologous pbsct with high dose Melphalan prep regimen." Multiple myeloma not having achieved remission    Per chart, "This is a 72 year old male with multiple myeloma admitted for an autologous pbsct with high dose Melphalan prep regimen. Day -2."

## 2022-10-25 NOTE — DIETITIAN INITIAL EVALUATION ADULT - OTHER INFO
PMHx of "diet controlled" pre-diabetes per chart, takes Jardiance at home, pt unable to confirm medication as his wife manages all supplements and medications. Reported checking blood glucose levels at home ~once every other day. On sliding scale insulin in-house, continue to monitor POCT and order A1c lab to assess glycemic control.  Wt history per pt: UBW of 170 pounds x 1-2 months ago, endorses 5 pound, 3% wt loss x 1-2 months. Also reported past wt of 225 pounds x 2 years ago, ? accuracy, per Tashia MILLER pt weighed 200 pounds 9/16/16, 182 pounds 11/27/21, 170 pounds 9/21/22, and downtrending to current weight of 165 pounds this admission (10/24/22).

## 2022-10-25 NOTE — DIETITIAN INITIAL EVALUATION ADULT - ENERGY INTAKE
Still reporting a less than typical appetite, noted that the food he eats at home is very different from the institutionalized food he is receiving, but is making an effort to eat his meals. Had lunch and dinner yesterday, was eating his breakfast tray at time of visit, had eaten ~50%.  Fair (50-75%) Still reporting a less than typical appetite, noted that the food he eats at home is very different from the institutionalized food he is receiving, but is making an effort to eat his meals. Had lunch and dinner yesterday, was eating his breakfast tray at time of visit, had eaten ~50%. Pt open to receiving 1 Glucerna shake daily as well as peanut putter and crackers as a snack to optimize protein-energy intake. Good appetite/PO intake yesterday 10/24, % of meals consumed. Decreased appetite noted today, RD observed <75% of breakfast consumed.   - Amenable to receiving oral nutritional supplement to optimize PO intake: Glucerna 1x/day  - Food preferences obtained; RD to honor as able.

## 2022-10-25 NOTE — DIETITIAN INITIAL EVALUATION ADULT - NSFNSGIIOFT_GEN_A_CORE
I&O's Detail    24 Oct 2022 07:01  -  25 Oct 2022 07:00  --------------------------------------------------------  IN:    Heparin: 48.7 mL    IV PiggyBack: 535 mL    Oral Fluid: 580 mL    sodium chloride 0.9%: 310.4 mL    sodium chloride 0.9%: 3126.9 mL  Total IN: 4601 mL    OUT:    Voided (mL): 2775 mL  Total OUT: 2775 mL    Total NET: 1826 mL      25 Oct 2022 07:01  -  25 Oct 2022 11:51  --------------------------------------------------------  IN:    Heparin: 12.5 mL    Oral Fluid: 120 mL    sodium chloride 0.9%: 139 mL    sodium chloride 0.9%: 799 mL  Total IN: 1070.5 mL    OUT:    Voided (mL): 900 mL  Total OUT: 900 mL    Total NET: 170.5 mL

## 2022-10-25 NOTE — PROGRESS NOTE ADULT - SUBJECTIVE AND OBJECTIVE BOX
HPC Transplant Team                                                      Critical / Counseling Time Provided: 30 minutes                                                                                                                                                        Chief Complaint: Autologous peripheral blood stem cell transplant with high dose Melphalan prep regimen for treatment of multiple myeloma    S: Patient seen and examined with HPC Transplant Team:     All other ROS negative     O: Vitals:   Vital Signs Last 24 Hrs  T(C): 36.7 (25 Oct 2022 04:55), Max: 37 (24 Oct 2022 11:18)  T(F): 98 (25 Oct 2022 04:55), Max: 98.6 (24 Oct 2022 11:18)  HR: 93 (25 Oct 2022 04:55) (75 - 97)  BP: 124/69 (25 Oct 2022 04:55) (124/69 - 168/89)  BP(mean): --  RR: 18 (25 Oct 2022 04:55) (16 - 18)  SpO2: 95% (25 Oct 2022 04:55) (95% - 99%)    Parameters below as of 25 Oct 2022 04:55  Patient On (Oxygen Delivery Method): room air      Admit weight: 74.9kg   Daily Height in cm: 168 (24 Oct 2022 10:12)    Today's weight:      Intake / Output:   10-24 @ 07:01  -  10-25 @ 07:00  --------------------------------------------------------  IN: 4601 mL / OUT: 2775 mL / NET: 1826 mL        PE:   Oropharynx: no erythema or ulcerations  Oral Mucositis:                 -                                       Grade: n/a  CVS: S1, S2 RRR   Lungs: CTA throughout bilaterally   Abdomen: + BS x 4, soft, NT, ND   Extremities: no edema  Gastric Mucositis:      -                                            Grade: n/a  Intestinal Mucositis:    -                                          Grade: n/a  Skin: no rash   TLC: CDI   Neuro: A&Ox3   Pain: 0    Labs:   CBC Full  -  ( 25 Oct 2022 07:27 )  WBC Count : 4.46 K/uL  Hemoglobin : 11.1 g/dL  Hematocrit : 35.6 %  Platelet Count - Automated : 412 K/uL  Mean Cell Volume : 83.2 fl  Mean Cell Hemoglobin : 25.9 pg  Mean Cell Hemoglobin Concentration : 31.2 gm/dL  Auto Neutrophil # : x  Auto Lymphocyte # : x  Auto Monocyte # : x  Auto Eosinophil # : x  Auto Basophil # : x  Auto Neutrophil % : x  Auto Lymphocyte % : x  Auto Monocyte % : x  Auto Eosinophil % : x  Auto Basophil % : x                          11.1   4.46  )-----------( 412      ( 25 Oct 2022 07:27 )             35.6     10-25    142  |  107  |  17  ----------------------------<  176<H>  4.1   |  20<L>  |  1.01    Ca    8.6      25 Oct 2022 07:25  Phos  3.3     10-25  Mg     1.9     10-25    TPro  7.0  /  Alb  3.9  /  TBili  0.4  /  DBili  x   /  AST  16  /  ALT  16  /  AlkPhos  115  10-25    PT/INR - ( 24 Oct 2022 14:42 )   PT: 12.6 sec;   INR: 1.09 ratio         PTT - ( 24 Oct 2022 14:42 )  PTT:33.2 sec  LIVER FUNCTIONS - ( 25 Oct 2022 07:25 )  Alb: 3.9 g/dL / Pro: 7.0 g/dL / ALK PHOS: 115 U/L / ALT: 16 U/L / AST: 16 U/L / GGT: x           Lactate Dehydrogenase, Serum: 179 U/L (10-25 @ 07:25)  Lactate Dehydrogenase, Serum: 159 U/L (10-24 @ 14:42)        Meds:   Antimicrobials:   clotrimazole Lozenge 1 Lozenge Oral five times a day  fluconAZOLE   Tablet 400 milliGRAM(s) Oral daily  trimethoprim  160 mG/sulfamethoxazole 800 mG 1 Tablet(s) Oral every 12 hours      Heme / Onc:   heparin  Infusion 312 Unit(s)/Hr IV Continuous <Continuous>  melphalan IVPB (eMAR) 124 milliGRAM(s) IV Intermittent daily      GI:  pantoprazole    Tablet 40 milliGRAM(s) Oral before breakfast  senna 1 Tablet(s) Oral at bedtime PRN  sodium bicarbonate Mouth Rinse 10 milliLiter(s) Swish and Spit five times a day  ursodiol Capsule 300 milliGRAM(s) Oral two times a day      Cardiovascular:   amLODIPine   Tablet 10 milliGRAM(s) Oral daily  furosemide   Injectable 40 milliGRAM(s) IV Push once  furosemide   Injectable 20 milliGRAM(s) IV Push every 24 hours PRN  hydrALAZINE 25 milliGRAM(s) Oral three times a day  tamsulosin 0.4 milliGRAM(s) Oral at bedtime      Immunologic:       Other medications:   acetaminophen     Tablet .. 650 milliGRAM(s) Oral every 6 hours  aprepitant 80 milliGRAM(s) Oral every 24 hours  Biotene Dry Mouth Oral Rinse 5 milliLiter(s) Swish and Spit five times a day  chlorhexidine 4% Liquid 1 Application(s) Topical <User Schedule>  dexAMETHasone  IVPB 20 milliGRAM(s) IV Intermittent every 24 hours  dextrose 5%. 1000 milliLiter(s) IV Continuous <Continuous>  dextrose 5%. 1000 milliLiter(s) IV Continuous <Continuous>  dextrose 50% Injectable 25 Gram(s) IV Push once  dextrose 50% Injectable 12.5 Gram(s) IV Push once  dextrose 50% Injectable 25 Gram(s) IV Push once  finasteride 5 milliGRAM(s) Oral daily  folic acid 1 milliGRAM(s) Oral daily  glucagon  Injectable 1 milliGRAM(s) IntraMuscular once  insulin lispro (ADMELOG) corrective regimen sliding scale   SubCutaneous three times a day before meals  insulin lispro (ADMELOG) corrective regimen sliding scale   SubCutaneous at bedtime  loratadine 10 milliGRAM(s) Oral daily  LORazepam   Injectable 1 milliGRAM(s) IV Push every 24 hours  multivitamin 1 Tablet(s) Oral daily  nystatin Powder 1 Application(s) Topical three times a day  ondansetron Injectable 8 milliGRAM(s) IV Push every 8 hours  potassium chloride  20 mEq/100 mL IVPB 20 milliEquivalent(s) IV Intermittent every 2 hours  sodium chloride 0.9%. 1000 milliLiter(s) IV Continuous <Continuous>  sodium chloride 0.9%. 1000 milliLiter(s) IV Continuous <Continuous>      PRN:   acetaminophen     Tablet .. 650 milliGRAM(s) Oral every 6 hours PRN  dextrose Oral Gel 15 Gram(s) Oral once PRN  furosemide   Injectable 20 milliGRAM(s) IV Push every 24 hours PRN  LORazepam   Injectable 1 milliGRAM(s) IV Push every 6 hours PRN  metoclopramide Injectable 10 milliGRAM(s) IV Push every 6 hours PRN  senna 1 Tablet(s) Oral at bedtime PRN  sodium chloride 0.9% lock flush 10 milliLiter(s) IV Push every 1 hour PRN    A/P: 72 year old male with multiple myeloma diagnosed 12/20/21 s/p RVD x 6 admitted for autologous pbsct with high dose melphalan prep regimen (dose reduced to 70mg / m2 for age)    1. Infectious Disease:   clotrimazole Lozenge 1 Lozenge Oral five times a day  fluconAZOLE   Tablet 400 milliGRAM(s) Oral daily  trimethoprim  160 mG/sulfamethoxazole 800 mG 1 Tablet(s) Oral every 12 hours    2. VOD Prophylaxis: Actigall, Glutamine, Heparin (dosed at 100 units / kg / day)     3. GI Prophylaxis:    pantoprazole    Tablet 40 milliGRAM(s) Oral before breakfast    4. Mouthcare - NS / NaHCO3 rinses, Mycelex, Biotene; Skin care     5. GVHD prophylaxis - n/a     6. Transfuse & replete electrolytes prn   potassium chloride  20 mEq/100 mL IVPB 20 milliEquivalent(s) IV Intermittent every 2 hours    7. IV hydration, daily weights, strict I&O, prn diuresis   Lasix 40mg IV x  1 0800  Lasix 40mg IV x 1 1600    8. PO intake as tolerated, nutrition follow up as needed, MVI, folic acid     9. Antiemetics, anti-diarrhea medications:   LORazepam   Injectable 1 milliGRAM(s) IV Push every 6 hours PRN  metoclopramide Injectable 10 milliGRAM(s) IV Push every 6 hours PRN  aprepitant 80 milliGRAM(s) Oral every 24 hours  dexAMETHasone  IVPB 20 milliGRAM(s) IV Intermittent every 24 hours  LORazepam   Injectable 1 milliGRAM(s) IV Push every 24 hours  ondansetron Injectable 8 milliGRAM(s) IV Push every 8 hours    10. OOB as tolerated, physical therapy consult if needed     11. Monitor coags / fibrinogen 2x week, vitamin K as needed     12. Monitor closely for clinical changes, monitor for fevers     13. Emotional support provided, plan of care discussed and questions addressed     14. Patient education done regarding chemotherapy prep, plan of care, restrictions and discharge planning     15. Continue regular social work input     I have written the above note for Dr. Gonzales who performed service with me in the room.   Shayla Lundberg NP-C ([phone])    I have seen and examined patient with NP, I agree with above note as scribed.                    HPC Transplant Team                                                      Critical / Counseling Time Provided: 30 minutes                                                                                                                                                        Chief Complaint: Autologous peripheral blood stem cell transplant with high dose Melphalan prep regimen for treatment of multiple myeloma    S: Patient seen and examined with HPC Transplant Team:   + fatigue   All other ROS negative     O: Vitals:   Vital Signs Last 24 Hrs  T(C): 36.7 (25 Oct 2022 04:55), Max: 37 (24 Oct 2022 11:18)  T(F): 98 (25 Oct 2022 04:55), Max: 98.6 (24 Oct 2022 11:18)  HR: 93 (25 Oct 2022 04:55) (75 - 97)  BP: 124/69 (25 Oct 2022 04:55) (124/69 - 168/89)  BP(mean): --  RR: 18 (25 Oct 2022 04:55) (16 - 18)  SpO2: 95% (25 Oct 2022 04:55) (95% - 99%)    Parameters below as of 25 Oct 2022 04:55  Patient On (Oxygen Delivery Method): room air      Admit weight: 74.9kg   Daily Height in cm: 168 (24 Oct 2022 10:12)    Today's weight:  74.7kg     Intake / Output:   10-24 @ 07:01  -  10-25 @ 07:00  --------------------------------------------------------  IN: 4601 mL / OUT: 2775 mL / NET: 1826 mL        PE:   Oropharynx: no erythema or ulcerations  Oral Mucositis:                 -                                       Grade: n/a  CVS: S1, S2 RRR   Lungs: CTA throughout bilaterally   Abdomen: + BS x 4, soft, NT, ND   Extremities: no edema  Gastric Mucositis:      -                                            Grade: n/a  Intestinal Mucositis:    -                                          Grade: n/a  Skin: no rash   TLC: CDI   Neuro: A&Ox3   Pain: 0    Labs:   CBC Full  -  ( 25 Oct 2022 07:27 )  WBC Count : 4.46 K/uL  Hemoglobin : 11.1 g/dL  Hematocrit : 35.6 %  Platelet Count - Automated : 412 K/uL  Mean Cell Volume : 83.2 fl  Mean Cell Hemoglobin : 25.9 pg  Mean Cell Hemoglobin Concentration : 31.2 gm/dL  Auto Neutrophil # : x  Auto Lymphocyte # : x  Auto Monocyte # : x  Auto Eosinophil # : x  Auto Basophil # : x  Auto Neutrophil % : x  Auto Lymphocyte % : x  Auto Monocyte % : x  Auto Eosinophil % : x  Auto Basophil % : x                          11.1   4.46  )-----------( 412      ( 25 Oct 2022 07:27 )             35.6     10-25    142  |  107  |  17  ----------------------------<  176<H>  4.1   |  20<L>  |  1.01    Ca    8.6      25 Oct 2022 07:25  Phos  3.3     10-25  Mg     1.9     10-25    TPro  7.0  /  Alb  3.9  /  TBili  0.4  /  DBili  x   /  AST  16  /  ALT  16  /  AlkPhos  115  10-25    PT/INR - ( 24 Oct 2022 14:42 )   PT: 12.6 sec;   INR: 1.09 ratio         PTT - ( 24 Oct 2022 14:42 )  PTT:33.2 sec  LIVER FUNCTIONS - ( 25 Oct 2022 07:25 )  Alb: 3.9 g/dL / Pro: 7.0 g/dL / ALK PHOS: 115 U/L / ALT: 16 U/L / AST: 16 U/L / GGT: x           Lactate Dehydrogenase, Serum: 179 U/L (10-25 @ 07:25)  Lactate Dehydrogenase, Serum: 159 U/L (10-24 @ 14:42)        Meds:   Antimicrobials:   clotrimazole Lozenge 1 Lozenge Oral five times a day  fluconAZOLE   Tablet 400 milliGRAM(s) Oral daily  trimethoprim  160 mG/sulfamethoxazole 800 mG 1 Tablet(s) Oral every 12 hours      Heme / Onc:   heparin  Infusion 312 Unit(s)/Hr IV Continuous <Continuous>  melphalan IVPB (eMAR) 124 milliGRAM(s) IV Intermittent daily      GI:  pantoprazole    Tablet 40 milliGRAM(s) Oral before breakfast  senna 1 Tablet(s) Oral at bedtime PRN  sodium bicarbonate Mouth Rinse 10 milliLiter(s) Swish and Spit five times a day  ursodiol Capsule 300 milliGRAM(s) Oral two times a day      Cardiovascular:   amLODIPine   Tablet 10 milliGRAM(s) Oral daily  furosemide   Injectable 40 milliGRAM(s) IV Push once  furosemide   Injectable 20 milliGRAM(s) IV Push every 24 hours PRN  hydrALAZINE 25 milliGRAM(s) Oral three times a day  tamsulosin 0.4 milliGRAM(s) Oral at bedtime      Immunologic:       Other medications:   acetaminophen     Tablet .. 650 milliGRAM(s) Oral every 6 hours  aprepitant 80 milliGRAM(s) Oral every 24 hours  Biotene Dry Mouth Oral Rinse 5 milliLiter(s) Swish and Spit five times a day  chlorhexidine 4% Liquid 1 Application(s) Topical <User Schedule>  dexAMETHasone  IVPB 20 milliGRAM(s) IV Intermittent every 24 hours  dextrose 5%. 1000 milliLiter(s) IV Continuous <Continuous>  dextrose 5%. 1000 milliLiter(s) IV Continuous <Continuous>  dextrose 50% Injectable 25 Gram(s) IV Push once  dextrose 50% Injectable 12.5 Gram(s) IV Push once  dextrose 50% Injectable 25 Gram(s) IV Push once  finasteride 5 milliGRAM(s) Oral daily  folic acid 1 milliGRAM(s) Oral daily  glucagon  Injectable 1 milliGRAM(s) IntraMuscular once  insulin lispro (ADMELOG) corrective regimen sliding scale   SubCutaneous three times a day before meals  insulin lispro (ADMELOG) corrective regimen sliding scale   SubCutaneous at bedtime  loratadine 10 milliGRAM(s) Oral daily  LORazepam   Injectable 1 milliGRAM(s) IV Push every 24 hours  multivitamin 1 Tablet(s) Oral daily  nystatin Powder 1 Application(s) Topical three times a day  ondansetron Injectable 8 milliGRAM(s) IV Push every 8 hours  potassium chloride  20 mEq/100 mL IVPB 20 milliEquivalent(s) IV Intermittent every 2 hours  sodium chloride 0.9%. 1000 milliLiter(s) IV Continuous <Continuous>  sodium chloride 0.9%. 1000 milliLiter(s) IV Continuous <Continuous>      PRN:   acetaminophen     Tablet .. 650 milliGRAM(s) Oral every 6 hours PRN  dextrose Oral Gel 15 Gram(s) Oral once PRN  furosemide   Injectable 20 milliGRAM(s) IV Push every 24 hours PRN  LORazepam   Injectable 1 milliGRAM(s) IV Push every 6 hours PRN  metoclopramide Injectable 10 milliGRAM(s) IV Push every 6 hours PRN  senna 1 Tablet(s) Oral at bedtime PRN  sodium chloride 0.9% lock flush 10 milliLiter(s) IV Push every 1 hour PRN    A/P: 72 year old male with multiple myeloma diagnosed 12/20/21 s/p RVD x 6 admitted for autologous pbsct with high dose melphalan prep regimen (dose reduced to 70mg / m2 for age)  Day - 2   10/25- melphalan 2/2; continue melphalan hydration for 24 hours post infusion of last dose. Strict I&O, daily weights, prn diuresis     1. Infectious Disease:   clotrimazole Lozenge 1 Lozenge Oral five times a day  fluconAZOLE   Tablet 400 milliGRAM(s) Oral daily  trimethoprim  160 mG/sulfamethoxazole 800 mG 1 Tablet(s) Oral every 12 hours    2. VOD Prophylaxis: Actigall, Glutamine, Heparin (dosed at 100 units / kg / day)     3. GI Prophylaxis:    pantoprazole    Tablet 40 milliGRAM(s) Oral before breakfast    4. Mouthcare - NS / NaHCO3 rinses, Mycelex, Biotene; Skin care     5. GVHD prophylaxis - n/a     6. Transfuse & replete electrolytes prn   potassium chloride  20 mEq/100 mL IVPB 20 milliEquivalent(s) IV Intermittent every 2 hours    7. IV hydration, daily weights, strict I&O, prn diuresis   Lasix 40mg IV x  1 0800  Lasix 40mg IV x 1 1600    8. PO intake as tolerated, nutrition follow up as needed, MVI, folic acid     9. Antiemetics, anti-diarrhea medications:   LORazepam   Injectable 1 milliGRAM(s) IV Push every 6 hours PRN  metoclopramide Injectable 10 milliGRAM(s) IV Push every 6 hours PRN  aprepitant 80 milliGRAM(s) Oral every 24 hours  dexAMETHasone  IVPB 20 milliGRAM(s) IV Intermittent every 24 hours  LORazepam   Injectable 1 milliGRAM(s) IV Push every 24 hours  ondansetron Injectable 8 milliGRAM(s) IV Push every 8 hours    10. OOB as tolerated, physical therapy consult if needed     11. Monitor coags / fibrinogen 2x week, vitamin K as needed     12. Monitor closely for clinical changes, monitor for fevers     13. Emotional support provided, plan of care discussed and questions addressed     14. Patient education done regarding chemotherapy prep, plan of care, restrictions and discharge planning     15. Continue regular social work input     I have written the above note for Dr. Gonzales who performed service with me in the room.   Shayla Lundberg NP-C ([phone])    I have seen and examined patient with NP, I agree with above note as scribed.

## 2022-10-25 NOTE — DIETITIAN INITIAL EVALUATION ADULT - PERTINENT LABORATORY DATA
10-25    142  |  107  |  17  ----------------------------<  176<H>  4.1   |  20<L>  |  1.01    Ca    8.6      25 Oct 2022 07:25  Phos  3.3     10-25  Mg     1.9     10-25    TPro  7.0  /  Alb  3.9  /  TBili  0.4  /  DBili  x   /  AST  16  /  ALT  16  /  AlkPhos  115  10-25  POCT Blood Glucose.: 155 mg/dL (10-25-22 @ 07:36)  A1C with Estimated Average Glucose Result: 6.8 % (10-25-22 @ 07:26)  A1C with Estimated Average Glucose Result: 7.1 % (01-13-22 @ 07:26)  A1C with Estimated Average Glucose Result: 6.2 % (11-27-21 @ 02:59)

## 2022-10-25 NOTE — DIETITIAN INITIAL EVALUATION ADULT - NSFNSGIASSESSMENTFT_GEN_A_CORE
Pt reported constipation, per flowsheet last BM 10/23, ? accuracy. On senna prn, open to receiving stewed prunes.

## 2022-10-25 NOTE — ADVANCED PRACTICE NURSE CONSULT - ASSESSMENT
Pt A/Ox4, vital signs stable, afebrile. Right IJ TL cath line easily flushed with positive blood return, dressing C/D/I Site intact, no redness, swelling, or pain. Pt received, Zofran 8mg IV every 8hrs ATC as order. 2RN's verification completed. Pt educated on chemotherapy, verbalized understanding. Today is Day 1/2 of Melphalan. Pt received Melphalan 70mg/m2= 124mg IV over 30minutes connected to lowest port of normal saline via TL cath red lumen, via alaris pump. Call bell in reach, Pt safety maintained. Continuing hourly rounds. Pt educated on safety and fall prevention.   
Lab results reviewed by Dr. Gonzales. patient got 2 doses of Lasix 40mg IV today with good output. Uses urinal by self,around 1600 spilled an adequate amount of urine post second dose of Lasix 40mgIV. NP Frankie Roger aware. reinforced teachings to patient about his chemo regimen well understood. Patient wityh right IJ TLC all ports in used patent. Decadron 20mg IV x 1 given prior to melphalan. At 1830 124mg melphalan/500ml NSS started x 30minutes via NSS line into white port of right IJ TLC through Alaris IV pump. Safety maintained. IV fluids hydration reduced to 75ml/hr while melphalan infusing.

## 2022-10-25 NOTE — DIETITIAN INITIAL EVALUATION ADULT - ADD RECOMMEND
1) Add Glucerna shake once a day to optimize po intake, 2) Continue with regular diet, encourage protein rich foods, maximize preferences, 3) Provided education on increased needs and food safety measures related to transplant MNT, RD available to reinforce prn, 4) Monitor bowel movements, encourage intake of stewed prunes to alleviate constipation/ continue with senna, 5)  1) Add Glucerna shake once a day to optimize po intake, 2) Continue with regular diet, encourage protein rich foods, maximize preferences, 3) Provided education on increased needs and food safety measures related to BMT nutrition, RD available to reinforce prn, 4) Monitor bowel movements, encourage intake of stewed prunes to alleviate constipation/ continue with senna, 5) Monitor electrolytes and replete prn, 6) Continue with Glutasolve, multivitamin and folic acid once a day, 7) RD will continue to monitor PO intake, labs, hydration, and wt prn.  1) Continue with regular diet, encourage protein rich foods, maximize preferences  2) Add Glucerna shake once a day to optimize po intake   3) Provided education on increased needs and food safety measures related to BMT nutrition, RD available to reinforce prn,   4) Monitor bowel movements, encourage intake of stewed prunes to alleviate constipation/ continue with senna,   5) Monitor electrolytes and replete prn  6) Continue with Glutasolve, multivitamin and folic acid once a day  7) RD will continue to monitor PO intake, labs, hydration, and wt prn.

## 2022-10-25 NOTE — DIETITIAN INITIAL EVALUATION ADULT - PERTINENT MEDS FT
MEDICATIONS  (STANDING):  acetaminophen     Tablet .. 650 milliGRAM(s) Oral every 6 hours  amLODIPine   Tablet 10 milliGRAM(s) Oral daily  aprepitant 80 milliGRAM(s) Oral every 24 hours  Biotene Dry Mouth Oral Rinse 5 milliLiter(s) Swish and Spit five times a day  chlorhexidine 4% Liquid 1 Application(s) Topical <User Schedule>  clotrimazole Lozenge 1 Lozenge Oral five times a day  dexAMETHasone  IVPB 20 milliGRAM(s) IV Intermittent every 24 hours  dextrose 5%. 1000 milliLiter(s) (50 mL/Hr) IV Continuous <Continuous>  dextrose 5%. 1000 milliLiter(s) (100 mL/Hr) IV Continuous <Continuous>  dextrose 50% Injectable 25 Gram(s) IV Push once  dextrose 50% Injectable 12.5 Gram(s) IV Push once  dextrose 50% Injectable 25 Gram(s) IV Push once  finasteride 5 milliGRAM(s) Oral daily  fluconAZOLE   Tablet 400 milliGRAM(s) Oral daily  folic acid 1 milliGRAM(s) Oral daily  furosemide   Injectable 40 milliGRAM(s) IV Push once  glucagon  Injectable 1 milliGRAM(s) IntraMuscular once  heparin  Infusion 312 Unit(s)/Hr (3.12 mL/Hr) IV Continuous <Continuous>  hydrALAZINE 25 milliGRAM(s) Oral three times a day  insulin lispro (ADMELOG) corrective regimen sliding scale   SubCutaneous three times a day before meals  insulin lispro (ADMELOG) corrective regimen sliding scale   SubCutaneous at bedtime  loratadine 10 milliGRAM(s) Oral daily  LORazepam   Injectable 1 milliGRAM(s) IV Push every 24 hours  melphalan IVPB (eMAR) 124 milliGRAM(s) IV Intermittent daily  multivitamin 1 Tablet(s) Oral daily  nystatin Powder 1 Application(s) Topical three times a day  ondansetron Injectable 8 milliGRAM(s) IV Push every 8 hours  pantoprazole    Tablet 40 milliGRAM(s) Oral before breakfast  potassium chloride  20 mEq/100 mL IVPB 20 milliEquivalent(s) IV Intermittent every 2 hours  sodium bicarbonate Mouth Rinse 10 milliLiter(s) Swish and Spit five times a day  sodium chloride 0.9%. 1000 milliLiter(s) (20 mL/Hr) IV Continuous <Continuous>  sodium chloride 0.9%. 1000 milliLiter(s) (200 mL/Hr) IV Continuous <Continuous>  tamsulosin 0.4 milliGRAM(s) Oral at bedtime  trimethoprim  160 mG/sulfamethoxazole 800 mG 1 Tablet(s) Oral every 12 hours  ursodiol Capsule 300 milliGRAM(s) Oral two times a day    MEDICATIONS  (PRN):  acetaminophen     Tablet .. 650 milliGRAM(s) Oral every 6 hours PRN Temp greater or equal to 38C (100.4F), Mild Pain (1 - 3)  dextrose Oral Gel 15 Gram(s) Oral once PRN Blood Glucose LESS THAN 70 milliGRAM(s)/deciliter  furosemide   Injectable 20 milliGRAM(s) IV Push every 24 hours PRN if urine output < 100 ml/hr x 2 hours  LORazepam   Injectable 1 milliGRAM(s) IV Push every 6 hours PRN Anxiety / Nausea / Vomiting  metoclopramide Injectable 10 milliGRAM(s) IV Push every 6 hours PRN Nausea and/or Vomiting  senna 1 Tablet(s) Oral at bedtime PRN Constipation  sodium chloride 0.9% lock flush 10 milliLiter(s) IV Push every 1 hour PRN Pre/post blood products, medications, blood draw, and to maintain line patency

## 2022-10-25 NOTE — DIETITIAN INITIAL EVALUATION ADULT - ORAL INTAKE PTA/DIET HISTORY
Per pt, appetite/ intake decreased PTA. Endorsed typical intake of potatoes, rice, beef, and vegetables, sometimes has ensure shakes. NFKA, denies chewing/ swallowing difficulty, reported vitamin/ supplement use, unable to identify what supplements, noted wife has a list.  Per pt, appetite/ intake decreased PTA, <75% typical intake for >1 month. Endorsed typical intake of potatoes, rice, beef, and vegetables, sometimes has ensure shakes. NFKA or intolerances, no dietary restrictions, denies chewing/ swallowing difficulty, reported vitamin/ supplement use, unable to identify what supplements, noted wife has a list. Per pt, appetite/ intake decreased PTA, <75% typical intake for >1 month. Endorsed typical intake of potatoes, rice, beef, and vegetables, sometimes has Ensure shakes 1-2x/hanh. NFKA or intolerances, no dietary restrictions, denies chewing/ swallowing difficulty, reported vitamin/ supplement use, unable to identify what supplements (PER H&P 10/24 calcium carbonate, sodium bicarbonate supplementation noted).

## 2022-10-25 NOTE — PROGRESS NOTE ADULT - CRITICAL CARE ATTENDING COMMENT
72 year old male with multiple myeloma diagnosed 12/20/21 s/p RVD x 6 admitted for autologous pbsct with high dose melphalan prep regimen (dose reduced to 70mg / m2 for age)    Day - 2     1. Admit to BMTU   2. 3 hours prior to Melphalan start hydration: D5NS at 200ml /hr and continue 24 hours post Melphalan infusion  3. Day – 3 & day -2 - Melphalan 100mg/m2  IV   4. Strict I&O, daily weights, prn diuresis   5. Day -1 rest day. At 2200 on day – 1, start transplant hydration 1/2NS + 50mEq NaHCO3- (may substitute [id number] if bicarb not available)  6. Day 0 – infuse HPC product. 4 hours after infusion of cells start Zarxio 5 micrograms / kg (actual weight) . Continue through engraftment.   7. On day 0, + 1, + 2-give Kepivance 60micrograms / kg (actual weight).  8. VOD prophylaxis - low dose heparin gtt (dosed at 100 units / kg / day), glutamine supplementation, Actigall BID   9. PCP prophylaxis - Bactrim DS through day -2    10. Antiviral prophylaxis - Acyclovir 400mg po TID to start day -1   11. Antifungal prophylaxis- Diflucan 400 mg po daily.  12. GI prophylaxis - Protonix po QD   13. Antibacterial prophylaxis - when ANC < 1000, start Cipro 500mg po BID. If becomes febrile, pan cx, CXR and change Cipro to Cefepime 2g IV q 8 hours. Continue until count recovery  14. Kepivance for prevention of mucositis- days 0, +1, +2  15. Aggressive mouth care and skin care as per protocol. 72 year old male with multiple myeloma diagnosed 12/20/21 s/p RVD x 6 admitted for autologous pbsct with high dose melphalan prep regimen (dose reduced to 70mg / m2 for age)    Day - 2     1. Admit to BMTU   2. 3 hours prior to Melphalan start hydration: D5NS at 200ml /hr and continue 24 hours post Melphalan infusion  3. Day – 3 & day -2 - Melphalan 70mg/m2  IV   4. Strict I&O, daily weights, prn diuresis   5. Day -1 rest day. At 2200 on day – 1, start transplant hydration 1/2NS + 50mEq NaHCO3- (may substitute [id number] if bicarb not available)  6. Day 0 – infuse HPC product. 4 hours after infusion of cells start Zarxio 5 micrograms / kg (actual weight) . Continue through engraftment.   7. On day 0, + 1, + 2-give Kepivance 60micrograms / kg (actual weight).  8. VOD prophylaxis - low dose heparin gtt (dosed at 100 units / kg / day), glutamine supplementation, Actigall BID   9. PCP prophylaxis - Bactrim DS through day -2    10. Antiviral prophylaxis - Acyclovir 400mg po TID to start day -1   11. Antifungal prophylaxis- Diflucan 400 mg po daily.  12. GI prophylaxis - Protonix po QD   13. Antibacterial prophylaxis - when ANC < 500, start Cipro 500mg po BID. If becomes febrile, pan cx, CXR and change Cipro to Cefepime 2g IV q 8 hours. Continue until count recovery  14. Kepivance for prevention of mucositis- days 0, +1, +2  15. Aggressive mouth care and skin care as per protocol.

## 2022-10-25 NOTE — PROGRESS NOTE ADULT - ASSESSMENT
Interventional Radiology Follow-Up Note    This is a 72y Male s/p TLC placement in Interventional Radiology on 10/24    S: Patient seen and examined @ bedside. No complaints offered.     Medication:   amLODIPine   Tablet: (10-25)  clotrimazole Lozenge: (10-25)  fluconAZOLE   Tablet: (10-25)  furosemide   Injectable: (10-25)  heparin  Infusion: (10-24)  hydrALAZINE: (10-25)  tamsulosin: (10-24)  trimethoprim  160 mG/sulfamethoxazole 800 mG: (10-25)    Vitals:   T(F): 98.1, Max: 98.4 (21:50)  HR: 94  BP: 130/66  RR: 18  SpO2: 98%    Physical Exam:  General: Nontoxic, in NAD, A&O x3.  Abdomen: soft, NTND, no peritoneal signs.  Access site: clean, dry and intact, soft with no evidence of hematoma    LABS:  WBC 4.46 / Hgb 11.1 / Hct 35.6 / Plt 412  Na -- / K -- / CO2 -- / Cl -- / BUN -- / Cr -- / Glucose --  ALT -- / AST -- / Alk Phos -- / Tbili --  Ptt -- / Pt -- / INR --      Assessment/Plan: 72M HTN remote Hx DVT (Eliquis 2.5 bid), CKD2-3, hx CVA with R sided deficit, pre-diabetes, MM undergoing chemotherapy (scheduled for 1/4/22)  which has been delayed by COVID-19 positive test 12/31/21 who is admitted for stem cell transplant. He is s/p  IR TLC placement.     -continue global management per primary team  -monitor h/h; transfuse as needed  -trend vs/labs  -IR signing off  -reconsult prn     Please call IR at extension [phone] with any questions, concerns, or issues regarding above.

## 2022-10-25 NOTE — DIETITIAN INITIAL EVALUATION ADULT - PERSON TAUGHT/METHOD
Spoke to pt about increased protein energy needs 2/2 transplant, what foods to avoid and general food safety guidelines, as well as the importance of Glutasolve for liver preservation./verbal instruction/patient instructed Spoke to pt about increased protein energy needs 2/2 BMT, prioritizing protein in meals, utilizing small, frequent meals/ snack implementation to optimize intake, types of food to avoid and general food safety guidelines, as well as the importance of Glutasolve for liver preservation./verbal instruction/patient instructed Reviewed BMT nutrition recommendations: food safety recommendations, increased protein-energy needs, Glutasolve 1x/day.     Discussed importance of adequate consumption of meals/supplements to optimize protein-energy intake. Encouraged small/frequent meals, nutrient dense snacks, prioritizing protein foods at meal time./verbal instruction/teach back - (Patient repeats in own words)/patient instructed

## 2022-10-25 NOTE — DIETITIAN INITIAL EVALUATION ADULT - ETIOLOGY
related to decreased ability to meet increased nutrient needs 2/2 multiple myeloma with chemotherapy treatment decreased ability to consume adequate protein-energy in setting of treatment side effects

## 2022-10-25 NOTE — DIETITIAN INITIAL EVALUATION ADULT - FACTORS AFF FOOD INTAKE
Decreased po intake 2/2 chemotherapy treatment./other (specify) Decreased po intake 2/2 chemotherapy treatment./persistent lack of appetite Decreased po intake 2/2 chemotherapy treatment./persistent lack of appetite/Shinto/ethnic/cultural/personal food preferences

## 2022-10-26 LAB
ALBUMIN SERPL ELPH-MCNC: 3.8 G/DL — SIGNIFICANT CHANGE UP (ref 3.3–5)
ALP SERPL-CCNC: 109 U/L — SIGNIFICANT CHANGE UP (ref 40–120)
ALT FLD-CCNC: 13 U/L — SIGNIFICANT CHANGE UP (ref 10–45)
ANION GAP SERPL CALC-SCNC: 11 MMOL/L — SIGNIFICANT CHANGE UP (ref 5–17)
AST SERPL-CCNC: 17 U/L — SIGNIFICANT CHANGE UP (ref 10–40)
BASOPHILS # BLD AUTO: 0 K/UL — SIGNIFICANT CHANGE UP (ref 0–0.2)
BASOPHILS NFR BLD AUTO: 0 % — SIGNIFICANT CHANGE UP (ref 0–2)
BILIRUB DIRECT SERPL-MCNC: 0.1 MG/DL — SIGNIFICANT CHANGE UP (ref 0–0.3)
BILIRUB INDIRECT FLD-MCNC: 0.5 MG/DL — SIGNIFICANT CHANGE UP (ref 0.2–1)
BILIRUB SERPL-MCNC: 0.6 MG/DL — SIGNIFICANT CHANGE UP (ref 0.2–1.2)
BLD GP AB SCN SERPL QL: NEGATIVE — SIGNIFICANT CHANGE UP
BUN SERPL-MCNC: 22 MG/DL — SIGNIFICANT CHANGE UP (ref 7–23)
CALCIUM SERPL-MCNC: 8.6 MG/DL — SIGNIFICANT CHANGE UP (ref 8.4–10.5)
CHLORIDE SERPL-SCNC: 105 MMOL/L — SIGNIFICANT CHANGE UP (ref 96–108)
CO2 SERPL-SCNC: 23 MMOL/L — SIGNIFICANT CHANGE UP (ref 22–31)
CREAT SERPL-MCNC: 1.18 MG/DL — SIGNIFICANT CHANGE UP (ref 0.5–1.3)
CULTURE RESULTS: SIGNIFICANT CHANGE UP
EGFR: 65 ML/MIN/1.73M2 — SIGNIFICANT CHANGE UP
EOSINOPHIL # BLD AUTO: 0 K/UL — SIGNIFICANT CHANGE UP (ref 0–0.5)
EOSINOPHIL NFR BLD AUTO: 0 % — SIGNIFICANT CHANGE UP (ref 0–6)
GLUCOSE BLDC GLUCOMTR-MCNC: 183 MG/DL — HIGH (ref 70–99)
GLUCOSE BLDC GLUCOMTR-MCNC: 195 MG/DL — HIGH (ref 70–99)
GLUCOSE BLDC GLUCOMTR-MCNC: 210 MG/DL — HIGH (ref 70–99)
GLUCOSE BLDC GLUCOMTR-MCNC: 210 MG/DL — HIGH (ref 70–99)
GLUCOSE SERPL-MCNC: 196 MG/DL — HIGH (ref 70–99)
HCT VFR BLD CALC: 34.2 % — LOW (ref 39–50)
HGB BLD-MCNC: 10.8 G/DL — LOW (ref 13–17)
LDH SERPL L TO P-CCNC: 209 U/L — SIGNIFICANT CHANGE UP (ref 50–242)
LYMPHOCYTES # BLD AUTO: 0.19 K/UL — LOW (ref 1–3.3)
LYMPHOCYTES # BLD AUTO: 7.1 % — LOW (ref 13–44)
MAGNESIUM SERPL-MCNC: 2 MG/DL — SIGNIFICANT CHANGE UP (ref 1.6–2.6)
MANUAL SMEAR VERIFICATION: SIGNIFICANT CHANGE UP
MCHC RBC-ENTMCNC: 25.7 PG — LOW (ref 27–34)
MCHC RBC-ENTMCNC: 31.6 GM/DL — LOW (ref 32–36)
MCV RBC AUTO: 81.4 FL — SIGNIFICANT CHANGE UP (ref 80–100)
MONOCYTES # BLD AUTO: 0 K/UL — SIGNIFICANT CHANGE UP (ref 0–0.9)
MONOCYTES NFR BLD AUTO: 0 % — LOW (ref 2–14)
NEUTROPHILS # BLD AUTO: 2.45 K/UL — SIGNIFICANT CHANGE UP (ref 1.8–7.4)
NEUTROPHILS NFR BLD AUTO: 92.9 % — HIGH (ref 43–77)
PHOSPHATE SERPL-MCNC: 3.5 MG/DL — SIGNIFICANT CHANGE UP (ref 2.5–4.5)
PLAT MORPH BLD: NORMAL — SIGNIFICANT CHANGE UP
PLATELET # BLD AUTO: 381 K/UL — SIGNIFICANT CHANGE UP (ref 150–400)
POTASSIUM SERPL-MCNC: 4.2 MMOL/L — SIGNIFICANT CHANGE UP (ref 3.5–5.3)
POTASSIUM SERPL-SCNC: 4.2 MMOL/L — SIGNIFICANT CHANGE UP (ref 3.5–5.3)
PROT SERPL-MCNC: 6.7 G/DL — SIGNIFICANT CHANGE UP (ref 6–8.3)
RBC # BLD: 4.2 M/UL — SIGNIFICANT CHANGE UP (ref 4.2–5.8)
RBC # FLD: 14.9 % — HIGH (ref 10.3–14.5)
RBC BLD AUTO: SIGNIFICANT CHANGE UP
RH IG SCN BLD-IMP: POSITIVE — SIGNIFICANT CHANGE UP
SODIUM SERPL-SCNC: 139 MMOL/L — SIGNIFICANT CHANGE UP (ref 135–145)
SPECIMEN SOURCE: SIGNIFICANT CHANGE UP
WBC # BLD: 2.64 K/UL — LOW (ref 3.8–10.5)
WBC # FLD AUTO: 2.64 K/UL — LOW (ref 3.8–10.5)

## 2022-10-26 PROCEDURE — 99291 CRITICAL CARE FIRST HOUR: CPT

## 2022-10-26 RX ORDER — SENNA PLUS 8.6 MG/1
1 TABLET ORAL ONCE
Refills: 0 | Status: COMPLETED | OUTPATIENT
Start: 2022-10-26 | End: 2022-10-26

## 2022-10-26 RX ORDER — POTASSIUM CHLORIDE 20 MEQ
20 PACKET (EA) ORAL
Refills: 0 | Status: COMPLETED | OUTPATIENT
Start: 2022-10-26 | End: 2022-10-26

## 2022-10-26 RX ORDER — FUROSEMIDE 40 MG
40 TABLET ORAL ONCE
Refills: 0 | Status: COMPLETED | OUTPATIENT
Start: 2022-10-26 | End: 2022-10-26

## 2022-10-26 RX ORDER — POLYETHYLENE GLYCOL 3350 17 G/17G
17 POWDER, FOR SOLUTION ORAL DAILY
Refills: 0 | Status: DISCONTINUED | OUTPATIENT
Start: 2022-10-26 | End: 2022-11-15

## 2022-10-26 RX ADMIN — Medication 1 LOZENGE: at 15:37

## 2022-10-26 RX ADMIN — Medication 20 MILLIGRAM(S): at 05:53

## 2022-10-26 RX ADMIN — Medication 5 MILLILITER(S): at 11:12

## 2022-10-26 RX ADMIN — Medication 400 MILLIGRAM(S): at 21:30

## 2022-10-26 RX ADMIN — NYSTATIN CREAM 1 APPLICATION(S): 100000 CREAM TOPICAL at 21:29

## 2022-10-26 RX ADMIN — Medication 1 LOZENGE: at 11:12

## 2022-10-26 RX ADMIN — APREPITANT 80 MILLIGRAM(S): 80 CAPSULE ORAL at 11:18

## 2022-10-26 RX ADMIN — Medication 10 MILLILITER(S): at 11:12

## 2022-10-26 RX ADMIN — Medication 5 MILLILITER(S): at 07:48

## 2022-10-26 RX ADMIN — AMLODIPINE BESYLATE 10 MILLIGRAM(S): 2.5 TABLET ORAL at 05:36

## 2022-10-26 RX ADMIN — Medication 10 MILLILITER(S): at 15:37

## 2022-10-26 RX ADMIN — URSODIOL 300 MILLIGRAM(S): 250 TABLET, FILM COATED ORAL at 05:35

## 2022-10-26 RX ADMIN — Medication 50 MILLIEQUIVALENT(S): at 11:11

## 2022-10-26 RX ADMIN — Medication 25 MILLIGRAM(S): at 05:36

## 2022-10-26 RX ADMIN — Medication 1: at 07:47

## 2022-10-26 RX ADMIN — ONDANSETRON 8 MILLIGRAM(S): 8 TABLET, FILM COATED ORAL at 05:35

## 2022-10-26 RX ADMIN — Medication 5 MILLILITER(S): at 15:37

## 2022-10-26 RX ADMIN — Medication 10 MILLILITER(S): at 07:48

## 2022-10-26 RX ADMIN — Medication 5 MILLILITER(S): at 20:40

## 2022-10-26 RX ADMIN — TAMSULOSIN HYDROCHLORIDE 0.4 MILLIGRAM(S): 0.4 CAPSULE ORAL at 21:30

## 2022-10-26 RX ADMIN — Medication 50 MILLIEQUIVALENT(S): at 13:26

## 2022-10-26 RX ADMIN — SODIUM CHLORIDE 200 MILLILITER(S): 9 INJECTION INTRAMUSCULAR; INTRAVENOUS; SUBCUTANEOUS at 13:22

## 2022-10-26 RX ADMIN — CHLORHEXIDINE GLUCONATE 1 APPLICATION(S): 213 SOLUTION TOPICAL at 05:37

## 2022-10-26 RX ADMIN — PANTOPRAZOLE SODIUM 40 MILLIGRAM(S): 20 TABLET, DELAYED RELEASE ORAL at 05:36

## 2022-10-26 RX ADMIN — Medication 2: at 12:03

## 2022-10-26 RX ADMIN — Medication 25 MILLIGRAM(S): at 13:23

## 2022-10-26 RX ADMIN — Medication 1 MILLIGRAM(S): at 11:19

## 2022-10-26 RX ADMIN — FLUCONAZOLE 400 MILLIGRAM(S): 150 TABLET ORAL at 11:20

## 2022-10-26 RX ADMIN — FINASTERIDE 5 MILLIGRAM(S): 5 TABLET, FILM COATED ORAL at 11:19

## 2022-10-26 RX ADMIN — Medication 40 MILLIGRAM(S): at 07:52

## 2022-10-26 RX ADMIN — NYSTATIN CREAM 1 APPLICATION(S): 100000 CREAM TOPICAL at 05:38

## 2022-10-26 RX ADMIN — NYSTATIN CREAM 1 APPLICATION(S): 100000 CREAM TOPICAL at 13:22

## 2022-10-26 RX ADMIN — POLYETHYLENE GLYCOL 3350 17 GRAM(S): 17 POWDER, FOR SOLUTION ORAL at 11:03

## 2022-10-26 RX ADMIN — Medication 1 TABLET(S): at 11:20

## 2022-10-26 RX ADMIN — Medication 10 MILLILITER(S): at 00:08

## 2022-10-26 RX ADMIN — Medication 400 MILLIGRAM(S): at 05:36

## 2022-10-26 RX ADMIN — Medication 5 MILLILITER(S): at 00:07

## 2022-10-26 RX ADMIN — Medication 50 MILLIEQUIVALENT(S): at 08:04

## 2022-10-26 RX ADMIN — Medication 1 LOZENGE: at 07:49

## 2022-10-26 RX ADMIN — SODIUM CHLORIDE 200 MILLILITER(S): 9 INJECTION INTRAMUSCULAR; INTRAVENOUS; SUBCUTANEOUS at 03:00

## 2022-10-26 RX ADMIN — Medication 2: at 16:40

## 2022-10-26 RX ADMIN — Medication 25 MILLIGRAM(S): at 21:29

## 2022-10-26 RX ADMIN — SODIUM CHLORIDE 200 MILLILITER(S): 9 INJECTION INTRAMUSCULAR; INTRAVENOUS; SUBCUTANEOUS at 00:07

## 2022-10-26 RX ADMIN — SODIUM CHLORIDE 20 MILLILITER(S): 9 INJECTION INTRAMUSCULAR; INTRAVENOUS; SUBCUTANEOUS at 05:54

## 2022-10-26 RX ADMIN — URSODIOL 300 MILLIGRAM(S): 250 TABLET, FILM COATED ORAL at 17:57

## 2022-10-26 RX ADMIN — Medication 400 MILLIGRAM(S): at 13:23

## 2022-10-26 RX ADMIN — LORATADINE 10 MILLIGRAM(S): 10 TABLET ORAL at 11:19

## 2022-10-26 RX ADMIN — Medication 1 LOZENGE: at 00:08

## 2022-10-26 RX ADMIN — Medication 40 MILLIGRAM(S): at 15:36

## 2022-10-26 RX ADMIN — SENNA PLUS 1 TABLET(S): 8.6 TABLET ORAL at 11:13

## 2022-10-26 RX ADMIN — Medication 10 MILLILITER(S): at 20:40

## 2022-10-26 RX ADMIN — Medication 1 LOZENGE: at 20:40

## 2022-10-26 RX ADMIN — HEPARIN SODIUM 3.12 UNIT(S)/HR: 5000 INJECTION INTRAVENOUS; SUBCUTANEOUS at 15:18

## 2022-10-26 NOTE — PROGRESS NOTE ADULT - CRITICAL CARE ATTENDING COMMENT
72 year old male with multiple myeloma diagnosed 12/20/21 s/p RVD x 6 admitted for autologous pbsct with high dose melphalan prep regimen (dose reduced to 70mg / m2 for age)    Day - 1    1. Admit to BMTU   2. 3 hours prior to Melphalan start hydration: D5NS at 200ml /hr and continue 24 hours post Melphalan infusion  3. Day – 3 & day -2 - Melphalan 70mg/m2  IV   4. Strict I&O, daily weights, prn diuresis   5. Day -1 rest day. At 2200 on day – 1, start transplant hydration 1/2NS + 50mEq NaHCO3- (may substitute [id number] if bicarb not available)  6. Day 0 – infuse HPC product. 4 hours after infusion of cells start Zarxio 5 micrograms / kg (actual weight) . Continue through engraftment.   7. On day 0, + 1, + 2-give Kepivance 60micrograms / kg (actual weight).  8. VOD prophylaxis - low dose heparin gtt (dosed at 100 units / kg / day), glutamine supplementation, Actigall BID   9. PCP prophylaxis - Bactrim DS through day -2    10. Antiviral prophylaxis - Acyclovir 400mg po TID to start day -1   11. Antifungal prophylaxis- Diflucan 400 mg po daily.  12. GI prophylaxis - Protonix po QD   13. Antibacterial prophylaxis - when ANC < 500, start Cipro 500mg po BID. If becomes febrile, pan cx, CXR and change Cipro to Cefepime 2g IV q 8 hours. Continue until count recovery  14. Kepivance for prevention of mucositis- days 0, +1, +2  15. Aggressive mouth care and skin care as per protocol. 72 year old male with multiple myeloma diagnosed 12/20/21 s/p RVD x 6 admitted for autologous pbsct with high dose melphalan prep regimen (dose reduced to 70mg / m2 for age)    Day - 1    1. Admit to BMTU   2. 3 hours prior to Melphalan start hydration: D5NS at 200ml /hr and continue 24 hours post Melphalan infusion  3. Day – 3 & day -2 - Melphalan 70mg/m2  IV   4. Strict I&O, daily weights, prn diuresis   5. Day -1 rest day. At 2200 on day – 1, start transplant hydration 1/2NS + 50mEq NaHCO3- (may substitute [id number] if bicarb not available)  6. Day 0 – infuse HPC product. 4 hours after infusion of cells start Zarxio 5 micrograms / kg (actual weight) . Continue through engraftment.   7. On day 0, + 1, + 2-give Kepivance 60micrograms / kg (actual weight).  8. VOD prophylaxis - low dose heparin gtt (dosed at 100 units / kg / day), glutamine supplementation, Actigall BID   9. PCP prophylaxis - Bactrim DS through day -2    10. Antiviral prophylaxis - Acyclovir 400mg po TID to start day -1   11. Antifungal prophylaxis- Diflucan 400 mg po daily.  12. GI prophylaxis - Protonix po QD   13. Antibacterial prophylaxis - when ANC < 500, start Cipro 500mg po BID. If becomes febrile, pan cx, CXR and change Cipro to Cefepime 2g IV q 8 hours. Continue until count recovery  14. Kepivance for prevention of mucositis- days 0, +1, +2  15. Aggressive mouth care and skin care as per protocol..senna prn

## 2022-10-26 NOTE — PROGRESS NOTE ADULT - SUBJECTIVE AND OBJECTIVE BOX
[facility] Transplant Team                                                      Critical / Counseling Time Provided: 30 minutes                                                                                                                                                        Chief Complaint: Autologous peripheral blood stem cell transplant with high dose Melphalan prep regimen for treatment of multiple myeloma    S: Patient seen and examined with [facility] Transplant Team:     All other ROS negative       O: Vitals:   Vital Signs Last 24 Hrs  T(C): 36.6 (26 Oct 2022 05:15), Max: 36.8 (26 Oct 2022 00:45)  T(F): 97.8 (26 Oct 2022 05:15), Max: 98.3 (26 Oct 2022 00:45)  HR: 95 (26 Oct 2022 05:15) (78 - 95)  BP: 135/71 (26 Oct 2022 05:15) (112/61 - 139/73)  BP(mean): --  RR: 18 (26 Oct 2022 05:15) (18 - 18)  SpO2: 94% (26 Oct 2022 05:15) (94% - 98%)    Parameters below as of 26 Oct 2022 05:15  Patient On (Oxygen Delivery Method): room air      Admit weight: 74.9kg   Daily Weight in k.7 (25 Oct 2022 09:15)  Today's weight:     Intake / Output:   10-25 @ :01  -  10-26 @ 07:00  --------------------------------------------------------  IN: 5820.6 mL / OUT: 3860 mL / NET: 1960.6 mL    10-26 @ :01  -  10-26 @ 08:40  --------------------------------------------------------  IN: 360.6 mL / OUT: 400 mL / NET: -39.4 mL        PE:   Oropharynx: no erythema or ulcerations  Oral Mucositis:                 -                                       Grade: n/a  CVS: S1, S2 RRR   Lungs: CTA throughout bilaterally   Abdomen: + BS x 4, soft, NT, ND   Extremities: no edema  Gastric Mucositis:      -                                            Grade: n/a  Intestinal Mucositis:    -                                          Grade: n/a  Skin: no rash   TLC: CDI   Neuro: A&Ox3   Pain: 0    Labs:   CBC Full  -  ( 26 Oct 2022 07:11 )  WBC Count : 2.64 K/uL  Hemoglobin : 10.8 g/dL  Hematocrit : 34.2 %  Platelet Count - Automated : 381 K/uL  Mean Cell Volume : 81.4 fl  Mean Cell Hemoglobin : 25.7 pg  Mean Cell Hemoglobin Concentration : 31.6 gm/dL  Auto Neutrophil # : 2.45 K/uL  Auto Lymphocyte # : 0.19 K/uL  Auto Monocyte # : 0.00 K/uL  Auto Eosinophil # : 0.00 K/uL  Auto Basophil # : 0.00 K/uL  Auto Neutrophil % : 92.9 %  Auto Lymphocyte % : 7.1 %  Auto Monocyte % : 0.0 %  Auto Eosinophil % : 0.0 %  Auto Basophil % : 0.0 %                          10.8   2.64  )-----------( 381      ( 26 Oct 2022 07:11 )             34.2       Meds:   Antimicrobials:   acyclovir   Oral Tab/Cap 400 milliGRAM(s) Oral every 8 hours  clotrimazole Lozenge 1 Lozenge Oral five times a day  fluconAZOLE   Tablet 400 milliGRAM(s) Oral daily      Heme / Onc:   heparin  Infusion 312 Unit(s)/Hr IV Continuous <Continuous>      GI:  pantoprazole    Tablet 40 milliGRAM(s) Oral before breakfast  senna 2 Tablet(s) Oral at bedtime  sodium bicarbonate Mouth Rinse 10 milliLiter(s) Swish and Spit five times a day  ursodiol Capsule 300 milliGRAM(s) Oral two times a day      Cardiovascular:   amLODIPine   Tablet 10 milliGRAM(s) Oral daily  furosemide   Injectable 40 milliGRAM(s) IV Push once  hydrALAZINE 25 milliGRAM(s) Oral three times a day  tamsulosin 0.4 milliGRAM(s) Oral at bedtime      Immunologic:       Other medications:   acetaminophen     Tablet .. 650 milliGRAM(s) Oral every 6 hours  aprepitant 80 milliGRAM(s) Oral every 24 hours  Biotene Dry Mouth Oral Rinse 5 milliLiter(s) Swish and Spit five times a day  chlorhexidine 4% Liquid 1 Application(s) Topical <User Schedule>  dextrose 5%. 1000 milliLiter(s) IV Continuous <Continuous>  dextrose 5%. 1000 milliLiter(s) IV Continuous <Continuous>  dextrose 50% Injectable 25 Gram(s) IV Push once  dextrose 50% Injectable 12.5 Gram(s) IV Push once  dextrose 50% Injectable 25 Gram(s) IV Push once  finasteride 5 milliGRAM(s) Oral daily  folic acid 1 milliGRAM(s) Oral daily  glucagon  Injectable 1 milliGRAM(s) IntraMuscular once  insulin lispro (ADMELOG) corrective regimen sliding scale   SubCutaneous three times a day before meals  insulin lispro (ADMELOG) corrective regimen sliding scale   SubCutaneous at bedtime  loratadine 10 milliGRAM(s) Oral daily  LORazepam   Injectable 1 milliGRAM(s) IV Push every 24 hours  multivitamin 1 Tablet(s) Oral daily  nystatin Powder 1 Application(s) Topical three times a day  potassium chloride  20 mEq/100 mL IVPB 20 milliEquivalent(s) IV Intermittent every 2 hours  sodium chloride 0.45% 1000 milliLiter(s) IV Continuous <Continuous>  sodium chloride 0.9%. 1000 milliLiter(s) IV Continuous <Continuous>  sodium chloride 0.9%. 1000 milliLiter(s) IV Continuous <Continuous>      PRN:   acetaminophen     Tablet .. 650 milliGRAM(s) Oral every 6 hours PRN  dextrose Oral Gel 15 Gram(s) Oral once PRN  LORazepam   Injectable 1 milliGRAM(s) IV Push every 6 hours PRN  metoclopramide Injectable 10 milliGRAM(s) IV Push every 6 hours PRN  sodium chloride 0.9% lock flush 10 milliLiter(s) IV Push every 1 hour PRN    A/P: 72 year old male with multiple myeloma diagnosed 19 s/p RVD x 6 admitted for autologous pbsct with high dose melphalan prep regimen (dose reduced to 70mg / m2 for age)  Day - 1  10/25- melphalan /; continue melphalan hydration for 24 hours post infusion of last dose. Strict I&O, daily weights, prn diuresis     1. Infectious Disease:   acyclovir   Oral Tab/Cap 400 milliGRAM(s) Oral every 8 hours  clotrimazole Lozenge 1 Lozenge Oral five times a day  fluconAZOLE   Tablet 400 milliGRAM(s) Oral daily    2. VOD Prophylaxis: Actigall, Glutamine, Heparin (dosed at 100 units / kg / day)     3. GI Prophylaxis:   pantoprazole    Tablet 40 milliGRAM(s) Oral before breakfast    4. Mouthcare - NS / NaHCO3 rinses, Mycelex, Biotene; Skin care     5. GVHD prophylaxis - n/a     6. Transfuse & replete electrolytes prn   potassium chloride  20 mEq/100 mL IVPB 20 milliEquivalent(s) IV Intermittent every 2 hours    7. IV hydration, daily weights, strict I&O, prn diuresis   Lasix 40mg IV x 1 0800  Lasix 40mg IV x 1 1600    8. PO intake as tolerated, nutrition follow up as needed, MVI, folic acid     9. Antiemetics, anti-diarrhea medications:   LORazepam   Injectable 1 milliGRAM(s) IV Push every 6 hours PRN  metoclopramide Injectable 10 milliGRAM(s) IV Push every 6 hours PRN  LORazepam   Injectable 1 milliGRAM(s) IV Push every 24 hours  aprepitant 80 milliGRAM(s) Oral every 24 hours    10. OOB as tolerated, physical therapy consult if needed     11. Monitor coags / fibrinogen 2x week, vitamin K as needed     12. Monitor closely for clinical changes, monitor for fevers     13. Emotional support provided, plan of care discussed and questions addressed     14. Patient education done regarding chemotherapy prep, plan of care, restrictions and discharge planning     15. Continue regular social work input     I have written the above note for Dr. Gonzales who performed service with me in the room.   Shayla Lundberg NP-C ([phone])    I have seen and examined patient with NP, I agree with above note as scribed.                    [facility] Transplant Team                                                      Critical / Counseling Time Provided: 30 minutes                                                                                                                                                        Chief Complaint: Autologous peripheral blood stem cell transplant with high dose Melphalan prep regimen for treatment of multiple myeloma    S: Patient seen and examined with [facility] Transplant Team:   + insomnia   + constipation   All other ROS negative       O: Vitals:   Vital Signs Last 24 Hrs  T(C): 36.6 (26 Oct 2022 05:15), Max: 36.8 (26 Oct 2022 00:45)  T(F): 97.8 (26 Oct 2022 05:15), Max: 98.3 (26 Oct 2022 00:45)  HR: 95 (26 Oct 2022 05:15) (78 - 95)  BP: 135/71 (26 Oct 2022 05:15) (112/61 - 139/73)  BP(mean): --  RR: 18 (26 Oct 2022 05:15) (18 - 18)  SpO2: 94% (26 Oct 2022 05:15) (94% - 98%)    Parameters below as of 26 Oct 2022 05:15  Patient On (Oxygen Delivery Method): room air      Admit weight: 74.9kg   Daily Weight in k.7 (25 Oct 2022 09:15)  Today's weight: 75.1kg     Intake / Output:   10-25 @ :01  -  10-26 @ 07:00  --------------------------------------------------------  IN: 5820.6 mL / OUT: 3860 mL / NET: 1960.6 mL    10-26 @ 07:01  -  10-26 @ 08:40  --------------------------------------------------------  IN: 360.6 mL / OUT: 400 mL / NET: -39.4 mL        PE:   Oropharynx: no erythema or ulcerations  Oral Mucositis:                 -                                       Grade: n/a  CVS: S1, S2 RRR   Lungs: CTA throughout bilaterally   Abdomen: + BS x 4, soft, NT, ND   Extremities: no edema  Gastric Mucositis:      -                                            Grade: n/a  Intestinal Mucositis:    -                                          Grade: n/a  Skin: no rash   TLC: CDI   Neuro: A&Ox3   Pain: 0    Labs:   CBC Full  -  ( 26 Oct 2022 07:11 )  WBC Count : 2.64 K/uL  Hemoglobin : 10.8 g/dL  Hematocrit : 34.2 %  Platelet Count - Automated : 381 K/uL  Mean Cell Volume : 81.4 fl  Mean Cell Hemoglobin : 25.7 pg  Mean Cell Hemoglobin Concentration : 31.6 gm/dL  Auto Neutrophil # : 2.45 K/uL  Auto Lymphocyte # : 0.19 K/uL  Auto Monocyte # : 0.00 K/uL  Auto Eosinophil # : 0.00 K/uL  Auto Basophil # : 0.00 K/uL  Auto Neutrophil % : 92.9 %  Auto Lymphocyte % : 7.1 %  Auto Monocyte % : 0.0 %  Auto Eosinophil % : 0.0 %  Auto Basophil % : 0.0 %                          10.8   2.64  )-----------( 381      ( 26 Oct 2022 07:11 )             34.2       Meds:   Antimicrobials:   acyclovir   Oral Tab/Cap 400 milliGRAM(s) Oral every 8 hours  clotrimazole Lozenge 1 Lozenge Oral five times a day  fluconAZOLE   Tablet 400 milliGRAM(s) Oral daily      Heme / Onc:   heparin  Infusion 312 Unit(s)/Hr IV Continuous <Continuous>      GI:  pantoprazole    Tablet 40 milliGRAM(s) Oral before breakfast  senna 2 Tablet(s) Oral at bedtime  sodium bicarbonate Mouth Rinse 10 milliLiter(s) Swish and Spit five times a day  ursodiol Capsule 300 milliGRAM(s) Oral two times a day      Cardiovascular:   amLODIPine   Tablet 10 milliGRAM(s) Oral daily  furosemide   Injectable 40 milliGRAM(s) IV Push once  hydrALAZINE 25 milliGRAM(s) Oral three times a day  tamsulosin 0.4 milliGRAM(s) Oral at bedtime      Immunologic:       Other medications:   acetaminophen     Tablet .. 650 milliGRAM(s) Oral every 6 hours  aprepitant 80 milliGRAM(s) Oral every 24 hours  Biotene Dry Mouth Oral Rinse 5 milliLiter(s) Swish and Spit five times a day  chlorhexidine 4% Liquid 1 Application(s) Topical <User Schedule>  dextrose 5%. 1000 milliLiter(s) IV Continuous <Continuous>  dextrose 5%. 1000 milliLiter(s) IV Continuous <Continuous>  dextrose 50% Injectable 25 Gram(s) IV Push once  dextrose 50% Injectable 12.5 Gram(s) IV Push once  dextrose 50% Injectable 25 Gram(s) IV Push once  finasteride 5 milliGRAM(s) Oral daily  folic acid 1 milliGRAM(s) Oral daily  glucagon  Injectable 1 milliGRAM(s) IntraMuscular once  insulin lispro (ADMELOG) corrective regimen sliding scale   SubCutaneous three times a day before meals  insulin lispro (ADMELOG) corrective regimen sliding scale   SubCutaneous at bedtime  loratadine 10 milliGRAM(s) Oral daily  LORazepam   Injectable 1 milliGRAM(s) IV Push every 24 hours  multivitamin 1 Tablet(s) Oral daily  nystatin Powder 1 Application(s) Topical three times a day  potassium chloride  20 mEq/100 mL IVPB 20 milliEquivalent(s) IV Intermittent every 2 hours  sodium chloride 0.45% 1000 milliLiter(s) IV Continuous <Continuous>  sodium chloride 0.9%. 1000 milliLiter(s) IV Continuous <Continuous>  sodium chloride 0.9%. 1000 milliLiter(s) IV Continuous <Continuous>      PRN:   acetaminophen     Tablet .. 650 milliGRAM(s) Oral every 6 hours PRN  dextrose Oral Gel 15 Gram(s) Oral once PRN  LORazepam   Injectable 1 milliGRAM(s) IV Push every 6 hours PRN  metoclopramide Injectable 10 milliGRAM(s) IV Push every 6 hours PRN  sodium chloride 0.9% lock flush 10 milliLiter(s) IV Push every 1 hour PRN    A/P: 72 year old male with multiple myeloma diagnosed 19 s/p RVD x 6 admitted for autologous pbsct with high dose melphalan prep regimen (dose reduced to 70mg / m2 for age)  Day - 1  10/25- melphalan /; continue melphalan hydration for 24 hours post infusion of last dose. Strict I&O, daily weights, prn diuresis     1. Infectious Disease:   acyclovir   Oral Tab/Cap 400 milliGRAM(s) Oral every 8 hours  clotrimazole Lozenge 1 Lozenge Oral five times a day  fluconAZOLE   Tablet 400 milliGRAM(s) Oral daily    2. VOD Prophylaxis: Actigall, Glutamine, Heparin (dosed at 100 units / kg / day)     3. GI Prophylaxis:   pantoprazole    Tablet 40 milliGRAM(s) Oral before breakfast    4. Mouthcare - NS / NaHCO3 rinses, Mycelex, Biotene; Skin care     5. GVHD prophylaxis - n/a     6. Transfuse & replete electrolytes prn   potassium chloride  20 mEq/100 mL IVPB 20 milliEquivalent(s) IV Intermittent every 2 hours    7. IV hydration, daily weights, strict I&O, prn diuresis   Lasix 40mg IV x 1 0800  Lasix 40mg IV x 1 1600    8. PO intake as tolerated, nutrition follow up as needed, MVI, folic acid     9. Antiemetics, anti-diarrhea medications:   LORazepam   Injectable 1 milliGRAM(s) IV Push every 6 hours PRN  metoclopramide Injectable 10 milliGRAM(s) IV Push every 6 hours PRN  LORazepam   Injectable 1 milliGRAM(s) IV Push every 24 hours  aprepitant 80 milliGRAM(s) Oral every 24 hours    10. OOB as tolerated, physical therapy consult if needed     11. Monitor coags / fibrinogen 2x week, vitamin K as needed     12. Monitor closely for clinical changes, monitor for fevers     13. Emotional support provided, plan of care discussed and questions addressed     14. Patient education done regarding chemotherapy prep, plan of care, restrictions and discharge planning     15. Continue regular social work input     I have written the above note for Dr. Gonzales who performed service with me in the room.   Shayla Lundberg NP-C ([phone])    I have seen and examined patient with NP, I agree with above note as scribed.

## 2022-10-27 DIAGNOSIS — Z51.5 ENCOUNTER FOR PALLIATIVE CARE: ICD-10-CM

## 2022-10-27 DIAGNOSIS — C90.00 MULTIPLE MYELOMA NOT HAVING ACHIEVED REMISSION: ICD-10-CM

## 2022-10-27 DIAGNOSIS — R53.81 OTHER MALAISE: ICD-10-CM

## 2022-10-27 DIAGNOSIS — K59.00 CONSTIPATION, UNSPECIFIED: ICD-10-CM

## 2022-10-27 LAB
ALBUMIN SERPL ELPH-MCNC: 3.8 G/DL — SIGNIFICANT CHANGE UP (ref 3.3–5)
ALP SERPL-CCNC: 94 U/L — SIGNIFICANT CHANGE UP (ref 40–120)
ALT FLD-CCNC: 11 U/L — SIGNIFICANT CHANGE UP (ref 10–45)
ANION GAP SERPL CALC-SCNC: 10 MMOL/L — SIGNIFICANT CHANGE UP (ref 5–17)
APTT BLD: 35.2 SEC — SIGNIFICANT CHANGE UP (ref 27.5–35.5)
AST SERPL-CCNC: 13 U/L — SIGNIFICANT CHANGE UP (ref 10–40)
BASOPHILS # BLD AUTO: 0 K/UL — SIGNIFICANT CHANGE UP (ref 0–0.2)
BASOPHILS NFR BLD AUTO: 0 % — SIGNIFICANT CHANGE UP (ref 0–2)
BILIRUB SERPL-MCNC: 0.8 MG/DL — SIGNIFICANT CHANGE UP (ref 0.2–1.2)
BUN SERPL-MCNC: 29 MG/DL — HIGH (ref 7–23)
CALCIUM SERPL-MCNC: 8.4 MG/DL — SIGNIFICANT CHANGE UP (ref 8.4–10.5)
CHLORIDE SERPL-SCNC: 101 MMOL/L — SIGNIFICANT CHANGE UP (ref 96–108)
CO2 SERPL-SCNC: 27 MMOL/L — SIGNIFICANT CHANGE UP (ref 22–31)
CREAT SERPL-MCNC: 1.25 MG/DL — SIGNIFICANT CHANGE UP (ref 0.5–1.3)
EGFR: 60 ML/MIN/1.73M2 — SIGNIFICANT CHANGE UP
EOSINOPHIL # BLD AUTO: 0 K/UL — SIGNIFICANT CHANGE UP (ref 0–0.5)
EOSINOPHIL NFR BLD AUTO: 0 % — SIGNIFICANT CHANGE UP (ref 0–6)
FIBRINOGEN PPP-MCNC: 425 MG/DL — SIGNIFICANT CHANGE UP (ref 330–520)
G6PD RBC-CCNC: 12.6 U/G HGB — SIGNIFICANT CHANGE UP (ref 7–20.5)
GLUCOSE BLDC GLUCOMTR-MCNC: 135 MG/DL — HIGH (ref 70–99)
GLUCOSE BLDC GLUCOMTR-MCNC: 156 MG/DL — HIGH (ref 70–99)
GLUCOSE BLDC GLUCOMTR-MCNC: 173 MG/DL — HIGH (ref 70–99)
GLUCOSE BLDC GLUCOMTR-MCNC: 183 MG/DL — HIGH (ref 70–99)
GLUCOSE SERPL-MCNC: 158 MG/DL — HIGH (ref 70–99)
HCT VFR BLD CALC: 33.3 % — LOW (ref 39–50)
HGB BLD-MCNC: 10.9 G/DL — LOW (ref 13–17)
IMM GRANULOCYTES NFR BLD AUTO: 0.5 % — SIGNIFICANT CHANGE UP (ref 0–0.9)
INR BLD: 1.07 RATIO — SIGNIFICANT CHANGE UP (ref 0.88–1.16)
LDH SERPL L TO P-CCNC: 158 U/L — SIGNIFICANT CHANGE UP (ref 50–242)
LYMPHOCYTES # BLD AUTO: 0.09 K/UL — LOW (ref 1–3.3)
LYMPHOCYTES # BLD AUTO: 2.2 % — LOW (ref 13–44)
MAGNESIUM SERPL-MCNC: 1.9 MG/DL — SIGNIFICANT CHANGE UP (ref 1.6–2.6)
MCHC RBC-ENTMCNC: 25.8 PG — LOW (ref 27–34)
MCHC RBC-ENTMCNC: 32.7 GM/DL — SIGNIFICANT CHANGE UP (ref 32–36)
MCV RBC AUTO: 78.7 FL — LOW (ref 80–100)
MONOCYTES # BLD AUTO: 0.13 K/UL — SIGNIFICANT CHANGE UP (ref 0–0.9)
MONOCYTES NFR BLD AUTO: 3.2 % — SIGNIFICANT CHANGE UP (ref 2–14)
NEUTROPHILS # BLD AUTO: 3.84 K/UL — SIGNIFICANT CHANGE UP (ref 1.8–7.4)
NEUTROPHILS NFR BLD AUTO: 94.1 % — HIGH (ref 43–77)
NRBC # BLD: 0 /100 WBCS — SIGNIFICANT CHANGE UP (ref 0–0)
PHOSPHATE SERPL-MCNC: 3.3 MG/DL — SIGNIFICANT CHANGE UP (ref 2.5–4.5)
PLATELET # BLD AUTO: 322 K/UL — SIGNIFICANT CHANGE UP (ref 150–400)
POTASSIUM SERPL-MCNC: 3.9 MMOL/L — SIGNIFICANT CHANGE UP (ref 3.5–5.3)
POTASSIUM SERPL-SCNC: 3.9 MMOL/L — SIGNIFICANT CHANGE UP (ref 3.5–5.3)
PROT SERPL-MCNC: 6.6 G/DL — SIGNIFICANT CHANGE UP (ref 6–8.3)
PROTHROM AB SERPL-ACNC: 12.4 SEC — SIGNIFICANT CHANGE UP (ref 10.5–13.4)
RBC # BLD: 4.23 M/UL — SIGNIFICANT CHANGE UP (ref 4.2–5.8)
RBC # FLD: 14.8 % — HIGH (ref 10.3–14.5)
SODIUM SERPL-SCNC: 138 MMOL/L — SIGNIFICANT CHANGE UP (ref 135–145)
WBC # BLD: 4.08 K/UL — SIGNIFICANT CHANGE UP (ref 3.8–10.5)
WBC # FLD AUTO: 4.08 K/UL — SIGNIFICANT CHANGE UP (ref 3.8–10.5)

## 2022-10-27 PROCEDURE — 99223 1ST HOSP IP/OBS HIGH 75: CPT

## 2022-10-27 PROCEDURE — 99291 CRITICAL CARE FIRST HOUR: CPT

## 2022-10-27 RX ORDER — POTASSIUM CHLORIDE 20 MEQ
20 PACKET (EA) ORAL
Refills: 0 | Status: COMPLETED | OUTPATIENT
Start: 2022-10-27 | End: 2022-10-27

## 2022-10-27 RX ORDER — FUROSEMIDE 40 MG
40 TABLET ORAL ONCE
Refills: 0 | Status: COMPLETED | OUTPATIENT
Start: 2022-10-27 | End: 2022-10-27

## 2022-10-27 RX ORDER — ONDANSETRON 8 MG/1
8 TABLET, FILM COATED ORAL EVERY 8 HOURS
Refills: 0 | Status: DISCONTINUED | OUTPATIENT
Start: 2022-10-27 | End: 2022-11-15

## 2022-10-27 RX ADMIN — Medication 50 MILLIEQUIVALENT(S): at 10:47

## 2022-10-27 RX ADMIN — Medication 1 LOZENGE: at 20:09

## 2022-10-27 RX ADMIN — Medication 1 LOZENGE: at 00:30

## 2022-10-27 RX ADMIN — Medication 100 MILLIGRAM(S): at 22:10

## 2022-10-27 RX ADMIN — Medication 5 MILLILITER(S): at 00:30

## 2022-10-27 RX ADMIN — Medication 650 MILLIGRAM(S): at 20:00

## 2022-10-27 RX ADMIN — Medication 10 MILLILITER(S): at 23:26

## 2022-10-27 RX ADMIN — Medication 1 LOZENGE: at 12:17

## 2022-10-27 RX ADMIN — SODIUM CHLORIDE 20 MILLILITER(S): 9 INJECTION INTRAMUSCULAR; INTRAVENOUS; SUBCUTANEOUS at 07:47

## 2022-10-27 RX ADMIN — Medication 25 MILLIGRAM(S): at 14:40

## 2022-10-27 RX ADMIN — NYSTATIN CREAM 1 APPLICATION(S): 100000 CREAM TOPICAL at 20:20

## 2022-10-27 RX ADMIN — Medication 5 MILLILITER(S): at 20:09

## 2022-10-27 RX ADMIN — Medication 1 MILLIGRAM(S): at 12:16

## 2022-10-27 RX ADMIN — Medication 1 LOZENGE: at 23:27

## 2022-10-27 RX ADMIN — HEPARIN SODIUM 3.12 UNIT(S)/HR: 5000 INJECTION INTRAVENOUS; SUBCUTANEOUS at 20:10

## 2022-10-27 RX ADMIN — PANTOPRAZOLE SODIUM 40 MILLIGRAM(S): 20 TABLET, DELAYED RELEASE ORAL at 06:27

## 2022-10-27 RX ADMIN — Medication 25 MILLIGRAM(S): at 06:27

## 2022-10-27 RX ADMIN — Medication 1 TABLET(S): at 12:16

## 2022-10-27 RX ADMIN — Medication 50 MILLIEQUIVALENT(S): at 13:24

## 2022-10-27 RX ADMIN — Medication 5 MILLILITER(S): at 12:17

## 2022-10-27 RX ADMIN — SENNA PLUS 2 TABLET(S): 8.6 TABLET ORAL at 20:20

## 2022-10-27 RX ADMIN — Medication 10 MILLILITER(S): at 12:27

## 2022-10-27 RX ADMIN — FLUCONAZOLE 400 MILLIGRAM(S): 150 TABLET ORAL at 12:17

## 2022-10-27 RX ADMIN — LORATADINE 10 MILLIGRAM(S): 10 TABLET ORAL at 12:16

## 2022-10-27 RX ADMIN — Medication 400 MILLIGRAM(S): at 14:40

## 2022-10-27 RX ADMIN — Medication 400 MILLIGRAM(S): at 06:27

## 2022-10-27 RX ADMIN — Medication 10 MILLILITER(S): at 20:09

## 2022-10-27 RX ADMIN — Medication 1 LOZENGE: at 15:33

## 2022-10-27 RX ADMIN — Medication 25 MILLIGRAM(S): at 20:19

## 2022-10-27 RX ADMIN — Medication 5 MILLILITER(S): at 23:26

## 2022-10-27 RX ADMIN — Medication 650 MILLIGRAM(S): at 10:45

## 2022-10-27 RX ADMIN — URSODIOL 300 MILLIGRAM(S): 250 TABLET, FILM COATED ORAL at 17:49

## 2022-10-27 RX ADMIN — Medication 1: at 12:21

## 2022-10-27 RX ADMIN — Medication 40 MILLIGRAM(S): at 18:31

## 2022-10-27 RX ADMIN — Medication 1: at 17:49

## 2022-10-27 RX ADMIN — HEPARIN SODIUM 3.12 UNIT(S)/HR: 5000 INJECTION INTRAVENOUS; SUBCUTANEOUS at 07:46

## 2022-10-27 RX ADMIN — Medication 5 MILLILITER(S): at 15:33

## 2022-10-27 RX ADMIN — Medication 650 MILLIGRAM(S): at 19:30

## 2022-10-27 RX ADMIN — Medication 480 MICROGRAM(S): at 17:48

## 2022-10-27 RX ADMIN — FINASTERIDE 5 MILLIGRAM(S): 5 TABLET, FILM COATED ORAL at 12:16

## 2022-10-27 RX ADMIN — Medication 40 MILLIGRAM(S): at 16:54

## 2022-10-27 RX ADMIN — Medication 50 MILLIGRAM(S): at 10:45

## 2022-10-27 RX ADMIN — Medication 25 MILLIGRAM(S): at 10:46

## 2022-10-27 RX ADMIN — SODIUM CHLORIDE 150 MILLILITER(S): 9 INJECTION, SOLUTION INTRAVENOUS at 20:10

## 2022-10-27 RX ADMIN — URSODIOL 300 MILLIGRAM(S): 250 TABLET, FILM COATED ORAL at 06:25

## 2022-10-27 RX ADMIN — Medication 400 MILLIGRAM(S): at 20:19

## 2022-10-27 RX ADMIN — Medication 10 MILLILITER(S): at 00:30

## 2022-10-27 RX ADMIN — Medication 10 MILLILITER(S): at 15:39

## 2022-10-27 RX ADMIN — NYSTATIN CREAM 1 APPLICATION(S): 100000 CREAM TOPICAL at 06:00

## 2022-10-27 RX ADMIN — TAMSULOSIN HYDROCHLORIDE 0.4 MILLIGRAM(S): 0.4 CAPSULE ORAL at 20:20

## 2022-10-27 RX ADMIN — Medication 40 MILLIGRAM(S): at 08:58

## 2022-10-27 RX ADMIN — POLYETHYLENE GLYCOL 3350 17 GRAM(S): 17 POWDER, FOR SOLUTION ORAL at 15:38

## 2022-10-27 RX ADMIN — Medication 1 LOZENGE: at 09:00

## 2022-10-27 RX ADMIN — APREPITANT 80 MILLIGRAM(S): 80 CAPSULE ORAL at 12:15

## 2022-10-27 RX ADMIN — SODIUM CHLORIDE 150 MILLILITER(S): 9 INJECTION, SOLUTION INTRAVENOUS at 07:46

## 2022-10-27 RX ADMIN — Medication 50 MILLIEQUIVALENT(S): at 15:32

## 2022-10-27 RX ADMIN — NYSTATIN CREAM 1 APPLICATION(S): 100000 CREAM TOPICAL at 14:40

## 2022-10-27 RX ADMIN — AMLODIPINE BESYLATE 10 MILLIGRAM(S): 2.5 TABLET ORAL at 06:27

## 2022-10-27 RX ADMIN — CHLORHEXIDINE GLUCONATE 1 APPLICATION(S): 213 SOLUTION TOPICAL at 07:50

## 2022-10-27 RX ADMIN — Medication 5 MILLILITER(S): at 07:53

## 2022-10-27 RX ADMIN — Medication 10 MILLILITER(S): at 07:53

## 2022-10-27 RX ADMIN — Medication 4440 MICROGRAM(S): at 17:58

## 2022-10-27 RX ADMIN — LIDOCAINE AND PRILOCAINE CREAM 1 APPLICATION(S): 25; 25 CREAM TOPICAL at 12:24

## 2022-10-27 RX ADMIN — SODIUM CHLORIDE 20 MILLILITER(S): 9 INJECTION INTRAMUSCULAR; INTRAVENOUS; SUBCUTANEOUS at 20:09

## 2022-10-27 NOTE — CONSULT NOTE ADULT - PROBLEM SELECTOR RECOMMENDATION 4
Actions:  [] Rapport building     [] Symptom assessment    [] Eliciting preferences of goals   [] Prognostic understanding    [] Emotional Support  [] Coping skill development  []  Other  Interdisciplinary Referrals:  Communication:  Documentation Review: [] Primary Team [] Consultants [] Interdisciplinary team  Content: PPSv2 prior to admission: 70  Current functional PPSv2: 70  Nursing care required: Some assistance with ADLs  A review of the paper chart has been conducted  HCP form present:               Yes and valid []               Yes but invalid []                No [x]   MOLST present:                   Yes and valid []               Yes but invalid []                No [x]  Incapacity form present:   Yes and valid []                Yes but invalid []                No []                 N/A [x]  Living Will:                            Yes and valid []                Yes but invalid []                No [x]      Family Health Care Decision Act (FHCDA) Surrogate Decision Maker Hierarchy in the absence of a health care agent  Health system Article 81 Guardian-->Spouse or domestic partner--> Adult child-->Parent-->Sibling--> Close friend

## 2022-10-27 NOTE — CONSULT NOTE ADULT - PROBLEM SELECTOR RECOMMENDATION 9
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

## 2022-10-27 NOTE — CHART NOTE - NSCHARTNOTEFT_GEN_A_CORE
After pre-medication, Mr. Cespedes received 304ml of autologous, pooled, thawed, washed, mobilized, plasma reduced, HPC apheresis over approximately 1 hour. Cell counts as follows:   Total MNCs (x 10^8/kg) = 6.86  CD34+ cells (x10^6/kg)= 5.85  Cell viability (%) = 76%  He tolerated the infusion well with no adverse reactions noted. Shayla Lundberg NP [phone]

## 2022-10-27 NOTE — CONSULT NOTE ADULT - PROBLEM SELECTOR RECOMMENDATION 3
PPSv2 prior to admission:  Current functional PPSv2:  Nursing care required:  A review of the paper chart has been conducted  HCP form present:               Yes and valid []               Yes but invalid []                No []   MOLST present:                   Yes and valid []               Yes but invalid []                No []  Incapacity form present:   Yes and valid []                Yes but invalid []                No []                 N/A []  Living Will:                            Yes and valid []                Yes but invalid []                No []      Family Health Care Decision Act (FHCDA) Surrogate Decision Maker Hierarchy in the absence of a health care agent  [facility] [place] Article 81 Guardian-->Spouse or domestic partner--> Adult child-->Parent-->Sibling--> Close friend Condition: Multiple myeloma  Degree: s/p six cycles of lenalidomide, bortezomib, and dexamethasone (RVD)  Active treatment: Here for SCT/Day 0  Clinical impact on complexity: Significant

## 2022-10-27 NOTE — CONSULT NOTE ADULT - SUBJECTIVE AND OBJECTIVE BOX
HPI:  This is a 72 year old male with multiple myeloma admitted for an autologous pbsct with high dose Melphalan prep regimen. Hematologic history as follows: Initially presented in 11/2021 with anemia and back pain, diagnosed with multiple myeloma 12/2021.A bone marrow biopsy at that time showed 70% involvement with monoclonal plasma cells (IgG kappa), improved to 1-2% involvement with bone marrow biopsy 5/2022 after treatment with RVD x 6. He has no complaints on admission. Other pertinent medical history: BPH, HTN, DVT, DM, CVA, HTN.  (24 Oct 2022 13:08)      Palliative Global Assessment  A review of the paper chart has been conducted  HCP form present:               Yes and valid []               Yes but invalid []                No []   MOLST present:                   Yes and valid []               Yes but invalid []                No []  Incapacity form present:   Yes and valid []                Yes but invalid []                No []                 N/A []  Living Will:                            Yes and valid []                Yes but invalid []                No []      Family Heatlh Care Decision Act Surrogate Decision Maker [facility] Article 81 Guardian-->Spouse or domestic partner--> Adult child-->Parent-->Sibling--> Close friend      Contacts listed in the paper or electronic chart  Name  Relationship  Number(s)  Translation Required:  Spouse or Partner:  Family unit:  Family history of hematologic malignancy:  Residence: [ ] Apartment   [] House  [ ] Other  Degree of functionality in the home:  Performance Status (PPSv2):  BMI:BMI (kg/m2): 26.5 (10-24-22 @ 10:12)  Engagement in the home:  Employment: [ ] Currently employed [ ] Exited workforce due to illness  [ ] Retired prior to illness  [ ] No/distant history of employment   Support network (degree):  ---Family:        [ ] Low  [ ] Moderate  [ ] High  ---Neighbors: [ ] Low  [ ] Moderate  [ ] High  ---Social:         [ ] Low  [ ] Moderate  [ ] High  ---Spiritual:    [ ] Low  [ ] Moderate  [ ] High  Financial concerns:  Coping Strategies:  Caregiver burden/fatigue/needs:  Grief identified: [] Yes [] No  Medical communication and decision making preferences:      PERTINENT PM/SXH:   Benign essential hypertension    Hx of hyperlipidemia    DVT (deep venous thrombosis)    Obstructive uropathy    Acute renal failure    Hearing loss right ear    Obesity    Anemia of unknown etiology in 2012    BPH (Benign Prostatic Hyperplasia) vs. &quot;prostate cancer&quot;--will require future prostate bx    BPH (benign prostatic hypertrophy)    Diabetes mellitus    CVA (cerebral vascular accident)    Multiple myeloma      H/O tooth extraction    S/P prostatectomy      FAMILY HISTORY:  Family history of hypertension (Father)      Family Hx substance abuse [ ]yes [ ]no  ITEMS NOT CHECKED ARE NOT PRESENT    SOCIAL HISTORY:   Significant other/partner[ ]  Children[ ]  Yarsanism/Spirituality:  Substance hx:  [ ]   Tobacco hx:  [ ]   Alcohol hx: [ ]   Home Opioid hx:  [ ] I-Stop Reference No:  Living Situation: [ ]Home  [ ]Long term care  [ ]Rehab [ ]Other    ADVANCE DIRECTIVES:    DNR/MOLST  [ ]  Living Will  [ ]   DECISION MAKER(s):  [ ] Health Care Proxy(s)  [ ] Surrogate(s)  [ ] Guardian           Name(s): Phone Number(s):    BASELINE (I)ADL(s) (prior to admission):  [place]: [ ]Total  [ ] Moderate [ ]Dependent    Allergies    allopurinol (Other)    Intolerances    MEDICATIONS  (STANDING):  acetaminophen     Tablet .. 650 milliGRAM(s) Oral once  acetaminophen     Tablet .. 650 milliGRAM(s) Oral every 6 hours  acyclovir   Oral Tab/Cap 400 milliGRAM(s) Oral every 8 hours  amLODIPine   Tablet 10 milliGRAM(s) Oral daily  aprepitant 80 milliGRAM(s) Oral every 24 hours  Biotene Dry Mouth Oral Rinse 5 milliLiter(s) Swish and Spit five times a day  chlorhexidine 4% Liquid 1 Application(s) Topical <User Schedule>  clotrimazole Lozenge 1 Lozenge Oral five times a day  dextrose 5%. 1000 milliLiter(s) (50 mL/Hr) IV Continuous <Continuous>  dextrose 5%. 1000 milliLiter(s) (100 mL/Hr) IV Continuous <Continuous>  dextrose 50% Injectable 25 Gram(s) IV Push once  dextrose 50% Injectable 12.5 Gram(s) IV Push once  dextrose 50% Injectable 25 Gram(s) IV Push once  diphenhydrAMINE Injectable 25 milliGRAM(s) IV Push once  filgrastim-sndz (ZARXIO) Injectable 480 MICROGram(s) SubCutaneous every 24 hours  finasteride 5 milliGRAM(s) Oral daily  fluconAZOLE   Tablet 400 milliGRAM(s) Oral daily  folic acid 1 milliGRAM(s) Oral daily  glucagon  Injectable 1 milliGRAM(s) IntraMuscular once  heparin  Infusion 312 Unit(s)/Hr (3.12 mL/Hr) IV Continuous <Continuous>  hydrALAZINE 25 milliGRAM(s) Oral three times a day  hydrocortisone sodium succinate Injectable 50 milliGRAM(s) IV Push once  insulin lispro (ADMELOG) corrective regimen sliding scale   SubCutaneous three times a day before meals  insulin lispro (ADMELOG) corrective regimen sliding scale   SubCutaneous at bedtime  lidocaine/prilocaine Cream 1 Application(s) Topical daily  loratadine 10 milliGRAM(s) Oral daily  LORazepam   Injectable 1 milliGRAM(s) IV Push every 24 hours  multivitamin 1 Tablet(s) Oral daily  nystatin Powder 1 Application(s) Topical three times a day  palifermin Injectable  (eMAR) 4440 MICROGram(s) IV Push every 24 hours  pantoprazole    Tablet 40 milliGRAM(s) Oral before breakfast  senna 2 Tablet(s) Oral at bedtime  sodium bicarbonate Mouth Rinse 10 milliLiter(s) Swish and Spit five times a day  sodium chloride 0.45% 1000 milliLiter(s) (150 mL/Hr) IV Continuous <Continuous>  sodium chloride 0.9%. 1000 milliLiter(s) (200 mL/Hr) IV Continuous <Continuous>  sodium chloride 0.9%. 1000 milliLiter(s) (20 mL/Hr) IV Continuous <Continuous>  tamsulosin 0.4 milliGRAM(s) Oral at bedtime  ursodiol Capsule 300 milliGRAM(s) Oral two times a day    MEDICATIONS  (PRN):  acetaminophen     Tablet .. 650 milliGRAM(s) Oral every 6 hours PRN Temp greater or equal to 38C (100.4F), Mild Pain (1 - 3)  dextrose Oral Gel 15 Gram(s) Oral once PRN Blood Glucose LESS THAN 70 milliGRAM(s)/deciliter  LORazepam   Injectable 1 milliGRAM(s) IV Push every 6 hours PRN Anxiety / Nausea / Vomiting  metoclopramide Injectable 10 milliGRAM(s) IV Push every 6 hours PRN Nausea and/or Vomiting  polyethylene glycol 3350 17 Gram(s) Oral daily PRN Constipation  sodium chloride 0.9% lock flush 10 milliLiter(s) IV Push every 1 hour PRN Pre/post blood products, medications, blood draw, and to maintain line patency    PRESENT SYMPTOMS: [ ]Unable to self-report  [ ] CPOT [ ] PAINADs [ ] RDOS  Source if other than patient:  [ ]Family   [ ]Team     Pain: [ ]yes [ ]no  QOL impact -   Location -                    Aggravating factors -  Quality -  Radiation -  Timing-  Severity (0-10 scale):  Minimal acceptable level (0-10 scale):     CPOT:    https://www.[facility].org/getattachment/[email or web address]/Critical-Care-Pain-Observation-Tool-(CPOT)    PAIN AD Score:   http://geriatrictoolkit.[email or web address]/cog/painad.pdf (press ctrl +  left click to view)    Dyspnea:                           [ ]Mild [ ]Moderate [ ]Severe      RDOS:  0 to 2  minimal or no respiratory distress   3  mild distress  4 to 6 moderate distress  >7 severe distress  https://[email or web address]/handouts/hen/Respiratory_Distress_Observation_Scale.pdf (Ctrl +  left click to view)     Anxiety:                             [ ]Mild [ ]Moderate [ ]Severe  Fatigue:                             [ ]Mild [ ]Moderate [ ]Severe  Nausea:                             [ ]Mild [ ]Moderate [ ]Severe  Loss of appetite:              [ ]Mild [ ]Moderate [ ]Severe  Constipation:                    [ ]Mild [ ]Moderate [ ]Severe    PCSSQ[Palliative Care Spiritual Screening Question]   Severity (0-10):  Score of 4 or > indicate consideration of Chaplaincy referral.  Chaplaincy Referral: [ ] yes [ ] refused [ ] following [ ] Deferred     Caregiver [place]? : [ ] yes [ ] no [ ] Deferred [ ] Declined             Social work referral [ ] Patient & Family Centered Care Referral [ ]     Anticipatory Grief present?:  [ ] yes [ ] no  [ ] Deferred                  Social work referral [ ] Chaplaincy Referral[ ]      Other Symptoms:  [ ]All other review of systems negative     Palliative Performance Status Version 2:         %    http://npcrc.org/files/news/palliative_performance_scale_ppsv2.pdf  PHYSICAL EXAM:  Vital Signs Last 24 Hrs  T(C): 36.1 (27 Oct 2022 06:00), Max: 36.8 (26 Oct 2022 13:26)  T(F): 97 (27 Oct 2022 06:00), Max: 98.2 (26 Oct 2022 13:26)  HR: 93 (27 Oct 2022 06:00) (81 - 114)  BP: 138/71 (27 Oct 2022 06:00) (125/69 - 143/73)  BP(mean): --  RR: 18 (27 Oct 2022 06:00) (18 - 18)  SpO2: 96% (27 Oct 2022 06:00) (96% - 99%)    Parameters below as of 27 Oct 2022 06:00  Patient On (Oxygen Delivery Method): room air     I&O's Summary    26 Oct 2022 07:01  -  27 Oct 2022 07:00  --------------------------------------------------------  IN: 4691 mL / OUT: 3450 mL / NET: 1241 mL      GENERAL: [ ]Cachexia    [ ]Alert  [ ]Oriented x   [ ]Lethargic  [ ]Unarousable  [ ]Verbal  [ ]Non-Verbal  Behavioral:   [ ] Anxiety  [ ] Delirium [ ] Agitation [ ] Other  HEENT:  [ ]Normal   [ ]Dry mouth   [ ]ET Tube/Trach  [ ]Oral lesions  PULMONARY:   [ ]Clear [ ]Tachypnea  [ ]Audible excessive secretions   [ ]Rhonchi        [ ]Right [ ]Left [ ]Bilateral  [ ]Crackles        [ ]Right [ ]Left [ ]Bilateral  [ ]Wheezing     [ ]Right [ ]Left [ ]Bilateral  [ ]Diminished breath sounds [ ]right [ ]left [ ]bilateral  CARDIOVASCULAR:    [ ]Regular [ ]Irregular [ ]Tachy  [ ]Vick [ ]Murmur [ ]Other  GASTROINTESTINAL:  [ ]Soft  [ ]Distended   [ ]+BS  [ ]Non tender [ ]Tender  [ ]Other [ ]PEG [ ]OGT/ NGT  Last BM:  GENITOURINARY:  [ ]Normal [ ] Incontinent   [ ]Oliguria/Anuria   [ ]Miranda  MUSCULOSKELETAL:   [ ]Normal   [ ]Weakness  [ ]Bed/Wheelchair bound [ ]Edema  NEUROLOGIC:   [ ]No focal deficits  [ ]Cognitive impairment  [ ]Dysphagia [ ]Dysarthria [ ]Paresis [ ]Other   SKIN:   [ ]Normal  [ ]Rash  [ ]Other  [ ]Pressure ulcer(s)       Present on admission [ ]y [ ]n    CRITICAL CARE:  [ ] Shock Present  [ ]Septic [ ]Cardiogenic [ ]Neurologic [ ]Hypovolemic  [ ]  Vasopressors [ ]  Inotropes   [ ]Respiratory failure present [ ]Mechanical ventilation [ ]Non-invasive ventilatory support [ ]High flow    [ ]Acute  [ ]Chronic [ ]Hypoxic  [ ]Hypercarbic [ ]Other  [ ]Other organ failure     LABS:                        10.9   4.08  )-----------( 322      ( 27 Oct 2022 07:01 )             33.3   10-26    139  |  105  |  22  ----------------------------<  196<H>  4.2   |  23  |  1.18    Ca    8.6      26 Oct 2022 07:11  Phos  3.5     10-26  Mg     2.0     10-26    TPro  6.7  /  Alb  3.8  /  TBili  0.6  /  DBili  0.1  /  AST  17  /  ALT  13  /  AlkPhos  109  10-26  PT/INR - ( 27 Oct 2022 07:01 )   PT: 12.4 sec;   INR: 1.07 ratio         PTT - ( 27 Oct 2022 07:01 )  PTT:35.2 sec      RADIOLOGY & ADDITIONAL STUDIES:    PROTEIN CALORIE MALNUTRITION PRESENT: [ ]mild [ ]moderate [ ]severe [ ]underweight [ ]morbid obesity  https://www.andeal.org/vault/2440/web/files/ONC/Table_Clinical%20Characteristics%20to%20Document%20Malnutrition-White%20JV%20et%20al%567758.pdf    Height (cm): 168 (10-24-22 @ 10:12), 168.5 (10-21-22 @ 14:58), 170.2 (10-11-22 @ 09:26)  Weight (kg): 74.9 (10-24-22 @ 10:12), 76 (10-21-22 @ 14:58), 80.7 (10-11-22 @ 09:26)  BMI (kg/m2): 26.5 (10-24-22 @ 10:12), 26.8 (10-21-22 @ 14:58), 27.9 (10-11-22 @ 09:26)    [ ]PPSV2 < or = to 30% [ ]significant weight loss  [ ]poor nutritional intake  [ ]anasarca[ ]Artificial Nutrition      Other REFERRALS:  [ ]Hospice  [ ]Child Life  [ ]Social Work  [ ]Case management [ ]Holistic Therapy     Goals of Care Document:  HPI:  This is a 72 year old male with multiple myeloma admitted for an autologous pbsct with high dose Melphalan prep regimen. Hematologic history as follows: Initially presented in 11/2021 with anemia and back pain, diagnosed with multiple myeloma 12/2021.A bone marrow biopsy at that time showed 70% involvement with monoclonal plasma cells (IgG kappa), improved to 1-2% involvement with bone marrow biopsy 5/2022 after treatment with RVD x 6. He has no complaints on admission. Other pertinent medical history: BPH, HTN, DVT, DM, CVA, HTN.  (24 Oct 2022 13:08)      Collateral information obtained about time of diagnosis from the chart.  Pt reports his experience from this time    Palliative Global Assessment  A review of the paper chart has been conducted  HCP form present:               Yes and valid []               Yes but invalid []                No [x]   MOLST present:                   Yes and valid []               Yes but invalid []                No [x]  Incapacity form present:   Yes and valid []                Yes but invalid []                No []                 N/A [x]  Living Will:                            Yes and valid []                Yes but invalid []                No [x]      Family Health Care Decision Act Surrogate Decision Maker Hierarchy  [facility] Article 81 Guardian-->Spouse or domestic partner--> Adult child-->Parent-->Sibling--> Close friend      Contacts listed in the paper or electronic chart  Name Hanny Medel Wife  Number(s)   Translation Required: None  Spouse or Partner: Above  Family unit: Pt resides with his wife and stepdaughter, with two step grandchildren (approx 11 and 2). However, he does have three of his own children in [place]  Family history of cancer: Yes  Residence: [ ] Apartment   [x] House  [ ] Other  Degree of functionality in the home: No assistive devices  Performance Status (PPSv2): 70  BMI:BMI (kg/m2): 26.5 (10-24-22 @ 10:12)  Engagement in the home: Moderate  Employment: [ ] Currently employed [ ] Exited workforce due to illness  [x ] Retired prior to illness  He retired at 60 as a  [ ] No/distant history of employment   Support network (degree):  ---Family:        [ ] Low  [ ] Moderate  [x ] High  ---Neighbors: [ x] Low  [ ] Moderate  [ ] High  ---Social:         [x ] Low  [ ] Moderate  [ ] High  ---Spiritual:    [x ] Low  [ ] Moderate  [ ] High Druze and watches service on TV  Financial concerns: None  Coping Strategies: TBD  Caregiver burden/fatigue/needs: Minimal, he has an aide  Grief identified: [] Yes [x] No PT independent reports being at peace with his own mortality, stating that he has lived longer than his siblings.  Medical communication and decision making preferences:  His preference for being interviewed about his past further is through his wife      PERTINENT PM/SXH:   Benign essential hypertension    Hx of hyperlipidemia    DVT (deep venous thrombosis)    Obstructive uropathy    Acute renal failure    Hearing loss right ear    Obesity    Anemia of unknown etiology in 2012    BPH (Benign Prostatic Hyperplasia) vs. &quot;prostate cancer&quot;--will require future prostate bx    BPH (benign prostatic hypertrophy)    Diabetes mellitus    CVA (cerebral vascular accident)    Multiple myeloma      H/O tooth extraction    S/P prostatectomy      FAMILY HISTORY:  Family history of hypertension (Father)      Family Hx substance abuse [ ]yes [ ]no  ITEMS NOT CHECKED ARE NOT PRESENT    SOCIAL HISTORY:   Significant other/partner[x ]  Children[x ]  Catholic/Spirituality:  Substance hx:  [ ]   Tobacco hx:  [ ]   Alcohol hx: [ ]   Home Opioid hx:  [ ] I-Stop Reference No:  Living Situation: [ ]Home  [ ]Long term care  [ ]Rehab [ ]Other    ADVANCE DIRECTIVES:    DNR/MOLST  [ ]  Living Will  [ ]   DECISION MAKER(s):  [ ] Health Care Proxy(s)  [ ] Surrogate(s)  [ ] Guardian           Name(s): Phone Number(s):    BASELINE (I)ADL(s) (prior to admission):  [place]: [ ]Total  [ ] Moderate [ ]Dependent    Allergies    allopurinol (Other)    Intolerances    MEDICATIONS  (STANDING):  acetaminophen     Tablet .. 650 milliGRAM(s) Oral once  acetaminophen     Tablet .. 650 milliGRAM(s) Oral every 6 hours  acyclovir   Oral Tab/Cap 400 milliGRAM(s) Oral every 8 hours  amLODIPine   Tablet 10 milliGRAM(s) Oral daily  aprepitant 80 milliGRAM(s) Oral every 24 hours  Biotene Dry Mouth Oral Rinse 5 milliLiter(s) Swish and Spit five times a day  chlorhexidine 4% Liquid 1 Application(s) Topical <User Schedule>  clotrimazole Lozenge 1 Lozenge Oral five times a day  dextrose 5%. 1000 milliLiter(s) (50 mL/Hr) IV Continuous <Continuous>  dextrose 5%. 1000 milliLiter(s) (100 mL/Hr) IV Continuous <Continuous>  dextrose 50% Injectable 25 Gram(s) IV Push once  dextrose 50% Injectable 12.5 Gram(s) IV Push once  dextrose 50% Injectable 25 Gram(s) IV Push once  diphenhydrAMINE Injectable 25 milliGRAM(s) IV Push once  filgrastim-sndz (ZARXIO) Injectable 480 MICROGram(s) SubCutaneous every 24 hours  finasteride 5 milliGRAM(s) Oral daily  fluconAZOLE   Tablet 400 milliGRAM(s) Oral daily  folic acid 1 milliGRAM(s) Oral daily  glucagon  Injectable 1 milliGRAM(s) IntraMuscular once  heparin  Infusion 312 Unit(s)/Hr (3.12 mL/Hr) IV Continuous <Continuous>  hydrALAZINE 25 milliGRAM(s) Oral three times a day  hydrocortisone sodium succinate Injectable 50 milliGRAM(s) IV Push once  insulin lispro (ADMELOG) corrective regimen sliding scale   SubCutaneous three times a day before meals  insulin lispro (ADMELOG) corrective regimen sliding scale   SubCutaneous at bedtime  lidocaine/prilocaine Cream 1 Application(s) Topical daily  loratadine 10 milliGRAM(s) Oral daily  LORazepam   Injectable 1 milliGRAM(s) IV Push every 24 hours  multivitamin 1 Tablet(s) Oral daily  nystatin Powder 1 Application(s) Topical three times a day  palifermin Injectable  (eMAR) 4440 MICROGram(s) IV Push every 24 hours  pantoprazole    Tablet 40 milliGRAM(s) Oral before breakfast  senna 2 Tablet(s) Oral at bedtime  sodium bicarbonate Mouth Rinse 10 milliLiter(s) Swish and Spit five times a day  sodium chloride 0.45% 1000 milliLiter(s) (150 mL/Hr) IV Continuous <Continuous>  sodium chloride 0.9%. 1000 milliLiter(s) (200 mL/Hr) IV Continuous <Continuous>  sodium chloride 0.9%. 1000 milliLiter(s) (20 mL/Hr) IV Continuous <Continuous>  tamsulosin 0.4 milliGRAM(s) Oral at bedtime  ursodiol Capsule 300 milliGRAM(s) Oral two times a day    MEDICATIONS  (PRN):  acetaminophen     Tablet .. 650 milliGRAM(s) Oral every 6 hours PRN Temp greater or equal to 38C (100.4F), Mild Pain (1 - 3)  dextrose Oral Gel 15 Gram(s) Oral once PRN Blood Glucose LESS THAN 70 milliGRAM(s)/deciliter  LORazepam   Injectable 1 milliGRAM(s) IV Push every 6 hours PRN Anxiety / Nausea / Vomiting  metoclopramide Injectable 10 milliGRAM(s) IV Push every 6 hours PRN Nausea and/or Vomiting  polyethylene glycol 3350 17 Gram(s) Oral daily PRN Constipation  sodium chloride 0.9% lock flush 10 milliLiter(s) IV Push every 1 hour PRN Pre/post blood products, medications, blood draw, and to maintain line patency    PRESENT SYMPTOMS: [ ]Unable to self-report  [ ] CPOT [ ] PAINADs [ ] RDOS  Source if other than patient:  [ ]Family   [ ]Team     Pain: [ ]yes [x ]no  QOL impact -   Location -                    Aggravating factors -  Quality -  Radiation -  Timing-  Severity (0-10 scale):  Minimal acceptable level (0-10 scale):     CPOT:    https://www.[facility].org/getattachment/[email or web address]/Critical-Care-Pain-Observation-Tool-(CPOT)    PAIN AD Score:   http://geriatrictoolkit.[place]/cog/painad.pdf (press ctrl +  left click to view)    Dyspnea:                 None          [ ]Mild [ ]Moderate [ ]Severe      RDOS:  0 to 2  minimal or no respiratory distress   3  mild distress  4 to 6 moderate distress  >7 severe distress  https://[email or web address]/handouts/hen/Respiratory_Distress_Observation_Scale.pdf (Ctrl +  left click to view)     Anxiety:                             [ ]Mild [ ]Moderate [ ]Severe  Fatigue:                             [ ]Mild [ ]Moderate [ ]Severe  Nausea:                             [ ]Mild [ ]Moderate [ ]Severe  Loss of appetite:              [ ]Mild [ ]Moderate [ ]Severe  Constipation:                    [ ]Mild [x ]Moderate [ ]Severe    PCSSQ[Palliative Care Spiritual Screening Question]   Severity (0-10):0  Score of 4 or > indicate consideration of Chaplaincy referral.  Chaplaincy Referral: [ ] yes [ ] refused [ ] following [x ] Deferred Not very spiritual     Caregiver [place]? : [ ] yes [x ] no [ ] Deferred [ ] Declined             Social work referral [ ] Patient & Family Centered Care Referral [ ]     Anticipatory Grief present?:  [ ] yes [x ] no  [ ] Deferred                  Social work referral [ ] Chaplaincy Referral[ ]      Other Symptoms:  [x ]All other review of systems negative     Palliative Performance Status Version 2:     70    %    http://[email or web address].org/files/news/palliative_performance_scale_ppsv2.pdf  PHYSICAL EXAM:  Vital Signs Last 24 Hrs  T(C): 36.1 (27 Oct 2022 06:00), Max: 36.8 (26 Oct 2022 13:26)  T(F): 97 (27 Oct 2022 06:00), Max: 98.2 (26 Oct 2022 13:26)  HR: 93 (27 Oct 2022 06:00) (81 - 114)  BP: 138/71 (27 Oct 2022 06:00) (125/69 - 143/73)  BP(mean): --  RR: 18 (27 Oct 2022 06:00) (18 - 18)  SpO2: 96% (27 Oct 2022 06:00) (96% - 99%)    Parameters below as of 27 Oct 2022 06:00  Patient On (Oxygen Delivery Method): room air     I&O's Summary    26 Oct 2022 07:01  -  27 Oct 2022 07:00  --------------------------------------------------------  IN: 4691 mL / OUT: 3450 mL / NET: 1241 mL      GENERAL: [ ]Cachexia    [x ]Alert  [ ]Oriented x   [ ]Lethargic  [ ]Unarousable  [ ]Verbal  [ ]Non-Verbal  Behavioral:   [ ] Anxiety  [ ] Delirium [ ] Agitation [x ] Other Calm  HEENT:  [ ]Normal   [ ]Dry mouth   [ ]ET Tube/Trach  [ ]Oral lesions  PULMONARY:   [x ]Clear [ ]Tachypnea  [ ]Audible excessive secretions   [ ]Rhonchi        [ ]Right [ ]Left [ ]Bilateral  [ ]Crackles        [ ]Right [ ]Left [ ]Bilateral  [ ]Wheezing     [ ]Right [ ]Left [ ]Bilateral  [ ]Diminished breath sounds [ ]right [ ]left [ ]bilateral  CARDIOVASCULAR:    [ x]Regular [ ]Irregular [ ]Tachy  [ ]Vick [ ]Murmur [ ]Other  GASTROINTESTINAL:  [ x]Soft  [ ]Distended   [ ]+BS  [ ]Non tender [ ]Tender  [ ]Other [ ]PEG [ ]OGT/ NGT  Last BM:  GENITOURINARY:  [x ]Normal [ ] Incontinent   [ ]Oliguria/Anuria   [ ]Miranda  MUSCULOSKELETAL:   [ x]Normal   [ ]Weakness  [ ]Bed/Wheelchair bound [ ]Edema  NEUROLOGIC:   [x ]No focal deficits  [ ]Cognitive impairment  [ ]Dysphagia [ ]Dysarthria [ ]Paresis [ ]Other   SKIN:   [x ]Normal  [ ]Rash  [ ]Other  [ ]Pressure ulcer(s)       Present on admission [ ]y [ ]n    CRITICAL CARE:  [ ] Shock Present  [ ]Septic [ ]Cardiogenic [ ]Neurologic [ ]Hypovolemic  [ ]  Vasopressors [ ]  Inotropes   [ ]Respiratory failure present [ ]Mechanical ventilation [ ]Non-invasive ventilatory support [ ]High flow    [ ]Acute  [ ]Chronic [ ]Hypoxic  [ ]Hypercarbic [ ]Other  [ ]Other organ failure     LABS:                        10.9   4.08  )-----------( 322      ( 27 Oct 2022 07:01 )             33.3   10-26    139  |  105  |  22  ----------------------------<  196<H>  4.2   |  23  |  1.18    Ca    8.6      26 Oct 2022 07:11  Phos  3.5     10-26  Mg     2.0     10-26    TPro  6.7  /  Alb  3.8  /  TBili  0.6  /  DBili  0.1  /  AST  17  /  ALT  13  /  AlkPhos  109  10-26  PT/INR - ( 27 Oct 2022 07:01 )   PT: 12.4 sec;   INR: 1.07 ratio         PTT - ( 27 Oct 2022 07:01 )  PTT:35.2 sec      RADIOLOGY & ADDITIONAL STUDIES:    PROTEIN CALORIE MALNUTRITION PRESENT: [ ]mild [ ]moderate [ ]severe [ ]underweight [ ]morbid obesity  https://www.andeal.org/vault/2262/web/files/ONC/Table_Clinical%20Characteristics%20to%20Document%20Malnutrition-White%20JV%20et%20al%202012.pdf    Height (cm): 168 (10-24-22 @ 10:12), 168.5 (10-21-22 @ 14:58), 170.2 (10-11-22 @ 09:26)  Weight (kg): 74.9 (10-24-22 @ 10:12), 76 (10-21-22 @ 14:58), 80.7 (10-11-22 @ 09:26)  BMI (kg/m2): 26.5 (10-24-22 @ 10:12), 26.8 (10-21-22 @ 14:58), 27.9 (10-11-22 @ 09:26)    [ ]PPSV2 < or = to 30% [ ]significant weight loss  [ ]poor nutritional intake  [ ]anasarca[ ]Artificial Nutrition      Other REFERRALS:  [ ]Hospice  [ ]Child Life  [ ]Social Work  [ ]Case management [ ]Holistic Therapy     Goals of Care Document:

## 2022-10-27 NOTE — PROGRESS NOTE ADULT - CRITICAL CARE ATTENDING COMMENT
72 year old male with multiple myeloma diagnosed 12/20/21 s/p RVD x 6 admitted for autologous pbsct with high dose melphalan prep regimen (dose reduced to 70mg / m2 for age)    Day 0    1. Admit to BMTU   2. 3 hours prior to Melphalan start hydration: D5NS at 200ml /hr and continue 24 hours post Melphalan infusion  3. Day – 3 & day -2 - Melphalan 70mg/m2  IV   4. Strict I&O, daily weights, prn diuresis   5. Day -1 rest day. At 2200 on day – 1, start transplant hydration 1/2NS + 50mEq NaHCO3- (may substitute [id number] if bicarb not available)  6. Day 0 – infuse HPC product. 4 hours after infusion of cells start Zarxio 5 micrograms / kg (actual weight) . Continue through engraftment.   7. On day 0, + 1, + 2-give Kepivance 60micrograms / kg (actual weight).  8. VOD prophylaxis - low dose heparin gtt (dosed at 100 units / kg / day), glutamine supplementation, Actigall BID   9. PCP prophylaxis - Bactrim DS through day -2    10. Antiviral prophylaxis - Acyclovir 400mg po TID to start day -1   11. Antifungal prophylaxis- Diflucan 400 mg po daily.  12. GI prophylaxis - Protonix po QD   13. Antibacterial prophylaxis - when ANC < 500, start Cipro 500mg po BID. If becomes febrile, pan cx, CXR and change Cipro to Cefepime 2g IV q 8 hours. Continue until count recovery  14. Kepivance for prevention of mucositis- days 0, +1, +2  15. Aggressive mouth care and skin care as per protocol..senna prn

## 2022-10-27 NOTE — CONSULT NOTE ADULT - PROBLEM SELECTOR RECOMMENDATION 2
Condition:  Degree:  Active treatment:  Clinical impact on complexity: IV chemotherapy as part of stem cell transplant process  Continue to monitor for known adverse effects/symptoms  Risk of infectious complications given anticipated impact on hematopoietic lineages and resultant immune compromise  PPx/Tx per primary team  Relevant factors: Plan is for infusion today

## 2022-10-27 NOTE — CONSULT NOTE ADULT - PROBLEM SELECTOR RECOMMENDATION 5
Actions:  [x] Rapport building     [x] Symptom assessment    [x] Eliciting preferences of goals   [] Prognostic understanding    [] Emotional Support  [] Coping skill development  []  Other  Interdisciplinary Referrals: None at this time  Communication: d/w Team  Documentation Review: [x] Primary Team [] Consultants [] Interdisciplinary team  Content: n/a

## 2022-10-27 NOTE — PROGRESS NOTE ADULT - SUBJECTIVE AND OBJECTIVE BOX
HPC Transplant Team                                                      Critical / Counseling Time Provided: 30 minutes                                                                                                                                                        Chief Complaint: Autologous peripheral blood stem cell transplant with high dose Melphalan prep regimen for treatment of multiple myeloma    S: Patient seen and examined with HPC Transplant Team:     All other ROS negative     O: Vitals:   Vital Signs Last 24 Hrs  T(C): 36.1 (27 Oct 2022 06:00), Max: 36.8 (26 Oct 2022 13:26)  T(F): 97 (27 Oct 2022 06:00), Max: 98.2 (26 Oct 2022 13:26)  HR: 93 (27 Oct 2022 06:00) (81 - 114)  BP: 138/71 (27 Oct 2022 06:00) (125/69 - 143/73)  BP(mean): --  RR: 18 (27 Oct 2022 06:00) (18 - 18)  SpO2: 96% (27 Oct 2022 06:00) (96% - 99%)    Parameters below as of 27 Oct 2022 06:00  Patient On (Oxygen Delivery Method): room air      Admit weight: 74.9kg   Daily Weight in k.1 (26 Oct 2022 09:20)  Today's weight:     Intake / Output:   10-26 @ 07:01  -  10-27 @ 07:00  --------------------------------------------------------  IN: 4691 mL / OUT: 3450 mL / NET: 1241 mL      PE:   Oropharynx: no erythema or ulcerations  Oral Mucositis:                 -                                       Grade: n/a  CVS: S1, S2 RRR   Lungs: CTA throughout bilaterally   Abdomen: + BS x 4, soft, NT, ND   Extremities: no edema  Gastric Mucositis:      -                                            Grade: n/a  Intestinal Mucositis:    -                                          Grade: n/a  Skin: no rash   TLC: CDI   Neuro: A&Ox3   Pain: 0    Labs:   CBC Full  -  ( 27 Oct 2022 07:01 )  WBC Count : 4.08 K/uL  Hemoglobin : 10.9 g/dL  Hematocrit : 33.3 %  Platelet Count - Automated : 322 K/uL  Mean Cell Volume : 78.7 fl  Mean Cell Hemoglobin : 25.8 pg  Mean Cell Hemoglobin Concentration : 32.7 gm/dL  Auto Neutrophil # : 3.84 K/uL  Auto Lymphocyte # : 0.09 K/uL  Auto Monocyte # : 0.13 K/uL  Auto Eosinophil # : 0.00 K/uL  Auto Basophil # : 0.00 K/uL  Auto Neutrophil % : 94.1 %  Auto Lymphocyte % : 2.2 %  Auto Monocyte % : 3.2 %  Auto Eosinophil % : 0.0 %  Auto Basophil % : 0.0 %                          10.9   4.08  )-----------( 322      ( 27 Oct 2022 07:01 )             33.3     10-27    138  |  101  |  29<H>  ----------------------------<  158<H>  3.9   |  27  |  1.25    Ca    8.4      27 Oct 2022 07:01  Phos  3.3     10-27  Mg     1.9     10-27    TPro  6.6  /  Alb  3.8  /  TBili  0.8  /  DBili  x   /  AST  13  /  ALT  11  /  AlkPhos  94  10-27    PT/INR - ( 27 Oct 2022 07:01 )   PT: 12.4 sec;   INR: 1.07 ratio         PTT - ( 27 Oct 2022 07:01 )  PTT:35.2 sec  LIVER FUNCTIONS - ( 27 Oct 2022 07:01 )  Alb: 3.8 g/dL / Pro: 6.6 g/dL / ALK PHOS: 94 U/L / ALT: 11 U/L / AST: 13 U/L / GGT: x           Lactate Dehydrogenase, Serum: 158 U/L (10-27 @ 07:01)        Meds:   Antimicrobials:   acyclovir   Oral Tab/Cap 400 milliGRAM(s) Oral every 8 hours  clotrimazole Lozenge 1 Lozenge Oral five times a day  fluconAZOLE   Tablet 400 milliGRAM(s) Oral daily      Heme / Onc:   heparin  Infusion 312 Unit(s)/Hr IV Continuous <Continuous>      GI:  pantoprazole    Tablet 40 milliGRAM(s) Oral before breakfast  polyethylene glycol 3350 17 Gram(s) Oral daily PRN  senna 2 Tablet(s) Oral at bedtime  sodium bicarbonate Mouth Rinse 10 milliLiter(s) Swish and Spit five times a day  ursodiol Capsule 300 milliGRAM(s) Oral two times a day      Cardiovascular:   amLODIPine   Tablet 10 milliGRAM(s) Oral daily  furosemide   Injectable 40 milliGRAM(s) IV Push once  furosemide   Injectable 40 milliGRAM(s) IV Push once  hydrALAZINE 25 milliGRAM(s) Oral three times a day  tamsulosin 0.4 milliGRAM(s) Oral at bedtime      Immunologic:   filgrastim-sndz (ZARXIO) Injectable 480 MICROGram(s) SubCutaneous every 24 hours      Other medications:   acetaminophen     Tablet .. 650 milliGRAM(s) Oral once  acetaminophen     Tablet .. 650 milliGRAM(s) Oral every 6 hours  aprepitant 80 milliGRAM(s) Oral every 24 hours  Biotene Dry Mouth Oral Rinse 5 milliLiter(s) Swish and Spit five times a day  chlorhexidine 4% Liquid 1 Application(s) Topical <User Schedule>  dextrose 5%. 1000 milliLiter(s) IV Continuous <Continuous>  dextrose 5%. 1000 milliLiter(s) IV Continuous <Continuous>  dextrose 50% Injectable 25 Gram(s) IV Push once  dextrose 50% Injectable 12.5 Gram(s) IV Push once  dextrose 50% Injectable 25 Gram(s) IV Push once  diphenhydrAMINE Injectable 25 milliGRAM(s) IV Push once  finasteride 5 milliGRAM(s) Oral daily  folic acid 1 milliGRAM(s) Oral daily  glucagon  Injectable 1 milliGRAM(s) IntraMuscular once  hydrocortisone sodium succinate Injectable 50 milliGRAM(s) IV Push once  insulin lispro (ADMELOG) corrective regimen sliding scale   SubCutaneous three times a day before meals  insulin lispro (ADMELOG) corrective regimen sliding scale   SubCutaneous at bedtime  lidocaine/prilocaine Cream 1 Application(s) Topical daily  loratadine 10 milliGRAM(s) Oral daily  LORazepam   Injectable 1 milliGRAM(s) IV Push every 24 hours  multivitamin 1 Tablet(s) Oral daily  nystatin Powder 1 Application(s) Topical three times a day  palifermin Injectable  (eMAR) 4440 MICROGram(s) IV Push every 24 hours  potassium chloride  20 mEq/100 mL IVPB 20 milliEquivalent(s) IV Intermittent every 2 hours  sodium chloride 0.45% 1000 milliLiter(s) IV Continuous <Continuous>  sodium chloride 0.9%. 1000 milliLiter(s) IV Continuous <Continuous>  sodium chloride 0.9%. 1000 milliLiter(s) IV Continuous <Continuous>      PRN:   acetaminophen     Tablet .. 650 milliGRAM(s) Oral every 6 hours PRN  dextrose Oral Gel 15 Gram(s) Oral once PRN  LORazepam   Injectable 1 milliGRAM(s) IV Push every 6 hours PRN  metoclopramide Injectable 10 milliGRAM(s) IV Push every 6 hours PRN  polyethylene glycol 3350 17 Gram(s) Oral daily PRN  sodium chloride 0.9% lock flush 10 milliLiter(s) IV Push every 1 hour PRN    A/P: 72 year old male with multiple myeloma diagnosed 19 s/p RVD x 6 admitted for autologous pbsct with high dose melphalan prep regimen (dose reduced to 70mg / m2 for age)  Day 0  10/25- melphalan ; continue melphalan hydration for 24 hours post infusion of last dose. Strict I&O, daily weights, prn diuresis   10/27- HPC transplant today; continue transplant hydration for 24 hours post infusion of cells     1. Infectious Disease:   acyclovir   Oral Tab/Cap 400 milliGRAM(s) Oral every 8 hours  clotrimazole Lozenge 1 Lozenge Oral five times a day  fluconAZOLE   Tablet 400 milliGRAM(s) Oral daily    2. VOD Prophylaxis: Actigall, Glutamine, Heparin (dosed at 100 units / kg / day)     3. GI Prophylaxis:    pantoprazole    Tablet 40 milliGRAM(s) Oral before breakfast    4. Mouthcare - NS / NaHCO3 rinses, Mycelex, Biotene; Skin care     5. GVHD prophylaxis - n/a     6. Transfuse & replete electrolytes prn   KCl 20mEq IV q 2 hours x 3 doses     7. IV hydration, daily weights, strict I&O, prn diuresis   Lasix 40mg IV x 1 0800  Lasix 40mg IV x 1 1600    8. PO intake as tolerated, nutrition follow up as needed, MVI, folic acid     9. Antiemetics, anti-diarrhea medications:   LORazepam   Injectable 1 milliGRAM(s) IV Push every 24 hours  aprepitant 80 milliGRAM(s) Oral every 24 hours  LORazepam   Injectable 1 milliGRAM(s) IV Push every 6 hours PRN  metoclopramide Injectable 10 milliGRAM(s) IV Push every 6 hours PRN  ondansetron 8mg IV q 8 hours prn     10. OOB as tolerated, physical therapy consult if needed     11. Monitor coags / fibrinogen 2x week, vitamin K as needed     12. Monitor closely for clinical changes, monitor for fevers     13. Emotional support provided, plan of care discussed and questions addressed     14. Patient education done regarding chemotherapy prep, plan of care, restrictions and discharge planning     15. Continue regular social work input     I have written the above note for Dr. Persaud who performed service with me in the room.   Shayla Lundberg NP-C ([phone])    I have seen and examined patient with NP, I agree with above note as scribed.                    HPC Transplant Team                                                      Critical / Counseling Time Provided: 30 minutes                                                                                                                                                        Chief Complaint: Autologous peripheral blood stem cell transplant with high dose Melphalan prep regimen for treatment of multiple myeloma    S: Patient seen and examined with HPC Transplant Team:   No complaints today   All ROS negative - discussed with daughter Brenda     O: Vitals:   Vital Signs Last 24 Hrs  T(C): 36.1 (27 Oct 2022 06:00), Max: 36.8 (26 Oct 2022 13:26)  T(F): 97 (27 Oct 2022 06:00), Max: 98.2 (26 Oct 2022 13:26)  HR: 93 (27 Oct 2022 06:00) (81 - 114)  BP: 138/71 (27 Oct 2022 06:00) (125/69 - 143/73)  BP(mean): --  RR: 18 (27 Oct 2022 06:00) (18 - 18)  SpO2: 96% (27 Oct 2022 06:00) (96% - 99%)    Parameters below as of 27 Oct 2022 06:00  Patient On (Oxygen Delivery Method): room air      Admit weight: 74.9kg   Daily Weight in k.1 (26 Oct 2022 09:20)  Today's weight: 74.2kg     Intake / Output:   10-26 @ 07:01  -  10-27 @ 07:00  --------------------------------------------------------  IN: 4691 mL / OUT: 3450 mL / NET: 1241 mL      PE:   Oropharynx: no erythema or ulcerations  Oral Mucositis:                 -                                       Grade: n/a  CVS: S1, S2 RRR   Lungs: CTA throughout bilaterally   Abdomen: + BS x 4, soft, NT, ND   Extremities: no edema  Gastric Mucositis:      -                                            Grade: n/a  Intestinal Mucositis:    -                                          Grade: n/a  Skin: no rash   TLC: CDI   Neuro: A&Ox3   Pain: 0    Labs:   CBC Full  -  ( 27 Oct 2022 07:01 )  WBC Count : 4.08 K/uL  Hemoglobin : 10.9 g/dL  Hematocrit : 33.3 %  Platelet Count - Automated : 322 K/uL  Mean Cell Volume : 78.7 fl  Mean Cell Hemoglobin : 25.8 pg  Mean Cell Hemoglobin Concentration : 32.7 gm/dL  Auto Neutrophil # : 3.84 K/uL  Auto Lymphocyte # : 0.09 K/uL  Auto Monocyte # : 0.13 K/uL  Auto Eosinophil # : 0.00 K/uL  Auto Basophil # : 0.00 K/uL  Auto Neutrophil % : 94.1 %  Auto Lymphocyte % : 2.2 %  Auto Monocyte % : 3.2 %  Auto Eosinophil % : 0.0 %  Auto Basophil % : 0.0 %                          10.9   4.08  )-----------( 322      ( 27 Oct 2022 07:01 )             33.3     10    138  |  101  |  29<H>  ----------------------------<  158<H>  3.9   |  27  |  1.25    Ca    8.4      27 Oct 2022 07:01  Phos  3.3     10-27  Mg     1.9     10-27    TPro  6.6  /  Alb  3.8  /  TBili  0.8  /  DBili  x   /  AST  13  /  ALT  11  /  AlkPhos  94  10    PT/INR - ( 27 Oct 2022 07:01 )   PT: 12.4 sec;   INR: 1.07 ratio         PTT - ( 27 Oct 2022 07:01 )  PTT:35.2 sec  LIVER FUNCTIONS - ( 27 Oct 2022 07:01 )  Alb: 3.8 g/dL / Pro: 6.6 g/dL / ALK PHOS: 94 U/L / ALT: 11 U/L / AST: 13 U/L / GGT: x           Lactate Dehydrogenase, Serum: 158 U/L (10-27 @ 07:01)        Meds:   Antimicrobials:   acyclovir   Oral Tab/Cap 400 milliGRAM(s) Oral every 8 hours  clotrimazole Lozenge 1 Lozenge Oral five times a day  fluconAZOLE   Tablet 400 milliGRAM(s) Oral daily      Heme / Onc:   heparin  Infusion 312 Unit(s)/Hr IV Continuous <Continuous>      GI:  pantoprazole    Tablet 40 milliGRAM(s) Oral before breakfast  polyethylene glycol 3350 17 Gram(s) Oral daily PRN  senna 2 Tablet(s) Oral at bedtime  sodium bicarbonate Mouth Rinse 10 milliLiter(s) Swish and Spit five times a day  ursodiol Capsule 300 milliGRAM(s) Oral two times a day      Cardiovascular:   amLODIPine   Tablet 10 milliGRAM(s) Oral daily  furosemide   Injectable 40 milliGRAM(s) IV Push once  furosemide   Injectable 40 milliGRAM(s) IV Push once  hydrALAZINE 25 milliGRAM(s) Oral three times a day  tamsulosin 0.4 milliGRAM(s) Oral at bedtime      Immunologic:   filgrastim-sndz (ZARXIO) Injectable 480 MICROGram(s) SubCutaneous every 24 hours      Other medications:   acetaminophen     Tablet .. 650 milliGRAM(s) Oral once  acetaminophen     Tablet .. 650 milliGRAM(s) Oral every 6 hours  aprepitant 80 milliGRAM(s) Oral every 24 hours  Biotene Dry Mouth Oral Rinse 5 milliLiter(s) Swish and Spit five times a day  chlorhexidine 4% Liquid 1 Application(s) Topical <User Schedule>  dextrose 5%. 1000 milliLiter(s) IV Continuous <Continuous>  dextrose 5%. 1000 milliLiter(s) IV Continuous <Continuous>  dextrose 50% Injectable 25 Gram(s) IV Push once  dextrose 50% Injectable 12.5 Gram(s) IV Push once  dextrose 50% Injectable 25 Gram(s) IV Push once  diphenhydrAMINE Injectable 25 milliGRAM(s) IV Push once  finasteride 5 milliGRAM(s) Oral daily  folic acid 1 milliGRAM(s) Oral daily  glucagon  Injectable 1 milliGRAM(s) IntraMuscular once  hydrocortisone sodium succinate Injectable 50 milliGRAM(s) IV Push once  insulin lispro (ADMELOG) corrective regimen sliding scale   SubCutaneous three times a day before meals  insulin lispro (ADMELOG) corrective regimen sliding scale   SubCutaneous at bedtime  lidocaine/prilocaine Cream 1 Application(s) Topical daily  loratadine 10 milliGRAM(s) Oral daily  LORazepam   Injectable 1 milliGRAM(s) IV Push every 24 hours  multivitamin 1 Tablet(s) Oral daily  nystatin Powder 1 Application(s) Topical three times a day  palifermin Injectable  (eMAR) 4440 MICROGram(s) IV Push every 24 hours  potassium chloride  20 mEq/100 mL IVPB 20 milliEquivalent(s) IV Intermittent every 2 hours  sodium chloride 0.45% 1000 milliLiter(s) IV Continuous <Continuous>  sodium chloride 0.9%. 1000 milliLiter(s) IV Continuous <Continuous>  sodium chloride 0.9%. 1000 milliLiter(s) IV Continuous <Continuous>      PRN:   acetaminophen     Tablet .. 650 milliGRAM(s) Oral every 6 hours PRN  dextrose Oral Gel 15 Gram(s) Oral once PRN  LORazepam   Injectable 1 milliGRAM(s) IV Push every 6 hours PRN  metoclopramide Injectable 10 milliGRAM(s) IV Push every 6 hours PRN  polyethylene glycol 3350 17 Gram(s) Oral daily PRN  sodium chloride 0.9% lock flush 10 milliLiter(s) IV Push every 1 hour PRN    A/P: 72 year old male with multiple myeloma diagnosed 19 s/p RVD x 6 admitted for autologous pbsct with high dose melphalan prep regimen (dose reduced to 70mg / m2 for age)  Day 0  10/25- melphalan ; continue melphalan hydration for 24 hours post infusion of last dose. Strict I&O, daily weights, prn diuresis   10/27- HPC transplant today; continue transplant hydration for 24 hours post infusion of cells     1. Infectious Disease:   acyclovir   Oral Tab/Cap 400 milliGRAM(s) Oral every 8 hours  clotrimazole Lozenge 1 Lozenge Oral five times a day  fluconAZOLE   Tablet 400 milliGRAM(s) Oral daily    2. VOD Prophylaxis: Actigall, Glutamine, Heparin (dosed at 100 units / kg / day)     3. GI Prophylaxis:    pantoprazole    Tablet 40 milliGRAM(s) Oral before breakfast    4. Mouthcare - NS / NaHCO3 rinses, Mycelex, Biotene; Skin care     5. GVHD prophylaxis - n/a     6. Transfuse & replete electrolytes prn   KCl 20mEq IV q 2 hours x 3 doses     7. IV hydration, daily weights, strict I&O, prn diuresis   Lasix 40mg IV x 1 0800  Lasix 40mg IV x 1 1600    8. PO intake as tolerated, nutrition follow up as needed, MVI, folic acid     9. Antiemetics, anti-diarrhea medications:   LORazepam   Injectable 1 milliGRAM(s) IV Push every 24 hours  aprepitant 80 milliGRAM(s) Oral every 24 hours  LORazepam   Injectable 1 milliGRAM(s) IV Push every 6 hours PRN  metoclopramide Injectable 10 milliGRAM(s) IV Push every 6 hours PRN  ondansetron 8mg IV q 8 hours prn     10. OOB as tolerated, physical therapy consult if needed     11. Monitor coags / fibrinogen 2x week, vitamin K as needed     12. Monitor closely for clinical changes, monitor for fevers     13. Emotional support provided, plan of care discussed and questions addressed     14. Patient education done regarding chemotherapy prep, plan of care, restrictions and discharge planning     15. Continue regular social work input     I have written the above note for Dr. Persaud who performed service with me in the room.   Shayla Lundberg NP-C ([phone])    I have seen and examined patient with NP, I agree with above note as scribed.

## 2022-10-27 NOTE — CONSULT NOTE ADULT - ASSESSMENT
HPI:  This is a 72 year old male with multiple myeloma admitted for an autologous pbsct with high dose Melphalan prep regimen. Hematologic history as follows: Initially presented in 11/2021 with anemia and back pain, diagnosed with multiple myeloma 12/2021.A bone marrow biopsy at that time showed 70% involvement with monoclonal plasma cells (IgG kappa), improved to 1-2% involvement with bone marrow biopsy 5/2022 after treatment with RVD x 6. He has no complaints on admission here for SCT.

## 2022-10-28 LAB
% ALBUMIN: 57.8 % — SIGNIFICANT CHANGE UP
% ALPHA 1: 5.4 % — SIGNIFICANT CHANGE UP
% ALPHA 2: 11.4 % — SIGNIFICANT CHANGE UP
% BETA: 9.3 % — SIGNIFICANT CHANGE UP
% GAMMA: 16.1 % — SIGNIFICANT CHANGE UP
% M SPIKE: 5.4 % — SIGNIFICANT CHANGE UP
ALBUMIN SERPL ELPH-MCNC: 3.5 G/DL — SIGNIFICANT CHANGE UP (ref 3.3–5)
ALBUMIN SERPL ELPH-MCNC: 4 G/DL — SIGNIFICANT CHANGE UP (ref 3.6–5.5)
ALBUMIN/GLOB SERPL ELPH: 1.4 RATIO — SIGNIFICANT CHANGE UP
ALP SERPL-CCNC: 90 U/L — SIGNIFICANT CHANGE UP (ref 40–120)
ALPHA1 GLOB SERPL ELPH-MCNC: 0.4 G/DL — SIGNIFICANT CHANGE UP (ref 0.1–0.4)
ALPHA2 GLOB SERPL ELPH-MCNC: 0.8 G/DL — SIGNIFICANT CHANGE UP (ref 0.5–1)
ALT FLD-CCNC: 10 U/L — SIGNIFICANT CHANGE UP (ref 10–45)
ANION GAP SERPL CALC-SCNC: 11 MMOL/L — SIGNIFICANT CHANGE UP (ref 5–17)
AST SERPL-CCNC: 17 U/L — SIGNIFICANT CHANGE UP (ref 10–40)
B-GLOBULIN SERPL ELPH-MCNC: 0.6 G/DL — SIGNIFICANT CHANGE UP (ref 0.5–1)
BASOPHILS # BLD AUTO: 0 K/UL — SIGNIFICANT CHANGE UP (ref 0–0.2)
BASOPHILS NFR BLD AUTO: 0 % — SIGNIFICANT CHANGE UP (ref 0–2)
BILIRUB SERPL-MCNC: 1 MG/DL — SIGNIFICANT CHANGE UP (ref 0.2–1.2)
BLD GP AB SCN SERPL QL: NEGATIVE — SIGNIFICANT CHANGE UP
BUN SERPL-MCNC: 27 MG/DL — HIGH (ref 7–23)
CALCIUM SERPL-MCNC: 8.2 MG/DL — LOW (ref 8.4–10.5)
CHLORIDE SERPL-SCNC: 97 MMOL/L — SIGNIFICANT CHANGE UP (ref 96–108)
CO2 SERPL-SCNC: 30 MMOL/L — SIGNIFICANT CHANGE UP (ref 22–31)
CREAT SERPL-MCNC: 1.28 MG/DL — SIGNIFICANT CHANGE UP (ref 0.5–1.3)
EGFR: 59 ML/MIN/1.73M2 — LOW
EOSINOPHIL # BLD AUTO: 0 K/UL — SIGNIFICANT CHANGE UP (ref 0–0.5)
EOSINOPHIL NFR BLD AUTO: 0 % — SIGNIFICANT CHANGE UP (ref 0–6)
GAMMA GLOBULIN: 1.1 G/DL — SIGNIFICANT CHANGE UP (ref 0.6–1.6)
GLUCOSE BLDC GLUCOMTR-MCNC: 122 MG/DL — HIGH (ref 70–99)
GLUCOSE BLDC GLUCOMTR-MCNC: 137 MG/DL — HIGH (ref 70–99)
GLUCOSE BLDC GLUCOMTR-MCNC: 142 MG/DL — HIGH (ref 70–99)
GLUCOSE BLDC GLUCOMTR-MCNC: 177 MG/DL — HIGH (ref 70–99)
GLUCOSE SERPL-MCNC: 115 MG/DL — HIGH (ref 70–99)
HCT VFR BLD CALC: 32.4 % — LOW (ref 39–50)
HGB BLD-MCNC: 10.7 G/DL — LOW (ref 13–17)
INTERPRETATION SERPL IFE-IMP: SIGNIFICANT CHANGE UP
LDH SERPL L TO P-CCNC: 300 U/L — HIGH (ref 50–242)
LYMPHOCYTES # BLD AUTO: 0 % — LOW (ref 13–44)
LYMPHOCYTES # BLD AUTO: 0 K/UL — LOW (ref 1–3.3)
M-SPIKE: 0.4 G/DL — HIGH (ref 0–0)
MAGNESIUM SERPL-MCNC: 1.8 MG/DL — SIGNIFICANT CHANGE UP (ref 1.6–2.6)
MANUAL SMEAR VERIFICATION: SIGNIFICANT CHANGE UP
MCHC RBC-ENTMCNC: 26.2 PG — LOW (ref 27–34)
MCHC RBC-ENTMCNC: 33 GM/DL — SIGNIFICANT CHANGE UP (ref 32–36)
MCV RBC AUTO: 79.2 FL — LOW (ref 80–100)
MONOCYTES # BLD AUTO: 0 K/UL — SIGNIFICANT CHANGE UP (ref 0–0.9)
MONOCYTES NFR BLD AUTO: 0 % — LOW (ref 2–14)
NEUTROPHILS # BLD AUTO: 29.87 K/UL — HIGH (ref 1.8–7.4)
NEUTROPHILS NFR BLD AUTO: 100 % — HIGH (ref 43–77)
NRBC # BLD: 1 /100 — HIGH (ref 0–0)
PHOSPHATE SERPL-MCNC: 2.8 MG/DL — SIGNIFICANT CHANGE UP (ref 2.5–4.5)
PLAT MORPH BLD: NORMAL — SIGNIFICANT CHANGE UP
PLATELET # BLD AUTO: 210 K/UL — SIGNIFICANT CHANGE UP (ref 150–400)
POTASSIUM SERPL-MCNC: 3.4 MMOL/L — LOW (ref 3.5–5.3)
POTASSIUM SERPL-SCNC: 3.4 MMOL/L — LOW (ref 3.5–5.3)
PROT PATTERN SERPL ELPH-IMP: SIGNIFICANT CHANGE UP
PROT SERPL-MCNC: 6.1 G/DL — SIGNIFICANT CHANGE UP (ref 6–8.3)
RBC # BLD: 4.09 M/UL — LOW (ref 4.2–5.8)
RBC # FLD: 14.7 % — HIGH (ref 10.3–14.5)
RBC BLD AUTO: SIGNIFICANT CHANGE UP
RH IG SCN BLD-IMP: POSITIVE — SIGNIFICANT CHANGE UP
SODIUM SERPL-SCNC: 138 MMOL/L — SIGNIFICANT CHANGE UP (ref 135–145)
WBC # BLD: 29.87 K/UL — HIGH (ref 3.8–10.5)
WBC # FLD AUTO: 29.87 K/UL — HIGH (ref 3.8–10.5)

## 2022-10-28 PROCEDURE — 99291 CRITICAL CARE FIRST HOUR: CPT

## 2022-10-28 PROCEDURE — 38241 TRANSPLT AUTOL HCT/DONOR: CPT

## 2022-10-28 RX ORDER — LACTULOSE 10 G/15ML
20 SOLUTION ORAL ONCE
Refills: 0 | Status: COMPLETED | OUTPATIENT
Start: 2022-10-28 | End: 2022-10-28

## 2022-10-28 RX ORDER — PANTOPRAZOLE SODIUM 20 MG/1
40 TABLET, DELAYED RELEASE ORAL ONCE
Refills: 0 | Status: DISCONTINUED | OUTPATIENT
Start: 2022-10-28 | End: 2022-10-28

## 2022-10-28 RX ORDER — MAGNESIUM SULFATE 500 MG/ML
1 VIAL (ML) INJECTION ONCE
Refills: 0 | Status: COMPLETED | OUTPATIENT
Start: 2022-10-28 | End: 2022-10-28

## 2022-10-28 RX ORDER — LACTULOSE 10 G/15ML
20 SOLUTION ORAL ONCE
Refills: 0 | Status: DISCONTINUED | OUTPATIENT
Start: 2022-10-28 | End: 2022-11-10

## 2022-10-28 RX ORDER — POTASSIUM CHLORIDE 20 MEQ
20 PACKET (EA) ORAL
Refills: 0 | Status: COMPLETED | OUTPATIENT
Start: 2022-10-28 | End: 2022-10-28

## 2022-10-28 RX ORDER — FUROSEMIDE 40 MG
60 TABLET ORAL ONCE
Refills: 0 | Status: COMPLETED | OUTPATIENT
Start: 2022-10-28 | End: 2022-10-28

## 2022-10-28 RX ADMIN — Medication 10 MILLILITER(S): at 11:49

## 2022-10-28 RX ADMIN — CHLORHEXIDINE GLUCONATE 1 APPLICATION(S): 213 SOLUTION TOPICAL at 10:05

## 2022-10-28 RX ADMIN — Medication 5 MILLILITER(S): at 12:04

## 2022-10-28 RX ADMIN — NYSTATIN CREAM 1 APPLICATION(S): 100000 CREAM TOPICAL at 20:58

## 2022-10-28 RX ADMIN — Medication 25 MILLIGRAM(S): at 05:19

## 2022-10-28 RX ADMIN — PANTOPRAZOLE SODIUM 40 MILLIGRAM(S): 20 TABLET, DELAYED RELEASE ORAL at 05:19

## 2022-10-28 RX ADMIN — Medication 50 MILLIEQUIVALENT(S): at 13:14

## 2022-10-28 RX ADMIN — LIDOCAINE AND PRILOCAINE CREAM 1 APPLICATION(S): 25; 25 CREAM TOPICAL at 13:13

## 2022-10-28 RX ADMIN — Medication 100 GRAM(S): at 08:53

## 2022-10-28 RX ADMIN — Medication 1 LOZENGE: at 11:49

## 2022-10-28 RX ADMIN — Medication 1 LOZENGE: at 08:40

## 2022-10-28 RX ADMIN — HEPARIN SODIUM 3.12 UNIT(S)/HR: 5000 INJECTION INTRAVENOUS; SUBCUTANEOUS at 20:20

## 2022-10-28 RX ADMIN — Medication 480 MICROGRAM(S): at 13:12

## 2022-10-28 RX ADMIN — FINASTERIDE 5 MILLIGRAM(S): 5 TABLET, FILM COATED ORAL at 11:54

## 2022-10-28 RX ADMIN — Medication 60 MILLIGRAM(S): at 11:10

## 2022-10-28 RX ADMIN — Medication 1 LOZENGE: at 23:55

## 2022-10-28 RX ADMIN — Medication 1: at 12:13

## 2022-10-28 RX ADMIN — Medication 1 LOZENGE: at 16:56

## 2022-10-28 RX ADMIN — Medication 10 MILLILITER(S): at 16:56

## 2022-10-28 RX ADMIN — SODIUM CHLORIDE 20 MILLILITER(S): 9 INJECTION INTRAMUSCULAR; INTRAVENOUS; SUBCUTANEOUS at 20:19

## 2022-10-28 RX ADMIN — Medication 50 MILLIEQUIVALENT(S): at 11:53

## 2022-10-28 RX ADMIN — Medication 400 MILLIGRAM(S): at 13:13

## 2022-10-28 RX ADMIN — LACTULOSE 20 GRAM(S): 10 SOLUTION ORAL at 11:10

## 2022-10-28 RX ADMIN — NYSTATIN CREAM 1 APPLICATION(S): 100000 CREAM TOPICAL at 05:19

## 2022-10-28 RX ADMIN — Medication 5 MILLILITER(S): at 23:54

## 2022-10-28 RX ADMIN — Medication 4440 MICROGRAM(S): at 17:06

## 2022-10-28 RX ADMIN — Medication 400 MILLIGRAM(S): at 05:18

## 2022-10-28 RX ADMIN — TAMSULOSIN HYDROCHLORIDE 0.4 MILLIGRAM(S): 0.4 CAPSULE ORAL at 20:58

## 2022-10-28 RX ADMIN — Medication 10 MILLILITER(S): at 08:40

## 2022-10-28 RX ADMIN — Medication 25 MILLIGRAM(S): at 13:13

## 2022-10-28 RX ADMIN — Medication 1 TABLET(S): at 11:49

## 2022-10-28 RX ADMIN — Medication 1 MILLIGRAM(S): at 11:48

## 2022-10-28 RX ADMIN — Medication 100 MILLIGRAM(S): at 13:13

## 2022-10-28 RX ADMIN — Medication 10 MILLILITER(S): at 20:20

## 2022-10-28 RX ADMIN — LORATADINE 10 MILLIGRAM(S): 10 TABLET ORAL at 11:48

## 2022-10-28 RX ADMIN — Medication 10 MILLILITER(S): at 23:54

## 2022-10-28 RX ADMIN — NYSTATIN CREAM 1 APPLICATION(S): 100000 CREAM TOPICAL at 13:14

## 2022-10-28 RX ADMIN — AMLODIPINE BESYLATE 10 MILLIGRAM(S): 2.5 TABLET ORAL at 05:19

## 2022-10-28 RX ADMIN — SENNA PLUS 2 TABLET(S): 8.6 TABLET ORAL at 20:58

## 2022-10-28 RX ADMIN — Medication 50 MILLIEQUIVALENT(S): at 10:04

## 2022-10-28 RX ADMIN — Medication 5 MILLILITER(S): at 20:20

## 2022-10-28 RX ADMIN — URSODIOL 300 MILLIGRAM(S): 250 TABLET, FILM COATED ORAL at 05:20

## 2022-10-28 RX ADMIN — URSODIOL 300 MILLIGRAM(S): 250 TABLET, FILM COATED ORAL at 17:03

## 2022-10-28 RX ADMIN — Medication 25 MILLIGRAM(S): at 20:57

## 2022-10-28 RX ADMIN — Medication 400 MILLIGRAM(S): at 20:57

## 2022-10-28 RX ADMIN — Medication 100 MILLIGRAM(S): at 20:57

## 2022-10-28 RX ADMIN — FLUCONAZOLE 400 MILLIGRAM(S): 150 TABLET ORAL at 11:48

## 2022-10-28 RX ADMIN — Medication 5 MILLILITER(S): at 16:56

## 2022-10-28 RX ADMIN — Medication 5 MILLILITER(S): at 08:40

## 2022-10-28 RX ADMIN — Medication 1 LOZENGE: at 20:21

## 2022-10-28 RX ADMIN — Medication 100 MILLIGRAM(S): at 05:21

## 2022-10-28 NOTE — CHART NOTE - NSCHARTNOTEFT_GEN_A_CORE
Nutrition Follow Up Note  Patient seen for: malnutrition follow up    Chart reviewed, events noted.    Per chart, "This is a 72 year old male with multiple myeloma admitted for an autologous pbsct with high dose Melphalan prep regimen." Day +1    Sources: EMR, patient    Diet Order:   Diet, Regular:   GlutaSolve(Glutamine) 15gm pkg     Qty per Day:  1  Supplement Feeding Modality:  Oral  Glucerna Shake Cans or Servings Per Day:  1       Frequency:  Daily (10-25-)    - Is current order appropriate/adequate? [x] Yes  []  No:     - PO intake :   [] >75%  Adequate    [] 50-75%  Fair       [x] <50%  Poor   Pt noted he eats ~50% of the plate, sometimes less or more depending on appetite at the time of meal.    - Nutrition-related concerns:  - Appetite has decreased since last visit (10/25), though pt has "no complaints" about the food. Eating between 25-75% per flowsheet, patient confirmed. Noted no difficulty chewing/swallowing but sometimes has pain in his mouth, further decreasing appetite.  - Doesn't like Glucerna supplement much, per flowsheet has drank 25-75% x 3 days. Wants to try to drink it again today before trialing a different supplement. Does not want to add more Glucerna.  - Last BM x 5 days ago, noted he is very constipated, has been eating stewed prunes, believes they aren't helping. Doesn't want more prunes, amenable to prune juice with breakfast.  - Renal: eGFR has decreased, BUN is elevated, hypokalemic, all likely 2/2 poor po intake, other renal labs WNL.  - Latest A1C and recent POCTs indicate good glycemic control.      GI:  Last BM 10/23.   Bowel Regimen? [x] Yes - pt on MiraLax and Senna, consider increasing dosages. Denies N/V.      Weights:   Daily Weight in k.2 (10-), Weight in k.1 (10-), Weight in k.7 (10-25)    Nutritionally Pertinent MEDICATIONS  (STANDING):  ativan  Mg sulfate  NaCl  folic acid  sliding scale insulin  multivitamin  protonix  KCl  senna  ursodiol Capsule  Reglan    Pertinent Labs: 10-28 @ 07:21:  BUN 27<H>, <H>, K+ 3.4<L>, eGFR 59<L>    A1C with Estimated Average Glucose Result: 6.8 % (10-25-22 @ 07:26)    Finger Sticks:  POCT Blood Glucose.: 122 mg/dL (10-28 @ 08:17)  POCT Blood Glucose.: 156 mg/dL (10-27 @ 22:04)  POCT Blood Glucose.: 173 mg/dL (10-27 @ 17:42)  POCT Blood Glucose.: 183 mg/dL (10-27 @ 12:13)      Skin per nursing documentation: no pressure injuries  Edema per flowsheet: (+1) b/l foot    Estimated Needs: based on   [x] no change since previous assessment  -Energy: 5307-1520 kcal/kg (30-35 kcal/day)  -Protein: 112.2-134.64 g/kg (1.5-1.8 g/day)      Previous Nutrition Diagnosis: Moderate chronic malnutrition  Nutrition Diagnosis is: [x] ongoing  [] resolved [] not applicable       Nutrition Care Plan:  [x] In Progress  [] Achieved  [] Not applicable    Nutrition Interventions:  - Add prune juice with breakfast to promote BM  - Monitor protein supplement preferences, RD available to speak with pt about other options     Education Provided:       [] Yes:  [x] No:     Recommendations:      1) Continue with regular diet as tolerated, encourage protein-rich foods, maximize preferences  2) Continue with Glucerna supplement, monitor preferences, may switch to different oral supplement if intake of Glucerna remains low  3) Continue with Glutasolve, multivitamin, and folic acid daily  4) Monitor BMs, if constipation persists consider increasing bowel regimen, encourage intake of stewed prunes and prune juice to alleviate constipation       Monitoring and Evaluation:   Continue to monitor nutritional intake, tolerance to diet prescription, weights, labs, skin integrity      RD remains available upon request and will follow up per protocol    Sandra Abbott, Dietetic Intern - pager #[phone] Nutrition Follow Up Note  Patient seen for: malnutrition follow up    Chart reviewed, events noted.    Per chart, "This is a 72 year old male with multiple myeloma admitted for an autologous pbsct with high dose Melphalan prep regimen." Day +1    Sources: EMR, patient    Diet Order:   Diet, Regular:   GlutaSolve(Glutamine) 15gm pkg     Qty per Day:  1  Supplement Feeding Modality:  Oral  Glucerna Shake Cans or Servings Per Day:  1       Frequency:  Daily (10-25-)    - Is current order appropriate/adequate? [x] Yes  []  No:     - PO intake :   [] >75%  Adequate    [] 50-75%  Fair       [x] <50%  Poor   Pt noted he eats ~50% of the plate, sometimes less or more depending on appetite at the time of meal.    - Nutrition-related concerns:  - Appetite has decreased since last visit (10/25), though pt has "no complaints" about the food. Eating between 25-75% per flowsheet, patient confirmed. Noted no difficulty chewing/swallowing but sometimes has pain in his mouth, further decreasing appetite  - Doesn't like Glucerna supplement much, per flowsheet has drank 25-75% x 3 days. Wants to try to drink it again today before trialing a different supplement. Does not want to add more Glucerna  - Hypokalemic, s/p repletion 10/28, magnesium trending low, received mg sulfate 10/28  -DM2, latest A1C 6.8% (good control PTA), POCTs controlled, insulin regimen (ISS lispro)    GI:  Last BM 10/23.   Bowel Regimen? [x] Yes - pt on MiraLax and Senna, consider increasing dosages. Denies N/V.  Pt has been eating stewed prunes, believes they aren't helping. Doesn't want more prunes, amenable to prune juice with breakfast to alleviate constipation.    Weights:   Daily Weight in k.2 (10-), Weight in k.1 (10-26), Weight in k.7 (10-25)  Dosing wt 74.9 kG (10/24)    Nutritionally Pertinent MEDICATIONS  (STANDING):  ativan  Mg sulfate  NaCl  folic acid  sliding scale insulin  multivitamin  protonix  KCl  senna  ursodiol Capsule  Reglan    Pertinent Labs: 10-28 @ 07:21:  BUN 27<H>, <H>, K+ 3.4<L>, eGFR 59<L>    A1C with Estimated Average Glucose Result: 6.8 % (10-25-22 @ 07:26)    Finger Sticks:  POCT Blood Glucose.: 122 mg/dL (10-28 @ 08:17)  POCT Blood Glucose.: 156 mg/dL (10-27 @ 22:04)  POCT Blood Glucose.: 173 mg/dL (10-27 @ 17:42)  POCT Blood Glucose.: 183 mg/dL (10-27 @ 12:13)      Skin per nursing documentation: no pressure injuries  Edema per flowsheet: (+1) b/l foot    Estimated Needs: based on   [x] no change since previous assessment  -Energy: 6757-1616 kcal/kg (30-35 kcal/day)  -Protein: 112.2-134.64 g/kg (1.5-1.8 g/day)      Previous Nutrition Diagnosis: Moderate chronic malnutrition  Nutrition Diagnosis is: [x] ongoing - being addressed with po intake, food preferences, oral supplements      Nutrition Care Plan:  [x] In Progress  [] Achieved  [] Not applicable    Nutrition Interventions:  - Monitor protein supplement preferences, RD available to speak with pt about other options     Education Provided:       [] Yes:  [x] No:     Recommendations:      1) Continue with regular diet as tolerated, encourage protein-rich foods, maximize preferences  2) Continue with Glucerna supplement, monitor preferences, trial [employer] glucose support 1x  3) Continue with Glutasolve, multivitamin, and folic acid daily  4) Monitor BMs, if constipation persists consider increasing bowel regimen, encourage intake of stewed prunes and prune juice to alleviate constipation       Monitoring and Evaluation:   Continue to monitor nutritional intake, tolerance to diet prescription, weights, labs, skin integrity      RD remains available upon request and will follow up per protocol    Sandra Abbott, Dietetic Intern - pager #[phone] Nutrition Follow Up Note  Patient seen for: malnutrition follow up    Chart reviewed, events noted.    Per chart, "This is a 72 year old male with multiple myeloma admitted for an autologous pbsct with high dose Melphalan prep regimen." Day +1    Sources: EMR, patient    Diet Order:   Diet, Regular:   GlutaSolve(Glutamine) 15gm pkg     Qty per Day:  1  Supplement Feeding Modality:  Oral  Glucerna Shake Cans or Servings Per Day:  1       Frequency:  Daily (10-25-)    - Is current order appropriate/adequate? [x] Yes  []  No:     - PO intake :   [] >75%  Adequate    [] 50-75%  Fair       [x] <50%  Poor   Pt noted he eats ~50% of the plate, sometimes less or more depending on appetite at the time of meal.    - Nutrition-related concerns:  - Appetite has decreased since last visit (10/25), though pt has "no complaints" about the food. Eating between 25-75% per flowsheet, patient confirmed. Noted he sometimes has pain in his mouth, further decreasing appetite, spoke about soft foods, food preferences obtained.  - Doesn't like Glucerna supplement much, per flowsheet has drank 25-75% x 3 days. Add [employer] glucose support 2x/day, consider d/c'ing Glucerna.  - Hypokalemic, s/p repletion 10/28, magnesium trending low, received mg sulfate 10/28  -DM2, latest A1C 6.8% (good control PTA), POCTs controlled, insulin regimen (ISS lispro)    GI:  Last BM 10/23.   Bowel Regimen? [x] Yes - pt on MiraLax and Senna, consider increasing dosages. Denies N/V.  Pt has been eating stewed prunes, believes they aren't helping. Doesn't want more prunes, amenable to prune juice with breakfast to alleviate constipation.    Weights:   Daily Weight in k.2 (10-), Weight in k.1 (10-26), Weight in k.7 (10-25)  Dosing wt 74.9 kG (10/24)    Nutritionally Pertinent MEDICATIONS  (STANDING):  ativan  Mg sulfate  NaCl  folic acid  sliding scale insulin  multivitamin  protonix  KCl  senna  ursodiol Capsule  Reglan    Pertinent Labs: 10-28 @ 07:21:  BUN 27<H>, <H>, K+ 3.4<L>, eGFR 59<L>    A1C with Estimated Average Glucose Result: 6.8 % (10-25-22 @ 07:26)    Finger Sticks:  POCT Blood Glucose.: 122 mg/dL (10-28 @ 08:17)  POCT Blood Glucose.: 156 mg/dL (10-27 @ 22:04)  POCT Blood Glucose.: 173 mg/dL (10-27 @ 17:42)  POCT Blood Glucose.: 183 mg/dL (10-27 @ 12:13)      Skin per nursing documentation: no pressure injuries  Edema per flowsheet: (+1) b/l foot    Estimated Needs: based on   [x] no change since previous assessment  -Energy: 1348-8229 kcal/kg (30-35 kcal/day)  -Protein: 112.2-134.64 g/kg (1.5-1.8 g/day)      Previous Nutrition Diagnosis: Moderate chronic malnutrition  Nutrition Diagnosis is: [x] ongoing - being addressed with po intake, food preferences, oral supplements      Nutrition Care Plan:  [x] In Progress  [] Achieved  [] Not applicable    Nutrition Interventions:  - Monitor protein supplement preferences, RD available to speak with pt about other options     Education Provided:       [] Yes:  [x] No:     Recommendations:      1) Continue with regular diet as tolerated, encourage protein-rich foods, maximize preferences  2) Continue with Glucerna supplement, add [employer] glucose support 2x/day to optimize glucose intake  3) Continue with Glutasolve, multivitamin, and folic acid daily  4) Monitor BMs, if constipation persists consider increasing bowel regimen, encourage intake of stewed prunes and prune juice to alleviate constipation       Monitoring and Evaluation:   Continue to monitor nutritional intake, tolerance to diet prescription, weights, labs, skin integrity      RD remains available upon request and will follow up per protocol    Sandra Abbott, Dietetic Intern - pager #[phone] Nutrition Follow Up Note  Patient seen for: malnutrition follow up    Chart reviewed, events noted.    Per chart, "This is a 72 year old male with multiple myeloma admitted for an autologous pbsct with high dose Melphalan prep regimen." Day +1    Sources: EMR, patient    Diet Order:   Diet, Regular:   GlutaSolve(Glutamine) 15gm pkg     Qty per Day:  1  Supplement Feeding Modality:  Oral  Glucerna Shake Cans or Servings Per Day:  1       Frequency:  Daily (10-25-)    - Is current order appropriate/adequate? [x] Yes  []  No:     - PO intake :   [] >75%  Adequate    [] 50-75%  Fair       [x] <50%  Poor   Pt noted he eats ~50% of the plate, sometimes less or more depending on appetite at the time of meal.    - Nutrition-related concerns:  - Appetite has decreased since last visit (10/25), though pt has "no complaints" about the food. Eating between 25-75% per flowsheet, patient confirmed. Noted he sometimes has pain in his mouth, further decreasing appetite, spoke about soft foods, food preferences obtained.  - Doesn't like Glucerna supplement much, per flowsheet has drank 25-75% x 3 days. Add [employer] glucose support 2x/day, consider d/c'ing Glucerna.  - Hypokalemic, s/p repletion 10/28, magnesium trending low, received mg sulfate 10/28  -DM2, latest A1C 6.8% (good control PTA), POCTs controlled, insulin regimen (ISS lispro)    GI:  Last BM 10/23.   Bowel Regimen? [x] Yes - pt on MiraLax and Senna, consider increasing dosages. Denies N/V.  Pt has been eating stewed prunes, believes they aren't helping. Doesn't want more prunes, amenable to prune juice with breakfast to alleviate constipation.    Weights:   Daily Weight in k.2 (10-), Weight in k.1 (10-26), Weight in k.7 (10-25)  Dosing wt 74.9 kG (10/24)    Nutritionally Pertinent MEDICATIONS  (STANDING):  ativan  Mg sulfate  NaCl  folic acid  sliding scale insulin  multivitamin  protonix  KCl  senna  ursodiol Capsule  Reglan    Pertinent Labs: 10-28 @ 07:21:  BUN 27<H>, <H>, K+ 3.4<L>, eGFR 59<L>    A1C with Estimated Average Glucose Result: 6.8 % (10-25-22 @ 07:26)    Finger Sticks:  POCT Blood Glucose.: 122 mg/dL (10-28 @ 08:17)  POCT Blood Glucose.: 156 mg/dL (10-27 @ 22:04)  POCT Blood Glucose.: 173 mg/dL (10-27 @ 17:42)  POCT Blood Glucose.: 183 mg/dL (10-27 @ 12:13)      Skin per nursing documentation: no pressure injuries  Edema per flowsheet: (+1) b/l foot    Estimated Needs: based on   [x] no change since previous assessment  -Energy: 2325-4737 kcal/kg (30-35 kcal/day)  -Protein: 112.2-134.64 g/kg (1.5-1.8 g/day)      Previous Nutrition Diagnosis: Moderate chronic malnutrition  Nutrition Diagnosis is: [x] ongoing - being addressed with po intake, food preferences, oral supplements      Nutrition Care Plan:  [x] In Progress  [] Achieved  [] Not applicable    Nutrition Interventions:  - Monitor protein supplement preferences, RD available to speak with pt about other options     Education Provided:       [] Yes:  [x] No:     Recommendations:      1) Continue with regular diet as tolerated, encourage protein-rich foods, maximize preferences  2) Continue with Glucerna supplement, add [employer] glucose support 2x/day to optimize po intake  3) Continue with Glutasolve, multivitamin, and folic acid daily  4) Monitor BMs, if constipation persists consider increasing bowel regimen, encourage intake of stewed prunes and prune juice to alleviate constipation       Monitoring and Evaluation:   Continue to monitor nutritional intake, tolerance to diet prescription, weights, labs, skin integrity      RD remains available upon request and will follow up per protocol    Sandra Abbott, Dietetic Intern - pager #[phone]

## 2022-10-28 NOTE — PROGRESS NOTE ADULT - SUBJECTIVE AND OBJECTIVE BOX
[facility] Transplant Team                                                      Critical / Counseling Time Provided: 30 minutes                                                                                                                                                        Chief Complaint: Autologous peripheral blood stem cell transplant with high dose Melphalan prep regimen for treatment of multiple myeloma    S: Patient seen and examined with [facility] Transplant Team:   No complaints today   All ROS negative - discussed with daughter Brenda       O: Vitals:   Vital Signs Last 24 Hrs  T(C): 36.7 (28 Oct 2022 05:20), Max: 36.7 (28 Oct 2022 05:20)  T(F): 98.1 (28 Oct 2022 05:20), Max: 98.1 (28 Oct 2022 05:20)  HR: 101 (28 Oct 2022 05:20) (86 - 111)  BP: 124/63 (28 Oct 2022 05:20) (120/66 - 137/64)  BP(mean): --  RR: 19 (28 Oct 2022 05:20) (18 - 19)  SpO2: 96% (28 Oct 2022 05:20) (96% - 100%)    Parameters below as of 28 Oct 2022 05:20  Patient On (Oxygen Delivery Method): room air        Admit weight: 74.9kg   Daily     Daily Weight in k.2 (27 Oct 2022 10:00)    Intake / Output:   10-27 @ 07:01  -  10-28 @ 07:00  --------------------------------------------------------  IN: 5727.6 mL / OUT: 3955 mL / NET: 1772.6 mL          PE:   Oropharynx: no erythema or ulcerations  Oral Mucositis:                 -                                       Grade: n/a  CVS: S1, S2 RRR   Lungs: CTA throughout bilaterally   Abdomen: + BS x 4, soft, NT, ND   Extremities: no edema  Gastric Mucositis:      -                                            Grade: n/a  Intestinal Mucositis:    -                                          Grade: n/a  Skin: no rash   TLC: CDI   Neuro: A&Ox3   Pain: 0              Cultures:         Radiology:       Meds:   Antimicrobials:   acyclovir   Oral Tab/Cap 400 milliGRAM(s) Oral every 8 hours  clotrimazole Lozenge 1 Lozenge Oral five times a day  fluconAZOLE   Tablet 400 milliGRAM(s) Oral daily      Heme / Onc:   heparin  Infusion 312 Unit(s)/Hr IV Continuous <Continuous>      GI:  pantoprazole    Tablet 40 milliGRAM(s) Oral before breakfast  polyethylene glycol 3350 17 Gram(s) Oral daily PRN  senna 2 Tablet(s) Oral at bedtime  sodium bicarbonate Mouth Rinse 10 milliLiter(s) Swish and Spit five times a day  ursodiol Capsule 300 milliGRAM(s) Oral two times a day      Cardiovascular:   amLODIPine   Tablet 10 milliGRAM(s) Oral daily  hydrALAZINE 25 milliGRAM(s) Oral three times a day  tamsulosin 0.4 milliGRAM(s) Oral at bedtime      Immunologic:   filgrastim-sndz (ZARXIO) Injectable 480 MICROGram(s) SubCutaneous every 24 hours      Other medications:   acetaminophen     Tablet .. 650 milliGRAM(s) Oral every 6 hours  benzonatate 100 milliGRAM(s) Oral three times a day  Biotene Dry Mouth Oral Rinse 5 milliLiter(s) Swish and Spit five times a day  chlorhexidine 4% Liquid 1 Application(s) Topical <User Schedule>  dextrose 5%. 1000 milliLiter(s) IV Continuous <Continuous>  dextrose 5%. 1000 milliLiter(s) IV Continuous <Continuous>  dextrose 50% Injectable 25 Gram(s) IV Push once  dextrose 50% Injectable 12.5 Gram(s) IV Push once  dextrose 50% Injectable 25 Gram(s) IV Push once  finasteride 5 milliGRAM(s) Oral daily  folic acid 1 milliGRAM(s) Oral daily  glucagon  Injectable 1 milliGRAM(s) IntraMuscular once  insulin lispro (ADMELOG) corrective regimen sliding scale   SubCutaneous three times a day before meals  insulin lispro (ADMELOG) corrective regimen sliding scale   SubCutaneous at bedtime  lidocaine/prilocaine Cream 1 Application(s) Topical daily  loratadine 10 milliGRAM(s) Oral daily  LORazepam   Injectable 1 milliGRAM(s) IV Push every 24 hours  multivitamin 1 Tablet(s) Oral daily  nystatin Powder 1 Application(s) Topical three times a day  palifermin Injectable  (eMAR) 4440 MICROGram(s) IV Push every 24 hours  sodium chloride 0.45% 1000 milliLiter(s) IV Continuous <Continuous>  sodium chloride 0.9%. 1000 milliLiter(s) IV Continuous <Continuous>  sodium chloride 0.9%. 1000 milliLiter(s) IV Continuous <Continuous>      PRN:   acetaminophen     Tablet .. 650 milliGRAM(s) Oral every 6 hours PRN  dextrose Oral Gel 15 Gram(s) Oral once PRN  LORazepam   Injectable 1 milliGRAM(s) IV Push every 6 hours PRN  metoclopramide Injectable 10 milliGRAM(s) IV Push every 6 hours PRN  ondansetron Injectable 8 milliGRAM(s) IV Push every 8 hours PRN  polyethylene glycol 3350 17 Gram(s) Oral daily PRN  sodium chloride 0.9% lock flush 10 milliLiter(s) IV Push every 1 hour PRN      A/P: 72 year old male with multiple myeloma diagnosed 19 s/p RVD x 6 admitted for autologous pbsct with high dose melphalan prep regimen (dose reduced to 70mg / m2 for age)  Day +1  10/25- melphalan ; continue melphalan hydration for 24 hours post infusion of last dose. Strict I&O, daily weights, prn diuresis   10/27- HPC transplant today; continue transplant hydration for 24 hours post infusion of cells     1. Infectious Disease:   acyclovir   Oral Tab/Cap 400 milliGRAM(s) Oral every 8 hours  clotrimazole Lozenge 1 Lozenge Oral five times a day  fluconAZOLE   Tablet 400 milliGRAM(s) Oral daily    2. VOD Prophylaxis: Actigall, Glutamine, Heparin (dosed at 100 units / kg / day)     3. GI Prophylaxis:    pantoprazole    Tablet 40 milliGRAM(s) Oral before breakfast    4. Mouthcare - NS / NaHCO3 rinses, Mycelex, Biotene; Skin care     5. GVHD prophylaxis - n/a     6. Transfuse & replete electrolytes prn   KCl 20mEq IV q 2 hours x 3 doses     7. IV hydration, daily weights, strict I&O, prn diuresis   Lasix 40mg IV x 1 0800  Lasix 40mg IV x 1 1600    8. PO intake as tolerated, nutrition follow up as needed, MVI, folic acid     9. Antiemetics, anti-diarrhea medications:   LORazepam   Injectable 1 milliGRAM(s) IV Push every 24 hours  aprepitant 80 milliGRAM(s) Oral every 24 hours  LORazepam   Injectable 1 milliGRAM(s) IV Push every 6 hours PRN  metoclopramide Injectable 10 milliGRAM(s) IV Push every 6 hours PRN  ondansetron 8mg IV q 8 hours prn     10. OOB as tolerated, physical therapy consult if needed     11. Monitor coags / fibrinogen 2x week, vitamin K as needed     12. Monitor closely for clinical changes, monitor for fevers     13. Emotional support provided, plan of care discussed and questions addressed     14. Patient education done regarding chemotherapy prep, plan of care, restrictions and discharge planning     15. Continue regular social work input     I have written the above note for Dr. Persaud who performed service with me in the room.   Shayla Lundberg NP-C ([phone])    I have seen and examined patient with NP, I agree with above note as scribed.                      [facility] Transplant Team                                                      Critical / Counseling Time Provided: 30 minutes                                                                                                                                                        Chief Complaint: Autologous peripheral blood stem cell transplant with high dose Melphalan prep regimen for treatment of multiple myeloma    S: Patient seen and examined with [facility] Transplant Team:     Constipated, no BM x 5 days     All ROS negative - discussed with daughter Brenda       O: Vitals:   Vital Signs Last 24 Hrs  T(C): 36.7 (28 Oct 2022 05:20), Max: 36.7 (28 Oct 2022 05:20)  T(F): 98.1 (28 Oct 2022 05:20), Max: 98.1 (28 Oct 2022 05:20)  HR: 101 (28 Oct 2022 05:20) (86 - 111)  BP: 124/63 (28 Oct 2022 05:20) (120/66 - 137/64)  BP(mean): --  RR: 19 (28 Oct 2022 05:20) (18 - 19)  SpO2: 96% (28 Oct 2022 05:20) (96% - 100%)    Parameters below as of 28 Oct 2022 05:20  Patient On (Oxygen Delivery Method): room air      Admit weight: 74.9kg   Daily     Daily Weight in k.2 (27 Oct 2022 10:00)    Intake / Output:   10-27 @ 07:01  -  10-28 @ 07:00  --------------------------------------------------------  IN: 5727.6 mL / OUT: 3955 mL / NET: 1772.6 mL      PE:   Oropharynx: no erythema or ulcerations  Oral Mucositis:                 -                                       Grade: n/a  CVS: S1, S2 RRR   Lungs: CTA throughout bilaterally   Abdomen: + BS x 4, soft, NT, ND   Extremities: no edema  Gastric Mucositis:      -                                            Grade: n/a  Intestinal Mucositis:    -                                          Grade: n/a  Skin: no rash   TLC: CDI   Neuro: A&Ox3   Pain: 0      LABS:                          10.7   29.87 )-----------( 210      ( 28 Oct 2022 07:19 )             32.4         Mean Cell Volume : 79.2 fl  Mean Cell Hemoglobin : 26.2 pg  Mean Cell Hemoglobin Concentration : 33.0 gm/dL  Auto Neutrophil # : 29.87 K/uL  Auto Lymphocyte # : 0.00 K/uL  Auto Monocyte # : 0.00 K/uL  Auto Eosinophil # : 0.00 K/uL  Auto Basophil # : 0.00 K/uL  Auto Neutrophil % : 100.0 %  Auto Lymphocyte % : 0.0 %  Auto Monocyte % : 0.0 %  Auto Eosinophil % : 0.0 %  Auto Basophil % : 0.0 %      10-28    138  |  97  |  27<H>  ----------------------------<  115<H>  3.4<L>   |  30  |  1.28    Ca    8.2<L>      28 Oct 2022 07:21  Phos  2.8     10-28  Mg     1.8     10-28    TPro  6.1  /  Alb  3.5  /  TBili  1.0  /  DBili  x   /  AST  17  /  ALT  10  /  AlkPhos  90  10-28      PT/INR - ( 27 Oct 2022 07:01 )   PT: 12.4 sec;   INR: 1.07 ratio         PTT - ( 27 Oct 2022 07:01 )  PTT:35.2 sec      Uric Acid --        Cultures:     COVID- PCR . (10.21.22 @ 14:57)    COVID-19 PCR: NotDetec      Radiology:     < from: IR Procedure (10.24.22 @ 13:07) >  Impression: Successful placement of a right internal jugular vein   triple-lumen catheter.      Meds:   Antimicrobials:   acyclovir   Oral Tab/Cap 400 milliGRAM(s) Oral every 8 hours  clotrimazole Lozenge 1 Lozenge Oral five times a day  fluconAZOLE   Tablet 400 milliGRAM(s) Oral daily      Heme / Onc:   heparin  Infusion 312 Unit(s)/Hr IV Continuous <Continuous>      GI:  pantoprazole    Tablet 40 milliGRAM(s) Oral before breakfast  polyethylene glycol 3350 17 Gram(s) Oral daily PRN  senna 2 Tablet(s) Oral at bedtime  sodium bicarbonate Mouth Rinse 10 milliLiter(s) Swish and Spit five times a day  ursodiol Capsule 300 milliGRAM(s) Oral two times a day      Cardiovascular:   amLODIPine   Tablet 10 milliGRAM(s) Oral daily  hydrALAZINE 25 milliGRAM(s) Oral three times a day  tamsulosin 0.4 milliGRAM(s) Oral at bedtime      Immunologic:   filgrastim-sndz (ZARXIO) Injectable 480 MICROGram(s) SubCutaneous every 24 hours      Other medications:   acetaminophen     Tablet .. 650 milliGRAM(s) Oral every 6 hours  benzonatate 100 milliGRAM(s) Oral three times a day  Biotene Dry Mouth Oral Rinse 5 milliLiter(s) Swish and Spit five times a day  chlorhexidine 4% Liquid 1 Application(s) Topical <User Schedule>  dextrose 5%. 1000 milliLiter(s) IV Continuous <Continuous>  dextrose 5%. 1000 milliLiter(s) IV Continuous <Continuous>  dextrose 50% Injectable 25 Gram(s) IV Push once  dextrose 50% Injectable 12.5 Gram(s) IV Push once  dextrose 50% Injectable 25 Gram(s) IV Push once  finasteride 5 milliGRAM(s) Oral daily  folic acid 1 milliGRAM(s) Oral daily  glucagon  Injectable 1 milliGRAM(s) IntraMuscular once  insulin lispro (ADMELOG) corrective regimen sliding scale   SubCutaneous three times a day before meals  insulin lispro (ADMELOG) corrective regimen sliding scale   SubCutaneous at bedtime  lidocaine/prilocaine Cream 1 Application(s) Topical daily  loratadine 10 milliGRAM(s) Oral daily  LORazepam   Injectable 1 milliGRAM(s) IV Push every 24 hours  multivitamin 1 Tablet(s) Oral daily  nystatin Powder 1 Application(s) Topical three times a day  palifermin Injectable  (eMAR) 4440 MICROGram(s) IV Push every 24 hours  sodium chloride 0.45% 1000 milliLiter(s) IV Continuous <Continuous>  sodium chloride 0.9%. 1000 milliLiter(s) IV Continuous <Continuous>  sodium chloride 0.9%. 1000 milliLiter(s) IV Continuous <Continuous>      PRN:   acetaminophen     Tablet .. 650 milliGRAM(s) Oral every 6 hours PRN  dextrose Oral Gel 15 Gram(s) Oral once PRN  LORazepam   Injectable 1 milliGRAM(s) IV Push every 6 hours PRN  metoclopramide Injectable 10 milliGRAM(s) IV Push every 6 hours PRN  ondansetron Injectable 8 milliGRAM(s) IV Push every 8 hours PRN  polyethylene glycol 3350 17 Gram(s) Oral daily PRN  sodium chloride 0.9% lock flush 10 milliLiter(s) IV Push every 1 hour PRN      A/P: 72 year old male with multiple myeloma diagnosed 19 s/p RVD x 6 admitted for autologous pbsct with high dose melphalan prep regimen (dose reduced to 70mg / m2 for age)  Day +1  10/25- melphalan ; continue melphalan hydration for 24 hours post infusion of last dose. Strict I&O, daily weights, prn diuresis   10/27- HPC transplant today; continue transplant hydration for 24 hours post infusion of cells     1. Infectious Disease:   acyclovir   Oral Tab/Cap 400 milliGRAM(s) Oral every 8 hours  clotrimazole Lozenge 1 Lozenge Oral five times a day  fluconAZOLE   Tablet 400 milliGRAM(s) Oral daily    2. VOD Prophylaxis: Actigall, Glutamine, Heparin (dosed at 100 units / kg / day)     3. GI Prophylaxis:    pantoprazole    Tablet 40 milliGRAM(s) Oral before breakfast    4. Mouthcare - NS / NaHCO3 rinses, Mycelex, Biotene; Skin care     5. GVHD prophylaxis - n/a     6. Transfuse & replete electrolytes prn     hypokalemia: KCl 20mEq IV q 2 hours x 3 doses   Hypomagnesemia: Mg 1 g IVPB x1    7. IV hydration, daily weights, strict I&O, prn diuresis     Lasix 40mg IV x 1 1800 yesterday    8. PO intake as tolerated, nutrition follow up as needed, MVI, folic acid     9. Antiemetics, anti-diarrhea medications:   LORazepam   Injectable 1 milliGRAM(s) IV Push every 24 hours  aprepitant 80 milliGRAM(s) Oral every 24 hours  LORazepam   Injectable 1 milliGRAM(s) IV Push every 6 hours PRN  metoclopramide Injectable 10 milliGRAM(s) IV Push every 6 hours PRN  ondansetron 8mg IV q 8 hours prn     10. OOB as tolerated, physical therapy consult if needed     11. Monitor coags / fibrinogen 2x week, vitamin K as needed     12. Monitor closely for clinical changes, monitor for fevers     13. Emotional support provided, plan of care discussed and questions addressed     14. Patient education done regarding chemotherapy prep, plan of care, restrictions and discharge planning     15. Continue regular social work input     I have written the above note for Dr. Persaud who performed service with me in the room.   Shayla SUMMERS ([phone])    I have seen and examined patient with NP, I agree with above note as scribed.                      [facility] Transplant Team                                                      Critical / Counseling Time Provided: 30 minutes                                                                                                                                                        Chief Complaint: Autologous peripheral blood stem cell transplant with high dose Melphalan prep regimen for treatment of multiple myeloma    S: Patient seen and examined with [facility] Transplant Team:     Constipated, no BM x 5 days     All ROS negative - discussed with daughter Brenda      O: Vitals:   Vital Signs Last 24 Hrs  T(C): 36.7 (28 Oct 2022 05:20), Max: 36.7 (28 Oct 2022 05:20)  T(F): 98.1 (28 Oct 2022 05:20), Max: 98.1 (28 Oct 2022 05:20)  HR: 101 (28 Oct 2022 05:20) (86 - 111)  BP: 124/63 (28 Oct 2022 05:20) (120/66 - 137/64)  BP(mean): --  RR: 19 (28 Oct 2022 05:20) (18 - 19)  SpO2: 96% (28 Oct 2022 05:20) (96% - 100%)    Parameters below as of 28 Oct 2022 05:20  Patient On (Oxygen Delivery Method): room air      Admit weight: 74.9kg   Daily     Daily Weight in k.2 (27 Oct 2022 10:00)    Intake / Output:   10-27 @ 07:01  -  10-28 @ 07:00  --------------------------------------------------------  IN: 5727.6 mL / OUT: 3955 mL / NET: 1772.6 mL      PE:   Oropharynx: no erythema or ulcerations  Oral Mucositis:                 -                                       Grade: n/a  CVS: S1, S2 RRR   Lungs: CTA throughout bilaterally   Abdomen: + BS x 4, soft, NT, ND   Extremities: no edema  Gastric Mucositis:      -                                            Grade: n/a  Intestinal Mucositis:    -                                          Grade: n/a  Skin: no rash   TLC: CDI   Neuro: A&Ox3   Pain: 0      LABS:                          10.7   29.87 )-----------( 210      ( 28 Oct 2022 07:19 )             32.4         Mean Cell Volume : 79.2 fl  Mean Cell Hemoglobin : 26.2 pg  Mean Cell Hemoglobin Concentration : 33.0 gm/dL  Auto Neutrophil # : 29.87 K/uL  Auto Lymphocyte # : 0.00 K/uL  Auto Monocyte # : 0.00 K/uL  Auto Eosinophil # : 0.00 K/uL  Auto Basophil # : 0.00 K/uL  Auto Neutrophil % : 100.0 %  Auto Lymphocyte % : 0.0 %  Auto Monocyte % : 0.0 %  Auto Eosinophil % : 0.0 %  Auto Basophil % : 0.0 %      10-28    138  |  97  |  27<H>  ----------------------------<  115<H>  3.4<L>   |  30  |  1.28    Ca    8.2<L>      28 Oct 2022 07:21  Phos  2.8     10-28  Mg     1.8     10-28    TPro  6.1  /  Alb  3.5  /  TBili  1.0  /  DBili  x   /  AST  17  /  ALT  10  /  AlkPhos  90  10-28      PT/INR - ( 27 Oct 2022 07:01 )   PT: 12.4 sec;   INR: 1.07 ratio         PTT - ( 27 Oct 2022 07:01 )  PTT:35.2 sec      Uric Acid --        Cultures:     COVID- PCR . (10.21.22 @ 14:57)    COVID-19 PCR: NotDetec      Radiology:     < from: IR Procedure (10.24.22 @ 13:07) >  Impression: Successful placement of a right internal jugular vein   triple-lumen catheter.      Meds:   Antimicrobials:   acyclovir   Oral Tab/Cap 400 milliGRAM(s) Oral every 8 hours  clotrimazole Lozenge 1 Lozenge Oral five times a day  fluconAZOLE   Tablet 400 milliGRAM(s) Oral daily      Heme / Onc:   heparin  Infusion 312 Unit(s)/Hr IV Continuous <Continuous>      GI:  pantoprazole    Tablet 40 milliGRAM(s) Oral before breakfast  polyethylene glycol 3350 17 Gram(s) Oral daily PRN  senna 2 Tablet(s) Oral at bedtime  sodium bicarbonate Mouth Rinse 10 milliLiter(s) Swish and Spit five times a day  ursodiol Capsule 300 milliGRAM(s) Oral two times a day      Cardiovascular:   amLODIPine   Tablet 10 milliGRAM(s) Oral daily  hydrALAZINE 25 milliGRAM(s) Oral three times a day  tamsulosin 0.4 milliGRAM(s) Oral at bedtime      Immunologic:   filgrastim-sndz (ZARXIO) Injectable 480 MICROGram(s) SubCutaneous every 24 hours      Other medications:   acetaminophen     Tablet .. 650 milliGRAM(s) Oral every 6 hours  benzonatate 100 milliGRAM(s) Oral three times a day  Biotene Dry Mouth Oral Rinse 5 milliLiter(s) Swish and Spit five times a day  chlorhexidine 4% Liquid 1 Application(s) Topical <User Schedule>  dextrose 5%. 1000 milliLiter(s) IV Continuous <Continuous>  dextrose 5%. 1000 milliLiter(s) IV Continuous <Continuous>  dextrose 50% Injectable 25 Gram(s) IV Push once  dextrose 50% Injectable 12.5 Gram(s) IV Push once  dextrose 50% Injectable 25 Gram(s) IV Push once  finasteride 5 milliGRAM(s) Oral daily  folic acid 1 milliGRAM(s) Oral daily  glucagon  Injectable 1 milliGRAM(s) IntraMuscular once  insulin lispro (ADMELOG) corrective regimen sliding scale   SubCutaneous three times a day before meals  insulin lispro (ADMELOG) corrective regimen sliding scale   SubCutaneous at bedtime  lidocaine/prilocaine Cream 1 Application(s) Topical daily  loratadine 10 milliGRAM(s) Oral daily  LORazepam   Injectable 1 milliGRAM(s) IV Push every 24 hours  multivitamin 1 Tablet(s) Oral daily  nystatin Powder 1 Application(s) Topical three times a day  palifermin Injectable  (eMAR) 4440 MICROGram(s) IV Push every 24 hours  sodium chloride 0.45% 1000 milliLiter(s) IV Continuous <Continuous>  sodium chloride 0.9%. 1000 milliLiter(s) IV Continuous <Continuous>  sodium chloride 0.9%. 1000 milliLiter(s) IV Continuous <Continuous>      PRN:   acetaminophen     Tablet .. 650 milliGRAM(s) Oral every 6 hours PRN  dextrose Oral Gel 15 Gram(s) Oral once PRN  LORazepam   Injectable 1 milliGRAM(s) IV Push every 6 hours PRN  metoclopramide Injectable 10 milliGRAM(s) IV Push every 6 hours PRN  ondansetron Injectable 8 milliGRAM(s) IV Push every 8 hours PRN  polyethylene glycol 3350 17 Gram(s) Oral daily PRN  sodium chloride 0.9% lock flush 10 milliLiter(s) IV Push every 1 hour PRN      A/P: 72 year old male with multiple myeloma diagnosed 19 s/p RVD x 6 admitted for autologous pbsct with high dose melphalan prep regimen (dose reduced to 70mg / m2 for age)  Day +1  10/25- melphalan ; continue melphalan hydration for 24 hours post infusion of last dose. Strict I&O, daily weights, prn diuresis   10/27- HPC transplant today; continue transplant hydration for 24 hours post infusion of cells     1. Infectious Disease:   acyclovir   Oral Tab/Cap 400 milliGRAM(s) Oral every 8 hours  clotrimazole Lozenge 1 Lozenge Oral five times a day  fluconAZOLE   Tablet 400 milliGRAM(s) Oral daily    2. VOD Prophylaxis: Actigall, Glutamine, Heparin (dosed at 100 units / kg / day)     3. GI Prophylaxis:    pantoprazole    Tablet 40 milliGRAM(s) Oral before breakfast    4. Mouthcare - NS / NaHCO3 rinses, Mycelex, Biotene; Skin care     5. GVHD prophylaxis - n/a     6. Transfuse & replete electrolytes prn     hypokalemia: KCl 20mEq IV q 2 hours x 3 doses   Hypomagnesemia: Mg 1 g IVPB x1    7. IV hydration, daily weights, strict I&O, prn diuresis     Lasix 40mg IV x 1 1800 yesterday    8. PO intake as tolerated, nutrition follow up as needed, MVI, folic acid     9. Antiemetics, anti-diarrhea medications:   LORazepam   Injectable 1 milliGRAM(s) IV Push every 24 hours  aprepitant 80 milliGRAM(s) Oral every 24 hours  LORazepam   Injectable 1 milliGRAM(s) IV Push every 6 hours PRN  metoclopramide Injectable 10 milliGRAM(s) IV Push every 6 hours PRN  ondansetron 8mg IV q 8 hours prn     10. OOB as tolerated, physical therapy consult if needed     11. Monitor coags / fibrinogen 2x week, vitamin K as needed     12. Monitor closely for clinical changes, monitor for fevers     13. Emotional support provided, plan of care discussed and questions addressed     14. Patient education done regarding chemotherapy prep, plan of care, restrictions and discharge planning     15. Continue regular social work input     I have written the above note for Dr. Persaud who performed service with me in the room.   Shayla SUMMERS ([phone])    I have seen and examined patient with NP, I agree with above note as scribed.                      [facility] Transplant Team                                                      Critical / Counseling Time Provided: 30 minutes                                                                                                                                                        Chief Complaint: Autologous peripheral blood stem cell transplant with high dose Melphalan prep regimen for treatment of multiple myeloma    S: Patient seen and examined with [facility] Transplant Team:     generalized weakness  decreased appetite, trouble swallowing solid food   heart burn last night  upper lip discomfort   Constipated, no BM x 4-5 days   dry cough    All ROS negative       O: Vitals:   Vital Signs Last 24 Hrs  T(C): 36.7 (28 Oct 2022 05:20), Max: 36.7 (28 Oct 2022 05:20)  T(F): 98.1 (28 Oct 2022 05:20), Max: 98.1 (28 Oct 2022 05:20)  HR: 101 (28 Oct 2022 05:20) (86 - 111)  BP: 124/63 (28 Oct 2022 05:20) (120/66 - 137/64)  BP(mean): --  RR: 19 (28 Oct 2022 05:20) (18 - 19)  SpO2: 96% (28 Oct 2022 05:20) (96% - 100%)    Parameters below as of 28 Oct 2022 05:20  Patient On (Oxygen Delivery Method): room air      Admit weight: 74.9kg   Daily     Daily Weight in k.2 (27 Oct 2022 10:00)    Intake / Output:   10-27 @ 07:01  -  10-28 @ 07:00  --------------------------------------------------------  IN: 5727.6 mL / OUT: 3955 mL / NET: 1772.6 mL      PE:   Oropharynx: no erythema or ulcerations  Oral Mucositis:                 -                                       Grade: n/a  CVS: S1, S2 RRR   Lungs: CTA throughout bilaterally   Abdomen: + BS x 4, soft, NT, ND   Extremities: no edema  Gastric Mucositis:      -                                            Grade: n/a  Intestinal Mucositis:    -                                          Grade: n/a  Skin: no rash   TLC: CDI   Neuro: A&Ox3   Pain: 0      LABS:                          10.7   29.87 )-----------( 210      ( 28 Oct 2022 07:19 )             32.4         Mean Cell Volume : 79.2 fl  Mean Cell Hemoglobin : 26.2 pg  Mean Cell Hemoglobin Concentration : 33.0 gm/dL  Auto Neutrophil # : 29.87 K/uL  Auto Lymphocyte # : 0.00 K/uL  Auto Monocyte # : 0.00 K/uL  Auto Eosinophil # : 0.00 K/uL  Auto Basophil # : 0.00 K/uL  Auto Neutrophil % : 100.0 %  Auto Lymphocyte % : 0.0 %  Auto Monocyte % : 0.0 %  Auto Eosinophil % : 0.0 %  Auto Basophil % : 0.0 %      10-28    138  |  97  |  27<H>  ----------------------------<  115<H>  3.4<L>   |  30  |  1.28    Ca    8.2<L>      28 Oct 2022 07:21  Phos  2.8     10-28  Mg     1.8     10-28    TPro  6.1  /  Alb  3.5  /  TBili  1.0  /  DBili  x   /  AST  17  /  ALT  10  /  AlkPhos  90  10-28      PT/INR - ( 27 Oct 2022 07:01 )   PT: 12.4 sec;   INR: 1.07 ratio         PTT - ( 27 Oct 2022 07:01 )  PTT:35.2 sec      Uric Acid --        Cultures:     COVID- PCR . (10.21.22 @ 14:57)    COVID-19 PCR: NotDetec      Radiology:     < from: IR Procedure (10.24.22 @ 13:07) >  Impression: Successful placement of a right internal jugular vein   triple-lumen catheter.      Meds:   Antimicrobials:   acyclovir   Oral Tab/Cap 400 milliGRAM(s) Oral every 8 hours  clotrimazole Lozenge 1 Lozenge Oral five times a day  fluconAZOLE   Tablet 400 milliGRAM(s) Oral daily      Heme / Onc:   heparin  Infusion 312 Unit(s)/Hr IV Continuous <Continuous>      GI:  pantoprazole    Tablet 40 milliGRAM(s) Oral before breakfast  polyethylene glycol 3350 17 Gram(s) Oral daily PRN  senna 2 Tablet(s) Oral at bedtime  sodium bicarbonate Mouth Rinse 10 milliLiter(s) Swish and Spit five times a day  ursodiol Capsule 300 milliGRAM(s) Oral two times a day      Cardiovascular:   amLODIPine   Tablet 10 milliGRAM(s) Oral daily  hydrALAZINE 25 milliGRAM(s) Oral three times a day  tamsulosin 0.4 milliGRAM(s) Oral at bedtime      Immunologic:   filgrastim-sndz (ZARXIO) Injectable 480 MICROGram(s) SubCutaneous every 24 hours      Other medications:   acetaminophen     Tablet .. 650 milliGRAM(s) Oral every 6 hours  benzonatate 100 milliGRAM(s) Oral three times a day  Biotene Dry Mouth Oral Rinse 5 milliLiter(s) Swish and Spit five times a day  chlorhexidine 4% Liquid 1 Application(s) Topical <User Schedule>  dextrose 5%. 1000 milliLiter(s) IV Continuous <Continuous>  dextrose 5%. 1000 milliLiter(s) IV Continuous <Continuous>  dextrose 50% Injectable 25 Gram(s) IV Push once  dextrose 50% Injectable 12.5 Gram(s) IV Push once  dextrose 50% Injectable 25 Gram(s) IV Push once  finasteride 5 milliGRAM(s) Oral daily  folic acid 1 milliGRAM(s) Oral daily  glucagon  Injectable 1 milliGRAM(s) IntraMuscular once  insulin lispro (ADMELOG) corrective regimen sliding scale   SubCutaneous three times a day before meals  insulin lispro (ADMELOG) corrective regimen sliding scale   SubCutaneous at bedtime  lidocaine/prilocaine Cream 1 Application(s) Topical daily  loratadine 10 milliGRAM(s) Oral daily  LORazepam   Injectable 1 milliGRAM(s) IV Push every 24 hours  multivitamin 1 Tablet(s) Oral daily  nystatin Powder 1 Application(s) Topical three times a day  palifermin Injectable  (eMAR) 4440 MICROGram(s) IV Push every 24 hours  sodium chloride 0.45% 1000 milliLiter(s) IV Continuous <Continuous>  sodium chloride 0.9%. 1000 milliLiter(s) IV Continuous <Continuous>  sodium chloride 0.9%. 1000 milliLiter(s) IV Continuous <Continuous>      PRN:   acetaminophen     Tablet .. 650 milliGRAM(s) Oral every 6 hours PRN  dextrose Oral Gel 15 Gram(s) Oral once PRN  LORazepam   Injectable 1 milliGRAM(s) IV Push every 6 hours PRN  metoclopramide Injectable 10 milliGRAM(s) IV Push every 6 hours PRN  ondansetron Injectable 8 milliGRAM(s) IV Push every 8 hours PRN  polyethylene glycol 3350 17 Gram(s) Oral daily PRN  sodium chloride 0.9% lock flush 10 milliLiter(s) IV Push every 1 hour PRN      A/P: 72 year old male with multiple myeloma diagnosed 19 s/p RVD x 6 admitted for autologous pbsct with high dose melphalan prep regimen (dose reduced to 70mg / m2 for age)  Day +1  10/25- melphalan ; continue melphalan hydration for 24 hours post infusion of last dose. Strict I&O, daily weights, prn diuresis   10/27- HPC transplant today; continue transplant hydration for 24 hours post infusion of cells     1. Infectious Disease:   acyclovir   Oral Tab/Cap 400 milliGRAM(s) Oral every 8 hours  clotrimazole Lozenge 1 Lozenge Oral five times a day  fluconAZOLE   Tablet 400 milliGRAM(s) Oral daily    2. VOD Prophylaxis: Actigall, Glutamine, Heparin (dosed at 100 units / kg / day)     3. GI Prophylaxis:    pantoprazole    Tablet 40 milliGRAM(s) Oral before breakfast    4. Mouthcare - NS / NaHCO3 rinses, Mycelex, Biotene; Skin care     5. GVHD prophylaxis - n/a     6. Transfuse & replete electrolytes prn     hypokalemia: KCl 20mEq IV q 2 hours x 3 doses   Hypomagnesemia: Mg 1 g IVPB x1    7. IV hydration, daily weights, strict I&O, prn diuresis     Lasix 40mg IV x 1 1800 yesterday    8. PO intake as tolerated, nutrition follow up as needed, MVI, folic acid     9. Antiemetics, anti-diarrhea medications:   LORazepam   Injectable 1 milliGRAM(s) IV Push every 24 hours  aprepitant 80 milliGRAM(s) Oral every 24 hours  LORazepam   Injectable 1 milliGRAM(s) IV Push every 6 hours PRN  metoclopramide Injectable 10 milliGRAM(s) IV Push every 6 hours PRN  ondansetron 8mg IV q 8 hours prn     10. OOB as tolerated, physical therapy consult if needed     11. Monitor coags / fibrinogen 2x week, vitamin K as needed     12. Monitor closely for clinical changes, monitor for fevers     13. Emotional support provided, plan of care discussed and questions addressed     14. Patient education done regarding chemotherapy prep, plan of care, restrictions and discharge planning     15. Continue regular social work input     I have written the above note for Dr. Persaud who performed service with me in the room.   Shayla Lundberg NP-C ([phone])    I have seen and examined patient with NP, I agree with above note as scribed.                      HPC Transplant Team                                                      Critical / Counseling Time Provided: 30 minutes                                                                                                                                                        Chief Complaint: Autologous peripheral blood stem cell transplant with high dose Melphalan prep regimen for treatment of multiple myeloma    S: Patient seen and examined with HPC Transplant Team:     generalized weakness  decreased appetite, trouble swallowing solid food   heart burn last night  upper lip discomfort   Constipated, no good BM x 4-5 days, small BM 2 days ago  dry cough    All ROS negative       O: Vitals:   Vital Signs Last 24 Hrs  T(C): 36.7 (28 Oct 2022 05:20), Max: 36.7 (28 Oct 2022 05:20)  T(F): 98.1 (28 Oct 2022 05:20), Max: 98.1 (28 Oct 2022 05:20)  HR: 101 (28 Oct 2022 05:20) (86 - 111)  BP: 124/63 (28 Oct 2022 05:20) (120/66 - 137/64)  BP(mean): --  RR: 19 (28 Oct 2022 05:20) (18 - 19)  SpO2: 96% (28 Oct 2022 05:20) (96% - 100%)    Parameters below as of 28 Oct 2022 05:20  Patient On (Oxygen Delivery Method): room air      Admit weight: 74.9kg   Daily   75.8 today  Daily Weight in k.2 (27 Oct 2022 10:00)    Intake / Output:   10-27 @ 07:01  -  10-28 @ 07:00  --------------------------------------------------------  IN: 5727.6 mL / OUT: 3955 mL / NET: 1772.6 mL      PE:   Oropharynx: no erythema or ulcerations  Oral Mucositis:                 -                                       Grade: n/a  CVS: S1, S2 RRR   Lungs: CTA throughout bilaterally   Abdomen: + BS x 4, soft, NT, ND   Extremities: no edema  Gastric Mucositis:      -                                            Grade: n/a  Intestinal Mucositis:    -                                          Grade: n/a  Skin: no rash   TLC: CDI   Neuro: A&Ox3   Pain: 0      LABS:                          10.7   29.87 )-----------( 210      ( 28 Oct 2022 07:19 )             32.4         Mean Cell Volume : 79.2 fl  Mean Cell Hemoglobin : 26.2 pg  Mean Cell Hemoglobin Concentration : 33.0 gm/dL  Auto Neutrophil # : 29.87 K/uL  Auto Lymphocyte # : 0.00 K/uL  Auto Monocyte # : 0.00 K/uL  Auto Eosinophil # : 0.00 K/uL  Auto Basophil # : 0.00 K/uL  Auto Neutrophil % : 100.0 %  Auto Lymphocyte % : 0.0 %  Auto Monocyte % : 0.0 %  Auto Eosinophil % : 0.0 %  Auto Basophil % : 0.0 %      10    138  |  97  |  27<H>  ----------------------------<  115<H>  3.4<L>   |  30  |  1.28    Ca    8.2<L>      28 Oct 2022 07:21  Phos  2.8     10-28  Mg     1.8     10-28    TPro  6.1  /  Alb  3.5  /  TBili  1.0  /  DBili  x   /  AST  17  /  ALT  10  /  AlkPhos  90  10-28      PT/INR - ( 27 Oct 2022 07:01 )   PT: 12.4 sec;   INR: 1.07 ratio         PTT - ( 27 Oct 2022 07:01 )  PTT:35.2 sec      Uric Acid --        Cultures:     COVID-19 PCR . (10.21.22 @ 14:57)    COVID-19 PCR: NotDetec      Radiology:     < from: IR Procedure (10.24.22 @ 13:07) >  Impression: Successful placement of a right internal jugular vein   triple-lumen catheter.      Meds:   Antimicrobials:   acyclovir   Oral Tab/Cap 400 milliGRAM(s) Oral every 8 hours  clotrimazole Lozenge 1 Lozenge Oral five times a day  fluconAZOLE   Tablet 400 milliGRAM(s) Oral daily      Heme / Onc:   heparin  Infusion 312 Unit(s)/Hr IV Continuous <Continuous>      GI:  pantoprazole    Tablet 40 milliGRAM(s) Oral before breakfast  polyethylene glycol 3350 17 Gram(s) Oral daily PRN  senna 2 Tablet(s) Oral at bedtime  sodium bicarbonate Mouth Rinse 10 milliLiter(s) Swish and Spit five times a day  ursodiol Capsule 300 milliGRAM(s) Oral two times a day      Cardiovascular:   amLODIPine   Tablet 10 milliGRAM(s) Oral daily  hydrALAZINE 25 milliGRAM(s) Oral three times a day  tamsulosin 0.4 milliGRAM(s) Oral at bedtime      Immunologic:   filgrastim-sndz (ZARXIO) Injectable 480 MICROGram(s) SubCutaneous every 24 hours      Other medications:   acetaminophen     Tablet .. 650 milliGRAM(s) Oral every 6 hours  benzonatate 100 milliGRAM(s) Oral three times a day  Biotene Dry Mouth Oral Rinse 5 milliLiter(s) Swish and Spit five times a day  chlorhexidine 4% Liquid 1 Application(s) Topical <User Schedule>  dextrose 5%. 1000 milliLiter(s) IV Continuous <Continuous>  dextrose 5%. 1000 milliLiter(s) IV Continuous <Continuous>  dextrose 50% Injectable 25 Gram(s) IV Push once  dextrose 50% Injectable 12.5 Gram(s) IV Push once  dextrose 50% Injectable 25 Gram(s) IV Push once  finasteride 5 milliGRAM(s) Oral daily  folic acid 1 milliGRAM(s) Oral daily  glucagon  Injectable 1 milliGRAM(s) IntraMuscular once  insulin lispro (ADMELOG) corrective regimen sliding scale   SubCutaneous three times a day before meals  insulin lispro (ADMELOG) corrective regimen sliding scale   SubCutaneous at bedtime  lidocaine/prilocaine Cream 1 Application(s) Topical daily  loratadine 10 milliGRAM(s) Oral daily  LORazepam   Injectable 1 milliGRAM(s) IV Push every 24 hours  multivitamin 1 Tablet(s) Oral daily  nystatin Powder 1 Application(s) Topical three times a day  palifermin Injectable  (eMAR) 4440 MICROGram(s) IV Push every 24 hours  sodium chloride 0.45% 1000 milliLiter(s) IV Continuous <Continuous>  sodium chloride 0.9%. 1000 milliLiter(s) IV Continuous <Continuous>  sodium chloride 0.9%. 1000 milliLiter(s) IV Continuous <Continuous>      PRN:   acetaminophen     Tablet .. 650 milliGRAM(s) Oral every 6 hours PRN  dextrose Oral Gel 15 Gram(s) Oral once PRN  LORazepam   Injectable 1 milliGRAM(s) IV Push every 6 hours PRN  metoclopramide Injectable 10 milliGRAM(s) IV Push every 6 hours PRN  ondansetron Injectable 8 milliGRAM(s) IV Push every 8 hours PRN  polyethylene glycol 3350 17 Gram(s) Oral daily PRN  sodium chloride 0.9% lock flush 10 milliLiter(s) IV Push every 1 hour PRN      A/P: 72 year old male with multiple myeloma diagnosed 19 s/p RVD x 6 admitted for autologous pbsct with high dose melphalan prep regimen (dose reduced to 70mg / m2 for age)  Day +1  10/25- melphalan ; continue melphalan hydration for 24 hours post infusion of last dose. Strict I&O, daily weights, prn diuresis   10/27- HPC transplant today; continue transplant hydration for 24 hours post infusion of cells   10/28 weight agin, I>O, lasix 60 mg iv x1; constipated, Lactolose now and PRN    1. Infectious Disease:   acyclovir   Oral Tab/Cap 400 milliGRAM(s) Oral every 8 hours  clotrimazole Lozenge 1 Lozenge Oral five times a day  fluconAZOLE   Tablet 400 milliGRAM(s) Oral daily  Add Cipro if ANC<500, if fever, pancx CXR and switch Cipro to Cefepime       2. VOD Prophylaxis: Actigall, Glutamine, Heparin (dosed at 100 units / kg / day)     3. GI Prophylaxis:    pantoprazole    Tablet 40 milliGRAM(s) Oral before breakfast    4. Mouthcare - NS / NaHCO3 rinses, Mycelex, Biotene; Skin care     5. GVHD prophylaxis - n/a     6. Transfuse & replete electrolytes prn     hypokalemia: KCl 20mEq IV q 2 hours x 3 doses   Hypomagnesemia: Mg 1 g IVPB x1    7. IV hydration, daily weights, strict I&O, prn diuresis     Lasix 60 mg iv x1 today    8. PO intake as tolerated, nutrition follow up as needed, MVI, folic acid     9. Antiemetics, anti-diarrhea medications:   LORazepam   Injectable 1 milliGRAM(s) IV Push every 24 hours  aprepitant 80 milliGRAM(s) Oral every 24 hours  LORazepam   Injectable 1 milliGRAM(s) IV Push every 6 hours PRN  metoclopramide Injectable 10 milliGRAM(s) IV Push every 6 hours PRN  ondansetron 8mg IV q 8 hours prn     10. OOB as tolerated, physical therapy consult if needed     11. Monitor coags / fibrinogen 2x week, vitamin K as needed     12. Monitor closely for clinical changes, monitor for fevers     13. Emotional support provided, plan of care discussed and questions addressed     14. Patient education done regarding chemotherapy prep, plan of care, restrictions and discharge planning     15. Continue regular social work input     I have written the above note for Dr. Persaud who performed service with me in the room.   Shayla Lundberg NP-C ([phone])    I have seen and examined patient with NP, I agree with above note as scribed.

## 2022-10-28 NOTE — PROGRESS NOTE ADULT - CRITICAL CARE ATTENDING COMMENT
72 year old male with multiple myeloma diagnosed 12/20/21 s/p RVD x 6 admitted for autologous pbsct with high dose melphalan prep regimen (dose reduced to 70mg / m2 for age)    Day 0    1. Admit to BMTU   2. 3 hours prior to Melphalan start hydration: D5NS at 200ml /hr and continue 24 hours post Melphalan infusion  3. Day – 3 & day -2 - Melphalan 70mg/m2  IV   4. Strict I&O, daily weights, prn diuresis   5. Day -1 rest day. At 2200 on day – 1, start transplant hydration 1/2NS + 50mEq NaHCO3- (may substitute [id number] if bicarb not available)  6. Day 0 – infuse HPC product. 4 hours after infusion of cells start Zarxio 5 micrograms / kg (actual weight) . Continue through engraftment.   7. On day 0, + 1, + 2-give Kepivance 60micrograms / kg (actual weight).  8. VOD prophylaxis - low dose heparin gtt (dosed at 100 units / kg / day), glutamine supplementation, Actigall BID   9. PCP prophylaxis - Bactrim DS through day -2    10. Antiviral prophylaxis - Acyclovir 400mg po TID to start day -1   11. Antifungal prophylaxis- Diflucan 400 mg po daily.  12. GI prophylaxis - Protonix po QD   13. Antibacterial prophylaxis - when ANC < 500, start Cipro 500mg po BID. If becomes febrile, pan cx, CXR and change Cipro to Cefepime 2g IV q 8 hours. Continue until count recovery  14. Kepivance for prevention of mucositis- days 0, +1, +2  15. Aggressive mouth care and skin care as per protocol..senna prn 72 year old male with multiple myeloma diagnosed 12/20/21 s/p RVD x 6 admitted for autologous pbsct with high dose melphalan prep regimen (dose reduced to 70mg / m2 for age)    Day 1    1. Admit to BMTU   2. 3 hours prior to Melphalan start hydration: D5NS at 200ml /hr and continue 24 hours post Melphalan infusion  3. Day – 3 & day -2 - Melphalan 70mg/m2  IV   4. Strict I&O, daily weights, prn diuresis   5. Day -1 rest day. At 2200 on day – 1, start transplant hydration 1/2NS + 50mEq NaHCO3- (may substitute [id number] if bicarb not available)  6. Day 0 – infuse HPC product. 4 hours after infusion of cells start Zarxio 5 micrograms / kg (actual weight) . Continue through engraftment.   7. On day 0, + 1, + 2-give Kepivance 60micrograms / kg (actual weight).  8. VOD prophylaxis - low dose heparin gtt (dosed at 100 units / kg / day), glutamine supplementation, Actigall BID   9. PCP prophylaxis - Bactrim DS through day -2    10. Antiviral prophylaxis - Acyclovir 400mg po TID to start day -1   11. Antifungal prophylaxis- Diflucan 400 mg po daily.  12. GI prophylaxis - Protonix po QD   13. Antibacterial prophylaxis - when ANC < 500, start Cipro 500mg po BID. If becomes febrile, pan cx, CXR and change Cipro to Cefepime 2g IV q 8 hours. Continue until count recovery  14. Kepivance for prevention of mucositis- days 0, +1, +2  15. Aggressive mouth care and skin care as per protocol..senna, miralax  prn  16. The patient is aware of his diagnosis...he does not want to be reminded of it every day

## 2022-10-29 LAB
ALBUMIN SERPL ELPH-MCNC: 3.4 G/DL — SIGNIFICANT CHANGE UP (ref 3.3–5)
ALP SERPL-CCNC: 105 U/L — SIGNIFICANT CHANGE UP (ref 40–120)
ALT FLD-CCNC: 11 U/L — SIGNIFICANT CHANGE UP (ref 10–45)
ANION GAP SERPL CALC-SCNC: 10 MMOL/L — SIGNIFICANT CHANGE UP (ref 5–17)
ANISOCYTOSIS BLD QL: SLIGHT — SIGNIFICANT CHANGE UP
AST SERPL-CCNC: 14 U/L — SIGNIFICANT CHANGE UP (ref 10–40)
BASOPHILS # BLD AUTO: 0 K/UL — SIGNIFICANT CHANGE UP (ref 0–0.2)
BASOPHILS NFR BLD AUTO: 0 % — SIGNIFICANT CHANGE UP (ref 0–2)
BILIRUB SERPL-MCNC: 0.8 MG/DL — SIGNIFICANT CHANGE UP (ref 0.2–1.2)
BUN SERPL-MCNC: 19 MG/DL — SIGNIFICANT CHANGE UP (ref 7–23)
CALCIUM SERPL-MCNC: 8.1 MG/DL — LOW (ref 8.4–10.5)
CHLORIDE SERPL-SCNC: 100 MMOL/L — SIGNIFICANT CHANGE UP (ref 96–108)
CO2 SERPL-SCNC: 30 MMOL/L — SIGNIFICANT CHANGE UP (ref 22–31)
CREAT SERPL-MCNC: 1.09 MG/DL — SIGNIFICANT CHANGE UP (ref 0.5–1.3)
EGFR: 71 ML/MIN/1.73M2 — SIGNIFICANT CHANGE UP
EOSINOPHIL # BLD AUTO: 0 K/UL — SIGNIFICANT CHANGE UP (ref 0–0.5)
EOSINOPHIL NFR BLD AUTO: 0 % — SIGNIFICANT CHANGE UP (ref 0–6)
GLUCOSE BLDC GLUCOMTR-MCNC: 108 MG/DL — HIGH (ref 70–99)
GLUCOSE BLDC GLUCOMTR-MCNC: 108 MG/DL — HIGH (ref 70–99)
GLUCOSE BLDC GLUCOMTR-MCNC: 117 MG/DL — HIGH (ref 70–99)
GLUCOSE BLDC GLUCOMTR-MCNC: 140 MG/DL — HIGH (ref 70–99)
GLUCOSE SERPL-MCNC: 99 MG/DL — SIGNIFICANT CHANGE UP (ref 70–99)
HCT VFR BLD CALC: 33.9 % — LOW (ref 39–50)
HGB BLD-MCNC: 10.8 G/DL — LOW (ref 13–17)
HYPOCHROMIA BLD QL: SLIGHT — SIGNIFICANT CHANGE UP
LDH SERPL L TO P-CCNC: 220 U/L — SIGNIFICANT CHANGE UP (ref 50–242)
LYMPHOCYTES # BLD AUTO: 0 % — LOW (ref 13–44)
LYMPHOCYTES # BLD AUTO: 0 K/UL — LOW (ref 1–3.3)
MACROCYTES BLD QL: SLIGHT — SIGNIFICANT CHANGE UP
MAGNESIUM SERPL-MCNC: 1.9 MG/DL — SIGNIFICANT CHANGE UP (ref 1.6–2.6)
MANUAL SMEAR VERIFICATION: SIGNIFICANT CHANGE UP
MCHC RBC-ENTMCNC: 26.1 PG — LOW (ref 27–34)
MCHC RBC-ENTMCNC: 31.9 GM/DL — LOW (ref 32–36)
MCV RBC AUTO: 81.9 FL — SIGNIFICANT CHANGE UP (ref 80–100)
MONOCYTES # BLD AUTO: 0 K/UL — SIGNIFICANT CHANGE UP (ref 0–0.9)
MONOCYTES NFR BLD AUTO: 0 % — LOW (ref 2–14)
NEUTROPHILS # BLD AUTO: 22.37 K/UL — HIGH (ref 1.8–7.4)
NEUTROPHILS NFR BLD AUTO: 100 % — HIGH (ref 43–77)
OVALOCYTES BLD QL SMEAR: SLIGHT — SIGNIFICANT CHANGE UP
PHOSPHATE SERPL-MCNC: 2.9 MG/DL — SIGNIFICANT CHANGE UP (ref 2.5–4.5)
PLAT MORPH BLD: NORMAL — SIGNIFICANT CHANGE UP
PLATELET # BLD AUTO: 133 K/UL — LOW (ref 150–400)
POIKILOCYTOSIS BLD QL AUTO: SLIGHT — SIGNIFICANT CHANGE UP
POTASSIUM SERPL-MCNC: 3.7 MMOL/L — SIGNIFICANT CHANGE UP (ref 3.5–5.3)
POTASSIUM SERPL-SCNC: 3.7 MMOL/L — SIGNIFICANT CHANGE UP (ref 3.5–5.3)
PROT SERPL-MCNC: 5.8 G/DL — LOW (ref 6–8.3)
RBC # BLD: 4.14 M/UL — LOW (ref 4.2–5.8)
RBC # FLD: 14.6 % — HIGH (ref 10.3–14.5)
RBC BLD AUTO: ABNORMAL
SODIUM SERPL-SCNC: 140 MMOL/L — SIGNIFICANT CHANGE UP (ref 135–145)
TARGETS BLD QL SMEAR: SIGNIFICANT CHANGE UP
WBC # BLD: 22.37 K/UL — HIGH (ref 3.8–10.5)
WBC # FLD AUTO: 22.37 K/UL — HIGH (ref 3.8–10.5)

## 2022-10-29 PROCEDURE — 99291 CRITICAL CARE FIRST HOUR: CPT

## 2022-10-29 RX ADMIN — Medication 1 LOZENGE: at 07:29

## 2022-10-29 RX ADMIN — Medication 10 MILLILITER(S): at 07:29

## 2022-10-29 RX ADMIN — Medication 10 MILLILITER(S): at 16:28

## 2022-10-29 RX ADMIN — Medication 25 MILLIGRAM(S): at 05:48

## 2022-10-29 RX ADMIN — NYSTATIN CREAM 1 APPLICATION(S): 100000 CREAM TOPICAL at 05:49

## 2022-10-29 RX ADMIN — Medication 10 MILLILITER(S): at 20:42

## 2022-10-29 RX ADMIN — Medication 1 LOZENGE: at 16:28

## 2022-10-29 RX ADMIN — Medication 25 MILLIGRAM(S): at 22:04

## 2022-10-29 RX ADMIN — Medication 400 MILLIGRAM(S): at 05:48

## 2022-10-29 RX ADMIN — Medication 1 LOZENGE: at 11:55

## 2022-10-29 RX ADMIN — PANTOPRAZOLE SODIUM 40 MILLIGRAM(S): 20 TABLET, DELAYED RELEASE ORAL at 05:49

## 2022-10-29 RX ADMIN — NYSTATIN CREAM 1 APPLICATION(S): 100000 CREAM TOPICAL at 13:38

## 2022-10-29 RX ADMIN — Medication 400 MILLIGRAM(S): at 13:36

## 2022-10-29 RX ADMIN — Medication 100 MILLIGRAM(S): at 22:06

## 2022-10-29 RX ADMIN — Medication 5 MILLILITER(S): at 16:31

## 2022-10-29 RX ADMIN — LORATADINE 10 MILLIGRAM(S): 10 TABLET ORAL at 11:54

## 2022-10-29 RX ADMIN — CHLORHEXIDINE GLUCONATE 1 APPLICATION(S): 213 SOLUTION TOPICAL at 07:30

## 2022-10-29 RX ADMIN — Medication 480 MICROGRAM(S): at 13:36

## 2022-10-29 RX ADMIN — FLUCONAZOLE 400 MILLIGRAM(S): 150 TABLET ORAL at 11:54

## 2022-10-29 RX ADMIN — Medication 4440 MICROGRAM(S): at 13:44

## 2022-10-29 RX ADMIN — Medication 5 MILLILITER(S): at 20:42

## 2022-10-29 RX ADMIN — Medication 1 TABLET(S): at 11:53

## 2022-10-29 RX ADMIN — LIDOCAINE AND PRILOCAINE CREAM 1 APPLICATION(S): 25; 25 CREAM TOPICAL at 13:38

## 2022-10-29 RX ADMIN — Medication 1 MILLIGRAM(S): at 11:54

## 2022-10-29 RX ADMIN — FINASTERIDE 5 MILLIGRAM(S): 5 TABLET, FILM COATED ORAL at 11:54

## 2022-10-29 RX ADMIN — NYSTATIN CREAM 1 APPLICATION(S): 100000 CREAM TOPICAL at 22:03

## 2022-10-29 RX ADMIN — Medication 100 MILLIGRAM(S): at 13:36

## 2022-10-29 RX ADMIN — URSODIOL 300 MILLIGRAM(S): 250 TABLET, FILM COATED ORAL at 05:49

## 2022-10-29 RX ADMIN — Medication 5 MILLILITER(S): at 07:29

## 2022-10-29 RX ADMIN — Medication 5 MILLILITER(S): at 11:54

## 2022-10-29 RX ADMIN — Medication 10 MILLILITER(S): at 11:54

## 2022-10-29 RX ADMIN — AMLODIPINE BESYLATE 10 MILLIGRAM(S): 2.5 TABLET ORAL at 05:48

## 2022-10-29 RX ADMIN — TAMSULOSIN HYDROCHLORIDE 0.4 MILLIGRAM(S): 0.4 CAPSULE ORAL at 22:04

## 2022-10-29 RX ADMIN — Medication 1 LOZENGE: at 20:42

## 2022-10-29 RX ADMIN — URSODIOL 300 MILLIGRAM(S): 250 TABLET, FILM COATED ORAL at 17:54

## 2022-10-29 RX ADMIN — Medication 400 MILLIGRAM(S): at 22:04

## 2022-10-29 RX ADMIN — Medication 100 MILLIGRAM(S): at 05:48

## 2022-10-29 RX ADMIN — Medication 25 MILLIGRAM(S): at 15:58

## 2022-10-29 NOTE — PROGRESS NOTE ADULT - SUBJECTIVE AND OBJECTIVE BOX
[facility] Transplant Team                                                      Critical / Counseling Time Provided: 30 minutes                                                                                                                                                        Chief Complaint: Autologous peripheral blood stem cell transplant with high dose Melphalan prep regimen for treatment of multiple myeloma    S: Patient seen and examined with [facility] Transplant Team:     O:    Vital Signs Last 24 Hrs  T(C): 36.8 (29 Oct 2022 05:40), Max: 36.8 (29 Oct 2022 05:40)  T(F): 98.2 (29 Oct 2022 05:40), Max: 98.2 (29 Oct 2022 05:40)  HR: 109 (29 Oct 2022 05:40) (96 - 109)  BP: 124/73 (29 Oct 2022 05:40) (117/66 - 136/76)  BP(mean): --  RR: 18 (29 Oct 2022 05:40) (18 - 19)  SpO2: 96% (29 Oct 2022 05:40) (94% - 98%)    Parameters below as of 29 Oct 2022 05:40  Patient On (Oxygen Delivery Method): room air        Admit weight: 74.9kg  Daily Weight in kG: p    Intake / Output:   10-28 @ 07:01  -  10-29 @ 07:00  --------------------------------------------------------  IN: 2536.4 mL / OUT: 2490 mL / NET: 46.4 mL      PE:   Oropharynx: no erythema or ulcerations  Oral Mucositis:                 -                                       Grade: n/a  CVS: S1, S2 RRR   Lungs: CTA throughout bilaterally   Abdomen: + BS x 4, soft, NT, ND   Extremities: no edema  Gastric Mucositis:      -                                            Grade: n/a  Intestinal Mucositis:    -                                          Grade: n/a  Skin: no rash   TLC: CDI   Neuro: A&Ox3   Pain: 0      Labs:       -------    Cultures:     n/a    Radiology:       Meds:   Antimicrobials:   acyclovir   Oral Tab/Cap 400 milliGRAM(s) Oral every 8 hours  clotrimazole Lozenge 1 Lozenge Oral five times a day  fluconAZOLE   Tablet 400 milliGRAM(s) Oral daily      Heme / Onc:   heparin  Infusion 312 Unit(s)/Hr IV Continuous <Continuous>      GI:  aluminum hydroxide/magnesium hydroxide/simethicone Suspension 30 milliLiter(s) Oral every 6 hours PRN  lactulose Syrup 20 Gram(s) Oral once PRN  pantoprazole    Tablet 40 milliGRAM(s) Oral before breakfast  polyethylene glycol 3350 17 Gram(s) Oral daily PRN  senna 2 Tablet(s) Oral at bedtime  sodium bicarbonate Mouth Rinse 10 milliLiter(s) Swish and Spit five times a day  ursodiol Capsule 300 milliGRAM(s) Oral two times a day      Cardiovascular:   amLODIPine   Tablet 10 milliGRAM(s) Oral daily  hydrALAZINE 25 milliGRAM(s) Oral three times a day  tamsulosin 0.4 milliGRAM(s) Oral at bedtime      Immunologic:   filgrastim-sndz (ZARXIO) Injectable 480 MICROGram(s) SubCutaneous every 24 hours      Other medications:   acetaminophen     Tablet .. 650 milliGRAM(s) Oral every 6 hours  benzonatate 100 milliGRAM(s) Oral three times a day  Biotene Dry Mouth Oral Rinse 5 milliLiter(s) Swish and Spit five times a day  chlorhexidine 4% Liquid 1 Application(s) Topical <User Schedule>  dextrose 5%. 1000 milliLiter(s) IV Continuous <Continuous>  dextrose 5%. 1000 milliLiter(s) IV Continuous <Continuous>  dextrose 50% Injectable 25 Gram(s) IV Push once  dextrose 50% Injectable 12.5 Gram(s) IV Push once  dextrose 50% Injectable 25 Gram(s) IV Push once  finasteride 5 milliGRAM(s) Oral daily  folic acid 1 milliGRAM(s) Oral daily  glucagon  Injectable 1 milliGRAM(s) IntraMuscular once  insulin lispro (ADMELOG) corrective regimen sliding scale   SubCutaneous three times a day before meals  insulin lispro (ADMELOG) corrective regimen sliding scale   SubCutaneous at bedtime  lidocaine/prilocaine Cream 1 Application(s) Topical daily  loratadine 10 milliGRAM(s) Oral daily  LORazepam   Injectable 1 milliGRAM(s) IV Push every 24 hours  multivitamin 1 Tablet(s) Oral daily  nystatin Powder 1 Application(s) Topical three times a day  palifermin Injectable  (eMAR) 4440 MICROGram(s) IV Push every 24 hours  sodium chloride 0.45% 1000 milliLiter(s) IV Continuous <Continuous>  sodium chloride 0.9%. 1000 milliLiter(s) IV Continuous <Continuous>  sodium chloride 0.9%. 1000 milliLiter(s) IV Continuous <Continuous>      PRN:   acetaminophen     Tablet .. 650 milliGRAM(s) Oral every 6 hours PRN  aluminum hydroxide/magnesium hydroxide/simethicone Suspension 30 milliLiter(s) Oral every 6 hours PRN  dextrose Oral Gel 15 Gram(s) Oral once PRN  lactulose Syrup 20 Gram(s) Oral once PRN  LORazepam   Injectable 1 milliGRAM(s) IV Push every 6 hours PRN  metoclopramide Injectable 10 milliGRAM(s) IV Push every 6 hours PRN  ondansetron Injectable 8 milliGRAM(s) IV Push every 8 hours PRN  polyethylene glycol 3350 17 Gram(s) Oral daily PRN  sodium chloride 0.9% lock flush 10 milliLiter(s) IV Push every 1 hour PRN      A/P: 72 year old male with multiple myeloma diagnosed 12/20/21 s/p RVD x 6 admitted for autologous pbsct with high dose melphalan prep regimen (dose reduced to 70mg / m2 for age)  Day +2  10/25- melphalan 2/2; continue melphalan hydration for 24 hours post infusion of last dose. Strict I&O, daily weights, prn diuresis   10/27- HPC transplant today; continue transplant hydration for 24 hours post infusion of cells   10/28 weight agin, I>O, lasix 60 mg iv x1; constipated, Lactolose now and PRN    1. Infectious Disease:   acyclovir   Oral Tab/Cap 400 milliGRAM(s) Oral every 8 hours  clotrimazole Lozenge 1 Lozenge Oral five times a day  fluconAZOLE   Tablet 400 milliGRAM(s) Oral daily  Add Cipro if ANC<500, if fever, pancx CXR and switch Cipro to Cefepime       2. VOD Prophylaxis: Actigall, Glutamine, Heparin (dosed at 100 units / kg / day)     3. GI Prophylaxis:    pantoprazole    Tablet 40 milliGRAM(s) Oral before breakfast    4. Mouthcare - NS / NaHCO3 rinses, Mycelex, Biotene; Skin care     5. GVHD prophylaxis - n/a     6. Transfuse & replete electrolytes prn       7. IV hydration, daily weights, strict I&O, prn diuresis       8. PO intake as tolerated, nutrition follow up as needed, MVI, folic acid     9. Antiemetics, anti-diarrhea medications:   LORazepam   Injectable 1 milliGRAM(s) IV Push every 24 hours  aprepitant 80 milliGRAM(s) Oral every 24 hours  LORazepam   Injectable 1 milliGRAM(s) IV Push every 6 hours PRN  metoclopramide Injectable 10 milliGRAM(s) IV Push every 6 hours PRN  ondansetron 8mg IV q 8 hours prn     10. OOB as tolerated, physical therapy consult if needed     11. Monitor coags / fibrinogen 2x week, vitamin K as needed     12. Monitor closely for clinical changes, monitor for fevers     13. Emotional support provided, plan of care discussed and questions addressed     14. Patient education done regarding chemotherapy prep, plan of care, restrictions and discharge planning     15. Continue regular social work input     I have written the above note for Dr. Persaud who performed service with me in the room.   Gordon Chang PA-C ([phone])    I have seen and examined patient with PA, I agree with above note as scribed.                [facility] Transplant Team                                                      Critical / Counseling Time Provided: 30 minutes                                                                                                                                                        Chief Complaint: Autologous peripheral blood stem cell transplant with high dose Melphalan prep regimen for treatment of multiple myeloma    S: Patient seen and examined with [facility] Transplant Team: No overnight events  + generalized weakness  +constipated  +Gassy    O: Rest of ROS negative    Vital Signs Last 24 Hrs  T(C): 36.8 (29 Oct 2022 05:40), Max: 36.8 (29 Oct 2022 05:40)  T(F): 98.2 (29 Oct 2022 05:40), Max: 98.2 (29 Oct 2022 05:40)  HR: 109 (29 Oct 2022 05:40) (96 - 109)  BP: 124/73 (29 Oct 2022 05:40) (117/66 - 136/76)  BP(mean): --  RR: 18 (29 Oct 2022 05:40) (18 - 19)  SpO2: 96% (29 Oct 2022 05:40) (94% - 98%)    Parameters below as of 29 Oct 2022 05:40  Patient On (Oxygen Delivery Method): room air      Admit weight: 74.9kg  Daily Weight in kG:     Intake / Output:   10-28 @ 07:01  -  10-29 @ 07:00  --------------------------------------------------------  IN: 2536.4 mL / OUT: 2490 mL / NET: 46.4 mL      PE:   Oropharynx: no erythema or ulcerations  Oral Mucositis:                 -                                       Grade: n/a  CVS: S1, S2 RRR   Lungs: CTA throughout bilaterally   Abdomen: + BS x 4, soft, NT, ND   Extremities: no edema  Gastric Mucositis:      -                                            Grade: n/a  Intestinal Mucositis:    -                                          Grade: n/a  Skin: no rash   TLC: CDI   Neuro: A&Ox3   Pain: 0      Labs:                           10.8   22.37 )-----------( 133      ( 29 Oct 2022 07:49 )             33.9     29 Oct 2022 07:47    140    |  100    |  19     ----------------------------<  99     3.7     |  30     |  1.09     Ca    8.1        29 Oct 2022 07:47  Phos  2.9       29 Oct 2022 07:47  Mg     1.9       29 Oct 2022 07:47    TPro  5.8    /  Alb  3.4    /  TBili  0.8    /  DBili  x      /  AST  14     /  ALT  11     /  AlkPhos  105    29 Oct 2022 07:47      CAPILLARY BLOOD GLUCOSE      POCT Blood Glucose.: 108 mg/dL (29 Oct 2022 08:34)  POCT Blood Glucose.: 142 mg/dL (28 Oct 2022 21:07)  POCT Blood Glucose.: 137 mg/dL (28 Oct 2022 17:14)  POCT Blood Glucose.: 177 mg/dL (28 Oct 2022 12:11)    LIVER FUNCTIONS - ( 29 Oct 2022 07:47 )  Alb: 3.4 g/dL / Pro: 5.8 g/dL / ALK PHOS: 105 U/L / ALT: 11 U/L / AST: 14 U/L / GGT: x                 Cultures:     n/a    Radiology:       Meds:   Antimicrobials:   acyclovir   Oral Tab/Cap 400 milliGRAM(s) Oral every 8 hours  clotrimazole Lozenge 1 Lozenge Oral five times a day  fluconAZOLE   Tablet 400 milliGRAM(s) Oral daily      Heme / Onc:   heparin  Infusion 312 Unit(s)/Hr IV Continuous <Continuous>      GI:  aluminum hydroxide/magnesium hydroxide/simethicone Suspension 30 milliLiter(s) Oral every 6 hours PRN  lactulose Syrup 20 Gram(s) Oral once PRN  pantoprazole    Tablet 40 milliGRAM(s) Oral before breakfast  polyethylene glycol 3350 17 Gram(s) Oral daily PRN  senna 2 Tablet(s) Oral at bedtime  sodium bicarbonate Mouth Rinse 10 milliLiter(s) Swish and Spit five times a day  ursodiol Capsule 300 milliGRAM(s) Oral two times a day      Cardiovascular:   amLODIPine   Tablet 10 milliGRAM(s) Oral daily  hydrALAZINE 25 milliGRAM(s) Oral three times a day  tamsulosin 0.4 milliGRAM(s) Oral at bedtime      Immunologic:   filgrastim-sndz (ZARXIO) Injectable 480 MICROGram(s) SubCutaneous every 24 hours      Other medications:   acetaminophen     Tablet .. 650 milliGRAM(s) Oral every 6 hours  benzonatate 100 milliGRAM(s) Oral three times a day  Biotene Dry Mouth Oral Rinse 5 milliLiter(s) Swish and Spit five times a day  chlorhexidine 4% Liquid 1 Application(s) Topical <User Schedule>  dextrose 5%. 1000 milliLiter(s) IV Continuous <Continuous>  dextrose 5%. 1000 milliLiter(s) IV Continuous <Continuous>  dextrose 50% Injectable 25 Gram(s) IV Push once  dextrose 50% Injectable 12.5 Gram(s) IV Push once  dextrose 50% Injectable 25 Gram(s) IV Push once  finasteride 5 milliGRAM(s) Oral daily  folic acid 1 milliGRAM(s) Oral daily  glucagon  Injectable 1 milliGRAM(s) IntraMuscular once  insulin lispro (ADMELOG) corrective regimen sliding scale   SubCutaneous three times a day before meals  insulin lispro (ADMELOG) corrective regimen sliding scale   SubCutaneous at bedtime  lidocaine/prilocaine Cream 1 Application(s) Topical daily  loratadine 10 milliGRAM(s) Oral daily  LORazepam   Injectable 1 milliGRAM(s) IV Push every 24 hours  multivitamin 1 Tablet(s) Oral daily  nystatin Powder 1 Application(s) Topical three times a day  palifermin Injectable  (eMAR) 4440 MICROGram(s) IV Push every 24 hours  sodium chloride 0.45% 1000 milliLiter(s) IV Continuous <Continuous>  sodium chloride 0.9%. 1000 milliLiter(s) IV Continuous <Continuous>  sodium chloride 0.9%. 1000 milliLiter(s) IV Continuous <Continuous>      PRN:   acetaminophen     Tablet .. 650 milliGRAM(s) Oral every 6 hours PRN  aluminum hydroxide/magnesium hydroxide/simethicone Suspension 30 milliLiter(s) Oral every 6 hours PRN  dextrose Oral Gel 15 Gram(s) Oral once PRN  lactulose Syrup 20 Gram(s) Oral once PRN  LORazepam   Injectable 1 milliGRAM(s) IV Push every 6 hours PRN  metoclopramide Injectable 10 milliGRAM(s) IV Push every 6 hours PRN  ondansetron Injectable 8 milliGRAM(s) IV Push every 8 hours PRN  polyethylene glycol 3350 17 Gram(s) Oral daily PRN  sodium chloride 0.9% lock flush 10 milliLiter(s) IV Push every 1 hour PRN      A/P: 72 year old male with multiple myeloma diagnosed 12/20/21 s/p RVD x 6 admitted for autologous pbsct with high dose melphalan prep regimen (dose reduced to 70mg / m2 for age)  Day +2  10/25- melphalan 2/2; continue melphalan hydration for 24 hours post infusion of last dose. Strict I&O, daily weights, prn diuresis   10/27- HPC transplant today; continue transplant hydration for 24 hours post infusion of cells   10/28 weight gain, I>O, lasix 60 mg iv x1; constipated, Lactulose now and PRN    1. Infectious Disease:   acyclovir   Oral Tab/Cap 400 milliGRAM(s) Oral every 8 hours  clotrimazole Lozenge 1 Lozenge Oral five times a day  fluconAZOLE   Tablet 400 milliGRAM(s) Oral daily  Add Cipro if ANC<500, if fever, pancx CXR and switch Cipro to Cefepime       2. VOD Prophylaxis: Actigall, Glutamine, Heparin (dosed at 100 units / kg / day)     3. GI Prophylaxis:    pantoprazole    Tablet 40 milliGRAM(s) Oral before breakfast    4. Mouthcare - NS / NaHCO3 rinses, Mycelex, Biotene; Skin care     5. GVHD prophylaxis - n/a     6. Transfuse & replete electrolytes prn       7. IV hydration, daily weights, strict I&O, prn diuresis       8. PO intake as tolerated, nutrition follow up as needed, MVI, folic acid     9. Antiemetics, anti-diarrhea medications:   LORazepam   Injectable 1 milliGRAM(s) IV Push every 24 hours  aprepitant 80 milliGRAM(s) Oral every 24 hours  LORazepam   Injectable 1 milliGRAM(s) IV Push every 6 hours PRN  metoclopramide Injectable 10 milliGRAM(s) IV Push every 6 hours PRN  ondansetron 8mg IV q 8 hours prn     10. OOB as tolerated, physical therapy consult if needed     11. Monitor coags / fibrinogen 2x week, vitamin K as needed     12. Monitor closely for clinical changes, monitor for fevers     13. Emotional support provided, plan of care discussed and questions addressed     14. Patient education done regarding chemotherapy prep, plan of care, restrictions and discharge planning     15. Continue regular social work input     I have written the above note for Dr. Persaud who performed service with me in the room.   Gordon Chang PA-C ([phone])    I have seen and examined patient with PA, I agree with above note as scribed.                [facility] Transplant Team                                                      Critical / Counseling Time Provided: 30 minutes                                                                                                                                                        Chief Complaint: Autologous peripheral blood stem cell transplant with high dose Melphalan prep regimen for treatment of multiple myeloma    S: Patient seen and examined with [facility] Transplant Team: No overnight events  + generalized weakness  +constipated  +Gassy    O: Rest of ROS negative    Vital Signs Last 24 Hrs  T(C): 36.8 (29 Oct 2022 05:40), Max: 36.8 (29 Oct 2022 05:40)  T(F): 98.2 (29 Oct 2022 05:40), Max: 98.2 (29 Oct 2022 05:40)  HR: 109 (29 Oct 2022 05:40) (96 - 109)  BP: 124/73 (29 Oct 2022 05:40) (117/66 - 136/76)  BP(mean): --  RR: 18 (29 Oct 2022 05:40) (18 - 19)  SpO2: 96% (29 Oct 2022 05:40) (94% - 98%)    Parameters below as of 29 Oct 2022 05:40  Patient On (Oxygen Delivery Method): room air      Admit weight: 74.9kg  Daily Weight in k.7kg    Intake / Output:   10-28 @ 07:01  -  10-29 @ 07:00  --------------------------------------------------------  IN: 2536.4 mL / OUT: 2490 mL / NET: 46.4 mL      PE:   Oropharynx: no erythema or ulcerations  Oral Mucositis:                 -                                       Grade: n/a  CVS: S1, S2 RRR   Lungs: CTA throughout bilaterally   Abdomen: + BS x 4, soft, NT, ND   Extremities: no edema  Gastric Mucositis:      -                                            Grade: n/a  Intestinal Mucositis:    -                                          Grade: n/a  Skin: no rash   TLC: CDI   Neuro: A&Ox3   Pain: 0      Labs:                           10.8   22.37 )-----------( 133      ( 29 Oct 2022 07:49 )             33.9     29 Oct 2022 07:47    140    |  100    |  19     ----------------------------<  99     3.7     |  30     |  1.09     Ca    8.1        29 Oct 2022 07:47  Phos  2.9       29 Oct 2022 07:47  Mg     1.9       29 Oct 2022 07:47    TPro  5.8    /  Alb  3.4    /  TBili  0.8    /  DBili  x      /  AST  14     /  ALT  11     /  AlkPhos  105    29 Oct 2022 07:47      CAPILLARY BLOOD GLUCOSE      POCT Blood Glucose.: 108 mg/dL (29 Oct 2022 08:34)  POCT Blood Glucose.: 142 mg/dL (28 Oct 2022 21:07)  POCT Blood Glucose.: 137 mg/dL (28 Oct 2022 17:14)  POCT Blood Glucose.: 177 mg/dL (28 Oct 2022 12:11)    LIVER FUNCTIONS - ( 29 Oct 2022 07:47 )  Alb: 3.4 g/dL / Pro: 5.8 g/dL / ALK PHOS: 105 U/L / ALT: 11 U/L / AST: 14 U/L / GGT: x                 Cultures:     n/a    Radiology:       Meds:   Antimicrobials:   acyclovir   Oral Tab/Cap 400 milliGRAM(s) Oral every 8 hours  clotrimazole Lozenge 1 Lozenge Oral five times a day  fluconAZOLE   Tablet 400 milliGRAM(s) Oral daily      Heme / Onc:   heparin  Infusion 312 Unit(s)/Hr IV Continuous <Continuous>      GI:  aluminum hydroxide/magnesium hydroxide/simethicone Suspension 30 milliLiter(s) Oral every 6 hours PRN  lactulose Syrup 20 Gram(s) Oral once PRN  pantoprazole    Tablet 40 milliGRAM(s) Oral before breakfast  polyethylene glycol 3350 17 Gram(s) Oral daily PRN  senna 2 Tablet(s) Oral at bedtime  sodium bicarbonate Mouth Rinse 10 milliLiter(s) Swish and Spit five times a day  ursodiol Capsule 300 milliGRAM(s) Oral two times a day      Cardiovascular:   amLODIPine   Tablet 10 milliGRAM(s) Oral daily  hydrALAZINE 25 milliGRAM(s) Oral three times a day  tamsulosin 0.4 milliGRAM(s) Oral at bedtime      Immunologic:   filgrastim-sndz (ZARXIO) Injectable 480 MICROGram(s) SubCutaneous every 24 hours      Other medications:   acetaminophen     Tablet .. 650 milliGRAM(s) Oral every 6 hours  benzonatate 100 milliGRAM(s) Oral three times a day  Biotene Dry Mouth Oral Rinse 5 milliLiter(s) Swish and Spit five times a day  chlorhexidine 4% Liquid 1 Application(s) Topical <User Schedule>  dextrose 5%. 1000 milliLiter(s) IV Continuous <Continuous>  dextrose 5%. 1000 milliLiter(s) IV Continuous <Continuous>  dextrose 50% Injectable 25 Gram(s) IV Push once  dextrose 50% Injectable 12.5 Gram(s) IV Push once  dextrose 50% Injectable 25 Gram(s) IV Push once  finasteride 5 milliGRAM(s) Oral daily  folic acid 1 milliGRAM(s) Oral daily  glucagon  Injectable 1 milliGRAM(s) IntraMuscular once  insulin lispro (ADMELOG) corrective regimen sliding scale   SubCutaneous three times a day before meals  insulin lispro (ADMELOG) corrective regimen sliding scale   SubCutaneous at bedtime  lidocaine/prilocaine Cream 1 Application(s) Topical daily  loratadine 10 milliGRAM(s) Oral daily  LORazepam   Injectable 1 milliGRAM(s) IV Push every 24 hours  multivitamin 1 Tablet(s) Oral daily  nystatin Powder 1 Application(s) Topical three times a day  palifermin Injectable  (eMAR) 4440 MICROGram(s) IV Push every 24 hours  sodium chloride 0.45% 1000 milliLiter(s) IV Continuous <Continuous>  sodium chloride 0.9%. 1000 milliLiter(s) IV Continuous <Continuous>  sodium chloride 0.9%. 1000 milliLiter(s) IV Continuous <Continuous>      PRN:   acetaminophen     Tablet .. 650 milliGRAM(s) Oral every 6 hours PRN  aluminum hydroxide/magnesium hydroxide/simethicone Suspension 30 milliLiter(s) Oral every 6 hours PRN  dextrose Oral Gel 15 Gram(s) Oral once PRN  lactulose Syrup 20 Gram(s) Oral once PRN  LORazepam   Injectable 1 milliGRAM(s) IV Push every 6 hours PRN  metoclopramide Injectable 10 milliGRAM(s) IV Push every 6 hours PRN  ondansetron Injectable 8 milliGRAM(s) IV Push every 8 hours PRN  polyethylene glycol 3350 17 Gram(s) Oral daily PRN  sodium chloride 0.9% lock flush 10 milliLiter(s) IV Push every 1 hour PRN      A/P: 72 year old male with multiple myeloma diagnosed 12/20/21 s/p RVD x 6 admitted for autologous pbsct with high dose melphalan prep regimen (dose reduced to 70mg / m2 for age)  Day +2  10/25- melphalan ; continue melphalan hydration for 24 hours post infusion of last dose. Strict I&O, daily weights, prn diuresis   10/27- HPC transplant today; continue transplant hydration for 24 hours post infusion of cells   10/28 weight gain, I>O, lasix 60 mg iv x1; constipated, Lactulose now and PRN    1. Infectious Disease:   acyclovir   Oral Tab/Cap 400 milliGRAM(s) Oral every 8 hours  clotrimazole Lozenge 1 Lozenge Oral five times a day  fluconAZOLE   Tablet 400 milliGRAM(s) Oral daily  Add Cipro if ANC<500, if fever, pancx CXR and switch Cipro to Cefepime       2. VOD Prophylaxis: Actigall, Glutamine, Heparin (dosed at 100 units / kg / day)     3. GI Prophylaxis:    pantoprazole    Tablet 40 milliGRAM(s) Oral before breakfast    4. Mouthcare - NS / NaHCO3 rinses, Mycelex, Biotene; Skin care     5. GVHD prophylaxis - n/a     6. Transfuse & replete electrolytes prn       7. IV hydration, daily weights, strict I&O, prn diuresis       8. PO intake as tolerated, nutrition follow up as needed, MVI, folic acid     9. Antiemetics, anti-diarrhea medications:   LORazepam   Injectable 1 milliGRAM(s) IV Push every 24 hours  aprepitant 80 milliGRAM(s) Oral every 24 hours  LORazepam   Injectable 1 milliGRAM(s) IV Push every 6 hours PRN  metoclopramide Injectable 10 milliGRAM(s) IV Push every 6 hours PRN  ondansetron 8mg IV q 8 hours prn     10. OOB as tolerated, physical therapy consult if needed     11. Monitor coags / fibrinogen 2x week, vitamin K as needed     12. Monitor closely for clinical changes, monitor for fevers     13. Emotional support provided, plan of care discussed and questions addressed     14. Patient education done regarding chemotherapy prep, plan of care, restrictions and discharge planning     15. Continue regular social work input     I have written the above note for Dr. Persaud who performed service with me in the room.   Gordon Chang PA-C ([phone])    I have seen and examined patient with PA, I agree with above note as scribed.

## 2022-10-29 NOTE — PROGRESS NOTE ADULT - CRITICAL CARE ATTENDING COMMENT
72 year old male with multiple myeloma diagnosed 12/20/21 s/p RVD x 6 admitted for autologous pbsct with high dose melphalan prep regimen (dose reduced to 70mg / m2 for age)    Day 1    1. Admit to BMTU   2. 3 hours prior to Melphalan start hydration: D5NS at 200ml /hr and continue 24 hours post Melphalan infusion  3. Day – 3 & day -2 - Melphalan 70mg/m2  IV   4. Strict I&O, daily weights, prn diuresis   5. Day -1 rest day. At 2200 on day – 1, start transplant hydration 1/2NS + 50mEq NaHCO3- (may substitute [id number] if bicarb not available)  6. Day 0 – infuse HPC product. 4 hours after infusion of cells start Zarxio 5 micrograms / kg (actual weight) . Continue through engraftment.   7. On day 0, + 1, + 2-give Kepivance 60micrograms / kg (actual weight).  8. VOD prophylaxis - low dose heparin gtt (dosed at 100 units / kg / day), glutamine supplementation, Actigall BID   9. PCP prophylaxis - Bactrim DS through day -2    10. Antiviral prophylaxis - Acyclovir 400mg po TID to start day -1   11. Antifungal prophylaxis- Diflucan 400 mg po daily.  12. GI prophylaxis - Protonix po QD   13. Antibacterial prophylaxis - when ANC < 500, start Cipro 500mg po BID. If becomes febrile, pan cx, CXR and change Cipro to Cefepime 2g IV q 8 hours. Continue until count recovery  14. Kepivance for prevention of mucositis- days 0, +1, +2  15. Aggressive mouth care and skin care as per protocol..senna, miralax  prn  16. The patient is aware of his diagnosis...he does not want to be reminded of it every day 72 year old male with multiple myeloma diagnosed 12/20/21 s/p RVD x 6 admitted for autologous pbsct with high dose melphalan prep regimen (dose reduced to 70mg / m2 for age)    Day +2    1. Admit to BMTU   2. -3 hours prior to Melphalan start hydration: D5NS at 200ml /hr and continue 24 hours post Melphalan infusion  3. Day – 3 & day -2 - Melphalan 70mg/m2  IV   4. Strict I&O, daily weights, prn diuresis   5. Day -1 rest day. At 2200 on day – 1, start transplant hydration 1/2NS + 50mEq NaHCO3- (may substitute [id number] if bicarb not available)  6. Day 0 – infuse HPC product. 4 hours after infusion of cells start Zarxio 5 micrograms / kg (actual weight) . Continue through engraftment.   7. On day 0, + 1, + 2-give Kepivance 60micrograms / kg (actual weight).  8. VOD prophylaxis - low dose heparin gtt (dosed at 100 units / kg / day), glutamine supplementation, Actigall BID   9. PCP prophylaxis - Bactrim DS through day -2    10. Antiviral prophylaxis - Acyclovir 400mg po TID to start day -1   11. Antifungal prophylaxis- Diflucan 400 mg po daily.  12. GI prophylaxis - Protonix po QD   13. Antibacterial prophylaxis - when ANC < 500, start Cipro 500mg po BID. If becomes febrile, pan cx, CXR and change Cipro to Cefepime 2g IV q 8 hours. Continue until count recovery  14. Kepivance for prevention of mucositis- days 0, +1, +2  15. Aggressive mouth care and skin care as per protocol..senna, miralax  prn  16. The patient is aware of his diagnosis...he does not want to be reminded of it every day

## 2022-10-30 LAB
ALBUMIN SERPL ELPH-MCNC: 3.5 G/DL — SIGNIFICANT CHANGE UP (ref 3.3–5)
ALP SERPL-CCNC: 98 U/L — SIGNIFICANT CHANGE UP (ref 40–120)
ALT FLD-CCNC: 12 U/L — SIGNIFICANT CHANGE UP (ref 10–45)
ANION GAP SERPL CALC-SCNC: 10 MMOL/L — SIGNIFICANT CHANGE UP (ref 5–17)
AST SERPL-CCNC: 14 U/L — SIGNIFICANT CHANGE UP (ref 10–40)
BASOPHILS # BLD AUTO: 0.02 K/UL — SIGNIFICANT CHANGE UP (ref 0–0.2)
BASOPHILS NFR BLD AUTO: 0.2 % — SIGNIFICANT CHANGE UP (ref 0–2)
BILIRUB SERPL-MCNC: 0.7 MG/DL — SIGNIFICANT CHANGE UP (ref 0.2–1.2)
BUN SERPL-MCNC: 13 MG/DL — SIGNIFICANT CHANGE UP (ref 7–23)
CALCIUM SERPL-MCNC: 8.6 MG/DL — SIGNIFICANT CHANGE UP (ref 8.4–10.5)
CHLORIDE SERPL-SCNC: 100 MMOL/L — SIGNIFICANT CHANGE UP (ref 96–108)
CO2 SERPL-SCNC: 29 MMOL/L — SIGNIFICANT CHANGE UP (ref 22–31)
CREAT SERPL-MCNC: 0.87 MG/DL — SIGNIFICANT CHANGE UP (ref 0.5–1.3)
EGFR: 91 ML/MIN/1.73M2 — SIGNIFICANT CHANGE UP
EOSINOPHIL # BLD AUTO: 0.05 K/UL — SIGNIFICANT CHANGE UP (ref 0–0.5)
EOSINOPHIL NFR BLD AUTO: 0.5 % — SIGNIFICANT CHANGE UP (ref 0–6)
GLUCOSE BLDC GLUCOMTR-MCNC: 105 MG/DL — HIGH (ref 70–99)
GLUCOSE BLDC GLUCOMTR-MCNC: 127 MG/DL — HIGH (ref 70–99)
GLUCOSE BLDC GLUCOMTR-MCNC: 145 MG/DL — HIGH (ref 70–99)
GLUCOSE BLDC GLUCOMTR-MCNC: 89 MG/DL — SIGNIFICANT CHANGE UP (ref 70–99)
GLUCOSE SERPL-MCNC: 101 MG/DL — HIGH (ref 70–99)
HCT VFR BLD CALC: 34.5 % — LOW (ref 39–50)
HGB BLD-MCNC: 11 G/DL — LOW (ref 13–17)
IMM GRANULOCYTES NFR BLD AUTO: 3.8 % — HIGH (ref 0–0.9)
LDH SERPL L TO P-CCNC: 190 U/L — SIGNIFICANT CHANGE UP (ref 50–242)
LYMPHOCYTES # BLD AUTO: 0.04 K/UL — LOW (ref 1–3.3)
LYMPHOCYTES # BLD AUTO: 0.4 % — LOW (ref 13–44)
MAGNESIUM SERPL-MCNC: 1.9 MG/DL — SIGNIFICANT CHANGE UP (ref 1.6–2.6)
MCHC RBC-ENTMCNC: 25.9 PG — LOW (ref 27–34)
MCHC RBC-ENTMCNC: 31.9 GM/DL — LOW (ref 32–36)
MCV RBC AUTO: 81.4 FL — SIGNIFICANT CHANGE UP (ref 80–100)
MONOCYTES # BLD AUTO: 0.01 K/UL — SIGNIFICANT CHANGE UP (ref 0–0.9)
MONOCYTES NFR BLD AUTO: 0.1 % — LOW (ref 2–14)
NEUTROPHILS # BLD AUTO: 8.83 K/UL — HIGH (ref 1.8–7.4)
NEUTROPHILS NFR BLD AUTO: 95 % — HIGH (ref 43–77)
NRBC # BLD: 0 /100 WBCS — SIGNIFICANT CHANGE UP (ref 0–0)
PHOSPHATE SERPL-MCNC: 2.5 MG/DL — SIGNIFICANT CHANGE UP (ref 2.5–4.5)
PLATELET # BLD AUTO: 91 K/UL — LOW (ref 150–400)
POTASSIUM SERPL-MCNC: 3.4 MMOL/L — LOW (ref 3.5–5.3)
POTASSIUM SERPL-SCNC: 3.4 MMOL/L — LOW (ref 3.5–5.3)
PROT SERPL-MCNC: 5.8 G/DL — LOW (ref 6–8.3)
RBC # BLD: 4.24 M/UL — SIGNIFICANT CHANGE UP (ref 4.2–5.8)
RBC # FLD: 14.5 % — SIGNIFICANT CHANGE UP (ref 10.3–14.5)
SODIUM SERPL-SCNC: 139 MMOL/L — SIGNIFICANT CHANGE UP (ref 135–145)
WBC # BLD: 9.3 K/UL — SIGNIFICANT CHANGE UP (ref 3.8–10.5)
WBC # FLD AUTO: 9.3 K/UL — SIGNIFICANT CHANGE UP (ref 3.8–10.5)

## 2022-10-30 PROCEDURE — 99291 CRITICAL CARE FIRST HOUR: CPT

## 2022-10-30 RX ORDER — POTASSIUM CHLORIDE 20 MEQ
20 PACKET (EA) ORAL
Refills: 0 | Status: COMPLETED | OUTPATIENT
Start: 2022-10-30 | End: 2022-10-30

## 2022-10-30 RX ADMIN — Medication 1 LOZENGE: at 23:13

## 2022-10-30 RX ADMIN — LORATADINE 10 MILLIGRAM(S): 10 TABLET ORAL at 12:46

## 2022-10-30 RX ADMIN — Medication 10 MILLILITER(S): at 10:41

## 2022-10-30 RX ADMIN — Medication 1 LOZENGE: at 00:02

## 2022-10-30 RX ADMIN — Medication 10 MILLILITER(S): at 00:02

## 2022-10-30 RX ADMIN — Medication 5 MILLILITER(S): at 17:24

## 2022-10-30 RX ADMIN — NYSTATIN CREAM 1 APPLICATION(S): 100000 CREAM TOPICAL at 20:18

## 2022-10-30 RX ADMIN — Medication 5 MILLILITER(S): at 12:48

## 2022-10-30 RX ADMIN — FINASTERIDE 5 MILLIGRAM(S): 5 TABLET, FILM COATED ORAL at 12:51

## 2022-10-30 RX ADMIN — Medication 10 MILLILITER(S): at 23:13

## 2022-10-30 RX ADMIN — Medication 25 MILLIGRAM(S): at 14:13

## 2022-10-30 RX ADMIN — Medication 5 MILLILITER(S): at 23:13

## 2022-10-30 RX ADMIN — Medication 1 LOZENGE: at 17:25

## 2022-10-30 RX ADMIN — TAMSULOSIN HYDROCHLORIDE 0.4 MILLIGRAM(S): 0.4 CAPSULE ORAL at 20:17

## 2022-10-30 RX ADMIN — Medication 1 LOZENGE: at 12:49

## 2022-10-30 RX ADMIN — Medication 100 MILLIGRAM(S): at 20:17

## 2022-10-30 RX ADMIN — PANTOPRAZOLE SODIUM 40 MILLIGRAM(S): 20 TABLET, DELAYED RELEASE ORAL at 05:14

## 2022-10-30 RX ADMIN — AMLODIPINE BESYLATE 10 MILLIGRAM(S): 2.5 TABLET ORAL at 05:14

## 2022-10-30 RX ADMIN — Medication 400 MILLIGRAM(S): at 05:16

## 2022-10-30 RX ADMIN — Medication 400 MILLIGRAM(S): at 20:17

## 2022-10-30 RX ADMIN — LIDOCAINE AND PRILOCAINE CREAM 1 APPLICATION(S): 25; 25 CREAM TOPICAL at 12:50

## 2022-10-30 RX ADMIN — Medication 50 MILLIEQUIVALENT(S): at 09:42

## 2022-10-30 RX ADMIN — Medication 1 TABLET(S): at 12:46

## 2022-10-30 RX ADMIN — Medication 100 MILLIGRAM(S): at 05:16

## 2022-10-30 RX ADMIN — Medication 25 MILLIGRAM(S): at 20:17

## 2022-10-30 RX ADMIN — Medication 5 MILLILITER(S): at 19:55

## 2022-10-30 RX ADMIN — Medication 400 MILLIGRAM(S): at 13:12

## 2022-10-30 RX ADMIN — Medication 5 MILLILITER(S): at 10:41

## 2022-10-30 RX ADMIN — SODIUM CHLORIDE 20 MILLILITER(S): 9 INJECTION INTRAMUSCULAR; INTRAVENOUS; SUBCUTANEOUS at 19:56

## 2022-10-30 RX ADMIN — Medication 480 MICROGRAM(S): at 12:58

## 2022-10-30 RX ADMIN — Medication 10 MILLILITER(S): at 12:49

## 2022-10-30 RX ADMIN — FLUCONAZOLE 400 MILLIGRAM(S): 150 TABLET ORAL at 12:47

## 2022-10-30 RX ADMIN — URSODIOL 300 MILLIGRAM(S): 250 TABLET, FILM COATED ORAL at 05:14

## 2022-10-30 RX ADMIN — Medication 1 LOZENGE: at 10:41

## 2022-10-30 RX ADMIN — Medication 50 MILLIEQUIVALENT(S): at 14:15

## 2022-10-30 RX ADMIN — NYSTATIN CREAM 1 APPLICATION(S): 100000 CREAM TOPICAL at 14:13

## 2022-10-30 RX ADMIN — Medication 1 LOZENGE: at 19:55

## 2022-10-30 RX ADMIN — HEPARIN SODIUM 3.12 UNIT(S)/HR: 5000 INJECTION INTRAVENOUS; SUBCUTANEOUS at 19:56

## 2022-10-30 RX ADMIN — Medication 1 MILLIGRAM(S): at 12:47

## 2022-10-30 RX ADMIN — CHLORHEXIDINE GLUCONATE 1 APPLICATION(S): 213 SOLUTION TOPICAL at 05:15

## 2022-10-30 RX ADMIN — Medication 10 MILLILITER(S): at 17:24

## 2022-10-30 RX ADMIN — Medication 10 MILLILITER(S): at 19:55

## 2022-10-30 RX ADMIN — URSODIOL 300 MILLIGRAM(S): 250 TABLET, FILM COATED ORAL at 17:25

## 2022-10-30 RX ADMIN — Medication 5 MILLILITER(S): at 00:02

## 2022-10-30 RX ADMIN — Medication 100 MILLIGRAM(S): at 13:12

## 2022-10-30 RX ADMIN — NYSTATIN CREAM 1 APPLICATION(S): 100000 CREAM TOPICAL at 05:15

## 2022-10-30 RX ADMIN — Medication 50 MILLIEQUIVALENT(S): at 12:47

## 2022-10-30 NOTE — PROGRESS NOTE ADULT - CRITICAL CARE ATTENDING COMMENT
72 year old male with multiple myeloma diagnosed 12/20/21 s/p RVD x 6 admitted for autologous pbsct with high dose melphalan prep regimen (dose reduced to 70mg / m2 for age)    Day +2    1. Admit to BMTU   2. -3 hours prior to Melphalan start hydration: D5NS at 200ml /hr and continue 24 hours post Melphalan infusion  3. Day – 3 & day -2 - Melphalan 70mg/m2  IV   4. Strict I&O, daily weights, prn diuresis   5. Day -1 rest day. At 2200 on day – 1, start transplant hydration 1/2NS + 50mEq NaHCO3- (may substitute [id number] if bicarb not available)  6. Day 0 – infuse HPC product. 4 hours after infusion of cells start Zarxio 5 micrograms / kg (actual weight) . Continue through engraftment.   7. On day 0, + 1, + 2-give Kepivance 60micrograms / kg (actual weight).  8. VOD prophylaxis - low dose heparin gtt (dosed at 100 units / kg / day), glutamine supplementation, Actigall BID   9. PCP prophylaxis - Bactrim DS through day -2    10. Antiviral prophylaxis - Acyclovir 400mg po TID to start day -1   11. Antifungal prophylaxis- Diflucan 400 mg po daily.  12. GI prophylaxis - Protonix po QD   13. Antibacterial prophylaxis - when ANC < 500, start Cipro 500mg po BID. If becomes febrile, pan cx, CXR and change Cipro to Cefepime 2g IV q 8 hours. Continue until count recovery  14. Kepivance for prevention of mucositis- days 0, +1, +2  15. Aggressive mouth care and skin care as per protocol..senna, miralax  prn  16. The patient is aware of his diagnosis...he does not want to be reminded of it every day 72 year old male with multiple myeloma diagnosed 12/20/21 s/p RVD x 6 admitted for autologous pbsct with high dose melphalan prep regimen (dose reduced to 70mg / m2 for age)    Day +3    1. Admit to BMTU   2. -3 hours prior to Melphalan start hydration: D5NS at 200ml /hr and continue 24 hours post Melphalan infusion  3. Day – 3 & day -2 - Melphalan 70mg/m2  IV   4. Strict I&O, daily weights, prn diuresis   5. Day -1 rest day. At 2200 on day – 1, start transplant hydration 1/2NS + 50mEq NaHCO3- (may substitute [id number] if bicarb not available)  6. Day 0 – infuse HPC product. 4 hours after infusion of cells start Zarxio 5 micrograms / kg (actual weight) . Continue through engraftment.   7. On day 0, + 1, + 2-give Kepivance 60micrograms / kg (actual weight).  8. VOD prophylaxis - low dose heparin gtt (dosed at 100 units / kg / day), glutamine supplementation, Actigall BID   9. PCP prophylaxis - Bactrim DS through day -2    10. Antiviral prophylaxis - Acyclovir 400mg po TID to start day -1   11. Antifungal prophylaxis- Diflucan 400 mg po daily.  12. GI prophylaxis - Protonix po QD   13. Antibacterial prophylaxis - when ANC < 500, start Cipro 500mg po BID. If becomes febrile, pan cx, CXR and change Cipro to Cefepime 2g IV q 8 hours. Continue until count recovery  14. Kepivance for prevention of mucositis- days 0, +1, +2  15. Aggressive mouth care and skin care as per protocol..senna, miralax  prn  16. The patient is aware of his diagnosis...he does not want to be reminded of it every day

## 2022-10-30 NOTE — PROGRESS NOTE ADULT - SUBJECTIVE AND OBJECTIVE BOX
HPC Transplant Team                                                      Critical / Counseling Time Provided: 30 minutes                                                                                                                                                        Chief Complaint: Autologous peripheral blood stem cell transplant with high dose Melphalan prep regimen for treatment of multiple myeloma    S: Patient seen and examined with HPC Transplant Team:     O:     Vital Signs Last 24 Hrs  T(C): 37 (30 Oct 2022 05:30), Max: 37 (30 Oct 2022 05:30)  T(F): 98.6 (30 Oct 2022 05:30), Max: 98.6 (30 Oct 2022 05:30)  HR: 103 (30 Oct 2022 05:30) (95 - 111)  BP: 119/64 (30 Oct 2022 05:30) (119/64 - 154/80)  BP(mean): --  RR: 18 (30 Oct 2022 05:30) (18 - 18)  SpO2: 98% (30 Oct 2022 05:30) (96% - 99%)    Parameters below as of 30 Oct 2022 05:30  Patient On (Oxygen Delivery Method): room air        Admit weight: 74.9kg    Daily Weight in kG: p    Intake / Output:   10-29 @ 07:01  -  10-30 @ 07:00  --------------------------------------------------------  IN: 1866.7 mL / OUT: 2455 mL / NET: -588.3 mL    PE:   Oropharynx: no erythema or ulcerations  Oral Mucositis:                 -                                       Grade: n/a  CVS: S1, S2 RRR   Lungs: CTA throughout bilaterally   Abdomen: + BS x 4, soft, NT, ND   Extremities: no edema  Gastric Mucositis:      -                                            Grade: n/a  Intestinal Mucositis:    -                                          Grade: n/a  Skin: no rash   TLC: CDI   Neuro: A&Ox3   Pain: 0        Labs:   CBC Full  -  ( 30 Oct 2022 06:53 )  WBC Count : 9.30 K/uL  Hemoglobin : 11.0 g/dL  Hematocrit : 34.5 %  Platelet Count - Automated : 91 K/uL  Mean Cell Volume : 81.4 fl  Mean Cell Hemoglobin : 25.9 pg  Mean Cell Hemoglobin Concentration : 31.9 gm/dL  Auto Neutrophil # : 8.83 K/uL  Auto Lymphocyte # : 0.04 K/uL  Auto Monocyte # : 0.01 K/uL  Auto Eosinophil # : 0.05 K/uL  Auto Basophil # : 0.02 K/uL  Auto Neutrophil % : 95.0 %  Auto Lymphocyte % : 0.4 %  Auto Monocyte % : 0.1 %  Auto Eosinophil % : 0.5 %  Auto Basophil % : 0.2 %                          11.0   9.30  )-----------( 91       ( 30 Oct 2022 06:53 )             34.5     10-30    139  |  100  |  13  ----------------------------<  101<H>  3.4<L>   |  29  |  0.87    Ca    8.6      30 Oct 2022 06:46  Phos  2.5     10-30  Mg     1.9     10-30    TPro  5.8<L>  /  Alb  3.5  /  TBili  0.7  /  DBili  x   /  AST  14  /  ALT  12  /  AlkPhos  98  10-30      LIVER FUNCTIONS - ( 30 Oct 2022 06:46 )  Alb: 3.5 g/dL / Pro: 5.8 g/dL / ALK PHOS: 98 U/L / ALT: 12 U/L / AST: 14 U/L / GGT: x           Lactate Dehydrogenase, Serum: 190 U/L (10-30 @ 06:46)  Lactate Dehydrogenase, Serum: 220 U/L (10-29 @ 07:47)            Cultures:         Radiology:       Meds:   Antimicrobials:   acyclovir   Oral Tab/Cap 400 milliGRAM(s) Oral every 8 hours  clotrimazole Lozenge 1 Lozenge Oral five times a day  fluconAZOLE   Tablet 400 milliGRAM(s) Oral daily      Heme / Onc:   heparin  Infusion 312 Unit(s)/Hr IV Continuous <Continuous>      GI:  aluminum hydroxide/magnesium hydroxide/simethicone Suspension 30 milliLiter(s) Oral every 6 hours PRN  lactulose Syrup 20 Gram(s) Oral once PRN  pantoprazole    Tablet 40 milliGRAM(s) Oral before breakfast  polyethylene glycol 3350 17 Gram(s) Oral daily PRN  senna 2 Tablet(s) Oral at bedtime  sodium bicarbonate Mouth Rinse 10 milliLiter(s) Swish and Spit five times a day  ursodiol Capsule 300 milliGRAM(s) Oral two times a day      Cardiovascular:   amLODIPine   Tablet 10 milliGRAM(s) Oral daily  hydrALAZINE 25 milliGRAM(s) Oral three times a day  tamsulosin 0.4 milliGRAM(s) Oral at bedtime      Immunologic:   filgrastim-sndz (ZARXIO) Injectable 480 MICROGram(s) SubCutaneous every 24 hours      Other medications:   acetaminophen     Tablet .. 650 milliGRAM(s) Oral every 6 hours  benzonatate 100 milliGRAM(s) Oral three times a day  Biotene Dry Mouth Oral Rinse 5 milliLiter(s) Swish and Spit five times a day  chlorhexidine 4% Liquid 1 Application(s) Topical <User Schedule>  dextrose 5%. 1000 milliLiter(s) IV Continuous <Continuous>  dextrose 5%. 1000 milliLiter(s) IV Continuous <Continuous>  dextrose 50% Injectable 25 Gram(s) IV Push once  dextrose 50% Injectable 12.5 Gram(s) IV Push once  dextrose 50% Injectable 25 Gram(s) IV Push once  finasteride 5 milliGRAM(s) Oral daily  folic acid 1 milliGRAM(s) Oral daily  glucagon  Injectable 1 milliGRAM(s) IntraMuscular once  insulin lispro (ADMELOG) corrective regimen sliding scale   SubCutaneous three times a day before meals  insulin lispro (ADMELOG) corrective regimen sliding scale   SubCutaneous at bedtime  lidocaine/prilocaine Cream 1 Application(s) Topical daily  loratadine 10 milliGRAM(s) Oral daily  LORazepam   Injectable 1 milliGRAM(s) IV Push every 24 hours  multivitamin 1 Tablet(s) Oral daily  nystatin Powder 1 Application(s) Topical three times a day  sodium chloride 0.45% 1000 milliLiter(s) IV Continuous <Continuous>  sodium chloride 0.9%. 1000 milliLiter(s) IV Continuous <Continuous>  sodium chloride 0.9%. 1000 milliLiter(s) IV Continuous <Continuous>      PRN:   acetaminophen     Tablet .. 650 milliGRAM(s) Oral every 6 hours PRN  aluminum hydroxide/magnesium hydroxide/simethicone Suspension 30 milliLiter(s) Oral every 6 hours PRN  dextrose Oral Gel 15 Gram(s) Oral once PRN  lactulose Syrup 20 Gram(s) Oral once PRN  LORazepam   Injectable 1 milliGRAM(s) IV Push every 6 hours PRN  metoclopramide Injectable 10 milliGRAM(s) IV Push every 6 hours PRN  ondansetron Injectable 8 milliGRAM(s) IV Push every 8 hours PRN  polyethylene glycol 3350 17 Gram(s) Oral daily PRN  sodium chloride 0.9% lock flush 10 milliLiter(s) IV Push every 1 hour PRN    A/P: 72 year old male with multiple myeloma diagnosed 12/20/21 s/p RVD x 6 admitted for autologous pbsct with high dose melphalan prep regimen (dose reduced to 70mg / m2 for age)  Day +3  10/25- melphalan 2/2; continue melphalan hydration for 24 hours post infusion of last dose. Strict I&O, daily weights, prn diuresis   10/27- HPC transplant today; continue transplant hydration for 24 hours post infusion of cells   10/28 weight gain, I>O, lasix 60 mg iv x1; constipated, Lactulose now and PRN    1. Infectious Disease:   acyclovir   Oral Tab/Cap 400 milliGRAM(s) Oral every 8 hours  clotrimazole Lozenge 1 Lozenge Oral five times a day  fluconAZOLE   Tablet 400 milliGRAM(s) Oral daily  Add Cipro if ANC<500, if fever, pancx CXR and switch Cipro to Cefepime       2. VOD Prophylaxis: Actigall, Glutamine, Heparin (dosed at 100 units / kg / day)     3. GI Prophylaxis:    pantoprazole    Tablet 40 milliGRAM(s) Oral before breakfast    4. Mouthcare - NS / NaHCO3 rinses, Mycelex, Biotene; Skin care     5. GVHD prophylaxis - n/a     6. Transfuse & replete electrolytes prn       7. IV hydration, daily weights, strict I&O, prn diuresis       8. PO intake as tolerated, nutrition follow up as needed, MVI, folic acid     9. Antiemetics, anti-diarrhea medications:   LORazepam   Injectable 1 milliGRAM(s) IV Push every 24 hours  aprepitant 80 milliGRAM(s) Oral every 24 hours  LORazepam   Injectable 1 milliGRAM(s) IV Push every 6 hours PRN  metoclopramide Injectable 10 milliGRAM(s) IV Push every 6 hours PRN  ondansetron 8mg IV q 8 hours prn     10. OOB as tolerated, physical therapy consult if needed     11. Monitor coags / fibrinogen 2x week, vitamin K as needed     12. Monitor closely for clinical changes, monitor for fevers     13. Emotional support provided, plan of care discussed and questions addressed     14. Patient education done regarding chemotherapy prep, plan of care, restrictions and discharge planning     15. Continue regular social work input     I have written the above note for Dr. Persaud who performed service with me in the room.   Gordon Chang PA-C ([phone])    I have seen and examined patient with PA, I agree with above note as scribed.                HPC Transplant Team                                                      Critical / Counseling Time Provided: 30 minutes                                                                                                                                                        Chief Complaint: Autologous peripheral blood stem cell transplant with high dose Melphalan prep regimen for treatment of multiple myeloma    S: Patient seen and examined with [facility] Transplant Team:   +Loose stools    Rest of ROS negative    Vital Signs Last 24 Hrs  T(C): 37 (30 Oct 2022 05:30), Max: 37 (30 Oct 2022 05:30)  T(F): 98.6 (30 Oct 2022 05:30), Max: 98.6 (30 Oct 2022 05:30)  HR: 103 (30 Oct 2022 05:30) (95 - 111)  BP: 119/64 (30 Oct 2022 05:30) (119/64 - 154/80)  BP(mean): --  RR: 18 (30 Oct 2022 05:30) (18 - 18)  SpO2: 98% (30 Oct 2022 05:30) (96% - 99%)    Parameters below as of 30 Oct 2022 05:30  Patient On (Oxygen Delivery Method): room air        Admit weight: 74.9kg  Daily Weight in k.6kg    Intake / Output:   10-29 @ 07:01  -  10-30 @ 07:00  --------------------------------------------------------  IN: 1866.7 mL / OUT: 2455 mL / NET: -588.3 mL    PE:   Oropharynx: no erythema or ulcerations  Oral Mucositis:                 -                                       Grade: n/a  CVS: S1, S2 RRR   Lungs: CTA throughout bilaterally   Abdomen: + BS x 4, soft, NT, ND   Extremities: no edema  Gastric Mucositis:      -                                            Grade: n/a  Intestinal Mucositis:    -                                          Grade: n/a  Skin: no rash   TLC: CDI   Neuro: A&Ox3   Pain: 0        Labs:   CBC Full  -  ( 30 Oct 2022 06:53 )  WBC Count : 9.30 K/uL  Hemoglobin : 11.0 g/dL  Hematocrit : 34.5 %  Platelet Count - Automated : 91 K/uL  Mean Cell Volume : 81.4 fl  Mean Cell Hemoglobin : 25.9 pg  Mean Cell Hemoglobin Concentration : 31.9 gm/dL  Auto Neutrophil # : 8.83 K/uL  Auto Lymphocyte # : 0.04 K/uL  Auto Monocyte # : 0.01 K/uL  Auto Eosinophil # : 0.05 K/uL  Auto Basophil # : 0.02 K/uL  Auto Neutrophil % : 95.0 %  Auto Lymphocyte % : 0.4 %  Auto Monocyte % : 0.1 %  Auto Eosinophil % : 0.5 %  Auto Basophil % : 0.2 %                          11.0   9.30  )-----------( 91       ( 30 Oct 2022 06:53 )             34.5     10-30    139  |  100  |  13  ----------------------------<  101<H>  3.4<L>   |  29  |  0.87    Ca    8.6      30 Oct 2022 06:46  Phos  2.5     10-30  Mg     1.9     10-30    TPro  5.8<L>  /  Alb  3.5  /  TBili  0.7  /  DBili  x   /  AST  14  /  ALT  12  /  AlkPhos  98  10-30      LIVER FUNCTIONS - ( 30 Oct 2022 06:46 )  Alb: 3.5 g/dL / Pro: 5.8 g/dL / ALK PHOS: 98 U/L / ALT: 12 U/L / AST: 14 U/L / GGT: x           Lactate Dehydrogenase, Serum: 190 U/L (10-30 @ 06:46)  Lactate Dehydrogenase, Serum: 220 U/L (10-29 @ 07:47)        Cultures:         Radiology:       Meds:   Antimicrobials:   acyclovir   Oral Tab/Cap 400 milliGRAM(s) Oral every 8 hours  clotrimazole Lozenge 1 Lozenge Oral five times a day  fluconAZOLE   Tablet 400 milliGRAM(s) Oral daily      Heme / Onc:   heparin  Infusion 312 Unit(s)/Hr IV Continuous <Continuous>      GI:  aluminum hydroxide/magnesium hydroxide/simethicone Suspension 30 milliLiter(s) Oral every 6 hours PRN  lactulose Syrup 20 Gram(s) Oral once PRN  pantoprazole    Tablet 40 milliGRAM(s) Oral before breakfast  polyethylene glycol 3350 17 Gram(s) Oral daily PRN  senna 2 Tablet(s) Oral at bedtime  sodium bicarbonate Mouth Rinse 10 milliLiter(s) Swish and Spit five times a day  ursodiol Capsule 300 milliGRAM(s) Oral two times a day      Cardiovascular:   amLODIPine   Tablet 10 milliGRAM(s) Oral daily  hydrALAZINE 25 milliGRAM(s) Oral three times a day  tamsulosin 0.4 milliGRAM(s) Oral at bedtime      Immunologic:   filgrastim-sndz (ZARXIO) Injectable 480 MICROGram(s) SubCutaneous every 24 hours      Other medications:   acetaminophen     Tablet .. 650 milliGRAM(s) Oral every 6 hours  benzonatate 100 milliGRAM(s) Oral three times a day  Biotene Dry Mouth Oral Rinse 5 milliLiter(s) Swish and Spit five times a day  chlorhexidine 4% Liquid 1 Application(s) Topical <User Schedule>  dextrose 5%. 1000 milliLiter(s) IV Continuous <Continuous>  dextrose 5%. 1000 milliLiter(s) IV Continuous <Continuous>  dextrose 50% Injectable 25 Gram(s) IV Push once  dextrose 50% Injectable 12.5 Gram(s) IV Push once  dextrose 50% Injectable 25 Gram(s) IV Push once  finasteride 5 milliGRAM(s) Oral daily  folic acid 1 milliGRAM(s) Oral daily  glucagon  Injectable 1 milliGRAM(s) IntraMuscular once  insulin lispro (ADMELOG) corrective regimen sliding scale   SubCutaneous three times a day before meals  insulin lispro (ADMELOG) corrective regimen sliding scale   SubCutaneous at bedtime  lidocaine/prilocaine Cream 1 Application(s) Topical daily  loratadine 10 milliGRAM(s) Oral daily  LORazepam   Injectable 1 milliGRAM(s) IV Push every 24 hours  multivitamin 1 Tablet(s) Oral daily  nystatin Powder 1 Application(s) Topical three times a day  sodium chloride 0.45% 1000 milliLiter(s) IV Continuous <Continuous>  sodium chloride 0.9%. 1000 milliLiter(s) IV Continuous <Continuous>  sodium chloride 0.9%. 1000 milliLiter(s) IV Continuous <Continuous>      PRN:   acetaminophen     Tablet .. 650 milliGRAM(s) Oral every 6 hours PRN  aluminum hydroxide/magnesium hydroxide/simethicone Suspension 30 milliLiter(s) Oral every 6 hours PRN  dextrose Oral Gel 15 Gram(s) Oral once PRN  lactulose Syrup 20 Gram(s) Oral once PRN  LORazepam   Injectable 1 milliGRAM(s) IV Push every 6 hours PRN  metoclopramide Injectable 10 milliGRAM(s) IV Push every 6 hours PRN  ondansetron Injectable 8 milliGRAM(s) IV Push every 8 hours PRN  polyethylene glycol 3350 17 Gram(s) Oral daily PRN  sodium chloride 0.9% lock flush 10 milliLiter(s) IV Push every 1 hour PRN    A/P: 72 year old male with multiple myeloma diagnosed 19 s/p RVD x 6 admitted for autologous pbsct with high dose melphalan prep regimen (dose reduced to 70mg / m2 for age)  Day +3  10/25- melphalan ; continue melphalan hydration for 24 hours post infusion of last dose. Strict I&O, daily weights, prn diuresis   10/27- HPC transplant today; continue transplant hydration for 24 hours post infusion of cells   10/28 weight gain, I>O, lasix 60 mg iv x1; constipated, Lactulose now and PRN    1. Infectious Disease:   acyclovir   Oral Tab/Cap 400 milliGRAM(s) Oral every 8 hours  clotrimazole Lozenge 1 Lozenge Oral five times a day  fluconAZOLE   Tablet 400 milliGRAM(s) Oral daily  Add Cipro if ANC<500, if fever, pancx CXR and switch Cipro to Cefepime       2. VOD Prophylaxis: Actigall, Glutamine, Heparin (dosed at 100 units / kg / day)     3. GI Prophylaxis:    pantoprazole    Tablet 40 milliGRAM(s) Oral before breakfast    4. Mouthcare - NS / NaHCO3 rinses, Mycelex, Biotene; Skin care     5. GVHD prophylaxis - n/a     6. Transfuse & replete electrolytes prn   KCL 20meq x3    7. IV hydration, daily weights, strict I&O, prn diuresis       8. PO intake as tolerated, nutrition follow up as needed, MVI, folic acid     9. Antiemetics, anti-diarrhea medications:   LORazepam   Injectable 1 milliGRAM(s) IV Push every 24 hours  aprepitant 80 milliGRAM(s) Oral every 24 hours  LORazepam   Injectable 1 milliGRAM(s) IV Push every 6 hours PRN  metoclopramide Injectable 10 milliGRAM(s) IV Push every 6 hours PRN  ondansetron 8mg IV q 8 hours prn     10. OOB as tolerated, physical therapy consult if needed     11. Monitor coags / fibrinogen 2x week, vitamin K as needed     12. Monitor closely for clinical changes, monitor for fevers     13. Emotional support provided, plan of care discussed and questions addressed     14. Patient education done regarding chemotherapy prep, plan of care, restrictions and discharge planning     15. Continue regular social work input     I have written the above note for Dr. Persaud who performed service with me in the room.   Gordon Chang PA-C ([phone])    I have seen and examined patient with PA, I agree with above note as scribed.

## 2022-10-31 LAB
ALBUMIN SERPL ELPH-MCNC: 3.5 G/DL — SIGNIFICANT CHANGE UP (ref 3.3–5)
ALP SERPL-CCNC: 94 U/L — SIGNIFICANT CHANGE UP (ref 40–120)
ALT FLD-CCNC: 17 U/L — SIGNIFICANT CHANGE UP (ref 10–45)
ANION GAP SERPL CALC-SCNC: 11 MMOL/L — SIGNIFICANT CHANGE UP (ref 5–17)
APTT BLD: 34.8 SEC — SIGNIFICANT CHANGE UP (ref 27.5–35.5)
AST SERPL-CCNC: 21 U/L — SIGNIFICANT CHANGE UP (ref 10–40)
BASOPHILS # BLD AUTO: 0 K/UL — SIGNIFICANT CHANGE UP (ref 0–0.2)
BASOPHILS NFR BLD AUTO: 0 % — SIGNIFICANT CHANGE UP (ref 0–2)
BILIRUB SERPL-MCNC: 0.6 MG/DL — SIGNIFICANT CHANGE UP (ref 0.2–1.2)
BLD GP AB SCN SERPL QL: NEGATIVE — SIGNIFICANT CHANGE UP
BUN SERPL-MCNC: 11 MG/DL — SIGNIFICANT CHANGE UP (ref 7–23)
CALCIUM SERPL-MCNC: 8.6 MG/DL — SIGNIFICANT CHANGE UP (ref 8.4–10.5)
CHLORIDE SERPL-SCNC: 102 MMOL/L — SIGNIFICANT CHANGE UP (ref 96–108)
CO2 SERPL-SCNC: 27 MMOL/L — SIGNIFICANT CHANGE UP (ref 22–31)
CREAT SERPL-MCNC: 0.87 MG/DL — SIGNIFICANT CHANGE UP (ref 0.5–1.3)
EGFR: 91 ML/MIN/1.73M2 — SIGNIFICANT CHANGE UP
EOSINOPHIL # BLD AUTO: 0.02 K/UL — SIGNIFICANT CHANGE UP (ref 0–0.5)
EOSINOPHIL NFR BLD AUTO: 1.8 % — SIGNIFICANT CHANGE UP (ref 0–6)
FIBRINOGEN PPP-MCNC: 502 MG/DL — SIGNIFICANT CHANGE UP (ref 330–520)
GLUCOSE BLDC GLUCOMTR-MCNC: 101 MG/DL — HIGH (ref 70–99)
GLUCOSE BLDC GLUCOMTR-MCNC: 131 MG/DL — HIGH (ref 70–99)
GLUCOSE BLDC GLUCOMTR-MCNC: 162 MG/DL — HIGH (ref 70–99)
GLUCOSE SERPL-MCNC: 113 MG/DL — HIGH (ref 70–99)
HCT VFR BLD CALC: 32.4 % — LOW (ref 39–50)
HGB BLD-MCNC: 10.3 G/DL — LOW (ref 13–17)
INR BLD: 1.04 RATIO — SIGNIFICANT CHANGE UP (ref 0.88–1.16)
LDH SERPL L TO P-CCNC: 166 U/L — SIGNIFICANT CHANGE UP (ref 50–242)
LYMPHOCYTES # BLD AUTO: 0.03 K/UL — LOW (ref 1–3.3)
LYMPHOCYTES # BLD AUTO: 2.6 % — LOW (ref 13–44)
MAGNESIUM SERPL-MCNC: 1.9 MG/DL — SIGNIFICANT CHANGE UP (ref 1.6–2.6)
MANUAL SMEAR VERIFICATION: SIGNIFICANT CHANGE UP
MCHC RBC-ENTMCNC: 25.7 PG — LOW (ref 27–34)
MCHC RBC-ENTMCNC: 31.8 GM/DL — LOW (ref 32–36)
MCV RBC AUTO: 80.8 FL — SIGNIFICANT CHANGE UP (ref 80–100)
MONOCYTES # BLD AUTO: 0 K/UL — SIGNIFICANT CHANGE UP (ref 0–0.9)
MONOCYTES NFR BLD AUTO: 0 % — LOW (ref 2–14)
NEUTROPHILS # BLD AUTO: 0.93 K/UL — LOW (ref 1.8–7.4)
NEUTROPHILS NFR BLD AUTO: 95.6 % — HIGH (ref 43–77)
PHOSPHATE SERPL-MCNC: 2.1 MG/DL — LOW (ref 2.5–4.5)
PLAT MORPH BLD: NORMAL — SIGNIFICANT CHANGE UP
PLATELET # BLD AUTO: 50 K/UL — LOW (ref 150–400)
POTASSIUM SERPL-MCNC: 3.7 MMOL/L — SIGNIFICANT CHANGE UP (ref 3.5–5.3)
POTASSIUM SERPL-SCNC: 3.7 MMOL/L — SIGNIFICANT CHANGE UP (ref 3.5–5.3)
PROT SERPL-MCNC: 6 G/DL — SIGNIFICANT CHANGE UP (ref 6–8.3)
PROTHROM AB SERPL-ACNC: 12 SEC — SIGNIFICANT CHANGE UP (ref 10.5–13.4)
RBC # BLD: 4.01 M/UL — LOW (ref 4.2–5.8)
RBC # FLD: 14.5 % — SIGNIFICANT CHANGE UP (ref 10.3–14.5)
RBC BLD AUTO: SIGNIFICANT CHANGE UP
RH IG SCN BLD-IMP: POSITIVE — SIGNIFICANT CHANGE UP
SODIUM SERPL-SCNC: 140 MMOL/L — SIGNIFICANT CHANGE UP (ref 135–145)
WBC # BLD: 0.97 K/UL — CRITICAL LOW (ref 3.8–10.5)
WBC # FLD AUTO: 0.97 K/UL — CRITICAL LOW (ref 3.8–10.5)

## 2022-10-31 PROCEDURE — 99291 CRITICAL CARE FIRST HOUR: CPT

## 2022-10-31 RX ORDER — CIPROFLOXACIN LACTATE 400MG/40ML
500 VIAL (ML) INTRAVENOUS EVERY 12 HOURS
Refills: 0 | Status: DISCONTINUED | OUTPATIENT
Start: 2022-10-31 | End: 2022-11-03

## 2022-10-31 RX ORDER — POTASSIUM PHOSPHATE, MONOBASIC POTASSIUM PHOSPHATE, DIBASIC 236; 224 MG/ML; MG/ML
15 INJECTION, SOLUTION INTRAVENOUS ONCE
Refills: 0 | Status: COMPLETED | OUTPATIENT
Start: 2022-10-31 | End: 2022-10-31

## 2022-10-31 RX ADMIN — TAMSULOSIN HYDROCHLORIDE 0.4 MILLIGRAM(S): 0.4 CAPSULE ORAL at 21:38

## 2022-10-31 RX ADMIN — Medication 1 TABLET(S): at 12:26

## 2022-10-31 RX ADMIN — Medication 1 LOZENGE: at 15:57

## 2022-10-31 RX ADMIN — Medication 1 LOZENGE: at 20:01

## 2022-10-31 RX ADMIN — Medication 25 MILLIGRAM(S): at 13:12

## 2022-10-31 RX ADMIN — LIDOCAINE AND PRILOCAINE CREAM 1 APPLICATION(S): 25; 25 CREAM TOPICAL at 12:28

## 2022-10-31 RX ADMIN — Medication 650 MILLIGRAM(S): at 00:40

## 2022-10-31 RX ADMIN — Medication 100 MILLIGRAM(S): at 05:13

## 2022-10-31 RX ADMIN — Medication 1 LOZENGE: at 12:21

## 2022-10-31 RX ADMIN — Medication 1 MILLIGRAM(S): at 12:25

## 2022-10-31 RX ADMIN — Medication 500 MILLIGRAM(S): at 17:38

## 2022-10-31 RX ADMIN — Medication 25 MILLIGRAM(S): at 21:38

## 2022-10-31 RX ADMIN — Medication 400 MILLIGRAM(S): at 21:38

## 2022-10-31 RX ADMIN — Medication 5 MILLILITER(S): at 12:19

## 2022-10-31 RX ADMIN — URSODIOL 300 MILLIGRAM(S): 250 TABLET, FILM COATED ORAL at 17:39

## 2022-10-31 RX ADMIN — URSODIOL 300 MILLIGRAM(S): 250 TABLET, FILM COATED ORAL at 05:13

## 2022-10-31 RX ADMIN — Medication 10 MILLILITER(S): at 20:19

## 2022-10-31 RX ADMIN — PANTOPRAZOLE SODIUM 40 MILLIGRAM(S): 20 TABLET, DELAYED RELEASE ORAL at 05:13

## 2022-10-31 RX ADMIN — Medication 650 MILLIGRAM(S): at 01:10

## 2022-10-31 RX ADMIN — Medication 10 MILLILITER(S): at 12:21

## 2022-10-31 RX ADMIN — Medication 25 MILLIGRAM(S): at 05:13

## 2022-10-31 RX ADMIN — FINASTERIDE 5 MILLIGRAM(S): 5 TABLET, FILM COATED ORAL at 12:28

## 2022-10-31 RX ADMIN — Medication 100 MILLIGRAM(S): at 13:11

## 2022-10-31 RX ADMIN — LORATADINE 10 MILLIGRAM(S): 10 TABLET ORAL at 12:25

## 2022-10-31 RX ADMIN — CHLORHEXIDINE GLUCONATE 1 APPLICATION(S): 213 SOLUTION TOPICAL at 07:43

## 2022-10-31 RX ADMIN — Medication 400 MILLIGRAM(S): at 05:12

## 2022-10-31 RX ADMIN — NYSTATIN CREAM 1 APPLICATION(S): 100000 CREAM TOPICAL at 05:13

## 2022-10-31 RX ADMIN — Medication 5 MILLILITER(S): at 20:00

## 2022-10-31 RX ADMIN — Medication 400 MILLIGRAM(S): at 13:11

## 2022-10-31 RX ADMIN — FLUCONAZOLE 400 MILLIGRAM(S): 150 TABLET ORAL at 12:23

## 2022-10-31 RX ADMIN — Medication 480 MICROGRAM(S): at 13:12

## 2022-10-31 RX ADMIN — NYSTATIN CREAM 1 APPLICATION(S): 100000 CREAM TOPICAL at 14:42

## 2022-10-31 RX ADMIN — POTASSIUM PHOSPHATE, MONOBASIC POTASSIUM PHOSPHATE, DIBASIC 62.5 MILLIMOLE(S): 236; 224 INJECTION, SOLUTION INTRAVENOUS at 09:25

## 2022-10-31 RX ADMIN — Medication 100 MILLIGRAM(S): at 21:38

## 2022-10-31 RX ADMIN — Medication 10 MILLILITER(S): at 15:57

## 2022-10-31 RX ADMIN — NYSTATIN CREAM 1 APPLICATION(S): 100000 CREAM TOPICAL at 21:39

## 2022-10-31 RX ADMIN — Medication 5 MILLILITER(S): at 15:57

## 2022-10-31 RX ADMIN — AMLODIPINE BESYLATE 10 MILLIGRAM(S): 2.5 TABLET ORAL at 05:13

## 2022-10-31 NOTE — PROGRESS NOTE ADULT - CRITICAL CARE ATTENDING COMMENT
72 year old male with multiple myeloma diagnosed 12/20/21 s/p RVD x 6 admitted for autologous pbsct with high dose melphalan prep regimen (dose reduced to 70mg / m2 for age)    Day + 4    1. Admit to BMTU   2. -3 hours prior to Melphalan start hydration: D5NS at 200ml /hr and continue 24 hours post Melphalan infusion  3. Day – 3 & day -2 - Melphalan 70mg/m2  IV   4. Strict I&O, daily weights, prn diuresis   5. Day -1 rest day. At 2200 on day – 1, start transplant hydration 1/2NS + 50mEq NaHCO3- (may substitute [id number] if bicarb not available)  6. Day 0 – infuse HPC product. 4 hours after infusion of cells start Zarxio 5 micrograms / kg (actual weight) . Continue through engraftment.   7. On day 0, + 1, + 2-give Kepivance 60micrograms / kg (actual weight).  8. VOD prophylaxis - low dose heparin gtt (dosed at 100 units / kg / day), glutamine supplementation, Actigall BID   9. PCP prophylaxis - Bactrim DS through day -2    10. Antiviral prophylaxis - Acyclovir 400mg po TID to start day -1   11. Antifungal prophylaxis- Diflucan 400 mg po daily.  12. GI prophylaxis - Protonix po QD   13. Antibacterial prophylaxis - when ANC < 500, start Cipro 500mg po BID. If becomes febrile, pan cx, CXR and change Cipro to Cefepime 2g IV q 8 hours. Continue until count recovery  14. Kepivance for prevention of mucositis- days 0, +1, +2  15. Aggressive mouth care and skin care as per protocol..senna, miralax  prn  16. The patient is aware of his diagnosis...he does not want to be reminded of it every day 72 year old male with multiple myeloma diagnosed 12/20/21 s/p RVD x 6 admitted for autologous pbsct with high dose melphalan prep regimen (dose reduced to 70mg / m2 for age)    Day + 4...some soft stool..wife on phone    1. Admit to BMTU   2. -3 hours prior to Melphalan start hydration: D5NS at 200ml /hr and continue 24 hours post Melphalan infusion  3. Day – 3 & day -2 - Melphalan 70mg/m2  IV   4. Strict I&O, daily weights, prn diuresis   5. Day -1 rest day. At 2200 on day – 1, start transplant hydration 1/2NS + 50mEq NaHCO3- (may substitute [id number] if bicarb not available)  6. Day 0 – infuse HPC product. 4 hours after infusion of cells start Zarxio 5 micrograms / kg (actual weight) . Continue through engraftment.   7. On day 0, + 1, + 2-give Kepivance 60micrograms / kg (actual weight).  8. VOD prophylaxis - low dose heparin gtt (dosed at 100 units / kg / day), glutamine supplementation, Actigall BID   9. PCP prophylaxis - Bactrim DS through day -2    10. Antiviral prophylaxis - Acyclovir 400mg po TID to start day -1   11. Antifungal prophylaxis- Diflucan 400 mg po daily.  12. GI prophylaxis - Protonix po QD   13. Antibacterial prophylaxis -  ANC < 500, start Cipro 500mg po BID. If becomes febrile, pan cx, CXR and change Cipro to Cefepime 2g IV q 8 hours. Continue until count recovery  14. Kepivance for prevention of mucositis- days 0, +1, +2  15. Aggressive mouth care and skin care as per protocol..senna, miralax  prn  16. The patient is aware of his diagnosis...he does not want to be reminded of it every day..

## 2022-10-31 NOTE — PROGRESS NOTE ADULT - SUBJECTIVE AND OBJECTIVE BOX
\[facility]\"" Transplant Team                                                      Critical / Counseling Time Provided: 30 minutes                                                                                                                                                        Chief Complaint: Autologous peripheral blood stem cell transplant with high dose Melphalan prep regimen for treatment of multiple myeloma    S: Patient seen and examined with \[facility]\"" Transplant Team:     All other ROS negative     O: Vitals:   Vital Signs Last 24 Hrs  T(C): 36.8 (31 Oct 2022 05:00), Max: 37 (31 Oct 2022 00:40)  T(F): 98.2 (31 Oct 2022 05:00), Max: 98.6 (31 Oct 2022 00:40)  HR: 103 (31 Oct 2022 05:00) (90 - 107)  BP: 133/74 (31 Oct 2022 05:00) (109/59 - 157/78)  BP(mean): --  RR: 18 (31 Oct 2022 05:00) (18 - 19)  SpO2: 98% (31 Oct 2022 05:00) (97% - 100%)    Parameters below as of 31 Oct 2022 05:00  Patient On (Oxygen Delivery Method): room air    Admit weight: 74.9kg   Daily Weight in k.6 (30 Oct 2022 09:10)  Today's weight:     Intake / Output:   10-30 @ 07:01  -  10-31 @ 07:00  --------------------------------------------------------  IN: 2119.9 mL / OUT: 2135 mL / NET: -15.1 mL      PE:   Oropharynx: no erythema or ulcerations  Oral Mucositis:                 -                                       Grade: n/a  CVS: S1, S2 RRR   Lungs: CTA throughout bilaterally   Abdomen: + BS x 4, soft, NT, ND   Extremities: no edema  Gastric Mucositis:      -                                            Grade: n/a  Intestinal Mucositis:    -                                          Grade: n/a  Skin: no rash   TLC: CDI   Neuro: A&Ox3   Pain: 0    Labs:     Meds:   Antimicrobials:   acyclovir   Oral Tab/Cap 400 milliGRAM(s) Oral every 8 hours  clotrimazole Lozenge 1 Lozenge Oral five times a day  fluconAZOLE   Tablet 400 milliGRAM(s) Oral daily      Heme / Onc:   heparin  Infusion 312 Unit(s)/Hr IV Continuous <Continuous>      GI:  aluminum hydroxide/magnesium hydroxide/simethicone Suspension 30 milliLiter(s) Oral every 6 hours PRN  lactulose Syrup 20 Gram(s) Oral once PRN  pantoprazole    Tablet 40 milliGRAM(s) Oral before breakfast  polyethylene glycol 3350 17 Gram(s) Oral daily PRN  senna 2 Tablet(s) Oral at bedtime  sodium bicarbonate Mouth Rinse 10 milliLiter(s) Swish and Spit five times a day  ursodiol Capsule 300 milliGRAM(s) Oral two times a day      Cardiovascular:   amLODIPine   Tablet 10 milliGRAM(s) Oral daily  hydrALAZINE 25 milliGRAM(s) Oral three times a day  tamsulosin 0.4 milliGRAM(s) Oral at bedtime      Immunologic:   filgrastim-sndz (ZARXIO) Injectable 480 MICROGram(s) SubCutaneous every 24 hours      Other medications:   acetaminophen     Tablet .. 650 milliGRAM(s) Oral every 6 hours  benzonatate 100 milliGRAM(s) Oral three times a day  Biotene Dry Mouth Oral Rinse 5 milliLiter(s) Swish and Spit five times a day  chlorhexidine 4% Liquid 1 Application(s) Topical <User Schedule>  dextrose 5%. 1000 milliLiter(s) IV Continuous <Continuous>  dextrose 5%. 1000 milliLiter(s) IV Continuous <Continuous>  dextrose 50% Injectable 25 Gram(s) IV Push once  dextrose 50% Injectable 12.5 Gram(s) IV Push once  dextrose 50% Injectable 25 Gram(s) IV Push once  finasteride 5 milliGRAM(s) Oral daily  folic acid 1 milliGRAM(s) Oral daily  glucagon  Injectable 1 milliGRAM(s) IntraMuscular once  insulin lispro (ADMELOG) corrective regimen sliding scale   SubCutaneous three times a day before meals  insulin lispro (ADMELOG) corrective regimen sliding scale   SubCutaneous at bedtime  lidocaine/prilocaine Cream 1 Application(s) Topical daily  loratadine 10 milliGRAM(s) Oral daily  LORazepam   Injectable 1 milliGRAM(s) IV Push every 24 hours  multivitamin 1 Tablet(s) Oral daily  nystatin Powder 1 Application(s) Topical three times a day  sodium chloride 0.45% 1000 milliLiter(s) IV Continuous <Continuous>  sodium chloride 0.9%. 1000 milliLiter(s) IV Continuous <Continuous>  sodium chloride 0.9%. 1000 milliLiter(s) IV Continuous <Continuous>      PRN:   acetaminophen     Tablet .. 650 milliGRAM(s) Oral every 6 hours PRN  aluminum hydroxide/magnesium hydroxide/simethicone Suspension 30 milliLiter(s) Oral every 6 hours PRN  dextrose Oral Gel 15 Gram(s) Oral once PRN  lactulose Syrup 20 Gram(s) Oral once PRN  LORazepam   Injectable 1 milliGRAM(s) IV Push every 6 hours PRN  metoclopramide Injectable 10 milliGRAM(s) IV Push every 6 hours PRN  ondansetron Injectable 8 milliGRAM(s) IV Push every 8 hours PRN  polyethylene glycol 3350 17 Gram(s) Oral daily PRN  sodium chloride 0.9% lock flush 10 milliLiter(s) IV Push every 1 hour PRN    A/P: 72 year old male with multiple myeloma diagnosed 19 s/p RVD x 6 admitted for autologous pbsct with high dose melphalan prep regimen (dose reduced to 70mg / m2 for age)  Day + 4  10/25- melphalan ; continue melphalan hydration for 24 hours post infusion of last dose. Strict I&O, daily weights, prn diuresis   10/27- [facility] transplant today; continue transplant hydration for 24 hours post infusion of cells   10/28 weight gain, I>O, lasix 60 mg iv x1; constipated, Lactulose now and PRN    1. Infectious Disease:   acyclovir   Oral Tab/Cap 400 milliGRAM(s) Oral every 8 hours  clotrimazole Lozenge 1 Lozenge Oral five times a day  fluconAZOLE   Tablet 400 milliGRAM(s) Oral daily    2. VOD Prophylaxis: Actigall, Glutamine, Heparin (dosed at 100 units / kg / day)     3. GI Prophylaxis:  pantoprazole    Tablet 40 milliGRAM(s) Oral before breakfast    4. Mouthcare - NS / NaHCO3 rinses, Mycelex, Biotene; Skin care     5. GVHD prophylaxis - n/a     6. Transfuse & replete electrolytes prn     7. IV hydration, daily weights, strict I&O, prn diuresis     8. PO intake as tolerated, nutrition follow up as needed, MVI, folic acid     9. Antiemetics, anti-diarrhea medications:   LORazepam   Injectable 1 milliGRAM(s) IV Push every 6 hours PRN  metoclopramide Injectable 10 milliGRAM(s) IV Push every 6 hours PRN  ondansetron Injectable 8 milliGRAM(s) IV Push every 8 hours PRN  LORazepam   Injectable 1 milliGRAM(s) IV Push every 24 hours    10. OOB as tolerated, physical therapy consult if needed     11. Monitor coags / fibrinogen 2x week, vitamin K as needed     12. Monitor closely for clinical changes, monitor for fevers     13. Emotional support provided, plan of care discussed and questions addressed     14. Patient education done regarding  plan of care, restrictions and discharge planning     15. Continue regular social work input     I have written the above note for Dr. Gonzales who performed service with me in the room.   Shayla Lundberg NP-C ([phone])    I have seen and examined patient with NP, I agree with above note as scribed.                    [facility] Transplant Team                                                      Critical / Counseling Time Provided: 30 minutes                                                                                                                                                        Chief Complaint: Autologous peripheral blood stem cell transplant with high dose Melphalan prep regimen for treatment of multiple myeloma    S: Patient seen and examined with [facility] Transplant Team:     All other ROS negative     O: Vitals:   Vital Signs Last 24 Hrs  T(C): 36.8 (31 Oct 2022 05:00), Max: 37 (31 Oct 2022 00:40)  T(F): 98.2 (31 Oct 2022 05:00), Max: 98.6 (31 Oct 2022 00:40)  HR: 103 (31 Oct 2022 05:00) (90 - 107)  BP: 133/74 (31 Oct 2022 05:00) (109/59 - 157/78)  BP(mean): --  RR: 18 (31 Oct 2022 05:00) (18 - 19)  SpO2: 98% (31 Oct 2022 05:00) (97% - 100%)    Parameters below as of 31 Oct 2022 05:00  Patient On (Oxygen Delivery Method): room air    Admit weight: 74.9kg   Daily Weight in k.6 (30 Oct 2022 09:10)  Today's weight:     Intake / Output:   10-30 @ 07:01  -  10-31 @ 07:00  --------------------------------------------------------  IN: 2119.9 mL / OUT: 2135 mL / NET: -15.1 mL      PE:   Oropharynx: no erythema or ulcerations  Oral Mucositis:                 -                                       Grade: n/a  CVS: S1, S2 RRR   Lungs: CTA throughout bilaterally   Abdomen: + BS x 4, soft, NT, ND   Extremities: no edema  Gastric Mucositis:      -                                            Grade: n/a  Intestinal Mucositis:    -                                          Grade: n/a  Skin: no rash   TLC: CDI   Neuro: A&Ox3   Pain: 0    Labs:                         10.3   0.97  )-----------( 50       ( 31 Oct 2022 07:36 )             32.4     10-    140  |  102  |  11  ----------------------------<  113<H>  3.7   |  27  |  0.87    Ca    8.6      31 Oct 2022 07:39  Phos  2.1     10-31  Mg     1.9     10-31    TPro  6.0  /  Alb  3.5  /  TBili  0.6  /  DBili  x   /  AST  21  /  ALT  17  /  AlkPhos  94  10-31      Meds:   Antimicrobials:   acyclovir   Oral Tab/Cap 400 milliGRAM(s) Oral every 8 hours  clotrimazole Lozenge 1 Lozenge Oral five times a day  fluconAZOLE   Tablet 400 milliGRAM(s) Oral daily      Heme / Onc:   heparin  Infusion 312 Unit(s)/Hr IV Continuous <Continuous>      GI:  aluminum hydroxide/magnesium hydroxide/simethicone Suspension 30 milliLiter(s) Oral every 6 hours PRN  lactulose Syrup 20 Gram(s) Oral once PRN  pantoprazole    Tablet 40 milliGRAM(s) Oral before breakfast  polyethylene glycol 3350 17 Gram(s) Oral daily PRN  senna 2 Tablet(s) Oral at bedtime  sodium bicarbonate Mouth Rinse 10 milliLiter(s) Swish and Spit five times a day  ursodiol Capsule 300 milliGRAM(s) Oral two times a day      Cardiovascular:   amLODIPine   Tablet 10 milliGRAM(s) Oral daily  hydrALAZINE 25 milliGRAM(s) Oral three times a day  tamsulosin 0.4 milliGRAM(s) Oral at bedtime      Immunologic:   filgrastim-sndz (ZARXIO) Injectable 480 MICROGram(s) SubCutaneous every 24 hours      Other medications:   acetaminophen     Tablet .. 650 milliGRAM(s) Oral every 6 hours  benzonatate 100 milliGRAM(s) Oral three times a day  Biotene Dry Mouth Oral Rinse 5 milliLiter(s) Swish and Spit five times a day  chlorhexidine 4% Liquid 1 Application(s) Topical <User Schedule>  dextrose 5%. 1000 milliLiter(s) IV Continuous <Continuous>  dextrose 5%. 1000 milliLiter(s) IV Continuous <Continuous>  dextrose 50% Injectable 25 Gram(s) IV Push once  dextrose 50% Injectable 12.5 Gram(s) IV Push once  dextrose 50% Injectable 25 Gram(s) IV Push once  finasteride 5 milliGRAM(s) Oral daily  folic acid 1 milliGRAM(s) Oral daily  glucagon  Injectable 1 milliGRAM(s) IntraMuscular once  insulin lispro (ADMELOG) corrective regimen sliding scale   SubCutaneous three times a day before meals  insulin lispro (ADMELOG) corrective regimen sliding scale   SubCutaneous at bedtime  lidocaine/prilocaine Cream 1 Application(s) Topical daily  loratadine 10 milliGRAM(s) Oral daily  LORazepam   Injectable 1 milliGRAM(s) IV Push every 24 hours  multivitamin 1 Tablet(s) Oral daily  nystatin Powder 1 Application(s) Topical three times a day  sodium chloride 0.45% 1000 milliLiter(s) IV Continuous <Continuous>  sodium chloride 0.9%. 1000 milliLiter(s) IV Continuous <Continuous>  sodium chloride 0.9%. 1000 milliLiter(s) IV Continuous <Continuous>      PRN:   acetaminophen     Tablet .. 650 milliGRAM(s) Oral every 6 hours PRN  aluminum hydroxide/magnesium hydroxide/simethicone Suspension 30 milliLiter(s) Oral every 6 hours PRN  dextrose Oral Gel 15 Gram(s) Oral once PRN  lactulose Syrup 20 Gram(s) Oral once PRN  LORazepam   Injectable 1 milliGRAM(s) IV Push every 6 hours PRN  metoclopramide Injectable 10 milliGRAM(s) IV Push every 6 hours PRN  ondansetron Injectable 8 milliGRAM(s) IV Push every 8 hours PRN  polyethylene glycol 3350 17 Gram(s) Oral daily PRN  sodium chloride 0.9% lock flush 10 milliLiter(s) IV Push every 1 hour PRN    A/P: 72 year old male with multiple myeloma diagnosed 19 s/p RVD x 6 admitted for autologous pbsct with high dose melphalan prep regimen (dose reduced to 70mg / m2 for age)  Day + 4  10/25- melphalan ; continue melphalan hydration for 24 hours post infusion of last dose. Strict I&O, daily weights, prn diuresis   10/27- [facility] transplant today; continue transplant hydration for 24 hours post infusion of cells   10/28 weight gain, I>O, lasix 60 mg iv x1; constipated, Lactulose now and PRN    1. Infectious Disease:   acyclovir   Oral Tab/Cap 400 milliGRAM(s) Oral every 8 hours  clotrimazole Lozenge 1 Lozenge Oral five times a day  fluconAZOLE   Tablet 400 milliGRAM(s) Oral daily    2. VOD Prophylaxis: Actigall, Glutamine, Heparin (dosed at 100 units / kg / day)     3. GI Prophylaxis:  pantoprazole    Tablet 40 milliGRAM(s) Oral before breakfast    4. Mouthcare - NS / NaHCO3 rinses, Mycelex, Biotene; Skin care     5. GVHD prophylaxis - n/a     6. Transfuse & replete electrolytes prn   Kphos 15mmol IV x 1    7. IV hydration, daily weights, strict I&O, prn diuresis     8. PO intake as tolerated, nutrition follow up as needed, MVI, folic acid     9. Antiemetics, anti-diarrhea medications:   LORazepam   Injectable 1 milliGRAM(s) IV Push every 6 hours PRN  metoclopramide Injectable 10 milliGRAM(s) IV Push every 6 hours PRN  ondansetron Injectable 8 milliGRAM(s) IV Push every 8 hours PRN  LORazepam   Injectable 1 milliGRAM(s) IV Push every 24 hours    10. OOB as tolerated, physical therapy consult if needed     11. Monitor coags / fibrinogen 2x week, vitamin K as needed     12. Monitor closely for clinical changes, monitor for fevers     13. Emotional support provided, plan of care discussed and questions addressed     14. Patient education done regarding  plan of care, restrictions and discharge planning     15. Continue regular social work input     I have written the above note for Dr. Gonzales who performed service with me in the room.   Shayla Lundberg NP-C ([phone])    I have seen and examined patient with NP, I agree with above note as scribed.                    [facility] Transplant Team                                                      Critical / Counseling Time Provided: 30 minutes                                                                                                                                                        Chief Complaint: Autologous peripheral blood stem cell transplant with high dose Melphalan prep regimen for treatment of multiple myeloma    S: Patient seen and examined with [facility] Transplant Team:   + fatigue   ROS / PE completed with wife on the telephone; all questions / issues addressed   All other ROS negative     O: Vitals:   Vital Signs Last 24 Hrs  T(C): 36.8 (31 Oct 2022 05:00), Max: 37 (31 Oct 2022 00:40)  T(F): 98.2 (31 Oct 2022 05:00), Max: 98.6 (31 Oct 2022 00:40)  HR: 103 (31 Oct 2022 05:00) (90 - 107)  BP: 133/74 (31 Oct 2022 05:00) (109/59 - 157/78)  BP(mean): --  RR: 18 (31 Oct 2022 05:00) (18 - 19)  SpO2: 98% (31 Oct 2022 05:00) (97% - 100%)    Parameters below as of 31 Oct 2022 05:00  Patient On (Oxygen Delivery Method): room air    Admit weight: 74.9kg   Daily Weight in k.6 (30 Oct 2022 09:10)  Today's weight:     Intake / Output:   10-30 @ 07:01  -  10- @ 07:00  --------------------------------------------------------  IN: 2119.9 mL / OUT: 2135 mL / NET: -15.1 mL      PE:   Oropharynx: no erythema or ulcerations  Oral Mucositis:                 -                                       Grade: n/a  CVS: S1, S2 RRR   Lungs: CTA throughout bilaterally   Abdomen: + BS x 4, soft, NT, ND   Extremities: no edema  Gastric Mucositis:      -                                            Grade: n/a  Intestinal Mucositis:    -                                          Grade: n/a  Skin: no rash   TLC: CDI   Neuro: A&Ox3   Pain: 0    Labs:                         10.3   0.97  )-----------( 50       ( 31 Oct 2022 07:36 )             32.4     10-31    140  |  102  |  11  ----------------------------<  113<H>  3.7   |  27  |  0.87    Ca    8.6      31 Oct 2022 07:39  Phos  2.1     10-31  Mg     1.9     10-31    TPro  6.0  /  Alb  3.5  /  TBili  0.6  /  DBili  x   /  AST  21  /  ALT  17  /  AlkPhos  94  10-31      Meds:   Antimicrobials:   acyclovir   Oral Tab/Cap 400 milliGRAM(s) Oral every 8 hours  clotrimazole Lozenge 1 Lozenge Oral five times a day  fluconAZOLE   Tablet 400 milliGRAM(s) Oral daily      Heme / Onc:   heparin  Infusion 312 Unit(s)/Hr IV Continuous <Continuous>      GI:  aluminum hydroxide/magnesium hydroxide/simethicone Suspension 30 milliLiter(s) Oral every 6 hours PRN  lactulose Syrup 20 Gram(s) Oral once PRN  pantoprazole    Tablet 40 milliGRAM(s) Oral before breakfast  polyethylene glycol 3350 17 Gram(s) Oral daily PRN  senna 2 Tablet(s) Oral at bedtime  sodium bicarbonate Mouth Rinse 10 milliLiter(s) Swish and Spit five times a day  ursodiol Capsule 300 milliGRAM(s) Oral two times a day      Cardiovascular:   amLODIPine   Tablet 10 milliGRAM(s) Oral daily  hydrALAZINE 25 milliGRAM(s) Oral three times a day  tamsulosin 0.4 milliGRAM(s) Oral at bedtime      Immunologic:   filgrastim-sndz (ZARXIO) Injectable 480 MICROGram(s) SubCutaneous every 24 hours      Other medications:   acetaminophen     Tablet .. 650 milliGRAM(s) Oral every 6 hours  benzonatate 100 milliGRAM(s) Oral three times a day  Biotene Dry Mouth Oral Rinse 5 milliLiter(s) Swish and Spit five times a day  chlorhexidine 4% Liquid 1 Application(s) Topical <User Schedule>  dextrose 5%. 1000 milliLiter(s) IV Continuous <Continuous>  dextrose 5%. 1000 milliLiter(s) IV Continuous <Continuous>  dextrose 50% Injectable 25 Gram(s) IV Push once  dextrose 50% Injectable 12.5 Gram(s) IV Push once  dextrose 50% Injectable 25 Gram(s) IV Push once  finasteride 5 milliGRAM(s) Oral daily  folic acid 1 milliGRAM(s) Oral daily  glucagon  Injectable 1 milliGRAM(s) IntraMuscular once  insulin lispro (ADMELOG) corrective regimen sliding scale   SubCutaneous three times a day before meals  insulin lispro (ADMELOG) corrective regimen sliding scale   SubCutaneous at bedtime  lidocaine/prilocaine Cream 1 Application(s) Topical daily  loratadine 10 milliGRAM(s) Oral daily  LORazepam   Injectable 1 milliGRAM(s) IV Push every 24 hours  multivitamin 1 Tablet(s) Oral daily  nystatin Powder 1 Application(s) Topical three times a day  sodium chloride 0.45% 1000 milliLiter(s) IV Continuous <Continuous>  sodium chloride 0.9%. 1000 milliLiter(s) IV Continuous <Continuous>  sodium chloride 0.9%. 1000 milliLiter(s) IV Continuous <Continuous>      PRN:   acetaminophen     Tablet .. 650 milliGRAM(s) Oral every 6 hours PRN  aluminum hydroxide/magnesium hydroxide/simethicone Suspension 30 milliLiter(s) Oral every 6 hours PRN  dextrose Oral Gel 15 Gram(s) Oral once PRN  lactulose Syrup 20 Gram(s) Oral once PRN  LORazepam   Injectable 1 milliGRAM(s) IV Push every 6 hours PRN  metoclopramide Injectable 10 milliGRAM(s) IV Push every 6 hours PRN  ondansetron Injectable 8 milliGRAM(s) IV Push every 8 hours PRN  polyethylene glycol 3350 17 Gram(s) Oral daily PRN  sodium chloride 0.9% lock flush 10 milliLiter(s) IV Push every 1 hour PRN    A/P: 72 year old male with multiple myeloma diagnosed 21 s/p RVD x 6 admitted for autologous pbsct with high dose melphalan prep regimen (dose reduced to 70mg / m2 for age)  Day + 4  10/25- melphalan /; continue melphalan hydration for 24 hours post infusion of last dose. Strict I&O, daily weights, prn diuresis   10/27- [facility] transplant today; continue transplant hydration for 24 hours post infusion of cells   10/28 weight gain, I>O, lasix 60 mg iv x1; constipated, Lactulose now and PRN    1. Infectious Disease:   acyclovir   Oral Tab/Cap 400 milliGRAM(s) Oral every 8 hours  clotrimazole Lozenge 1 Lozenge Oral five times a day  fluconAZOLE   Tablet 400 milliGRAM(s) Oral daily    2. VOD Prophylaxis: Actigall, Glutamine, Heparin (dosed at 100 units / kg / day)     3. GI Prophylaxis:  pantoprazole    Tablet 40 milliGRAM(s) Oral before breakfast    4. Mouthcare - NS / NaHCO3 rinses, Mycelex, Biotene; Skin care     5. GVHD prophylaxis - n/a     6. Transfuse & replete electrolytes prn   Kphos 15mmol IV x 1    7. IV hydration, daily weights, strict I&O, prn diuresis     8. PO intake as tolerated, nutrition follow up as needed, MVI, folic acid     9. Antiemetics, anti-diarrhea medications:   LORazepam   Injectable 1 milliGRAM(s) IV Push every 6 hours PRN  metoclopramide Injectable 10 milliGRAM(s) IV Push every 6 hours PRN  ondansetron Injectable 8 milliGRAM(s) IV Push every 8 hours PRN  LORazepam   Injectable 1 milliGRAM(s) IV Push every 24 hours    10. OOB as tolerated, physical therapy consult if needed     11. Monitor coags / fibrinogen 2x week, vitamin K as needed     12. Monitor closely for clinical changes, monitor for fevers     13. Emotional support provided, plan of care discussed and questions addressed     14. Patient education done regarding  plan of care, restrictions and discharge planning     15. Continue regular social work input     I have written the above note for Dr. Gonzales who performed service with me in the room.   Shayla Lundberg NP-C ([phone])    I have seen and examined patient with NP, I agree with above note as scribed.                    [facility] Transplant Team                                                      Critical / Counseling Time Provided: 30 minutes                                                                                                                                                        Chief Complaint: Autologous peripheral blood stem cell transplant with high dose Melphalan prep regimen for treatment of multiple myeloma    S: Patient seen and examined with [facility] Transplant Team:   + fatigue   ROS / PE completed with wife on the telephone; all questions / issues addressed   All other ROS negative     O: Vitals:   Vital Signs Last 24 Hrs  T(C): 36.8 (31 Oct 2022 05:00), Max: 37 (31 Oct 2022 00:40)  T(F): 98.2 (31 Oct 2022 05:00), Max: 98.6 (31 Oct 2022 00:40)  HR: 103 (31 Oct 2022 05:00) (90 - 107)  BP: 133/74 (31 Oct 2022 05:00) (109/59 - 157/78)  BP(mean): --  RR: 18 (31 Oct 2022 05:00) (18 - 19)  SpO2: 98% (31 Oct 2022 05:00) (97% - 100%)    Parameters below as of 31 Oct 2022 05:00  Patient On (Oxygen Delivery Method): room air    Admit weight: 74.9kg   Daily Weight in k.6 (30 Oct 2022 09:10)  Today's weight: 73.3kg     Intake / Output:   10-30 @ 07:01  -  10-31 @ 07:00  --------------------------------------------------------  IN: 2119.9 mL / OUT: 2135 mL / NET: -15.1 mL      PE:   Oropharynx: no erythema or ulcerations  Oral Mucositis:                 -                                       Grade: n/a  CVS: S1, S2 RRR   Lungs: CTA throughout bilaterally   Abdomen: + BS x 4, soft, NT, ND   Extremities: no edema  Gastric Mucositis:      -                                            Grade: n/a  Intestinal Mucositis:    -                                          Grade: n/a  Skin: no rash   TLC: CDI   Neuro: A&Ox3   Pain: 0    Labs:                         10.3   0.97  )-----------( 50       ( 31 Oct 2022 07:36 )             32.4     10    140  |  102  |  11  ----------------------------<  113<H>  3.7   |  27  |  0.87    Ca    8.6      31 Oct 2022 07:39  Phos  2.1     10-31  Mg     1.9     10-31    TPro  6.0  /  Alb  3.5  /  TBili  0.6  /  DBili  x   /  AST  21  /  ALT  17  /  AlkPhos  94  10-31      Meds:   Antimicrobials:   acyclovir   Oral Tab/Cap 400 milliGRAM(s) Oral every 8 hours  clotrimazole Lozenge 1 Lozenge Oral five times a day  fluconAZOLE   Tablet 400 milliGRAM(s) Oral daily      Heme / Onc:   heparin  Infusion 312 Unit(s)/Hr IV Continuous <Continuous>      GI:  aluminum hydroxide/magnesium hydroxide/simethicone Suspension 30 milliLiter(s) Oral every 6 hours PRN  lactulose Syrup 20 Gram(s) Oral once PRN  pantoprazole    Tablet 40 milliGRAM(s) Oral before breakfast  polyethylene glycol 3350 17 Gram(s) Oral daily PRN  senna 2 Tablet(s) Oral at bedtime  sodium bicarbonate Mouth Rinse 10 milliLiter(s) Swish and Spit five times a day  ursodiol Capsule 300 milliGRAM(s) Oral two times a day      Cardiovascular:   amLODIPine   Tablet 10 milliGRAM(s) Oral daily  hydrALAZINE 25 milliGRAM(s) Oral three times a day  tamsulosin 0.4 milliGRAM(s) Oral at bedtime      Immunologic:   filgrastim-sndz (ZARXIO) Injectable 480 MICROGram(s) SubCutaneous every 24 hours      Other medications:   acetaminophen     Tablet .. 650 milliGRAM(s) Oral every 6 hours  benzonatate 100 milliGRAM(s) Oral three times a day  Biotene Dry Mouth Oral Rinse 5 milliLiter(s) Swish and Spit five times a day  chlorhexidine 4% Liquid 1 Application(s) Topical <User Schedule>  dextrose 5%. 1000 milliLiter(s) IV Continuous <Continuous>  dextrose 5%. 1000 milliLiter(s) IV Continuous <Continuous>  dextrose 50% Injectable 25 Gram(s) IV Push once  dextrose 50% Injectable 12.5 Gram(s) IV Push once  dextrose 50% Injectable 25 Gram(s) IV Push once  finasteride 5 milliGRAM(s) Oral daily  folic acid 1 milliGRAM(s) Oral daily  glucagon  Injectable 1 milliGRAM(s) IntraMuscular once  insulin lispro (ADMELOG) corrective regimen sliding scale   SubCutaneous three times a day before meals  insulin lispro (ADMELOG) corrective regimen sliding scale   SubCutaneous at bedtime  lidocaine/prilocaine Cream 1 Application(s) Topical daily  loratadine 10 milliGRAM(s) Oral daily  LORazepam   Injectable 1 milliGRAM(s) IV Push every 24 hours  multivitamin 1 Tablet(s) Oral daily  nystatin Powder 1 Application(s) Topical three times a day  sodium chloride 0.45% 1000 milliLiter(s) IV Continuous <Continuous>  sodium chloride 0.9%. 1000 milliLiter(s) IV Continuous <Continuous>  sodium chloride 0.9%. 1000 milliLiter(s) IV Continuous <Continuous>      PRN:   acetaminophen     Tablet .. 650 milliGRAM(s) Oral every 6 hours PRN  aluminum hydroxide/magnesium hydroxide/simethicone Suspension 30 milliLiter(s) Oral every 6 hours PRN  dextrose Oral Gel 15 Gram(s) Oral once PRN  lactulose Syrup 20 Gram(s) Oral once PRN  LORazepam   Injectable 1 milliGRAM(s) IV Push every 6 hours PRN  metoclopramide Injectable 10 milliGRAM(s) IV Push every 6 hours PRN  ondansetron Injectable 8 milliGRAM(s) IV Push every 8 hours PRN  polyethylene glycol 3350 17 Gram(s) Oral daily PRN  sodium chloride 0.9% lock flush 10 milliLiter(s) IV Push every 1 hour PRN    A/P: 72 year old male with multiple myeloma diagnosed 21 s/p RVD x 6 admitted for autologous pbsct with high dose melphalan prep regimen (dose reduced to 70mg / m2 for age)  Day + 4  10/25- melphalan /; continue melphalan hydration for 24 hours post infusion of last dose. Strict I&O, daily weights, prn diuresis   10/27- HPC transplant today; continue transplant hydration for 24 hours post infusion of cells   10/28 weight gain, I>O, lasix 60 mg iv x1; constipated, Lactulose now and PRN    1. Infectious Disease:   acyclovir   Oral Tab/Cap 400 milliGRAM(s) Oral every 8 hours  clotrimazole Lozenge 1 Lozenge Oral five times a day  fluconAZOLE   Tablet 400 milliGRAM(s) Oral daily    2. VOD Prophylaxis: Actigall, Glutamine, Heparin (dosed at 100 units / kg / day)     3. GI Prophylaxis:  pantoprazole    Tablet 40 milliGRAM(s) Oral before breakfast    4. Mouthcare - NS / NaHCO3 rinses, Mycelex, Biotene; Skin care     5. GVHD prophylaxis - n/a     6. Transfuse & replete electrolytes prn   Kphos 15mmol IV x 1    7. IV hydration, daily weights, strict I&O, prn diuresis     8. PO intake as tolerated, nutrition follow up as needed, MVI, folic acid     9. Antiemetics, anti-diarrhea medications:   LORazepam   Injectable 1 milliGRAM(s) IV Push every 6 hours PRN  metoclopramide Injectable 10 milliGRAM(s) IV Push every 6 hours PRN  ondansetron Injectable 8 milliGRAM(s) IV Push every 8 hours PRN  LORazepam   Injectable 1 milliGRAM(s) IV Push every 24 hours    10. OOB as tolerated, physical therapy consult if needed     11. Monitor coags / fibrinogen 2x week, vitamin K as needed     12. Monitor closely for clinical changes, monitor for fevers     13. Emotional support provided, plan of care discussed and questions addressed     14. Patient education done regarding  plan of care, restrictions and discharge planning     15. Continue regular social work input     I have written the above note for Dr. Gonzales who performed service with me in the room.   Shayla Lundberg NP-C ([phone])    I have seen and examined patient with NP, I agree with above note as scribed.

## 2022-11-01 ENCOUNTER — NON-APPOINTMENT (OUTPATIENT)
Age: 72
End: 2022-11-01

## 2022-11-01 LAB
ALBUMIN SERPL ELPH-MCNC: 3.8 G/DL — SIGNIFICANT CHANGE UP (ref 3.3–5)
ALP SERPL-CCNC: 101 U/L — SIGNIFICANT CHANGE UP (ref 40–120)
ALT FLD-CCNC: 24 U/L — SIGNIFICANT CHANGE UP (ref 10–45)
ANION GAP SERPL CALC-SCNC: 10 MMOL/L — SIGNIFICANT CHANGE UP (ref 5–17)
AST SERPL-CCNC: 21 U/L — SIGNIFICANT CHANGE UP (ref 10–40)
BILIRUB SERPL-MCNC: 0.5 MG/DL — SIGNIFICANT CHANGE UP (ref 0.2–1.2)
BUN SERPL-MCNC: 10 MG/DL — SIGNIFICANT CHANGE UP (ref 7–23)
C DIFF GDH STL QL: NEGATIVE — SIGNIFICANT CHANGE UP
C DIFF GDH STL QL: SIGNIFICANT CHANGE UP
CALCIUM SERPL-MCNC: 9.1 MG/DL — SIGNIFICANT CHANGE UP (ref 8.4–10.5)
CHLORIDE SERPL-SCNC: 101 MMOL/L — SIGNIFICANT CHANGE UP (ref 96–108)
CO2 SERPL-SCNC: 27 MMOL/L — SIGNIFICANT CHANGE UP (ref 22–31)
CREAT SERPL-MCNC: 0.82 MG/DL — SIGNIFICANT CHANGE UP (ref 0.5–1.3)
EGFR: 92 ML/MIN/1.73M2 — SIGNIFICANT CHANGE UP
GLUCOSE BLDC GLUCOMTR-MCNC: 115 MG/DL — HIGH (ref 70–99)
GLUCOSE BLDC GLUCOMTR-MCNC: 128 MG/DL — HIGH (ref 70–99)
GLUCOSE BLDC GLUCOMTR-MCNC: 132 MG/DL — HIGH (ref 70–99)
GLUCOSE BLDC GLUCOMTR-MCNC: 186 MG/DL — HIGH (ref 70–99)
GLUCOSE SERPL-MCNC: 137 MG/DL — HIGH (ref 70–99)
HCT VFR BLD CALC: 32.5 % — LOW (ref 39–50)
HGB BLD-MCNC: 10.7 G/DL — LOW (ref 13–17)
LDH SERPL L TO P-CCNC: 159 U/L — SIGNIFICANT CHANGE UP (ref 50–242)
MAGNESIUM SERPL-MCNC: 1.8 MG/DL — SIGNIFICANT CHANGE UP (ref 1.6–2.6)
MCHC RBC-ENTMCNC: 26.2 PG — LOW (ref 27–34)
MCHC RBC-ENTMCNC: 32.9 GM/DL — SIGNIFICANT CHANGE UP (ref 32–36)
MCV RBC AUTO: 79.5 FL — LOW (ref 80–100)
NRBC # BLD: 0 /100 WBCS — SIGNIFICANT CHANGE UP (ref 0–0)
PHOSPHATE SERPL-MCNC: 2.1 MG/DL — LOW (ref 2.5–4.5)
PLATELET # BLD AUTO: 28 K/UL — LOW (ref 150–400)
POTASSIUM SERPL-MCNC: 3.7 MMOL/L — SIGNIFICANT CHANGE UP (ref 3.5–5.3)
POTASSIUM SERPL-SCNC: 3.7 MMOL/L — SIGNIFICANT CHANGE UP (ref 3.5–5.3)
PROT SERPL-MCNC: 6.5 G/DL — SIGNIFICANT CHANGE UP (ref 6–8.3)
RBC # BLD: 4.09 M/UL — LOW (ref 4.2–5.8)
RBC # FLD: 14.5 % — SIGNIFICANT CHANGE UP (ref 10.3–14.5)
SODIUM SERPL-SCNC: 138 MMOL/L — SIGNIFICANT CHANGE UP (ref 135–145)
WBC # BLD: <0.1 K/UL — CRITICAL LOW (ref 3.8–10.5)
WBC # FLD AUTO: <0.1 K/UL — CRITICAL LOW (ref 3.8–10.5)

## 2022-11-01 PROCEDURE — 99291 CRITICAL CARE FIRST HOUR: CPT

## 2022-11-01 RX ORDER — POTASSIUM PHOSPHATE, MONOBASIC POTASSIUM PHOSPHATE, DIBASIC 236; 224 MG/ML; MG/ML
15 INJECTION, SOLUTION INTRAVENOUS ONCE
Refills: 0 | Status: COMPLETED | OUTPATIENT
Start: 2022-11-01 | End: 2022-11-01

## 2022-11-01 RX ADMIN — CHLORHEXIDINE GLUCONATE 1 APPLICATION(S): 213 SOLUTION TOPICAL at 06:29

## 2022-11-01 RX ADMIN — Medication 5 MILLILITER(S): at 16:55

## 2022-11-01 RX ADMIN — Medication 10 MILLILITER(S): at 22:14

## 2022-11-01 RX ADMIN — POTASSIUM PHOSPHATE, MONOBASIC POTASSIUM PHOSPHATE, DIBASIC 62.5 MILLIMOLE(S): 236; 224 INJECTION, SOLUTION INTRAVENOUS at 08:21

## 2022-11-01 RX ADMIN — Medication 400 MILLIGRAM(S): at 21:33

## 2022-11-01 RX ADMIN — Medication 1 LOZENGE: at 00:28

## 2022-11-01 RX ADMIN — Medication 100 MILLIGRAM(S): at 21:33

## 2022-11-01 RX ADMIN — Medication 25 MILLIGRAM(S): at 06:22

## 2022-11-01 RX ADMIN — Medication 100 MILLIGRAM(S): at 12:35

## 2022-11-01 RX ADMIN — Medication 5 MILLILITER(S): at 20:31

## 2022-11-01 RX ADMIN — Medication 10 MILLILITER(S): at 08:22

## 2022-11-01 RX ADMIN — AMLODIPINE BESYLATE 10 MILLIGRAM(S): 2.5 TABLET ORAL at 06:22

## 2022-11-01 RX ADMIN — Medication 10 MILLILITER(S): at 16:55

## 2022-11-01 RX ADMIN — URSODIOL 300 MILLIGRAM(S): 250 TABLET, FILM COATED ORAL at 06:21

## 2022-11-01 RX ADMIN — Medication 1 TABLET(S): at 12:32

## 2022-11-01 RX ADMIN — Medication 400 MILLIGRAM(S): at 12:34

## 2022-11-01 RX ADMIN — Medication 25 MILLIGRAM(S): at 12:35

## 2022-11-01 RX ADMIN — Medication 1 LOZENGE: at 08:21

## 2022-11-01 RX ADMIN — Medication 1 LOZENGE: at 16:56

## 2022-11-01 RX ADMIN — NYSTATIN CREAM 1 APPLICATION(S): 100000 CREAM TOPICAL at 12:35

## 2022-11-01 RX ADMIN — Medication 500 MILLIGRAM(S): at 16:56

## 2022-11-01 RX ADMIN — Medication 500 MILLIGRAM(S): at 06:25

## 2022-11-01 RX ADMIN — NYSTATIN CREAM 1 APPLICATION(S): 100000 CREAM TOPICAL at 22:14

## 2022-11-01 RX ADMIN — Medication 10 MILLILITER(S): at 00:44

## 2022-11-01 RX ADMIN — Medication 480 MICROGRAM(S): at 12:35

## 2022-11-01 RX ADMIN — PANTOPRAZOLE SODIUM 40 MILLIGRAM(S): 20 TABLET, DELAYED RELEASE ORAL at 06:25

## 2022-11-01 RX ADMIN — TAMSULOSIN HYDROCHLORIDE 0.4 MILLIGRAM(S): 0.4 CAPSULE ORAL at 21:33

## 2022-11-01 RX ADMIN — Medication 1 LOZENGE: at 20:31

## 2022-11-01 RX ADMIN — Medication 1 LOZENGE: at 12:31

## 2022-11-01 RX ADMIN — FLUCONAZOLE 400 MILLIGRAM(S): 150 TABLET ORAL at 12:32

## 2022-11-01 RX ADMIN — Medication 1: at 12:30

## 2022-11-01 RX ADMIN — FINASTERIDE 5 MILLIGRAM(S): 5 TABLET, FILM COATED ORAL at 12:33

## 2022-11-01 RX ADMIN — Medication 400 MILLIGRAM(S): at 06:23

## 2022-11-01 RX ADMIN — LORATADINE 10 MILLIGRAM(S): 10 TABLET ORAL at 12:33

## 2022-11-01 RX ADMIN — URSODIOL 300 MILLIGRAM(S): 250 TABLET, FILM COATED ORAL at 16:56

## 2022-11-01 RX ADMIN — Medication 5 MILLILITER(S): at 08:21

## 2022-11-01 RX ADMIN — LIDOCAINE AND PRILOCAINE CREAM 1 APPLICATION(S): 25; 25 CREAM TOPICAL at 12:31

## 2022-11-01 RX ADMIN — Medication 1 MILLIGRAM(S): at 13:01

## 2022-11-01 RX ADMIN — Medication 100 MILLIGRAM(S): at 06:23

## 2022-11-01 RX ADMIN — Medication 10 MILLILITER(S): at 12:31

## 2022-11-01 RX ADMIN — Medication 5 MILLILITER(S): at 12:31

## 2022-11-01 RX ADMIN — NYSTATIN CREAM 1 APPLICATION(S): 100000 CREAM TOPICAL at 06:29

## 2022-11-01 RX ADMIN — Medication 5 MILLILITER(S): at 00:28

## 2022-11-01 NOTE — PHYSICAL THERAPY INITIAL EVALUATION ADULT - PERTINENT HX OF CURRENT PROBLEM, REHAB EVAL
Pt is 72 year old male with multiple myeloma admitted for an autologous pbsct with high dose Melphalan prep regimen. Hematologic history as follows: Initially presented in 11/2021 with anemia and back pain, diagnosed with multiple myeloma 12/2021. PMHx includes BPH, HTN, DVT, DM, CVA with residual R sided weakness, HTN. Hosp course: 10/24 central line placed to R IJV.

## 2022-11-01 NOTE — PHYSICAL THERAPY INITIAL EVALUATION ADULT - ADDITIONAL COMMENTS
Per chart review, pt lives in a pvt apartment on the 1st floor with his spouse and adult children. Pt states that he was Independent prior to admission. Per chart review, pt lives in a pvt apartment on the 1st floor with his spouse and adult children. Pt states that he was Independent prior to admission but owns a RW.

## 2022-11-01 NOTE — PROGRESS NOTE ADULT - CRITICAL CARE ATTENDING COMMENT
72 year old male with multiple myeloma diagnosed 12/20/21 s/p RVD x 6 admitted for autologous pbsct with high dose melphalan prep regimen (dose reduced to 70mg / m2 for age)    Day + 5...some soft stool..wife on phone    1. Admit to BMTU   2. -3 hours prior to Melphalan start hydration: D5NS at 200ml /hr and continue 24 hours post Melphalan infusion  3. Day – 3 & day -2 - Melphalan 70mg/m2  IV   4. Strict I&O, daily weights, prn diuresis   5. Day -1 rest day. At 2200 on day – 1, start transplant hydration 1/2NS + 50mEq NaHCO3- (may substitute [id number] if bicarb not available)  6. Day 0 – infuse HPC product. 4 hours after infusion of cells start Zarxio 5 micrograms / kg (actual weight) . Continue through engraftment.   7. On day 0, + 1, + 2-give Kepivance 60micrograms / kg (actual weight).  8. VOD prophylaxis - low dose heparin gtt (dosed at 100 units / kg / day), glutamine supplementation, Actigall BID   9. PCP prophylaxis - Bactrim DS through day -2    10. Antiviral prophylaxis - Acyclovir 400mg po TID to start day -1   11. Antifungal prophylaxis- Diflucan 400 mg po daily.  12. GI prophylaxis - Protonix po QD   13. Antibacterial prophylaxis -  ANC < 500, start Cipro 500mg po BID. If becomes febrile, pan cx, CXR and change Cipro to Cefepime 2g IV q 8 hours. Continue until count recovery  14. Kepivance for prevention of mucositis- days 0, +1, +2  15. Aggressive mouth care and skin care as per protocol..senna, miralax  prn  16. The patient is aware of his diagnosis...he does not want to be reminded of it every day.. 72 year old male with multiple myeloma diagnosed 12/20/21 s/p RVD x 6 admitted for autologous pbsct with high dose melphalan prep regimen (dose reduced to 70mg / m2 for age)    Day + 5...some soft stool..    1. Admit to BMTU   2. -3 hours prior to Melphalan start hydration: D5NS at 200ml /hr and continue 24 hours post Melphalan infusion  3. Day – 3 & day -2 - Melphalan 70mg/m2  IV   4. Strict I&O, daily weights, prn diuresis   5. Day -1 rest day. At 2200 on day – 1, start transplant hydration 1/2NS + 50mEq NaHCO3- (may substitute [id number] if bicarb not available)  6. Day 0 – infuse HPC product. 4 hours after infusion of cells start Zarxio 5 micrograms / kg (actual weight) . Continue through engraftment.   7. On day 0, + 1, + 2-give Kepivance 60micrograms / kg (actual weight).  8. VOD prophylaxis - low dose heparin gtt (dosed at 100 units / kg / day), glutamine supplementation, Actigall BID   9. PCP prophylaxis - Bactrim DS through day -2    10. Antiviral prophylaxis - Acyclovir 400mg po TID to start day -1   11. Antifungal prophylaxis- Diflucan 400 mg po daily.  12. GI prophylaxis - Protonix po QD   13. Antibacterial prophylaxis -  ANC < 500, start Cipro 500mg po BID. If becomes febrile, pan cx, CXR and change Cipro to Cefepime 2g IV q 8 hours. Continue until count recovery  14. Kepivance for prevention of mucositis- days 0, +1, +2  15. Aggressive mouth care and skin care as per protocol..senna, miralax  prn  16. The patient is aware of his diagnosis...he does not want to be reminded of it every day..

## 2022-11-01 NOTE — PHYSICAL THERAPY INITIAL EVALUATION ADULT - BALANCE TRAINING, PT EVAL
GOAL: Patient will demonstrate an increase in static/dynamic balance in sitting/standing where deficient by at least 1 grade to facilitate greater independence during functional mobility and ADL's in 2 weeks.

## 2022-11-01 NOTE — PHYSICAL THERAPY INITIAL EVALUATION ADULT - TRANSFER TRAINING, PT EVAL
GOAL: Patient will perform sit to stand transfers independently at rolling walker with proper hand placement and sequencing in 2 weeks.

## 2022-11-01 NOTE — PHYSICAL THERAPY INITIAL EVALUATION ADULT - STRENGTHENING, PT EVAL
GOAL: Patient will demonstrate a 1/2 increase in strength where deficient to assist with performing functional mobility and ADLs.

## 2022-11-01 NOTE — PROGRESS NOTE ADULT - SUBJECTIVE AND OBJECTIVE BOX
HPC Transplant Team                                                      Critical / Counseling Time Provided: 30 minutes                                                                                                                                                        Chief Complaint: Autologous peripheral blood stem cell transplant with high dose Melphalan prep regimen for treatment of multiple myeloma    S: Patient seen and examined with HPC Transplant Team:     All other ROS negative    O: Vitals:   Vital Signs Last 24 Hrs  T(C): 36.8 (2022 06:00), Max: 37.3 (2022 00:48)  T(F): 98.2 (2022 06:00), Max: 99.1 (2022 00:48)  HR: 102 (2022 06:00) (95 - 110)  BP: 138/76 (2022 06:00) (132/75 - 153/78)  BP(mean): --  RR: 18 (2022 06:00) (18 - 18)  SpO2: 97% (2022 06:00) (97% - 100%)    Parameters below as of 31 Oct 2022 21:43  Patient On (Oxygen Delivery Method): room air      Admit weight: 74.69kg  Daily Weight in k.3 (31 Oct 2022 09:30)  Today's weight:     Intake / Output:   10-31 @ 07:01  -  11-01 @ 07:00  --------------------------------------------------------  IN: 1978.9 mL / OUT: 1503 mL / NET: 475.9 mL      PE:   Oropharynx: no erythema or ulcerations  Oral Mucositis:                 -                                       Grade: n/a  CVS: S1, S2 RRR   Lungs: CTA throughout bilaterally   Abdomen: + BS x 4, soft, NT, ND   Extremities: no edema  Gastric Mucositis:      -                                            Grade: n/a  Intestinal Mucositis:    -                                          Grade: n/a  Skin: no rash   TLC: CDI   Neuro: A&Ox3   Pain: 0    Labs:   CBC Full  -  ( 2022 06:55 )  WBC Count : <0.10 K/uL  Hemoglobin : 10.7 g/dL  Hematocrit : 32.5 %  Platelet Count - Automated : 28 K/uL  Mean Cell Volume : 79.5 fl  Mean Cell Hemoglobin : 26.2 pg  Mean Cell Hemoglobin Concentration : 32.9 gm/dL  Auto Neutrophil # : x  Auto Lymphocyte # : x  Auto Monocyte # : x  Auto Eosinophil # : x  Auto Basophil # : x  Auto Neutrophil % : x  Auto Lymphocyte % : x  Auto Monocyte % : x  Auto Eosinophil % : x  Auto Basophil % : x                          10.7   <0.10 )-----------( 28       ( 2022 06:55 )             32.5         138  |  101  |  10  ----------------------------<  137<H>  3.7   |  27  |  0.82    Ca    9.1      2022 06:53  Phos  2.1       Mg     1.8         TPro  6.5  /  Alb  3.8  /  TBili  0.5  /  DBili  x   /  AST  21  /  ALT  24  /  AlkPhos  101      PT/INR - ( 31 Oct 2022 07:37 )   PT: 12.0 sec;   INR: 1.04 ratio         PTT - ( 31 Oct 2022 07:37 )  PTT:34.8 sec  LIVER FUNCTIONS - ( 2022 06:53 )  Alb: 3.8 g/dL / Pro: 6.5 g/dL / ALK PHOS: 101 U/L / ALT: 24 U/L / AST: 21 U/L / GGT: x           Lactate Dehydrogenase, Serum: 159 U/L ( @ 06:53)      Meds:   Antimicrobials:   acyclovir   Oral Tab/Cap 400 milliGRAM(s) Oral every 8 hours  ciprofloxacin     Tablet 500 milliGRAM(s) Oral every 12 hours  clotrimazole Lozenge 1 Lozenge Oral five times a day  fluconAZOLE   Tablet 400 milliGRAM(s) Oral daily      Heme / Onc:   heparin  Infusion 312 Unit(s)/Hr IV Continuous <Continuous>      GI:  aluminum hydroxide/magnesium hydroxide/simethicone Suspension 30 milliLiter(s) Oral every 6 hours PRN  lactulose Syrup 20 Gram(s) Oral once PRN  pantoprazole    Tablet 40 milliGRAM(s) Oral before breakfast  polyethylene glycol 3350 17 Gram(s) Oral daily PRN  senna 2 Tablet(s) Oral at bedtime  sodium bicarbonate Mouth Rinse 10 milliLiter(s) Swish and Spit five times a day  ursodiol Capsule 300 milliGRAM(s) Oral two times a day      Cardiovascular:   amLODIPine   Tablet 10 milliGRAM(s) Oral daily  hydrALAZINE 25 milliGRAM(s) Oral three times a day      Immunologic:   filgrastim-sndz (ZARXIO) Injectable 480 MICROGram(s) SubCutaneous every 24 hours      Other medications:   acetaminophen     Tablet .. 650 milliGRAM(s) Oral every 6 hours  benzonatate 100 milliGRAM(s) Oral three times a day  Biotene Dry Mouth Oral Rinse 5 milliLiter(s) Swish and Spit five times a day  chlorhexidine 4% Liquid 1 Application(s) Topical <User Schedule>  dextrose 5%. 1000 milliLiter(s) IV Continuous <Continuous>  dextrose 5%. 1000 milliLiter(s) IV Continuous <Continuous>  dextrose 50% Injectable 25 Gram(s) IV Push once  dextrose 50% Injectable 12.5 Gram(s) IV Push once  dextrose 50% Injectable 25 Gram(s) IV Push once  finasteride 5 milliGRAM(s) Oral daily  folic acid 1 milliGRAM(s) Oral daily  glucagon  Injectable 1 milliGRAM(s) IntraMuscular once  insulin lispro (ADMELOG) corrective regimen sliding scale   SubCutaneous three times a day before meals  insulin lispro (ADMELOG) corrective regimen sliding scale   SubCutaneous at bedtime  lidocaine/prilocaine Cream 1 Application(s) Topical daily  loratadine 10 milliGRAM(s) Oral daily  LORazepam   Injectable 1 milliGRAM(s) IV Push every 24 hours  multivitamin 1 Tablet(s) Oral daily  nystatin Powder 1 Application(s) Topical three times a day  potassium phosphate IVPB 15 milliMole(s) IV Intermittent once  sodium chloride 0.45% 1000 milliLiter(s) IV Continuous <Continuous>  sodium chloride 0.9%. 1000 milliLiter(s) IV Continuous <Continuous>  sodium chloride 0.9%. 1000 milliLiter(s) IV Continuous <Continuous>  tamsulosin 0.4 milliGRAM(s) Oral at bedtime      PRN:   acetaminophen     Tablet .. 650 milliGRAM(s) Oral every 6 hours PRN  aluminum hydroxide/magnesium hydroxide/simethicone Suspension 30 milliLiter(s) Oral every 6 hours PRN  dextrose Oral Gel 15 Gram(s) Oral once PRN  lactulose Syrup 20 Gram(s) Oral once PRN  LORazepam   Injectable 1 milliGRAM(s) IV Push every 6 hours PRN  metoclopramide Injectable 10 milliGRAM(s) IV Push every 6 hours PRN  ondansetron Injectable 8 milliGRAM(s) IV Push every 8 hours PRN  polyethylene glycol 3350 17 Gram(s) Oral daily PRN  sodium chloride 0.9% lock flush 10 milliLiter(s) IV Push every 1 hour PRN    A/P: 72 year old male with multiple myeloma diagnosed 19 s/p RVD x 6 admitted for autologous pbsct with high dose melphalan prep regimen (dose reduced to 70mg / m2 for age)  Day + 5  10/25- melphalan ; continue melphalan hydration for 24 hours post infusion of last dose. Strict I&O, daily weights, prn diuresis   10/27- HPC transplant today; continue transplant hydration for 24 hours post infusion of cells   10/28 weight gain, I>O, lasix 60 mg iv x1; constipated, Lactulose now and PRN  - [facility]. Started on cipro 10/31/22. If T >/= 38C, pan cx, CXR and change cipro to cefepime 2g IV q 8 hours     1. Infectious Disease:   acyclovir   Oral Tab/Cap 400 milliGRAM(s) Oral every 8 hours  ciprofloxacin     Tablet 500 milliGRAM(s) Oral every 12 hours  clotrimazole Lozenge 1 Lozenge Oral five times a day  fluconAZOLE   Tablet 400 milliGRAM(s) Oral daily    2. VOD Prophylaxis: Actigall, Glutamine, Heparin (dosed at 100 units / kg / day)     3. GI Prophylaxis:   pantoprazole    Tablet 40 milliGRAM(s) Oral before breakfast    4. Mouthcare - NS / NaHCO3 rinses, Mycelex, Biotene; Skin care     5. GVHD prophylaxis - n/a     6. Transfuse & replete electrolytes prn   potassium phosphate IVPB 15 milliMole(s) IV Intermittent once    7. IV hydration, daily weights, strict I&O, prn diuresis     8. PO intake as tolerated, nutrition follow up as needed, MVI, folic acid     9. Antiemetics, anti-diarrhea medications:   LORazepam   Injectable 1 milliGRAM(s) IV Push every 6 hours PRN  metoclopramide Injectable 10 milliGRAM(s) IV Push every 6 hours PRN  ondansetron Injectable 8 milliGRAM(s) IV Push every 8 hours PRN  LORazepam   Injectable 1 milliGRAM(s) IV Push every 24 hours    10. OOB as tolerated, physical therapy consult if needed     11. Monitor coags / fibrinogen 2x week, vitamin K as needed     12. Monitor closely for clinical changes, monitor for fevers     13. Emotional support provided, plan of care discussed and questions addressed     14. Patient education done regarding plan of care, restrictions and discharge planning     15. Continue regular social work input     I have written the above note for Dr. Gonzales performed service with me in the room.   Shayla Lundberg NP-C ([phone])    I have seen and examined patient with NP, I agree with above note as scribed.                    HPC Transplant Team                                                      Critical / Counseling Time Provided: 30 minutes                                                                                                                                                        Chief Complaint: Autologous peripheral blood stem cell transplant with high dose Melphalan prep regimen for treatment of multiple myeloma    S: Patient seen and examined with HPC Transplant Team:   All ROS negative    O: Vitals:   Vital Signs Last 24 Hrs  T(C): 36.8 (2022 06:00), Max: 37.3 (2022 00:48)  T(F): 98.2 (2022 06:00), Max: 99.1 (2022 00:48)  HR: 102 (2022 06:00) (95 - 110)  BP: 138/76 (2022 06:00) (132/75 - 153/78)  BP(mean): --  RR: 18 (2022 06:00) (18 - 18)  SpO2: 97% (2022 06:00) (97% - 100%)    Parameters below as of 31 Oct 2022 21:43  Patient On (Oxygen Delivery Method): room air      Admit weight: 74.69kg  Daily Weight in k.3 (31 Oct 2022 09:30)  Today's weight: 72.3kg     Intake / Output:   10-31 @ 07:01  -  -01 @ 07:00  --------------------------------------------------------  IN: 1978.9 mL / OUT: 1503 mL / NET: 475.9 mL      PE:   Oropharynx: no erythema or ulcerations  Oral Mucositis:                 -                                       Grade: n/a  CVS: S1, S2 RRR   Lungs: CTA throughout bilaterally   Abdomen: + BS x 4, soft, NT, ND   Extremities: no edema  Gastric Mucositis:      -                                            Grade: n/a  Intestinal Mucositis:    -                                          Grade: n/a  Skin: no rash   TLC: CDI   Neuro: A&Ox3   Pain: 0    Labs:   CBC Full  -  ( 2022 06:55 )  WBC Count : <0.10 K/uL  Hemoglobin : 10.7 g/dL  Hematocrit : 32.5 %  Platelet Count - Automated : 28 K/uL  Mean Cell Volume : 79.5 fl  Mean Cell Hemoglobin : 26.2 pg  Mean Cell Hemoglobin Concentration : 32.9 gm/dL  Auto Neutrophil # : x  Auto Lymphocyte # : x  Auto Monocyte # : x  Auto Eosinophil # : x  Auto Basophil # : x  Auto Neutrophil % : x  Auto Lymphocyte % : x  Auto Monocyte % : x  Auto Eosinophil % : x  Auto Basophil % : x                          10.7   <0.10 )-----------( 28       ( 2022 06:55 )             32.5         138  |  101  |  10  ----------------------------<  137<H>  3.7   |  27  |  0.82    Ca    9.1      2022 06:53  Phos  2.1       Mg     1.8         TPro  6.5  /  Alb  3.8  /  TBili  0.5  /  DBili  x   /  AST  21  /  ALT  24  /  AlkPhos  101      PT/INR - ( 31 Oct 2022 07:37 )   PT: 12.0 sec;   INR: 1.04 ratio         PTT - ( 31 Oct 2022 07:37 )  PTT:34.8 sec  LIVER FUNCTIONS - ( 2022 06:53 )  Alb: 3.8 g/dL / Pro: 6.5 g/dL / ALK PHOS: 101 U/L / ALT: 24 U/L / AST: 21 U/L / GGT: x           Lactate Dehydrogenase, Serum: 159 U/L ( @ 06:53)      Meds:   Antimicrobials:   acyclovir   Oral Tab/Cap 400 milliGRAM(s) Oral every 8 hours  ciprofloxacin     Tablet 500 milliGRAM(s) Oral every 12 hours  clotrimazole Lozenge 1 Lozenge Oral five times a day  fluconAZOLE   Tablet 400 milliGRAM(s) Oral daily      Heme / Onc:   heparin  Infusion 312 Unit(s)/Hr IV Continuous <Continuous>      GI:  aluminum hydroxide/magnesium hydroxide/simethicone Suspension 30 milliLiter(s) Oral every 6 hours PRN  lactulose Syrup 20 Gram(s) Oral once PRN  pantoprazole    Tablet 40 milliGRAM(s) Oral before breakfast  polyethylene glycol 3350 17 Gram(s) Oral daily PRN  senna 2 Tablet(s) Oral at bedtime  sodium bicarbonate Mouth Rinse 10 milliLiter(s) Swish and Spit five times a day  ursodiol Capsule 300 milliGRAM(s) Oral two times a day      Cardiovascular:   amLODIPine   Tablet 10 milliGRAM(s) Oral daily  hydrALAZINE 25 milliGRAM(s) Oral three times a day      Immunologic:   filgrastim-sndz (ZARXIO) Injectable 480 MICROGram(s) SubCutaneous every 24 hours      Other medications:   acetaminophen     Tablet .. 650 milliGRAM(s) Oral every 6 hours  benzonatate 100 milliGRAM(s) Oral three times a day  Biotene Dry Mouth Oral Rinse 5 milliLiter(s) Swish and Spit five times a day  chlorhexidine 4% Liquid 1 Application(s) Topical <User Schedule>  dextrose 5%. 1000 milliLiter(s) IV Continuous <Continuous>  dextrose 5%. 1000 milliLiter(s) IV Continuous <Continuous>  dextrose 50% Injectable 25 Gram(s) IV Push once  dextrose 50% Injectable 12.5 Gram(s) IV Push once  dextrose 50% Injectable 25 Gram(s) IV Push once  finasteride 5 milliGRAM(s) Oral daily  folic acid 1 milliGRAM(s) Oral daily  glucagon  Injectable 1 milliGRAM(s) IntraMuscular once  insulin lispro (ADMELOG) corrective regimen sliding scale   SubCutaneous three times a day before meals  insulin lispro (ADMELOG) corrective regimen sliding scale   SubCutaneous at bedtime  lidocaine/prilocaine Cream 1 Application(s) Topical daily  loratadine 10 milliGRAM(s) Oral daily  LORazepam   Injectable 1 milliGRAM(s) IV Push every 24 hours  multivitamin 1 Tablet(s) Oral daily  nystatin Powder 1 Application(s) Topical three times a day  potassium phosphate IVPB 15 milliMole(s) IV Intermittent once  sodium chloride 0.45% 1000 milliLiter(s) IV Continuous <Continuous>  sodium chloride 0.9%. 1000 milliLiter(s) IV Continuous <Continuous>  sodium chloride 0.9%. 1000 milliLiter(s) IV Continuous <Continuous>  tamsulosin 0.4 milliGRAM(s) Oral at bedtime      PRN:   acetaminophen     Tablet .. 650 milliGRAM(s) Oral every 6 hours PRN  aluminum hydroxide/magnesium hydroxide/simethicone Suspension 30 milliLiter(s) Oral every 6 hours PRN  dextrose Oral Gel 15 Gram(s) Oral once PRN  lactulose Syrup 20 Gram(s) Oral once PRN  LORazepam   Injectable 1 milliGRAM(s) IV Push every 6 hours PRN  metoclopramide Injectable 10 milliGRAM(s) IV Push every 6 hours PRN  ondansetron Injectable 8 milliGRAM(s) IV Push every 8 hours PRN  polyethylene glycol 3350 17 Gram(s) Oral daily PRN  sodium chloride 0.9% lock flush 10 milliLiter(s) IV Push every 1 hour PRN    A/P: 72 year old male with multiple myeloma diagnosed 19 s/p RVD x 6 admitted for autologous pbsct with high dose melphalan prep regimen (dose reduced to 70mg / m2 for age)  Day + 5  10/25- melphalan ; continue melphalan hydration for 24 hours post infusion of last dose. Strict I&O, daily weights, prn diuresis   10/27- HPC transplant today; continue transplant hydration for 24 hours post infusion of cells   10/28 weight gain, I>O, lasix 60 mg iv x1; constipated, Lactulose now and PRN  - [facility]. Started on cipro 10/31/22. If T >/= 38C, pan cx, CXR and change cipro to cefepime 2g IV q 8 hours     1. Infectious Disease:   acyclovir   Oral Tab/Cap 400 milliGRAM(s) Oral every 8 hours  ciprofloxacin     Tablet 500 milliGRAM(s) Oral every 12 hours  clotrimazole Lozenge 1 Lozenge Oral five times a day  fluconAZOLE   Tablet 400 milliGRAM(s) Oral daily    2. VOD Prophylaxis: Actigall, Glutamine, Heparin (dosed at 100 units / kg / day)     3. GI Prophylaxis:   pantoprazole    Tablet 40 milliGRAM(s) Oral before breakfast    4. Mouthcare - NS / NaHCO3 rinses, Mycelex, Biotene; Skin care     5. GVHD prophylaxis - n/a     6. Transfuse & replete electrolytes prn   potassium phosphate IVPB 15 milliMole(s) IV Intermittent once    7. IV hydration, daily weights, strict I&O, prn diuresis     8. PO intake as tolerated, nutrition follow up as needed, MVI, folic acid     9. Antiemetics, anti-diarrhea medications:   LORazepam   Injectable 1 milliGRAM(s) IV Push every 6 hours PRN  metoclopramide Injectable 10 milliGRAM(s) IV Push every 6 hours PRN  ondansetron Injectable 8 milliGRAM(s) IV Push every 8 hours PRN  LORazepam   Injectable 1 milliGRAM(s) IV Push every 24 hours    10. OOB as tolerated, physical therapy consult if needed     11. Monitor coags / fibrinogen 2x week, vitamin K as needed     12. Monitor closely for clinical changes, monitor for fevers     13. Emotional support provided, plan of care discussed and questions addressed     14. Patient education done regarding plan of care, restrictions and discharge planning     15. Continue regular social work input     I have written the above note for Dr. Gonzales performed service with me in the room.   Shayla Lundberg NP-C ([phone])    I have seen and examined patient with NP, I agree with above note as scribed.

## 2022-11-02 LAB
ALBUMIN SERPL ELPH-MCNC: 3.3 G/DL — SIGNIFICANT CHANGE UP (ref 3.3–5)
ALP SERPL-CCNC: 88 U/L — SIGNIFICANT CHANGE UP (ref 40–120)
ALT FLD-CCNC: 19 U/L — SIGNIFICANT CHANGE UP (ref 10–45)
ANION GAP SERPL CALC-SCNC: 8 MMOL/L — SIGNIFICANT CHANGE UP (ref 5–17)
AST SERPL-CCNC: 11 U/L — SIGNIFICANT CHANGE UP (ref 10–40)
BILIRUB DIRECT SERPL-MCNC: 0.1 MG/DL — SIGNIFICANT CHANGE UP (ref 0–0.3)
BILIRUB INDIRECT FLD-MCNC: 0.3 MG/DL — SIGNIFICANT CHANGE UP (ref 0.2–1)
BILIRUB SERPL-MCNC: 0.4 MG/DL — SIGNIFICANT CHANGE UP (ref 0.2–1.2)
BLD GP AB SCN SERPL QL: NEGATIVE — SIGNIFICANT CHANGE UP
BUN SERPL-MCNC: 10 MG/DL — SIGNIFICANT CHANGE UP (ref 7–23)
CALCIUM SERPL-MCNC: 8.5 MG/DL — SIGNIFICANT CHANGE UP (ref 8.4–10.5)
CHLORIDE SERPL-SCNC: 103 MMOL/L — SIGNIFICANT CHANGE UP (ref 96–108)
CO2 SERPL-SCNC: 27 MMOL/L — SIGNIFICANT CHANGE UP (ref 22–31)
CREAT SERPL-MCNC: 0.83 MG/DL — SIGNIFICANT CHANGE UP (ref 0.5–1.3)
EGFR: 92 ML/MIN/1.73M2 — SIGNIFICANT CHANGE UP
GLUCOSE BLDC GLUCOMTR-MCNC: 113 MG/DL — HIGH (ref 70–99)
GLUCOSE BLDC GLUCOMTR-MCNC: 130 MG/DL — HIGH (ref 70–99)
GLUCOSE BLDC GLUCOMTR-MCNC: 136 MG/DL — HIGH (ref 70–99)
GLUCOSE BLDC GLUCOMTR-MCNC: 151 MG/DL — HIGH (ref 70–99)
GLUCOSE SERPL-MCNC: 121 MG/DL — HIGH (ref 70–99)
HCT VFR BLD CALC: 29.7 % — LOW (ref 39–50)
HGB BLD-MCNC: 9.7 G/DL — LOW (ref 13–17)
LDH SERPL L TO P-CCNC: 127 U/L — SIGNIFICANT CHANGE UP (ref 50–242)
MAGNESIUM SERPL-MCNC: 1.7 MG/DL — SIGNIFICANT CHANGE UP (ref 1.6–2.6)
MCHC RBC-ENTMCNC: 26.2 PG — LOW (ref 27–34)
MCHC RBC-ENTMCNC: 32.7 GM/DL — SIGNIFICANT CHANGE UP (ref 32–36)
MCV RBC AUTO: 80.3 FL — SIGNIFICANT CHANGE UP (ref 80–100)
NRBC # BLD: 0 /100 WBCS — SIGNIFICANT CHANGE UP (ref 0–0)
PHOSPHATE SERPL-MCNC: 2.2 MG/DL — LOW (ref 2.5–4.5)
PLATELET # BLD AUTO: 13 K/UL — CRITICAL LOW (ref 150–400)
POTASSIUM SERPL-MCNC: 3.9 MMOL/L — SIGNIFICANT CHANGE UP (ref 3.5–5.3)
POTASSIUM SERPL-SCNC: 3.9 MMOL/L — SIGNIFICANT CHANGE UP (ref 3.5–5.3)
PROT SERPL-MCNC: 5.9 G/DL — LOW (ref 6–8.3)
RBC # BLD: 3.7 M/UL — LOW (ref 4.2–5.8)
RBC # FLD: 14.4 % — SIGNIFICANT CHANGE UP (ref 10.3–14.5)
RH IG SCN BLD-IMP: POSITIVE — SIGNIFICANT CHANGE UP
SODIUM SERPL-SCNC: 138 MMOL/L — SIGNIFICANT CHANGE UP (ref 135–145)
WBC # BLD: 0.03 K/UL — CRITICAL LOW (ref 3.8–10.5)
WBC # FLD AUTO: 0.03 K/UL — CRITICAL LOW (ref 3.8–10.5)

## 2022-11-02 PROCEDURE — 99291 CRITICAL CARE FIRST HOUR: CPT

## 2022-11-02 RX ORDER — POTASSIUM PHOSPHATE, MONOBASIC POTASSIUM PHOSPHATE, DIBASIC 236; 224 MG/ML; MG/ML
15 INJECTION, SOLUTION INTRAVENOUS ONCE
Refills: 0 | Status: COMPLETED | OUTPATIENT
Start: 2022-11-02 | End: 2022-11-02

## 2022-11-02 RX ADMIN — Medication 1 LOZENGE: at 00:23

## 2022-11-02 RX ADMIN — Medication 5 MILLILITER(S): at 00:22

## 2022-11-02 RX ADMIN — NYSTATIN CREAM 1 APPLICATION(S): 100000 CREAM TOPICAL at 06:58

## 2022-11-02 RX ADMIN — NYSTATIN CREAM 1 APPLICATION(S): 100000 CREAM TOPICAL at 21:46

## 2022-11-02 RX ADMIN — Medication 1 MILLIGRAM(S): at 12:44

## 2022-11-02 RX ADMIN — Medication 1 LOZENGE: at 12:47

## 2022-11-02 RX ADMIN — CHLORHEXIDINE GLUCONATE 1 APPLICATION(S): 213 SOLUTION TOPICAL at 06:58

## 2022-11-02 RX ADMIN — Medication 25 MILLIGRAM(S): at 14:32

## 2022-11-02 RX ADMIN — Medication 100 MILLIGRAM(S): at 06:54

## 2022-11-02 RX ADMIN — Medication 5 MILLILITER(S): at 20:09

## 2022-11-02 RX ADMIN — Medication 10 MILLILITER(S): at 20:10

## 2022-11-02 RX ADMIN — FLUCONAZOLE 400 MILLIGRAM(S): 150 TABLET ORAL at 12:45

## 2022-11-02 RX ADMIN — Medication 500 MILLIGRAM(S): at 18:31

## 2022-11-02 RX ADMIN — Medication 400 MILLIGRAM(S): at 14:32

## 2022-11-02 RX ADMIN — Medication 10 MILLILITER(S): at 12:48

## 2022-11-02 RX ADMIN — Medication 1: at 12:46

## 2022-11-02 RX ADMIN — Medication 10 MILLILITER(S): at 00:23

## 2022-11-02 RX ADMIN — POTASSIUM PHOSPHATE, MONOBASIC POTASSIUM PHOSPHATE, DIBASIC 62.5 MILLIMOLE(S): 236; 224 INJECTION, SOLUTION INTRAVENOUS at 08:47

## 2022-11-02 RX ADMIN — PANTOPRAZOLE SODIUM 40 MILLIGRAM(S): 20 TABLET, DELAYED RELEASE ORAL at 06:54

## 2022-11-02 RX ADMIN — Medication 500 MILLIGRAM(S): at 06:54

## 2022-11-02 RX ADMIN — URSODIOL 300 MILLIGRAM(S): 250 TABLET, FILM COATED ORAL at 06:55

## 2022-11-02 RX ADMIN — Medication 5 MILLILITER(S): at 12:47

## 2022-11-02 RX ADMIN — LORATADINE 10 MILLIGRAM(S): 10 TABLET ORAL at 12:45

## 2022-11-02 RX ADMIN — TAMSULOSIN HYDROCHLORIDE 0.4 MILLIGRAM(S): 0.4 CAPSULE ORAL at 21:44

## 2022-11-02 RX ADMIN — FINASTERIDE 5 MILLIGRAM(S): 5 TABLET, FILM COATED ORAL at 12:44

## 2022-11-02 RX ADMIN — Medication 5 MILLILITER(S): at 08:47

## 2022-11-02 RX ADMIN — Medication 400 MILLIGRAM(S): at 21:45

## 2022-11-02 RX ADMIN — Medication 10 MILLILITER(S): at 08:47

## 2022-11-02 RX ADMIN — URSODIOL 300 MILLIGRAM(S): 250 TABLET, FILM COATED ORAL at 18:31

## 2022-11-02 RX ADMIN — Medication 100 MILLIGRAM(S): at 21:45

## 2022-11-02 RX ADMIN — Medication 5 MILLILITER(S): at 15:43

## 2022-11-02 RX ADMIN — Medication 1 LOZENGE: at 20:09

## 2022-11-02 RX ADMIN — Medication 1 TABLET(S): at 12:44

## 2022-11-02 RX ADMIN — Medication 100 MILLIGRAM(S): at 14:32

## 2022-11-02 RX ADMIN — Medication 25 MILLIGRAM(S): at 06:54

## 2022-11-02 RX ADMIN — Medication 1 LOZENGE: at 08:48

## 2022-11-02 RX ADMIN — NYSTATIN CREAM 1 APPLICATION(S): 100000 CREAM TOPICAL at 15:42

## 2022-11-02 RX ADMIN — Medication 25 MILLIGRAM(S): at 21:45

## 2022-11-02 RX ADMIN — Medication 10 MILLILITER(S): at 15:44

## 2022-11-02 RX ADMIN — AMLODIPINE BESYLATE 10 MILLIGRAM(S): 2.5 TABLET ORAL at 06:54

## 2022-11-02 RX ADMIN — Medication 400 MILLIGRAM(S): at 06:54

## 2022-11-02 RX ADMIN — Medication 1 LOZENGE: at 15:43

## 2022-11-02 RX ADMIN — Medication 480 MICROGRAM(S): at 15:43

## 2022-11-02 NOTE — PROGRESS NOTE ADULT - SUBJECTIVE AND OBJECTIVE BOX
HPC Transplant Team                                                      Critical / Counseling Time Provided: 30 minutes                                                                                                                                                        Chief Complaint: Autologous peripheral blood stem cell transplant with high dose Melphalan prep regimen for treatment of multiple myeloma    S: Patient seen and examined with HPC Transplant Team:     All other ROS negative     O: Vitals:   Vital Signs Last 24 Hrs  T(C): 37.4 (2022 06:16), Max: 37.5 (2022 00:58)  T(F): 99.3 (2022 06:16), Max: 99.5 (2022 00:58)  HR: 110 (2022 06:16) (92 - 110)  BP: 123/76 (2022 06:16) (110/62 - 138/75)  BP(mean): --  RR: 18 (2022 06:16) (18 - 20)  SpO2: 98% (2022 06:16) (97% - 99%)    Parameters below as of 2022 21:31  Patient On (Oxygen Delivery Method): room air      Admit weight: 74.9kg   Daily Weight in k.3 (2022 09:40)  Today's weight:     Intake / Output:    @ 07:01  -  02 @ 07:00  --------------------------------------------------------  IN: 1686 mL / OUT: 1050 mL / NET: 636 mL        PE:   Oropharynx: no erythema or ulcerations  Oral Mucositis:                 -                                       Grade: n/a  CVS: S1, S2 RRR   Lungs: CTA throughout bilaterally   Abdomen: + BS x 4, soft, NT, ND   Extremities: no edema  Gastric Mucositis:      -                                            Grade: n/a  Intestinal Mucositis:    -                                          Grade: n/a  Skin: no rash   TLC: CDI   Neuro: A&Ox3   Pain: 0    Labs:   CBC Full  -  ( 2022 07:26 )  WBC Count : 0.03 K/uL  Hemoglobin : 9.7 g/dL  Hematocrit : 29.7 %  Platelet Count - Automated : 13 K/uL  Mean Cell Volume : 80.3 fl  Mean Cell Hemoglobin : 26.2 pg  Mean Cell Hemoglobin Concentration : 32.7 gm/dL  Auto Neutrophil # : x  Auto Lymphocyte # : x  Auto Monocyte # : x  Auto Eosinophil # : x  Auto Basophil # : x  Auto Neutrophil % : x  Auto Lymphocyte % : x  Auto Monocyte % : x  Auto Eosinophil % : x  Auto Basophil % : x                          9.7    0.03  )-----------( 13       ( 2022 07:26 )             29.7         138  |  103  |  10  ----------------------------<  121<H>  3.9   |  27  |  0.83    Ca    8.5      2022 07:20  Phos  2.2       Mg     1.7         TPro  5.9<L>  /  Alb  3.3  /  TBili  0.4  /  DBili  0.1  /  AST  11  /  ALT  19  /  AlkPhos  88        LIVER FUNCTIONS - ( 2022 07:20 )  Alb: 3.3 g/dL / Pro: 5.9 g/dL / ALK PHOS: 88 U/L / ALT: 19 U/L / AST: 11 U/L / GGT: x           Lactate Dehydrogenase, Serum: 127 U/L ( @ 07:20)        Meds:   Antimicrobials:   acyclovir   Oral Tab/Cap 400 milliGRAM(s) Oral every 8 hours  ciprofloxacin     Tablet 500 milliGRAM(s) Oral every 12 hours  clotrimazole Lozenge 1 Lozenge Oral five times a day  fluconAZOLE   Tablet 400 milliGRAM(s) Oral daily      Heme / Onc:   heparin  Infusion 312 Unit(s)/Hr IV Continuous <Continuous>      GI:  aluminum hydroxide/magnesium hydroxide/simethicone Suspension 30 milliLiter(s) Oral every 6 hours PRN  lactulose Syrup 20 Gram(s) Oral once PRN  pantoprazole    Tablet 40 milliGRAM(s) Oral before breakfast  polyethylene glycol 3350 17 Gram(s) Oral daily PRN  senna 2 Tablet(s) Oral at bedtime  sodium bicarbonate Mouth Rinse 10 milliLiter(s) Swish and Spit five times a day  ursodiol Capsule 300 milliGRAM(s) Oral two times a day      Cardiovascular:   amLODIPine   Tablet 10 milliGRAM(s) Oral daily  hydrALAZINE 25 milliGRAM(s) Oral three times a day      Immunologic:   filgrastim-sndz (ZARXIO) Injectable 480 MICROGram(s) SubCutaneous every 24 hours      Other medications:   acetaminophen     Tablet .. 650 milliGRAM(s) Oral every 6 hours  benzonatate 100 milliGRAM(s) Oral three times a day  Biotene Dry Mouth Oral Rinse 5 milliLiter(s) Swish and Spit five times a day  chlorhexidine 4% Liquid 1 Application(s) Topical <User Schedule>  dextrose 5%. 1000 milliLiter(s) IV Continuous <Continuous>  dextrose 5%. 1000 milliLiter(s) IV Continuous <Continuous>  dextrose 50% Injectable 25 Gram(s) IV Push once  dextrose 50% Injectable 12.5 Gram(s) IV Push once  dextrose 50% Injectable 25 Gram(s) IV Push once  finasteride 5 milliGRAM(s) Oral daily  folic acid 1 milliGRAM(s) Oral daily  glucagon  Injectable 1 milliGRAM(s) IntraMuscular once  insulin lispro (ADMELOG) corrective regimen sliding scale   SubCutaneous three times a day before meals  insulin lispro (ADMELOG) corrective regimen sliding scale   SubCutaneous at bedtime  lidocaine/prilocaine Cream 1 Application(s) Topical daily  loratadine 10 milliGRAM(s) Oral daily  LORazepam   Injectable 1 milliGRAM(s) IV Push every 24 hours  multivitamin 1 Tablet(s) Oral daily  nystatin Powder 1 Application(s) Topical three times a day  potassium phosphate IVPB 15 milliMole(s) IV Intermittent once  sodium chloride 0.45% 1000 milliLiter(s) IV Continuous <Continuous>  sodium chloride 0.9%. 1000 milliLiter(s) IV Continuous <Continuous>  sodium chloride 0.9%. 1000 milliLiter(s) IV Continuous <Continuous>  tamsulosin 0.4 milliGRAM(s) Oral at bedtime      PRN:   acetaminophen     Tablet .. 650 milliGRAM(s) Oral every 6 hours PRN  aluminum hydroxide/magnesium hydroxide/simethicone Suspension 30 milliLiter(s) Oral every 6 hours PRN  dextrose Oral Gel 15 Gram(s) Oral once PRN  lactulose Syrup 20 Gram(s) Oral once PRN  LORazepam   Injectable 1 milliGRAM(s) IV Push every 6 hours PRN  metoclopramide Injectable 10 milliGRAM(s) IV Push every 6 hours PRN  ondansetron Injectable 8 milliGRAM(s) IV Push every 8 hours PRN  polyethylene glycol 3350 17 Gram(s) Oral daily PRN  sodium chloride 0.9% lock flush 10 milliLiter(s) IV Push every 1 hour PRN    A/P: 72 year old male with multiple myeloma diagnosed 19 s/p RVD x 6 admitted for autologous pbsct with high dose melphalan prep regimen (dose reduced to 70mg / m2 for age)  Day + 6  10/25- melphalan /; continue melphalan hydration for 24 hours post infusion of last dose. Strict I&O, daily weights, prn diuresis   10/27- HPC transplant today; continue transplant hydration for 24 hours post infusion of cells   10/28 weight gain, I>O, lasix 60 mg iv x1; constipated, Lactulose now and PRN  - Parrish. Started on cipro 10/31/22. If T >/= 38C, pan cx, CXR and change cipro to cefepime 2g IV q 8 hours     1. Infectious Disease:   acyclovir   Oral Tab/Cap 400 milliGRAM(s) Oral every 8 hours  ciprofloxacin     Tablet 500 milliGRAM(s) Oral every 12 hours  clotrimazole Lozenge 1 Lozenge Oral five times a day  fluconAZOLE   Tablet 400 milliGRAM(s) Oral daily    2. VOD Prophylaxis: Actigall, Glutamine, Heparin (dosed at 100 units / kg / day)     3. GI Prophylaxis:   pantoprazole    Tablet 40 milliGRAM(s) Oral before breakfast    4. Mouthcare - NS / NaHCO3 rinses, Mycelex, Biotene; Skin care     5. GVHD prophylaxis - n/a     6. Transfuse & replete electrolytes prn   potassium phosphate IVPB 15 milliMole(s) IV Intermittent once    7. IV hydration, daily weights, strict I&O, prn diuresis     8. PO intake as tolerated, nutrition follow up as needed, MVI, folic acid     9. Antiemetics, anti-diarrhea medications:   LORazepam   Injectable 1 milliGRAM(s) IV Push every 6 hours PRN  metoclopramide Injectable 10 milliGRAM(s) IV Push every 6 hours PRN  ondansetron Injectable 8 milliGRAM(s) IV Push every 8 hours PRN    10. OOB as tolerated, physical therapy consult if needed     11. Monitor coags / fibrinogen 2x week, vitamin K as needed     12. Monitor closely for clinical changes, monitor for fevers     13. Emotional support provided, plan of care discussed and questions addressed     14. Patient education done regarding  plan of care, restrictions and discharge planning     15. Continue regular social work input     I have written the above note for Dr. Gonzales who performed service with me in the room.   Shayla Lundberg NP-C ([phone])    I have seen and examined patient with NP, I agree with above note as scribed.                    HPC Transplant Team                                                      Critical / Counseling Time Provided: 30 minutes                                                                                                                                                        Chief Complaint: Autologous peripheral blood stem cell transplant with high dose Melphalan prep regimen for treatment of multiple myeloma    S: Patient seen and examined with [facility] Transplant Team:   + fatigue   + loose stool   All other ROS negative     O: Vitals:   Vital Signs Last 24 Hrs  T(C): 37.4 (2022 06:16), Max: 37.5 (2022 00:58)  T(F): 99.3 (2022 06:16), Max: 99.5 (2022 00:58)  HR: 110 (2022 06:16) (92 - 110)  BP: 123/76 (2022 06:16) (110/62 - 138/75)  BP(mean): --  RR: 18 (2022 06:16) (18 - 20)  SpO2: 98% (2022 06:16) (97% - 99%)    Parameters below as of 2022 21:31  Patient On (Oxygen Delivery Method): room air      Admit weight: 74.9kg   Daily Weight in k.3 (2022 09:40)  Today's weight: 72.5kg     Intake / Output:    @ 07:01  -   @ 07:00  --------------------------------------------------------  IN: 1686 mL / OUT: 1050 mL / NET: 636 mL        PE:   Oropharynx: no erythema or ulcerations  Oral Mucositis:                 -                                       Grade: n/a  CVS: S1, S2 RRR   Lungs: CTA throughout bilaterally   Abdomen: + BS x 4, soft, NT, ND   Extremities: no edema  Gastric Mucositis:      -                                            Grade: n/a  Intestinal Mucositis:    -                                          Grade: n/a  Skin: no rash   TLC: CDI   Neuro: A&Ox3   Pain: 0    Labs:   CBC Full  -  ( 2022 07:26 )  WBC Count : 0.03 K/uL  Hemoglobin : 9.7 g/dL  Hematocrit : 29.7 %  Platelet Count - Automated : 13 K/uL  Mean Cell Volume : 80.3 fl  Mean Cell Hemoglobin : 26.2 pg  Mean Cell Hemoglobin Concentration : 32.7 gm/dL  Auto Neutrophil # : x  Auto Lymphocyte # : x  Auto Monocyte # : x  Auto Eosinophil # : x  Auto Basophil # : x  Auto Neutrophil % : x  Auto Lymphocyte % : x  Auto Monocyte % : x  Auto Eosinophil % : x  Auto Basophil % : x                          9.7    0.03  )-----------( 13       ( 2022 07:26 )             29.7         138  |  103  |  10  ----------------------------<  121<H>  3.9   |  27  |  0.83    Ca    8.5      2022 07:20  Phos  2.2       Mg     1.7         TPro  5.9<L>  /  Alb  3.3  /  TBili  0.4  /  DBili  0.1  /  AST  11  /  ALT  19  /  AlkPhos  88        LIVER FUNCTIONS - ( 2022 07:20 )  Alb: 3.3 g/dL / Pro: 5.9 g/dL / ALK PHOS: 88 U/L / ALT: 19 U/L / AST: 11 U/L / GGT: x           Lactate Dehydrogenase, Serum: 127 U/L ( @ 07:20)        Meds:   Antimicrobials:   acyclovir   Oral Tab/Cap 400 milliGRAM(s) Oral every 8 hours  ciprofloxacin     Tablet 500 milliGRAM(s) Oral every 12 hours  clotrimazole Lozenge 1 Lozenge Oral five times a day  fluconAZOLE   Tablet 400 milliGRAM(s) Oral daily      Heme / Onc:   heparin  Infusion 312 Unit(s)/Hr IV Continuous <Continuous>      GI:  aluminum hydroxide/magnesium hydroxide/simethicone Suspension 30 milliLiter(s) Oral every 6 hours PRN  lactulose Syrup 20 Gram(s) Oral once PRN  pantoprazole    Tablet 40 milliGRAM(s) Oral before breakfast  polyethylene glycol 3350 17 Gram(s) Oral daily PRN  senna 2 Tablet(s) Oral at bedtime  sodium bicarbonate Mouth Rinse 10 milliLiter(s) Swish and Spit five times a day  ursodiol Capsule 300 milliGRAM(s) Oral two times a day      Cardiovascular:   amLODIPine   Tablet 10 milliGRAM(s) Oral daily  hydrALAZINE 25 milliGRAM(s) Oral three times a day      Immunologic:   filgrastim-sndz (ZARXIO) Injectable 480 MICROGram(s) SubCutaneous every 24 hours      Other medications:   acetaminophen     Tablet .. 650 milliGRAM(s) Oral every 6 hours  benzonatate 100 milliGRAM(s) Oral three times a day  Biotene Dry Mouth Oral Rinse 5 milliLiter(s) Swish and Spit five times a day  chlorhexidine 4% Liquid 1 Application(s) Topical <User Schedule>  dextrose 5%. 1000 milliLiter(s) IV Continuous <Continuous>  dextrose 5%. 1000 milliLiter(s) IV Continuous <Continuous>  dextrose 50% Injectable 25 Gram(s) IV Push once  dextrose 50% Injectable 12.5 Gram(s) IV Push once  dextrose 50% Injectable 25 Gram(s) IV Push once  finasteride 5 milliGRAM(s) Oral daily  folic acid 1 milliGRAM(s) Oral daily  glucagon  Injectable 1 milliGRAM(s) IntraMuscular once  insulin lispro (ADMELOG) corrective regimen sliding scale   SubCutaneous three times a day before meals  insulin lispro (ADMELOG) corrective regimen sliding scale   SubCutaneous at bedtime  lidocaine/prilocaine Cream 1 Application(s) Topical daily  loratadine 10 milliGRAM(s) Oral daily  LORazepam   Injectable 1 milliGRAM(s) IV Push every 24 hours  multivitamin 1 Tablet(s) Oral daily  nystatin Powder 1 Application(s) Topical three times a day  potassium phosphate IVPB 15 milliMole(s) IV Intermittent once  sodium chloride 0.45% 1000 milliLiter(s) IV Continuous <Continuous>  sodium chloride 0.9%. 1000 milliLiter(s) IV Continuous <Continuous>  sodium chloride 0.9%. 1000 milliLiter(s) IV Continuous <Continuous>  tamsulosin 0.4 milliGRAM(s) Oral at bedtime      PRN:   acetaminophen     Tablet .. 650 milliGRAM(s) Oral every 6 hours PRN  aluminum hydroxide/magnesium hydroxide/simethicone Suspension 30 milliLiter(s) Oral every 6 hours PRN  dextrose Oral Gel 15 Gram(s) Oral once PRN  lactulose Syrup 20 Gram(s) Oral once PRN  LORazepam   Injectable 1 milliGRAM(s) IV Push every 6 hours PRN  metoclopramide Injectable 10 milliGRAM(s) IV Push every 6 hours PRN  ondansetron Injectable 8 milliGRAM(s) IV Push every 8 hours PRN  polyethylene glycol 3350 17 Gram(s) Oral daily PRN  sodium chloride 0.9% lock flush 10 milliLiter(s) IV Push every 1 hour PRN    A/P: 72 year old male with multiple myeloma diagnosed 19 s/p RVD x 6 admitted for autologous pbsct with high dose melphalan prep regimen (dose reduced to 70mg / m2 for age)  Day + 6  10/25- melphalan ; continue melphalan hydration for 24 hours post infusion of last dose. Strict I&O, daily weights, prn diuresis   10/27- HPC transplant today; continue transplant hydration for 24 hours post infusion of cells   10/28 weight gain, I>O, lasix 60 mg iv x1; constipated, Lactulose now and PRN  - Parrish. Started on cipro 10/31/22. If T >/= 38C, pan cx, CXR and change cipro to cefepime 2g IV q 8 hours     1. Infectious Disease:   acyclovir   Oral Tab/Cap 400 milliGRAM(s) Oral every 8 hours  ciprofloxacin     Tablet 500 milliGRAM(s) Oral every 12 hours  clotrimazole Lozenge 1 Lozenge Oral five times a day  fluconAZOLE   Tablet 400 milliGRAM(s) Oral daily    2. VOD Prophylaxis: Actigall, Glutamine, Heparin (dosed at 100 units / kg / day)     3. GI Prophylaxis:   pantoprazole    Tablet 40 milliGRAM(s) Oral before breakfast    4. Mouthcare - NS / NaHCO3 rinses, Mycelex, Biotene; Skin care     5. GVHD prophylaxis - n/a     6. Transfuse & replete electrolytes prn   potassium phosphate IVPB 15 milliMole(s) IV Intermittent once    7. IV hydration, daily weights, strict I&O, prn diuresis     8. PO intake as tolerated, nutrition follow up as needed, MVI, folic acid     9. Antiemetics, anti-diarrhea medications:   LORazepam   Injectable 1 milliGRAM(s) IV Push every 6 hours PRN  metoclopramide Injectable 10 milliGRAM(s) IV Push every 6 hours PRN  ondansetron Injectable 8 milliGRAM(s) IV Push every 8 hours PRN    10. OOB as tolerated, physical therapy consult if needed     11. Monitor coags / fibrinogen 2x week, vitamin K as needed     12. Monitor closely for clinical changes, monitor for fevers     13. Emotional support provided, plan of care discussed and questions addressed     14. Patient education done regarding  plan of care, restrictions and discharge planning     15. Continue regular social work input     I have written the above note for Dr. Gonzales who performed service with me in the room.   Shayla Lundberg NP-C ([phone])    I have seen and examined patient with NP, I agree with above note as scribed.

## 2022-11-02 NOTE — PROGRESS NOTE ADULT - CRITICAL CARE ATTENDING COMMENT
72 year old male with multiple myeloma diagnosed 12/20/21 s/p RVD x 6 admitted for autologous pbsct with high dose melphalan prep regimen (dose reduced to 70mg / m2 for age)    Day + 6...some soft stool..    1. Admit to BMTU   2. -3 hours prior to Melphalan start hydration: D5NS at 200ml /hr and continue 24 hours post Melphalan infusion  3. Day – 3 & day -2 - Melphalan 70mg/m2  IV   4. Strict I&O, daily weights, prn diuresis   5. Day -1 rest day. At 2200 on day – 1, start transplant hydration 1/2NS + 50mEq NaHCO3- (may substitute [id number] if bicarb not available)  6. Day 0 – infuse HPC product. 4 hours after infusion of cells start Zarxio 5 micrograms / kg (actual weight) . Continue through engraftment.   7. On day 0, + 1, + 2-give Kepivance 60micrograms / kg (actual weight).  8. VOD prophylaxis - low dose heparin gtt (dosed at 100 units / kg / day), glutamine supplementation, Actigall BID   9. PCP prophylaxis - Bactrim DS through day -2    10. Antiviral prophylaxis - Acyclovir 400mg po TID to start day -1   11. Antifungal prophylaxis- Diflucan 400 mg po daily.  12. GI prophylaxis - Protonix po QD   13. Antibacterial prophylaxis -  ANC < 500, start Cipro 500mg po BID. If becomes febrile, pan cx, CXR and change Cipro to Cefepime 2g IV q 8 hours. Continue until count recovery  14. Kepivance for prevention of mucositis- days 0, +1, +2  15. Aggressive mouth care and skin care as per protocol..senna, miralax  prn  16. The patient is aware of his diagnosis...he does not want to be reminded of it every day.. 72 year old male with multiple myeloma diagnosed 12/20/21 s/p RVD x 6 admitted for autologous pbsct with high dose melphalan prep regimen (dose reduced to 70mg / m2 for age)    Day + 6...some soft stool..otherwise no issues    1. Admit to BMTU   2. -3 hours prior to Melphalan start hydration: D5NS at 200ml /hr and continue 24 hours post Melphalan infusion  3. Day – 3 & day -2 - Melphalan 70mg/m2  IV   4. Strict I&O, daily weights, prn diuresis   5. Day -1 rest day. At 2200 on day – 1, start transplant hydration 1/2NS + 50mEq NaHCO3- (may substitute [id number] if bicarb not available)  6. Day 0 – infuse HPC product. 4 hours after infusion of cells start Zarxio 5 micrograms / kg (actual weight) . Continue through engraftment.   7. On day 0, + 1, + 2-give Kepivance 60micrograms / kg (actual weight).  8. VOD prophylaxis - low dose heparin gtt (dosed at 100 units / kg / day), glutamine supplementation, Actigall BID   9. PCP prophylaxis - Bactrim DS through day -2    10. Antiviral prophylaxis - Acyclovir 400mg po TID to start day -1   11. Antifungal prophylaxis- Diflucan 400 mg po daily.  12. GI prophylaxis - Protonix po QD   13. Antibacterial prophylaxis -  ANC < 500, start Cipro 500mg po BID. If becomes febrile, pan cx, CXR and change Cipro to Cefepime 2g IV q 8 hours. Continue until count recovery  14. Kepivance for prevention of mucositis- days 0, +1, +2  15. Aggressive mouth care and skin care as per protocol..senna, miralax  prn  16. The patient is aware of his diagnosis...he does not want to be reminded of it every day..

## 2022-11-02 NOTE — CHART NOTE - NSCHARTNOTEFT_GEN_A_CORE
Nutrition Follow Up Note  Patient seen for: malnutrition follow up    Chart reviewed, events noted.    Per chart, "This is a 72 year old male with multiple myeloma admitted for an autologous pbsct with high dose Melphalan prep regimen." Day +6    Source: [] Patient       [x] EMR        [x] RN        [] Family at bedside       [] Other:  - Pt asleep and when aroused declined interview    Diet Order:   Diet, Regular:   GlutaSolve(Glutamine) 15gm pkg     Qty per Day:  1  Supplement Feeding Modality:  Oral  Glucerna Shake Cans or Servings Per Day:  1       Frequency:  Daily (10-25-)    - Is current order appropriate/adequate? [x] Yes  []  No:     - PO intake :   [] >75%  Adequate    [] 50-75%  Fair       [x] <50%  Poor    - Nutrition-related concerns:  - Pt has had decreased intake since last visit (10/28), per flowsheet pt was eating 0-50% x 5 days, RN confirmed.  - RN noted over the weekend pt had not been receiving [employer] glucose support shakes at all  - PMHx T2DM, latest A1C 6.8 (10/25) - controlled, POCTs controlled, ISS regimen (lispro)  - Hypophosphatemic, s/p repletion with potassium phosphate x 2 days, magnesium trending low, monitor need for repletion    GI:  Last BM .   Bowel Regimen? [x] Yes - senna and miralax  - Per RN, pt received lactulose Friday 10/28 2/2 constipation, experienced diarrhea 10/29-10/30, then subsided. Denies any N/V.      Weights:   Daily Weight in k.5 (), Weight in k.3 (), Weight in k.3 (10-31), Weight in k.6 (10-30), Weight in k.7 (10-29), Weight in k.8 (10-28), Weight in k.2 (10-27)    Nutritionally Pertinent MEDICATIONS  (STANDING):  folic acid  glucagon  Injectable  insulin lispro (ADMELOG) corrective regimen sliding scale  multivitamin  protonix  senna  sodium chloride    Pertinent Labs:  @ 07:20: <H>, Phos 2.2<L>    A1C with Estimated Average Glucose Result: 6.8 % (10-25-22 @ 07:26)    Finger Sticks:  POCT Blood Glucose.: 151 mg/dL ( @ 12:40)  POCT Blood Glucose.: 113 mg/dL ( @ 08:59)  POCT Blood Glucose.: 115 mg/dL ( @ 21:27)  POCT Blood Glucose.: 132 mg/dL ( @ 17:28)      Skin per nursing documentation: no pressure injuries   Edema: Per flowsheets, none noted    Estimated Needs: based on   [x] no change since previous assessment  -Energy: 0344-3056 kcal/day (30-35 kcal/kg)  -Protein: 112.2-134.64 g/day (1.5-1.8 g/kg)      Previous Nutrition Diagnosis: Moderate chronic malnutrition  Nutrition Diagnosis is: [x] ongoing - being addressed with po intake, food preferences, oral supplements    Nutrition Care Plan:  [x] In Progress  [] Achieved  [] Not applicable    Nutrition Interventions:  - Monitor protein supplement preferences, RD available to speak with pt about other options     Education Provided:       [] Yes:  [x] No: pt asleep at time of visit    Recommendations:      1) Continue with regular diet as tolerated, encourage protein-rich foods, maximize preferences  2) Monitor supplement intake, continue with Glucerna 1x/day and [employer] glucose support 2x/day to optimize po intake  3) Continue with Glutasolve, multivitamin, and folic acid daily  4) Monitor BMs and adjust bowel regimen accordingly  5) Monitor PO intake, labs, hydration, skin integrity and weights       Monitoring and Evaluation:   Continue to monitor nutritional intake, tolerance to diet prescription, weights, labs, skin integrity      RD remains available upon request and will follow up per protocol    Sandra Abbott, Dietetic Intern - pager #[phone] Nutrition Follow Up Note  Patient seen for: malnutrition follow up    Chart reviewed, events noted.    Per chart, "This is a 72 year old male with multiple myeloma admitted for an autologous pbsct with high dose Melphalan prep regimen." Day +6    Source: [] Patient       [x] EMR        [x] RN        [] Family at bedside       [] Other:  - Pt asleep and when aroused declined interview    Diet Order:   Diet, Regular:   GlutaSolve(Glutamine) 15gm pkg     Qty per Day:  1  Supplement Feeding Modality:  Oral  Glucerna Shake Cans or Servings Per Day:  1       Frequency:  Daily (10-25-)    - Is current order appropriate/adequate? [x] Yes  []  No:     - PO intake :   [] >75%  Adequate    [] 50-75%  Fair       [x] <50%  Poor    - Nutrition-related concerns:  - Pt has had decreased intake since last visit (10/28), per flowsheet pt was eating 0-50% x 5 days, RN confirmed.  - RN noted over the weekend pt had not been receiving [employer] glucose support shakes then got 1 this morning, spoke with provider and confirmed he will receive 2x/day.  - PMHx T2DM, latest A1C 6.8 (10/25) - controlled, POCTs controlled, ISS regimen (lispro)  - Hypophosphatemic, s/p repletion with potassium phosphate x 2 days, magnesium trending low, monitor need for repletion    GI:  Last BM .   Bowel Regimen? [x] Yes - senna and miralax  - Per RN, pt received lactulose Friday 10/28 2/2 constipation, experienced diarrhea 10/29-10/30, then subsided. Denies any N/V.      Weights:   Daily Weight in k.5 (), Weight in k.3 (), Weight in k.3 (10-31), Weight in k.6 (10-30), Weight in k.7 (10-29), Weight in k.8 (10-28), Weight in k.2 (10-27)    Nutritionally Pertinent MEDICATIONS  (STANDING):  folic acid  glucagon  Injectable  insulin lispro (ADMELOG) corrective regimen sliding scale  multivitamin  protonix  senna  sodium chloride    Pertinent Labs:  @ 07:20: <H>, Phos 2.2<L>    A1C with Estimated Average Glucose Result: 6.8 % (10-25-22 @ 07:26)    Finger Sticks:  POCT Blood Glucose.: 151 mg/dL ( @ 12:40)  POCT Blood Glucose.: 113 mg/dL ( @ 08:59)  POCT Blood Glucose.: 115 mg/dL ( @ 21:27)  POCT Blood Glucose.: 132 mg/dL ( @ 17:28)      Skin per nursing documentation: no pressure injuries   Edema: Per flowsheets, none noted    Estimated Needs: based on   [x] no change since previous assessment  -Energy: 6424-6862 kcal/day (30-35 kcal/kg)  -Protein: 112.2-134.64 g/day (1.5-1.8 g/kg)      Previous Nutrition Diagnosis: Moderate chronic malnutrition  Nutrition Diagnosis is: [x] ongoing - being addressed with po intake, food preferences, oral supplements    Nutrition Care Plan:  [x] In Progress  [] Achieved  [] Not applicable    Nutrition Interventions:  - Monitor protein supplement preferences, RD available to speak with pt about other options     Education Provided:       [] Yes:  [x] No: pt asleep at time of visit    Recommendations:      1) Continue with regular diet as tolerated, encourage protein-rich foods, maximize preferences  2) Monitor supplement intake, continue with Glucerna 1x/day and [employer] glucose support 2x/day to optimize po intake  3) Continue with Glutasolve, multivitamin, and folic acid daily  4) Monitor BMs and adjust bowel regimen accordingly  5) Monitor PO intake, labs, hydration, skin integrity and weights       Monitoring and Evaluation:   Continue to monitor nutritional intake, tolerance to diet prescription, weights, labs, skin integrity      RD remains available upon request and will follow up per protocol    Sandra Abbott, Dietetic Intern - pager #[phone] Nutrition Follow Up Note  Patient seen for: malnutrition follow up    Chart reviewed, events noted.    Per chart, "This is a 72 year old male with multiple myeloma admitted for an autologous pbsct with high dose Melphalan prep regimen." Day +6    Source: [] Patient       [x] EMR        [x] RN        [] Family at bedside       [] Other:  - Pt asleep and when aroused declined interview    Diet Order:   Diet, Regular:   GlutaSolve(Glutamine) 15gm pkg     Qty per Day:  1  Supplement Feeding Modality:  Oral  Glucerna Shake Cans or Servings Per Day:  1       Frequency:  Daily (10-25-)    - Is current order appropriate/adequate? [x] Yes  []  No:     - PO intake :   [] >75%  Adequate    [] 50-75%  Fair       [x] <50%  Poor    - Nutrition-related concerns:  - Pt has had decreased intake since last visit (10/28), per flowsheet pt was eating 0-50% x 5 days, RN confirmed.  - RN noted over the weekend pt had not been receiving [employer] glucose support shakes then got 1 this morning, spoke with provider and confirmed he will receive 2x/day.  - PMHx T2DM, latest A1C 6.8 (10/25) - controlled, POCTs controlled, ISS regimen (lispro)  - Hypophosphatemic, s/p repletion with potassium phosphate x 2 days, magnesium trending low, monitor need for repletion    GI:  Last BM .   Bowel Regimen? [x] Yes - senna and miralax  - Per RN, pt received lactulose Friday 10/28 2/2 constipation, experienced diarrhea 10/29-10/30, then subsided. Denies any N/V.      Weights:   Daily Weight in k.5 (), Weight in k.3 (), Weight in k.3 (10-31), Weight in k.6 (10-30), Weight in k.7 (10-29), Weight in k.8 (10-28), Weight in k.2 (10-27)  Dosing wt - 74.9 kg  - Wt fluctuation likely 2/2 fluid shifts, will monitor.    Nutritionally Pertinent MEDICATIONS  (STANDING):  folic acid  glucagon  Injectable  insulin lispro (ADMELOG) corrective regimen sliding scale  multivitamin  protonix  senna  sodium chloride    Pertinent Labs:  @ 07:20: <H>, Phos 2.2<L>    A1C with Estimated Average Glucose Result: 6.8 % (10-25-22 @ 07:26)    Finger Sticks:  POCT Blood Glucose.: 151 mg/dL ( @ 12:40)  POCT Blood Glucose.: 113 mg/dL ( @ 08:59)  POCT Blood Glucose.: 115 mg/dL ( @ 21:27)  POCT Blood Glucose.: 132 mg/dL ( @ 17:28)      Skin per nursing documentation: no pressure injuries   Edema: Per flowsheets, none noted    Estimated Needs: based on wt 10/25 - 74.8 kg  [x] no change since previous assessment  -Energy: 8177-5575 kcal/day (30-35 kcal/kg)  -Protein: 112.2-134.64 g/day (1.5-1.8 g/kg)    Defer fluids to Team    Previous Nutrition Diagnosis: Moderate chronic malnutrition  Nutrition Diagnosis is: [x] ongoing - being addressed with po intake, food preferences, oral supplements    Nutrition Care Plan:  [x] In Progress  [] Achieved  [] Not applicable    Nutrition Interventions:  - Monitor protein supplement preferences, RD available to speak with pt about other options     Education Provided:       [] Yes:  [x] No: pt asleep at time of visit    Recommendations:      1) Continue with regular diet as tolerated, encourage protein-rich foods, maximize preferences  2) Monitor supplement intake, continue with Glucerna 1x/day and [employer] glucose support 2x/day to optimize po intake  3) Continue with Glutasolve, multivitamin, and folic acid daily  4) Monitor BMs and adjust bowel regimen accordingly  5) Monitor PO intake, labs, hydration, skin integrity and weights       Monitoring and Evaluation:   Continue to monitor nutritional intake, tolerance to diet prescription, weights, labs, skin integrity      RD remains available upon request and will follow up per protocol    Sandra Abbott, Dietetic Intern - pager #[phone]

## 2022-11-03 LAB
ALBUMIN SERPL ELPH-MCNC: 3.3 G/DL — SIGNIFICANT CHANGE UP (ref 3.3–5)
ALP SERPL-CCNC: 85 U/L — SIGNIFICANT CHANGE UP (ref 40–120)
ALT FLD-CCNC: 17 U/L — SIGNIFICANT CHANGE UP (ref 10–45)
ANION GAP SERPL CALC-SCNC: 7 MMOL/L — SIGNIFICANT CHANGE UP (ref 5–17)
APTT BLD: 33.4 SEC — SIGNIFICANT CHANGE UP (ref 27.5–35.5)
AST SERPL-CCNC: 13 U/L — SIGNIFICANT CHANGE UP (ref 10–40)
BILIRUB SERPL-MCNC: 0.4 MG/DL — SIGNIFICANT CHANGE UP (ref 0.2–1.2)
BUN SERPL-MCNC: 10 MG/DL — SIGNIFICANT CHANGE UP (ref 7–23)
CALCIUM SERPL-MCNC: 8.4 MG/DL — SIGNIFICANT CHANGE UP (ref 8.4–10.5)
CHLORIDE SERPL-SCNC: 101 MMOL/L — SIGNIFICANT CHANGE UP (ref 96–108)
CO2 SERPL-SCNC: 28 MMOL/L — SIGNIFICANT CHANGE UP (ref 22–31)
CREAT SERPL-MCNC: 0.79 MG/DL — SIGNIFICANT CHANGE UP (ref 0.5–1.3)
EGFR: 93 ML/MIN/1.73M2 — SIGNIFICANT CHANGE UP
FIBRINOGEN PPP-MCNC: 696 MG/DL — HIGH (ref 330–520)
GLUCOSE BLDC GLUCOMTR-MCNC: 112 MG/DL — HIGH (ref 70–99)
GLUCOSE BLDC GLUCOMTR-MCNC: 140 MG/DL — HIGH (ref 70–99)
GLUCOSE BLDC GLUCOMTR-MCNC: 199 MG/DL — HIGH (ref 70–99)
GLUCOSE BLDC GLUCOMTR-MCNC: 72 MG/DL — SIGNIFICANT CHANGE UP (ref 70–99)
GLUCOSE SERPL-MCNC: 112 MG/DL — HIGH (ref 70–99)
HCT VFR BLD CALC: 30.2 % — LOW (ref 39–50)
HGB BLD-MCNC: 9.7 G/DL — LOW (ref 13–17)
INR BLD: 1.05 RATIO — SIGNIFICANT CHANGE UP (ref 0.88–1.16)
LDH SERPL L TO P-CCNC: 117 U/L — SIGNIFICANT CHANGE UP (ref 50–242)
MAGNESIUM SERPL-MCNC: 1.7 MG/DL — SIGNIFICANT CHANGE UP (ref 1.6–2.6)
MCHC RBC-ENTMCNC: 26.1 PG — LOW (ref 27–34)
MCHC RBC-ENTMCNC: 32.1 GM/DL — SIGNIFICANT CHANGE UP (ref 32–36)
MCV RBC AUTO: 81.2 FL — SIGNIFICANT CHANGE UP (ref 80–100)
NRBC # BLD: 0 /100 WBCS — SIGNIFICANT CHANGE UP (ref 0–0)
PHOSPHATE SERPL-MCNC: 2.4 MG/DL — LOW (ref 2.5–4.5)
PLATELET # BLD AUTO: 5 K/UL — CRITICAL LOW (ref 150–400)
POTASSIUM SERPL-MCNC: 3.7 MMOL/L — SIGNIFICANT CHANGE UP (ref 3.5–5.3)
POTASSIUM SERPL-SCNC: 3.7 MMOL/L — SIGNIFICANT CHANGE UP (ref 3.5–5.3)
PROT SERPL-MCNC: 5.8 G/DL — LOW (ref 6–8.3)
PROTHROM AB SERPL-ACNC: 12.2 SEC — SIGNIFICANT CHANGE UP (ref 10.5–13.4)
RBC # BLD: 3.72 M/UL — LOW (ref 4.2–5.8)
RBC # FLD: 14.3 % — SIGNIFICANT CHANGE UP (ref 10.3–14.5)
SODIUM SERPL-SCNC: 136 MMOL/L — SIGNIFICANT CHANGE UP (ref 135–145)
WBC # BLD: 0.04 K/UL — CRITICAL LOW (ref 3.8–10.5)
WBC # FLD AUTO: 0.04 K/UL — CRITICAL LOW (ref 3.8–10.5)

## 2022-11-03 PROCEDURE — 99291 CRITICAL CARE FIRST HOUR: CPT

## 2022-11-03 RX ORDER — LOPERAMIDE HCL 2 MG
2 TABLET ORAL ONCE
Refills: 0 | Status: COMPLETED | OUTPATIENT
Start: 2022-11-03 | End: 2022-11-03

## 2022-11-03 RX ORDER — POTASSIUM PHOSPHATE, MONOBASIC POTASSIUM PHOSPHATE, DIBASIC 236; 224 MG/ML; MG/ML
15 INJECTION, SOLUTION INTRAVENOUS ONCE
Refills: 0 | Status: COMPLETED | OUTPATIENT
Start: 2022-11-03 | End: 2022-11-03

## 2022-11-03 RX ORDER — CEFEPIME 1 G/1
2000 INJECTION, POWDER, FOR SOLUTION INTRAMUSCULAR; INTRAVENOUS EVERY 8 HOURS
Refills: 0 | Status: DISCONTINUED | OUTPATIENT
Start: 2022-11-03 | End: 2022-11-11

## 2022-11-03 RX ADMIN — Medication 500 MILLIGRAM(S): at 06:01

## 2022-11-03 RX ADMIN — Medication 100 MILLIGRAM(S): at 06:01

## 2022-11-03 RX ADMIN — Medication 1 LOZENGE: at 17:05

## 2022-11-03 RX ADMIN — NYSTATIN CREAM 1 APPLICATION(S): 100000 CREAM TOPICAL at 14:06

## 2022-11-03 RX ADMIN — Medication 480 MICROGRAM(S): at 18:23

## 2022-11-03 RX ADMIN — Medication 1 LOZENGE: at 00:06

## 2022-11-03 RX ADMIN — Medication 5 MILLILITER(S): at 17:05

## 2022-11-03 RX ADMIN — Medication 5 MILLILITER(S): at 20:19

## 2022-11-03 RX ADMIN — Medication 1 MILLIGRAM(S): at 14:06

## 2022-11-03 RX ADMIN — POTASSIUM PHOSPHATE, MONOBASIC POTASSIUM PHOSPHATE, DIBASIC 62.5 MILLIMOLE(S): 236; 224 INJECTION, SOLUTION INTRAVENOUS at 11:30

## 2022-11-03 RX ADMIN — Medication 5 MILLILITER(S): at 23:06

## 2022-11-03 RX ADMIN — Medication 400 MILLIGRAM(S): at 14:05

## 2022-11-03 RX ADMIN — SODIUM CHLORIDE 20 MILLILITER(S): 9 INJECTION INTRAMUSCULAR; INTRAVENOUS; SUBCUTANEOUS at 06:00

## 2022-11-03 RX ADMIN — Medication 100 MILLIGRAM(S): at 14:05

## 2022-11-03 RX ADMIN — Medication 10 MILLILITER(S): at 17:05

## 2022-11-03 RX ADMIN — URSODIOL 300 MILLIGRAM(S): 250 TABLET, FILM COATED ORAL at 06:01

## 2022-11-03 RX ADMIN — Medication 25 MILLIGRAM(S): at 14:05

## 2022-11-03 RX ADMIN — Medication 1: at 12:40

## 2022-11-03 RX ADMIN — PANTOPRAZOLE SODIUM 40 MILLIGRAM(S): 20 TABLET, DELAYED RELEASE ORAL at 06:01

## 2022-11-03 RX ADMIN — Medication 25 MILLIGRAM(S): at 06:01

## 2022-11-03 RX ADMIN — Medication 100 MILLIGRAM(S): at 20:23

## 2022-11-03 RX ADMIN — Medication 1 LOZENGE: at 10:05

## 2022-11-03 RX ADMIN — FINASTERIDE 5 MILLIGRAM(S): 5 TABLET, FILM COATED ORAL at 14:05

## 2022-11-03 RX ADMIN — NYSTATIN CREAM 1 APPLICATION(S): 100000 CREAM TOPICAL at 06:02

## 2022-11-03 RX ADMIN — Medication 2 MILLIGRAM(S): at 20:22

## 2022-11-03 RX ADMIN — Medication 25 MILLIGRAM(S): at 20:24

## 2022-11-03 RX ADMIN — SODIUM CHLORIDE 20 MILLILITER(S): 9 INJECTION INTRAMUSCULAR; INTRAVENOUS; SUBCUTANEOUS at 20:18

## 2022-11-03 RX ADMIN — Medication 1 LOZENGE: at 20:19

## 2022-11-03 RX ADMIN — Medication 10 MILLILITER(S): at 23:06

## 2022-11-03 RX ADMIN — HEPARIN SODIUM 3.12 UNIT(S)/HR: 5000 INJECTION INTRAVENOUS; SUBCUTANEOUS at 20:19

## 2022-11-03 RX ADMIN — LORATADINE 10 MILLIGRAM(S): 10 TABLET ORAL at 14:04

## 2022-11-03 RX ADMIN — Medication 1 TABLET(S): at 14:04

## 2022-11-03 RX ADMIN — Medication 1 LOZENGE: at 23:06

## 2022-11-03 RX ADMIN — HEPARIN SODIUM 3.12 UNIT(S)/HR: 5000 INJECTION INTRAVENOUS; SUBCUTANEOUS at 06:05

## 2022-11-03 RX ADMIN — FLUCONAZOLE 400 MILLIGRAM(S): 150 TABLET ORAL at 14:05

## 2022-11-03 RX ADMIN — Medication 400 MILLIGRAM(S): at 20:23

## 2022-11-03 RX ADMIN — AMLODIPINE BESYLATE 10 MILLIGRAM(S): 2.5 TABLET ORAL at 06:01

## 2022-11-03 RX ADMIN — URSODIOL 300 MILLIGRAM(S): 250 TABLET, FILM COATED ORAL at 17:04

## 2022-11-03 RX ADMIN — Medication 10 MILLILITER(S): at 20:19

## 2022-11-03 RX ADMIN — Medication 10 MILLILITER(S): at 10:06

## 2022-11-03 RX ADMIN — TAMSULOSIN HYDROCHLORIDE 0.4 MILLIGRAM(S): 0.4 CAPSULE ORAL at 20:24

## 2022-11-03 RX ADMIN — Medication 400 MILLIGRAM(S): at 06:01

## 2022-11-03 RX ADMIN — Medication 650 MILLIGRAM(S): at 23:40

## 2022-11-03 RX ADMIN — Medication 5 MILLILITER(S): at 10:05

## 2022-11-03 RX ADMIN — Medication 10 MILLILITER(S): at 00:07

## 2022-11-03 RX ADMIN — CHLORHEXIDINE GLUCONATE 1 APPLICATION(S): 213 SOLUTION TOPICAL at 06:02

## 2022-11-03 RX ADMIN — Medication 5 MILLILITER(S): at 00:05

## 2022-11-03 RX ADMIN — NYSTATIN CREAM 1 APPLICATION(S): 100000 CREAM TOPICAL at 20:22

## 2022-11-03 RX ADMIN — Medication 500 MILLIGRAM(S): at 17:05

## 2022-11-03 NOTE — PROGRESS NOTE ADULT - SUBJECTIVE AND OBJECTIVE BOX
HPC Transplant Team                                                      Critical / Counseling Time Provided: 30 minutes                                                                                                                                                        Chief Complaint: Autologous peripheral blood stem cell transplant with high dose Melphalan prep regimen for treatment of multiple myeloma    S: Patient seen and examined with [facility] Transplant Team:   + fatigue   + loose stool   All other ROS negative     O: Vitals:   Vital Signs Last 24 Hrs  T(C): 37.3 (2022 05:50), Max: 37.3 (2022 17:12)  T(F): 99.1 (2022 05:50), Max: 99.1 (2022 17:12)  HR: 101 (2022 05:50) (99 - 105)  BP: 131/75 (2022 05:50) (110/64 - 131/75)  BP(mean): --  RR: 18 (2022 05:50) (18 - 19)  SpO2: 97% (2022 05:50) (95% - 99%)    Parameters below as of 2022 05:50  Patient On (Oxygen Delivery Method): room air        Admit weight:  74.9kg   Daily Weight in k.5 (2022 09:20)  Today's weight:     Intake / Output:    @ 07:01  -   @ 07:00  --------------------------------------------------------  IN: 1429.9 mL / OUT: 1450 mL / NET: -20.1 mL      PE:   Oropharynx: no erythema or ulcerations  Oral Mucositis:                 -                                       Grade: n/a  CVS: S1, S2 RRR   Lungs: CTA throughout bilaterally   Abdomen: + BS x 4, soft, NT, ND   Extremities: no edema  Gastric Mucositis:      -                                            Grade: n/a  Intestinal Mucositis:    -                                          Grade: n/a  Skin: no rash   TLC: CDI   Neuro: A&Ox3   Pain: 0    Labs:   CBC Full  -  ( 2022 06:42 )  WBC Count : 0.04 K/uL  Hemoglobin : 9.7 g/dL  Hematocrit : 30.2 %  Platelet Count - Automated : 5 K/uL  Mean Cell Volume : 81.2 fl  Mean Cell Hemoglobin : 26.1 pg  Mean Cell Hemoglobin Concentration : 32.1 gm/dL  Auto Neutrophil # : x  Auto Lymphocyte # : x  Auto Monocyte # : x  Auto Eosinophil # : x  Auto Basophil # : x  Auto Neutrophil % : x  Auto Lymphocyte % : x  Auto Monocyte % : x  Auto Eosinophil % : x  Auto Basophil % : x                          9.7    0.04  )-----------( 5        ( 2022 06:42 )             30.2         136  |  101  |  10  ----------------------------<  112<H>  3.7   |  28  |  0.79    Ca    8.4      2022 06:43  Phos  2.4       Mg     1.7         TPro  5.8<L>  /  Alb  3.3  /  TBili  0.4  /  DBili  x   /  AST  13  /  ALT  17  /  AlkPhos  85      PT/INR - ( 2022 06:41 )   PT: 12.2 sec;   INR: 1.05 ratio         PTT - ( 2022 06:41 )  PTT:33.4 sec  LIVER FUNCTIONS - ( 2022 06:43 )  Alb: 3.3 g/dL / Pro: 5.8 g/dL / ALK PHOS: 85 U/L / ALT: 17 U/L / AST: 13 U/L / GGT: x           Lactate Dehydrogenase, Serum: 117 U/L ( @ 06:43)      Meds:   Antimicrobials:   acyclovir   Oral Tab/Cap 400 milliGRAM(s) Oral every 8 hours  ciprofloxacin     Tablet 500 milliGRAM(s) Oral every 12 hours  clotrimazole Lozenge 1 Lozenge Oral five times a day  fluconAZOLE   Tablet 400 milliGRAM(s) Oral daily      Heme / Onc:   heparin  Infusion 312 Unit(s)/Hr IV Continuous <Continuous>      GI:  aluminum hydroxide/magnesium hydroxide/simethicone Suspension 30 milliLiter(s) Oral every 6 hours PRN  lactulose Syrup 20 Gram(s) Oral once PRN  pantoprazole    Tablet 40 milliGRAM(s) Oral before breakfast  polyethylene glycol 3350 17 Gram(s) Oral daily PRN  senna 2 Tablet(s) Oral at bedtime  sodium bicarbonate Mouth Rinse 10 milliLiter(s) Swish and Spit five times a day  ursodiol Capsule 300 milliGRAM(s) Oral two times a day      Cardiovascular:   amLODIPine   Tablet 10 milliGRAM(s) Oral daily  hydrALAZINE 25 milliGRAM(s) Oral three times a day      Immunologic:   filgrastim-sndz (ZARXIO) Injectable 480 MICROGram(s) SubCutaneous every 24 hours      Other medications:   acetaminophen     Tablet .. 650 milliGRAM(s) Oral every 6 hours  benzonatate 100 milliGRAM(s) Oral three times a day  Biotene Dry Mouth Oral Rinse 5 milliLiter(s) Swish and Spit five times a day  chlorhexidine 4% Liquid 1 Application(s) Topical <User Schedule>  dextrose 5%. 1000 milliLiter(s) IV Continuous <Continuous>  dextrose 5%. 1000 milliLiter(s) IV Continuous <Continuous>  dextrose 50% Injectable 25 Gram(s) IV Push once  dextrose 50% Injectable 12.5 Gram(s) IV Push once  dextrose 50% Injectable 25 Gram(s) IV Push once  finasteride 5 milliGRAM(s) Oral daily  folic acid 1 milliGRAM(s) Oral daily  glucagon  Injectable 1 milliGRAM(s) IntraMuscular once  insulin lispro (ADMELOG) corrective regimen sliding scale   SubCutaneous three times a day before meals  insulin lispro (ADMELOG) corrective regimen sliding scale   SubCutaneous at bedtime  lidocaine/prilocaine Cream 1 Application(s) Topical daily  loratadine 10 milliGRAM(s) Oral daily  LORazepam   Injectable 1 milliGRAM(s) IV Push every 24 hours  multivitamin 1 Tablet(s) Oral daily  nystatin Powder 1 Application(s) Topical three times a day  potassium phosphate IVPB 15 milliMole(s) IV Intermittent once  sodium chloride 0.45% 1000 milliLiter(s) IV Continuous <Continuous>  sodium chloride 0.9%. 1000 milliLiter(s) IV Continuous <Continuous>  sodium chloride 0.9%. 1000 milliLiter(s) IV Continuous <Continuous>  tamsulosin 0.4 milliGRAM(s) Oral at bedtime      PRN:   acetaminophen     Tablet .. 650 milliGRAM(s) Oral every 6 hours PRN  aluminum hydroxide/magnesium hydroxide/simethicone Suspension 30 milliLiter(s) Oral every 6 hours PRN  dextrose Oral Gel 15 Gram(s) Oral once PRN  lactulose Syrup 20 Gram(s) Oral once PRN  LORazepam   Injectable 1 milliGRAM(s) IV Push every 6 hours PRN  metoclopramide Injectable 10 milliGRAM(s) IV Push every 6 hours PRN  ondansetron Injectable 8 milliGRAM(s) IV Push every 8 hours PRN  polyethylene glycol 3350 17 Gram(s) Oral daily PRN  sodium chloride 0.9% lock flush 10 milliLiter(s) IV Push every 1 hour PRN    A/P: 72 year old male with multiple myeloma diagnosed 21 s/p RVD x 6 admitted for autologous pbsct with high dose melphalan prep regimen (dose reduced to 70mg / m2 for age)  Day + 7  10/25- melphalan ; continue melphalan hydration for 24 hours post infusion of last dose. Strict I&O, daily weights, prn diuresis   10/27- HPC transplant today; continue transplant hydration for 24 hours post infusion of cells   10/28 weight gain, I>O, lasix 60 mg iv x1; constipated, Lactulose now and PRN  - [facility]rish. Started on cipro 10/31/22. If T >/= 38C, pan cx, CXR and change cipro to cefepime 2g IV q 8 hours     1. Infectious Disease:   acyclovir   Oral Tab/Cap 400 milliGRAM(s) Oral every 8 hours  ciprofloxacin     Tablet 500 milliGRAM(s) Oral every 12 hours  clotrimazole Lozenge 1 Lozenge Oral five times a day  fluconAZOLE   Tablet 400 milliGRAM(s) Oral daily    2. VOD Prophylaxis: Actigall, Glutamine, Heparin (dosed at 100 units / kg / day)     3. GI Prophylaxis:   pantoprazole    Tablet 40 milliGRAM(s) Oral before breakfast    4. Mouthcare - NS / NaHCO3 rinses, Mycelex, Biotene; Skin care     5. GVHD prophylaxis - n/a     6. Transfuse & replete electrolytes prn   1 bag platelets  potassium phosphate IVPB 15 milliMole(s) IV Intermittent once    7. IV hydration, daily weights, strict I&O, prn diuresis     8. PO intake as tolerated, nutrition follow up as needed, MVI, folic acid     9. Antiemetics, anti-diarrhea medications:   LORazepam   Injectable 1 milliGRAM(s) IV Push every 6 hours PRN  metoclopramide Injectable 10 milliGRAM(s) IV Push every 6 hours PRN  ondansetron Injectable 8 milliGRAM(s) IV Push every 8 hours PRN    10. OOB as tolerated, physical therapy consult if needed     11. Monitor coags / fibrinogen 2x week, vitamin K as needed     12. Monitor closely for clinical changes, monitor for fevers     13. Emotional support provided, plan of care discussed and questions addressed     14. Patient education done regarding plan of care, restrictions and discharge planning     15. Continue regular social work input     I have written the above note for Dr. Gonzales who performed service with me in the room.   Shayla Lundberg NP-C ([phone])    I have seen and examined patient with NP, I agree with above note as scribed.

## 2022-11-03 NOTE — PROGRESS NOTE ADULT - CRITICAL CARE ATTENDING COMMENT
72 year old male with multiple myeloma diagnosed 12/20/21 s/p RVD x 6 admitted for autologous pbsct with high dose melphalan prep regimen (dose reduced to 70mg / m2 for age)    Day + 7...some soft stool..otherwise no issues    1. Admit to BMTU   2. -3 hours prior to Melphalan start hydration: D5NS at 200ml /hr and continue 24 hours post Melphalan infusion  3. Day – 3 & day -2 - Melphalan 70mg/m2  IV   4. Strict I&O, daily weights, prn diuresis   5. Day -1 rest day. At 2200 on day – 1, start transplant hydration 1/2NS + 50mEq NaHCO3- (may substitute [id number] if bicarb not available)  6. Day 0 – infuse HPC product. 4 hours after infusion of cells start Zarxio 5 micrograms / kg (actual weight) . Continue through engraftment.   7. On day 0, + 1, + 2-give [employer]ep[employer]ance 60micrograms / kg (actual weight).  8. VOD prophylaxis - low dose heparin gtt (dosed at 100 units / kg / day), glutamine supplementation, Actigall BID   9. PCP prophylaxis - Bactrim DS through day -2    10. Antiviral prophylaxis - Acyclovir 400mg po TID to start day -1   11. Antifungal prophylaxis- Diflucan 400 mg po daily.  12. GI prophylaxis - Protonix po QD   13. Antibacterial prophylaxis -  ANC < 500, start Cipro 500mg po BID. If becomes febrile, pan cx, CXR and change Cipro to Cefepime 2g IV q 8 hours. Continue until count recovery  14. Kepivance for prevention of mucositis- days 0, +1, +2  15. Aggressive mouth care and skin care as per protocol..senna, miralax  prn  16. The patient is aware of his diagnosis...he does not want to be reminded of it every day.. 72 year old male with multiple myeloma diagnosed 12/20/21 s/p RVD x 6 admitted for autologous pbsct with high dose melphalan prep regimen (dose reduced to 70mg / m2 for age)    Day + 7...some soft stool..otherwise no issues.    1. Admit to BMTU   2. -3 hours prior to Melphalan start hydration: D5NS at 200ml /hr and continue 24 hours post Melphalan infusion  3. Day – 3 & day -2 - Melphalan 70mg/m2  IV   4. Strict I&O, daily weights, prn diuresis   5. Day -1 rest day. At 2200 on day – 1, start transplant hydration 1/2NS + 50mEq NaHCO3- (may substitute [id number] if bicarb not available)  6. Day 0 – infuse HPC product. 4 hours after infusion of cells start Zarxio 5 micrograms / kg (actual weight) . Continue through engraftment.   7. On day 0, + 1, + 2-give Kepivance 60micrograms / kg (actual weight).  8. VOD prophylaxis - low dose heparin gtt (dosed at 100 units / kg / day), glutamine supplementation, Actigall BID   9. PCP prophylaxis - Bactrim DS through day -2    10. Antiviral prophylaxis - Acyclovir 400mg po TID to start day -1   11. Antifungal prophylaxis- Diflucan 400 mg po daily.  12. GI prophylaxis - Protonix po QD   13. Antibacterial prophylaxis -  ANC < 500, start Cipro 500mg po BID. If becomes febrile, pan cx, CXR and change Cipro to Cefepime 2g IV q 8 hours. Continue until count recovery  14. Kepivance for prevention of mucositis- days 0, +1, +2  15. Aggressive mouth care and skin care as per protocol..senna, miralax  prn  16. The patient is aware of his diagnosis...he does not want to be reminded of it every day..

## 2022-11-03 NOTE — CHART NOTE - NSCHARTNOTEFT_GEN_A_CORE
PA Medicine Note     Notified by RN for patient's temperature: 100.4F    Vital Signs Last 24 Hrs  T(C): 38 (03 Nov 2022 23:40), Max: 38 (03 Nov 2022 23:40)  T(F): 100.4 (03 Nov 2022 23:40), Max: 100.4 (03 Nov 2022 23:40)  HR: 114 (03 Nov 2022 23:40) (95 - 114)  BP: 116/67 (03 Nov 2022 23:40) (103/60 - 143/76)  BP(mean): --  RR: 19 (03 Nov 2022 23:40) (16 - 19)  SpO2: 96% (03 Nov 2022 23:40) (95% - 99%)    Parameters below as of 03 Nov 2022 23:40  Patient On (Oxygen Delivery Method): room air      #New onset fever   -Acetaminophen and cooling measures PRN for pyrexia  -Pancultured- blood cultures, UA, Chest XR ordered  -Cipro changed to Cefepime 2g IV q8 hours  -Will continue to monitor vital signs overnight  -Will endorse overnight events with AM BMT team     ELDA SandsC  Department of Medicine [facility] Medicine Note     Notified by RN for patient's temperature: 100.4F    Vital Signs Last 24 Hrs  T(C): 38 (03 Nov 2022 23:40), Max: 38 (03 Nov 2022 23:40)  T(F): 100.4 (03 Nov 2022 23:40), Max: 100.4 (03 Nov 2022 23:40)  HR: 114 (03 Nov 2022 23:40) (95 - 114)  BP: 116/67 (03 Nov 2022 23:40) (103/60 - 143/76)  BP(mean): --  RR: 19 (03 Nov 2022 23:40) (16 - 19)  SpO2: 96% (03 Nov 2022 23:40) (95% - 99%)    Parameters below as of 03 Nov 2022 23:40  Patient On (Oxygen Delivery Method): room air    #New onset fever   -Acetaminophen and cooling measures PRN for pyrexia  -Pancultured- blood cultures, UA, UCx, Chest XR ordered  -Ciprofloxacin changed to Cefepime 2g IV q8 hours, stat dose given   -Will continue to monitor vital signs overnight  -Will endorse overnight events with AM BMT team     Marry Cazares PA-C  Department of Medicine

## 2022-11-04 LAB
ALBUMIN SERPL ELPH-MCNC: 3.2 G/DL — LOW (ref 3.3–5)
ALP SERPL-CCNC: 80 U/L — SIGNIFICANT CHANGE UP (ref 40–120)
ALT FLD-CCNC: 16 U/L — SIGNIFICANT CHANGE UP (ref 10–45)
ANION GAP SERPL CALC-SCNC: 10 MMOL/L — SIGNIFICANT CHANGE UP (ref 5–17)
APPEARANCE UR: ABNORMAL
AST SERPL-CCNC: 14 U/L — SIGNIFICANT CHANGE UP (ref 10–40)
BACTERIA # UR AUTO: ABNORMAL
BILIRUB SERPL-MCNC: 0.2 MG/DL — SIGNIFICANT CHANGE UP (ref 0.2–1.2)
BILIRUB UR-MCNC: NEGATIVE — SIGNIFICANT CHANGE UP
BLD GP AB SCN SERPL QL: NEGATIVE — SIGNIFICANT CHANGE UP
BUN SERPL-MCNC: 10 MG/DL — SIGNIFICANT CHANGE UP (ref 7–23)
CALCIUM SERPL-MCNC: 8.6 MG/DL — SIGNIFICANT CHANGE UP (ref 8.4–10.5)
CHLORIDE SERPL-SCNC: 104 MMOL/L — SIGNIFICANT CHANGE UP (ref 96–108)
CO2 SERPL-SCNC: 23 MMOL/L — SIGNIFICANT CHANGE UP (ref 22–31)
COLOR SPEC: YELLOW — SIGNIFICANT CHANGE UP
CREAT SERPL-MCNC: 0.84 MG/DL — SIGNIFICANT CHANGE UP (ref 0.5–1.3)
DIFF PNL FLD: ABNORMAL
EGFR: 92 ML/MIN/1.73M2 — SIGNIFICANT CHANGE UP
EPI CELLS # UR: 2 /HPF — SIGNIFICANT CHANGE UP
GLUCOSE BLDC GLUCOMTR-MCNC: 116 MG/DL — HIGH (ref 70–99)
GLUCOSE BLDC GLUCOMTR-MCNC: 135 MG/DL — HIGH (ref 70–99)
GLUCOSE BLDC GLUCOMTR-MCNC: 166 MG/DL — HIGH (ref 70–99)
GLUCOSE BLDC GLUCOMTR-MCNC: 187 MG/DL — HIGH (ref 70–99)
GLUCOSE SERPL-MCNC: 111 MG/DL — HIGH (ref 70–99)
GLUCOSE UR QL: NEGATIVE — SIGNIFICANT CHANGE UP
HCT VFR BLD CALC: 27.4 % — LOW (ref 39–50)
HGB BLD-MCNC: 9.1 G/DL — LOW (ref 13–17)
HYALINE CASTS # UR AUTO: 1 /LPF — SIGNIFICANT CHANGE UP (ref 0–2)
KETONES UR-MCNC: SIGNIFICANT CHANGE UP
LDH SERPL L TO P-CCNC: 146 U/L — SIGNIFICANT CHANGE UP (ref 50–242)
LEUKOCYTE ESTERASE UR-ACNC: NEGATIVE — SIGNIFICANT CHANGE UP
MAGNESIUM SERPL-MCNC: 1.6 MG/DL — SIGNIFICANT CHANGE UP (ref 1.6–2.6)
MCHC RBC-ENTMCNC: 26.3 PG — LOW (ref 27–34)
MCHC RBC-ENTMCNC: 33.2 GM/DL — SIGNIFICANT CHANGE UP (ref 32–36)
MCV RBC AUTO: 79.2 FL — LOW (ref 80–100)
NITRITE UR-MCNC: NEGATIVE — SIGNIFICANT CHANGE UP
NRBC # BLD: 0 /100 WBCS — SIGNIFICANT CHANGE UP (ref 0–0)
PH UR: 6 — SIGNIFICANT CHANGE UP (ref 5–8)
PHOSPHATE SERPL-MCNC: 2.8 MG/DL — SIGNIFICANT CHANGE UP (ref 2.5–4.5)
PLATELET # BLD AUTO: 18 K/UL — CRITICAL LOW (ref 150–400)
POTASSIUM SERPL-MCNC: 4 MMOL/L — SIGNIFICANT CHANGE UP (ref 3.5–5.3)
POTASSIUM SERPL-SCNC: 4 MMOL/L — SIGNIFICANT CHANGE UP (ref 3.5–5.3)
PROT SERPL-MCNC: 5.5 G/DL — LOW (ref 6–8.3)
PROT UR-MCNC: ABNORMAL
RBC # BLD: 3.46 M/UL — LOW (ref 4.2–5.8)
RBC # FLD: 14.2 % — SIGNIFICANT CHANGE UP (ref 10.3–14.5)
RBC CASTS # UR COMP ASSIST: 2 /HPF — SIGNIFICANT CHANGE UP (ref 0–4)
RH IG SCN BLD-IMP: POSITIVE — SIGNIFICANT CHANGE UP
SODIUM SERPL-SCNC: 137 MMOL/L — SIGNIFICANT CHANGE UP (ref 135–145)
SP GR SPEC: 1.01 — SIGNIFICANT CHANGE UP (ref 1.01–1.02)
UROBILINOGEN FLD QL: NEGATIVE — SIGNIFICANT CHANGE UP
WBC # BLD: <0.1 K/UL — CRITICAL LOW (ref 3.8–10.5)
WBC # FLD AUTO: <0.1 K/UL — CRITICAL LOW (ref 3.8–10.5)
WBC UR QL: 2 /HPF — SIGNIFICANT CHANGE UP (ref 0–5)

## 2022-11-04 PROCEDURE — 71045 X-RAY EXAM CHEST 1 VIEW: CPT | Mod: 26

## 2022-11-04 PROCEDURE — 99291 CRITICAL CARE FIRST HOUR: CPT

## 2022-11-04 RX ORDER — LOPERAMIDE HCL 2 MG
2 TABLET ORAL ONCE
Refills: 0 | Status: COMPLETED | OUTPATIENT
Start: 2022-11-04 | End: 2022-11-06

## 2022-11-04 RX ORDER — FLUCONAZOLE 150 MG/1
2 TABLET ORAL
Qty: 60 | Refills: 3
Start: 2022-11-04 | End: 2023-03-03

## 2022-11-04 RX ORDER — DIPHENHYDRAMINE HYDROCHLORIDE AND LIDOCAINE HYDROCHLORIDE AND ALUMINUM HYDROXIDE AND MAGNESIUM HYDRO
5 KIT
Refills: 0 | Status: COMPLETED | OUTPATIENT
Start: 2022-11-04 | End: 2022-11-07

## 2022-11-04 RX ORDER — AMLODIPINE BESYLATE 2.5 MG/1
1 TABLET ORAL
Qty: 0 | Refills: 0 | DISCHARGE
Start: 2022-11-04

## 2022-11-04 RX ORDER — HYDRALAZINE HCL 50 MG
1 TABLET ORAL
Qty: 0 | Refills: 0 | DISCHARGE
Start: 2022-11-04

## 2022-11-04 RX ORDER — EMPAGLIFLOZIN 10 MG/1
1 TABLET, FILM COATED ORAL
Qty: 30 | Refills: 0
Start: 2022-11-04 | End: 2022-12-03

## 2022-11-04 RX ORDER — LENALIDOMIDE 5 MG/1
1 CAPSULE ORAL
Qty: 0 | Refills: 0 | DISCHARGE

## 2022-11-04 RX ORDER — AMLODIPINE BESYLATE 2.5 MG/1
1 TABLET ORAL
Qty: 0 | Refills: 0 | DISCHARGE

## 2022-11-04 RX ORDER — ATOVAQUONE 750 MG/5ML
5 SUSPENSION ORAL
Qty: 300 | Refills: 3
Start: 2022-11-04 | End: 2023-03-03

## 2022-11-04 RX ORDER — ACYCLOVIR SODIUM 500 MG
1 VIAL (EA) INTRAVENOUS
Qty: 90 | Refills: 3
Start: 2022-11-04 | End: 2023-03-03

## 2022-11-04 RX ORDER — ASPIRIN/CALCIUM CARB/MAGNESIUM 324 MG
1 TABLET ORAL
Qty: 0 | Refills: 0 | DISCHARGE

## 2022-11-04 RX ORDER — HYDRALAZINE HCL 50 MG
1 TABLET ORAL
Qty: 0 | Refills: 0 | DISCHARGE

## 2022-11-04 RX ORDER — FINASTERIDE 5 MG/1
1 TABLET, FILM COATED ORAL
Qty: 0 | Refills: 0 | DISCHARGE
Start: 2022-11-04

## 2022-11-04 RX ORDER — FOLIC ACID 0.8 MG
1 TABLET ORAL
Qty: 30 | Refills: 3
Start: 2022-11-04 | End: 2023-03-03

## 2022-11-04 RX ORDER — TAMSULOSIN HYDROCHLORIDE 0.4 MG/1
1 CAPSULE ORAL
Qty: 0 | Refills: 0 | DISCHARGE
Start: 2022-11-04

## 2022-11-04 RX ORDER — PANTOPRAZOLE SODIUM 20 MG/1
1 TABLET, DELAYED RELEASE ORAL
Qty: 30 | Refills: 3
Start: 2022-11-04 | End: 2023-03-03

## 2022-11-04 RX ORDER — ACYCLOVIR SODIUM 500 MG
1 VIAL (EA) INTRAVENOUS
Qty: 0 | Refills: 0 | DISCHARGE

## 2022-11-04 RX ORDER — ONDANSETRON 8 MG/1
1 TABLET, FILM COATED ORAL
Qty: 90 | Refills: 0
Start: 2022-11-04 | End: 2022-12-03

## 2022-11-04 RX ADMIN — DIPHENHYDRAMINE HYDROCHLORIDE AND LIDOCAINE HYDROCHLORIDE AND ALUMINUM HYDROXIDE AND MAGNESIUM HYDRO 5 MILLILITER(S): KIT at 21:04

## 2022-11-04 RX ADMIN — SODIUM CHLORIDE 50 MILLILITER(S): 9 INJECTION INTRAMUSCULAR; INTRAVENOUS; SUBCUTANEOUS at 20:40

## 2022-11-04 RX ADMIN — Medication 1 MILLIGRAM(S): at 11:40

## 2022-11-04 RX ADMIN — Medication 1 LOZENGE: at 23:23

## 2022-11-04 RX ADMIN — Medication 10 MILLILITER(S): at 16:47

## 2022-11-04 RX ADMIN — HEPARIN SODIUM 3.12 UNIT(S)/HR: 5000 INJECTION INTRAVENOUS; SUBCUTANEOUS at 20:40

## 2022-11-04 RX ADMIN — Medication 5 MILLILITER(S): at 16:46

## 2022-11-04 RX ADMIN — PANTOPRAZOLE SODIUM 40 MILLIGRAM(S): 20 TABLET, DELAYED RELEASE ORAL at 05:25

## 2022-11-04 RX ADMIN — URSODIOL 300 MILLIGRAM(S): 250 TABLET, FILM COATED ORAL at 17:20

## 2022-11-04 RX ADMIN — Medication 25 MILLIGRAM(S): at 05:24

## 2022-11-04 RX ADMIN — Medication 1 LOZENGE: at 20:40

## 2022-11-04 RX ADMIN — Medication 650 MILLIGRAM(S): at 00:10

## 2022-11-04 RX ADMIN — Medication 1 LOZENGE: at 11:41

## 2022-11-04 RX ADMIN — Medication 100 MILLIGRAM(S): at 21:03

## 2022-11-04 RX ADMIN — Medication 1 TABLET(S): at 11:40

## 2022-11-04 RX ADMIN — CEFEPIME 100 MILLIGRAM(S): 1 INJECTION, POWDER, FOR SOLUTION INTRAMUSCULAR; INTRAVENOUS at 13:17

## 2022-11-04 RX ADMIN — Medication 400 MILLIGRAM(S): at 13:12

## 2022-11-04 RX ADMIN — Medication 1 LOZENGE: at 08:31

## 2022-11-04 RX ADMIN — Medication 400 MILLIGRAM(S): at 21:03

## 2022-11-04 RX ADMIN — Medication 10 MILLILITER(S): at 20:40

## 2022-11-04 RX ADMIN — Medication 25 MILLIGRAM(S): at 13:12

## 2022-11-04 RX ADMIN — NYSTATIN CREAM 1 APPLICATION(S): 100000 CREAM TOPICAL at 13:14

## 2022-11-04 RX ADMIN — Medication 10 MILLILITER(S): at 23:23

## 2022-11-04 RX ADMIN — AMLODIPINE BESYLATE 10 MILLIGRAM(S): 2.5 TABLET ORAL at 05:25

## 2022-11-04 RX ADMIN — CEFEPIME 100 MILLIGRAM(S): 1 INJECTION, POWDER, FOR SOLUTION INTRAMUSCULAR; INTRAVENOUS at 05:24

## 2022-11-04 RX ADMIN — Medication 100 MILLIGRAM(S): at 13:12

## 2022-11-04 RX ADMIN — CEFEPIME 100 MILLIGRAM(S): 1 INJECTION, POWDER, FOR SOLUTION INTRAMUSCULAR; INTRAVENOUS at 00:00

## 2022-11-04 RX ADMIN — CHLORHEXIDINE GLUCONATE 1 APPLICATION(S): 213 SOLUTION TOPICAL at 08:32

## 2022-11-04 RX ADMIN — DIPHENHYDRAMINE HYDROCHLORIDE AND LIDOCAINE HYDROCHLORIDE AND ALUMINUM HYDROXIDE AND MAGNESIUM HYDRO 5 MILLILITER(S): KIT at 16:48

## 2022-11-04 RX ADMIN — TAMSULOSIN HYDROCHLORIDE 0.4 MILLIGRAM(S): 0.4 CAPSULE ORAL at 21:05

## 2022-11-04 RX ADMIN — Medication 400 MILLIGRAM(S): at 05:24

## 2022-11-04 RX ADMIN — Medication 480 MICROGRAM(S): at 12:49

## 2022-11-04 RX ADMIN — Medication 1: at 12:48

## 2022-11-04 RX ADMIN — NYSTATIN CREAM 1 APPLICATION(S): 100000 CREAM TOPICAL at 05:25

## 2022-11-04 RX ADMIN — Medication 5 MILLILITER(S): at 11:41

## 2022-11-04 RX ADMIN — FINASTERIDE 5 MILLIGRAM(S): 5 TABLET, FILM COATED ORAL at 11:40

## 2022-11-04 RX ADMIN — Medication 10 MILLILITER(S): at 08:31

## 2022-11-04 RX ADMIN — LORATADINE 10 MILLIGRAM(S): 10 TABLET ORAL at 11:40

## 2022-11-04 RX ADMIN — ONDANSETRON 8 MILLIGRAM(S): 8 TABLET, FILM COATED ORAL at 21:45

## 2022-11-04 RX ADMIN — CEFEPIME 100 MILLIGRAM(S): 1 INJECTION, POWDER, FOR SOLUTION INTRAMUSCULAR; INTRAVENOUS at 21:04

## 2022-11-04 RX ADMIN — Medication 10 MILLILITER(S): at 11:41

## 2022-11-04 RX ADMIN — Medication 5 MILLILITER(S): at 08:31

## 2022-11-04 RX ADMIN — Medication 1 LOZENGE: at 16:47

## 2022-11-04 RX ADMIN — DIPHENHYDRAMINE HYDROCHLORIDE AND LIDOCAINE HYDROCHLORIDE AND ALUMINUM HYDROXIDE AND MAGNESIUM HYDRO 5 MILLILITER(S): KIT at 23:29

## 2022-11-04 RX ADMIN — Medication 5 MILLILITER(S): at 23:23

## 2022-11-04 RX ADMIN — Medication 5 MILLILITER(S): at 20:40

## 2022-11-04 RX ADMIN — NYSTATIN CREAM 1 APPLICATION(S): 100000 CREAM TOPICAL at 21:05

## 2022-11-04 RX ADMIN — URSODIOL 300 MILLIGRAM(S): 250 TABLET, FILM COATED ORAL at 05:25

## 2022-11-04 RX ADMIN — Medication 25 MILLIGRAM(S): at 21:04

## 2022-11-04 RX ADMIN — DIPHENHYDRAMINE HYDROCHLORIDE AND LIDOCAINE HYDROCHLORIDE AND ALUMINUM HYDROXIDE AND MAGNESIUM HYDRO 5 MILLILITER(S): KIT at 11:41

## 2022-11-04 RX ADMIN — FLUCONAZOLE 400 MILLIGRAM(S): 150 TABLET ORAL at 11:40

## 2022-11-04 RX ADMIN — Medication 100 MILLIGRAM(S): at 05:24

## 2022-11-04 NOTE — PROGRESS NOTE ADULT - CRITICAL CARE ATTENDING COMMENT
72 year old male with multiple myeloma diagnosed 12/20/21 s/p RVD x 6 admitted for autologous pbsct with high dose melphalan prep regimen (dose reduced to 70mg / m2 for age)    Day + 8...some soft stool..otherwise no issues.    1. Admit to BMTU   2. -3 hours prior to Melphalan start hydration: D5NS at 200ml /hr and continue 24 hours post Melphalan infusion  3. Day – 3 & day -2 - Melphalan 70mg/m2  IV   4. Strict I&O, daily weights, prn diuresis   5. Day -1 rest day. At 2200 on day – 1, start transplant hydration 1/2NS + 50mEq NaHCO3- (may substitute [id number] if bicarb not available)  6. Day 0 – infuse HPC product. 4 hours after infusion of cells start Zarxio 5 micrograms / kg (actual weight) . Continue through engraftment.   7. On day 0, + 1, + 2-give Kepivance 60micrograms / kg (actual weight).  8. VOD prophylaxis - low dose heparin gtt (dosed at 100 units / kg / day), glutamine supplementation, Actigall BID   9. PCP prophylaxis - Bactrim DS through day -2    10. Antiviral prophylaxis - Acyclovir 400mg po TID to start day -1   11. Antifungal prophylaxis- Diflucan 400 mg po daily.  12. GI prophylaxis - Protonix po QD   13. Antibacterial prophylaxis -  ANC < 500, start Cipro 500mg po BID. If becomes febrile, pan cx, CXR and change Cipro to Cefepime 2g IV q 8 hours. Continue until count recovery  14. Kepivance for prevention of mucositis- days 0, +1, +2  15. Aggressive mouth care and skin care as per protocol..senna, miralax  prn  16. The patient is aware of his diagnosis...he does not want to be reminded of it every day.. 72 year old male with multiple myeloma diagnosed 12/20/21 s/p RVD x 6 admitted for autologous pbsct with high dose melphalan prep regimen (dose reduced to 70mg / m2 for age)    Day + 8...some soft stool..feeling fatigued this am..step daughter on phone    1. Admit to BMTU   2. -3 hours prior to Melphalan start hydration: D5NS at 200ml /hr and continue 24 hours post Melphalan infusion  3. Day – 3 & day -2 - Melphalan 70mg/m2  IV   4. Strict I&O, daily weights, prn diuresis   5. Day -1 rest day. At 2200 on day – 1, start transplant hydration 1/2NS + 50mEq NaHCO3- (may substitute [id number] if bicarb not available)  6. Day 0 – infuse HPC product. 4 hours after infusion of cells start Zarxio 5 micrograms / kg (actual weight) . Continue through engraftment.   7. On day 0, + 1, + 2-give Kepivance 60micrograms / kg (actual weight).  8. VOD prophylaxis - low dose heparin gtt (dosed at 100 units / kg / day), glutamine supplementation, Actigall BID   9. PCP prophylaxis - Bactrim DS through day -2    10. Antiviral prophylaxis - Acyclovir 400mg po TID to start day -1   11. Antifungal prophylaxis- Diflucan 400 mg po daily.  12. GI prophylaxis - Protonix po QD   13. Antibacterial prophylaxis -  ANC < 500, started Cipro 500mg po BID.  febrile, pan cx, CXR and changed Cipro to Cefepime 2g IV q 8 hours. Continue until count recovery  14. Kepivance for prevention of mucositis- days 0, +1, +2  15. Aggressive mouth care and skin care as per protocol..senna, miralax  prn  16. The patient is aware of his diagnosis...he does not want to be reminded of it every day..

## 2022-11-04 NOTE — PROGRESS NOTE ADULT - SUBJECTIVE AND OBJECTIVE BOX
[facility] Transplant Team                                                      Critical / Counseling Time Provided: 30 minutes                                                                                                                                                        Chief Complaint: Autologous peripheral blood stem cell transplant with high dose Melphalan prep regimen for treatment of multiple myeloma    S: Patient seen and examined with [facility] Transplant Team:     All other ROS Negative    O: Vitals:   Vital Signs Last 24 Hrs  T(C): 37.4 (2022 05:25), Max: 38 (2022 23:40)  T(F): 99.3 (2022 05:25), Max: 100.4 (2022 23:40)  HR: 107 (2022 05:25) (95 - 114)  BP: 121/65 (2022 05:25) (103/60 - 143/76)  BP(mean): --  RR: 18 (2022 05:25) (16 - 19)  SpO2: 97% (2022 05:25) (96% - 99%)    Parameters below as of 2022 05:25  Patient On (Oxygen Delivery Method): room air        Admit weight:   Daily     Daily Weight in k.5 (2022 09:30)    Intake / Output:    @ 07:01  -  04 @ 07:00  --------------------------------------------------------  IN: 1871 mL / OUT: 1600 mL / NET: 271 mL    PE:   Oropharynx: no erythema or ulcerations  Oral Mucositis:                 -                                       Grade: n/a  CVS: S1, S2 RRR   Lungs: CTA throughout bilaterally   Abdomen: + BS x 4, soft, NT, ND   Extremities: no edema  Gastric Mucositis:      -                                            Grade: n/a  Intestinal Mucositis:    -                                          Grade: n/a  Skin: no rash   TLC: CDI   Neuro: A&Ox3   Pain: 0      Meds:   Antimicrobials:   acyclovir   Oral Tab/Cap 400 milliGRAM(s) Oral every 8 hours  cefepime   IVPB 2000 milliGRAM(s) IV Intermittent every 8 hours  clotrimazole Lozenge 1 Lozenge Oral five times a day  fluconAZOLE   Tablet 400 milliGRAM(s) Oral daily      Heme / Onc:   heparin  Infusion 312 Unit(s)/Hr IV Continuous <Continuous>      GI:  aluminum hydroxide/magnesium hydroxide/simethicone Suspension 30 milliLiter(s) Oral every 6 hours PRN  lactulose Syrup 20 Gram(s) Oral once PRN  pantoprazole    Tablet 40 milliGRAM(s) Oral before breakfast  polyethylene glycol 3350 17 Gram(s) Oral daily PRN  senna 2 Tablet(s) Oral at bedtime  sodium bicarbonate Mouth Rinse 10 milliLiter(s) Swish and Spit five times a day  ursodiol Capsule 300 milliGRAM(s) Oral two times a day      Cardiovascular:   amLODIPine   Tablet 10 milliGRAM(s) Oral daily  hydrALAZINE 25 milliGRAM(s) Oral three times a day      Immunologic:   filgrastim-sndz (ZARXIO) Injectable 480 MICROGram(s) SubCutaneous every 24 hours      Other medications:   acetaminophen     Tablet .. 650 milliGRAM(s) Oral every 6 hours  benzonatate 100 milliGRAM(s) Oral three times a day  Biotene Dry Mouth Oral Rinse 5 milliLiter(s) Swish and Spit five times a day  chlorhexidine 4% Liquid 1 Application(s) Topical <User Schedule>  dextrose 5%. 1000 milliLiter(s) IV Continuous <Continuous>  dextrose 5%. 1000 milliLiter(s) IV Continuous <Continuous>  dextrose 50% Injectable 25 Gram(s) IV Push once  dextrose 50% Injectable 12.5 Gram(s) IV Push once  dextrose 50% Injectable 25 Gram(s) IV Push once  finasteride 5 milliGRAM(s) Oral daily  folic acid 1 milliGRAM(s) Oral daily  glucagon  Injectable 1 milliGRAM(s) IntraMuscular once  insulin lispro (ADMELOG) corrective regimen sliding scale   SubCutaneous three times a day before meals  insulin lispro (ADMELOG) corrective regimen sliding scale   SubCutaneous at bedtime  lidocaine/prilocaine Cream 1 Application(s) Topical daily  loratadine 10 milliGRAM(s) Oral daily  LORazepam   Injectable 1 milliGRAM(s) IV Push every 24 hours  multivitamin 1 Tablet(s) Oral daily  nystatin Powder 1 Application(s) Topical three times a day  sodium chloride 0.45% 1000 milliLiter(s) IV Continuous <Continuous>  sodium chloride 0.9%. 1000 milliLiter(s) IV Continuous <Continuous>  sodium chloride 0.9%. 1000 milliLiter(s) IV Continuous <Continuous>  tamsulosin 0.4 milliGRAM(s) Oral at bedtime      PRN:   acetaminophen     Tablet .. 650 milliGRAM(s) Oral every 6 hours PRN  aluminum hydroxide/magnesium hydroxide/simethicone Suspension 30 milliLiter(s) Oral every 6 hours PRN  dextrose Oral Gel 15 Gram(s) Oral once PRN  lactulose Syrup 20 Gram(s) Oral once PRN  LORazepam   Injectable 1 milliGRAM(s) IV Push every 6 hours PRN  metoclopramide Injectable 10 milliGRAM(s) IV Push every 6 hours PRN  ondansetron Injectable 8 milliGRAM(s) IV Push every 8 hours PRN  polyethylene glycol 3350 17 Gram(s) Oral daily PRN  sodium chloride 0.9% lock flush 10 milliLiter(s) IV Push every 1 hour PRN    A/P: 72 year old male with multiple myeloma diagnosed 19 s/p RVD x 6 admitted for autologous pbsct with high dose melphalan prep regimen (dose reduced to 70mg / m2 for age)  Day + 8  10/25- melphalan ; continue melphalan hydration for 24 hours post infusion of last dose. Strict I&O, daily weights, prn diuresis   10/27- HPC transplant today; continue transplant hydration for 24 hours post infusion of cells   10/28 weight gain, I>O, lasix 60 mg iv x1; constipated, Lactulose now and PRN  - [facility]. Started on cipro 10/31/22. If T >/= 38C, pan cx, CXR and change cipro to cefepime 2g IV q 8 hours    Febrile overnight, cipro switched to Cefepime; pending Blood cultures and urine culture    1. Infectious Disease:   acyclovir   Oral Tab/Cap 400 milliGRAM(s) Oral every 8 hours  cefepime   IVPB 2000 milliGRAM(s) IV Intermittent every 8 hours  clotrimazole Lozenge 1 Lozenge Oral five times a day  fluconAZOLE   Tablet 400 milliGRAM(s) Oral daily    2. VOD Prophylaxis: Actigall, Glutamine, Heparin (dosed at 100 units / kg / day)     3. GI Prophylaxis:   pantoprazole    Tablet 40 milliGRAM(s) Oral before breakfast    4. Mouthcare - NS / NaHCO3 rinses, Mycelex, Biotene; Skin care     5. GVHD prophylaxis - n/a     6. Transfuse & replete electrolytes prn     7. IV hydration, daily weights, strict I&O, prn diuresis     8. PO intake as tolerated, nutrition follow up as needed, MVI, folic acid     9. Antiemetics, anti-diarrhea medications:   LORazepam   Injectable 1 milliGRAM(s) IV Push every 6 hours PRN  metoclopramide Injectable 10 milliGRAM(s) IV Push every 6 hours PRN  ondansetron Injectable 8 milliGRAM(s) IV Push every 8 hours PRN    10. OOB as tolerated, physical therapy consult if needed     11. Monitor coags / fibrinogen 2x week, vitamin K as needed     12. Monitor closely for clinical changes, monitor for fevers     13. Emotional support provided, plan of care discussed and questions addressed     14. Patient education done regarding plan of care, restrictions and discharge planning     15. Continue regular social work input     I have written the above note for Dr. Gonzales who performed service with me in the room.   Shayla Lundberg NP-C ([phone])    I have seen and examined patient with NP, I agree with above note as scribed.                    [facility] Transplant Team                                                      Critical / Counseling Time Provided: 30 minutes                                                                                                                                                        Chief Complaint: Autologous peripheral blood stem cell transplant with high dose Melphalan prep regimen for treatment of multiple myeloma    S: Patient seen and examined with [facility] Transplant Team:     All other ROS Negative    O: Vitals:   Vital Signs Last 24 Hrs  T(C): 37.4 (2022 05:25), Max: 38 (2022 23:40)  T(F): 99.3 (2022 05:25), Max: 100.4 (2022 23:40)  HR: 107 (2022 05:25) (95 - 114)  BP: 121/65 (2022 05:25) (103/60 - 143/76)  BP(mean): --  RR: 18 (2022 05:25) (16 - 19)  SpO2: 97% (2022 05:25) (96% - 99%)    Parameters below as of 2022 05:25  Patient On (Oxygen Delivery Method): room air        Admit weight:   Daily     Daily Weight in k.5 (2022 09:30)    Intake / Output:    @ 07:01  -  04 @ 07:00  --------------------------------------------------------  IN: 1871 mL / OUT: 1600 mL / NET: 271 mL    PE:   Oropharynx: no erythema or ulcerations  Oral Mucositis:                 -                                       Grade: n/a  CVS: S1, S2 RRR   Lungs: CTA throughout bilaterally   Abdomen: + BS x 4, soft, NT, ND   Extremities: no edema  Gastric Mucositis:      -                                            Grade: n/a  Intestinal Mucositis:    -                                          Grade: n/a  Skin: no rash   TLC: CDI   Neuro: A&Ox3   Pain: 0    LABS:                        9.1    <0.1  )-----------( 18       ( 2022 06:43 )             27.4     2022 06:39    137    |  104    |  10     ----------------------------<  111    4.0     |  23     |  0.84     Ca    8.6        2022 06:39  Phos  2.8       2022 06:39  Mg     1.6       2022 06:39    TPro  5.5    /  Alb  3.2    /  TBili  0.2    /  DBili  x      /  AST  14     /  ALT  16     /  AlkPhos  80     2022 06:39    PT/INR - ( 2022 06:41 )   PT: 12.2 sec;   INR: 1.05 ratio      PTT - ( 2022 06:41 )  PTT:33.4 sec    CAPILLARY BLOOD GLUCOSE  POCT Blood Glucose.: 140 mg/dL (2022 21:40)  POCT Blood Glucose.: 72 mg/dL (2022 17:10)  POCT Blood Glucose.: 199 mg/dL (2022 12:03)  POCT Blood Glucose.: 112 mg/dL (2022 08:02)    LIVER FUNCTIONS - ( 2022 06:39 )  Alb: 3.2 g/dL / Pro: 5.5 g/dL / ALK PHOS: 80 U/L / ALT: 16 U/L / AST: 14 U/L / GGT: x           Urinalysis Basic - ( 2022 00:31 )  Color: Yellow / Appearance: Slightly Turbid / S.014 / pH: x  Gluc: x / Ketone: Trace  / Bili: Negative / Urobili: Negative   Blood: x / Protein: Trace / Nitrite: Negative   Leuk Esterase: Negative / RBC: 2 /hpf / WBC 2 /HPF   Sq Epi: x / Non Sq Epi: 2 /hpf / Bacteria: Moderate      Meds:   Antimicrobials:   acyclovir   Oral Tab/Cap 400 milliGRAM(s) Oral every 8 hours  cefepime   IVPB 2000 milliGRAM(s) IV Intermittent every 8 hours  clotrimazole Lozenge 1 Lozenge Oral five times a day  fluconAZOLE   Tablet 400 milliGRAM(s) Oral daily      Heme / Onc:   heparin  Infusion 312 Unit(s)/Hr IV Continuous <Continuous>      GI:  aluminum hydroxide/magnesium hydroxide/simethicone Suspension 30 milliLiter(s) Oral every 6 hours PRN  lactulose Syrup 20 Gram(s) Oral once PRN  pantoprazole    Tablet 40 milliGRAM(s) Oral before breakfast  polyethylene glycol 3350 17 Gram(s) Oral daily PRN  senna 2 Tablet(s) Oral at bedtime  sodium bicarbonate Mouth Rinse 10 milliLiter(s) Swish and Spit five times a day  ursodiol Capsule 300 milliGRAM(s) Oral two times a day      Cardiovascular:   amLODIPine   Tablet 10 milliGRAM(s) Oral daily  hydrALAZINE 25 milliGRAM(s) Oral three times a day      Immunologic:   filgrastim-sndz (ZARXIO) Injectable 480 MICROGram(s) SubCutaneous every 24 hours      Other medications:   acetaminophen     Tablet .. 650 milliGRAM(s) Oral every 6 hours  benzonatate 100 milliGRAM(s) Oral three times a day  Biotene Dry Mouth Oral Rinse 5 milliLiter(s) Swish and Spit five times a day  chlorhexidine 4% Liquid 1 Application(s) Topical <User Schedule>  dextrose 5%. 1000 milliLiter(s) IV Continuous <Continuous>  dextrose 5%. 1000 milliLiter(s) IV Continuous <Continuous>  dextrose 50% Injectable 25 Gram(s) IV Push once  dextrose 50% Injectable 12.5 Gram(s) IV Push once  dextrose 50% Injectable 25 Gram(s) IV Push once  finasteride 5 milliGRAM(s) Oral daily  folic acid 1 milliGRAM(s) Oral daily  glucagon  Injectable 1 milliGRAM(s) IntraMuscular once  insulin lispro (ADMELOG) corrective regimen sliding scale   SubCutaneous three times a day before meals  insulin lispro (ADMELOG) corrective regimen sliding scale   SubCutaneous at bedtime  lidocaine/prilocaine Cream 1 Application(s) Topical daily  loratadine 10 milliGRAM(s) Oral daily  LORazepam   Injectable 1 milliGRAM(s) IV Push every 24 hours  multivitamin 1 Tablet(s) Oral daily  nystatin Powder 1 Application(s) Topical three times a day  sodium chloride 0.45% 1000 milliLiter(s) IV Continuous <Continuous>  sodium chloride 0.9%. 1000 milliLiter(s) IV Continuous <Continuous>  sodium chloride 0.9%. 1000 milliLiter(s) IV Continuous <Continuous>  tamsulosin 0.4 milliGRAM(s) Oral at bedtime      PRN:   acetaminophen     Tablet .. 650 milliGRAM(s) Oral every 6 hours PRN  aluminum hydroxide/magnesium hydroxide/simethicone Suspension 30 milliLiter(s) Oral every 6 hours PRN  dextrose Oral Gel 15 Gram(s) Oral once PRN  lactulose Syrup 20 Gram(s) Oral once PRN  LORazepam   Injectable 1 milliGRAM(s) IV Push every 6 hours PRN  metoclopramide Injectable 10 milliGRAM(s) IV Push every 6 hours PRN  ondansetron Injectable 8 milliGRAM(s) IV Push every 8 hours PRN  polyethylene glycol 3350 17 Gram(s) Oral daily PRN  sodium chloride 0.9% lock flush 10 milliLiter(s) IV Push every 1 hour PRN    A/P: 72 year old male with multiple myeloma diagnosed 19 s/p RVD x 6 admitted for autologous pbsct with high dose melphalan prep regimen (dose reduced to 70mg / m2 for age)  Day + 8  10/25- melphalan ; continue melphalan hydration for 24 hours post infusion of last dose. Strict I&O, daily weights, prn diuresis   10/27- [facility]PC transplant today; continue transplant hydration for 24 hours post infusion of cells   10/28 weight gain, I>O, lasix 60 mg iv x1; constipated, Lactulose now and PRN  - [facility]. Started on cipro 10/31/22. If T >/= 38C, pan cx, CXR and change cipro to cefepime 2g IV q 8 hours    Febrile overnight, cipro switched to Cefepime; pending Blood cultures and urine culture    1. Infectious Disease:   acyclovir   Oral Tab/Cap 400 milliGRAM(s) Oral every 8 hours  cefepime   IVPB 2000 milliGRAM(s) IV Intermittent every 8 hours  clotrimazole Lozenge 1 Lozenge Oral five times a day  fluconAZOLE   Tablet 400 milliGRAM(s) Oral daily    2. VOD Prophylaxis: Actigall, Glutamine, Heparin (dosed at 100 units / kg / day)     3. GI Prophylaxis:   pantoprazole    Tablet 40 milliGRAM(s) Oral before breakfast    4. Mouthcare - NS / NaHCO3 rinses, Mycelex, Biotene; Skin care     5. GVHD prophylaxis - n/a     6. Transfuse & replete electrolytes prn     7. IV hydration, daily weights, strict I&O, prn diuresis     8. PO intake as tolerated, nutrition follow up as needed, MVI, folic acid     9. Antiemetics, anti-diarrhea medications:   LORazepam   Injectable 1 milliGRAM(s) IV Push every 6 hours PRN  metoclopramide Injectable 10 milliGRAM(s) IV Push every 6 hours PRN  ondansetron Injectable 8 milliGRAM(s) IV Push every 8 hours PRN    10. OOB as tolerated, physical therapy consult if needed     11. Monitor coags / fibrinogen 2x week, vitamin K as needed     12. Monitor closely for clinical changes, monitor for fevers     13. Emotional support provided, plan of care discussed and questions addressed     14. Patient education done regarding plan of care, restrictions and discharge planning     15. Continue regular social work input     I have written the above note for Dr. Gonzales who performed service with me in the room.   Shayla Lundberg NP-C ([phone])    I have seen and examined patient with NP, I agree with above note as scribed.                    [facility] Transplant Team                                                      Critical / Counseling Time Provided: 30 minutes                                                                                                                                                        Chief Complaint: Autologous peripheral blood stem cell transplant with high dose Melphalan prep regimen for treatment of multiple myeloma    S: Patient seen and examined with [facility] Transplant Team:     All other ROS Negative    O: Vitals:   Vital Signs Last 24 Hrs  T(C): 37.4 (2022 05:25), Max: 38 (2022 23:40)  T(F): 99.3 (2022 05:25), Max: 100.4 (2022 23:40)  HR: 107 (2022 05:25) (95 - 114)  BP: 121/65 (2022 05:25) (103/60 - 143/76)  BP(mean): --  RR: 18 (2022 05:25) (16 - 19)  SpO2: 97% (2022 05:25) (96% - 99%)    Parameters below as of 2022 05:25  Patient On (Oxygen Delivery Method): room air        Today's weight: 73.5kg  Daily     Daily Weight in k.5 (2022 09:30)    Intake / Output:    @ 07:01  -  04 @ 07:00  --------------------------------------------------------  IN: 1871 mL / OUT: 1600 mL / NET: 271 mL    PE:   Oropharynx: no erythema or ulcerations  Oral Mucositis:                 -                                       Grade: n/a  CVS: S1, S2 RRR   Lungs: CTA throughout bilaterally   Abdomen: + BS x 4, soft, NT, ND   Extremities: no edema  Gastric Mucositis:      -                                            Grade: n/a  Intestinal Mucositis:    -                                          Grade: n/a  Skin: no rash   TLC: CDI   Neuro: A&Ox3   Pain: 0    LABS:                        9.1    <0.1  )-----------( 18       ( 2022 06:43 )             27.4     2022 06:39    137    |  104    |  10     ----------------------------<  111    4.0     |  23     |  0.84     Ca    8.6        2022 06:39  Phos  2.8       2022 06:39  Mg     1.6       2022 06:39    TPro  5.5    /  Alb  3.2    /  TBili  0.2    /  DBili  x      /  AST  14     /  ALT  16     /  AlkPhos  80     2022 06:39    PT/INR - ( 2022 06:41 )   PT: 12.2 sec;   INR: 1.05 ratio      PTT - ( 2022 06:41 )  PTT:33.4 sec    CAPILLARY BLOOD GLUCOSE  POCT Blood Glucose.: 140 mg/dL (2022 21:40)  POCT Blood Glucose.: 72 mg/dL (2022 17:10)  POCT Blood Glucose.: 199 mg/dL (2022 12:03)  POCT Blood Glucose.: 112 mg/dL (2022 08:02)    LIVER FUNCTIONS - ( 2022 06:39 )  Alb: 3.2 g/dL / Pro: 5.5 g/dL / ALK PHOS: 80 U/L / ALT: 16 U/L / AST: 14 U/L / GGT: x           Urinalysis Basic - ( 2022 00:31 )  Color: Yellow / Appearance: Slightly Turbid / S.014 / pH: x  Gluc: x / Ketone: Trace  / Bili: Negative / Urobili: Negative   Blood: x / Protein: Trace / Nitrite: Negative   Leuk Esterase: Negative / RBC: 2 /hpf / WBC 2 /HPF   Sq Epi: x / Non Sq Epi: 2 /hpf / Bacteria: Moderate      Meds:   Antimicrobials:   acyclovir   Oral Tab/Cap 400 milliGRAM(s) Oral every 8 hours  cefepime   IVPB 2000 milliGRAM(s) IV Intermittent every 8 hours  clotrimazole Lozenge 1 Lozenge Oral five times a day  fluconAZOLE   Tablet 400 milliGRAM(s) Oral daily      Heme / Onc:   heparin  Infusion 312 Unit(s)/Hr IV Continuous <Continuous>      GI:  aluminum hydroxide/magnesium hydroxide/simethicone Suspension 30 milliLiter(s) Oral every 6 hours PRN  lactulose Syrup 20 Gram(s) Oral once PRN  pantoprazole    Tablet 40 milliGRAM(s) Oral before breakfast  polyethylene glycol 3350 17 Gram(s) Oral daily PRN  senna 2 Tablet(s) Oral at bedtime  sodium bicarbonate Mouth Rinse 10 milliLiter(s) Swish and Spit five times a day  ursodiol Capsule 300 milliGRAM(s) Oral two times a day      Cardiovascular:   amLODIPine   Tablet 10 milliGRAM(s) Oral daily  hydrALAZINE 25 milliGRAM(s) Oral three times a day      Immunologic:   filgrastim-sndz (ZARXIO) Injectable 480 MICROGram(s) SubCutaneous every 24 hours      Other medications:   acetaminophen     Tablet .. 650 milliGRAM(s) Oral every 6 hours  benzonatate 100 milliGRAM(s) Oral three times a day  Biotene Dry Mouth Oral Rinse 5 milliLiter(s) Swish and Spit five times a day  chlorhexidine 4% Liquid 1 Application(s) Topical <User Schedule>  dextrose 5%. 1000 milliLiter(s) IV Continuous <Continuous>  dextrose 5%. 1000 milliLiter(s) IV Continuous <Continuous>  dextrose 50% Injectable 25 Gram(s) IV Push once  dextrose 50% Injectable 12.5 Gram(s) IV Push once  dextrose 50% Injectable 25 Gram(s) IV Push once  finasteride 5 milliGRAM(s) Oral daily  folic acid 1 milliGRAM(s) Oral daily  glucagon  Injectable 1 milliGRAM(s) IntraMuscular once  insulin lispro (ADMELOG) corrective regimen sliding scale   SubCutaneous three times a day before meals  insulin lispro (ADMELOG) corrective regimen sliding scale   SubCutaneous at bedtime  lidocaine/prilocaine Cream 1 Application(s) Topical daily  loratadine 10 milliGRAM(s) Oral daily  LORazepam   Injectable 1 milliGRAM(s) IV Push every 24 hours  multivitamin 1 Tablet(s) Oral daily  nystatin Powder 1 Application(s) Topical three times a day  sodium chloride 0.45% 1000 milliLiter(s) IV Continuous <Continuous>  sodium chloride 0.9%. 1000 milliLiter(s) IV Continuous <Continuous>  sodium chloride 0.9%. 1000 milliLiter(s) IV Continuous <Continuous>  tamsulosin 0.4 milliGRAM(s) Oral at bedtime      PRN:   acetaminophen     Tablet .. 650 milliGRAM(s) Oral every 6 hours PRN  aluminum hydroxide/magnesium hydroxide/simethicone Suspension 30 milliLiter(s) Oral every 6 hours PRN  dextrose Oral Gel 15 Gram(s) Oral once PRN  lactulose Syrup 20 Gram(s) Oral once PRN  LORazepam   Injectable 1 milliGRAM(s) IV Push every 6 hours PRN  metoclopramide Injectable 10 milliGRAM(s) IV Push every 6 hours PRN  ondansetron Injectable 8 milliGRAM(s) IV Push every 8 hours PRN  polyethylene glycol 3350 17 Gram(s) Oral daily PRN  sodium chloride 0.9% lock flush 10 milliLiter(s) IV Push every 1 hour PRN    A/P: 72 year old male with multiple myeloma diagnosed 19 s/p RVD x 6 admitted for autologous pbsct with high dose melphalan prep regimen (dose reduced to 70mg / m2 for age)  Day + 8  10/25- melphalan ; continue melphalan hydration for 24 hours post infusion of last dose. Strict I&O, daily weights, prn diuresis   10/27- HPC transplant today; continue transplant hydration for 24 hours post infusion of cells   10/28 weight gain, I>O, lasix 60 mg iv x1; constipated, Lactulose now and PRN  - [facility]. Started on cipro 10/31/22. If T >/= 38C, pan cx, CXR and change cipro to cefepime 2g IV q 8 hours    Febrile overnight, cipro switched to Cefepime; pending Blood cultures and urine culture    1. Infectious Disease:   acyclovir   Oral Tab/Cap 400 milliGRAM(s) Oral every 8 hours  cefepime   IVPB 2000 milliGRAM(s) IV Intermittent every 8 hours  clotrimazole Lozenge 1 Lozenge Oral five times a day  fluconAZOLE   Tablet 400 milliGRAM(s) Oral daily    2. VOD Prophylaxis: Actigall, Glutamine, Heparin (dosed at 100 units / kg / day)     3. GI Prophylaxis:   pantoprazole    Tablet 40 milliGRAM(s) Oral before breakfast    4. Mouthcare - NS / NaHCO3 rinses, Mycelex, Biotene; Skin care     5. GVHD prophylaxis - n/a     6. Transfuse & replete electrolytes prn     7. IV hydration, daily weights, strict I&O, prn diuresis     8. PO intake as tolerated, nutrition follow up as needed, MVI, folic acid     9. Antiemetics, anti-diarrhea medications:   LORazepam   Injectable 1 milliGRAM(s) IV Push every 6 hours PRN  metoclopramide Injectable 10 milliGRAM(s) IV Push every 6 hours PRN  ondansetron Injectable 8 milliGRAM(s) IV Push every 8 hours PRN    10. OOB as tolerated, physical therapy consult if needed     11. Monitor coags / fibrinogen 2x week, vitamin K as needed     12. Monitor closely for clinical changes, monitor for fevers     13. Emotional support provided, plan of care discussed and questions addressed     14. Patient education done regarding plan of care, restrictions and discharge planning     15. Continue regular social work input     I have written the above note for Dr. Gonzales who performed service with me in the room.   Shayla Lundberg NP-C ([phone])    I have seen and examined patient with NP, I agree with above note as scribed.                    [facility] Transplant Team                                                      Critical / Counseling Time Provided: 30 minutes                                                                                                                                                        Chief Complaint: Autologous peripheral blood stem cell transplant with high dose Melphalan prep regimen for treatment of multiple myeloma    S: Patient seen and examined with [facility] Transplant Team:     All other ROS Negative    O: Vitals:   Vital Signs Last 24 Hrs  T(C): 37.4 (2022 05:25), Max: 38 (2022 23:40)  T(F): 99.3 (2022 05:25), Max: 100.4 (2022 23:40)  HR: 107 (2022 05:25) (95 - 114)  BP: 121/65 (2022 05:25) (103/60 - 143/76)  BP(mean): --  RR: 18 (2022 05:25) (16 - 19)  SpO2: 97% (2022 05:25) (96% - 99%)    Parameters below as of 2022 05:25  Patient On (Oxygen Delivery Method): room air        Today's weight: 73.5kg  Daily     Daily Weight in k.5 (2022 09:30)    Intake / Output:    @ 07:01  -  04 @ 07:00  --------------------------------------------------------  IN: 1871 mL / OUT: 1600 mL / NET: 271 mL    PE:   Oropharynx: no erythema or ulcerations  Oral Mucositis:                 -                                       Grade: n/a  CVS: S1, S2 RRR   Lungs: CTA throughout bilaterally   Abdomen: + BS x 4, soft, NT, ND   Extremities: no edema  Gastric Mucositis:      -                                            Grade: n/a  Intestinal Mucositis:    -                                          Grade: n/a  Skin: no rash   TLC: CDI   Neuro: A&Ox3   Pain: 0    LABS:                        9.1    <0.1  )-----------( 18       ( 2022 06:43 )             27.4     2022 06:39    137    |  104    |  10     ----------------------------<  111    4.0     |  23     |  0.84     Ca    8.6        2022 06:39  Phos  2.8       2022 06:39  Mg     1.6       2022 06:39    TPro  5.5    /  Alb  3.2    /  TBili  0.2    /  DBili  x      /  AST  14     /  ALT  16     /  AlkPhos  80     2022 06:39    PT/INR - ( 2022 06:41 )   PT: 12.2 sec;   INR: 1.05 ratio      PTT - ( 2022 06:41 )  PTT:33.4 sec    CAPILLARY BLOOD GLUCOSE  POCT Blood Glucose.: 140 mg/dL (2022 21:40)  POCT Blood Glucose.: 72 mg/dL (2022 17:10)  POCT Blood Glucose.: 199 mg/dL (2022 12:03)  POCT Blood Glucose.: 112 mg/dL (2022 08:02)    LIVER FUNCTIONS - ( 2022 06:39 )  Alb: 3.2 g/dL / Pro: 5.5 g/dL / ALK PHOS: 80 U/L / ALT: 16 U/L / AST: 14 U/L / GGT: x           Urinalysis Basic - ( 2022 00:31 )  Color: Yellow / Appearance: Slightly Turbid / S.014 / pH: x  Gluc: x / Ketone: Trace  / Bili: Negative / Urobili: Negative   Blood: x / Protein: Trace / Nitrite: Negative   Leuk Esterase: Negative / RBC: 2 /hpf / WBC 2 /HPF   Sq Epi: x / Non Sq Epi: 2 /hpf / Bacteria: Moderate      Meds:   Antimicrobials:   acyclovir   Oral Tab/Cap 400 milliGRAM(s) Oral every 8 hours  cefepime   IVPB 2000 milliGRAM(s) IV Intermittent every 8 hours  clotrimazole Lozenge 1 Lozenge Oral five times a day  fluconAZOLE   Tablet 400 milliGRAM(s) Oral daily      Heme / Onc:   heparin  Infusion 312 Unit(s)/Hr IV Continuous <Continuous>      GI:  aluminum hydroxide/magnesium hydroxide/simethicone Suspension 30 milliLiter(s) Oral every 6 hours PRN  lactulose Syrup 20 Gram(s) Oral once PRN  pantoprazole    Tablet 40 milliGRAM(s) Oral before breakfast  polyethylene glycol 3350 17 Gram(s) Oral daily PRN  senna 2 Tablet(s) Oral at bedtime  sodium bicarbonate Mouth Rinse 10 milliLiter(s) Swish and Spit five times a day  ursodiol Capsule 300 milliGRAM(s) Oral two times a day      Cardiovascular:   amLODIPine   Tablet 10 milliGRAM(s) Oral daily  hydrALAZINE 25 milliGRAM(s) Oral three times a day      Immunologic:   filgrastim-sndz (ZARXIO) Injectable 480 MICROGram(s) SubCutaneous every 24 hours      Other medications:   acetaminophen     Tablet .. 650 milliGRAM(s) Oral every 6 hours  benzonatate 100 milliGRAM(s) Oral three times a day  Biotene Dry Mouth Oral Rinse 5 milliLiter(s) Swish and Spit five times a day  chlorhexidine 4% Liquid 1 Application(s) Topical <User Schedule>  dextrose 5%. 1000 milliLiter(s) IV Continuous <Continuous>  dextrose 5%. 1000 milliLiter(s) IV Continuous <Continuous>  dextrose 50% Injectable 25 Gram(s) IV Push once  dextrose 50% Injectable 12.5 Gram(s) IV Push once  dextrose 50% Injectable 25 Gram(s) IV Push once  finasteride 5 milliGRAM(s) Oral daily  folic acid 1 milliGRAM(s) Oral daily  glucagon  Injectable 1 milliGRAM(s) IntraMuscular once  insulin lispro (ADMELOG) corrective regimen sliding scale   SubCutaneous three times a day before meals  insulin lispro (ADMELOG) corrective regimen sliding scale   SubCutaneous at bedtime  lidocaine/prilocaine Cream 1 Application(s) Topical daily  loratadine 10 milliGRAM(s) Oral daily  LORazepam   Injectable 1 milliGRAM(s) IV Push every 24 hours  multivitamin 1 Tablet(s) Oral daily  nystatin Powder 1 Application(s) Topical three times a day  sodium chloride 0.45% 1000 milliLiter(s) IV Continuous <Continuous>  sodium chloride 0.9%. 1000 milliLiter(s) IV Continuous <Continuous>  sodium chloride 0.9%. 1000 milliLiter(s) IV Continuous <Continuous>  tamsulosin 0.4 milliGRAM(s) Oral at bedtime      PRN:   acetaminophen     Tablet .. 650 milliGRAM(s) Oral every 6 hours PRN  aluminum hydroxide/magnesium hydroxide/simethicone Suspension 30 milliLiter(s) Oral every 6 hours PRN  dextrose Oral Gel 15 Gram(s) Oral once PRN  lactulose Syrup 20 Gram(s) Oral once PRN  LORazepam   Injectable 1 milliGRAM(s) IV Push every 6 hours PRN  metoclopramide Injectable 10 milliGRAM(s) IV Push every 6 hours PRN  ondansetron Injectable 8 milliGRAM(s) IV Push every 8 hours PRN  polyethylene glycol 3350 17 Gram(s) Oral daily PRN  sodium chloride 0.9% lock flush 10 milliLiter(s) IV Push every 1 hour PRN    A/P: 72 year old male with multiple myeloma diagnosed 19 s/p RVD x 6 admitted for autologous pbsct with high dose melphalan prep regimen (dose reduced to 70mg / m2 for age)  Day + 8  10/25- melphalan ; continue melphalan hydration for 24 hours post infusion of last dose. Strict I&O, daily weights, prn diuresis   10/27- HPC transplant today; continue transplant hydration for 24 hours post infusion of cells   10/28 weight gain, I>O, lasix 60 mg iv x1; constipated, Lactulose now and PRN  - Parrish. Started on cipro 10/31/22. If T >/= 38C, pan cx, CXR and change cipro to cefepime 2g IV q 8 hours    Febrile overnight, cipro switched to Cefepime; pending Blood cultures and urine culture    1. Infectious Disease:   acyclovir   Oral Tab/Cap 400 milliGRAM(s) Oral every 8 hours  cefepime   IVPB 2000 milliGRAM(s) IV Intermittent every 8 hours  clotrimazole Lozenge 1 Lozenge Oral five times a day  fluconAZOLE   Tablet 400 milliGRAM(s) Oral daily    2. VOD Prophylaxis: Actigall, Glutamine, Heparin (dosed at 100 units / kg / day)     3. GI Prophylaxis:   pantoprazole    Tablet 40 milliGRAM(s) Oral before breakfast    4. Mouthcare - NS / NaHCO3 rinses, Mycelex, Biotene; Skin care     5. GVHD prophylaxis - n/a     6. Transfuse & replete electrolytes prn     7. IV hydration, daily weights, strict I&O, prn diuresis     8. PO intake as tolerated, nutrition follow up as needed, MVI, folic acid     9. Antiemetics, anti-diarrhea medications:   LORazepam   Injectable 1 milliGRAM(s) IV Push every 6 hours PRN  metoclopramide Injectable 10 milliGRAM(s) IV Push every 6 hours PRN  ondansetron Injectable 8 milliGRAM(s) IV Push every 8 hours PRN    10. OOB as tolerated, physical therapy consult if needed     11. Monitor coags / fibrinogen 2x week, vitamin K as needed     12. Monitor closely for clinical changes, monitor for fevers     13. Emotional support provided, plan of care discussed and questions addressed     14. Patient education done regarding plan of care, restrictions and discharge planning     15. Continue regular social work input     I have written the above note for Dr. Gonzales who performed service with me in the room.   Ana Mistry [facility] ([phone])    I have seen and examined patient with NP, I agree with above note as scribed.                    [facility] Transplant Team                                                      Critical / Counseling Time Provided: 30 minutes                                                                                                                                                        Chief Complaint: Autologous peripheral blood stem cell transplant with high dose Melphalan prep regimen for treatment of multiple myeloma    S: Patient seen and examined with [facility] Team:   +fatigue  +mouth pain  All other ROS Negative    O: Vitals:   Vital Signs Last 24 Hrs  T(C): 37.4 (2022 05:25), Max: 38 (2022 23:40)  T(F): 99.3 (2022 05:25), Max: 100.4 (2022 23:40)  HR: 107 (2022 05:25) (95 - 114)  BP: 121/65 (2022 05:25) (103/60 - 143/76)  BP(mean): --  RR: 18 (2022 05:25) (16 - 19)  SpO2: 97% (2022 05:25) (96% - 99%)    Parameters below as of 2022 05:25  Patient On (Oxygen Delivery Method): room air      Today's weight: 73.5kg  Daily     Daily Weight in k.5 (2022 09:30)    Intake / Output:    @ 07:01  -  04 @ 07:00  --------------------------------------------------------  IN: 1871 mL / OUT: 1600 mL / NET: 271 mL    PE:   Oropharynx: scattered erythema no ulcerations  Oral Mucositis:                 +                                       Grade: 1+ (reports mouth pain)  CVS: S1, S2 RRR   Lungs: CTA throughout bilaterally   Abdomen: + BS x 4, soft, NT, ND   Extremities: no edema  Gastric Mucositis:      -                                            Grade: n/a  Intestinal Mucositis:    -                                          Grade: n/a  Skin: no rash   TLC: CDI   Neuro: A&Ox3   Pain: 3/10- mouth    LABS:                        9.1    <0.1  )-----------( 18       ( 2022 06:43 )             27.4     2022 06:39    137    |  104    |  10     ----------------------------<  111    4.0     |  23     |  0.84     Ca    8.6        2022 06:39  Phos  2.8       2022 06:39  Mg     1.6       2022 06:39    TPro  5.5    /  Alb  3.2    /  TBili  0.2    /  DBili  x      /  AST  14     /  ALT  16     /  AlkPhos  80     2022 06:39    PT/INR - ( 2022 06:41 )   PT: 12.2 sec;   INR: 1.05 ratio      PTT - ( 2022 06:41 )  PTT:33.4 sec    CAPILLARY BLOOD GLUCOSE  POCT Blood Glucose.: 140 mg/dL (2022 21:40)  POCT Blood Glucose.: 72 mg/dL (2022 17:10)  POCT Blood Glucose.: 199 mg/dL (2022 12:03)  POCT Blood Glucose.: 112 mg/dL (2022 08:02)    LIVER FUNCTIONS - ( 2022 06:39 )  Alb: 3.2 g/dL / Pro: 5.5 g/dL / ALK PHOS: 80 U/L / ALT: 16 U/L / AST: 14 U/L / GGT: x           Urinalysis Basic - ( 2022 00:31 )  Color: Yellow / Appearance: Slightly Turbid / S.014 / pH: x  Gluc: x / Ketone: Trace  / Bili: Negative / Urobili: Negative   Blood: x / Protein: Trace / Nitrite: Negative   Leuk Esterase: Negative / RBC: 2 /hpf / WBC 2 /HPF   Sq Epi: x / Non Sq Epi: 2 /hpf / Bacteria: Moderate      Meds:   Antimicrobials:   acyclovir   Oral Tab/Cap 400 milliGRAM(s) Oral every 8 hours  cefepime   IVPB 2000 milliGRAM(s) IV Intermittent every 8 hours  clotrimazole Lozenge 1 Lozenge Oral five times a day  fluconAZOLE   Tablet 400 milliGRAM(s) Oral daily      Heme / Onc:   heparin  Infusion 312 Unit(s)/Hr IV Continuous <Continuous>      GI:  aluminum hydroxide/magnesium hydroxide/simethicone Suspension 30 milliLiter(s) Oral every 6 hours PRN  lactulose Syrup 20 Gram(s) Oral once PRN  pantoprazole    Tablet 40 milliGRAM(s) Oral before breakfast  polyethylene glycol 3350 17 Gram(s) Oral daily PRN  senna 2 Tablet(s) Oral at bedtime  sodium bicarbonate Mouth Rinse 10 milliLiter(s) Swish and Spit five times a day  ursodiol Capsule 300 milliGRAM(s) Oral two times a day      Cardiovascular:   amLODIPine   Tablet 10 milliGRAM(s) Oral daily  hydrALAZINE 25 milliGRAM(s) Oral three times a day      Immunologic:   filgrastim-sndz (ZARXIO) Injectable 480 MICROGram(s) SubCutaneous every 24 hours      Other medications:   acetaminophen     Tablet .. 650 milliGRAM(s) Oral every 6 hours  benzonatate 100 milliGRAM(s) Oral three times a day  Biotene Dry Mouth Oral Rinse 5 milliLiter(s) Swish and Spit five times a day  chlorhexidine 4% Liquid 1 Application(s) Topical <User Schedule>  dextrose 5%. 1000 milliLiter(s) IV Continuous <Continuous>  dextrose 5%. 1000 milliLiter(s) IV Continuous <Continuous>  dextrose 50% Injectable 25 Gram(s) IV Push once  dextrose 50% Injectable 12.5 Gram(s) IV Push once  dextrose 50% Injectable 25 Gram(s) IV Push once  finasteride 5 milliGRAM(s) Oral daily  folic acid 1 milliGRAM(s) Oral daily  glucagon  Injectable 1 milliGRAM(s) IntraMuscular once  insulin lispro (ADMELOG) corrective regimen sliding scale   SubCutaneous three times a day before meals  insulin lispro (ADMELOG) corrective regimen sliding scale   SubCutaneous at bedtime  lidocaine/prilocaine Cream 1 Application(s) Topical daily  loratadine 10 milliGRAM(s) Oral daily  LORazepam   Injectable 1 milliGRAM(s) IV Push every 24 hours  multivitamin 1 Tablet(s) Oral daily  nystatin Powder 1 Application(s) Topical three times a day  sodium chloride 0.45% 1000 milliLiter(s) IV Continuous <Continuous>  sodium chloride 0.9%. 1000 milliLiter(s) IV Continuous <Continuous>  sodium chloride 0.9%. 1000 milliLiter(s) IV Continuous <Continuous>  tamsulosin 0.4 milliGRAM(s) Oral at bedtime      PRN:   acetaminophen     Tablet .. 650 milliGRAM(s) Oral every 6 hours PRN  aluminum hydroxide/magnesium hydroxide/simethicone Suspension 30 milliLiter(s) Oral every 6 hours PRN  dextrose Oral Gel 15 Gram(s) Oral once PRN  lactulose Syrup 20 Gram(s) Oral once PRN  LORazepam   Injectable 1 milliGRAM(s) IV Push every 6 hours PRN  metoclopramide Injectable 10 milliGRAM(s) IV Push every 6 hours PRN  ondansetron Injectable 8 milliGRAM(s) IV Push every 8 hours PRN  polyethylene glycol 3350 17 Gram(s) Oral daily PRN  sodium chloride 0.9% lock flush 10 milliLiter(s) IV Push every 1 hour PRN    A/P: 72 year old male with multiple myeloma diagnosed 19 s/p RVD x 6 admitted for autologous pbsct with high dose melphalan prep regimen (dose reduced to 70mg / m2 for age)  Day + 8  10/25- melphalan ; continue melphalan hydration for 24 hours post infusion of last dose. Strict I&O, daily weights, prn diuresis   10/27- HPC transplant today; continue transplant hydration for 24 hours post infusion of cells   10/28 weight gain, I>O, lasix 60 mg iv x1; constipated, Lactulose now and PRN  - [facility]rish. Started on cipro 10/31/22. If T >/= 38C, pan cx, CXR and change cipro to cefepime 2g IV q 8 hours    Febrile overnight, cipro switched to Cefepime; pending Blood cultures and urine culture    1. Infectious Disease:   acyclovir   Oral Tab/Cap 400 milliGRAM(s) Oral every 8 hours  cefepime   IVPB 2000 milliGRAM(s) IV Intermittent every 8 hours  clotrimazole Lozenge 1 Lozenge Oral five times a day  fluconAZOLE   Tablet 400 milliGRAM(s) Oral daily    2. VOD Prophylaxis: Actigall, Glutamine, Heparin (dosed at 100 units / kg / day)     3. GI Prophylaxis:   pantoprazole    Tablet 40 milliGRAM(s) Oral before breakfast    4. Mouthcare - NS / NaHCO3 rinses, Mycelex, Biotene; Skin care     5. GVHD prophylaxis - n/a     6. Transfuse & replete electrolytes prn     7. IV hydration, daily weights, strict I&O, prn diuresis     8. PO intake as tolerated, nutrition follow up as needed, MVI, folic acid     9. Antiemetics, anti-diarrhea medications:   LORazepam   Injectable 1 milliGRAM(s) IV Push every 6 hours PRN  metoclopramide Injectable 10 milliGRAM(s) IV Push every 6 hours PRN  ondansetron Injectable 8 milliGRAM(s) IV Push every 8 hours PRN    10. OOB as tolerated, physical therapy consult if needed     11. Monitor coags / fibrinogen 2x week, vitamin K as needed     12. Monitor closely for clinical changes, monitor for fevers     13. Emotional support provided, plan of care discussed and questions addressed     14. Patient education done regarding plan of care, restrictions and discharge planning     15. Continue regular social work input     I have written the above note for Dr. Gonzales who performed service with me in the room.   Ana Mistry [facility] ([phone])    I have seen and examined patient with NP, I agree with above note as scribed.                    [facility]PC Transplant Team                                                      Critical / Counseling Time Provided: 30 minutes                                                                                                                                                        Chief Complaint: Autologous peripheral blood stem cell transplant with high dose Melphalan prep regimen for treatment of multiple myeloma    S: Patient seen and examined with [facility] Transplant Team:   +fatigue  +mouth pain  All other ROS Negative    O: Vitals:   Vital Signs Last 24 Hrs  T(C): 37.4 (2022 05:25), Max: 38 (2022 23:40)  T(F): 99.3 (2022 05:25), Max: 100.4 (2022 23:40)  HR: 107 (2022 05:25) (95 - 114)  BP: 121/65 (2022 05:25) (103/60 - 143/76)  BP(mean): --  RR: 18 (2022 05:25) (16 - 19)  SpO2: 97% (2022 05:25) (96% - 99%)    Parameters below as of 2022 05:25  Patient On (Oxygen Delivery Method): room air      Today's weight: 73.5kg  Daily     Daily Weight in k.5 (2022 09:30)    Intake / Output:    @ 07:01  -  04 @ 07:00  --------------------------------------------------------  IN: 1871 mL / OUT: 1600 mL / NET: 271 mL    PE:   Oropharynx: scattered erythema no ulcerations  Oral Mucositis:                 +                                       Grade: 1+ (reports mouth pain)  CVS: S1, S2 RRR   Lungs: CTA throughout bilaterally   Abdomen: + BS x 4, soft, NT, ND   Extremities: no edema  Gastric Mucositis:      -                                            Grade: n/a  Intestinal Mucositis:    -                                          Grade: n/a  Skin: no rash   TLC: CDI   Neuro: A&Ox3   Pain: 3/10- mouth    LABS:                        9.1    <0.1  )-----------( 18       ( 2022 06:43 )             27.4     2022 06:39    137    |  104    |  10     ----------------------------<  111    4.0     |  23     |  0.84     Ca    8.6        2022 06:39  Phos  2.8       2022 06:39  Mg     1.6       2022 06:39    TPro  5.5    /  Alb  3.2    /  TBili  0.2    /  DBili  x      /  AST  14     /  ALT  16     /  AlkPhos  80     2022 06:39    PT/INR - ( 2022 06:41 )   PT: 12.2 sec;   INR: 1.05 ratio      PTT - ( 2022 06:41 )  PTT:33.4 sec    CAPILLARY BLOOD GLUCOSE  POCT Blood Glucose.: 140 mg/dL (2022 21:40)  POCT Blood Glucose.: 72 mg/dL (2022 17:10)  POCT Blood Glucose.: 199 mg/dL (2022 12:03)  POCT Blood Glucose.: 112 mg/dL (2022 08:02)    LIVER FUNCTIONS - ( 2022 06:39 )  Alb: 3.2 g/dL / Pro: 5.5 g/dL / ALK PHOS: 80 U/L / ALT: 16 U/L / AST: 14 U/L / GGT: x           Urinalysis Basic - ( 2022 00:31 )  Color: Yellow / Appearance: Slightly Turbid / S.014 / pH: x  Gluc: x / Ketone: Trace  / Bili: Negative / Urobili: Negative   Blood: x / Protein: Trace / Nitrite: Negative   Leuk Esterase: Negative / RBC: 2 /hpf / WBC 2 /HPF   Sq Epi: x / Non Sq Epi: 2 /hpf / Bacteria: Moderate    Radiology:    from: Xray Chest 1 View- PORTABLE-Routine (Xray Chest 1 View- PORTABLE-Routine .) (22 @ 09:20)   Impression:  Clear lungs.    Meds:   Antimicrobials:   acyclovir   Oral Tab/Cap 400 milliGRAM(s) Oral every 8 hours  cefepime   IVPB 2000 milliGRAM(s) IV Intermittent every 8 hours  clotrimazole Lozenge 1 Lozenge Oral five times a day  fluconAZOLE   Tablet 400 milliGRAM(s) Oral daily      Heme / Onc:   heparin  Infusion 312 Unit(s)/Hr IV Continuous <Continuous>      GI:  aluminum hydroxide/magnesium hydroxide/simethicone Suspension 30 milliLiter(s) Oral every 6 hours PRN  lactulose Syrup 20 Gram(s) Oral once PRN  pantoprazole    Tablet 40 milliGRAM(s) Oral before breakfast  polyethylene glycol 3350 17 Gram(s) Oral daily PRN  senna 2 Tablet(s) Oral at bedtime  sodium bicarbonate Mouth Rinse 10 milliLiter(s) Swish and Spit five times a day  ursodiol Capsule 300 milliGRAM(s) Oral two times a day      Cardiovascular:   amLODIPine   Tablet 10 milliGRAM(s) Oral daily  hydrALAZINE 25 milliGRAM(s) Oral three times a day      Immunologic:   filgrastim-sndz (ZARXIO) Injectable 480 MICROGram(s) SubCutaneous every 24 hours      Other medications:   acetaminophen     Tablet .. 650 milliGRAM(s) Oral every 6 hours  benzonatate 100 milliGRAM(s) Oral three times a day  Biotene Dry Mouth Oral Rinse 5 milliLiter(s) Swish and Spit five times a day  chlorhexidine 4% Liquid 1 Application(s) Topical <User Schedule>  dextrose 5%. 1000 milliLiter(s) IV Continuous <Continuous>  dextrose 5%. 1000 milliLiter(s) IV Continuous <Continuous>  dextrose 50% Injectable 25 Gram(s) IV Push once  dextrose 50% Injectable 12.5 Gram(s) IV Push once  dextrose 50% Injectable 25 Gram(s) IV Push once  finasteride 5 milliGRAM(s) Oral daily  folic acid 1 milliGRAM(s) Oral daily  glucagon  Injectable 1 milliGRAM(s) IntraMuscular once  insulin lispro (ADMELOG) corrective regimen sliding scale   SubCutaneous three times a day before meals  insulin lispro (ADMELOG) corrective regimen sliding scale   SubCutaneous at bedtime  lidocaine/prilocaine Cream 1 Application(s) Topical daily  loratadine 10 milliGRAM(s) Oral daily  LORazepam   Injectable 1 milliGRAM(s) IV Push every 24 hours  multivitamin 1 Tablet(s) Oral daily  nystatin Powder 1 Application(s) Topical three times a day  sodium chloride 0.45% 1000 milliLiter(s) IV Continuous <Continuous>  sodium chloride 0.9%. 1000 milliLiter(s) IV Continuous <Continuous>  sodium chloride 0.9%. 1000 milliLiter(s) IV Continuous <Continuous>  tamsulosin 0.4 milliGRAM(s) Oral at bedtime      PRN:   acetaminophen     Tablet .. 650 milliGRAM(s) Oral every 6 hours PRN  aluminum hydroxide/magnesium hydroxide/simethicone Suspension 30 milliLiter(s) Oral every 6 hours PRN  dextrose Oral Gel 15 Gram(s) Oral once PRN  lactulose Syrup 20 Gram(s) Oral once PRN  LORazepam   Injectable 1 milliGRAM(s) IV Push every 6 hours PRN  metoclopramide Injectable 10 milliGRAM(s) IV Push every 6 hours PRN  ondansetron Injectable 8 milliGRAM(s) IV Push every 8 hours PRN  polyethylene glycol 3350 17 Gram(s) Oral daily PRN  sodium chloride 0.9% lock flush 10 milliLiter(s) IV Push every 1 hour PRN    A/P: 72 year old male with multiple myeloma diagnosed 19 s/p RVD x 6 admitted for autologous pbsct with high dose melphalan prep regimen (dose reduced to 70mg / m2 for age)  Day + 8  10/25- melphalan ; continue melphalan hydration for 24 hours post infusion of last dose. Strict I&O, daily weights, prn diuresis   10/27- HPC transplant today; continue transplant hydration for 24 hours post infusion of cells   10/28 weight gain, I>O, lasix 60 mg iv x1; constipated, Lactulose now and PRN  - Parrish. Started on cipro 10/31/22. If T >/= 38C, pan cx, CXR and change cipro to cefepime 2g IV q 8 hours    Febrile overnight, cipro switched to Cefepime; pending Blood cultures and urine culture    1. Infectious Disease:   acyclovir   Oral Tab/Cap 400 milliGRAM(s) Oral every 8 hours  cefepime   IVPB 2000 milliGRAM(s) IV Intermittent every 8 hours  clotrimazole Lozenge 1 Lozenge Oral five times a day  fluconAZOLE   Tablet 400 milliGRAM(s) Oral daily    2. VOD Prophylaxis: Actigall, Glutamine, Heparin (dosed at 100 units / kg / day)     3. GI Prophylaxis:   pantoprazole    Tablet 40 milliGRAM(s) Oral before breakfast    4. Mouthcare - NS / NaHCO3 rinses, Mycelex, Biotene; Skin care     5. GVHD prophylaxis - n/a     6. Transfuse & replete electrolytes prn     7. IV hydration, daily weights, strict I&O, prn diuresis     8. PO intake as tolerated, nutrition follow up as needed, MVI, folic acid     9. Antiemetics, anti-diarrhea medications:   LORazepam   Injectable 1 milliGRAM(s) IV Push every 6 hours PRN  metoclopramide Injectable 10 milliGRAM(s) IV Push every 6 hours PRN  ondansetron Injectable 8 milliGRAM(s) IV Push every 8 hours PRN    10. OOB as tolerated, physical therapy consult if needed     11. Monitor coags / fibrinogen 2x week, vitamin K as needed     12. Monitor closely for clinical changes, monitor for fevers     13. Emotional support provided, plan of care discussed and questions addressed     14. Patient education done regarding plan of care, restrictions and discharge planning     15. Continue regular social work input     I have written the above note for Dr. Gonzales who performed service with me in the room.   Ana Mistry [facility] ([phone])    I have seen and examined patient with NP, I agree with above note as scribed.

## 2022-11-05 LAB
ALBUMIN SERPL ELPH-MCNC: 3 G/DL — LOW (ref 3.3–5)
ALP SERPL-CCNC: 78 U/L — SIGNIFICANT CHANGE UP (ref 40–120)
ALT FLD-CCNC: 16 U/L — SIGNIFICANT CHANGE UP (ref 10–45)
ANION GAP SERPL CALC-SCNC: 10 MMOL/L — SIGNIFICANT CHANGE UP (ref 5–17)
AST SERPL-CCNC: 12 U/L — SIGNIFICANT CHANGE UP (ref 10–40)
BILIRUB SERPL-MCNC: 0.3 MG/DL — SIGNIFICANT CHANGE UP (ref 0.2–1.2)
BUN SERPL-MCNC: 8 MG/DL — SIGNIFICANT CHANGE UP (ref 7–23)
CALCIUM SERPL-MCNC: 8.3 MG/DL — LOW (ref 8.4–10.5)
CHLORIDE SERPL-SCNC: 105 MMOL/L — SIGNIFICANT CHANGE UP (ref 96–108)
CO2 SERPL-SCNC: 23 MMOL/L — SIGNIFICANT CHANGE UP (ref 22–31)
CREAT SERPL-MCNC: 0.83 MG/DL — SIGNIFICANT CHANGE UP (ref 0.5–1.3)
EGFR: 92 ML/MIN/1.73M2 — SIGNIFICANT CHANGE UP
GLUCOSE BLDC GLUCOMTR-MCNC: 105 MG/DL — HIGH (ref 70–99)
GLUCOSE BLDC GLUCOMTR-MCNC: 115 MG/DL — HIGH (ref 70–99)
GLUCOSE BLDC GLUCOMTR-MCNC: 130 MG/DL — HIGH (ref 70–99)
GLUCOSE BLDC GLUCOMTR-MCNC: 150 MG/DL — HIGH (ref 70–99)
GLUCOSE SERPL-MCNC: 110 MG/DL — HIGH (ref 70–99)
HCT VFR BLD CALC: 27.7 % — LOW (ref 39–50)
HGB BLD-MCNC: 9 G/DL — LOW (ref 13–17)
LDH SERPL L TO P-CCNC: 133 U/L — SIGNIFICANT CHANGE UP (ref 50–242)
MAGNESIUM SERPL-MCNC: 1.7 MG/DL — SIGNIFICANT CHANGE UP (ref 1.6–2.6)
MCHC RBC-ENTMCNC: 26.1 PG — LOW (ref 27–34)
MCHC RBC-ENTMCNC: 32.5 GM/DL — SIGNIFICANT CHANGE UP (ref 32–36)
MCV RBC AUTO: 80.3 FL — SIGNIFICANT CHANGE UP (ref 80–100)
NRBC # BLD: 0 /100 WBCS — SIGNIFICANT CHANGE UP (ref 0–0)
PHOSPHATE SERPL-MCNC: 2.3 MG/DL — LOW (ref 2.5–4.5)
PLATELET # BLD AUTO: 10 K/UL — CRITICAL LOW (ref 150–400)
POTASSIUM SERPL-MCNC: 3.8 MMOL/L — SIGNIFICANT CHANGE UP (ref 3.5–5.3)
POTASSIUM SERPL-SCNC: 3.8 MMOL/L — SIGNIFICANT CHANGE UP (ref 3.5–5.3)
PROT SERPL-MCNC: 5.7 G/DL — LOW (ref 6–8.3)
RBC # BLD: 3.45 M/UL — LOW (ref 4.2–5.8)
RBC # FLD: 14.2 % — SIGNIFICANT CHANGE UP (ref 10.3–14.5)
SODIUM SERPL-SCNC: 138 MMOL/L — SIGNIFICANT CHANGE UP (ref 135–145)
WBC # BLD: 0.11 K/UL — CRITICAL LOW (ref 3.8–10.5)
WBC # FLD AUTO: 0.11 K/UL — CRITICAL LOW (ref 3.8–10.5)

## 2022-11-05 PROCEDURE — 99291 CRITICAL CARE FIRST HOUR: CPT

## 2022-11-05 RX ORDER — POTASSIUM PHOSPHATE, MONOBASIC POTASSIUM PHOSPHATE, DIBASIC 236; 224 MG/ML; MG/ML
15 INJECTION, SOLUTION INTRAVENOUS ONCE
Refills: 0 | Status: COMPLETED | OUTPATIENT
Start: 2022-11-05 | End: 2022-11-05

## 2022-11-05 RX ADMIN — Medication 10 MILLILITER(S): at 08:00

## 2022-11-05 RX ADMIN — Medication 400 MILLIGRAM(S): at 21:25

## 2022-11-05 RX ADMIN — DIPHENHYDRAMINE HYDROCHLORIDE AND LIDOCAINE HYDROCHLORIDE AND ALUMINUM HYDROXIDE AND MAGNESIUM HYDRO 5 MILLILITER(S): KIT at 21:25

## 2022-11-05 RX ADMIN — Medication 1 MILLIGRAM(S): at 12:27

## 2022-11-05 RX ADMIN — AMLODIPINE BESYLATE 10 MILLIGRAM(S): 2.5 TABLET ORAL at 05:11

## 2022-11-05 RX ADMIN — DIPHENHYDRAMINE HYDROCHLORIDE AND LIDOCAINE HYDROCHLORIDE AND ALUMINUM HYDROXIDE AND MAGNESIUM HYDRO 5 MILLILITER(S): KIT at 12:25

## 2022-11-05 RX ADMIN — Medication 10 MILLILITER(S): at 16:24

## 2022-11-05 RX ADMIN — Medication 100 MILLIGRAM(S): at 05:10

## 2022-11-05 RX ADMIN — Medication 10 MILLILITER(S): at 20:48

## 2022-11-05 RX ADMIN — DIPHENHYDRAMINE HYDROCHLORIDE AND LIDOCAINE HYDROCHLORIDE AND ALUMINUM HYDROXIDE AND MAGNESIUM HYDRO 5 MILLILITER(S): KIT at 17:08

## 2022-11-05 RX ADMIN — FLUCONAZOLE 400 MILLIGRAM(S): 150 TABLET ORAL at 12:27

## 2022-11-05 RX ADMIN — Medication 650 MILLIGRAM(S): at 00:00

## 2022-11-05 RX ADMIN — Medication 1 LOZENGE: at 08:00

## 2022-11-05 RX ADMIN — Medication 100 MILLIGRAM(S): at 21:25

## 2022-11-05 RX ADMIN — FINASTERIDE 5 MILLIGRAM(S): 5 TABLET, FILM COATED ORAL at 12:31

## 2022-11-05 RX ADMIN — Medication 5 MILLILITER(S): at 12:25

## 2022-11-05 RX ADMIN — Medication 5 MILLILITER(S): at 16:24

## 2022-11-05 RX ADMIN — Medication 400 MILLIGRAM(S): at 05:10

## 2022-11-05 RX ADMIN — NYSTATIN CREAM 1 APPLICATION(S): 100000 CREAM TOPICAL at 05:11

## 2022-11-05 RX ADMIN — Medication 1 LOZENGE: at 12:25

## 2022-11-05 RX ADMIN — Medication 480 MICROGRAM(S): at 12:32

## 2022-11-05 RX ADMIN — TAMSULOSIN HYDROCHLORIDE 0.4 MILLIGRAM(S): 0.4 CAPSULE ORAL at 21:26

## 2022-11-05 RX ADMIN — NYSTATIN CREAM 1 APPLICATION(S): 100000 CREAM TOPICAL at 21:26

## 2022-11-05 RX ADMIN — Medication 650 MILLIGRAM(S): at 00:30

## 2022-11-05 RX ADMIN — Medication 1 LOZENGE: at 16:24

## 2022-11-05 RX ADMIN — Medication 100 MILLIGRAM(S): at 12:33

## 2022-11-05 RX ADMIN — NYSTATIN CREAM 1 APPLICATION(S): 100000 CREAM TOPICAL at 12:32

## 2022-11-05 RX ADMIN — CEFEPIME 100 MILLIGRAM(S): 1 INJECTION, POWDER, FOR SOLUTION INTRAMUSCULAR; INTRAVENOUS at 12:32

## 2022-11-05 RX ADMIN — DIPHENHYDRAMINE HYDROCHLORIDE AND LIDOCAINE HYDROCHLORIDE AND ALUMINUM HYDROXIDE AND MAGNESIUM HYDRO 5 MILLILITER(S): KIT at 10:38

## 2022-11-05 RX ADMIN — POTASSIUM PHOSPHATE, MONOBASIC POTASSIUM PHOSPHATE, DIBASIC 62.5 MILLIMOLE(S): 236; 224 INJECTION, SOLUTION INTRAVENOUS at 16:23

## 2022-11-05 RX ADMIN — CHLORHEXIDINE GLUCONATE 1 APPLICATION(S): 213 SOLUTION TOPICAL at 08:00

## 2022-11-05 RX ADMIN — LIDOCAINE AND PRILOCAINE CREAM 1 APPLICATION(S): 25; 25 CREAM TOPICAL at 12:30

## 2022-11-05 RX ADMIN — Medication 10 MILLILITER(S): at 12:25

## 2022-11-05 RX ADMIN — LORATADINE 10 MILLIGRAM(S): 10 TABLET ORAL at 12:29

## 2022-11-05 RX ADMIN — Medication 25 MILLIGRAM(S): at 21:28

## 2022-11-05 RX ADMIN — PANTOPRAZOLE SODIUM 40 MILLIGRAM(S): 20 TABLET, DELAYED RELEASE ORAL at 05:12

## 2022-11-05 RX ADMIN — Medication 1 TABLET(S): at 12:27

## 2022-11-05 RX ADMIN — CEFEPIME 100 MILLIGRAM(S): 1 INJECTION, POWDER, FOR SOLUTION INTRAMUSCULAR; INTRAVENOUS at 21:25

## 2022-11-05 RX ADMIN — Medication 400 MILLIGRAM(S): at 12:33

## 2022-11-05 RX ADMIN — CEFEPIME 100 MILLIGRAM(S): 1 INJECTION, POWDER, FOR SOLUTION INTRAMUSCULAR; INTRAVENOUS at 05:11

## 2022-11-05 RX ADMIN — URSODIOL 300 MILLIGRAM(S): 250 TABLET, FILM COATED ORAL at 05:12

## 2022-11-05 RX ADMIN — URSODIOL 300 MILLIGRAM(S): 250 TABLET, FILM COATED ORAL at 17:08

## 2022-11-05 RX ADMIN — Medication 5 MILLILITER(S): at 07:59

## 2022-11-05 RX ADMIN — Medication 5 MILLILITER(S): at 20:48

## 2022-11-05 RX ADMIN — Medication 1 LOZENGE: at 20:48

## 2022-11-05 NOTE — PROGRESS NOTE ADULT - CRITICAL CARE ATTENDING COMMENT
72 year old male with multiple myeloma diagnosed 12/20/21 s/p RVD x 6 admitted for autologous pbsct with high dose melphalan prep regimen (dose reduced to 70mg / m2 for age)    Day + 8...some soft stool..feeling fatigued this am..step daughter on phone    1. Admit to BMTU   2. -3 hours prior to Melphalan start hydration: D5NS at 200ml /hr and continue 24 hours post Melphalan infusion  3. Day – 3 & day -2 - Melphalan 70mg/m2  IV   4. Strict I&O, daily weights, prn diuresis   5. Day -1 rest day. At 2200 on day – 1, start transplant hydration 1/2NS + 50mEq NaHCO3- (may substitute [id number] if bicarb not available)  6. Day 0 – infuse HPC product. 4 hours after infusion of cells start Zarxio 5 micrograms / kg (actual weight) . Continue through engraftment.   7. On day 0, + 1, + 2-give Kepivance 60micrograms / kg (actual weight).  8. VOD prophylaxis - low dose heparin gtt (dosed at 100 units / kg / day), glutamine supplementation, Actigall BID   9. PCP prophylaxis - Bactrim DS through day -2    10. Antiviral prophylaxis - Acyclovir 400mg po TID to start day -1   11. Antifungal prophylaxis- Diflucan 400 mg po daily.  12. GI prophylaxis - Protonix po QD   13. Antibacterial prophylaxis -  ANC < 500, started Cipro 500mg po BID.  febrile, pan cx, CXR and changed Cipro to Cefepime 2g IV q 8 hours. Continue until count recovery  14. Kepivance for prevention of mucositis- days 0, +1, +2  15. Aggressive mouth care and skin care as per protocol..senna, miralax  prn  16. The patient is aware of his diagnosis...he does not want to be reminded of it every day.. 72 year old male with multiple myeloma diagnosed 12/20/21 s/p RVD x 6 admitted for autologous pbsct with high dose melphalan prep regimen (dose reduced to 70mg / m2 for age)    Day + 9...some soft stool..feeling better this am..increased saliva...wbc recovering    1. Admit to BMTU   2. -3 hours prior to Melphalan start hydration: D5NS at 200ml /hr and continue 24 hours post Melphalan infusion  3. Day – 3 & day -2 - Melphalan 70mg/m2  IV   4. Strict I&O, daily weights, prn diuresis   5. Day -1 rest day. At 2200 on day – 1, start transplant hydration 1/2NS + 50mEq NaHCO3- (may substitute [id number] if bicarb not available)  6. Day 0 – infuse HPC product. 4 hours after infusion of cells start Zarxio 5 micrograms / kg (actual weight) . Continue through engraftment.   7. On day 0, + 1, + 2-give Kepivance 60micrograms / kg (actual weight).  8. VOD prophylaxis - low dose heparin gtt (dosed at 100 units / kg / day), glutamine supplementation, Actigall BID   9. PCP prophylaxis - Bactrim DS through day -2    10. Antiviral prophylaxis - Acyclovir 400mg po TID to start day -1   11. Antifungal prophylaxis- Diflucan 400 mg po daily.  12. GI prophylaxis - Protonix po QD   13. Antibacterial prophylaxis -  ANC < 500, started Cipro 500mg po BID.  febrile, pan cx, CXR and changed Cipro to Cefepime 2g IV q 8 hours. Continue until count recovery  14. Kepivance for prevention of mucositis- days 0, +1, +2  15. Aggressive mouth care and skin care as per protocol..senna, miralax  prn  16. The patient is aware of his diagnosis...he does not want to be reminded of it every day..

## 2022-11-05 NOTE — PROGRESS NOTE ADULT - SUBJECTIVE AND OBJECTIVE BOX
Chief Complaint: Autologous peripheral blood stem cell transplant with high dose Melphalan prep regimen for treatment of multiple myeloma    S: Patient seen and examined with HPC Transplant Team:   +fatigue  +neck pain   All other ROS Negative    O: Vitals:   Vital Signs Last 24 Hrs  T(C): 37.2 (2022 13:23), Max: 37.5 (2022 19:08)  T(F): 99 (:23), Max: 99.5 (2022 19:08)  HR: 110 (:) (98 - 110)  BP: 100/61 (:) (100/61 - 131/68)  BP(mean): --  RR: 18 (:) (18 - 19)  SpO2: 99% (:) (97% - 99%)    Parameters below as of 2022 09:20  Patient On (Oxygen Delivery Method): room air        Admit weight: 73.5kg    Daily Weight in k.4 Kg  (2022 09:20)    Intake / Output:    @ 07: @ 07:00  --------------------------------------------------------  IN: 1715 mL / OUT: 1605 mL / NET: 110 mL     @ 08: @ 16:34  --------------------------------------------------------  IN: 904.7 mL / OUT: 950 mL / NET: -45.3 mL      PE:   Oropharynx: scattered erythema no ulcerations  Oral Mucositis:                                                     Grade NA  CVS: S1, S2 RRR   Lungs: CTA throughout bilaterally   Abdomen: + BS x 4, soft, NT, ND   Extremities: no edema  Gastric Mucositis:      -                                            Grade: n/a  Intestinal Mucositis:    -                                          Grade: n/a  Skin: no rash   TLC: CDI   Neuro: A&Ox3   Pain: 3/10- neck pain         Labs:   CBC Full  -  ( 2022 07:33 )  WBC Count : 0.11 K/uL  Hemoglobin : 9.0 g/dL  Hematocrit : 27.7 %  Platelet Count - Automated : 10 K/uL  Mean Cell Volume : 80.3 fl  Mean Cell Hemoglobin : 26.1 pg  Mean Cell Hemoglobin Concentration : 32.5 gm/dL  Auto Neutrophil # : x  Auto Lymphocyte # : x  Auto Monocyte # : x  Auto Eosinophil # : x  Auto Basophil # : x  Auto Neutrophil % : x  Auto Lymphocyte % : x  Auto Monocyte % : x  Auto Eosinophil % : x  Auto Basophil % : x                          9.0    0.11  )-----------( 10       ( 2022 07:33 )             27.7         138  |  105  |  8   ----------------------------<  110<H>  3.8   |  23  |  0.83    Ca    8.3<L>      2022 07:32  Phos  2.3     -  Mg     1.7         TPro  5.7<L>  /  Alb  3.0<L>  /  TBili  0.3  /  DBili  x   /  AST  12  /  ALT  16  /  AlkPhos  78        LIVER FUNCTIONS - ( 2022 07:32 )  Alb: 3.0 g/dL / Pro: 5.7 g/dL / ALK PHOS: 78 U/L / ALT: 16 U/L / AST: 12 U/L / GGT: x           Lactate Dehydrogenase, Serum: 133 U/L ( @ 07:32)  culture   Culture - Blood (22 @ 00:31)   Specimen Source: .Blood Blood   Culture Results:   No growth to date. Culture - Blood (22 @ 00:31)   Specimen Source: .Blood Blood   Culture Results:   No growth to date.   Culture - Urine (22 @ 00:31)   Specimen Source: Clean Catch Clean Catch (Midstream)   Culture Results:   >100,000 CFU/ml Gram Negative Rods     Radiology:   Xray Chest 1 View- PORTABLE-Routine (Xray Chest 1 View- PORTABLE-Routine .) (22 @ 09:20)   Impression:  Clear lungs.      Meds:   Antimicrobials:   acyclovir   Oral Tab/Cap 400 milliGRAM(s) Oral every 8 hours  cefepime   IVPB 2000 milliGRAM(s) IV Intermittent every 8 hours  clotrimazole Lozenge 1 Lozenge Oral five times a day  fluconAZOLE   Tablet 400 milliGRAM(s) Oral daily      Heme / Onc:   heparin  Infusion 312 Unit(s)/Hr IV Continuous <Continuous>      GI:  aluminum hydroxide/magnesium hydroxide/simethicone Suspension 30 milliLiter(s) Oral every 6 hours PRN  lactulose Syrup 20 Gram(s) Oral once PRN  loperamide 2 milliGRAM(s) Oral once  pantoprazole    Tablet 40 milliGRAM(s) Oral before breakfast  polyethylene glycol 3350 17 Gram(s) Oral daily PRN  senna 2 Tablet(s) Oral at bedtime  sodium bicarbonate Mouth Rinse 10 milliLiter(s) Swish and Spit five times a day  ursodiol Capsule 300 milliGRAM(s) Oral two times a day      Cardiovascular:   amLODIPine   Tablet 10 milliGRAM(s) Oral daily  hydrALAZINE 25 milliGRAM(s) Oral three times a day      Immunologic:   filgrastim-sndz (ZARXIO) Injectable 480 MICROGram(s) SubCutaneous every 24 hours      Other medications:   acetaminophen     Tablet .. 650 milliGRAM(s) Oral every 6 hours  benzonatate 100 milliGRAM(s) Oral three times a day  Biotene Dry Mouth Oral Rinse 5 milliLiter(s) Swish and Spit five times a day  chlorhexidine 4% Liquid 1 Application(s) Topical <User Schedule>  dextrose 5%. 1000 milliLiter(s) IV Continuous <Continuous>  dextrose 5%. 1000 milliLiter(s) IV Continuous <Continuous>  dextrose 50% Injectable 25 Gram(s) IV Push once  dextrose 50% Injectable 12.5 Gram(s) IV Push once  dextrose 50% Injectable 25 Gram(s) IV Push once  finasteride 5 milliGRAM(s) Oral daily  FIRST- Mouthwash  BLM 5 milliLiter(s) Swish and Swallow five times a day  folic acid 1 milliGRAM(s) Oral daily  glucagon  Injectable 1 milliGRAM(s) IntraMuscular once  insulin lispro (ADMELOG) corrective regimen sliding scale   SubCutaneous three times a day before meals  insulin lispro (ADMELOG) corrective regimen sliding scale   SubCutaneous at bedtime  lidocaine/prilocaine Cream 1 Application(s) Topical daily  loratadine 10 milliGRAM(s) Oral daily  LORazepam   Injectable 1 milliGRAM(s) IV Push every 24 hours  multivitamin 1 Tablet(s) Oral daily  nystatin Powder 1 Application(s) Topical three times a day  sodium chloride 0.9%. 1000 milliLiter(s) IV Continuous <Continuous>  tamsulosin 0.4 milliGRAM(s) Oral at bedtime      PRN:   acetaminophen     Tablet .. 650 milliGRAM(s) Oral every 6 hours PRN  aluminum hydroxide/magnesium hydroxide/simethicone Suspension 30 milliLiter(s) Oral every 6 hours PRN  dextrose Oral Gel 15 Gram(s) Oral once PRN  lactulose Syrup 20 Gram(s) Oral once PRN  metoclopramide Injectable 10 milliGRAM(s) IV Push every 6 hours PRN  ondansetron Injectable 8 milliGRAM(s) IV Push every 8 hours PRN  polyethylene glycol 3350 17 Gram(s) Oral daily PRN  sodium chloride 0.9% lock flush 10 milliLiter(s) IV Push every 1 hour PRN    A/P: 72 year old male with multiple myeloma diagnosed 19 s/p RVD x 6 admitted for autologous pbsct with high dose melphalan prep regimen (dose reduced to 70mg / m2 for age)  Day + 9  10/25- melphalan ; continue melphalan hydration for 24 hours post infusion of last dose. Strict I&O, daily weights, prn diuresis   10/27- HPC transplant today; continue transplant hydration for 24 hours post infusion of cells   10/28 weight gain, I>O, lasix 60 mg iv x1; constipated, Lactulose now and PRN  - [facility]. Started on cipro 10/31/22. If T >/= 38C, pan cx, CXR and change cipro to cefepime 2g IV q 8 hours    Febrile overnight, cipro switched to Cefepime; pending Blood cultures and urine culture (+) GNR; follow up sensitivities     1. Infectious Disease:   acyclovir   Oral Tab/Cap 400 milliGRAM(s) Oral every 8 hours  cefepime   IVPB 2000 milliGRAM(s) IV Intermittent every 8 hours  clotrimazole Lozenge 1 Lozenge Oral five times a day  fluconAZOLE   Tablet 400 milliGRAM(s) Oral daily    2. VOD Prophylaxis: Actigall, Glutamine, Heparin (dosed at 100 units / kg / day)     3. GI Prophylaxis:   pantoprazole    Tablet 40 milliGRAM(s) Oral before breakfast    4. Mouthcare - NS / NaHCO3 rinses, Mycelex, Biotene; Skin care     5. GVHD prophylaxis - n/a     6. Transfuse & replete electrolytes prn   Hypophosphatemia Potassium phosphate 15 Mmole IV x 1     7. IV hydration, daily weights, strict I&O, prn diuresis     8. PO intake as tolerated, nutrition follow up as needed, MVI, folic acid     9. Antiemetics, anti-diarrhea medications:   LORazepam   Injectable 1 milliGRAM(s) IV Push every 6 hours PRN  metoclopramide Injectable 10 milliGRAM(s) IV Push every 6 hours PRN  ondansetron Injectable 8 milliGRAM(s) IV Push every 8 hours PRN    10. OOB as tolerated, physical therapy consult if needed     11. Monitor coags / fibrinogen 2x week, vitamin K as needed     12. Monitor closely for clinical changes, monitor for fevers     13. Emotional support provided, plan of care discussed and questions addressed     14. Patient education done regarding plan of care, restrictions and discharge planning     15. Continue regular social work input     I have written the above note for Dr. Gonzales who performed service with me in the room.   Magdalene Smith NP-C  ([phone])    I have seen and examined patient with NP, I agree with above note as scribed.

## 2022-11-06 LAB
-  AMIKACIN: SIGNIFICANT CHANGE UP
-  AMOXICILLIN/CLAVULANIC ACID: SIGNIFICANT CHANGE UP
-  AMPICILLIN/SULBACTAM: SIGNIFICANT CHANGE UP
-  AMPICILLIN: SIGNIFICANT CHANGE UP
-  AZTREONAM: SIGNIFICANT CHANGE UP
-  CEFAZOLIN: SIGNIFICANT CHANGE UP
-  CEFEPIME: SIGNIFICANT CHANGE UP
-  CEFOXITIN: SIGNIFICANT CHANGE UP
-  CEFTRIAXONE: SIGNIFICANT CHANGE UP
-  CIPROFLOXACIN: SIGNIFICANT CHANGE UP
-  ERTAPENEM: SIGNIFICANT CHANGE UP
-  GENTAMICIN: SIGNIFICANT CHANGE UP
-  IMIPENEM: SIGNIFICANT CHANGE UP
-  LEVOFLOXACIN: SIGNIFICANT CHANGE UP
-  MEROPENEM: SIGNIFICANT CHANGE UP
-  NITROFURANTOIN: SIGNIFICANT CHANGE UP
-  PIPERACILLIN/TAZOBACTAM: SIGNIFICANT CHANGE UP
-  TOBRAMYCIN: SIGNIFICANT CHANGE UP
-  TRIMETHOPRIM/SULFAMETHOXAZOLE: SIGNIFICANT CHANGE UP
ALBUMIN SERPL ELPH-MCNC: 3.1 G/DL — LOW (ref 3.3–5)
ALP SERPL-CCNC: 82 U/L — SIGNIFICANT CHANGE UP (ref 40–120)
ALT FLD-CCNC: 16 U/L — SIGNIFICANT CHANGE UP (ref 10–45)
ANION GAP SERPL CALC-SCNC: 10 MMOL/L — SIGNIFICANT CHANGE UP (ref 5–17)
AST SERPL-CCNC: 10 U/L — SIGNIFICANT CHANGE UP (ref 10–40)
BILIRUB SERPL-MCNC: 0.2 MG/DL — SIGNIFICANT CHANGE UP (ref 0.2–1.2)
BUN SERPL-MCNC: 9 MG/DL — SIGNIFICANT CHANGE UP (ref 7–23)
CALCIUM SERPL-MCNC: 8.4 MG/DL — SIGNIFICANT CHANGE UP (ref 8.4–10.5)
CHLORIDE SERPL-SCNC: 104 MMOL/L — SIGNIFICANT CHANGE UP (ref 96–108)
CO2 SERPL-SCNC: 25 MMOL/L — SIGNIFICANT CHANGE UP (ref 22–31)
CREAT SERPL-MCNC: 0.77 MG/DL — SIGNIFICANT CHANGE UP (ref 0.5–1.3)
CULTURE RESULTS: SIGNIFICANT CHANGE UP
EGFR: 94 ML/MIN/1.73M2 — SIGNIFICANT CHANGE UP
GLUCOSE BLDC GLUCOMTR-MCNC: 151 MG/DL — HIGH (ref 70–99)
GLUCOSE BLDC GLUCOMTR-MCNC: 164 MG/DL — HIGH (ref 70–99)
GLUCOSE BLDC GLUCOMTR-MCNC: 174 MG/DL — HIGH (ref 70–99)
GLUCOSE BLDC GLUCOMTR-MCNC: 96 MG/DL — SIGNIFICANT CHANGE UP (ref 70–99)
GLUCOSE SERPL-MCNC: 104 MG/DL — HIGH (ref 70–99)
HCT VFR BLD CALC: 26.8 % — LOW (ref 39–50)
HGB BLD-MCNC: 8.9 G/DL — LOW (ref 13–17)
LDH SERPL L TO P-CCNC: 122 U/L — SIGNIFICANT CHANGE UP (ref 50–242)
MAGNESIUM SERPL-MCNC: 1.7 MG/DL — SIGNIFICANT CHANGE UP (ref 1.6–2.6)
MCHC RBC-ENTMCNC: 26.6 PG — LOW (ref 27–34)
MCHC RBC-ENTMCNC: 33.2 GM/DL — SIGNIFICANT CHANGE UP (ref 32–36)
MCV RBC AUTO: 80 FL — SIGNIFICANT CHANGE UP (ref 80–100)
METHOD TYPE: SIGNIFICANT CHANGE UP
NRBC # BLD: 0 /100 WBCS — SIGNIFICANT CHANGE UP (ref 0–0)
ORGANISM # SPEC MICROSCOPIC CNT: SIGNIFICANT CHANGE UP
ORGANISM # SPEC MICROSCOPIC CNT: SIGNIFICANT CHANGE UP
PHOSPHATE SERPL-MCNC: 2.4 MG/DL — LOW (ref 2.5–4.5)
PLATELET # BLD AUTO: 6 K/UL — CRITICAL LOW (ref 150–400)
POTASSIUM SERPL-MCNC: 3.8 MMOL/L — SIGNIFICANT CHANGE UP (ref 3.5–5.3)
POTASSIUM SERPL-SCNC: 3.8 MMOL/L — SIGNIFICANT CHANGE UP (ref 3.5–5.3)
PROT SERPL-MCNC: 5.6 G/DL — LOW (ref 6–8.3)
RBC # BLD: 3.35 M/UL — LOW (ref 4.2–5.8)
RBC # FLD: 14.1 % — SIGNIFICANT CHANGE UP (ref 10.3–14.5)
SODIUM SERPL-SCNC: 139 MMOL/L — SIGNIFICANT CHANGE UP (ref 135–145)
SPECIMEN SOURCE: SIGNIFICANT CHANGE UP
WBC # BLD: 0.16 K/UL — CRITICAL LOW (ref 3.8–10.5)
WBC # FLD AUTO: 0.16 K/UL — CRITICAL LOW (ref 3.8–10.5)

## 2022-11-06 PROCEDURE — 99291 CRITICAL CARE FIRST HOUR: CPT

## 2022-11-06 RX ORDER — POTASSIUM PHOSPHATE, MONOBASIC POTASSIUM PHOSPHATE, DIBASIC 236; 224 MG/ML; MG/ML
15 INJECTION, SOLUTION INTRAVENOUS ONCE
Refills: 0 | Status: COMPLETED | OUTPATIENT
Start: 2022-11-06 | End: 2022-11-06

## 2022-11-06 RX ADMIN — Medication 25 MILLIGRAM(S): at 05:36

## 2022-11-06 RX ADMIN — CEFEPIME 100 MILLIGRAM(S): 1 INJECTION, POWDER, FOR SOLUTION INTRAMUSCULAR; INTRAVENOUS at 12:38

## 2022-11-06 RX ADMIN — LORATADINE 10 MILLIGRAM(S): 10 TABLET ORAL at 12:10

## 2022-11-06 RX ADMIN — Medication 100 MILLIGRAM(S): at 21:11

## 2022-11-06 RX ADMIN — CHLORHEXIDINE GLUCONATE 1 APPLICATION(S): 213 SOLUTION TOPICAL at 07:40

## 2022-11-06 RX ADMIN — DIPHENHYDRAMINE HYDROCHLORIDE AND LIDOCAINE HYDROCHLORIDE AND ALUMINUM HYDROXIDE AND MAGNESIUM HYDRO 5 MILLILITER(S): KIT at 12:09

## 2022-11-06 RX ADMIN — Medication 100 MILLIGRAM(S): at 12:38

## 2022-11-06 RX ADMIN — Medication 400 MILLIGRAM(S): at 12:37

## 2022-11-06 RX ADMIN — Medication 5 MILLILITER(S): at 20:23

## 2022-11-06 RX ADMIN — CEFEPIME 100 MILLIGRAM(S): 1 INJECTION, POWDER, FOR SOLUTION INTRAMUSCULAR; INTRAVENOUS at 21:10

## 2022-11-06 RX ADMIN — Medication 100 MILLIGRAM(S): at 05:37

## 2022-11-06 RX ADMIN — DIPHENHYDRAMINE HYDROCHLORIDE AND LIDOCAINE HYDROCHLORIDE AND ALUMINUM HYDROXIDE AND MAGNESIUM HYDRO 5 MILLILITER(S): KIT at 09:22

## 2022-11-06 RX ADMIN — Medication 5 MILLILITER(S): at 16:44

## 2022-11-06 RX ADMIN — Medication 400 MILLIGRAM(S): at 21:11

## 2022-11-06 RX ADMIN — Medication 1 MILLIGRAM(S): at 12:09

## 2022-11-06 RX ADMIN — NYSTATIN CREAM 1 APPLICATION(S): 100000 CREAM TOPICAL at 05:36

## 2022-11-06 RX ADMIN — DIPHENHYDRAMINE HYDROCHLORIDE AND LIDOCAINE HYDROCHLORIDE AND ALUMINUM HYDROXIDE AND MAGNESIUM HYDRO 5 MILLILITER(S): KIT at 16:48

## 2022-11-06 RX ADMIN — DIPHENHYDRAMINE HYDROCHLORIDE AND LIDOCAINE HYDROCHLORIDE AND ALUMINUM HYDROXIDE AND MAGNESIUM HYDRO 5 MILLILITER(S): KIT at 23:33

## 2022-11-06 RX ADMIN — PANTOPRAZOLE SODIUM 40 MILLIGRAM(S): 20 TABLET, DELAYED RELEASE ORAL at 05:35

## 2022-11-06 RX ADMIN — TAMSULOSIN HYDROCHLORIDE 0.4 MILLIGRAM(S): 0.4 CAPSULE ORAL at 21:11

## 2022-11-06 RX ADMIN — Medication 1 LOZENGE: at 12:08

## 2022-11-06 RX ADMIN — Medication 10 MILLILITER(S): at 23:25

## 2022-11-06 RX ADMIN — POTASSIUM PHOSPHATE, MONOBASIC POTASSIUM PHOSPHATE, DIBASIC 62.5 MILLIMOLE(S): 236; 224 INJECTION, SOLUTION INTRAVENOUS at 09:22

## 2022-11-06 RX ADMIN — Medication 10 MILLILITER(S): at 00:05

## 2022-11-06 RX ADMIN — Medication 1: at 12:07

## 2022-11-06 RX ADMIN — FLUCONAZOLE 400 MILLIGRAM(S): 150 TABLET ORAL at 12:15

## 2022-11-06 RX ADMIN — SODIUM CHLORIDE 50 MILLILITER(S): 9 INJECTION INTRAMUSCULAR; INTRAVENOUS; SUBCUTANEOUS at 05:38

## 2022-11-06 RX ADMIN — URSODIOL 300 MILLIGRAM(S): 250 TABLET, FILM COATED ORAL at 05:35

## 2022-11-06 RX ADMIN — Medication 1: at 17:17

## 2022-11-06 RX ADMIN — CEFEPIME 100 MILLIGRAM(S): 1 INJECTION, POWDER, FOR SOLUTION INTRAMUSCULAR; INTRAVENOUS at 05:35

## 2022-11-06 RX ADMIN — Medication 25 MILLIGRAM(S): at 16:43

## 2022-11-06 RX ADMIN — Medication 5 MILLILITER(S): at 23:24

## 2022-11-06 RX ADMIN — AMLODIPINE BESYLATE 10 MILLIGRAM(S): 2.5 TABLET ORAL at 05:36

## 2022-11-06 RX ADMIN — DIPHENHYDRAMINE HYDROCHLORIDE AND LIDOCAINE HYDROCHLORIDE AND ALUMINUM HYDROXIDE AND MAGNESIUM HYDRO 5 MILLILITER(S): KIT at 00:06

## 2022-11-06 RX ADMIN — Medication 5 MILLILITER(S): at 12:08

## 2022-11-06 RX ADMIN — HEPARIN SODIUM 3.12 UNIT(S)/HR: 5000 INJECTION INTRAVENOUS; SUBCUTANEOUS at 05:38

## 2022-11-06 RX ADMIN — Medication 480 MICROGRAM(S): at 12:37

## 2022-11-06 RX ADMIN — Medication 1 LOZENGE: at 07:40

## 2022-11-06 RX ADMIN — NYSTATIN CREAM 1 APPLICATION(S): 100000 CREAM TOPICAL at 12:39

## 2022-11-06 RX ADMIN — Medication 1 LOZENGE: at 23:25

## 2022-11-06 RX ADMIN — Medication 1 LOZENGE: at 00:05

## 2022-11-06 RX ADMIN — Medication 1 LOZENGE: at 20:23

## 2022-11-06 RX ADMIN — Medication 400 MILLIGRAM(S): at 05:35

## 2022-11-06 RX ADMIN — Medication 1 LOZENGE: at 16:44

## 2022-11-06 RX ADMIN — NYSTATIN CREAM 1 APPLICATION(S): 100000 CREAM TOPICAL at 21:12

## 2022-11-06 RX ADMIN — Medication 5 MILLILITER(S): at 07:40

## 2022-11-06 RX ADMIN — FINASTERIDE 5 MILLIGRAM(S): 5 TABLET, FILM COATED ORAL at 12:15

## 2022-11-06 RX ADMIN — DIPHENHYDRAMINE HYDROCHLORIDE AND LIDOCAINE HYDROCHLORIDE AND ALUMINUM HYDROXIDE AND MAGNESIUM HYDRO 5 MILLILITER(S): KIT at 21:10

## 2022-11-06 RX ADMIN — Medication 1 TABLET(S): at 16:44

## 2022-11-06 RX ADMIN — Medication 10 MILLILITER(S): at 20:23

## 2022-11-06 RX ADMIN — Medication 10 MILLILITER(S): at 07:40

## 2022-11-06 RX ADMIN — Medication 5 MILLILITER(S): at 00:05

## 2022-11-06 RX ADMIN — Medication 25 MILLIGRAM(S): at 21:13

## 2022-11-06 RX ADMIN — Medication 2 MILLIGRAM(S): at 10:41

## 2022-11-06 RX ADMIN — URSODIOL 300 MILLIGRAM(S): 250 TABLET, FILM COATED ORAL at 17:18

## 2022-11-06 RX ADMIN — Medication 10 MILLILITER(S): at 12:08

## 2022-11-06 RX ADMIN — LIDOCAINE AND PRILOCAINE CREAM 1 APPLICATION(S): 25; 25 CREAM TOPICAL at 12:11

## 2022-11-06 RX ADMIN — Medication 10 MILLILITER(S): at 16:44

## 2022-11-06 NOTE — PROGRESS NOTE ADULT - SUBJECTIVE AND OBJECTIVE BOX
HPC Transplant Team                                                      Critical / Counseling Time Provided: 30 minutes                                                                                                                                                        Chief Complaint: Autologous peripheral blood stem cell transplant with high dose Melphalan prep regimen for treatment of multiple myeloma    S: Patient seen and examined with [facility] Transplant Team:   +fatigue  +neck pain   All other ROS Negative    O: Vitals:   Vital Signs Last 24 Hrs  T(C): 37.1 (2022 05:35), Max: 37.3 (2022 00:06)  T(F): 98.8 (2022 05:35), Max: 99.1 (2022 00:06)  HR: 102 (2022 05:35) (98 - 111)  BP: 124/65 (2022 05:35) (100/61 - 132/75)  BP(mean): --  RR: 18 (2022 05:35) (18 - 18)  SpO2: 96% (2022 05:35) (96% - 99%)    Parameters below as of 2022 05:35  Patient On (Oxygen Delivery Method): room air        Admit weight:   Daily     Daily Weight in k.3 (2022 08:07)    Intake / Output:    @ 08:01  -   @ 07:00  --------------------------------------------------------  IN: 2299.7 mL / OUT: 2000 mL / NET: 299.7 mL        PE:   Oropharynx: scattered erythema no ulcerations  Oral Mucositis:                                                     Grade NA  CVS: S1, S2 RRR   Lungs: CTA throughout bilaterally   Abdomen: + BS x 4, soft, NT, ND   Extremities: no edema  Gastric Mucositis:      -                                            Grade: n/a  Intestinal Mucositis:    -                                          Grade: n/a  Skin: no rash   TLC: CDI   Neuro: A&Ox3   Pain: 3/10- neck pain     Labs:             Culture - Blood (22 @ 00:31)   Specimen Source: .Blood Blood   Culture Results:   No growth to date. Culture - Blood (22 @ 00:31)   Specimen Source: .Blood Blood   Culture Results:   No growth to date.   Culture - Urine (22 @ 00:31)   Specimen Source: Clean Catch Clean Catch (Midstream)   Culture Results:   >100,000 CFU/ml Gram Negative Rods     Radiology:   Xray Chest 1 View- PORTABLE-Routine (Xray Chest 1 View- PORTABLE-Routine .) (22 @ 09:20)   Impression:  Clear lungs.    Meds:   Antimicrobials:   acyclovir   Oral Tab/Cap 400 milliGRAM(s) Oral every 8 hours  cefepime   IVPB 2000 milliGRAM(s) IV Intermittent every 8 hours  clotrimazole Lozenge 1 Lozenge Oral five times a day  fluconAZOLE   Tablet 400 milliGRAM(s) Oral daily      Heme / Onc:   heparin  Infusion 312 Unit(s)/Hr IV Continuous <Continuous>      GI:  aluminum hydroxide/magnesium hydroxide/simethicone Suspension 30 milliLiter(s) Oral every 6 hours PRN  lactulose Syrup 20 Gram(s) Oral once PRN  loperamide 2 milliGRAM(s) Oral once  pantoprazole    Tablet 40 milliGRAM(s) Oral before breakfast  polyethylene glycol 3350 17 Gram(s) Oral daily PRN  senna 2 Tablet(s) Oral at bedtime  sodium bicarbonate Mouth Rinse 10 milliLiter(s) Swish and Spit five times a day  ursodiol Capsule 300 milliGRAM(s) Oral two times a day      Cardiovascular:   amLODIPine   Tablet 10 milliGRAM(s) Oral daily  hydrALAZINE 25 milliGRAM(s) Oral three times a day      Immunologic:   filgrastim-sndz (ZARXIO) Injectable 480 MICROGram(s) SubCutaneous every 24 hours      Other medications:   acetaminophen     Tablet .. 650 milliGRAM(s) Oral every 6 hours  benzonatate 100 milliGRAM(s) Oral three times a day  Biotene Dry Mouth Oral Rinse 5 milliLiter(s) Swish and Spit five times a day  chlorhexidine 4% Liquid 1 Application(s) Topical <User Schedule>  dextrose 5%. 1000 milliLiter(s) IV Continuous <Continuous>  dextrose 5%. 1000 milliLiter(s) IV Continuous <Continuous>  dextrose 50% Injectable 12.5 Gram(s) IV Push once  dextrose 50% Injectable 25 Gram(s) IV Push once  dextrose 50% Injectable 25 Gram(s) IV Push once  finasteride 5 milliGRAM(s) Oral daily  FIRST- Mouthwash  BLM 5 milliLiter(s) Swish and Swallow five times a day  folic acid 1 milliGRAM(s) Oral daily  glucagon  Injectable 1 milliGRAM(s) IntraMuscular once  insulin lispro (ADMELOG) corrective regimen sliding scale   SubCutaneous three times a day before meals  insulin lispro (ADMELOG) corrective regimen sliding scale   SubCutaneous at bedtime  lidocaine/prilocaine Cream 1 Application(s) Topical daily  loratadine 10 milliGRAM(s) Oral daily  LORazepam   Injectable 1 milliGRAM(s) IV Push every 24 hours  multivitamin 1 Tablet(s) Oral daily  nystatin Powder 1 Application(s) Topical three times a day  sodium chloride 0.9%. 1000 milliLiter(s) IV Continuous <Continuous>  tamsulosin 0.4 milliGRAM(s) Oral at bedtime      PRN:   acetaminophen     Tablet .. 650 milliGRAM(s) Oral every 6 hours PRN  aluminum hydroxide/magnesium hydroxide/simethicone Suspension 30 milliLiter(s) Oral every 6 hours PRN  dextrose Oral Gel 15 Gram(s) Oral once PRN  lactulose Syrup 20 Gram(s) Oral once PRN  metoclopramide Injectable 10 milliGRAM(s) IV Push every 6 hours PRN  ondansetron Injectable 8 milliGRAM(s) IV Push every 8 hours PRN  polyethylene glycol 3350 17 Gram(s) Oral daily PRN  sodium chloride 0.9% lock flush 10 milliLiter(s) IV Push every 1 hour PRN        A/P: 72 year old male with multiple myeloma diagnosed 19 s/p RVD x 6 admitted for autologous pbsct with high dose melphalan prep regimen (dose reduced to 70mg / m2 for age)  Day + 10  10/25- melphalan ; continue melphalan hydration for 24 hours post infusion of last dose. Strict I&O, daily weights, prn diuresis   10/27- HPC transplant today; continue transplant hydration for 24 hours post infusion of cells   10/28 weight gain, I>O, lasix 60 mg iv x1; constipated, Lactulose now and PRN  - Parrish. Started on cipro 10/31/22. If T >/= 38C, pan cx, CXR and change cipro to cefepime 2g IV q 8 hours    Febrile overnight, cipro switched to Cefepime; pending Blood cultures and urine culture (+) GNR; follow up sensitivities     1. Infectious Disease:   acyclovir   Oral Tab/Cap 400 milliGRAM(s) Oral every 8 hours  cefepime   IVPB 2000 milliGRAM(s) IV Intermittent every 8 hours  clotrimazole Lozenge 1 Lozenge Oral five times a day  fluconAZOLE   Tablet 400 milliGRAM(s) Oral daily    2. VOD Prophylaxis: Actigall, Glutamine, Heparin (dosed at 100 units / kg / day)     3. GI Prophylaxis:   pantoprazole    Tablet 40 milliGRAM(s) Oral before breakfast    4. Mouthcare - NS / NaHCO3 rinses, Mycelex, Biotene; Skin care     5. GVHD prophylaxis - n/a     6. Transfuse & replete electrolytes prn   Platelets 1 unit    7. IV hydration, daily weights, strict I&O, prn diuresis     8. PO intake as tolerated, nutrition follow up as needed, MVI, folic acid     9. Antiemetics, anti-diarrhea medications:   LORazepam   Injectable 1 milliGRAM(s) IV Push every 6 hours PRN  metoclopramide Injectable 10 milliGRAM(s) IV Push every 6 hours PRN  ondansetron Injectable 8 milliGRAM(s) IV Push every 8 hours PRN    10. OOB as tolerated, physical therapy consult if needed     11. Monitor coags / fibrinogen 2x week, vitamin K as needed     12. Monitor closely for clinical changes, monitor for fevers     13. Emotional support provided, plan of care discussed and questions addressed     14. Patient education done regarding plan of care, restrictions and discharge planning     15. Continue regular social work input         I have written the above note for Dr. Gonzales who performed service with me in the room.   Ana Mistry [facility] ([phone])    I have seen and examined patient with NP, I agree with above note as scribed.                    HPC Transplant Team                                                      Critical / Counseling Time Provided: 30 minutes                                                                                                                                                        Chief Complaint: Autologous peripheral blood stem cell transplant with high dose Melphalan prep regimen for treatment of multiple myeloma    S: Patient seen and examined with [facility] Transplant Team:   +fatigue  All other ROS Negative    O: Vitals:   Vital Signs Last 24 Hrs  T(C): 37.1 (2022 05:35), Max: 37.3 (2022 00:06)  T(F): 98.8 (2022 05:35), Max: 99.1 (2022 00:06)  HR: 102 (2022 05:35) (98 - 111)  BP: 124/65 (2022 05:35) (100/61 - 132/75)  BP(mean): --  RR: 18 (2022 05:35) (18 - 18)  SpO2: 96% (2022 05:35) (96% - 99%)    Parameters below as of 2022 05:35  Patient On (Oxygen Delivery Method): room air        Today's weight: 74.3kg  Daily     Daily Weight in k.3 (2022 08:07)    Intake / Output:    @ 08:01  -   @ 07:00  --------------------------------------------------------  IN: 2299.7 mL / OUT: 2000 mL / NET: 299.7 mL      PE:   Oropharynx: scattered erythema no ulcerations  Oral Mucositis:                                                     Grade NA  CVS: S1, S2 RRR   Lungs: CTA throughout bilaterally   Abdomen: + BS x 4, soft, NT, ND   Extremities: no edema  Gastric Mucositis:      -                                            Grade: n/a  Intestinal Mucositis:    -                                          Grade: n/a  Skin: no rash   TLC: CDI   Neuro: A&Ox3   Pain: denies    Labs:                           8.9    0.16  )-----------( 6        ( 2022 07:27 )             26.8     2022 07:25    139    |  104    |  9      ----------------------------<  104    3.8     |  25     |  0.77     Ca    8.4        2022 07:25  Phos  2.4       2022 07:25  Mg     1.7       2022 07:25    TPro  5.6    /  Alb  3.1    /  TBili  0.2    /  DBili  x      /  AST  10     /  ALT  16     /  AlkPhos  82     2022 07:25    CAPILLARY BLOOD GLUCOSE  POCT Blood Glucose.: 174 mg/dL (2022 11:58)  POCT Blood Glucose.: 96 mg/dL (2022 07:45)  POCT Blood Glucose.: 105 mg/dL (2022 21:09)  POCT Blood Glucose.: 130 mg/dL (2022 17:07)    LIVER FUNCTIONS - ( 2022 07:25 )  Alb: 3.1 g/dL / Pro: 5.6 g/dL / ALK PHOS: 82 U/L / ALT: 16 U/L / AST: 10 U/L / GGT: x           Culture - Blood (22 @ 00:31)   Specimen Source: .Blood Blood   Culture Results:   No growth to date. Culture - Blood (22 @ 00:31)   Specimen Source: .Blood Blood   Culture Results:   No growth to date.   Culture - Urine (22 @ 00:31)   Specimen Source: Clean Catch Clean Catch (Midstream)   Culture Results:   >100,000 CFU/ml Gram Negative Rods     Radiology:   Xray Chest 1 View- PORTABLE-Routine (Xray Chest 1 View- PORTABLE-Routine .) (22 @ 09:20)   Impression:  Clear lungs.    Meds:   Antimicrobials:   acyclovir   Oral Tab/Cap 400 milliGRAM(s) Oral every 8 hours  cefepime   IVPB 2000 milliGRAM(s) IV Intermittent every 8 hours  clotrimazole Lozenge 1 Lozenge Oral five times a day  fluconAZOLE   Tablet 400 milliGRAM(s) Oral daily      Heme / Onc:   heparin  Infusion 312 Unit(s)/Hr IV Continuous <Continuous>      GI:  aluminum hydroxide/magnesium hydroxide/simethicone Suspension 30 milliLiter(s) Oral every 6 hours PRN  lactulose Syrup 20 Gram(s) Oral once PRN  loperamide 2 milliGRAM(s) Oral once  pantoprazole    Tablet 40 milliGRAM(s) Oral before breakfast  polyethylene glycol 3350 17 Gram(s) Oral daily PRN  senna 2 Tablet(s) Oral at bedtime  sodium bicarbonate Mouth Rinse 10 milliLiter(s) Swish and Spit five times a day  ursodiol Capsule 300 milliGRAM(s) Oral two times a day      Cardiovascular:   amLODIPine   Tablet 10 milliGRAM(s) Oral daily  hydrALAZINE 25 milliGRAM(s) Oral three times a day      Immunologic:   filgrastim-sndz (ZARXIO) Injectable 480 MICROGram(s) SubCutaneous every 24 hours      Other medications:   acetaminophen     Tablet .. 650 milliGRAM(s) Oral every 6 hours  benzonatate 100 milliGRAM(s) Oral three times a day  Biotene Dry Mouth Oral Rinse 5 milliLiter(s) Swish and Spit five times a day  chlorhexidine 4% Liquid 1 Application(s) Topical <User Schedule>  dextrose 5%. 1000 milliLiter(s) IV Continuous <Continuous>  dextrose 5%. 1000 milliLiter(s) IV Continuous <Continuous>  dextrose 50% Injectable 12.5 Gram(s) IV Push once  dextrose 50% Injectable 25 Gram(s) IV Push once  dextrose 50% Injectable 25 Gram(s) IV Push once  finasteride 5 milliGRAM(s) Oral daily  FIRST- Mouthwash  BLM 5 milliLiter(s) Swish and Swallow five times a day  folic acid 1 milliGRAM(s) Oral daily  glucagon  Injectable 1 milliGRAM(s) IntraMuscular once  insulin lispro (ADMELOG) corrective regimen sliding scale   SubCutaneous three times a day before meals  insulin lispro (ADMELOG) corrective regimen sliding scale   SubCutaneous at bedtime  lidocaine/prilocaine Cream 1 Application(s) Topical daily  loratadine 10 milliGRAM(s) Oral daily  LORazepam   Injectable 1 milliGRAM(s) IV Push every 24 hours  multivitamin 1 Tablet(s) Oral daily  nystatin Powder 1 Application(s) Topical three times a day  sodium chloride 0.9%. 1000 milliLiter(s) IV Continuous <Continuous>  tamsulosin 0.4 milliGRAM(s) Oral at bedtime      PRN:   acetaminophen     Tablet .. 650 milliGRAM(s) Oral every 6 hours PRN  aluminum hydroxide/magnesium hydroxide/simethicone Suspension 30 milliLiter(s) Oral every 6 hours PRN  dextrose Oral Gel 15 Gram(s) Oral once PRN  lactulose Syrup 20 Gram(s) Oral once PRN  metoclopramide Injectable 10 milliGRAM(s) IV Push every 6 hours PRN  ondansetron Injectable 8 milliGRAM(s) IV Push every 8 hours PRN  polyethylene glycol 3350 17 Gram(s) Oral daily PRN  sodium chloride 0.9% lock flush 10 milliLiter(s) IV Push every 1 hour PRN        A/P: 72 year old male with multiple myeloma diagnosed 21 s/p RVD x 6 admitted for autologous pbsct with high dose melphalan prep regimen (dose reduced to 70mg / m2 for age)  Day + 10  10/25- melphalan ; continue melphalan hydration for 24 hours post infusion of last dose. Strict I&O, daily weights, prn diuresis   10/27- HPC transplant today; continue transplant hydration for 24 hours post infusion of cells   10/28 weight gain, I>O, lasix 60 mg iv x1; constipated, Lactulose now and PRN  - Parrish. Started on cipro 10/31/22. If T >/= 38C, pan cx, CXR and change cipro to cefepime 2g IV q 8 hours    Febrile overnight, cipro switched to Cefepime; BC(-), urine culture (+) GNR Enterobacter cloacae complex; follow up sensitivities     1. Infectious Disease:   acyclovir   Oral Tab/Cap 400 milliGRAM(s) Oral every 8 hours  cefepime   IVPB 2000 milliGRAM(s) IV Intermittent every 8 hours  clotrimazole Lozenge 1 Lozenge Oral five times a day  fluconAZOLE   Tablet 400 milliGRAM(s) Oral daily    2. VOD Prophylaxis: Actigall, Glutamine, Heparin (dosed at 100 units / kg / day)     3. GI Prophylaxis:   pantoprazole    Tablet 40 milliGRAM(s) Oral before breakfast    4. Mouthcare - NS / NaHCO3 rinses, Mycelex, Biotene; Skin care     5. GVHD prophylaxis - n/a     6. Transfuse & replete electrolytes prn   Platelets 1 unit  potassium phosphate 15 millimoles IVPB x 1    7. IV hydration, daily weights, strict I&O, prn diuresis     8. PO intake as tolerated, nutrition follow up as needed, MVI, folic acid     9. Antiemetics, anti-diarrhea medications:   LORazepam   Injectable 1 milliGRAM(s) IV Push every 6 hours PRN  metoclopramide Injectable 10 milliGRAM(s) IV Push every 6 hours PRN  ondansetron Injectable 8 milliGRAM(s) IV Push every 8 hours PRN    10. OOB as tolerated, physical therapy consult if needed     11. Monitor coags / fibrinogen 2x week, vitamin K as needed     12. Monitor closely for clinical changes, monitor for fevers     13. Emotional support provided, plan of care discussed and questions addressed     14. Patient education done regarding plan of care, restrictions and discharge planning     15. Continue regular social work input         I have written the above note for Dr. Gonzales who performed service with me in the room.   Ana Mistry [facility] ([phone])    I have seen and examined patient with NP, I agree with above note as scribed.                    HPC Transplant Team                                                      Critical / Counseling Time Provided: 30 minutes                                                                                                                                                        Chief Complaint: Autologous peripheral blood stem cell transplant with high dose Melphalan prep regimen for treatment of multiple myeloma    S: Patient seen and examined with [facility] Transplant Team:   +fatigue  All other ROS Negative    O: Vitals:   Vital Signs Last 24 Hrs  T(C): 37.1 (2022 05:35), Max: 37.3 (2022 00:06)  T(F): 98.8 (2022 05:35), Max: 99.1 (2022 00:06)  HR: 102 (2022 05:35) (98 - 111)  BP: 124/65 (2022 05:35) (100/61 - 132/75)  BP(mean): --  RR: 18 (2022 05:35) (18 - 18)  SpO2: 96% (2022 05:35) (96% - 99%)    Parameters below as of 2022 05:35  Patient On (Oxygen Delivery Method): room air        Today's weight: 74.3kg  Daily     Daily Weight in k.3 (2022 08:07)    Intake / Output:    @ 08:01  -   @ 07:00  --------------------------------------------------------  IN: 2299.7 mL / OUT: 2000 mL / NET: 299.7 mL      PE:   Oropharynx: scattered erythema no ulcerations  Oral Mucositis:                                                     Grade NA  CVS: S1, S2 RRR   Lungs: CTA throughout bilaterally   Abdomen: + BS x 4, soft, NT, ND   Extremities: no edema  Gastric Mucositis:      -                                            Grade: n/a  Intestinal Mucositis:    -                                          Grade: n/a  Skin: no rash   TLC: CDI   Neuro: A&Ox3   Pain: denies    Labs:                           8.9    0.16  )-----------( 6        ( 2022 07:27 )             26.8     2022 07:25    139    |  104    |  9      ----------------------------<  104    3.8     |  25     |  0.77     Ca    8.4        2022 07:25  Phos  2.4       2022 07:25  Mg     1.7       2022 07:25    TPro  5.6    /  Alb  3.1    /  TBili  0.2    /  DBili  x      /  AST  10     /  ALT  16     /  AlkPhos  82     2022 07:25    CAPILLARY BLOOD GLUCOSE  POCT Blood Glucose.: 174 mg/dL (2022 11:58)  POCT Blood Glucose.: 96 mg/dL (2022 07:45)  POCT Blood Glucose.: 105 mg/dL (2022 21:09)  POCT Blood Glucose.: 130 mg/dL (2022 17:07)    LIVER FUNCTIONS - ( 2022 07:25 )  Alb: 3.1 g/dL / Pro: 5.6 g/dL / ALK PHOS: 82 U/L / ALT: 16 U/L / AST: 10 U/L / GGT: x           Culture - Blood (22 @ 00:31)   Specimen Source: .Blood Blood   Culture Results:   No growth to date. Culture - Blood (22 @ 00:31)   Specimen Source: .Blood Blood   Culture Results:   No growth to date.   Culture - Urine (22 @ 00:31)   Specimen Source: Clean Catch Clean Catch (Midstream)   Culture Results:   >100,000 CFU/ml Gram Negative Rods     Radiology:   Xray Chest 1 View- PORTABLE-Routine (Xray Chest 1 View- PORTABLE-Routine .) (22 @ 09:20)   Impression:  Clear lungs.    Meds:   Antimicrobials:   acyclovir   Oral Tab/Cap 400 milliGRAM(s) Oral every 8 hours  cefepime   IVPB 2000 milliGRAM(s) IV Intermittent every 8 hours  clotrimazole Lozenge 1 Lozenge Oral five times a day  fluconAZOLE   Tablet 400 milliGRAM(s) Oral daily      Heme / Onc:   heparin  Infusion 312 Unit(s)/Hr IV Continuous <Continuous>      GI:  aluminum hydroxide/magnesium hydroxide/simethicone Suspension 30 milliLiter(s) Oral every 6 hours PRN  lactulose Syrup 20 Gram(s) Oral once PRN  loperamide 2 milliGRAM(s) Oral once  pantoprazole    Tablet 40 milliGRAM(s) Oral before breakfast  polyethylene glycol 3350 17 Gram(s) Oral daily PRN  senna 2 Tablet(s) Oral at bedtime  sodium bicarbonate Mouth Rinse 10 milliLiter(s) Swish and Spit five times a day  ursodiol Capsule 300 milliGRAM(s) Oral two times a day      Cardiovascular:   amLODIPine   Tablet 10 milliGRAM(s) Oral daily  hydrALAZINE 25 milliGRAM(s) Oral three times a day      Immunologic:   filgrastim-sndz (ZARXIO) Injectable 480 MICROGram(s) SubCutaneous every 24 hours      Other medications:   acetaminophen     Tablet .. 650 milliGRAM(s) Oral every 6 hours  benzonatate 100 milliGRAM(s) Oral three times a day  Biotene Dry Mouth Oral Rinse 5 milliLiter(s) Swish and Spit five times a day  chlorhexidine 4% Liquid 1 Application(s) Topical <User Schedule>  dextrose 5%. 1000 milliLiter(s) IV Continuous <Continuous>  dextrose 5%. 1000 milliLiter(s) IV Continuous <Continuous>  dextrose 50% Injectable 12.5 Gram(s) IV Push once  dextrose 50% Injectable 25 Gram(s) IV Push once  dextrose 50% Injectable 25 Gram(s) IV Push once  finasteride 5 milliGRAM(s) Oral daily  FIRST- Mouthwash  BLM 5 milliLiter(s) Swish and Swallow five times a day  folic acid 1 milliGRAM(s) Oral daily  glucagon  Injectable 1 milliGRAM(s) IntraMuscular once  insulin lispro (ADMELOG) corrective regimen sliding scale   SubCutaneous three times a day before meals  insulin lispro (ADMELOG) corrective regimen sliding scale   SubCutaneous at bedtime  lidocaine/prilocaine Cream 1 Application(s) Topical daily  loratadine 10 milliGRAM(s) Oral daily  LORazepam   Injectable 1 milliGRAM(s) IV Push every 24 hours  multivitamin 1 Tablet(s) Oral daily  nystatin Powder 1 Application(s) Topical three times a day  sodium chloride 0.9%. 1000 milliLiter(s) IV Continuous <Continuous>  tamsulosin 0.4 milliGRAM(s) Oral at bedtime      PRN:   acetaminophen     Tablet .. 650 milliGRAM(s) Oral every 6 hours PRN  aluminum hydroxide/magnesium hydroxide/simethicone Suspension 30 milliLiter(s) Oral every 6 hours PRN  dextrose Oral Gel 15 Gram(s) Oral once PRN  lactulose Syrup 20 Gram(s) Oral once PRN  metoclopramide Injectable 10 milliGRAM(s) IV Push every 6 hours PRN  ondansetron Injectable 8 milliGRAM(s) IV Push every 8 hours PRN  polyethylene glycol 3350 17 Gram(s) Oral daily PRN  sodium chloride 0.9% lock flush 10 milliLiter(s) IV Push every 1 hour PRN        A/P: 72 year old male with multiple myeloma diagnosed 21 s/p RVD x 6 admitted for autologous pbsct with high dose melphalan prep regimen (dose reduced to 70mg / m2 for age)  Day + 10  10/25- melphalan ; continue melphalan hydration for 24 hours post infusion of last dose. Strict I&O, daily weights, prn diuresis   10/27- HPC transplant today; continue transplant hydration for 24 hours post infusion of cells   10/28 weight gain, I>O, lasix 60 mg iv x1; constipated, Lactulose now and PRN  - Parrish. Started on cipro 10/31/22. If T >/= 38C, pan cx, CXR and change cipro to cefepime 2g IV q 8 hours    Febrile overnight, cipro switched to Cefepime; BC(-), urine culture (+) GNR Enterobacter cloacae complex; follow up sensitivities    pending repeat urine culture    1. Infectious Disease:   acyclovir   Oral Tab/Cap 400 milliGRAM(s) Oral every 8 hours  cefepime   IVPB 2000 milliGRAM(s) IV Intermittent every 8 hours  clotrimazole Lozenge 1 Lozenge Oral five times a day  fluconAZOLE   Tablet 400 milliGRAM(s) Oral daily    2. VOD Prophylaxis: Actigall, Glutamine, Heparin (dosed at 100 units / kg / day)     3. GI Prophylaxis:   pantoprazole    Tablet 40 milliGRAM(s) Oral before breakfast    4. Mouthcare - NS / NaHCO3 rinses, Mycelex, Biotene; Skin care     5. GVHD prophylaxis - n/a     6. Transfuse & replete electrolytes prn   Platelets 1 unit  potassium phosphate 15 millimoles IVPB x 1    7. IV hydration, daily weights, strict I&O, prn diuresis     8. PO intake as tolerated, nutrition follow up as needed, MVI, folic acid     9. Antiemetics, anti-diarrhea medications:   LORazepam   Injectable 1 milliGRAM(s) IV Push every 6 hours PRN  metoclopramide Injectable 10 milliGRAM(s) IV Push every 6 hours PRN  ondansetron Injectable 8 milliGRAM(s) IV Push every 8 hours PRN    10. OOB as tolerated, physical therapy consult if needed     11. Monitor coags / fibrinogen 2x week, vitamin K as needed     12. Monitor closely for clinical changes, monitor for fevers     13. Emotional support provided, plan of care discussed and questions addressed     14. Patient education done regarding plan of care, restrictions and discharge planning     15. Continue regular social work input         I have written the above note for Dr. Gonzales who performed service with me in the room.   Ana Mistry [facility] ([phone])    I have seen and examined patient with NP, I agree with above note as scribed.                    [facility] Transplant Team                                                      Critical / Counseling Time Provided: 30 minutes                                                                                                                                                        Chief Complaint: Autologous peripheral blood stem cell transplant with high dose Melphalan prep regimen for treatment of multiple myeloma    S: Patient seen and examined with [facility] Transplant Team:   +fatigue  All other ROS Negative    O: Vitals:   Vital Signs Last 24 Hrs  T(C): 37.1 (2022 05:35), Max: 37.3 (2022 00:06)  T(F): 98.8 (2022 05:35), Max: 99.1 (2022 00:06)  HR: 102 (2022 05:35) (98 - 111)  BP: 124/65 (2022 05:35) (100/61 - 132/75)  BP(mean): --  RR: 18 (2022 05:35) (18 - 18)  SpO2: 96% (2022 05:35) (96% - 99%)    Parameters below as of 2022 05:35  Patient On (Oxygen Delivery Method): room air        Today's weight: 74.3kg  Daily     Daily Weight in k.3 (2022 08:07)    Intake / Output:    @ 08:01  -   @ 07:00  --------------------------------------------------------  IN: 2299.7 mL / OUT: 2000 mL / NET: 299.7 mL      PE:   Oropharynx: scattered erythema no ulcerations  Oral Mucositis:                                                     Grade NA  CVS: S1, S2 RRR   Lungs: CTA throughout bilaterally   Abdomen: + BS x 4, soft, NT, ND   Extremities: no edema  Gastric Mucositis:      -                                            Grade: n/a  Intestinal Mucositis:    -                                          Grade: n/a  Skin: no rash   TLC: CDI   Neuro: A&Ox3   Pain: denies    Labs:                           8.9    0.16  )-----------( 6        ( 2022 07:27 )             26.8     2022 07:25    139    |  104    |  9      ----------------------------<  104    3.8     |  25     |  0.77     Ca    8.4        2022 07:25  Phos  2.4       2022 07:25  Mg     1.7       2022 07:25    TPro  5.6    /  Alb  3.1    /  TBili  0.2    /  DBili  x      /  AST  10     /  ALT  16     /  AlkPhos  82     2022 07:25    CAPILLARY BLOOD GLUCOSE  POCT Blood Glucose.: 174 mg/dL (2022 11:58)  POCT Blood Glucose.: 96 mg/dL (2022 07:45)  POCT Blood Glucose.: 105 mg/dL (2022 21:09)  POCT Blood Glucose.: 130 mg/dL (2022 17:07)    LIVER FUNCTIONS - ( 2022 07:25 )  Alb: 3.1 g/dL / Pro: 5.6 g/dL / ALK PHOS: 82 U/L / ALT: 16 U/L / AST: 10 U/L / GGT: x           Cultures:    Culture - Urine (22 @ 00:31)    -  Meropenem: S <=1    -  Nitrofurantoin: R >64 Should not be used to treat pyelonephritis    -  Piperacillin/Tazobactam: S <=8    -  Tobramycin: S <=2    -  Trimethoprim/Sulfamethoxazole: R >2/38    -  Amikacin: S <=16    -  Amoxicillin/Clavulanic Acid: R >16/8    -  Ampicillin: R >16 These ampicillin results predict results for amoxicillin    -  Ampicillin/Sulbactam: R >16/8 Enterobacter, Klebsiella aerogenes, Citrobacter, and Serratia may develop resistance during prolonged therapy (3-4 days)    -  Aztreonam: S <=4    -  Cefazolin: R >16    -  Cefepime: S <=2    -  Cefoxitin: R <=8    -  Ceftriaxone: S <=1 Enterobacter, Klebsiella aerogenes, Citrobacter, and Serratia may develop resistance during prolonged therapy    -  Ciprofloxacin: R >2    -  Ertapenem: S <=0.5    -  Gentamicin: S <=2    -  Imipenem: S <=1    -  Levofloxacin: R >4    Specimen Source: Clean Catch Clean Catch (Midstream)    Culture Results:   >100,000 CFU/ml Enterobacter cloacae complex    Organism Identification: Enterobacter cloacae complex    Organism: Enterobacter cloacae complex    Method Type: ADALID      Culture - Blood (22 @ 00:31)   Specimen Source: .Blood Blood   Culture Results:   No growth to date. Culture - Blood (22 @ 00:31)   Specimen Source: .Blood Blood   Culture Results:   No growth to date.   Culture - Urine (22 @ 00:31)   Specimen Source: Clean Catch Clean Catch (Midstream)   Culture Results:   >100,000 CFU/ml Gram Negative Rods     Radiology:   Xray Chest 1 View- PORTABLE-Routine (Xray Chest 1 View- PORTABLE-Routine .) (22 @ 09:20)   Impression:  Clear lungs.    Meds:   Antimicrobials:   acyclovir   Oral Tab/Cap 400 milliGRAM(s) Oral every 8 hours  cefepime   IVPB 2000 milliGRAM(s) IV Intermittent every 8 hours  clotrimazole Lozenge 1 Lozenge Oral five times a day  fluconAZOLE   Tablet 400 milliGRAM(s) Oral daily      Heme / Onc:   heparin  Infusion 312 Unit(s)/Hr IV Continuous <Continuous>      GI:  aluminum hydroxide/magnesium hydroxide/simethicone Suspension 30 milliLiter(s) Oral every 6 hours PRN  lactulose Syrup 20 Gram(s) Oral once PRN  loperamide 2 milliGRAM(s) Oral once  pantoprazole    Tablet 40 milliGRAM(s) Oral before breakfast  polyethylene glycol 3350 17 Gram(s) Oral daily PRN  senna 2 Tablet(s) Oral at bedtime  sodium bicarbonate Mouth Rinse 10 milliLiter(s) Swish and Spit five times a day  ursodiol Capsule 300 milliGRAM(s) Oral two times a day      Cardiovascular:   amLODIPine   Tablet 10 milliGRAM(s) Oral daily  hydrALAZINE 25 milliGRAM(s) Oral three times a day      Immunologic:   filgrastim-sndz (ZARXIO) Injectable 480 MICROGram(s) SubCutaneous every 24 hours      Other medications:   acetaminophen     Tablet .. 650 milliGRAM(s) Oral every 6 hours  benzonatate 100 milliGRAM(s) Oral three times a day  Biotene Dry Mouth Oral Rinse 5 milliLiter(s) Swish and Spit five times a day  chlorhexidine 4% Liquid 1 Application(s) Topical <User Schedule>  dextrose 5%. 1000 milliLiter(s) IV Continuous <Continuous>  dextrose 5%. 1000 milliLiter(s) IV Continuous <Continuous>  dextrose 50% Injectable 12.5 Gram(s) IV Push once  dextrose 50% Injectable 25 Gram(s) IV Push once  dextrose 50% Injectable 25 Gram(s) IV Push once  finasteride 5 milliGRAM(s) Oral daily  FIRST- Mouthwash  BLM 5 milliLiter(s) Swish and Swallow five times a day  folic acid 1 milliGRAM(s) Oral daily  glucagon  Injectable 1 milliGRAM(s) IntraMuscular once  insulin lispro (ADMELOG) corrective regimen sliding scale   SubCutaneous three times a day before meals  insulin lispro (ADMELOG) corrective regimen sliding scale   SubCutaneous at bedtime  lidocaine/prilocaine Cream 1 Application(s) Topical daily  loratadine 10 milliGRAM(s) Oral daily  LORazepam   Injectable 1 milliGRAM(s) IV Push every 24 hours  multivitamin 1 Tablet(s) Oral daily  nystatin Powder 1 Application(s) Topical three times a day  sodium chloride 0.9%. 1000 milliLiter(s) IV Continuous <Continuous>  tamsulosin 0.4 milliGRAM(s) Oral at bedtime      PRN:   acetaminophen     Tablet .. 650 milliGRAM(s) Oral every 6 hours PRN  aluminum hydroxide/magnesium hydroxide/simethicone Suspension 30 milliLiter(s) Oral every 6 hours PRN  dextrose Oral Gel 15 Gram(s) Oral once PRN  lactulose Syrup 20 Gram(s) Oral once PRN  metoclopramide Injectable 10 milliGRAM(s) IV Push every 6 hours PRN  ondansetron Injectable 8 milliGRAM(s) IV Push every 8 hours PRN  polyethylene glycol 3350 17 Gram(s) Oral daily PRN  sodium chloride 0.9% lock flush 10 milliLiter(s) IV Push every 1 hour PRN        A/P: 72 year old male with multiple myeloma diagnosed 19 s/p RVD x 6 admitted for autologous pbsct with high dose melphalan prep regimen (dose reduced to 70mg / m2 for age)  Day + 10  10/25- melphalan ; continue melphalan hydration for 24 hours post infusion of last dose. Strict I&O, daily weights, prn diuresis   10/27- HPC transplant today; continue transplant hydration for 24 hours post infusion of cells   10/28 weight gain, I>O, lasix 60 mg iv x1; constipated, Lactulose now and PRN  - [facility]. Started on cipro 10/31/22. If T >/= 38C, pan cx, CXR and change cipro to cefepime 2g IV q 8 hours    Febrile overnight, cipro switched to Cefepime; BC(-), urine culture (+) GNR Enterobacter cloacae complex; follow up sensitivities    pending repeat urine culture    1. Infectious Disease:   acyclovir   Oral Tab/Cap 400 milliGRAM(s) Oral every 8 hours  cefepime   IVPB 2000 milliGRAM(s) IV Intermittent every 8 hours  clotrimazole Lozenge 1 Lozenge Oral five times a day  fluconAZOLE   Tablet 400 milliGRAM(s) Oral daily    2. VOD Prophylaxis: Actigall, Glutamine, Heparin (dosed at 100 units / kg / day)     3. GI Prophylaxis:   pantoprazole    Tablet 40 milliGRAM(s) Oral before breakfast    4. Mouthcare - NS / NaHCO3 rinses, Mycelex, Biotene; Skin care     5. GVHD prophylaxis - n/a     6. Transfuse & replete electrolytes prn   Platelets 1 unit  potassium phosphate 15 millimoles IVPB x 1    7. IV hydration, daily weights, strict I&O, prn diuresis     8. PO intake as tolerated, nutrition follow up as needed, MVI, folic acid     9. Antiemetics, anti-diarrhea medications:   LORazepam   Injectable 1 milliGRAM(s) IV Push every 6 hours PRN  metoclopramide Injectable 10 milliGRAM(s) IV Push every 6 hours PRN  ondansetron Injectable 8 milliGRAM(s) IV Push every 8 hours PRN    10. OOB as tolerated, physical therapy consult if needed     11. Monitor coags / fibrinogen 2x week, vitamin K as needed     12. Monitor closely for clinical changes, monitor for fevers     13. Emotional support provided, plan of care discussed and questions addressed     14. Patient education done regarding plan of care, restrictions and discharge planning     15. Continue regular social work input         I have written the above note for Dr. Gonzales who performed service with me in the room.   Ana Mistry [facility] ([phone])    I have seen and examined patient with NP, I agree with above note as scribed.

## 2022-11-06 NOTE — PROGRESS NOTE ADULT - CRITICAL CARE ATTENDING COMMENT
72 year old male with multiple myeloma diagnosed 12/20/21 s/p RVD x 6 admitted for autologous pbsct with high dose melphalan prep regimen (dose reduced to 70mg / m2 for age)    Day + 9...some soft stool..feeling better this am..increased saliva...wbc recovering    1. Admit to BMTU   2. -3 hours prior to Melphalan start hydration: D5NS at 200ml /hr and continue 24 hours post Melphalan infusion  3. Day – 3 & day -2 - Melphalan 70mg/m2  IV   4. Strict I&O, daily weights, prn diuresis   5. Day -1 rest day. At 2200 on day – 1, start transplant hydration 1/2NS + 50mEq NaHCO3- (may substitute [id number] if bicarb not available)  6. Day 0 – infuse HPC product. 4 hours after infusion of cells start Zarxio 5 micrograms / kg (actual weight) . Continue through engraftment.   7. On day 0, + 1, + 2-give Kepivance 60micrograms / kg (actual weight).  8. VOD prophylaxis - low dose heparin gtt (dosed at 100 units / kg / day), glutamine supplementation, Actigall BID   9. PCP prophylaxis - Bactrim DS through day -2    10. Antiviral prophylaxis - Acyclovir 400mg po TID to start day -1   11. Antifungal prophylaxis- Diflucan 400 mg po daily.  12. GI prophylaxis - Protonix po QD   13. Antibacterial prophylaxis -  ANC < 500, started Cipro 500mg po BID.  febrile, pan cx, CXR and changed Cipro to Cefepime 2g IV q 8 hours. Continue until count recovery  14. Kepivance for prevention of mucositis- days 0, +1, +2  15. Aggressive mouth care and skin care as per protocol..senna, miralax  prn  16. The patient is aware of his diagnosis...he does not want to be reminded of it every day.. 72 year old male with multiple myeloma diagnosed 12/20/21 s/p RVD x 6 admitted for autologous pbsct with high dose melphalan prep regimen (dose reduced to 70mg / m2 for age)    Day + 10...some soft stool..feeling better this am..increased saliva...wbc recovering..repeat urine cx...enterobacter    1. Admit to BMTU   2. -3 hours prior to Melphalan start hydration: D5NS at 200ml /hr and continue 24 hours post Melphalan infusion  3. Day – 3 & day -2 - Melphalan 70mg/m2  IV   4. Strict I&O, daily weights, prn diuresis   5. Day -1 rest day. At 2200 on day – 1, start transplant hydration 1/2NS + 50mEq NaHCO3- (may substitute [id number] if bicarb not available)  6. Day 0 – infuse HPC product. 4 hours after infusion of cells start Zarxio 5 micrograms / kg (actual weight) . Continue through engraftment.   7. On day 0, + 1, + 2-give Kepivance 60micrograms / kg (actual weight).  8. VOD prophylaxis - low dose heparin gtt (dosed at 100 units / kg / day), glutamine supplementation, Actigall BID   9. PCP prophylaxis - Bactrim DS through day -2    10. Antiviral prophylaxis - Acyclovir 400mg po TID to start day -1   11. Antifungal prophylaxis- Diflucan 400 mg po daily.  12. GI prophylaxis - Protonix po QD   13. Antibacterial prophylaxis -  ANC < 500, started Cipro 500mg po BID.  febrile, pan cx, CXR and changed Cipro to Cefepime 2g IV q 8 hours. Continue until count recovery  14. Kepivance for prevention of mucositis- days 0, +1, +2  15. Aggressive mouth care and skin care as per protocol..senna, miralax  prn  16. The patient is aware of his diagnosis...he does not want to be reminded of it every day..

## 2022-11-07 LAB
ALBUMIN SERPL ELPH-MCNC: 3.1 G/DL — LOW (ref 3.3–5)
ALP SERPL-CCNC: 78 U/L — SIGNIFICANT CHANGE UP (ref 40–120)
ALT FLD-CCNC: 16 U/L — SIGNIFICANT CHANGE UP (ref 10–45)
ANION GAP SERPL CALC-SCNC: 8 MMOL/L — SIGNIFICANT CHANGE UP (ref 5–17)
APTT BLD: 32.5 SEC — SIGNIFICANT CHANGE UP (ref 27.5–35.5)
AST SERPL-CCNC: 11 U/L — SIGNIFICANT CHANGE UP (ref 10–40)
BILIRUB SERPL-MCNC: 0.2 MG/DL — SIGNIFICANT CHANGE UP (ref 0.2–1.2)
BLD GP AB SCN SERPL QL: NEGATIVE — SIGNIFICANT CHANGE UP
BUN SERPL-MCNC: 7 MG/DL — SIGNIFICANT CHANGE UP (ref 7–23)
CALCIUM SERPL-MCNC: 8.6 MG/DL — SIGNIFICANT CHANGE UP (ref 8.4–10.5)
CHLORIDE SERPL-SCNC: 104 MMOL/L — SIGNIFICANT CHANGE UP (ref 96–108)
CO2 SERPL-SCNC: 26 MMOL/L — SIGNIFICANT CHANGE UP (ref 22–31)
CREAT SERPL-MCNC: 0.68 MG/DL — SIGNIFICANT CHANGE UP (ref 0.5–1.3)
CULTURE RESULTS: SIGNIFICANT CHANGE UP
EGFR: 98 ML/MIN/1.73M2 — SIGNIFICANT CHANGE UP
FIBRINOGEN PPP-MCNC: 610 MG/DL — HIGH (ref 330–520)
GLUCOSE BLDC GLUCOMTR-MCNC: 129 MG/DL — HIGH (ref 70–99)
GLUCOSE BLDC GLUCOMTR-MCNC: 135 MG/DL — HIGH (ref 70–99)
GLUCOSE BLDC GLUCOMTR-MCNC: 91 MG/DL — SIGNIFICANT CHANGE UP (ref 70–99)
GLUCOSE BLDC GLUCOMTR-MCNC: 98 MG/DL — SIGNIFICANT CHANGE UP (ref 70–99)
GLUCOSE SERPL-MCNC: 91 MG/DL — SIGNIFICANT CHANGE UP (ref 70–99)
HCT VFR BLD CALC: 25.5 % — LOW (ref 39–50)
HGB BLD-MCNC: 8.4 G/DL — LOW (ref 13–17)
INR BLD: 1.15 RATIO — SIGNIFICANT CHANGE UP (ref 0.88–1.16)
LDH SERPL L TO P-CCNC: 135 U/L — SIGNIFICANT CHANGE UP (ref 50–242)
MAGNESIUM SERPL-MCNC: 1.7 MG/DL — SIGNIFICANT CHANGE UP (ref 1.6–2.6)
MCHC RBC-ENTMCNC: 25.9 PG — LOW (ref 27–34)
MCHC RBC-ENTMCNC: 32.9 GM/DL — SIGNIFICANT CHANGE UP (ref 32–36)
MCV RBC AUTO: 78.7 FL — LOW (ref 80–100)
NRBC # BLD: 0 /100 WBCS — SIGNIFICANT CHANGE UP (ref 0–0)
PHOSPHATE SERPL-MCNC: 2 MG/DL — LOW (ref 2.5–4.5)
PLATELET # BLD AUTO: 31 K/UL — LOW (ref 150–400)
POTASSIUM SERPL-MCNC: 3.4 MMOL/L — LOW (ref 3.5–5.3)
POTASSIUM SERPL-SCNC: 3.4 MMOL/L — LOW (ref 3.5–5.3)
PROT SERPL-MCNC: 5.5 G/DL — LOW (ref 6–8.3)
PROTHROM AB SERPL-ACNC: 13.2 SEC — SIGNIFICANT CHANGE UP (ref 10.5–13.4)
RBC # BLD: 3.24 M/UL — LOW (ref 4.2–5.8)
RBC # FLD: 14 % — SIGNIFICANT CHANGE UP (ref 10.3–14.5)
RH IG SCN BLD-IMP: POSITIVE — SIGNIFICANT CHANGE UP
SODIUM SERPL-SCNC: 138 MMOL/L — SIGNIFICANT CHANGE UP (ref 135–145)
SPECIMEN SOURCE: SIGNIFICANT CHANGE UP
WBC # BLD: 0.42 K/UL — CRITICAL LOW (ref 3.8–10.5)
WBC # FLD AUTO: 0.42 K/UL — CRITICAL LOW (ref 3.8–10.5)

## 2022-11-07 PROCEDURE — 99291 CRITICAL CARE FIRST HOUR: CPT

## 2022-11-07 RX ORDER — POTASSIUM CHLORIDE 20 MEQ
20 PACKET (EA) ORAL
Refills: 0 | Status: COMPLETED | OUTPATIENT
Start: 2022-11-07 | End: 2022-11-07

## 2022-11-07 RX ORDER — POTASSIUM PHOSPHATE, MONOBASIC POTASSIUM PHOSPHATE, DIBASIC 236; 224 MG/ML; MG/ML
15 INJECTION, SOLUTION INTRAVENOUS ONCE
Refills: 0 | Status: COMPLETED | OUTPATIENT
Start: 2022-11-07 | End: 2022-11-07

## 2022-11-07 RX ORDER — PETROLATUM,WHITE
1 JELLY (GRAM) TOPICAL
Refills: 0 | Status: DISCONTINUED | OUTPATIENT
Start: 2022-11-07 | End: 2022-11-15

## 2022-11-07 RX ADMIN — Medication 50 MILLIEQUIVALENT(S): at 08:34

## 2022-11-07 RX ADMIN — Medication 10 MILLILITER(S): at 16:11

## 2022-11-07 RX ADMIN — Medication 1 LOZENGE: at 23:12

## 2022-11-07 RX ADMIN — HEPARIN SODIUM 3.12 UNIT(S)/HR: 5000 INJECTION INTRAVENOUS; SUBCUTANEOUS at 19:51

## 2022-11-07 RX ADMIN — CEFEPIME 100 MILLIGRAM(S): 1 INJECTION, POWDER, FOR SOLUTION INTRAMUSCULAR; INTRAVENOUS at 05:43

## 2022-11-07 RX ADMIN — Medication 100 MILLIGRAM(S): at 20:28

## 2022-11-07 RX ADMIN — CEFEPIME 100 MILLIGRAM(S): 1 INJECTION, POWDER, FOR SOLUTION INTRAMUSCULAR; INTRAVENOUS at 20:28

## 2022-11-07 RX ADMIN — TAMSULOSIN HYDROCHLORIDE 0.4 MILLIGRAM(S): 0.4 CAPSULE ORAL at 20:33

## 2022-11-07 RX ADMIN — Medication 10 MILLILITER(S): at 11:54

## 2022-11-07 RX ADMIN — Medication 1 LOZENGE: at 11:54

## 2022-11-07 RX ADMIN — URSODIOL 300 MILLIGRAM(S): 250 TABLET, FILM COATED ORAL at 17:02

## 2022-11-07 RX ADMIN — Medication 5 MILLILITER(S): at 11:54

## 2022-11-07 RX ADMIN — Medication 10 MILLILITER(S): at 19:51

## 2022-11-07 RX ADMIN — LORATADINE 10 MILLIGRAM(S): 10 TABLET ORAL at 11:55

## 2022-11-07 RX ADMIN — NYSTATIN CREAM 1 APPLICATION(S): 100000 CREAM TOPICAL at 20:29

## 2022-11-07 RX ADMIN — Medication 10 MILLILITER(S): at 23:13

## 2022-11-07 RX ADMIN — Medication 1 MILLIGRAM(S): at 11:53

## 2022-11-07 RX ADMIN — URSODIOL 300 MILLIGRAM(S): 250 TABLET, FILM COATED ORAL at 05:43

## 2022-11-07 RX ADMIN — Medication 1 LOZENGE: at 08:28

## 2022-11-07 RX ADMIN — Medication 400 MILLIGRAM(S): at 13:08

## 2022-11-07 RX ADMIN — Medication 25 MILLIGRAM(S): at 13:09

## 2022-11-07 RX ADMIN — Medication 400 MILLIGRAM(S): at 05:43

## 2022-11-07 RX ADMIN — Medication 25 MILLIGRAM(S): at 20:29

## 2022-11-07 RX ADMIN — POTASSIUM PHOSPHATE, MONOBASIC POTASSIUM PHOSPHATE, DIBASIC 62.5 MILLIMOLE(S): 236; 224 INJECTION, SOLUTION INTRAVENOUS at 14:20

## 2022-11-07 RX ADMIN — FLUCONAZOLE 400 MILLIGRAM(S): 150 TABLET ORAL at 11:53

## 2022-11-07 RX ADMIN — Medication 1 APPLICATION(S): at 17:03

## 2022-11-07 RX ADMIN — Medication 1 LOZENGE: at 16:11

## 2022-11-07 RX ADMIN — Medication 25 MILLIGRAM(S): at 05:44

## 2022-11-07 RX ADMIN — Medication 10 MILLILITER(S): at 08:28

## 2022-11-07 RX ADMIN — Medication 650 MILLIGRAM(S): at 23:30

## 2022-11-07 RX ADMIN — Medication 480 MICROGRAM(S): at 12:01

## 2022-11-07 RX ADMIN — FINASTERIDE 5 MILLIGRAM(S): 5 TABLET, FILM COATED ORAL at 11:53

## 2022-11-07 RX ADMIN — DIPHENHYDRAMINE HYDROCHLORIDE AND LIDOCAINE HYDROCHLORIDE AND ALUMINUM HYDROXIDE AND MAGNESIUM HYDRO 5 MILLILITER(S): KIT at 08:29

## 2022-11-07 RX ADMIN — Medication 5 MILLILITER(S): at 16:10

## 2022-11-07 RX ADMIN — Medication 100 MILLIGRAM(S): at 05:45

## 2022-11-07 RX ADMIN — Medication 5 MILLILITER(S): at 23:13

## 2022-11-07 RX ADMIN — PANTOPRAZOLE SODIUM 40 MILLIGRAM(S): 20 TABLET, DELAYED RELEASE ORAL at 05:43

## 2022-11-07 RX ADMIN — AMLODIPINE BESYLATE 10 MILLIGRAM(S): 2.5 TABLET ORAL at 05:43

## 2022-11-07 RX ADMIN — Medication 1 TABLET(S): at 11:53

## 2022-11-07 RX ADMIN — Medication 1 LOZENGE: at 19:51

## 2022-11-07 RX ADMIN — CHLORHEXIDINE GLUCONATE 1 APPLICATION(S): 213 SOLUTION TOPICAL at 08:29

## 2022-11-07 RX ADMIN — LIDOCAINE AND PRILOCAINE CREAM 1 APPLICATION(S): 25; 25 CREAM TOPICAL at 11:55

## 2022-11-07 RX ADMIN — Medication 50 MILLIEQUIVALENT(S): at 10:35

## 2022-11-07 RX ADMIN — CEFEPIME 100 MILLIGRAM(S): 1 INJECTION, POWDER, FOR SOLUTION INTRAMUSCULAR; INTRAVENOUS at 13:09

## 2022-11-07 RX ADMIN — Medication 5 MILLILITER(S): at 19:50

## 2022-11-07 RX ADMIN — NYSTATIN CREAM 1 APPLICATION(S): 100000 CREAM TOPICAL at 13:10

## 2022-11-07 RX ADMIN — Medication 5 MILLILITER(S): at 08:28

## 2022-11-07 RX ADMIN — SODIUM CHLORIDE 50 MILLILITER(S): 9 INJECTION INTRAMUSCULAR; INTRAVENOUS; SUBCUTANEOUS at 19:51

## 2022-11-07 RX ADMIN — Medication 50 MILLIEQUIVALENT(S): at 11:52

## 2022-11-07 RX ADMIN — NYSTATIN CREAM 1 APPLICATION(S): 100000 CREAM TOPICAL at 05:44

## 2022-11-07 RX ADMIN — Medication 400 MILLIGRAM(S): at 20:28

## 2022-11-07 RX ADMIN — Medication 100 MILLIGRAM(S): at 13:09

## 2022-11-07 NOTE — PROGRESS NOTE ADULT - SUBJECTIVE AND OBJECTIVE BOX
HPC Transplant Team                                                      Critical / Counseling Time Provided: 30 minutes                                                                                                                                                        Chief Complaint: Autologous peripheral blood stem cell transplant with high dose Melphalan prep regimen for treatment of multiple myeloma    S: Patient seen and examined with HPC Transplant Team:     All other ROS negative     O: Vitals:   Vital Signs Last 24 Hrs  T(C): 37.1 (2022 05:20), Max: 37.1 (2022 05:20)  T(F): 98.8 (2022 05:20), Max: 98.8 (2022 05:20)  HR: 102 (2022 05:20) (97 - 103)  BP: 121/62 (2022 05:20) (121/62 - 140/75)  BP(mean): --  RR: 18 (2022 05:20) (18 - 18)  SpO2: 98% (2022 05:20) (97% - 99%)    Parameters below as of 2022 05:20  Patient On (Oxygen Delivery Method): room air      Admit weight: 74.9kg   Daily Weight in k.3 (2022 08:07)  Today's weight:     Intake / Output:    @ 07:01  -   @ 07:00  --------------------------------------------------------  IN: 2678 mL / OUT: 2470 mL / NET: 208 mL      PE:   Oropharynx: scattered erythema no ulcerations  Oral Mucositis:                                                     Grade NA  CVS: S1, S2 RRR   Lungs: CTA throughout bilaterally   Abdomen: + BS x 4, soft, NT, ND   Extremities: no edema  Gastric Mucositis:      -                                            Grade: n/a  Intestinal Mucositis:    -                                          Grade: n/a  Skin: no rash   TLC: CDI   Neuro: A&Ox3   Pain: denies      Labs:   CBC Full  -  ( 2022 07:01 )  WBC Count : 0.42 K/uL  Hemoglobin : 8.4 g/dL  Hematocrit : 25.5 %  Platelet Count - Automated : 31 K/uL  Mean Cell Volume : 78.7 fl  Mean Cell Hemoglobin : 25.9 pg  Mean Cell Hemoglobin Concentration : 32.9 gm/dL  Auto Neutrophil # : x  Auto Lymphocyte # : x  Auto Monocyte # : x  Auto Eosinophil # : x  Auto Basophil # : x  Auto Neutrophil % : x  Auto Lymphocyte % : x  Auto Monocyte % : x  Auto Eosinophil % : x  Auto Basophil % : x                          8.4    0.42  )-----------( 31       ( 2022 07:01 )             25.5         138  |  104  |  7   ----------------------------<  91  3.4<L>   |  26  |  0.68    Ca    8.6      2022 06:58  Phos  2.0       Mg     1.7         TPro  5.5<L>  /  Alb  3.1<L>  /  TBili  0.2  /  DBili  x   /  AST  11  /  ALT  x   /  AlkPhos  78      PT/INR - ( 2022 06:58 )   PT: 13.2 sec;   INR: 1.15 ratio         PTT - ( 2022 06:58 )  PTT:32.5 sec  LIVER FUNCTIONS - ( 2022 06:58 )  Alb: 3.1 g/dL / Pro: 5.5 g/dL / ALK PHOS: 78 U/L / ALT: x     / AST: 11 U/L / GGT: x           Lactate Dehydrogenase, Serum: 135 U/L ( @ 06:58)    Cultures:   Culture Results:   No growth to date. (22 @ 00:31)    Culture Results:   No growth to date. (22 @ 00:31)    Culture Results:   >100,000 CFU/ml Enterobacter cloacae complex (22 @ 00:31)    Radiology:   ACC: [id number] EXAM:  XR CHEST PORTABLE ROUTINE 1V                        PROCEDURE DATE:  2022    Impression:  Clear lungs.      Meds:   Antimicrobials:   acyclovir   Oral Tab/Cap 400 milliGRAM(s) Oral every 8 hours  cefepime   IVPB 2000 milliGRAM(s) IV Intermittent every 8 hours  clotrimazole Lozenge 1 Lozenge Oral five times a day  fluconAZOLE   Tablet 400 milliGRAM(s) Oral daily      Heme / Onc:   heparin  Infusion 312 Unit(s)/Hr IV Continuous <Continuous>      GI:  aluminum hydroxide/magnesium hydroxide/simethicone Suspension 30 milliLiter(s) Oral every 6 hours PRN  lactulose Syrup 20 Gram(s) Oral once PRN  pantoprazole    Tablet 40 milliGRAM(s) Oral before breakfast  polyethylene glycol 3350 17 Gram(s) Oral daily PRN  senna 2 Tablet(s) Oral at bedtime  sodium bicarbonate Mouth Rinse 10 milliLiter(s) Swish and Spit five times a day  ursodiol Capsule 300 milliGRAM(s) Oral two times a day      Cardiovascular:   amLODIPine   Tablet 10 milliGRAM(s) Oral daily  hydrALAZINE 25 milliGRAM(s) Oral three times a day      Immunologic:   filgrastim-sndz (ZARXIO) Injectable 480 MICROGram(s) SubCutaneous every 24 hours      Other medications:   acetaminophen     Tablet .. 650 milliGRAM(s) Oral every 6 hours  benzonatate 100 milliGRAM(s) Oral three times a day  Biotene Dry Mouth Oral Rinse 5 milliLiter(s) Swish and Spit five times a day  chlorhexidine 4% Liquid 1 Application(s) Topical <User Schedule>  dextrose 5%. 1000 milliLiter(s) IV Continuous <Continuous>  dextrose 5%. 1000 milliLiter(s) IV Continuous <Continuous>  dextrose 50% Injectable 25 Gram(s) IV Push once  dextrose 50% Injectable 12.5 Gram(s) IV Push once  dextrose 50% Injectable 25 Gram(s) IV Push once  finasteride 5 milliGRAM(s) Oral daily  FIRST- Mouthwash  BLM 5 milliLiter(s) Swish and Swallow five times a day  folic acid 1 milliGRAM(s) Oral daily  glucagon  Injectable 1 milliGRAM(s) IntraMuscular once  insulin lispro (ADMELOG) corrective regimen sliding scale   SubCutaneous three times a day before meals  insulin lispro (ADMELOG) corrective regimen sliding scale   SubCutaneous at bedtime  lidocaine/prilocaine Cream 1 Application(s) Topical daily  loratadine 10 milliGRAM(s) Oral daily  LORazepam   Injectable 1 milliGRAM(s) IV Push every 24 hours  multivitamin 1 Tablet(s) Oral daily  nystatin Powder 1 Application(s) Topical three times a day  sodium chloride 0.9%. 1000 milliLiter(s) IV Continuous <Continuous>  tamsulosin 0.4 milliGRAM(s) Oral at bedtime      PRN:   acetaminophen     Tablet .. 650 milliGRAM(s) Oral every 6 hours PRN  aluminum hydroxide/magnesium hydroxide/simethicone Suspension 30 milliLiter(s) Oral every 6 hours PRN  dextrose Oral Gel 15 Gram(s) Oral once PRN  lactulose Syrup 20 Gram(s) Oral once PRN  metoclopramide Injectable 10 milliGRAM(s) IV Push every 6 hours PRN  ondansetron Injectable 8 milliGRAM(s) IV Push every 8 hours PRN  polyethylene glycol 3350 17 Gram(s) Oral daily PRN  sodium chloride 0.9% lock flush 10 milliLiter(s) IV Push every 1 hour PRN    A/P: 72 year old male with multiple myeloma diagnosed 19 s/p RVD x 6 admitted for autologous pbsct with high dose melphalan prep regimen (dose reduced to 70mg / m2 for age)  Day + 11   10/25- melphalan ; continue melphalan hydration for 24 hours post infusion of last dose. Strict I&O, daily weights, prn diuresis   10/27- HPC transplant today; continue transplant hydration for 24 hours post infusion of cells   10/28 weight gain, I>O, lasix 60 mg iv x1; constipated, Lactulose now and PRN  - [facility]. Started on cipro 10/31/22. If T >/= 38C, pan cx, CXR and change cipro to cefepime 2g IV q 8 hours    Febrile overnight, cipro switched to Cefepime; BC(-), urine culture (+) GNR Enterobacter cloacae complex; follow up sensitivities    pending repeat urine culture    1. Infectious Disease:   acyclovir   Oral Tab/Cap 400 milliGRAM(s) Oral every 8 hours  cefepime   IVPB 2000 milliGRAM(s) IV Intermittent every 8 hours  clotrimazole Lozenge 1 Lozenge Oral five times a day  fluconAZOLE   Tablet 400 milliGRAM(s) Oral daily    2. VOD Prophylaxis: Actigall, Glutamine, Heparin (dosed at 100 units / kg / day)     3. GI Prophylaxis:    pantoprazole    Tablet 40 milliGRAM(s) Oral before breakfast    4. Mouthcare - NS / NaHCO3 rinses, Mycelex, Biotene; Skin care     5. GVHD prophylaxis - n/a     6. Transfuse & replete electrolytes prn   KCl 20mEq IV q 2 hours x 3 doses     7. IV hydration, daily weights, strict I&O, prn diuresis     8. PO intake as tolerated, nutrition follow up as needed, MVI, folic acid     9. Antiemetics, anti-diarrhea medications:   metoclopramide Injectable 10 milliGRAM(s) IV Push every 6 hours PRN  ondansetron Injectable 8 milliGRAM(s) IV Push every 8 hours PRN  LORazepam   Injectable 1 milliGRAM(s) IV Push every 24 hours    10. OOB as tolerated, physical therapy consult if needed     11. Monitor coags / fibrinogen 2x week, vitamin K as needed     12. Monitor closely for clinical changes, monitor for fevers     13. Emotional support provided, plan of care discussed and questions addressed     14. Patient education done regarding plan of care, restrictions and discharge planning     15. Continue regular social work input     I have written the above note for Dr. Gonzales performed service with me in the room.   Shayla Lundberg NP-C ([phone])    I have seen and examined patient with NP, I agree with above note as scribed.                    [facility]PC Transplant Team                                                      Critical / Counseling Time Provided: 30 minutes                                                                                                                                                        Chief Complaint: Autologous peripheral blood stem cell transplant with high dose Melphalan prep regimen for treatment of multiple myeloma    S: Patient seen and examined with HPC Transplant Team:     All other ROS negative     O: Vitals:   Vital Signs Last 24 Hrs  T(C): 37.1 (2022 05:20), Max: 37.1 (2022 05:20)  T(F): 98.8 (2022 05:20), Max: 98.8 (2022 05:20)  HR: 102 (2022 05:20) (97 - 103)  BP: 121/62 (2022 05:20) (121/62 - 140/75)  BP(mean): --  RR: 18 (2022 05:20) (18 - 18)  SpO2: 98% (2022 05:20) (97% - 99%)    Parameters below as of 2022 05:20  Patient On (Oxygen Delivery Method): room air      Admit weight: 74.9kg   Daily Weight in k.3 (2022 08:07)  Today's weight:     Intake / Output:    @ 07:01  -   @ 07:00  --------------------------------------------------------  IN: 2678 mL / OUT: 2470 mL / NET: 208 mL      PE:   Oropharynx: scattered erythema no ulcerations  Oral Mucositis:                                                     Grade NA  CVS: S1, S2 RRR   Lungs: CTA throughout bilaterally   Abdomen: + BS x 4, soft, NT, ND   Extremities: no edema  Gastric Mucositis:      -                                            Grade: n/a  Intestinal Mucositis:    -                                          Grade: n/a  Skin: no rash   TLC: CDI   Neuro: A&Ox3   Pain: denies      Labs:   CBC Full  -  ( 2022 07:01 )  WBC Count : 0.42 K/uL  Hemoglobin : 8.4 g/dL  Hematocrit : 25.5 %  Platelet Count - Automated : 31 K/uL  Mean Cell Volume : 78.7 fl  Mean Cell Hemoglobin : 25.9 pg  Mean Cell Hemoglobin Concentration : 32.9 gm/dL  Auto Neutrophil # : x  Auto Lymphocyte # : x  Auto Monocyte # : x  Auto Eosinophil # : x  Auto Basophil # : x  Auto Neutrophil % : x  Auto Lymphocyte % : x  Auto Monocyte % : x  Auto Eosinophil % : x  Auto Basophil % : x                          8.4    0.42  )-----------( 31       ( 2022 07:01 )             25.5         138  |  104  |  7   ----------------------------<  91  3.4<L>   |  26  |  0.68    Ca    8.6      2022 06:58  Phos  2.0       Mg     1.7         TPro  5.5<L>  /  Alb  3.1<L>  /  TBili  0.2  /  DBili  x   /  AST  11  /  ALT  x   /  AlkPhos  78      PT/INR - ( 2022 06:58 )   PT: 13.2 sec;   INR: 1.15 ratio         PTT - ( 2022 06:58 )  PTT:32.5 sec  LIVER FUNCTIONS - ( 2022 06:58 )  Alb: 3.1 g/dL / Pro: 5.5 g/dL / ALK PHOS: 78 U/L / ALT: x     / AST: 11 U/L / GGT: x           Lactate Dehydrogenase, Serum: 135 U/L ( @ 06:58)    Cultures:   Culture Results:   No growth to date. (22 @ 00:31)    Culture Results:   No growth to date. (22 @ 00:31)    Culture Results:   >100,000 CFU/ml Enterobacter cloacae complex (22 @ 00:31)    Radiology:   ACC: [id number] EXAM:  XR CHEST PORTABLE ROUTINE 1V                        PROCEDURE DATE:  2022    Impression:  Clear lungs.      Meds:   Antimicrobials:   acyclovir   Oral Tab/Cap 400 milliGRAM(s) Oral every 8 hours  cefepime   IVPB 2000 milliGRAM(s) IV Intermittent every 8 hours  clotrimazole Lozenge 1 Lozenge Oral five times a day  fluconAZOLE   Tablet 400 milliGRAM(s) Oral daily      Heme / Onc:   heparin  Infusion 312 Unit(s)/Hr IV Continuous <Continuous>      GI:  aluminum hydroxide/magnesium hydroxide/simethicone Suspension 30 milliLiter(s) Oral every 6 hours PRN  lactulose Syrup 20 Gram(s) Oral once PRN  pantoprazole    Tablet 40 milliGRAM(s) Oral before breakfast  polyethylene glycol 3350 17 Gram(s) Oral daily PRN  senna 2 Tablet(s) Oral at bedtime  sodium bicarbonate Mouth Rinse 10 milliLiter(s) Swish and Spit five times a day  ursodiol Capsule 300 milliGRAM(s) Oral two times a day      Cardiovascular:   amLODIPine   Tablet 10 milliGRAM(s) Oral daily  hydrALAZINE 25 milliGRAM(s) Oral three times a day      Immunologic:   filgrastim-sndz (ZARXIO) Injectable 480 MICROGram(s) SubCutaneous every 24 hours      Other medications:   acetaminophen     Tablet .. 650 milliGRAM(s) Oral every 6 hours  benzonatate 100 milliGRAM(s) Oral three times a day  Biotene Dry Mouth Oral Rinse 5 milliLiter(s) Swish and Spit five times a day  chlorhexidine 4% Liquid 1 Application(s) Topical <User Schedule>  dextrose 5%. 1000 milliLiter(s) IV Continuous <Continuous>  dextrose 5%. 1000 milliLiter(s) IV Continuous <Continuous>  dextrose 50% Injectable 25 Gram(s) IV Push once  dextrose 50% Injectable 12.5 Gram(s) IV Push once  dextrose 50% Injectable 25 Gram(s) IV Push once  finasteride 5 milliGRAM(s) Oral daily  FIRST- Mouthwash  BLM 5 milliLiter(s) Swish and Swallow five times a day  folic acid 1 milliGRAM(s) Oral daily  glucagon  Injectable 1 milliGRAM(s) IntraMuscular once  insulin lispro (ADMELOG) corrective regimen sliding scale   SubCutaneous three times a day before meals  insulin lispro (ADMELOG) corrective regimen sliding scale   SubCutaneous at bedtime  lidocaine/prilocaine Cream 1 Application(s) Topical daily  loratadine 10 milliGRAM(s) Oral daily  LORazepam   Injectable 1 milliGRAM(s) IV Push every 24 hours  multivitamin 1 Tablet(s) Oral daily  nystatin Powder 1 Application(s) Topical three times a day  sodium chloride 0.9%. 1000 milliLiter(s) IV Continuous <Continuous>  tamsulosin 0.4 milliGRAM(s) Oral at bedtime      PRN:   acetaminophen     Tablet .. 650 milliGRAM(s) Oral every 6 hours PRN  aluminum hydroxide/magnesium hydroxide/simethicone Suspension 30 milliLiter(s) Oral every 6 hours PRN  dextrose Oral Gel 15 Gram(s) Oral once PRN  lactulose Syrup 20 Gram(s) Oral once PRN  metoclopramide Injectable 10 milliGRAM(s) IV Push every 6 hours PRN  ondansetron Injectable 8 milliGRAM(s) IV Push every 8 hours PRN  polyethylene glycol 3350 17 Gram(s) Oral daily PRN  sodium chloride 0.9% lock flush 10 milliLiter(s) IV Push every 1 hour PRN    A/P: 72 year old male with multiple myeloma diagnosed 19 s/p RVD x 6 admitted for autologous pbsct with high dose melphalan prep regimen (dose reduced to 70mg / m2 for age)  Day + 11   10/25- melphalan ; continue melphalan hydration for 24 hours post infusion of last dose. Strict I&O, daily weights, prn diuresis   10/27- HPC transplant today; continue transplant hydration for 24 hours post infusion of cells   10/28 weight gain, I>O, lasix 60 mg iv x1; constipated, Lactulose now and PRN  - [facility]. Started on cipro 10/31/22. If T >/= 38C, pan cx, CXR and change cipro to cefepime 2g IV q 8 hours    Febrile overnight, cipro switched to Cefepime; BC(-), urine culture (+) GNR Enterobacter cloacae complex; follow up sensitivities    pending repeat urine culture    1. Infectious Disease:   acyclovir   Oral Tab/Cap 400 milliGRAM(s) Oral every 8 hours  cefepime   IVPB 2000 milliGRAM(s) IV Intermittent every 8 hours  clotrimazole Lozenge 1 Lozenge Oral five times a day  fluconAZOLE   Tablet 400 milliGRAM(s) Oral daily    2. VOD Prophylaxis: Actigall, Glutamine, Heparin (dosed at 100 units / kg / day)     3. GI Prophylaxis:    pantoprazole    Tablet 40 milliGRAM(s) Oral before breakfast    4. Mouthcare - NS / NaHCO3 rinses, Mycelex, Biotene; Skin care     5. GVHD prophylaxis - n/a     6. Transfuse & replete electrolytes prn   KCl 20mEq IV q 2 hours x 3 doses   Kphos 15mmol IV x 1     7. IV hydration, daily weights, strict I&O, prn diuresis     8. PO intake as tolerated, nutrition follow up as needed, MVI, folic acid     9. Antiemetics, anti-diarrhea medications:   metoclopramide Injectable 10 milliGRAM(s) IV Push every 6 hours PRN  ondansetron Injectable 8 milliGRAM(s) IV Push every 8 hours PRN  LORazepam   Injectable 1 milliGRAM(s) IV Push every 24 hours    10. OOB as tolerated, physical therapy consult if needed     11. Monitor coags / fibrinogen 2x week, vitamin K as needed     12. Monitor closely for clinical changes, monitor for fevers     13. Emotional support provided, plan of care discussed and questions addressed     14. Patient education done regarding plan of care, restrictions and discharge planning     15. Continue regular social work input     I have written the above note for Dr. Gonzales performed service with me in the room.   Shayla Lundberg NP-C ([phone])    I have seen and examined patient with NP, I agree with above note as scribed.                    [facility]PC Transplant Team                                                      Critical / Counseling Time Provided: 30 minutes                                                                                                                                                        Chief Complaint: Autologous peripheral blood stem cell transplant with high dose Melphalan prep regimen for treatment of multiple myeloma    S: Patient seen and examined with HPC Transplant Team:   + fatigue   All other ROS negative     O: Vitals:   Vital Signs Last 24 Hrs  T(C): 37.1 (2022 05:20), Max: 37.1 (2022 05:20)  T(F): 98.8 (2022 05:20), Max: 98.8 (2022 05:20)  HR: 102 (2022 05:20) (97 - 103)  BP: 121/62 (2022 05:20) (121/62 - 140/75)  BP(mean): --  RR: 18 (2022 05:20) (18 - 18)  SpO2: 98% (2022 05:20) (97% - 99%)    Parameters below as of 2022 05:20  Patient On (Oxygen Delivery Method): room air      Admit weight: 74.9kg   Daily Weight in k.3 (2022 08:07)  Today's weight: 73.1kg     Intake / Output:    @ 07:01  -   @ 07:00  --------------------------------------------------------  IN: 2678 mL / OUT: 2470 mL / NET: 208 mL      PE:   Oropharynx: scattered erythema no ulcerations  Oral Mucositis:                                                     Grade NA  CVS: S1, S2 RRR   Lungs: CTA throughout bilaterally   Abdomen: + BS x 4, soft, NT, ND   Extremities: no edema  Gastric Mucositis:      -                                            Grade: n/a  Intestinal Mucositis:    -                                          Grade: n/a  Skin: no rash   TLC: CDI   Neuro: A&Ox3   Pain: denies      Labs:   CBC Full  -  ( 2022 07:01 )  WBC Count : 0.42 K/uL  Hemoglobin : 8.4 g/dL  Hematocrit : 25.5 %  Platelet Count - Automated : 31 K/uL  Mean Cell Volume : 78.7 fl  Mean Cell Hemoglobin : 25.9 pg  Mean Cell Hemoglobin Concentration : 32.9 gm/dL  Auto Neutrophil # : x  Auto Lymphocyte # : x  Auto Monocyte # : x  Auto Eosinophil # : x  Auto Basophil # : x  Auto Neutrophil % : x  Auto Lymphocyte % : x  Auto Monocyte % : x  Auto Eosinophil % : x  Auto Basophil % : x                          8.4    0.42  )-----------( 31       ( 2022 07:01 )             25.5         138  |  104  |  7   ----------------------------<  91  3.4<L>   |  26  |  0.68    Ca    8.6      2022 06:58  Phos  2.0       Mg     1.7         TPro  5.5<L>  /  Alb  3.1<L>  /  TBili  0.2  /  DBili  x   /  AST  11  /  ALT  x   /  AlkPhos  78      PT/INR - ( 2022 06:58 )   PT: 13.2 sec;   INR: 1.15 ratio         PTT - ( 2022 06:58 )  PTT:32.5 sec  LIVER FUNCTIONS - ( 2022 06:58 )  Alb: 3.1 g/dL / Pro: 5.5 g/dL / ALK PHOS: 78 U/L / ALT: x     / AST: 11 U/L / GGT: x           Lactate Dehydrogenase, Serum: 135 U/L ( @ 06:58)    Cultures:   Culture Results:   No growth to date. (22 @ 00:31)    Culture Results:   No growth to date. (22 @ 00:31)    Culture Results:   >100,000 CFU/ml Enterobacter cloacae complex (22 @ 00:31)    Radiology:   ACC: [id number] EXAM:  XR CHEST PORTABLE ROUTINE 1V                        PROCEDURE DATE:  2022    Impression:  Clear lungs.      Meds:   Antimicrobials:   acyclovir   Oral Tab/Cap 400 milliGRAM(s) Oral every 8 hours  cefepime   IVPB 2000 milliGRAM(s) IV Intermittent every 8 hours  clotrimazole Lozenge 1 Lozenge Oral five times a day  fluconAZOLE   Tablet 400 milliGRAM(s) Oral daily      Heme / Onc:   heparin  Infusion 312 Unit(s)/Hr IV Continuous <Continuous>      GI:  aluminum hydroxide/magnesium hydroxide/simethicone Suspension 30 milliLiter(s) Oral every 6 hours PRN  lactulose Syrup 20 Gram(s) Oral once PRN  pantoprazole    Tablet 40 milliGRAM(s) Oral before breakfast  polyethylene glycol 3350 17 Gram(s) Oral daily PRN  senna 2 Tablet(s) Oral at bedtime  sodium bicarbonate Mouth Rinse 10 milliLiter(s) Swish and Spit five times a day  ursodiol Capsule 300 milliGRAM(s) Oral two times a day      Cardiovascular:   amLODIPine   Tablet 10 milliGRAM(s) Oral daily  hydrALAZINE 25 milliGRAM(s) Oral three times a day      Immunologic:   filgrastim-sndz (ZARXIO) Injectable 480 MICROGram(s) SubCutaneous every 24 hours      Other medications:   acetaminophen     Tablet .. 650 milliGRAM(s) Oral every 6 hours  benzonatate 100 milliGRAM(s) Oral three times a day  Biotene Dry Mouth Oral Rinse 5 milliLiter(s) Swish and Spit five times a day  chlorhexidine 4% Liquid 1 Application(s) Topical <User Schedule>  dextrose 5%. 1000 milliLiter(s) IV Continuous <Continuous>  dextrose 5%. 1000 milliLiter(s) IV Continuous <Continuous>  dextrose 50% Injectable 25 Gram(s) IV Push once  dextrose 50% Injectable 12.5 Gram(s) IV Push once  dextrose 50% Injectable 25 Gram(s) IV Push once  finasteride 5 milliGRAM(s) Oral daily  FIRST- Mouthwash  BLM 5 milliLiter(s) Swish and Swallow five times a day  folic acid 1 milliGRAM(s) Oral daily  glucagon  Injectable 1 milliGRAM(s) IntraMuscular once  insulin lispro (ADMELOG) corrective regimen sliding scale   SubCutaneous three times a day before meals  insulin lispro (ADMELOG) corrective regimen sliding scale   SubCutaneous at bedtime  lidocaine/prilocaine Cream 1 Application(s) Topical daily  loratadine 10 milliGRAM(s) Oral daily  LORazepam   Injectable 1 milliGRAM(s) IV Push every 24 hours  multivitamin 1 Tablet(s) Oral daily  nystatin Powder 1 Application(s) Topical three times a day  sodium chloride 0.9%. 1000 milliLiter(s) IV Continuous <Continuous>  tamsulosin 0.4 milliGRAM(s) Oral at bedtime      PRN:   acetaminophen     Tablet .. 650 milliGRAM(s) Oral every 6 hours PRN  aluminum hydroxide/magnesium hydroxide/simethicone Suspension 30 milliLiter(s) Oral every 6 hours PRN  dextrose Oral Gel 15 Gram(s) Oral once PRN  lactulose Syrup 20 Gram(s) Oral once PRN  metoclopramide Injectable 10 milliGRAM(s) IV Push every 6 hours PRN  ondansetron Injectable 8 milliGRAM(s) IV Push every 8 hours PRN  polyethylene glycol 3350 17 Gram(s) Oral daily PRN  sodium chloride 0.9% lock flush 10 milliLiter(s) IV Push every 1 hour PRN    A/P: 72 year old male with multiple myeloma diagnosed 19 s/p RVD x 6 admitted for autologous pbsct with high dose melphalan prep regimen (dose reduced to 70mg / m2 for age)  Day + 11   10/25- melphalan ; continue melphalan hydration for 24 hours post infusion of last dose. Strict I&O, daily weights, prn diuresis   10/27- HPC transplant today; continue transplant hydration for 24 hours post infusion of cells   10/28 weight gain, I>O, lasix 60 mg iv x1; constipated, Lactulose now and PRN  - [facility]. Started on cipro 10/31/22. If T >/= 38C, pan cx, CXR and change cipro to cefepime 2g IV q 8 hours    Febrile overnight, cipro switched to Cefepime; BC(-), urine culture (+) GNR Enterobacter cloacae complex; follow up sensitivities    pending repeat urine culture    1. Infectious Disease:   acyclovir   Oral Tab/Cap 400 milliGRAM(s) Oral every 8 hours  cefepime   IVPB 2000 milliGRAM(s) IV Intermittent every 8 hours  clotrimazole Lozenge 1 Lozenge Oral five times a day  fluconAZOLE   Tablet 400 milliGRAM(s) Oral daily    2. VOD Prophylaxis: Actigall, Glutamine, Heparin (dosed at 100 units / kg / day)     3. GI Prophylaxis:    pantoprazole    Tablet 40 milliGRAM(s) Oral before breakfast    4. Mouthcare - NS / NaHCO3 rinses, Mycelex, Biotene; Skin care     5. GVHD prophylaxis - n/a     6. Transfuse & replete electrolytes prn   KCl 20mEq IV q 2 hours x 3 doses   Kphos 15mmol IV x 1     7. IV hydration, daily weights, strict I&O, prn diuresis     8. PO intake as tolerated, nutrition follow up as needed, MVI, folic acid     9. Antiemetics, anti-diarrhea medications:   metoclopramide Injectable 10 milliGRAM(s) IV Push every 6 hours PRN  ondansetron Injectable 8 milliGRAM(s) IV Push every 8 hours PRN  LORazepam   Injectable 1 milliGRAM(s) IV Push every 24 hours    10. OOB as tolerated, physical therapy consult if needed     11. Monitor coags / fibrinogen 2x week, vitamin K as needed     12. Monitor closely for clinical changes, monitor for fevers     13. Emotional support provided, plan of care discussed and questions addressed     14. Patient education done regarding plan of care, restrictions and discharge planning     15. Continue regular social work input     I have written the above note for Dr. Gonzales performed service with me in the room.   Shayla Lundberg NP-C ([phone])    I have seen and examined patient with NP, I agree with above note as scribed.

## 2022-11-07 NOTE — PROGRESS NOTE ADULT - CRITICAL CARE ATTENDING COMMENT
72 year old male with multiple myeloma diagnosed 12/20/21 s/p RVD x 6 admitted for autologous pbsct with high dose melphalan prep regimen (dose reduced to 70mg / m2 for age)    Day + 11...some soft stool..feeling better this am..increased saliva...wbc recovering..repeat urine cx...enterobacter    1. Admit to BMTU   2. -3 hours prior to Melphalan start hydration: D5NS at 200ml /hr and continue 24 hours post Melphalan infusion  3. Day – 3 & day -2 - Melphalan 70mg/m2  IV   4. Strict I&O, daily weights, prn diuresis   5. Day -1 rest day. At 2200 on day – 1, start transplant hydration 1/2NS + 50mEq NaHCO3- (may substitute [id number] if bicarb not available)  6. Day 0 – infuse HPC product. 4 hours after infusion of cells start Zarxio 5 micrograms / kg (actual weight) . Continue through engraftment.   7. On day 0, + 1, + 2-give Kepivance 60micrograms / kg (actual weight).  8. VOD prophylaxis - low dose heparin gtt (dosed at 100 units / kg / day), glutamine supplementation, Actigall BID   9. PCP prophylaxis - Bactrim DS through day -2    10. Antiviral prophylaxis - Acyclovir 400mg po TID to start day -1   11. Antifungal prophylaxis- Diflucan 400 mg po daily.  12. GI prophylaxis - Protonix po QD   13. Antibacterial prophylaxis -  ANC < 500, started Cipro 500mg po BID.  febrile, pan cx, CXR and changed Cipro to Cefepime 2g IV q 8 hours. Continue until count recovery  14. Kepivance for prevention of mucositis- days 0, +1, +2  15. Aggressive mouth care and skin care as per protocol..senna, miralax  prn  16. The patient is aware of his diagnosis...he does not want to be reminded of it every day.. 72 year old male with multiple myeloma diagnosed 12/20/21 s/p RVD x 6 admitted for autologous pbsct with high dose melphalan prep regimen (dose reduced to 70mg / m2 for age)    Day + 11...some soft stool..feeling better this am..increased saliva improved...wbc recovering..repeat urine cx pending...enterobacter sensitive to cefapime    1. Admit to BMTU   2. -3 hours prior to Melphalan start hydration: D5NS at 200ml /hr and continue 24 hours post Melphalan infusion  3. Day – 3 & day -2 - Melphalan 70mg/m2  IV   4. Strict I&O, daily weights, prn diuresis   5. Day -1 rest day. At 2200 on day – 1, start transplant hydration 1/2NS + 50mEq NaHCO3- (may substitute [id number] if bicarb not available)  6. Day 0 – infuse HPC product. 4 hours after infusion of cells start Zarxio 5 micrograms / kg (actual weight) . Continue through engraftment.   7. On day 0, + 1, + 2-give Kepivance 60micrograms / kg (actual weight).  8. VOD prophylaxis - low dose heparin gtt (dosed at 100 units / kg / day), glutamine supplementation, Actigall BID   9. PCP prophylaxis - Bactrim DS through day -2    10. Antiviral prophylaxis - Acyclovir 400mg po TID to start day -1   11. Antifungal prophylaxis- Diflucan 400 mg po daily.  12. GI prophylaxis - Protonix po QD   13. Antibacterial prophylaxis -  ANC < 500, started Cipro 500mg po BID.  febrile, pan cx, CXR and changed Cipro to Cefepime 2g IV q 8 hours. Continue until count recovery  14. Kepivance for prevention of mucositis- days 0, +1, +2  15. Aggressive mouth care and skin care as per protocol..senna, miralax  prn  16. The patient is aware of his diagnosis...he does not want to be reminded of it every day..

## 2022-11-08 LAB
ALBUMIN SERPL ELPH-MCNC: 3.4 G/DL — SIGNIFICANT CHANGE UP (ref 3.3–5)
ALP SERPL-CCNC: 87 U/L — SIGNIFICANT CHANGE UP (ref 40–120)
ALT FLD-CCNC: 14 U/L — SIGNIFICANT CHANGE UP (ref 10–45)
ANION GAP SERPL CALC-SCNC: 9 MMOL/L — SIGNIFICANT CHANGE UP (ref 5–17)
ANISOCYTOSIS BLD QL: SLIGHT — SIGNIFICANT CHANGE UP
AST SERPL-CCNC: 13 U/L — SIGNIFICANT CHANGE UP (ref 10–40)
BASOPHILS # BLD AUTO: 0 K/UL — SIGNIFICANT CHANGE UP (ref 0–0.2)
BASOPHILS NFR BLD AUTO: 0 % — SIGNIFICANT CHANGE UP (ref 0–2)
BILIRUB SERPL-MCNC: 0.2 MG/DL — SIGNIFICANT CHANGE UP (ref 0.2–1.2)
BUN SERPL-MCNC: 6 MG/DL — LOW (ref 7–23)
CALCIUM SERPL-MCNC: 8.8 MG/DL — SIGNIFICANT CHANGE UP (ref 8.4–10.5)
CHLORIDE SERPL-SCNC: 101 MMOL/L — SIGNIFICANT CHANGE UP (ref 96–108)
CO2 SERPL-SCNC: 27 MMOL/L — SIGNIFICANT CHANGE UP (ref 22–31)
CREAT SERPL-MCNC: 0.7 MG/DL — SIGNIFICANT CHANGE UP (ref 0.5–1.3)
DACRYOCYTES BLD QL SMEAR: SLIGHT — SIGNIFICANT CHANGE UP
EGFR: 97 ML/MIN/1.73M2 — SIGNIFICANT CHANGE UP
EOSINOPHIL # BLD AUTO: 0 K/UL — SIGNIFICANT CHANGE UP (ref 0–0.5)
EOSINOPHIL NFR BLD AUTO: 0 % — SIGNIFICANT CHANGE UP (ref 0–6)
GLUCOSE BLDC GLUCOMTR-MCNC: 107 MG/DL — HIGH (ref 70–99)
GLUCOSE BLDC GLUCOMTR-MCNC: 113 MG/DL — HIGH (ref 70–99)
GLUCOSE BLDC GLUCOMTR-MCNC: 115 MG/DL — HIGH (ref 70–99)
GLUCOSE BLDC GLUCOMTR-MCNC: 140 MG/DL — HIGH (ref 70–99)
GLUCOSE SERPL-MCNC: 95 MG/DL — SIGNIFICANT CHANGE UP (ref 70–99)
HCT VFR BLD CALC: 27.8 % — LOW (ref 39–50)
HGB BLD-MCNC: 9.1 G/DL — LOW (ref 13–17)
HYPOCHROMIA BLD QL: SLIGHT — SIGNIFICANT CHANGE UP
LDH SERPL L TO P-CCNC: 153 U/L — SIGNIFICANT CHANGE UP (ref 50–242)
LYMPHOCYTES # BLD AUTO: 0.36 K/UL — LOW (ref 1–3.3)
LYMPHOCYTES # BLD AUTO: 34.7 % — SIGNIFICANT CHANGE UP (ref 13–44)
MACROCYTES BLD QL: SLIGHT — SIGNIFICANT CHANGE UP
MAGNESIUM SERPL-MCNC: 1.5 MG/DL — LOW (ref 1.6–2.6)
MANUAL SMEAR VERIFICATION: SIGNIFICANT CHANGE UP
MCHC RBC-ENTMCNC: 26 PG — LOW (ref 27–34)
MCHC RBC-ENTMCNC: 32.7 GM/DL — SIGNIFICANT CHANGE UP (ref 32–36)
MCV RBC AUTO: 79.4 FL — LOW (ref 80–100)
MONOCYTES # BLD AUTO: 0.14 K/UL — SIGNIFICANT CHANGE UP (ref 0–0.9)
MONOCYTES NFR BLD AUTO: 12.9 % — SIGNIFICANT CHANGE UP (ref 2–14)
NEUTROPHILS # BLD AUTO: 0.54 K/UL — LOW (ref 1.8–7.4)
NEUTROPHILS NFR BLD AUTO: 45.5 % — SIGNIFICANT CHANGE UP (ref 43–77)
NEUTS BAND # BLD: 5.9 % — SIGNIFICANT CHANGE UP (ref 0–8)
OVALOCYTES BLD QL SMEAR: SLIGHT — SIGNIFICANT CHANGE UP
PHOSPHATE SERPL-MCNC: 2.1 MG/DL — LOW (ref 2.5–4.5)
PLAT MORPH BLD: NORMAL — SIGNIFICANT CHANGE UP
PLATELET # BLD AUTO: 29 K/UL — LOW (ref 150–400)
POIKILOCYTOSIS BLD QL AUTO: SLIGHT — SIGNIFICANT CHANGE UP
POTASSIUM SERPL-MCNC: 4.2 MMOL/L — SIGNIFICANT CHANGE UP (ref 3.5–5.3)
POTASSIUM SERPL-SCNC: 4.2 MMOL/L — SIGNIFICANT CHANGE UP (ref 3.5–5.3)
PROT SERPL-MCNC: 5.9 G/DL — LOW (ref 6–8.3)
RBC # BLD: 3.5 M/UL — LOW (ref 4.2–5.8)
RBC # FLD: 14.1 % — SIGNIFICANT CHANGE UP (ref 10.3–14.5)
RBC BLD AUTO: ABNORMAL
SODIUM SERPL-SCNC: 137 MMOL/L — SIGNIFICANT CHANGE UP (ref 135–145)
TARGETS BLD QL SMEAR: SLIGHT — SIGNIFICANT CHANGE UP
TOXIC GRANULES BLD QL SMEAR: PRESENT — SIGNIFICANT CHANGE UP
VARIANT LYMPHS # BLD: 1 % — SIGNIFICANT CHANGE UP (ref 0–6)
WBC # BLD: 1.05 K/UL — LOW (ref 3.8–10.5)
WBC # FLD AUTO: 1.05 K/UL — LOW (ref 3.8–10.5)

## 2022-11-08 PROCEDURE — ZZZZZ: CPT

## 2022-11-08 RX ORDER — MAGNESIUM SULFATE 500 MG/ML
2 VIAL (ML) INJECTION ONCE
Refills: 0 | Status: COMPLETED | OUTPATIENT
Start: 2022-11-08 | End: 2022-11-08

## 2022-11-08 RX ORDER — POTASSIUM PHOSPHATE, MONOBASIC POTASSIUM PHOSPHATE, DIBASIC 236; 224 MG/ML; MG/ML
15 INJECTION, SOLUTION INTRAVENOUS ONCE
Refills: 0 | Status: COMPLETED | OUTPATIENT
Start: 2022-11-08 | End: 2022-11-08

## 2022-11-08 RX ADMIN — Medication 10 MILLILITER(S): at 23:55

## 2022-11-08 RX ADMIN — AMLODIPINE BESYLATE 10 MILLIGRAM(S): 2.5 TABLET ORAL at 05:19

## 2022-11-08 RX ADMIN — Medication 5 MILLILITER(S): at 23:53

## 2022-11-08 RX ADMIN — TAMSULOSIN HYDROCHLORIDE 0.4 MILLIGRAM(S): 0.4 CAPSULE ORAL at 21:48

## 2022-11-08 RX ADMIN — Medication 25 GRAM(S): at 10:00

## 2022-11-08 RX ADMIN — CHLORHEXIDINE GLUCONATE 1 APPLICATION(S): 213 SOLUTION TOPICAL at 08:00

## 2022-11-08 RX ADMIN — URSODIOL 300 MILLIGRAM(S): 250 TABLET, FILM COATED ORAL at 18:14

## 2022-11-08 RX ADMIN — CEFEPIME 100 MILLIGRAM(S): 1 INJECTION, POWDER, FOR SOLUTION INTRAMUSCULAR; INTRAVENOUS at 21:48

## 2022-11-08 RX ADMIN — Medication 100 MILLIGRAM(S): at 21:49

## 2022-11-08 RX ADMIN — Medication 10 MILLILITER(S): at 16:12

## 2022-11-08 RX ADMIN — Medication 1 LOZENGE: at 16:13

## 2022-11-08 RX ADMIN — Medication 650 MILLIGRAM(S): at 00:00

## 2022-11-08 RX ADMIN — POTASSIUM PHOSPHATE, MONOBASIC POTASSIUM PHOSPHATE, DIBASIC 62.5 MILLIMOLE(S): 236; 224 INJECTION, SOLUTION INTRAVENOUS at 15:44

## 2022-11-08 RX ADMIN — Medication 400 MILLIGRAM(S): at 05:19

## 2022-11-08 RX ADMIN — Medication 1 APPLICATION(S): at 18:14

## 2022-11-08 RX ADMIN — URSODIOL 300 MILLIGRAM(S): 250 TABLET, FILM COATED ORAL at 05:21

## 2022-11-08 RX ADMIN — PANTOPRAZOLE SODIUM 40 MILLIGRAM(S): 20 TABLET, DELAYED RELEASE ORAL at 05:20

## 2022-11-08 RX ADMIN — Medication 1 LOZENGE: at 23:53

## 2022-11-08 RX ADMIN — Medication 5 MILLILITER(S): at 16:11

## 2022-11-08 RX ADMIN — NYSTATIN CREAM 1 APPLICATION(S): 100000 CREAM TOPICAL at 05:19

## 2022-11-08 RX ADMIN — NYSTATIN CREAM 1 APPLICATION(S): 100000 CREAM TOPICAL at 22:07

## 2022-11-08 RX ADMIN — Medication 10 MILLILITER(S): at 21:47

## 2022-11-08 RX ADMIN — CEFEPIME 100 MILLIGRAM(S): 1 INJECTION, POWDER, FOR SOLUTION INTRAMUSCULAR; INTRAVENOUS at 05:20

## 2022-11-08 RX ADMIN — Medication 25 MILLIGRAM(S): at 05:21

## 2022-11-08 RX ADMIN — Medication 1 APPLICATION(S): at 05:19

## 2022-11-08 RX ADMIN — Medication 5 MILLILITER(S): at 20:29

## 2022-11-08 RX ADMIN — Medication 400 MILLIGRAM(S): at 21:48

## 2022-11-08 RX ADMIN — Medication 100 MILLIGRAM(S): at 05:20

## 2022-11-08 RX ADMIN — Medication 1 LOZENGE: at 20:29

## 2022-11-08 NOTE — PROGRESS NOTE ADULT - SUBJECTIVE AND OBJECTIVE BOX
HPC Transplant Team                                                      Critical / Counseling Time Provided: 30 minutes                                                                                                                                                        Chief Complaint: Autologous peripheral blood stem cell transplant with high dose Melphalan prep regimen for treatment of multiple myeloma    S: Patient seen and examined with HPC Transplant Team:     All other ROS negative     O: Vitals:   Vital Signs Last 24 Hrs  T(C): 37.2 (2022 05:28), Max: 37.3 (2022 00:02)  T(F): 99 (2022 05:28), Max: 99.1 (2022 00:02)  HR: 101 (:) (97 - 110)  BP: 120/52 (:) (120/52 - 154/77)  BP(mean): --  RR: 18 (:) (18 - 19)  SpO2: 98% (:) (97% - 100%)    Parameters below as of 2022 05:28  Patient On (Oxygen Delivery Method): room air      Admit weight: 74.9kg   Daily Weight in k.1 (2022 09:00)  Today's weight:     Intake / Output:    @ 07:01  -   @ 07:00  --------------------------------------------------------  IN: 2694.2 mL / OUT: 3650 mL / NET: -955.8 mL    PE:   Oropharynx: scattered erythema no ulcerations  Oral Mucositis:                                                     Grade NA  CVS: S1, S2 RRR   Lungs: CTA throughout bilaterally   Abdomen: + BS x 4, soft, NT, ND   Extremities: no edema  Gastric Mucositis:      -                                            Grade: n/a  Intestinal Mucositis:    -                                          Grade: n/a  Skin: no rash   TLC: CDI   Neuro: A&Ox3   Pain: denies      Labs:   CBC Full  -  ( 2022 07:06 )  WBC Count : 1.05 K/uL  Hemoglobin : 9.1 g/dL  Hematocrit : 27.8 %  Platelet Count - Automated : 29 K/uL  Mean Cell Volume : 79.4 fl  Mean Cell Hemoglobin : 26.0 pg  Mean Cell Hemoglobin Concentration : 32.7 gm/dL  Auto Neutrophil # : x  Auto Lymphocyte # : x  Auto Monocyte # : x  Auto Eosinophil # : x  Auto Basophil # : x  Auto Neutrophil % : x  Auto Lymphocyte % : x  Auto Monocyte % : x  Auto Eosinophil % : x  Auto Basophil % : x                          9.1    1.05  )-----------( 29       ( 2022 07:06 )             27.8         Cultures:   Culture Results:   <10,000 CFU/mL Normal Urogenital Kunal (22 @ 16:04)    Culture Results:   No growth to date. (22 @ 00:31)    Culture Results:   No growth to date. (22 @ 00:31)    Culture Results:   >100,000 CFU/ml Enterobacter cloacae complex (22 @ 00:31)    Radiology:   ACC: [id number] EXAM:  XR CHEST PORTABLE ROUTINE 1V                        PROCEDURE DATE:  2022    Impression:  Clear lungs.    Meds:   Antimicrobials:   acyclovir   Oral Tab/Cap 400 milliGRAM(s) Oral every 8 hours  cefepime   IVPB 2000 milliGRAM(s) IV Intermittent every 8 hours  clotrimazole Lozenge 1 Lozenge Oral five times a day  fluconAZOLE   Tablet 400 milliGRAM(s) Oral daily      Heme / Onc:   heparin  Infusion 312 Unit(s)/Hr IV Continuous <Continuous>      GI:  aluminum hydroxide/magnesium hydroxide/simethicone Suspension 30 milliLiter(s) Oral every 6 hours PRN  lactulose Syrup 20 Gram(s) Oral once PRN  pantoprazole    Tablet 40 milliGRAM(s) Oral before breakfast  polyethylene glycol 3350 17 Gram(s) Oral daily PRN  senna 2 Tablet(s) Oral at bedtime  sodium bicarbonate Mouth Rinse 10 milliLiter(s) Swish and Spit five times a day  ursodiol Capsule 300 milliGRAM(s) Oral two times a day      Cardiovascular:   amLODIPine   Tablet 10 milliGRAM(s) Oral daily  hydrALAZINE 25 milliGRAM(s) Oral three times a day      Immunologic:   filgrastim-sndz (ZARXIO) Injectable 480 MICROGram(s) SubCutaneous every 24 hours      Other medications:   acetaminophen     Tablet .. 650 milliGRAM(s) Oral every 6 hours  AQUAPHOR (petrolatum Ointment) 1 Application(s) Topical two times a day  benzonatate 100 milliGRAM(s) Oral three times a day  Biotene Dry Mouth Oral Rinse 5 milliLiter(s) Swish and Spit five times a day  chlorhexidine 4% Liquid 1 Application(s) Topical <User Schedule>  dextrose 5%. 1000 milliLiter(s) IV Continuous <Continuous>  dextrose 5%. 1000 milliLiter(s) IV Continuous <Continuous>  dextrose 50% Injectable 25 Gram(s) IV Push once  dextrose 50% Injectable 12.5 Gram(s) IV Push once  dextrose 50% Injectable 25 Gram(s) IV Push once  finasteride 5 milliGRAM(s) Oral daily  folic acid 1 milliGRAM(s) Oral daily  glucagon  Injectable 1 milliGRAM(s) IntraMuscular once  insulin lispro (ADMELOG) corrective regimen sliding scale   SubCutaneous three times a day before meals  insulin lispro (ADMELOG) corrective regimen sliding scale   SubCutaneous at bedtime  lidocaine/prilocaine Cream 1 Application(s) Topical daily  loratadine 10 milliGRAM(s) Oral daily  LORazepam   Injectable 1 milliGRAM(s) IV Push every 24 hours  multivitamin 1 Tablet(s) Oral daily  nystatin Powder 1 Application(s) Topical three times a day  sodium chloride 0.9%. 1000 milliLiter(s) IV Continuous <Continuous>  tamsulosin 0.4 milliGRAM(s) Oral at bedtime      PRN:   acetaminophen     Tablet .. 650 milliGRAM(s) Oral every 6 hours PRN  aluminum hydroxide/magnesium hydroxide/simethicone Suspension 30 milliLiter(s) Oral every 6 hours PRN  dextrose Oral Gel 15 Gram(s) Oral once PRN  lactulose Syrup 20 Gram(s) Oral once PRN  metoclopramide Injectable 10 milliGRAM(s) IV Push every 6 hours PRN  ondansetron Injectable 8 milliGRAM(s) IV Push every 8 hours PRN  polyethylene glycol 3350 17 Gram(s) Oral daily PRN  sodium chloride 0.9% lock flush 10 milliLiter(s) IV Push every 1 hour PRN    A/P: 72 year old male with multiple myeloma diagnosed 19 s/p RVD x 6 admitted for autologous pbsct with high dose melphalan prep regimen (dose reduced to 70mg / m2 for age)  Day + 12  10/25- melphalan ; continue melphalan hydration for 24 hours post infusion of last dose. Strict I&O, daily weights, prn diuresis   10/27- HPC transplant today; continue transplant hydration for 24 hours post infusion of cells   10/28 weight gain, I>O, lasix 60 mg iv x1; constipated, Lactulose now and PRN  - [facility]. Started on cipro 10/31/22. If T >/= 38C, pan cx, CXR and change cipro to cefepime 2g IV q 8 hours    Febrile overnight, cipro switched to Cefepime; BC(-), urine culture (+) GNR Enterobacter cloacae complex; follow up sensitivities    pending repeat urine culture  - repeat urine cx from  < 10K normal urogenital kunal. Continue Cefepime for 7 days     1. Infectious Disease:   acyclovir   Oral Tab/Cap 400 milliGRAM(s) Oral every 8 hours  cefepime   IVPB 2000 milliGRAM(s) IV Intermittent every 8 hours  clotrimazole Lozenge 1 Lozenge Oral five times a day  fluconAZOLE   Tablet 400 milliGRAM(s) Oral daily    2. VOD Prophylaxis: Actigall, Glutamine, Heparin (dosed at 100 units / kg / day)     3. GI Prophylaxis:   pantoprazole    Tablet 40 milliGRAM(s) Oral before breakfast    4. Mouthcare - NS / NaHCO3 rinses, Mycelex, Biotene; Skin care     5. GVHD prophylaxis - n/a     6. Transfuse & replete electrolytes prn     7. IV hydration, daily weights, strict I&O, prn diuresis     8. PO intake as tolerated, nutrition follow up as needed, MVI, folic acid     9. Antiemetics, anti-diarrhea medications:   metoclopramide Injectable 10 milliGRAM(s) IV Push every 6 hours PRN  ondansetron Injectable 8 milliGRAM(s) IV Push every 8 hours PRN  LORazepam   Injectable 1 milliGRAM(s) IV Push every 24 hours    10. OOB as tolerated, physical therapy consult if needed     11. Monitor coags / fibrinogen 2x week, vitamin K as needed     12. Monitor closely for clinical changes, monitor for fevers     13. Emotional support provided, plan of care discussed and questions addressed     14. Patient education done regarding plan of care, restrictions and discharge planning     15. Continue regular social work input     I have written the above note for Dr. Gonzales who performed service with me in the room.   Shayla Lundberg NP-C ([phone])    I have seen and examined patient with NP, I agree with above note as scribed.                    HPC Transplant Team                                                      Critical / Counseling Time Provided: 30 minutes                                                                                                                                                        Chief Complaint: Autologous peripheral blood stem cell transplant with high dose Melphalan prep regimen for treatment of multiple myeloma    S: Patient seen and examined with HPC Transplant Team:     All other ROS negative     O: Vitals:   Vital Signs Last 24 Hrs  T(C): 37.2 (2022 05:28), Max: 37.3 (2022 00:02)  T(F): 99 (2022 05:28), Max: 99.1 (2022 00:02)  HR: 101 (:) (97 - 110)  BP: 120/52 (:) (120/52 - 154/77)  BP(mean): --  RR: 18 (:) (18 - 19)  SpO2: 98% (:) (97% - 100%)    Parameters below as of 2022 05:28  Patient On (Oxygen Delivery Method): room air      Admit weight: 74.9kg   Daily Weight in k.1 (2022 09:00)  Today's weight:     Intake / Output:    @ 07:01  -   @ 07:00  --------------------------------------------------------  IN: 2694.2 mL / OUT: 3650 mL / NET: -955.8 mL    PE:   Oropharynx: scattered erythema no ulcerations  Oral Mucositis:                                                     Grade NA  CVS: S1, S2 RRR   Lungs: CTA throughout bilaterally   Abdomen: + BS x 4, soft, NT, ND   Extremities: no edema  Gastric Mucositis:      -                                            Grade: n/a  Intestinal Mucositis:    -                                          Grade: n/a  Skin: no rash   TLC: CDI   Neuro: A&Ox3   Pain: denies      Labs:   CBC Full  -  ( 2022 07:06 )  WBC Count : 1.05 K/uL  Hemoglobin : 9.1 g/dL  Hematocrit : 27.8 %  Platelet Count - Automated : 29 K/uL  Mean Cell Volume : 79.4 fl  Mean Cell Hemoglobin : 26.0 pg  Mean Cell Hemoglobin Concentration : 32.7 gm/dL  Auto Neutrophil # : x  Auto Lymphocyte # : x  Auto Monocyte # : x  Auto Eosinophil # : x  Auto Basophil # : x  Auto Neutrophil % : x  Auto Lymphocyte % : x  Auto Monocyte % : x  Auto Eosinophil % : x  Auto Basophil % : x                          9.1    1.05  )-----------( 29       ( 2022 07:06 )             27.8       11-    137  |  101  |  6<L>  ----------------------------<  95  4.2   |  27  |  0.70    Ca    8.8      2022 07:06  Phos  2.1       Mg     1.5         TPro  5.9<L>  /  Alb  3.4  /  TBili  0.2  /  DBili  x   /  AST  13  /  ALT  14  /  AlkPhos  87      Cultures:   Culture Results:   <10,000 CFU/mL Normal Urogenital Kunal (22 @ 16:04)    Culture Results:   No growth to date. (22 @ 00:31)    Culture Results:   No growth to date. (22 @ 00:31)    Culture Results:   >100,000 CFU/ml Enterobacter cloacae complex (22 @ 00:31)    Radiology:   ACC: [id number] EXAM:  XR CHEST PORTABLE ROUTINE 1V                        PROCEDURE DATE:  2022    Impression:  Clear lungs.    Meds:   Antimicrobials:   acyclovir   Oral Tab/Cap 400 milliGRAM(s) Oral every 8 hours  cefepime   IVPB 2000 milliGRAM(s) IV Intermittent every 8 hours  clotrimazole Lozenge 1 Lozenge Oral five times a day  fluconAZOLE   Tablet 400 milliGRAM(s) Oral daily      Heme / Onc:   heparin  Infusion 312 Unit(s)/Hr IV Continuous <Continuous>      GI:  aluminum hydroxide/magnesium hydroxide/simethicone Suspension 30 milliLiter(s) Oral every 6 hours PRN  lactulose Syrup 20 Gram(s) Oral once PRN  pantoprazole    Tablet 40 milliGRAM(s) Oral before breakfast  polyethylene glycol 3350 17 Gram(s) Oral daily PRN  senna 2 Tablet(s) Oral at bedtime  sodium bicarbonate Mouth Rinse 10 milliLiter(s) Swish and Spit five times a day  ursodiol Capsule 300 milliGRAM(s) Oral two times a day      Cardiovascular:   amLODIPine   Tablet 10 milliGRAM(s) Oral daily  hydrALAZINE 25 milliGRAM(s) Oral three times a day      Immunologic:   filgrastim-sndz (ZARXIO) Injectable 480 MICROGram(s) SubCutaneous every 24 hours      Other medications:   acetaminophen     Tablet .. 650 milliGRAM(s) Oral every 6 hours  AQUAPHOR (petrolatum Ointment) 1 Application(s) Topical two times a day  benzonatate 100 milliGRAM(s) Oral three times a day  Biotene Dry Mouth Oral Rinse 5 milliLiter(s) Swish and Spit five times a day  chlorhexidine 4% Liquid 1 Application(s) Topical <User Schedule>  dextrose 5%. 1000 milliLiter(s) IV Continuous <Continuous>  dextrose 5%. 1000 milliLiter(s) IV Continuous <Continuous>  dextrose 50% Injectable 25 Gram(s) IV Push once  dextrose 50% Injectable 12.5 Gram(s) IV Push once  dextrose 50% Injectable 25 Gram(s) IV Push once  finasteride 5 milliGRAM(s) Oral daily  folic acid 1 milliGRAM(s) Oral daily  glucagon  Injectable 1 milliGRAM(s) IntraMuscular once  insulin lispro (ADMELOG) corrective regimen sliding scale   SubCutaneous three times a day before meals  insulin lispro (ADMELOG) corrective regimen sliding scale   SubCutaneous at bedtime  lidocaine/prilocaine Cream 1 Application(s) Topical daily  loratadine 10 milliGRAM(s) Oral daily  LORazepam   Injectable 1 milliGRAM(s) IV Push every 24 hours  multivitamin 1 Tablet(s) Oral daily  nystatin Powder 1 Application(s) Topical three times a day  sodium chloride 0.9%. 1000 milliLiter(s) IV Continuous <Continuous>  tamsulosin 0.4 milliGRAM(s) Oral at bedtime      PRN:   acetaminophen     Tablet .. 650 milliGRAM(s) Oral every 6 hours PRN  aluminum hydroxide/magnesium hydroxide/simethicone Suspension 30 milliLiter(s) Oral every 6 hours PRN  dextrose Oral Gel 15 Gram(s) Oral once PRN  lactulose Syrup 20 Gram(s) Oral once PRN  metoclopramide Injectable 10 milliGRAM(s) IV Push every 6 hours PRN  ondansetron Injectable 8 milliGRAM(s) IV Push every 8 hours PRN  polyethylene glycol 3350 17 Gram(s) Oral daily PRN  sodium chloride 0.9% lock flush 10 milliLiter(s) IV Push every 1 hour PRN    A/P: 72 year old male with multiple myeloma diagnosed 19 s/p RVD x 6 admitted for autologous pbsct with high dose melphalan prep regimen (dose reduced to 70mg / m2 for age)  Day + 12  10/25- melphalan ; continue melphalan hydration for 24 hours post infusion of last dose. Strict I&O, daily weights, prn diuresis   10/27- HPC transplant today; continue transplant hydration for 24 hours post infusion of cells   10/28 weight gain, I>O, lasix 60 mg iv x1; constipated, Lactulose now and PRN  - [facility]. Started on cipro 10/31/22. If T >/= 38C, pan cx, CXR and change cipro to cefepime 2g IV q 8 hours    Febrile overnight, cipro switched to Cefepime; BC(-), urine culture (+) GNR Enterobacter cloacae complex; follow up sensitivities    pending repeat urine culture  - repeat urine cx from  < 10K normal urogenital kunal. Continue Cefepime for 7 days     1. Infectious Disease:   acyclovir   Oral Tab/Cap 400 milliGRAM(s) Oral every 8 hours  cefepime   IVPB 2000 milliGRAM(s) IV Intermittent every 8 hours  clotrimazole Lozenge 1 Lozenge Oral five times a day  fluconAZOLE   Tablet 400 milliGRAM(s) Oral daily    2. VOD Prophylaxis: Actigall, Glutamine, Heparin (dosed at 100 units / kg / day)     3. GI Prophylaxis:   pantoprazole    Tablet 40 milliGRAM(s) Oral before breakfast    4. Mouthcare - NS / NaHCO3 rinses, Mycelex, Biotene; Skin care     5. GVHD prophylaxis - n/a     6. Transfuse & replete electrolytes prn   magnesium sulfate 2g IV x 1   Kphos 15mmol IV x 1     7. IV hydration, daily weights, strict I&O, prn diuresis     8. PO intake as tolerated, nutrition follow up as needed, MVI, folic acid     9. Antiemetics, anti-diarrhea medications:   metoclopramide Injectable 10 milliGRAM(s) IV Push every 6 hours PRN  ondansetron Injectable 8 milliGRAM(s) IV Push every 8 hours PRN  LORazepam   Injectable 1 milliGRAM(s) IV Push every 24 hours    10. OOB as tolerated, physical therapy consult if needed     11. Monitor coags / fibrinogen 2x week, vitamin K as needed     12. Monitor closely for clinical changes, monitor for fevers     13. Emotional support provided, plan of care discussed and questions addressed     14. Patient education done regarding plan of care, restrictions and discharge planning     15. Continue regular social work input     I have written the above note for Dr. Gonzales who performed service with me in the room.   Shayla Lundberg NP-C ([phone])    I have seen and examined patient with NP, I agree with above note as scribed.                    HPC Transplant Team                                                      Critical / Counseling Time Provided: 30 minutes                                                                                                                                                        Chief Complaint: Autologous peripheral blood stem cell transplant with high dose Melphalan prep regimen for treatment of multiple myeloma    S: Patient seen and examined with HPC Transplant Team:   All ROS negative     O: Vitals:   Vital Signs Last 24 Hrs  T(C): 37.2 (2022 05:28), Max: 37.3 (2022 00:02)  T(F): 99 (2022 05:28), Max: 99.1 (2022 00:02)  HR: 101 (:) (97 - 110)  BP: 120/52 (2022 05:) (120/52 - 154/77)  BP(mean): --  RR: 18 (:) (18 - 19)  SpO2: 98% (:) (97% - 100%)    Parameters below as of 2022 05:28  Patient On (Oxygen Delivery Method): room air      Admit weight: 74.9kg   Daily Weight in k.1 (2022 09:00)  Today's weight: 72.8kg     Intake / Output:    @ 07:01  -   @ 07:00  --------------------------------------------------------  IN: 2694.2 mL / OUT: 3650 mL / NET: -955.8 mL    PE:   Oropharynx: scattered erythema no ulcerations  Oral Mucositis:                                                     Grade NA  CVS: S1, S2 RRR   Lungs: CTA throughout bilaterally   Abdomen: + BS x 4, soft, NT, ND   Extremities: no edema  Gastric Mucositis:      -                                            Grade: n/a  Intestinal Mucositis:    -                                          Grade: n/a  Skin: no rash   TLC: CDI   Neuro: A&Ox3   Pain: denies      Labs:   CBC Full  -  ( 2022 07:06 )  WBC Count : 1.05 K/uL  Hemoglobin : 9.1 g/dL  Hematocrit : 27.8 %  Platelet Count - Automated : 29 K/uL  Mean Cell Volume : 79.4 fl  Mean Cell Hemoglobin : 26.0 pg  Mean Cell Hemoglobin Concentration : 32.7 gm/dL  Auto Neutrophil # : x  Auto Lymphocyte # : x  Auto Monocyte # : x  Auto Eosinophil # : x  Auto Basophil # : x  Auto Neutrophil % : x  Auto Lymphocyte % : x  Auto Monocyte % : x  Auto Eosinophil % : x  Auto Basophil % : x                          9.1    1.05  )-----------( 29       ( 2022 07:06 )             27.8       11    137  |  101  |  6<L>  ----------------------------<  95  4.2   |  27  |  0.70    Ca    8.8      2022 07:06  Phos  2.1       Mg     1.5         TPro  5.9<L>  /  Alb  3.4  /  TBili  0.2  /  DBili  x   /  AST  13  /  ALT  14  /  AlkPhos  87      Cultures:   Culture Results:   <10,000 CFU/mL Normal Urogenital Kunal (22 @ 16:04)    Culture Results:   No growth to date. (22 @ 00:31)    Culture Results:   No growth to date. (22 @ 00:31)    Culture Results:   >100,000 CFU/ml Enterobacter cloacae complex (22 @ 00:31)    Radiology:   ACC: [id number] EXAM:  XR CHEST PORTABLE ROUTINE 1V                        PROCEDURE DATE:  2022    Impression:  Clear lungs.    Meds:   Antimicrobials:   acyclovir   Oral Tab/Cap 400 milliGRAM(s) Oral every 8 hours  cefepime   IVPB 2000 milliGRAM(s) IV Intermittent every 8 hours  clotrimazole Lozenge 1 Lozenge Oral five times a day  fluconAZOLE   Tablet 400 milliGRAM(s) Oral daily      Heme / Onc:   heparin  Infusion 312 Unit(s)/Hr IV Continuous <Continuous>      GI:  aluminum hydroxide/magnesium hydroxide/simethicone Suspension 30 milliLiter(s) Oral every 6 hours PRN  lactulose Syrup 20 Gram(s) Oral once PRN  pantoprazole    Tablet 40 milliGRAM(s) Oral before breakfast  polyethylene glycol 3350 17 Gram(s) Oral daily PRN  senna 2 Tablet(s) Oral at bedtime  sodium bicarbonate Mouth Rinse 10 milliLiter(s) Swish and Spit five times a day  ursodiol Capsule 300 milliGRAM(s) Oral two times a day      Cardiovascular:   amLODIPine   Tablet 10 milliGRAM(s) Oral daily  hydrALAZINE 25 milliGRAM(s) Oral three times a day      Immunologic:   filgrastim-sndz (ZARXIO) Injectable 480 MICROGram(s) SubCutaneous every 24 hours      Other medications:   acetaminophen     Tablet .. 650 milliGRAM(s) Oral every 6 hours  AQUAPHOR (petrolatum Ointment) 1 Application(s) Topical two times a day  benzonatate 100 milliGRAM(s) Oral three times a day  Biotene Dry Mouth Oral Rinse 5 milliLiter(s) Swish and Spit five times a day  chlorhexidine 4% Liquid 1 Application(s) Topical <User Schedule>  dextrose 5%. 1000 milliLiter(s) IV Continuous <Continuous>  dextrose 5%. 1000 milliLiter(s) IV Continuous <Continuous>  dextrose 50% Injectable 25 Gram(s) IV Push once  dextrose 50% Injectable 12.5 Gram(s) IV Push once  dextrose 50% Injectable 25 Gram(s) IV Push once  finasteride 5 milliGRAM(s) Oral daily  folic acid 1 milliGRAM(s) Oral daily  glucagon  Injectable 1 milliGRAM(s) IntraMuscular once  insulin lispro (ADMELOG) corrective regimen sliding scale   SubCutaneous three times a day before meals  insulin lispro (ADMELOG) corrective regimen sliding scale   SubCutaneous at bedtime  lidocaine/prilocaine Cream 1 Application(s) Topical daily  loratadine 10 milliGRAM(s) Oral daily  LORazepam   Injectable 1 milliGRAM(s) IV Push every 24 hours  multivitamin 1 Tablet(s) Oral daily  nystatin Powder 1 Application(s) Topical three times a day  sodium chloride 0.9%. 1000 milliLiter(s) IV Continuous <Continuous>  tamsulosin 0.4 milliGRAM(s) Oral at bedtime      PRN:   acetaminophen     Tablet .. 650 milliGRAM(s) Oral every 6 hours PRN  aluminum hydroxide/magnesium hydroxide/simethicone Suspension 30 milliLiter(s) Oral every 6 hours PRN  dextrose Oral Gel 15 Gram(s) Oral once PRN  lactulose Syrup 20 Gram(s) Oral once PRN  metoclopramide Injectable 10 milliGRAM(s) IV Push every 6 hours PRN  ondansetron Injectable 8 milliGRAM(s) IV Push every 8 hours PRN  polyethylene glycol 3350 17 Gram(s) Oral daily PRN  sodium chloride 0.9% lock flush 10 milliLiter(s) IV Push every 1 hour PRN    A/P: 72 year old male with multiple myeloma diagnosed 21 s/p RVD x 6 admitted for autologous pbsct with high dose melphalan prep regimen (dose reduced to 70mg / m2 for age)  Day + 12  10/25- melphalan ; continue melphalan hydration for 24 hours post infusion of last dose. Strict I&O, daily weights, prn diuresis   10/27- HPC transplant today; continue transplant hydration for 24 hours post infusion of cells   10/28 weight gain, I>O, lasix 60 mg iv x1; constipated, Lactulose now and PRN  - [facility]. Started on cipro 10/31/22. If T >/= 38C, pan cx, CXR and change cipro to cefepime 2g IV q 8 hours    Febrile overnight, cipro switched to Cefepime; BC(-), urine culture (+) GNR Enterobacter cloacae complex; follow up sensitivities    pending repeat urine culture  - repeat urine cx from  < 10K normal urogenital kunal. Continue Cefepime for 7 days     1. Infectious Disease:   acyclovir   Oral Tab/Cap 400 milliGRAM(s) Oral every 8 hours  cefepime   IVPB 2000 milliGRAM(s) IV Intermittent every 8 hours  clotrimazole Lozenge 1 Lozenge Oral five times a day  fluconAZOLE   Tablet 400 milliGRAM(s) Oral daily    2. VOD Prophylaxis: Actigall, Glutamine, Heparin (dosed at 100 units / kg / day)     3. GI Prophylaxis:   pantoprazole    Tablet 40 milliGRAM(s) Oral before breakfast    4. Mouthcare - NS / NaHCO3 rinses, Mycelex, Biotene; Skin care     5. GVHD prophylaxis - n/a     6. Transfuse & replete electrolytes prn   magnesium sulfate 2g IV x 1   Kphos 15mmol IV x 1     7. IV hydration, daily weights, strict I&O, prn diuresis     8. PO intake as tolerated, nutrition follow up as needed, MVI, folic acid     9. Antiemetics, anti-diarrhea medications:   metoclopramide Injectable 10 milliGRAM(s) IV Push every 6 hours PRN  ondansetron Injectable 8 milliGRAM(s) IV Push every 8 hours PRN  LORazepam   Injectable 1 milliGRAM(s) IV Push every 24 hours    10. OOB as tolerated, physical therapy consult if needed     11. Monitor coags / fibrinogen 2x week, vitamin K as needed     12. Monitor closely for clinical changes, monitor for fevers     13. Emotional support provided, plan of care discussed and questions addressed     14. Patient education done regarding plan of care, restrictions and discharge planning     15. Continue regular social work input     I have written the above note for Dr. Gonzales who performed service with me in the room.   Shayla Lundberg NP-C ([phone])    I have seen and examined patient with NP, I agree with above note as scribed.

## 2022-11-08 NOTE — PROGRESS NOTE ADULT - CRITICAL CARE ATTENDING COMMENT
72 year old male with multiple myeloma diagnosed 12/20/21 s/p RVD x 6 admitted for autologous pbsct with high dose melphalan prep regimen (dose reduced to 70mg / m2 for age)    Day + 12...some soft stool..feeling better this am..increased saliva improved...wbc recovering..repeat urine cx pending...enterobacter sensitive to cefapime    1. Admit to BMTU   2. -3 hours prior to Melphalan start hydration: D5NS at 200ml /hr and continue 24 hours post Melphalan infusion  3. Day – 3 & day -2 - Melphalan 70mg/m2  IV   4. Strict I&O, daily weights, prn diuresis   5. Day -1 rest day. At 2200 on day – 1, start transplant hydration 1/2NS + 50mEq NaHCO3- (may substitute [id number] if bicarb not available)  6. Day 0 – infuse HPC product. 4 hours after infusion of cells start Zarxio 5 micrograms / kg (actual weight) . Continue through engraftment.   7. On day 0, + 1, + 2-give Kepivance 60micrograms / kg (actual weight).  8. VOD prophylaxis - low dose heparin gtt (dosed at 100 units / kg / day), glutamine supplementation, Actigall BID   9. PCP prophylaxis - Bactrim DS through day -2    10. Antiviral prophylaxis - Acyclovir 400mg po TID to start day -1   11. Antifungal prophylaxis- Diflucan 400 mg po daily.  12. GI prophylaxis - Protonix po QD   13. Antibacterial prophylaxis -  ANC < 500, started Cipro 500mg po BID.  febrile, pan cx, CXR and changed Cipro to Cefepime 2g IV q 8 hours. Continue until count recovery  14. Kepivance for prevention of mucositis- days 0, +1, +2  15. Aggressive mouth care and skin care as per protocol..senna, miralax  prn  16. The patient is aware of his diagnosis...he does not want to be reminded of it every day.. 72 year old male with multiple myeloma diagnosed 12/20/21 s/p RVD x 6 admitted for autologous pbsct with high dose melphalan prep regimen (dose reduced to 70mg / m2 for age)    Day + 12...some soft stool..feeling better this am..increased saliva improved...wbc recovering..repeat urine cx less than 10,000...enterobacter sensitive to cefapime    1. Admit to BMTU   2. -3 hours prior to Melphalan start hydration: D5NS at 200ml /hr and continue 24 hours post Melphalan infusion  3. Day – 3 & day -2 - Melphalan 70mg/m2  IV   4. Strict I&O, daily weights, prn diuresis   5. Day -1 rest day. At 2200 on day – 1, start transplant hydration 1/2NS + 50mEq NaHCO3- (may substitute [id number] if bicarb not available)  6. Day 0 – infuse HPC product. 4 hours after infusion of cells start Zarxio 5 micrograms / kg (actual weight) . Continue through engraftment.   7. On day 0, + 1, + 2-give Kepivance 60micrograms / kg (actual weight).  8. VOD prophylaxis - low dose heparin gtt (dosed at 100 units / kg / day), glutamine supplementation, Actigall BID   9. PCP prophylaxis - Bactrim DS through day -2    10. Antiviral prophylaxis - Acyclovir 400mg po TID to start day -1   11. Antifungal prophylaxis- Diflucan 400 mg po daily.  12. GI prophylaxis - Protonix po QD   13. Antibacterial prophylaxis -  ANC < 500, started Cipro 500mg po BID.  febrile, pan cx, CXR and changed Cipro to Cefepime 2g IV q 8 hours. Continue until count recovery  14. Kepivance for prevention of mucositis- days 0, +1, +2  15. Aggressive mouth care and skin care as per protocol..senna, miralax  prn  16. The patient is aware of his diagnosis...he does not want to be reminded of it every day..  17 wbc recovering..d/c early next week

## 2022-11-09 LAB
ALBUMIN SERPL ELPH-MCNC: 3.4 G/DL — SIGNIFICANT CHANGE UP (ref 3.3–5)
ALP SERPL-CCNC: 95 U/L — SIGNIFICANT CHANGE UP (ref 40–120)
ALT FLD-CCNC: 16 U/L — SIGNIFICANT CHANGE UP (ref 10–45)
ANION GAP SERPL CALC-SCNC: 8 MMOL/L — SIGNIFICANT CHANGE UP (ref 5–17)
AST SERPL-CCNC: 12 U/L — SIGNIFICANT CHANGE UP (ref 10–40)
BASOPHILS # BLD AUTO: 0 K/UL — SIGNIFICANT CHANGE UP (ref 0–0.2)
BASOPHILS NFR BLD AUTO: 0 % — SIGNIFICANT CHANGE UP (ref 0–2)
BILIRUB DIRECT SERPL-MCNC: <0.1 MG/DL — SIGNIFICANT CHANGE UP (ref 0–0.3)
BILIRUB INDIRECT FLD-MCNC: >0.1 MG/DL — LOW (ref 0.2–1)
BILIRUB SERPL-MCNC: 0.2 MG/DL — SIGNIFICANT CHANGE UP (ref 0.2–1.2)
BLD GP AB SCN SERPL QL: NEGATIVE — SIGNIFICANT CHANGE UP
BUN SERPL-MCNC: 6 MG/DL — LOW (ref 7–23)
CALCIUM SERPL-MCNC: 8.9 MG/DL — SIGNIFICANT CHANGE UP (ref 8.4–10.5)
CHLORIDE SERPL-SCNC: 100 MMOL/L — SIGNIFICANT CHANGE UP (ref 96–108)
CO2 SERPL-SCNC: 28 MMOL/L — SIGNIFICANT CHANGE UP (ref 22–31)
CREAT SERPL-MCNC: 0.75 MG/DL — SIGNIFICANT CHANGE UP (ref 0.5–1.3)
CULTURE RESULTS: SIGNIFICANT CHANGE UP
CULTURE RESULTS: SIGNIFICANT CHANGE UP
EGFR: 95 ML/MIN/1.73M2 — SIGNIFICANT CHANGE UP
EOSINOPHIL # BLD AUTO: 0 K/UL — SIGNIFICANT CHANGE UP (ref 0–0.5)
EOSINOPHIL NFR BLD AUTO: 0 % — SIGNIFICANT CHANGE UP (ref 0–6)
GLUCOSE BLDC GLUCOMTR-MCNC: 105 MG/DL — HIGH (ref 70–99)
GLUCOSE BLDC GLUCOMTR-MCNC: 119 MG/DL — HIGH (ref 70–99)
GLUCOSE BLDC GLUCOMTR-MCNC: 119 MG/DL — HIGH (ref 70–99)
GLUCOSE BLDC GLUCOMTR-MCNC: 124 MG/DL — HIGH (ref 70–99)
GLUCOSE SERPL-MCNC: 102 MG/DL — HIGH (ref 70–99)
HCT VFR BLD CALC: 28.4 % — LOW (ref 39–50)
HGB BLD-MCNC: 9.3 G/DL — LOW (ref 13–17)
LDH SERPL L TO P-CCNC: 160 U/L — SIGNIFICANT CHANGE UP (ref 50–242)
LYMPHOCYTES # BLD AUTO: 0.37 K/UL — LOW (ref 1–3.3)
LYMPHOCYTES # BLD AUTO: 11.6 % — LOW (ref 13–44)
MAGNESIUM SERPL-MCNC: 1.8 MG/DL — SIGNIFICANT CHANGE UP (ref 1.6–2.6)
MANUAL SMEAR VERIFICATION: SIGNIFICANT CHANGE UP
MCHC RBC-ENTMCNC: 26.1 PG — LOW (ref 27–34)
MCHC RBC-ENTMCNC: 32.7 GM/DL — SIGNIFICANT CHANGE UP (ref 32–36)
MCV RBC AUTO: 79.8 FL — LOW (ref 80–100)
MONOCYTES # BLD AUTO: 0.23 K/UL — SIGNIFICANT CHANGE UP (ref 0–0.9)
MONOCYTES NFR BLD AUTO: 7.1 % — SIGNIFICANT CHANGE UP (ref 2–14)
NEUTROPHILS # BLD AUTO: 2.56 K/UL — SIGNIFICANT CHANGE UP (ref 1.8–7.4)
NEUTROPHILS NFR BLD AUTO: 77.7 % — HIGH (ref 43–77)
NEUTS BAND # BLD: 2.7 % — SIGNIFICANT CHANGE UP (ref 0–8)
PHOSPHATE SERPL-MCNC: 2.5 MG/DL — SIGNIFICANT CHANGE UP (ref 2.5–4.5)
PLAT MORPH BLD: NORMAL — SIGNIFICANT CHANGE UP
PLATELET # BLD AUTO: 27 K/UL — LOW (ref 150–400)
POTASSIUM SERPL-MCNC: 4.2 MMOL/L — SIGNIFICANT CHANGE UP (ref 3.5–5.3)
POTASSIUM SERPL-SCNC: 4.2 MMOL/L — SIGNIFICANT CHANGE UP (ref 3.5–5.3)
PROT SERPL-MCNC: 6.1 G/DL — SIGNIFICANT CHANGE UP (ref 6–8.3)
RBC # BLD: 3.56 M/UL — LOW (ref 4.2–5.8)
RBC # FLD: 14 % — SIGNIFICANT CHANGE UP (ref 10.3–14.5)
RBC BLD AUTO: SIGNIFICANT CHANGE UP
RH IG SCN BLD-IMP: POSITIVE — SIGNIFICANT CHANGE UP
SMUDGE CELLS # BLD: PRESENT — SIGNIFICANT CHANGE UP
SODIUM SERPL-SCNC: 136 MMOL/L — SIGNIFICANT CHANGE UP (ref 135–145)
SPECIMEN SOURCE: SIGNIFICANT CHANGE UP
SPECIMEN SOURCE: SIGNIFICANT CHANGE UP
VARIANT LYMPHS # BLD: 0.9 % — SIGNIFICANT CHANGE UP (ref 0–6)
WBC # BLD: 3.19 K/UL — LOW (ref 3.8–10.5)
WBC # FLD AUTO: 3.19 K/UL — LOW (ref 3.8–10.5)

## 2022-11-09 PROCEDURE — 99291 CRITICAL CARE FIRST HOUR: CPT

## 2022-11-09 RX ADMIN — Medication 1 MILLIGRAM(S): at 13:01

## 2022-11-09 RX ADMIN — CEFEPIME 100 MILLIGRAM(S): 1 INJECTION, POWDER, FOR SOLUTION INTRAMUSCULAR; INTRAVENOUS at 22:03

## 2022-11-09 RX ADMIN — Medication 480 MICROGRAM(S): at 16:38

## 2022-11-09 RX ADMIN — Medication 400 MILLIGRAM(S): at 13:03

## 2022-11-09 RX ADMIN — Medication 5 MILLILITER(S): at 08:00

## 2022-11-09 RX ADMIN — CEFEPIME 100 MILLIGRAM(S): 1 INJECTION, POWDER, FOR SOLUTION INTRAMUSCULAR; INTRAVENOUS at 06:33

## 2022-11-09 RX ADMIN — Medication 100 MILLIGRAM(S): at 06:34

## 2022-11-09 RX ADMIN — Medication 25 MILLIGRAM(S): at 13:15

## 2022-11-09 RX ADMIN — Medication 5 MILLILITER(S): at 23:58

## 2022-11-09 RX ADMIN — Medication 1 LOZENGE: at 20:27

## 2022-11-09 RX ADMIN — URSODIOL 300 MILLIGRAM(S): 250 TABLET, FILM COATED ORAL at 17:38

## 2022-11-09 RX ADMIN — Medication 1 LOZENGE: at 16:35

## 2022-11-09 RX ADMIN — Medication 5 MILLILITER(S): at 16:34

## 2022-11-09 RX ADMIN — CHLORHEXIDINE GLUCONATE 1 APPLICATION(S): 213 SOLUTION TOPICAL at 06:43

## 2022-11-09 RX ADMIN — PANTOPRAZOLE SODIUM 40 MILLIGRAM(S): 20 TABLET, DELAYED RELEASE ORAL at 06:33

## 2022-11-09 RX ADMIN — FINASTERIDE 5 MILLIGRAM(S): 5 TABLET, FILM COATED ORAL at 13:04

## 2022-11-09 RX ADMIN — AMLODIPINE BESYLATE 10 MILLIGRAM(S): 2.5 TABLET ORAL at 06:34

## 2022-11-09 RX ADMIN — Medication 25 MILLIGRAM(S): at 06:34

## 2022-11-09 RX ADMIN — SENNA PLUS 2 TABLET(S): 8.6 TABLET ORAL at 22:05

## 2022-11-09 RX ADMIN — Medication 100 MILLIGRAM(S): at 22:03

## 2022-11-09 RX ADMIN — FLUCONAZOLE 400 MILLIGRAM(S): 150 TABLET ORAL at 13:02

## 2022-11-09 RX ADMIN — NYSTATIN CREAM 1 APPLICATION(S): 100000 CREAM TOPICAL at 13:03

## 2022-11-09 RX ADMIN — Medication 10 MILLILITER(S): at 13:01

## 2022-11-09 RX ADMIN — URSODIOL 300 MILLIGRAM(S): 250 TABLET, FILM COATED ORAL at 06:34

## 2022-11-09 RX ADMIN — Medication 100 MILLIGRAM(S): at 13:03

## 2022-11-09 RX ADMIN — TAMSULOSIN HYDROCHLORIDE 0.4 MILLIGRAM(S): 0.4 CAPSULE ORAL at 22:03

## 2022-11-09 RX ADMIN — Medication 1 TABLET(S): at 13:04

## 2022-11-09 RX ADMIN — Medication 1 LOZENGE: at 08:01

## 2022-11-09 RX ADMIN — Medication 400 MILLIGRAM(S): at 06:34

## 2022-11-09 RX ADMIN — Medication 1 APPLICATION(S): at 06:43

## 2022-11-09 RX ADMIN — Medication 400 MILLIGRAM(S): at 22:03

## 2022-11-09 RX ADMIN — Medication 10 MILLILITER(S): at 16:35

## 2022-11-09 RX ADMIN — Medication 1 LOZENGE: at 23:58

## 2022-11-09 RX ADMIN — Medication 25 MILLIGRAM(S): at 22:03

## 2022-11-09 RX ADMIN — Medication 5 MILLILITER(S): at 13:00

## 2022-11-09 RX ADMIN — Medication 5 MILLILITER(S): at 20:27

## 2022-11-09 RX ADMIN — NYSTATIN CREAM 1 APPLICATION(S): 100000 CREAM TOPICAL at 22:24

## 2022-11-09 RX ADMIN — Medication 10 MILLILITER(S): at 20:30

## 2022-11-09 RX ADMIN — Medication 1 APPLICATION(S): at 17:39

## 2022-11-09 RX ADMIN — Medication 10 MILLILITER(S): at 08:01

## 2022-11-09 RX ADMIN — LORATADINE 10 MILLIGRAM(S): 10 TABLET ORAL at 13:02

## 2022-11-09 RX ADMIN — CEFEPIME 100 MILLIGRAM(S): 1 INJECTION, POWDER, FOR SOLUTION INTRAMUSCULAR; INTRAVENOUS at 13:04

## 2022-11-09 RX ADMIN — Medication 1 LOZENGE: at 13:00

## 2022-11-09 RX ADMIN — NYSTATIN CREAM 1 APPLICATION(S): 100000 CREAM TOPICAL at 06:43

## 2022-11-09 NOTE — PROGRESS NOTE ADULT - SUBJECTIVE AND OBJECTIVE BOX
HPC Transplant Team                                                      Critical / Counseling Time Provided: 30 minutes                                                                                                                                                        Chief Complaint: Autologous peripheral blood stem cell transplant with high dose Melphalan prep regimen for treatment of multiple myeloma    S: Patient seen and examined with HPC Transplant Team:     All other ROS negative     O: Vitals:   Vital Signs Last 24 Hrs  T(C): 37 (2022 06:00), Max: 37 (2022 06:00)  T(F): 98.6 (2022 06:00), Max: 98.6 (2022 06:00)  HR: 102 (2022 06:00) (95 - 111)  BP: 130/60 (2022 06:00) (114/70 - 137/68)  BP(mean): --  RR: 18 (2022 06:00) (16 - 18)  SpO2: 98% (2022 06:00) (97% - 99%)    Parameters below as of 2022 21:42  Patient On (Oxygen Delivery Method): room air      Admit weight: 74.9kg   Daily Weight in k.8 (2022 08:30)  Today's weight:     Intake / Output:    @ 07:01  -   @ 07:00  --------------------------------------------------------  IN: 2017 mL / OUT: 1800 mL / NET: 217 mL        PE:   Oropharynx: scattered erythema no ulcerations  Oral Mucositis:                                                     Grade NA  CVS: S1, S2 RRR   Lungs: CTA throughout bilaterally   Abdomen: + BS x 4, soft, NT, ND   Extremities: no edema  Gastric Mucositis:      -                                            Grade: n/a  Intestinal Mucositis:    -                                          Grade: n/a  Skin: no rash   TLC: CDI   Neuro: A&Ox3   Pain: denies      Labs:   CBC Full  -  ( 2022 07:23 )  WBC Count : 3.19 K/uL  Hemoglobin : 9.3 g/dL  Hematocrit : 28.4 %  Platelet Count - Automated : 27 K/uL  Mean Cell Volume : 79.8 fl  Mean Cell Hemoglobin : 26.1 pg  Mean Cell Hemoglobin Concentration : 32.7 gm/dL  Auto Neutrophil # : x  Auto Lymphocyte # : x  Auto Monocyte # : x  Auto Eosinophil # : x  Auto Basophil # : x  Auto Neutrophil % : x  Auto Lymphocyte % : x  Auto Monocyte % : x  Auto Eosinophil % : x  Auto Basophil % : x                          9.3    3.19  )-----------( 27       ( 2022 07:23 )             28.4       Cultures:   Culture Results:   <10,000 CFU/mL Normal Urogenital Kunal (22 @ 16:04)    Culture Results:   No growth to date. (22 @ 00:31)    Culture Results:   No growth to date. (22 @ 00:31)    Culture Results:   >100,000 CFU/ml Enterobacter cloacae complex (22 @ 00:31)    Radiology:   ACC: [id number] EXAM:  XR CHEST PORTABLE ROUTINE 1V                        PROCEDURE DATE:  2022    Impression:  Clear lungs.      Meds:   Antimicrobials:   acyclovir   Oral Tab/Cap 400 milliGRAM(s) Oral every 8 hours  cefepime   IVPB 2000 milliGRAM(s) IV Intermittent every 8 hours  clotrimazole Lozenge 1 Lozenge Oral five times a day  fluconAZOLE   Tablet 400 milliGRAM(s) Oral daily      Heme / Onc:   heparin  Infusion 312 Unit(s)/Hr IV Continuous <Continuous>      GI:  aluminum hydroxide/magnesium hydroxide/simethicone Suspension 30 milliLiter(s) Oral every 6 hours PRN  lactulose Syrup 20 Gram(s) Oral once PRN  pantoprazole    Tablet 40 milliGRAM(s) Oral before breakfast  polyethylene glycol 3350 17 Gram(s) Oral daily PRN  senna 2 Tablet(s) Oral at bedtime  sodium bicarbonate Mouth Rinse 10 milliLiter(s) Swish and Spit five times a day  ursodiol Capsule 300 milliGRAM(s) Oral two times a day      Cardiovascular:   amLODIPine   Tablet 10 milliGRAM(s) Oral daily  hydrALAZINE 25 milliGRAM(s) Oral three times a day      Immunologic:   filgrastim-sndz (ZARXIO) Injectable 480 MICROGram(s) SubCutaneous every 24 hours      Other medications:   acetaminophen     Tablet .. 650 milliGRAM(s) Oral every 6 hours  AQUAPHOR (petrolatum Ointment) 1 Application(s) Topical two times a day  benzonatate 100 milliGRAM(s) Oral three times a day  Biotene Dry Mouth Oral Rinse 5 milliLiter(s) Swish and Spit five times a day  chlorhexidine 4% Liquid 1 Application(s) Topical <User Schedule>  dextrose 5%. 1000 milliLiter(s) IV Continuous <Continuous>  dextrose 5%. 1000 milliLiter(s) IV Continuous <Continuous>  dextrose 50% Injectable 25 Gram(s) IV Push once  dextrose 50% Injectable 12.5 Gram(s) IV Push once  dextrose 50% Injectable 25 Gram(s) IV Push once  finasteride 5 milliGRAM(s) Oral daily  folic acid 1 milliGRAM(s) Oral daily  glucagon  Injectable 1 milliGRAM(s) IntraMuscular once  insulin lispro (ADMELOG) corrective regimen sliding scale   SubCutaneous three times a day before meals  insulin lispro (ADMELOG) corrective regimen sliding scale   SubCutaneous at bedtime  lidocaine/prilocaine Cream 1 Application(s) Topical daily  loratadine 10 milliGRAM(s) Oral daily  LORazepam   Injectable 1 milliGRAM(s) IV Push every 24 hours  multivitamin 1 Tablet(s) Oral daily  nystatin Powder 1 Application(s) Topical three times a day  sodium chloride 0.9%. 1000 milliLiter(s) IV Continuous <Continuous>  tamsulosin 0.4 milliGRAM(s) Oral at bedtime      PRN:   acetaminophen     Tablet .. 650 milliGRAM(s) Oral every 6 hours PRN  aluminum hydroxide/magnesium hydroxide/simethicone Suspension 30 milliLiter(s) Oral every 6 hours PRN  dextrose Oral Gel 15 Gram(s) Oral once PRN  lactulose Syrup 20 Gram(s) Oral once PRN  metoclopramide Injectable 10 milliGRAM(s) IV Push every 6 hours PRN  ondansetron Injectable 8 milliGRAM(s) IV Push every 8 hours PRN  polyethylene glycol 3350 17 Gram(s) Oral daily PRN  sodium chloride 0.9% lock flush 10 milliLiter(s) IV Push every 1 hour PRN    A/P: 72 year old male with multiple myeloma diagnosed 19 s/p RVD x 6 admitted for autologous pbsct with high dose melphalan prep regimen (dose reduced to 70mg / m2 for age)  Day + 13  10/25- melphalan 2/; continue melphalan hydration for 24 hours post infusion of last dose. Strict I&O, daily weights, prn diuresis   10/27- HPC transplant today; continue transplant hydration for 24 hours post infusion of cells   10/28 weight gain, I>O, lasix 60 mg iv x1; constipated, Lactulose now and PRN  - Parrish. Started on cipro 10/31/22. If T >/= 38C, pan cx, CXR and change cipro to cefepime 2g IV q 8 hours    Febrile overnight, cipro switched to Cefepime; BC(-), urine culture (+) GNR Enterobacter cloacae complex; follow up sensitivities    pending repeat urine culture  - repeat urine cx from  < 10K normal urogenital kunal. Continue Cefepime for 7 days   - engrafted 22 - continue cefepime for a 7 day course, continue zarxio for now     1. Infectious Disease:   acyclovir   Oral Tab/Cap 400 milliGRAM(s) Oral every 8 hours  cefepime   IVPB 2000 milliGRAM(s) IV Intermittent every 8 hours  clotrimazole Lozenge 1 Lozenge Oral five times a day  fluconAZOLE   Tablet 400 milliGRAM(s) Oral daily    2. VOD Prophylaxis: Actigall, Glutamine, Heparin (dosed at 100 units / kg / day)     3. GI Prophylaxis:    pantoprazole    Tablet 40 milliGRAM(s) Oral before breakfast    4. Mouthcare - NS / NaHCO3 rinses, Mycelex, Biotene; Skin care     5. GVHD prophylaxis - n/a     6. Transfuse & replete electrolytes prn     7. IV hydration, daily weights, strict I&O, prn diuresis     8. PO intake as tolerated, nutrition follow up as needed, MVI, folic acid     9. Antiemetics, anti-diarrhea medications:   metoclopramide Injectable 10 milliGRAM(s) IV Push every 6 hours PRN  ondansetron Injectable 8 milliGRAM(s) IV Push every 8 hours PRN  LORazepam   Injectable 1 milliGRAM(s) IV Push every 24 hours    10. OOB as tolerated, physical therapy consult if needed     11. Monitor coags / fibrinogen 2x week, vitamin K as needed     12. Monitor closely for clinical changes, monitor for fevers     13. Emotional support provided, plan of care discussed and questions addressed     14. Patient education done regarding  plan of care, restrictions and discharge planning     15. Continue regular social work input     I have written the above note for Dr. Gonzales who performed service with me in the room.   Shayla Lundberg NP-C ([phone])    I have seen and examined patient with NP, I agree with above note as scribed.                    HPC Transplant Team                                                      Critical / Counseling Time Provided: 30 minutes                                                                                                                                                        Chief Complaint: Autologous peripheral blood stem cell transplant with high dose Melphalan prep regimen for treatment of multiple myeloma    S: Patient seen and examined with HPC Transplant Team:     All other ROS negative     O: Vitals:   Vital Signs Last 24 Hrs  T(C): 37 (2022 06:00), Max: 37 (2022 06:00)  T(F): 98.6 (2022 06:00), Max: 98.6 (2022 06:00)  HR: 102 (2022 06:00) (95 - 111)  BP: 130/60 (2022 06:00) (114/70 - 137/68)  BP(mean): --  RR: 18 (2022 06:00) (16 - 18)  SpO2: 98% (2022 06:00) (97% - 99%)    Parameters below as of 2022 21:42  Patient On (Oxygen Delivery Method): room air      Admit weight: 74.9kg   Daily Weight in k.8 (2022 08:30)  Today's weight:     Intake / Output:    @ 07:01  -   @ 07:00  --------------------------------------------------------  IN: 2017 mL / OUT: 1800 mL / NET: 217 mL        PE:   Oropharynx: scattered erythema no ulcerations  Oral Mucositis:                                                     Grade NA  CVS: S1, S2 RRR   Lungs: CTA throughout bilaterally   Abdomen: + BS x 4, soft, NT, ND   Extremities: no edema  Gastric Mucositis:      -                                            Grade: n/a  Intestinal Mucositis:    -                                          Grade: n/a  Skin: no rash   TLC: CDI   Neuro: A&Ox3   Pain: denies      Labs:   CBC Full  -  ( 2022 07:23 )  WBC Count : 3.19 K/uL  Hemoglobin : 9.3 g/dL  Hematocrit : 28.4 %  Platelet Count - Automated : 27 K/uL  Mean Cell Volume : 79.8 fl  Mean Cell Hemoglobin : 26.1 pg  Mean Cell Hemoglobin Concentration : 32.7 gm/dL  Auto Neutrophil # : x  Auto Lymphocyte # : x  Auto Monocyte # : x  Auto Eosinophil # : x  Auto Basophil # : x  Auto Neutrophil % : x  Auto Lymphocyte % : x  Auto Monocyte % : x  Auto Eosinophil % : x  Auto Basophil % : x                          9.3    3.19  )-----------( 27       ( 2022 07:23 )             28.4         136  |  100  |  6<L>  ----------------------------<  102<H>  4.2   |  28  |  0.75    Ca    8.9      2022 07:29  Phos  2.5       Mg     1.8         TPro  6.1  /  Alb  3.4  /  TBili  0.2  /  DBili  <0.1  /  AST  12  /  ALT  16  /  AlkPhos  95      Cultures:   Culture Results:   <10,000 CFU/mL Normal Urogenital Kunal (22 @ 16:04)    Culture Results:   No growth to date. (22 @ 00:31)    Culture Results:   No growth to date. (22 @ 00:31)    Culture Results:   >100,000 CFU/ml Enterobacter cloacae complex (22 @ 00:31)    Radiology:   ACC: [id number] EXAM:  XR CHEST PORTABLE ROUTINE 1V                        PROCEDURE DATE:  2022    Impression:  Clear lungs.      Meds:   Antimicrobials:   acyclovir   Oral Tab/Cap 400 milliGRAM(s) Oral every 8 hours  cefepime   IVPB 2000 milliGRAM(s) IV Intermittent every 8 hours  clotrimazole Lozenge 1 Lozenge Oral five times a day  fluconAZOLE   Tablet 400 milliGRAM(s) Oral daily      Heme / Onc:   heparin  Infusion 312 Unit(s)/Hr IV Continuous <Continuous>      GI:  aluminum hydroxide/magnesium hydroxide/simethicone Suspension 30 milliLiter(s) Oral every 6 hours PRN  lactulose Syrup 20 Gram(s) Oral once PRN  pantoprazole    Tablet 40 milliGRAM(s) Oral before breakfast  polyethylene glycol 3350 17 Gram(s) Oral daily PRN  senna 2 Tablet(s) Oral at bedtime  sodium bicarbonate Mouth Rinse 10 milliLiter(s) Swish and Spit five times a day  ursodiol Capsule 300 milliGRAM(s) Oral two times a day      Cardiovascular:   amLODIPine   Tablet 10 milliGRAM(s) Oral daily  hydrALAZINE 25 milliGRAM(s) Oral three times a day      Immunologic:   filgrastim-sndz (ZARXIO) Injectable 480 MICROGram(s) SubCutaneous every 24 hours      Other medications:   acetaminophen     Tablet .. 650 milliGRAM(s) Oral every 6 hours  AQUAPHOR (petrolatum Ointment) 1 Application(s) Topical two times a day  benzonatate 100 milliGRAM(s) Oral three times a day  Biotene Dry Mouth Oral Rinse 5 milliLiter(s) Swish and Spit five times a day  chlorhexidine 4% Liquid 1 Application(s) Topical <User Schedule>  dextrose 5%. 1000 milliLiter(s) IV Continuous <Continuous>  dextrose 5%. 1000 milliLiter(s) IV Continuous <Continuous>  dextrose 50% Injectable 25 Gram(s) IV Push once  dextrose 50% Injectable 12.5 Gram(s) IV Push once  dextrose 50% Injectable 25 Gram(s) IV Push once  finasteride 5 milliGRAM(s) Oral daily  folic acid 1 milliGRAM(s) Oral daily  glucagon  Injectable 1 milliGRAM(s) IntraMuscular once  insulin lispro (ADMELOG) corrective regimen sliding scale   SubCutaneous three times a day before meals  insulin lispro (ADMELOG) corrective regimen sliding scale   SubCutaneous at bedtime  lidocaine/prilocaine Cream 1 Application(s) Topical daily  loratadine 10 milliGRAM(s) Oral daily  LORazepam   Injectable 1 milliGRAM(s) IV Push every 24 hours  multivitamin 1 Tablet(s) Oral daily  nystatin Powder 1 Application(s) Topical three times a day  sodium chloride 0.9%. 1000 milliLiter(s) IV Continuous <Continuous>  tamsulosin 0.4 milliGRAM(s) Oral at bedtime      PRN:   acetaminophen     Tablet .. 650 milliGRAM(s) Oral every 6 hours PRN  aluminum hydroxide/magnesium hydroxide/simethicone Suspension 30 milliLiter(s) Oral every 6 hours PRN  dextrose Oral Gel 15 Gram(s) Oral once PRN  lactulose Syrup 20 Gram(s) Oral once PRN  metoclopramide Injectable 10 milliGRAM(s) IV Push every 6 hours PRN  ondansetron Injectable 8 milliGRAM(s) IV Push every 8 hours PRN  polyethylene glycol 3350 17 Gram(s) Oral daily PRN  sodium chloride 0.9% lock flush 10 milliLiter(s) IV Push every 1 hour PRN    A/P: 72 year old male with multiple myeloma diagnosed 19 s/p RVD x 6 admitted for autologous pbsct with high dose melphalan prep regimen (dose reduced to 70mg / m2 for age)  Day + 13  10/25- melphalan /; continue melphalan hydration for 24 hours post infusion of last dose. Strict I&O, daily weights, prn diuresis   10/27- HPC transplant today; continue transplant hydration for 24 hours post infusion of cells   10/28 weight gain, I>O, lasix 60 mg iv x1; constipated, Lactulose now and PRN  - Parrish. Started on cipro 10/31/22. If T >/= 38C, pan cx, CXR and change cipro to cefepime 2g IV q 8 hours    Febrile overnight, cipro switched to Cefepime; BC(-), urine culture (+) GNR Enterobacter cloacae complex; follow up sensitivities    pending repeat urine culture  - repeat urine cx from  < 10K normal urogenital kunal. Continue Cefepime for 7 days   - engrafted 22 - continue cefepime for a 7 day course, continue zarxio for now     1. Infectious Disease:   acyclovir   Oral Tab/Cap 400 milliGRAM(s) Oral every 8 hours  cefepime   IVPB 2000 milliGRAM(s) IV Intermittent every 8 hours  clotrimazole Lozenge 1 Lozenge Oral five times a day  fluconAZOLE   Tablet 400 milliGRAM(s) Oral daily    2. VOD Prophylaxis: Actigall, Glutamine, Heparin (dosed at 100 units / kg / day)     3. GI Prophylaxis:    pantoprazole    Tablet 40 milliGRAM(s) Oral before breakfast    4. Mouthcare - NS / NaHCO3 rinses, Mycelex, Biotene; Skin care     5. GVHD prophylaxis - n/a     6. Transfuse & replete electrolytes prn     7. IV hydration, daily weights, strict I&O, prn diuresis     8. PO intake as tolerated, nutrition follow up as needed, MVI, folic acid     9. Antiemetics, anti-diarrhea medications:   metoclopramide Injectable 10 milliGRAM(s) IV Push every 6 hours PRN  ondansetron Injectable 8 milliGRAM(s) IV Push every 8 hours PRN  LORazepam   Injectable 1 milliGRAM(s) IV Push every 24 hours    10. OOB as tolerated, physical therapy consult if needed     11. Monitor coags / fibrinogen 2x week, vitamin K as needed     12. Monitor closely for clinical changes, monitor for fevers     13. Emotional support provided, plan of care discussed and questions addressed     14. Patient education done regarding  plan of care, restrictions and discharge planning     15. Continue regular social work input     I have written the above note for Dr. Gonzales who performed service with me in the room.   Shayla Lundberg NP-C ([phone])    I have seen and examined patient with NP, I agree with above note as scribed.                    HPC Transplant Team                                                      Critical / Counseling Time Provided: 30 minutes                                                                                                                                                        Chief Complaint: Autologous peripheral blood stem cell transplant with high dose Melphalan prep regimen for treatment of multiple myeloma    S: Patient seen and examined with HPC Transplant Team:   + fatigue   All other ROS negative     O: Vitals:   Vital Signs Last 24 Hrs  T(C): 37 (2022 06:00), Max: 37 (2022 06:00)  T(F): 98.6 (2022 06:00), Max: 98.6 (2022 06:00)  HR: 102 (2022 06:00) (95 - 111)  BP: 130/60 (2022 06:00) (114/70 - 137/68)  BP(mean): --  RR: 18 (2022 06:00) (16 - 18)  SpO2: 98% (2022 06:00) (97% - 99%)    Parameters below as of 2022 21:42  Patient On (Oxygen Delivery Method): room air      Admit weight: 74.9kg   Daily Weight in k.8 (2022 08:30)    Intake / Output:    @ 07:01  -   @ 07:00  --------------------------------------------------------  IN: 2017 mL / OUT: 1800 mL / NET: 217 mL        PE:   Oropharynx: scattered erythema no ulcerations  Oral Mucositis:                                                     Grade NA  CVS: S1, S2 RRR   Lungs: CTA throughout bilaterally   Abdomen: + BS x 4, soft, NT, ND   Extremities: no edema  Gastric Mucositis:      -                                            Grade: n/a  Intestinal Mucositis:    -                                          Grade: n/a  Skin: no rash   TLC: CDI   Neuro: A&Ox3   Pain: denies      Labs:   CBC Full  -  ( 2022 07:23 )  WBC Count : 3.19 K/uL  Hemoglobin : 9.3 g/dL  Hematocrit : 28.4 %  Platelet Count - Automated : 27 K/uL  Mean Cell Volume : 79.8 fl  Mean Cell Hemoglobin : 26.1 pg  Mean Cell Hemoglobin Concentration : 32.7 gm/dL  Auto Neutrophil # : x  Auto Lymphocyte # : x  Auto Monocyte # : x  Auto Eosinophil # : x  Auto Basophil # : x  Auto Neutrophil % : x  Auto Lymphocyte % : x  Auto Monocyte % : x  Auto Eosinophil % : x  Auto Basophil % : x                          9.3    3.19  )-----------( 27       ( 2022 07:23 )             28.4         136  |  100  |  6<L>  ----------------------------<  102<H>  4.2   |  28  |  0.75    Ca    8.9      2022 07:29  Phos  2.5       Mg     1.8         TPro  6.1  /  Alb  3.4  /  TBili  0.2  /  DBili  <0.1  /  AST  12  /  ALT  16  /  AlkPhos  95      Cultures:   Culture Results:   <10,000 CFU/mL Normal Urogenital Kunal (22 @ 16:04)    Culture Results:   No growth to date. (22 @ 00:31)    Culture Results:   No growth to date. (22 @ 00:31)    Culture Results:   >100,000 CFU/ml Enterobacter cloacae complex (22 @ 00:31)    Radiology:   ACC: [id number] EXAM:  XR CHEST PORTABLE ROUTINE 1V                        PROCEDURE DATE:  2022    Impression:  Clear lungs.      Meds:   Antimicrobials:   acyclovir   Oral Tab/Cap 400 milliGRAM(s) Oral every 8 hours  cefepime   IVPB 2000 milliGRAM(s) IV Intermittent every 8 hours  clotrimazole Lozenge 1 Lozenge Oral five times a day  fluconAZOLE   Tablet 400 milliGRAM(s) Oral daily      Heme / Onc:   heparin  Infusion 312 Unit(s)/Hr IV Continuous <Continuous>      GI:  aluminum hydroxide/magnesium hydroxide/simethicone Suspension 30 milliLiter(s) Oral every 6 hours PRN  lactulose Syrup 20 Gram(s) Oral once PRN  pantoprazole    Tablet 40 milliGRAM(s) Oral before breakfast  polyethylene glycol 3350 17 Gram(s) Oral daily PRN  senna 2 Tablet(s) Oral at bedtime  sodium bicarbonate Mouth Rinse 10 milliLiter(s) Swish and Spit five times a day  ursodiol Capsule 300 milliGRAM(s) Oral two times a day      Cardiovascular:   amLODIPine   Tablet 10 milliGRAM(s) Oral daily  hydrALAZINE 25 milliGRAM(s) Oral three times a day      Immunologic:   filgrastim-sndz (ZARXIO) Injectable 480 MICROGram(s) SubCutaneous every 24 hours      Other medications:   acetaminophen     Tablet .. 650 milliGRAM(s) Oral every 6 hours  AQUAPHOR (petrolatum Ointment) 1 Application(s) Topical two times a day  benzonatate 100 milliGRAM(s) Oral three times a day  Biotene Dry Mouth Oral Rinse 5 milliLiter(s) Swish and Spit five times a day  chlorhexidine 4% Liquid 1 Application(s) Topical <User Schedule>  dextrose 5%. 1000 milliLiter(s) IV Continuous <Continuous>  dextrose 5%. 1000 milliLiter(s) IV Continuous <Continuous>  dextrose 50% Injectable 25 Gram(s) IV Push once  dextrose 50% Injectable 12.5 Gram(s) IV Push once  dextrose 50% Injectable 25 Gram(s) IV Push once  finasteride 5 milliGRAM(s) Oral daily  folic acid 1 milliGRAM(s) Oral daily  glucagon  Injectable 1 milliGRAM(s) IntraMuscular once  insulin lispro (ADMELOG) corrective regimen sliding scale   SubCutaneous three times a day before meals  insulin lispro (ADMELOG) corrective regimen sliding scale   SubCutaneous at bedtime  lidocaine/prilocaine Cream 1 Application(s) Topical daily  loratadine 10 milliGRAM(s) Oral daily  LORazepam   Injectable 1 milliGRAM(s) IV Push every 24 hours  multivitamin 1 Tablet(s) Oral daily  nystatin Powder 1 Application(s) Topical three times a day  sodium chloride 0.9%. 1000 milliLiter(s) IV Continuous <Continuous>  tamsulosin 0.4 milliGRAM(s) Oral at bedtime      PRN:   acetaminophen     Tablet .. 650 milliGRAM(s) Oral every 6 hours PRN  aluminum hydroxide/magnesium hydroxide/simethicone Suspension 30 milliLiter(s) Oral every 6 hours PRN  dextrose Oral Gel 15 Gram(s) Oral once PRN  lactulose Syrup 20 Gram(s) Oral once PRN  metoclopramide Injectable 10 milliGRAM(s) IV Push every 6 hours PRN  ondansetron Injectable 8 milliGRAM(s) IV Push every 8 hours PRN  polyethylene glycol 3350 17 Gram(s) Oral daily PRN  sodium chloride 0.9% lock flush 10 milliLiter(s) IV Push every 1 hour PRN    A/P: 72 year old male with multiple myeloma diagnosed 21 s/p RVD x 6 admitted for autologous pbsct with high dose melphalan prep regimen (dose reduced to 70mg / m2 for age)  Day + 13  10/25- melphalan /; continue melphalan hydration for 24 hours post infusion of last dose. Strict I&O, daily weights, prn diuresis   10/27- HPC transplant today; continue transplant hydration for 24 hours post infusion of cells   10/28 weight gain, I>O, lasix 60 mg iv x1; constipated, Lactulose now and PRN  - Parrish. Started on cipro 10/31/22. If T >/= 38C, pan cx, CXR and change cipro to cefepime 2g IV q 8 hours    Febrile overnight, cipro switched to Cefepime; BC(-), urine culture (+) GNR Enterobacter cloacae complex; follow up sensitivities    pending repeat urine culture  - repeat urine cx from  < 10K normal urogenital kunal. Continue Cefepime for 7 days   - engrafted 22 - continue cefepime for a 7 day course, continue zarxio for now     1. Infectious Disease:   acyclovir   Oral Tab/Cap 400 milliGRAM(s) Oral every 8 hours  cefepime   IVPB 2000 milliGRAM(s) IV Intermittent every 8 hours  clotrimazole Lozenge 1 Lozenge Oral five times a day  fluconAZOLE   Tablet 400 milliGRAM(s) Oral daily    2. VOD Prophylaxis: Actigall, Glutamine, Heparin (dosed at 100 units / kg / day)     3. GI Prophylaxis:    pantoprazole    Tablet 40 milliGRAM(s) Oral before breakfast    4. Mouthcare - NS / NaHCO3 rinses, Mycelex, Biotene; Skin care     5. GVHD prophylaxis - n/a     6. Transfuse & replete electrolytes prn     7. IV hydration, daily weights, strict I&O, prn diuresis     8. PO intake as tolerated, nutrition follow up as needed, MVI, folic acid     9. Antiemetics, anti-diarrhea medications:   metoclopramide Injectable 10 milliGRAM(s) IV Push every 6 hours PRN  ondansetron Injectable 8 milliGRAM(s) IV Push every 8 hours PRN  LORazepam   Injectable 1 milliGRAM(s) IV Push every 24 hours    10. OOB as tolerated, physical therapy consult if needed     11. Monitor coags / fibrinogen 2x week, vitamin K as needed     12. Monitor closely for clinical changes, monitor for fevers     13. Emotional support provided, plan of care discussed and questions addressed     14. Patient education done regarding  plan of care, restrictions and discharge planning     15. Continue regular social work input     I have written the above note for Dr. Gonzales who performed service with me in the room.   Shayla Lundberg NP-C ([phone])    I have seen and examined patient with NP, I agree with above note as scribed.                    [facility]PC Transplant Team                                                      Critical / Counseling Time Provided: 30 minutes                                                                                                                                                        Chief Complaint: Autologous peripheral blood stem cell transplant with high dose Melphalan prep regimen for treatment of multiple myeloma    S: Patient seen and examined with HPC Transplant Team:   + fatigue   Crowd restrictions, dietary restrictions, mouth and skin care reviewed with patient and wife by attending MD   All other ROS negative     O: Vitals:   Vital Signs Last 24 Hrs  T(C): 37 (2022 06:00), Max: 37 (2022 06:00)  T(F): 98.6 (2022 06:00), Max: 98.6 (2022 06:00)  HR: 102 (2022 06:00) (95 - 111)  BP: 130/60 (2022 06:00) (114/70 - 137/68)  BP(mean): --  RR: 18 (2022 06:00) (16 - 18)  SpO2: 98% (2022 06:00) (97% - 99%)    Parameters below as of 2022 21:42  Patient On (Oxygen Delivery Method): room air      Admit weight: 74.9kg   Daily Weight in k.8 (2022 08:30)    Intake / Output:    @ 07:01  -   @ 07:00  --------------------------------------------------------  IN: 2017 mL / OUT: 1800 mL / NET: 217 mL        PE:   Oropharynx: scattered erythema no ulcerations  Oral Mucositis:                                                     Grade NA  CVS: S1, S2 RRR   Lungs: CTA throughout bilaterally   Abdomen: + BS x 4, soft, NT, ND   Extremities: no edema  Gastric Mucositis:      -                                            Grade: n/a  Intestinal Mucositis:    -                                          Grade: n/a  Skin: no rash   TLC: CDI   Neuro: A&Ox3   Pain: denies      Labs:   CBC Full  -  ( 2022 07:23 )  WBC Count : 3.19 K/uL  Hemoglobin : 9.3 g/dL  Hematocrit : 28.4 %  Platelet Count - Automated : 27 K/uL  Mean Cell Volume : 79.8 fl  Mean Cell Hemoglobin : 26.1 pg  Mean Cell Hemoglobin Concentration : 32.7 gm/dL  Auto Neutrophil # : x  Auto Lymphocyte # : x  Auto Monocyte # : x  Auto Eosinophil # : x  Auto Basophil # : x  Auto Neutrophil % : x  Auto Lymphocyte % : x  Auto Monocyte % : x  Auto Eosinophil % : x  Auto Basophil % : x                          9.3    3.19  )-----------( 27       ( 2022 07:23 )             28.4         136  |  100  |  6<L>  ----------------------------<  102<H>  4.2   |  28  |  0.75    Ca    8.9      2022 07:29  Phos  2.5       Mg     1.8         TPro  6.1  /  Alb  3.4  /  TBili  0.2  /  DBili  <0.1  /  AST  12  /  ALT  16  /  AlkPhos  95      Cultures:   Culture Results:   <10,000 CFU/mL Normal Urogenital Kunal (22 @ 16:04)    Culture Results:   No growth to date. (22 @ 00:31)    Culture Results:   No growth to date. (22 @ 00:31)    Culture Results:   >100,000 CFU/ml Enterobacter cloacae complex (22 @ 00:31)    Radiology:   ACC: [id number] EXAM:  XR CHEST PORTABLE ROUTINE 1V                        PROCEDURE DATE:  2022    Impression:  Clear lungs.      Meds:   Antimicrobials:   acyclovir   Oral Tab/Cap 400 milliGRAM(s) Oral every 8 hours  cefepime   IVPB 2000 milliGRAM(s) IV Intermittent every 8 hours  clotrimazole Lozenge 1 Lozenge Oral five times a day  fluconAZOLE   Tablet 400 milliGRAM(s) Oral daily      Heme / Onc:   heparin  Infusion 312 Unit(s)/Hr IV Continuous <Continuous>      GI:  aluminum hydroxide/magnesium hydroxide/simethicone Suspension 30 milliLiter(s) Oral every 6 hours PRN  lactulose Syrup 20 Gram(s) Oral once PRN  pantoprazole    Tablet 40 milliGRAM(s) Oral before breakfast  polyethylene glycol 3350 17 Gram(s) Oral daily PRN  senna 2 Tablet(s) Oral at bedtime  sodium bicarbonate Mouth Rinse 10 milliLiter(s) Swish and Spit five times a day  ursodiol Capsule 300 milliGRAM(s) Oral two times a day      Cardiovascular:   amLODIPine   Tablet 10 milliGRAM(s) Oral daily  hydrALAZINE 25 milliGRAM(s) Oral three times a day      Immunologic:   filgrastim-sndz (ZARXIO) Injectable 480 MICROGram(s) SubCutaneous every 24 hours      Other medications:   acetaminophen     Tablet .. 650 milliGRAM(s) Oral every 6 hours  AQUAPHOR (petrolatum Ointment) 1 Application(s) Topical two times a day  benzonatate 100 milliGRAM(s) Oral three times a day  Biotene Dry Mouth Oral Rinse 5 milliLiter(s) Swish and Spit five times a day  chlorhexidine 4% Liquid 1 Application(s) Topical <User Schedule>  dextrose 5%. 1000 milliLiter(s) IV Continuous <Continuous>  dextrose 5%. 1000 milliLiter(s) IV Continuous <Continuous>  dextrose 50% Injectable 25 Gram(s) IV Push once  dextrose 50% Injectable 12.5 Gram(s) IV Push once  dextrose 50% Injectable 25 Gram(s) IV Push once  finasteride 5 milliGRAM(s) Oral daily  folic acid 1 milliGRAM(s) Oral daily  glucagon  Injectable 1 milliGRAM(s) IntraMuscular once  insulin lispro (ADMELOG) corrective regimen sliding scale   SubCutaneous three times a day before meals  insulin lispro (ADMELOG) corrective regimen sliding scale   SubCutaneous at bedtime  lidocaine/prilocaine Cream 1 Application(s) Topical daily  loratadine 10 milliGRAM(s) Oral daily  LORazepam   Injectable 1 milliGRAM(s) IV Push every 24 hours  multivitamin 1 Tablet(s) Oral daily  nystatin Powder 1 Application(s) Topical three times a day  sodium chloride 0.9%. 1000 milliLiter(s) IV Continuous <Continuous>  tamsulosin 0.4 milliGRAM(s) Oral at bedtime      PRN:   acetaminophen     Tablet .. 650 milliGRAM(s) Oral every 6 hours PRN  aluminum hydroxide/magnesium hydroxide/simethicone Suspension 30 milliLiter(s) Oral every 6 hours PRN  dextrose Oral Gel 15 Gram(s) Oral once PRN  lactulose Syrup 20 Gram(s) Oral once PRN  metoclopramide Injectable 10 milliGRAM(s) IV Push every 6 hours PRN  ondansetron Injectable 8 milliGRAM(s) IV Push every 8 hours PRN  polyethylene glycol 3350 17 Gram(s) Oral daily PRN  sodium chloride 0.9% lock flush 10 milliLiter(s) IV Push every 1 hour PRN    A/P: 72 year old male with multiple myeloma diagnosed 19 s/p RVD x 6 admitted for autologous pbsct with high dose melphalan prep regimen (dose reduced to 70mg / m2 for age)  Day + 13  10/25- melphalan ; continue melphalan hydration for 24 hours post infusion of last dose. Strict I&O, daily weights, prn diuresis   10/27- HPC transplant today; continue transplant hydration for 24 hours post infusion of cells   10/28 weight gain, I>O, lasix 60 mg iv x1; constipated, Lactulose now and PRN  - [facility]. Started on cipro 10/31/22. If T >/= 38C, pan cx, CXR and change cipro to cefepime 2g IV q 8 hours    Febrile overnight, cipro switched to Cefepime; BC(-), urine culture (+) GNR Enterobacter cloacae complex; follow up sensitivities    pending repeat urine culture  - repeat urine cx from  < 10K normal urogenital kunal. Continue Cefepime for 7 days   - engrafted 22 - continue cefepime for a 7 day course, continue zarxio for now     1. Infectious Disease:   acyclovir   Oral Tab/Cap 400 milliGRAM(s) Oral every 8 hours  cefepime   IVPB 2000 milliGRAM(s) IV Intermittent every 8 hours  clotrimazole Lozenge 1 Lozenge Oral five times a day  fluconAZOLE   Tablet 400 milliGRAM(s) Oral daily    2. VOD Prophylaxis: Actigall, Glutamine, Heparin (dosed at 100 units / kg / day)     3. GI Prophylaxis:    pantoprazole    Tablet 40 milliGRAM(s) Oral before breakfast    4. Mouthcare - NS / NaHCO3 rinses, Mycelex, Biotene; Skin care     5. GVHD prophylaxis - n/a     6. Transfuse & replete electrolytes prn     7. IV hydration, daily weights, strict I&O, prn diuresis     8. PO intake as tolerated, nutrition follow up as needed, MVI, folic acid     9. Antiemetics, anti-diarrhea medications:   metoclopramide Injectable 10 milliGRAM(s) IV Push every 6 hours PRN  ondansetron Injectable 8 milliGRAM(s) IV Push every 8 hours PRN  LORazepam   Injectable 1 milliGRAM(s) IV Push every 24 hours    10. OOB as tolerated, physical therapy consult if needed     11. Monitor coags / fibrinogen 2x week, vitamin K as needed     12. Monitor closely for clinical changes, monitor for fevers     13. Emotional support provided, plan of care discussed and questions addressed     14. Patient education done regarding  plan of care, restrictions and discharge planning     15. Continue regular social work input     I have written the above note for Dr. Gonzales who performed service with me in the room.   Shayla Lundberg NP-C ([phone])    I have seen and examined patient with NP, I agree with above note as scribed.

## 2022-11-09 NOTE — DIETITIAN NUTRITION RISK NOTIFICATION - TREATMENT: THE FOLLOWING DIET HAS BEEN RECOMMENDED
Diet, Regular:   GlutaSolve(Glutamine) 15gm pkg     Qty per Day:  1  Supplement Feeding Modality:  Oral  Glucerna Shake Cans or Servings Per Day:  1       Frequency:  Daily (10-25-22 @ 16:08) [Active]      
Diet, Regular:   GlutaSolve(Glutamine) 15gm pkg     Qty per Day:  1 (10-24-22 @ 10:19) [Active]

## 2022-11-09 NOTE — DIETITIAN NUTRITION RISK NOTIFICATION - MALNUTRITION EVALUATION AS DEMONSTRATED BY (ADULTS > 20 YEARS OF AGE)
Weight loss.../Inadequate energy intake.../Loss of subcutaneous fat.../Loss of muscle...
Inadequate energy intake.../Loss of subcutaneous fat.../Loss of muscle...

## 2022-11-09 NOTE — PROGRESS NOTE ADULT - CRITICAL CARE ATTENDING COMMENT
72 year old male with multiple myeloma diagnosed 12/20/21 s/p RVD x 6 admitted for autologous pbsct with high dose melphalan prep regimen (dose reduced to 70mg / m2 for age)    Day + 13...some soft stool..feeling better this am..increased saliva improved...wbc recovering..repeat urine cx less than 10,000...enterobacter sensitive to cefapime    1. Admit to BMTU   2. -3 hours prior to Melphalan start hydration: D5NS at 200ml /hr and continue 24 hours post Melphalan infusion  3. Day – 3 & day -2 - Melphalan 70mg/m2  IV   4. Strict I&O, daily weights, prn diuresis   5. Day -1 rest day. At 2200 on day – 1, start transplant hydration 1/2NS + 50mEq NaHCO3- (may substitute [id number] if bicarb not available)  6. Day 0 – infuse HPC product. 4 hours after infusion of cells start Zarxio 5 micrograms / kg (actual weight) . Continue through engraftment.   7. On day 0, + 1, + 2-give Kepivance 60micrograms / kg (actual weight).  8. VOD prophylaxis - low dose heparin gtt (dosed at 100 units / kg / day), glutamine supplementation, Actigall BID   9. PCP prophylaxis - Bactrim DS through day -2    10. Antiviral prophylaxis - Acyclovir 400mg po TID to start day -1   11. Antifungal prophylaxis- Diflucan 400 mg po daily.  12. GI prophylaxis - Protonix po QD   13. Antibacterial prophylaxis -  ANC < 500, started Cipro 500mg po BID.  febrile, pan cx, CXR and changed Cipro to Cefepime 2g IV q 8 hours. Continue until count recovery  14. Kepivance for prevention of mucositis- days 0, +1, +2  15. Aggressive mouth care and skin care as per protocol..senna, miralax  prn  16. The patient is aware of his diagnosis...he does not want to be reminded of it every day..  17 wbc recovering..d/c early next week 72 year old male with multiple myeloma diagnosed 12/20/21 s/p RVD x 6 admitted for autologous pbsct with high dose melphalan prep regimen (dose reduced to 70mg / m2 for age)    Day + 13...some soft stool..feeling better this am..increased saliva improved...wbc recovering..repeat urine cx less than 10,000...enterobacter sensitive to cefapime    1. Admit to BMTU   2. -3 hours prior to Melphalan start hydration: D5NS at 200ml /hr and continue 24 hours post Melphalan infusion  3. Day – 3 & day -2 - Melphalan 70mg/m2  IV   4. Strict I&O, daily weights, prn diuresis   5. Day -1 rest day. At 2200 on day – 1, start transplant hydration 1/2NS + 50mEq NaHCO3- (may substitute [id number] if bicarb not available)  6. Day 0 – infuse [facility]PC product. 4 hours after infusion of cells start Zarxio 5 micrograms / kg (actual weight) . Continue through engraftment.   7. On day 0, + 1, + 2-give Kepivance 60micrograms / kg (actual weight).  8. VOD prophylaxis - low dose heparin gtt (dosed at 100 units / kg / day), glutamine supplementation, Actigall BID   9. PCP prophylaxis - Bactrim DS through day -2    10. Antiviral prophylaxis - Acyclovir 400mg po TID to start day -1   11. Antifungal prophylaxis- Diflucan 400 mg po daily.  12. GI prophylaxis - Protonix po QD   13. Antibacterial prophylaxis -  ANC < 500, started Cipro 500mg po BID.  febrile, pan cx, CXR and changed Cipro to Cefepime 2g IV q 8 hours. Continue until count recovery  14. Kepivance for prevention of mucositis- days 0, +1, +2  15. Aggressive mouth care and skin care as per protocol..senna, miralax  prn  16. The patient is aware of his diagnosis...he does not want to be reminded of it every day..  17 wbc recovering..d/c early next week.

## 2022-11-09 NOTE — CHART NOTE - NSCHARTNOTEFT_GEN_A_CORE
Nutrition Follow Up Note  Patient seen for: malnutrition follow up    Chart reviewed, events noted.    Per chart, "This is a 72 year old male with multiple myeloma admitted for an autologous pbsct with high dose Melphalan prep regimen." Day +13      Source: [x] Patient       [x] EMR        [] RN        [] Family at bedside       [] Other:    Diet Order:   Diet, Regular:   GlutaSolve(Glutamine) 15gm pkg     Qty per Day:  1  Supplement Feeding Modality:  Oral  Glucerna Shake Cans or Servings Per Day:  1       Frequency:  Daily (10-25-)    - Is current order appropriate/adequate? [x] Yes  []  No:     - PO intake :   [] >75%  Adequate    [] 50-75%  Fair       [x] <50%  Poor    - Nutrition-related concerns:  - Pt noted his appetite is about the same since last visit (), eating only small portions of food, though he reported drinking up to 100% of supplements when he feels up to it  - Experiencing discomfort in his mouth accompanied by "sharp" pain while chewing and swallowing, does better drinking liquids and eating soft and moist foods, spoke about menu options, preferences obtained  - PMHx T2DM, latest A1C 6.8% (10/25) - controlled, POCTs controlled, ISS regimen (lispro)  - Performed an NFPE in setting of continued decreased appetite and downtrending weight fluctuations. Found the following: severe temporal, clavicle, and shoulder muscle/fat wasting, moderate buccal, orbital, triceps, calf, thigh, and dorsal hand muscle/fat wasting.     GI:  Last BM  Bowel Regimen? [x] Yes - pt is on miralax, senna, and lactulose syrup prn, has previous orders for imodium   - Pt noted he is sometimes constipated and then when he is able to have a BM it is diarrhea  - Denies N/V    Weights:   Daily Weight in k.4 (), Weight in k.8 (), Weight in k.1 (), Weight in k.3 (), Weight in k.4 (), Weight in k.5 (), Weight in k.5 ()  Dosing wt this admission: 74.9kg (10/24)  - Wt fluctuations likely 2/2 fluid shifts, overall wt has been downtrending likely 2/2 decreased appetite    Nutritionally Pertinent MEDICATIONS  (STANDING):  ABX  clotrimazole lozenge  folic acid  sliding scale insulin Admelog  multivitamin  pantoprazole   senna  sodium bicarbonate Mouth Rinse  sodium chloride    Pertinent Labs:  @ 07:29: BUN 6<L>, <H>    A1C with Estimated Average Glucose Result: 6.8 % (10-25-22 @ 07:26)    Finger Sticks:  POCT Blood Glucose.: 119 mg/dL ( @ 12:26)  POCT Blood Glucose.: 119 mg/dL ( @ 07:59)  POCT Blood Glucose.: 107 mg/dL ( @ 21:40)  POCT Blood Glucose.: 113 mg/dL ( @ 17:20)      Skin per nursing documentation: Per flowsheet, no pressure injuries noted   Edema: Per flowsheet, none noted     Estimated Needs: based on wt 10/25 - 74.8 kg  [x] no change since previous assessment  -Energy: 4081-9711 kcal/day (30-35 kcal/kg)  -Protein: 112.2-134.64 g/day (1.5-1.8 g/kg)     Previous Nutrition Diagnosis: moderate chronic malnutrition  Nutrition Diagnosis is: [x] ongoing  [] resolved [] not applicable     New Nutrition Diagnosis: pt upgraded to - Severe chronic malnutrition related to decreased ability to consume adequate protein-energy in setting of treatment side effects as evidenced by <75% EER > 1 month, moderate to severe muscle/fat depletion, 3% weight loss x 2 weeks    Nutrition Care Plan:  [x] In Progress  [] Achieved  [] Not applicable    Nutrition Interventions:     Education Provided:       [x] Yes: spoke with patient about foods that may irritate mouth and throat sores such as spicy, acidic, and rough foods  - Collaborated with the pt to create a daily menu that contains soft/moist foods to optimize intake while he is having pain related to chewing and swallowing.    Recommendations:      1) Continue regular diet as tolerated, encourage protein-rich foods, maximize preferences  2) Continue Glucerna 1x/day and [employer] glucose support 2x/day to optimize po intake  3) Continue with Glutasolve, multivitamin, and folic acid daily  4) Monitor BMs and adjust bowel regimen accordingly  5) Food preferences collected, RD available to adjust as needed, will start a personalized daily menu tomorrow 11/10 to aid in chewing/swallowing pain and optimize preferences  6) Monitor PO intake, labs, hydration, skin integrity and wt        Monitoring and Evaluation:   Continue to monitor nutritional intake, tolerance to diet prescription, weights, labs, skin integrity      RD remains available upon request and will follow up per protocol    Sandra Abbott, Dietetic Intern - pager #[phone] Nutrition Follow Up Note  Patient seen for: malnutrition follow up    Chart reviewed, events noted.    Per chart, "This is a 72 year old male with multiple myeloma admitted for an autologous pbsct with high dose Melphalan prep regimen." Day +13      Source: [x] Patient       [x] EMR        [] RN        [] Family at bedside       [] Other:    Diet Order:   Diet, Regular:   GlutaSolve(Glutamine) 15gm pkg     Qty per Day:  1  Supplement Feeding Modality:  Oral  Glucerna Shake Cans or Servings Per Day:  1       Frequency:  Daily (10-25-)    - Is current order appropriate/adequate? [x] Yes  []  No:     - PO intake :   [] >75%  Adequate    [] 50-75%  Fair       [x] <50%  Poor    - Nutrition-related concerns:  - Pt noted his appetite is about the same since last visit (), eating only small portions of food, though he reported drinking up to 100% of supplements when he feels up to it  - Experiencing discomfort in his mouth accompanied by "sharp" pain while chewing and swallowing, does better drinking liquids and eating soft and moist foods, spoke about menu options, preferences obtained  - PMHx T2DM, latest A1C 6.8% (10/25) - controlled, POCTs controlled, ISS regimen (lispro)  - Performed an NFPE in setting of continued decreased appetite and downtrending weight fluctuations. Found the following: severe temporal, clavicle, and shoulder muscle/fat wasting, moderate buccal, orbital, triceps, calf, thigh, and dorsal hand muscle/fat wasting.     GI:  Last BM  Bowel Regimen? [x] Yes - pt is on miralax, senna, and lactulose syrup prn, has previous orders for imodium   - Pt noted he is sometimes constipated and then when he is able to have a BM it is diarrhea  - Denies N/V    Weights:   Daily Weight in k.4 (), Weight in k.8 (), Weight in k.1 (), Weight in k.3 (), Weight in k.4 (), Weight in k.5 (), Weight in k.5 ()  Dosing wt this admission: 74.9kg (10/24)  - Wt fluctuations likely 2/2 fluid shifts, overall wt has been downtrending likely 2/2 decreased appetite    Nutritionally Pertinent MEDICATIONS  (STANDING):  ABX  clotrimazole lozenge  folic acid  sliding scale insulin Admelog  multivitamin  pantoprazole   senna  sodium bicarbonate Mouth Rinse  sodium chloride    Pertinent Labs:  @ 07:29: BUN 6<L>, <H>    A1C with Estimated Average Glucose Result: 6.8 % (10-25-22 @ 07:26)    Finger Sticks:  POCT Blood Glucose.: 119 mg/dL ( @ 12:26)  POCT Blood Glucose.: 119 mg/dL ( @ 07:59)  POCT Blood Glucose.: 107 mg/dL ( @ 21:40)  POCT Blood Glucose.: 113 mg/dL ( @ 17:20)      Skin per nursing documentation: Per flowsheet, no pressure injuries noted   Edema: Per flowsheet, none noted     Estimated Needs: based on wt 10/25 - 74.8 kg  [x] no change since previous assessment  -Energy: 5622-9934 kcal/day (30-35 kcal/kg)  -Protein: 112.2-134.64 g/day (1.5-1.8 g/kg)     Previous Nutrition Diagnosis: moderate chronic malnutrition  Nutrition Diagnosis is: [x] ongoing - being addressed through po intake, oral supplements and food preferences    New Nutrition Diagnosis: pt upgraded to - Severe chronic malnutrition related to decreased ability to consume adequate protein-energy in setting of treatment side effects as evidenced by <75% EER > 1 month, moderate to severe muscle/fat depletion, 3% weight loss x 2 weeks    Nutrition Care Plan:  [x] In Progress  [] Achieved  [] Not applicable    Nutrition Interventions:     Education Provided:       [x] Yes: spoke with patient about foods that may irritate mouth and throat sores such as spicy, acidic, and rough foods  - Collaborated with the pt to create a daily menu that contains soft/moist foods to optimize intake while he is having pain related to chewing and swallowing.    Recommendations:      1) Continue regular diet as tolerated, encourage protein-rich foods, maximize preferences  2) Continue Glucerna 1x/day and [employer] glucose support 2x/day to optimize po intake  3) Continue with Glutasolve, multivitamin, and folic acid daily  4) Monitor BMs and adjust bowel regimen accordingly  5) Food preferences collected, RD available to adjust as needed, will start a personalized daily menu tomorrow 11/10 to aid in chewing/swallowing pain and optimize preferences  6) Monitor PO intake, labs, hydration, skin integrity and wt        Monitoring and Evaluation:   Continue to monitor nutritional intake, tolerance to diet prescription, weights, labs, skin integrity      RD remains available upon request and will follow up per protocol    Sandra Abbott, Dietetic Intern - pager #[phone] Nutrition Follow Up Note  Patient seen for: malnutrition follow up    Chart reviewed, events noted.    Per chart, "This is a 72 year old male with multiple myeloma admitted for an autologous pbsct with high dose Melphalan prep regimen." Day +13      Source: [x] Patient       [x] EMR        [] RN        [] Family at bedside       [] Other:    Diet Order:   Diet, Regular:   GlutaSolve(Glutamine) 15gm pkg     Qty per Day:  1  Supplement Feeding Modality:  Oral  Glucerna Shake Cans or Servings Per Day:  1       Frequency:  Daily (10-25-)    - Is current order appropriate/adequate? [x] Yes  []  No:     - PO intake :   [] >75%  Adequate    [] 50-75%  Fair       [x] <50%  Poor    - Nutrition-related concerns:  - Pt noted his appetite is about the same since last visit (), eating only small portions of food, though he reported drinking up to 100% of supplements when he feels up to it  - Experiencing discomfort in his mouth accompanied by "sharp" pain while chewing and swallowing, does better drinking liquids and eating soft and moist foods, spoke about menu options, preferences obtained  - PMHx T2DM, latest A1C 6.8% (10/25) - controlled, POCTs controlled, ISS regimen (lispro)  - Performed an NFPE in setting of continued decreased appetite and downtrending weight fluctuations. Found the following: severe temporal, clavicle, and shoulder muscle/fat wasting, moderate buccal, orbital, triceps, calf, thigh, and dorsal hand muscle/fat wasting.     GI:  Last BM  Bowel Regimen? [x] Yes - pt is on miralax, senna, and lactulose syrup prn, has previous orders for imodium   - Pt noted he is sometimes constipated and then when he is able to have a BM it is diarrhea  - Denies N/V    Weights:   Daily Weight in k.4 (), Weight in k.8 (), Weight in k.1 (), Weight in k.3 (), Weight in k.4 (), Weight in k.5 (), Weight in k.5 ()  Dosing wt this admission: 74.9kg (10/24)  ** Weight fluctuating - of note Pt has received IVF/diuretics in-house. Weight changes likely secondary to fluid shifts. RD to continue to monitor weight trends as able.   ** Overall weight loss of 3% x weeks      Nutritionally Pertinent MEDICATIONS  (STANDING):  ABX  clotrimazole lozenge  folic acid  sliding scale insulin Admelog  multivitamin  pantoprazole   senna  sodium bicarbonate Mouth Rinse  sodium chloride    Pertinent Labs:  @ 07:29: BUN 6<L>, <H>    A1C with Estimated Average Glucose Result: 6.8 % (10-25-22 @ 07:26)    Finger Sticks:  POCT Blood Glucose.: 119 mg/dL ( @ 12:26)  POCT Blood Glucose.: 119 mg/dL ( @ 07:59)  POCT Blood Glucose.: 107 mg/dL ( @ 21:40)  POCT Blood Glucose.: 113 mg/dL ( @ 17:20)      Skin per nursing documentation: Per flowsheet, no pressure injuries noted   Edema: Per flowsheet, none noted     Estimated Needs: based on wt 10/25 - 74.8 kg  [x] no change since previous assessment  -Energy: 7451-4282 kcal/day (30-35 kcal/kg)  -Protein: 112.2-134.64 g/day (1.5-1.8 g/kg)     Previous Nutrition Diagnosis: moderate chronic malnutrition  Nutrition Diagnosis is: [x] ongoing - being addressed through po intake, oral supplements and food preferences    New Nutrition Diagnosis: pt upgraded to - Severe acute on chronic malnutrition related to decreased ability to consume adequate protein-energy in setting of treatment side effects as evidenced by <50% EER x >5 days, moderate-severe muscle/fat depletion, 3% weight loss x 2 weeks    Nutrition Care Plan:  [x] In Progress  [] Achieved  [] Not applicable    Nutrition Interventions:     Education Provided:       [x] Yes: spoke with patient about foods that may irritate mouth and throat sores such as spicy, acidic, and rough foods. Encouraged Pt to prioritize soft foods, moist foods, liquids. Use condiments to moisten foods.   - Collaborated with the pt to create a daily menu that contains soft/moist foods to optimize intake while he is having pain related to chewing and swallowing.    Recommendations:      1) Continue regular diet as tolerated, encourage protein-rich foods, maximize preferences  2) Continue Glucerna 1x/day and [employer] glucose support 2x/day to optimize po intake  3) Continue with Glutasolve, multivitamin, and folic acid daily  4) Monitor BMs and adjust bowel regimen accordingly  5) Food preferences collected, RD available to adjust as needed, will start a personalized daily menu tomorrow 11/10 to aid in chewing/swallowing pain and optimize preferences  6) Monitor PO intake, labs, hydration, skin integrity and wt      Monitoring and Evaluation:   Continue to monitor nutritional intake, tolerance to diet prescription, weights, labs, skin integrity    RD remains available upon request and will follow up per protocol  Sandra Abbott, Dietetic Intern - pager #[phone]

## 2022-11-10 LAB
ALBUMIN SERPL ELPH-MCNC: 3.6 G/DL — SIGNIFICANT CHANGE UP (ref 3.3–5)
ALP SERPL-CCNC: 123 U/L — HIGH (ref 40–120)
ALT FLD-CCNC: 19 U/L — SIGNIFICANT CHANGE UP (ref 10–45)
ANION GAP SERPL CALC-SCNC: 9 MMOL/L — SIGNIFICANT CHANGE UP (ref 5–17)
APTT BLD: 32.1 SEC — SIGNIFICANT CHANGE UP (ref 27.5–35.5)
AST SERPL-CCNC: 15 U/L — SIGNIFICANT CHANGE UP (ref 10–40)
BASOPHILS # BLD AUTO: 0 K/UL — SIGNIFICANT CHANGE UP (ref 0–0.2)
BASOPHILS NFR BLD AUTO: 0 % — SIGNIFICANT CHANGE UP (ref 0–2)
BILIRUB SERPL-MCNC: 0.2 MG/DL — SIGNIFICANT CHANGE UP (ref 0.2–1.2)
BUN SERPL-MCNC: 6 MG/DL — LOW (ref 7–23)
CALCIUM SERPL-MCNC: 9.3 MG/DL — SIGNIFICANT CHANGE UP (ref 8.4–10.5)
CHLORIDE SERPL-SCNC: 97 MMOL/L — SIGNIFICANT CHANGE UP (ref 96–108)
CO2 SERPL-SCNC: 28 MMOL/L — SIGNIFICANT CHANGE UP (ref 22–31)
CREAT SERPL-MCNC: 0.68 MG/DL — SIGNIFICANT CHANGE UP (ref 0.5–1.3)
CULTURE RESULTS: SIGNIFICANT CHANGE UP
EGFR: 98 ML/MIN/1.73M2 — SIGNIFICANT CHANGE UP
EOSINOPHIL # BLD AUTO: 0 K/UL — SIGNIFICANT CHANGE UP (ref 0–0.5)
EOSINOPHIL NFR BLD AUTO: 0 % — SIGNIFICANT CHANGE UP (ref 0–6)
FIBRINOGEN PPP-MCNC: 442 MG/DL — SIGNIFICANT CHANGE UP (ref 200–445)
GLUCOSE BLDC GLUCOMTR-MCNC: 109 MG/DL — HIGH (ref 70–99)
GLUCOSE BLDC GLUCOMTR-MCNC: 120 MG/DL — HIGH (ref 70–99)
GLUCOSE BLDC GLUCOMTR-MCNC: 132 MG/DL — HIGH (ref 70–99)
GLUCOSE BLDC GLUCOMTR-MCNC: 146 MG/DL — HIGH (ref 70–99)
GLUCOSE SERPL-MCNC: 131 MG/DL — HIGH (ref 70–99)
HCT VFR BLD CALC: 30.9 % — LOW (ref 39–50)
HGB BLD-MCNC: 10.2 G/DL — LOW (ref 13–17)
INR BLD: 1.05 RATIO — SIGNIFICANT CHANGE UP (ref 0.88–1.16)
LDH SERPL L TO P-CCNC: 192 U/L — SIGNIFICANT CHANGE UP (ref 50–242)
LYMPHOCYTES # BLD AUTO: 0.56 K/UL — LOW (ref 1–3.3)
LYMPHOCYTES # BLD AUTO: 5.8 % — LOW (ref 13–44)
MAGNESIUM SERPL-MCNC: 1.7 MG/DL — SIGNIFICANT CHANGE UP (ref 1.6–2.6)
MANUAL SMEAR VERIFICATION: SIGNIFICANT CHANGE UP
MCHC RBC-ENTMCNC: 25.9 PG — LOW (ref 27–34)
MCHC RBC-ENTMCNC: 33 GM/DL — SIGNIFICANT CHANGE UP (ref 32–36)
MCV RBC AUTO: 78.4 FL — LOW (ref 80–100)
MONOCYTES # BLD AUTO: 0.28 K/UL — SIGNIFICANT CHANGE UP (ref 0–0.9)
MONOCYTES NFR BLD AUTO: 2.9 % — SIGNIFICANT CHANGE UP (ref 2–14)
NEUTROPHILS # BLD AUTO: 8.85 K/UL — HIGH (ref 1.8–7.4)
NEUTROPHILS NFR BLD AUTO: 85.5 % — HIGH (ref 43–77)
NEUTS BAND # BLD: 5.8 % — SIGNIFICANT CHANGE UP (ref 0–8)
PHOSPHATE SERPL-MCNC: 2.3 MG/DL — LOW (ref 2.5–4.5)
PLAT MORPH BLD: NORMAL — SIGNIFICANT CHANGE UP
PLATELET # BLD AUTO: 41 K/UL — LOW (ref 150–400)
POTASSIUM SERPL-MCNC: 4.2 MMOL/L — SIGNIFICANT CHANGE UP (ref 3.5–5.3)
POTASSIUM SERPL-SCNC: 4.2 MMOL/L — SIGNIFICANT CHANGE UP (ref 3.5–5.3)
PROT SERPL-MCNC: 6.9 G/DL — SIGNIFICANT CHANGE UP (ref 6–8.3)
PROTHROM AB SERPL-ACNC: 12.2 SEC — SIGNIFICANT CHANGE UP (ref 10.5–13.4)
RBC # BLD: 3.94 M/UL — LOW (ref 4.2–5.8)
RBC # FLD: 14.1 % — SIGNIFICANT CHANGE UP (ref 10.3–14.5)
RBC BLD AUTO: SIGNIFICANT CHANGE UP
SODIUM SERPL-SCNC: 134 MMOL/L — LOW (ref 135–145)
SPECIMEN SOURCE: SIGNIFICANT CHANGE UP
WBC # BLD: 9.69 K/UL — SIGNIFICANT CHANGE UP (ref 3.8–10.5)
WBC # FLD AUTO: 9.69 K/UL — SIGNIFICANT CHANGE UP (ref 3.8–10.5)

## 2022-11-10 PROCEDURE — ZZZZZ: CPT

## 2022-11-10 RX ADMIN — LORATADINE 10 MILLIGRAM(S): 10 TABLET ORAL at 11:53

## 2022-11-10 RX ADMIN — Medication 10 MILLILITER(S): at 11:50

## 2022-11-10 RX ADMIN — HEPARIN SODIUM 3.12 UNIT(S)/HR: 5000 INJECTION INTRAVENOUS; SUBCUTANEOUS at 19:54

## 2022-11-10 RX ADMIN — SODIUM CHLORIDE 50 MILLILITER(S): 9 INJECTION INTRAMUSCULAR; INTRAVENOUS; SUBCUTANEOUS at 19:54

## 2022-11-10 RX ADMIN — Medication 1 LOZENGE: at 19:51

## 2022-11-10 RX ADMIN — Medication 5 MILLILITER(S): at 19:51

## 2022-11-10 RX ADMIN — URSODIOL 300 MILLIGRAM(S): 250 TABLET, FILM COATED ORAL at 06:59

## 2022-11-10 RX ADMIN — ONDANSETRON 8 MILLIGRAM(S): 8 TABLET, FILM COATED ORAL at 07:50

## 2022-11-10 RX ADMIN — Medication 400 MILLIGRAM(S): at 20:05

## 2022-11-10 RX ADMIN — Medication 1 TABLET(S): at 11:48

## 2022-11-10 RX ADMIN — Medication 1 APPLICATION(S): at 07:06

## 2022-11-10 RX ADMIN — SENNA PLUS 2 TABLET(S): 8.6 TABLET ORAL at 20:06

## 2022-11-10 RX ADMIN — NYSTATIN CREAM 1 APPLICATION(S): 100000 CREAM TOPICAL at 20:05

## 2022-11-10 RX ADMIN — Medication 5 MILLILITER(S): at 08:54

## 2022-11-10 RX ADMIN — Medication 10 MILLILITER(S): at 08:55

## 2022-11-10 RX ADMIN — Medication 10 MILLILITER(S): at 19:51

## 2022-11-10 RX ADMIN — Medication 1 LOZENGE: at 15:27

## 2022-11-10 RX ADMIN — TAMSULOSIN HYDROCHLORIDE 0.4 MILLIGRAM(S): 0.4 CAPSULE ORAL at 20:06

## 2022-11-10 RX ADMIN — PANTOPRAZOLE SODIUM 40 MILLIGRAM(S): 20 TABLET, DELAYED RELEASE ORAL at 06:59

## 2022-11-10 RX ADMIN — Medication 5 MILLILITER(S): at 15:27

## 2022-11-10 RX ADMIN — FINASTERIDE 5 MILLIGRAM(S): 5 TABLET, FILM COATED ORAL at 11:48

## 2022-11-10 RX ADMIN — Medication 10 MILLILITER(S): at 15:27

## 2022-11-10 RX ADMIN — URSODIOL 300 MILLIGRAM(S): 250 TABLET, FILM COATED ORAL at 17:32

## 2022-11-10 RX ADMIN — NYSTATIN CREAM 1 APPLICATION(S): 100000 CREAM TOPICAL at 07:07

## 2022-11-10 RX ADMIN — Medication 1 MILLIGRAM(S): at 11:48

## 2022-11-10 RX ADMIN — ONDANSETRON 8 MILLIGRAM(S): 8 TABLET, FILM COATED ORAL at 19:55

## 2022-11-10 RX ADMIN — Medication 10 MILLIGRAM(S): at 09:42

## 2022-11-10 RX ADMIN — Medication 5 MILLILITER(S): at 11:49

## 2022-11-10 RX ADMIN — FLUCONAZOLE 400 MILLIGRAM(S): 150 TABLET ORAL at 11:49

## 2022-11-10 RX ADMIN — Medication 100 MILLIGRAM(S): at 06:59

## 2022-11-10 RX ADMIN — Medication 1 LOZENGE: at 08:55

## 2022-11-10 RX ADMIN — CHLORHEXIDINE GLUCONATE 1 APPLICATION(S): 213 SOLUTION TOPICAL at 07:07

## 2022-11-10 RX ADMIN — Medication 25 MILLIGRAM(S): at 20:06

## 2022-11-10 RX ADMIN — Medication 10 MILLILITER(S): at 00:00

## 2022-11-10 RX ADMIN — Medication 400 MILLIGRAM(S): at 13:58

## 2022-11-10 RX ADMIN — CEFEPIME 100 MILLIGRAM(S): 1 INJECTION, POWDER, FOR SOLUTION INTRAMUSCULAR; INTRAVENOUS at 06:57

## 2022-11-10 RX ADMIN — Medication 25 MILLIGRAM(S): at 14:06

## 2022-11-10 RX ADMIN — Medication 25 MILLIGRAM(S): at 06:59

## 2022-11-10 RX ADMIN — Medication 1 LOZENGE: at 11:49

## 2022-11-10 RX ADMIN — NYSTATIN CREAM 1 APPLICATION(S): 100000 CREAM TOPICAL at 14:59

## 2022-11-10 RX ADMIN — Medication 400 MILLIGRAM(S): at 06:59

## 2022-11-10 RX ADMIN — Medication 30 MILLILITER(S): at 06:50

## 2022-11-10 RX ADMIN — CEFEPIME 100 MILLIGRAM(S): 1 INJECTION, POWDER, FOR SOLUTION INTRAMUSCULAR; INTRAVENOUS at 13:57

## 2022-11-10 RX ADMIN — AMLODIPINE BESYLATE 10 MILLIGRAM(S): 2.5 TABLET ORAL at 06:59

## 2022-11-10 RX ADMIN — Medication 1 APPLICATION(S): at 17:32

## 2022-11-10 RX ADMIN — CEFEPIME 100 MILLIGRAM(S): 1 INJECTION, POWDER, FOR SOLUTION INTRAMUSCULAR; INTRAVENOUS at 20:05

## 2022-11-10 NOTE — PROGRESS NOTE ADULT - SUBJECTIVE AND OBJECTIVE BOX
[facility] Transplant Team                                                      Critical / Counseling Time Provided: 30 minutes                                                                                                                                                        Chief Complaint: Autologous peripheral blood stem cell transplant with high dose Melphalan prep regimen for treatment of multiple myeloma    S: Patient seen and examined with HPC Transplant Team:     All other ROS negative     O: Vitals:   Vital Signs Last 24 Hrs  T(C): 37 (10 Nov 2022 06:07), Max: 37.1 (2022 21:27)  T(F): 98.6 (10 Nov 2022 06:07), Max: 98.8 (2022 21:27)  HR: 110 (10 Nov 2022 06:07) (98 - 110)  BP: 146/70 (10 Nov 2022 06:07) (124/64 - 146/70)  BP(mean): --  RR: 18 (10 Nov 2022 06:07) (16 - 18)  SpO2: 97% (10 Nov 2022 06:07) (96% - 98%)    Parameters below as of 2022 21:27  Patient On (Oxygen Delivery Method): room air    Admit weight: 74.9kg   Daily Weight in k.4 (2022 09:10)  Today's weight:     Intake / Output:    @ 07:01  -  11-10 @ 07:00  --------------------------------------------------------  IN: 2172 mL / OUT: 3000 mL / NET: -828 mL      PE:   Oropharynx: scattered erythema no ulcerations  Oral Mucositis:                                                     Grade NA  CVS: S1, S2 RRR   Lungs: CTA throughout bilaterally   Abdomen: + BS x 4, soft, NT, ND   Extremities: no edema  Gastric Mucositis:      -                                            Grade: n/a  Intestinal Mucositis:    -                                          Grade: n/a  Skin: no rash   TLC: CDI   Neuro: A&Ox3   Pain: denies    Labs:     Cultures:   Culture Results:   <10,000 CFU/mL Normal Urogenital Kunal (22 @ 16:04)    Culture Results:   No growth to date. (22 @ 00:31)    Culture Results:   No growth to date. (22 @ 00:31)    Culture Results:   >100,000 CFU/ml Enterobacter cloacae complex (22 @ 00:31)    Radiology:   ACC: [id number] EXAM:  XR CHEST PORTABLE ROUTINE 1V                        PROCEDURE DATE:  2022    Impression:  Clear lungs.    Meds:   Antimicrobials:   acyclovir   Oral Tab/Cap 400 milliGRAM(s) Oral every 8 hours  cefepime   IVPB 2000 milliGRAM(s) IV Intermittent every 8 hours  clotrimazole Lozenge 1 Lozenge Oral five times a day  fluconAZOLE   Tablet 400 milliGRAM(s) Oral daily      Heme / Onc:   heparin  Infusion 312 Unit(s)/Hr IV Continuous <Continuous>      GI:  aluminum hydroxide/magnesium hydroxide/simethicone Suspension 30 milliLiter(s) Oral every 6 hours PRN  lactulose Syrup 20 Gram(s) Oral once PRN  pantoprazole    Tablet 40 milliGRAM(s) Oral before breakfast  polyethylene glycol 3350 17 Gram(s) Oral daily PRN  senna 2 Tablet(s) Oral at bedtime  sodium bicarbonate Mouth Rinse 10 milliLiter(s) Swish and Spit five times a day  ursodiol Capsule 300 milliGRAM(s) Oral two times a day      Cardiovascular:   amLODIPine   Tablet 10 milliGRAM(s) Oral daily  hydrALAZINE 25 milliGRAM(s) Oral three times a day      Immunologic:   filgrastim-sndz (ZARXIO) Injectable 480 MICROGram(s) SubCutaneous every 24 hours      Other medications:   acetaminophen     Tablet .. 650 milliGRAM(s) Oral every 6 hours  AQUAPHOR (petrolatum Ointment) 1 Application(s) Topical two times a day  benzonatate 100 milliGRAM(s) Oral three times a day  Biotene Dry Mouth Oral Rinse 5 milliLiter(s) Swish and Spit five times a day  chlorhexidine 4% Liquid 1 Application(s) Topical <User Schedule>  dextrose 5%. 1000 milliLiter(s) IV Continuous <Continuous>  dextrose 5%. 1000 milliLiter(s) IV Continuous <Continuous>  dextrose 50% Injectable 25 Gram(s) IV Push once  dextrose 50% Injectable 12.5 Gram(s) IV Push once  dextrose 50% Injectable 25 Gram(s) IV Push once  finasteride 5 milliGRAM(s) Oral daily  folic acid 1 milliGRAM(s) Oral daily  glucagon  Injectable 1 milliGRAM(s) IntraMuscular once  insulin lispro (ADMELOG) corrective regimen sliding scale   SubCutaneous three times a day before meals  insulin lispro (ADMELOG) corrective regimen sliding scale   SubCutaneous at bedtime  lidocaine/prilocaine Cream 1 Application(s) Topical daily  loratadine 10 milliGRAM(s) Oral daily  LORazepam   Injectable 1 milliGRAM(s) IV Push every 24 hours  multivitamin 1 Tablet(s) Oral daily  nystatin Powder 1 Application(s) Topical three times a day  sodium chloride 0.9%. 1000 milliLiter(s) IV Continuous <Continuous>  tamsulosin 0.4 milliGRAM(s) Oral at bedtime      PRN:   acetaminophen     Tablet .. 650 milliGRAM(s) Oral every 6 hours PRN  aluminum hydroxide/magnesium hydroxide/simethicone Suspension 30 milliLiter(s) Oral every 6 hours PRN  dextrose Oral Gel 15 Gram(s) Oral once PRN  lactulose Syrup 20 Gram(s) Oral once PRN  metoclopramide Injectable 10 milliGRAM(s) IV Push every 6 hours PRN  ondansetron Injectable 8 milliGRAM(s) IV Push every 8 hours PRN  polyethylene glycol 3350 17 Gram(s) Oral daily PRN  sodium chloride 0.9% lock flush 10 milliLiter(s) IV Push every 1 hour PRN    A/P: 72 year old male with multiple myeloma diagnosed 19 s/p RVD x 6 admitted for autologous pbsct with high dose melphalan prep regimen (dose reduced to 70mg / m2 for age)  Day + 14  10/25- melphalan /; continue melphalan hydration for 24 hours post infusion of last dose. Strict I&O, daily weights, prn diuresis   10/27- HPC transplant today; continue transplant hydration for 24 hours post infusion of cells   10/28 weight gain, I>O, lasix 60 mg iv x1; constipated, Lactulose now and PRN  - Parrish. Started on cipro 10/31/22. If T >/= 38C, pan cx, CXR and change cipro to cefepime 2g IV q 8 hours    Febrile overnight, cipro switched to Cefepime; BC(-), urine culture (+) GNR Enterobacter cloacae complex; follow up sensitivities    pending repeat urine culture  - repeat urine cx from 11/6 < 10K normal urogenital kunal. Continue Cefepime for 7 days   - engrafted 22 - continue cefepime for a 7 day course, continue zarxio for now     1. Infectious Disease:   acyclovir   Oral Tab/Cap 400 milliGRAM(s) Oral every 8 hours  cefepime   IVPB 2000 milliGRAM(s) IV Intermittent every 8 hours  clotrimazole Lozenge 1 Lozenge Oral five times a day  fluconAZOLE   Tablet 400 milliGRAM(s) Oral daily    2. VOD Prophylaxis: Actigall, Glutamine, Heparin (dosed at 100 units / kg / day)     3. GI Prophylaxis:    pantoprazole    Tablet 40 milliGRAM(s) Oral before breakfast    4. Mouthcare - NS / NaHCO3 rinses, Mycelex, Biotene; Skin care     5. GVHD prophylaxis - n/a     6. Transfuse & replete electrolytes prn     7. IV hydration, daily weights, strict I&O, prn diuresis     8. PO intake as tolerated, nutrition follow up as needed, MVI, folic acid     9. Antiemetics, anti-diarrhea medications:   metoclopramide Injectable 10 milliGRAM(s) IV Push every 6 hours PRN  ondansetron Injectable 8 milliGRAM(s) IV Push every 8 hours PRN  LORazepam   Injectable 1 milliGRAM(s) IV Push every 24 hours    10. OOB as tolerated, physical therapy consult if needed     11. Monitor coags / fibrinogen 2x week, vitamin K as needed     12. Monitor closely for clinical changes, monitor for fevers     13. Emotional support provided, plan of care discussed and questions addressed     14. Patient education done regarding plan of care, restrictions and discharge planning     15. Continue regular social work input     I have written the above note for Dr. Gonzales who performed service with me in the room.   Shayla Lundberg NP-C ([phone])    I have seen and examined patient with NP, I agree with above note as scribed.                    [facility] Transplant Team                                                      Critical / Counseling Time Provided: 30 minutes                                                                                                                                                        Chief Complaint: Autologous peripheral blood stem cell transplant with high dose Melphalan prep regimen for treatment of multiple myeloma    S: Patient seen and examined with [facility] Transplant Team:     All other ROS negative     O: Vitals:   Vital Signs Last 24 Hrs  T(C): 37 (10 Nov 2022 06:07), Max: 37.1 (2022 21:27)  T(F): 98.6 (10 Nov 2022 06:07), Max: 98.8 (2022 21:27)  HR: 110 (10 Nov 2022 06:07) (98 - 110)  BP: 146/70 (10 Nov 2022 06:07) (124/64 - 146/70)  BP(mean): --  RR: 18 (10 Nov 2022 06:07) (16 - 18)  SpO2: 97% (10 Nov 2022 06:07) (96% - 98%)    Parameters below as of 2022 21:27  Patient On (Oxygen Delivery Method): room air    Admit weight: 74.9kg   Daily Weight in k.4 (2022 09:10)  Today's weight:     Intake / Output:    @ 07:01  -  11-10 @ 07:00  --------------------------------------------------------  IN: 2172 mL / OUT: 3000 mL / NET: -828 mL      PE:   Oropharynx: scattered erythema no ulcerations  Oral Mucositis:                                                     Grade NA  CVS: S1, S2 RRR   Lungs: CTA throughout bilaterally   Abdomen: + BS x 4, soft, NT, ND   Extremities: no edema  Gastric Mucositis:      -                                            Grade: n/a  Intestinal Mucositis:    -                                          Grade: n/a  Skin: no rash   TLC: CDI   Neuro: A&Ox3   Pain: denies    Labs:                         10.2   9.69  )-----------( 41       ( 10 Nov 2022 07:34 )             30.9     11-10    134<L>  |  97  |  6<L>  ----------------------------<  131<H>  4.2   |  28  |  0.68    Ca    9.3      10 Nov 2022 07:34  Phos  2.3     11-10  Mg     1.7     -10    TPro  6.9  /  Alb  3.6  /  TBili  0.2  /  DBili  x   /  AST  15  /  ALT  19  /  AlkPhos  123<H>  11-10    Cultures:   Culture Results:   <10,000 CFU/mL Normal Urogenital Kunal (22 @ 16:04)    Culture Results:   No growth to date. (22 @ 00:31)    Culture Results:   No growth to date. (22 @ 00:31)    Culture Results:   >100,000 CFU/ml Enterobacter cloacae complex (22 @ 00:31)    Radiology:   ACC: [id number] EXAM:  XR CHEST PORTABLE ROUTINE 1V                        PROCEDURE DATE:  2022    Impression:  Clear lungs.    Meds:   Antimicrobials:   acyclovir   Oral Tab/Cap 400 milliGRAM(s) Oral every 8 hours  cefepime   IVPB 2000 milliGRAM(s) IV Intermittent every 8 hours  clotrimazole Lozenge 1 Lozenge Oral five times a day  fluconAZOLE   Tablet 400 milliGRAM(s) Oral daily      Heme / Onc:   heparin  Infusion 312 Unit(s)/Hr IV Continuous <Continuous>      GI:  aluminum hydroxide/magnesium hydroxide/simethicone Suspension 30 milliLiter(s) Oral every 6 hours PRN  lactulose Syrup 20 Gram(s) Oral once PRN  pantoprazole    Tablet 40 milliGRAM(s) Oral before breakfast  polyethylene glycol 3350 17 Gram(s) Oral daily PRN  senna 2 Tablet(s) Oral at bedtime  sodium bicarbonate Mouth Rinse 10 milliLiter(s) Swish and Spit five times a day  ursodiol Capsule 300 milliGRAM(s) Oral two times a day      Cardiovascular:   amLODIPine   Tablet 10 milliGRAM(s) Oral daily  hydrALAZINE 25 milliGRAM(s) Oral three times a day      Immunologic:   filgrastim-sndz (ZARXIO) Injectable 480 MICROGram(s) SubCutaneous every 24 hours      Other medications:   acetaminophen     Tablet .. 650 milliGRAM(s) Oral every 6 hours  AQUAPHOR (petrolatum Ointment) 1 Application(s) Topical two times a day  benzonatate 100 milliGRAM(s) Oral three times a day  Biotene Dry Mouth Oral Rinse 5 milliLiter(s) Swish and Spit five times a day  chlorhexidine 4% Liquid 1 Application(s) Topical <User Schedule>  dextrose 5%. 1000 milliLiter(s) IV Continuous <Continuous>  dextrose 5%. 1000 milliLiter(s) IV Continuous <Continuous>  dextrose 50% Injectable 25 Gram(s) IV Push once  dextrose 50% Injectable 12.5 Gram(s) IV Push once  dextrose 50% Injectable 25 Gram(s) IV Push once  finasteride 5 milliGRAM(s) Oral daily  folic acid 1 milliGRAM(s) Oral daily  glucagon  Injectable 1 milliGRAM(s) IntraMuscular once  insulin lispro (ADMELOG) corrective regimen sliding scale   SubCutaneous three times a day before meals  insulin lispro (ADMELOG) corrective regimen sliding scale   SubCutaneous at bedtime  lidocaine/prilocaine Cream 1 Application(s) Topical daily  loratadine 10 milliGRAM(s) Oral daily  LORazepam   Injectable 1 milliGRAM(s) IV Push every 24 hours  multivitamin 1 Tablet(s) Oral daily  nystatin Powder 1 Application(s) Topical three times a day  sodium chloride 0.9%. 1000 milliLiter(s) IV Continuous <Continuous>  tamsulosin 0.4 milliGRAM(s) Oral at bedtime      PRN:   acetaminophen     Tablet .. 650 milliGRAM(s) Oral every 6 hours PRN  aluminum hydroxide/magnesium hydroxide/simethicone Suspension 30 milliLiter(s) Oral every 6 hours PRN  dextrose Oral Gel 15 Gram(s) Oral once PRN  lactulose Syrup 20 Gram(s) Oral once PRN  metoclopramide Injectable 10 milliGRAM(s) IV Push every 6 hours PRN  ondansetron Injectable 8 milliGRAM(s) IV Push every 8 hours PRN  polyethylene glycol 3350 17 Gram(s) Oral daily PRN  sodium chloride 0.9% lock flush 10 milliLiter(s) IV Push every 1 hour PRN    A/P: 72 year old male with multiple myeloma diagnosed 19 s/p RVD x 6 admitted for autologous pbsct with high dose melphalan prep regimen (dose reduced to 70mg / m2 for age)  Day + 14  10/25- melphalan /; continue melphalan hydration for 24 hours post infusion of last dose. Strict I&O, daily weights, prn diuresis   10/27- HPC transplant today; continue transplant hydration for 24 hours post infusion of cells   10/28 weight gain, I>O, lasix 60 mg iv x1; constipated, Lactulose now and PRN  - Parrish. Started on cipro 10/31/22. If T >/= 38C, pan cx, CXR and change cipro to cefepime 2g IV q 8 hours    Febrile overnight, cipro switched to Cefepime; BC(-), urine culture (+) GNR Enterobacter cloacae complex; follow up sensitivities    pending repeat urine culture  - repeat urine cx from  < 10K normal urogenital kunal. Continue Cefepime for 7 days   - engrafted 22 - continue cefepime for a 7 day course, continue zarxio for now     1. Infectious Disease:   acyclovir   Oral Tab/Cap 400 milliGRAM(s) Oral every 8 hours  cefepime   IVPB 2000 milliGRAM(s) IV Intermittent every 8 hours  clotrimazole Lozenge 1 Lozenge Oral five times a day  fluconAZOLE   Tablet 400 milliGRAM(s) Oral daily    2. VOD Prophylaxis: Actigall, Glutamine, Heparin (dosed at 100 units / kg / day)     3. GI Prophylaxis:    pantoprazole    Tablet 40 milliGRAM(s) Oral before breakfast    4. Mouthcare - NS / NaHCO3 rinses, Mycelex, Biotene; Skin care     5. GVHD prophylaxis - n/a     6. Transfuse & replete electrolytes prn     7. IV hydration, daily weights, strict I&O, prn diuresis     8. PO intake as tolerated, nutrition follow up as needed, MVI, folic acid     9. Antiemetics, anti-diarrhea medications:   metoclopramide Injectable 10 milliGRAM(s) IV Push every 6 hours PRN  ondansetron Injectable 8 milliGRAM(s) IV Push every 8 hours PRN  LORazepam   Injectable 1 milliGRAM(s) IV Push every 24 hours    10. OOB as tolerated, physical therapy consult if needed     11. Monitor coags / fibrinogen 2x week, vitamin K as needed     12. Monitor closely for clinical changes, monitor for fevers     13. Emotional support provided, plan of care discussed and questions addressed     14. Patient education done regarding plan of care, restrictions and discharge planning     15. Continue regular social work input     I have written the above note for Dr. Gonzales who performed service with me in the room.   Shayla Lundberg NP-C ([phone])    I have seen and examined patient with NP, I agree with above note as scribed.                    [facility] Transplant Team                                                      Critical / Counseling Time Provided: 30 minutes                                                                                                                                                        Chief Complaint: Autologous peripheral blood stem cell transplant with high dose Melphalan prep regimen for treatment of multiple myeloma    S: Patient seen and examined with [facility] Transplant Team:   + nausea   + fatigue   All other ROS negative     O: Vitals:   Vital Signs Last 24 Hrs  T(C): 37 (10 Nov 2022 06:07), Max: 37.1 (2022 21:27)  T(F): 98.6 (10 Nov 2022 06:07), Max: 98.8 (2022 21:27)  HR: 110 (10 Nov 2022 06:07) (98 - 110)  BP: 146/70 (10 Nov 2022 06:07) (124/64 - 146/70)  BP(mean): --  RR: 18 (10 Nov 2022 06:07) (16 - 18)  SpO2: 97% (10 Nov 2022 06:07) (96% - 98%)    Parameters below as of 2022 21:27  Patient On (Oxygen Delivery Method): room air    Admit weight: 74.9kg   Daily Weight in k.4 (2022 09:10)  Today's weight: 70kg     Intake / Output:    @ 07:01  -  11-10 @ 07:00  --------------------------------------------------------  IN: 2172 mL / OUT: 3000 mL / NET: -828 mL      PE:   Oropharynx: scattered erythema no ulcerations  Oral Mucositis:                                                     Grade NA  CVS: S1, S2 RRR   Lungs: CTA throughout bilaterally   Abdomen: + BS x 4, soft, NT, ND   Extremities: no edema  Gastric Mucositis:      -                                            Grade: n/a  Intestinal Mucositis:    -                                          Grade: n/a  Skin: no rash   TLC: CDI   Neuro: A&Ox3   Pain: denies    Labs:                         10.2   9.69  )-----------( 41       ( 10 Nov 2022 07:34 )             30.9     11-10    134<L>  |  97  |  6<L>  ----------------------------<  131<H>  4.2   |  28  |  0.68    Ca    9.3      10 Nov 2022 07:34  Phos  2.3     11-10  Mg     1.7     11-10    TPro  6.9  /  Alb  3.6  /  TBili  0.2  /  DBili  x   /  AST  15  /  ALT  19  /  AlkPhos  123<H>  11-10    Cultures:   Culture Results:   <10,000 CFU/mL Normal Urogenital Kunal (22 @ 16:04)    Culture Results:   No growth to date. (22 @ 00:31)    Culture Results:   No growth to date. (22 @ 00:31)    Culture Results:   >100,000 CFU/ml Enterobacter cloacae complex (22 @ 00:31)    Radiology:   ACC: [id number] EXAM:  XR CHEST PORTABLE ROUTINE 1V                        PROCEDURE DATE:  2022    Impression:  Clear lungs.    Meds:   Antimicrobials:   acyclovir   Oral Tab/Cap 400 milliGRAM(s) Oral every 8 hours  cefepime   IVPB 2000 milliGRAM(s) IV Intermittent every 8 hours  clotrimazole Lozenge 1 Lozenge Oral five times a day  fluconAZOLE   Tablet 400 milliGRAM(s) Oral daily      Heme / Onc:   heparin  Infusion 312 Unit(s)/Hr IV Continuous <Continuous>      GI:  aluminum hydroxide/magnesium hydroxide/simethicone Suspension 30 milliLiter(s) Oral every 6 hours PRN  lactulose Syrup 20 Gram(s) Oral once PRN  pantoprazole    Tablet 40 milliGRAM(s) Oral before breakfast  polyethylene glycol 3350 17 Gram(s) Oral daily PRN  senna 2 Tablet(s) Oral at bedtime  sodium bicarbonate Mouth Rinse 10 milliLiter(s) Swish and Spit five times a day  ursodiol Capsule 300 milliGRAM(s) Oral two times a day      Cardiovascular:   amLODIPine   Tablet 10 milliGRAM(s) Oral daily  hydrALAZINE 25 milliGRAM(s) Oral three times a day      Immunologic:   filgrastim-sndz (ZARXIO) Injectable 480 MICROGram(s) SubCutaneous every 24 hours      Other medications:   acetaminophen     Tablet .. 650 milliGRAM(s) Oral every 6 hours  AQUAPHOR (petrolatum Ointment) 1 Application(s) Topical two times a day  benzonatate 100 milliGRAM(s) Oral three times a day  Biotene Dry Mouth Oral Rinse 5 milliLiter(s) Swish and Spit five times a day  chlorhexidine 4% Liquid 1 Application(s) Topical <User Schedule>  dextrose 5%. 1000 milliLiter(s) IV Continuous <Continuous>  dextrose 5%. 1000 milliLiter(s) IV Continuous <Continuous>  dextrose 50% Injectable 25 Gram(s) IV Push once  dextrose 50% Injectable 12.5 Gram(s) IV Push once  dextrose 50% Injectable 25 Gram(s) IV Push once  finasteride 5 milliGRAM(s) Oral daily  folic acid 1 milliGRAM(s) Oral daily  glucagon  Injectable 1 milliGRAM(s) IntraMuscular once  insulin lispro (ADMELOG) corrective regimen sliding scale   SubCutaneous three times a day before meals  insulin lispro (ADMELOG) corrective regimen sliding scale   SubCutaneous at bedtime  lidocaine/prilocaine Cream 1 Application(s) Topical daily  loratadine 10 milliGRAM(s) Oral daily  LORazepam   Injectable 1 milliGRAM(s) IV Push every 24 hours  multivitamin 1 Tablet(s) Oral daily  nystatin Powder 1 Application(s) Topical three times a day  sodium chloride 0.9%. 1000 milliLiter(s) IV Continuous <Continuous>  tamsulosin 0.4 milliGRAM(s) Oral at bedtime      PRN:   acetaminophen     Tablet .. 650 milliGRAM(s) Oral every 6 hours PRN  aluminum hydroxide/magnesium hydroxide/simethicone Suspension 30 milliLiter(s) Oral every 6 hours PRN  dextrose Oral Gel 15 Gram(s) Oral once PRN  lactulose Syrup 20 Gram(s) Oral once PRN  metoclopramide Injectable 10 milliGRAM(s) IV Push every 6 hours PRN  ondansetron Injectable 8 milliGRAM(s) IV Push every 8 hours PRN  polyethylene glycol 3350 17 Gram(s) Oral daily PRN  sodium chloride 0.9% lock flush 10 milliLiter(s) IV Push every 1 hour PRN    A/P: 72 year old male with multiple myeloma diagnosed 19 s/p RVD x 6 admitted for autologous pbsct with high dose melphalan prep regimen (dose reduced to 70mg / m2 for age)  Day + 14  10/25- melphalan ; continue melphalan hydration for 24 hours post infusion of last dose. Strict I&O, daily weights, prn diuresis   10/27- HPC transplant today; continue transplant hydration for 24 hours post infusion of cells   10/28 weight gain, I>O, lasix 60 mg iv x1; constipated, Lactulose now and PRN  - Parrish. Started on cipro 10/31/22. If T >/= 38C, pan cx, CXR and change cipro to cefepime 2g IV q 8 hours    Febrile overnight, cipro switched to Cefepime; BC(-), urine culture (+) GNR Enterobacter cloacae complex; follow up sensitivities    pending repeat urine culture  - repeat urine cx from  < 10K normal urogenital kunal. Continue Cefepime for 7 days   - engrafted 22 - continue cefepime for a 7 day course, continue zarxio for now     1. Infectious Disease:   acyclovir   Oral Tab/Cap 400 milliGRAM(s) Oral every 8 hours  cefepime   IVPB 2000 milliGRAM(s) IV Intermittent every 8 hours  clotrimazole Lozenge 1 Lozenge Oral five times a day  fluconAZOLE   Tablet 400 milliGRAM(s) Oral daily    2. VOD Prophylaxis: Actigall, Glutamine, Heparin (dosed at 100 units / kg / day)     3. GI Prophylaxis:    pantoprazole    Tablet 40 milliGRAM(s) Oral before breakfast    4. Mouthcare - NS / NaHCO3 rinses, Mycelex, Biotene; Skin care     5. GVHD prophylaxis - n/a     6. Transfuse & replete electrolytes prn     7. IV hydration, daily weights, strict I&O, prn diuresis     8. PO intake as tolerated, nutrition follow up as needed, MVI, folic acid     9. Antiemetics, anti-diarrhea medications:   metoclopramide Injectable 10 milliGRAM(s) IV Push every 6 hours PRN  ondansetron Injectable 8 milliGRAM(s) IV Push every 8 hours PRN  LORazepam   Injectable 1 milliGRAM(s) IV Push every 24 hours    10. OOB as tolerated, physical therapy consult if needed     11. Monitor coags / fibrinogen 2x week, vitamin K as needed     12. Monitor closely for clinical changes, monitor for fevers     13. Emotional support provided, plan of care discussed and questions addressed     14. Patient education done regarding plan of care, restrictions and discharge planning     15. Continue regular social work input     I have written the above note for Dr. Gonzales who performed service with me in the room.   Shayla Lundberg NP-C ([phone])    I have seen and examined patient with NP, I agree with above note as scribed.

## 2022-11-10 NOTE — PROGRESS NOTE ADULT - CRITICAL CARE ATTENDING COMMENT
72 year old male with multiple myeloma diagnosed 12/20/21 s/p RVD x 6 admitted for autologous pbsct with high dose melphalan prep regimen (dose reduced to 70mg / m2 for age)    Day + 14...some soft stool..feeling better this am..increased saliva improved...wbc recovering..repeat urine cx less than 10,000...enterobacter sensitive to cefapime    1. Admit to BMTU   2. -3 hours prior to Melphalan start hydration: D5NS at 200ml /hr and continue 24 hours post Melphalan infusion  3. Day – 3 & day -2 - Melphalan 70mg/m2  IV   4. Strict I&O, daily weights, prn diuresis   5. Day -1 rest day. At 2200 on day – 1, start transplant hydration 1/2NS + 50mEq NaHCO3- (may substitute [id number] if bicarb not available)  6. Day 0 – infuse HPC product. 4 hours after infusion of cells start Zarxio 5 micrograms / kg (actual weight) . Continue through engraftment.   7. On day 0, + 1, + 2-give Kepivance 60micrograms / kg (actual weight).  8. VOD prophylaxis - low dose heparin gtt (dosed at 100 units / kg / day), glutamine supplementation, Actigall BID   9. PCP prophylaxis - Bactrim DS through day -2    10. Antiviral prophylaxis - Acyclovir 400mg po TID to start day -1   11. Antifungal prophylaxis- Diflucan 400 mg po daily.  12. GI prophylaxis - Protonix po QD   13. Antibacterial prophylaxis -  ANC < 500, started Cipro 500mg po BID.  febrile, pan cx, CXR and changed Cipro to Cefepime 2g IV q 8 hours. Continue until count recovery  14. Kepivance for prevention of mucositis- days 0, +1, +2  15. Aggressive mouth care and skin care as per protocol..senna, miralax  prn  16. The patient is aware of his diagnosis...he does not want to be reminded of it every day..  17 wbc recovering..d/c early next week. 72 year old male with multiple myeloma diagnosed 12/20/21 s/p RVD x 6 admitted for autologous pbsct with high dose melphalan prep regimen (dose reduced to 70mg / m2 for age)    Day + 14...some soft stool..feeling better this am..increased saliva improved...wbc recovering..repeat urine cx less than 10,000...enterobacter sensitive to cefapime    1. Admit to BMTU   2. -3 hours prior to Melphalan start hydration: D5NS at 200ml /hr and continue 24 hours post Melphalan infusion  3. Day – 3 & day -2 - Melphalan 70mg/m2  IV   4. Strict I&O, daily weights, prn diuresis   5. Day -1 rest day. At 2200 on day – 1, start transplant hydration 1/2NS + 50mEq NaHCO3- (may substitute [id number] if bicarb not available)  6. Day 0 – infuse HPC product. 4 hours after infusion of cells start Zarxio 5 micrograms / kg (actual weight) . Continue through engraftment.   7. On day 0, + 1, + 2-give Kepivance 60micrograms / kg (actual weight).  8. VOD prophylaxis - low dose heparin gtt (dosed at 100 units / kg / day), glutamine supplementation, Actigall BID   9. PCP prophylaxis - Bactrim DS through day -2    10. Antiviral prophylaxis - Acyclovir 400mg po TID to start day -1   11. Antifungal prophylaxis- Diflucan 400 mg po daily.  12. GI prophylaxis - Protonix po QD   13. Antibacterial prophylaxis -  ANC < 500, started Cipro 500mg po BID.  febrile, pan cx, CXR and changed Cipro to Cefepime 2g IV q 8 hours. Continue until count recovery  14. Kepivance for prevention of mucositis- days 0, +1, +2  15. Aggressive mouth care and skin care as per protocol..senna, miralax  prn  16. The patient is aware of his diagnosis...he does not want to be reminded of it every day..  17 wbc recovering..d/c early next week.  18. daughter on phone 11/10/22...d/c instruction started..some nausea today...re inforced that this is quite normal and that it may continue for several weeks after d/c

## 2022-11-11 LAB
ALBUMIN SERPL ELPH-MCNC: 3.3 G/DL — SIGNIFICANT CHANGE UP (ref 3.3–5)
ALP SERPL-CCNC: 124 U/L — HIGH (ref 40–120)
ALT FLD-CCNC: 15 U/L — SIGNIFICANT CHANGE UP (ref 10–45)
ANION GAP SERPL CALC-SCNC: 7 MMOL/L — SIGNIFICANT CHANGE UP (ref 5–17)
APPEARANCE UR: CLEAR — SIGNIFICANT CHANGE UP
AST SERPL-CCNC: 16 U/L — SIGNIFICANT CHANGE UP (ref 10–40)
BACTERIA # UR AUTO: NEGATIVE — SIGNIFICANT CHANGE UP
BASOPHILS # BLD AUTO: 0 K/UL — SIGNIFICANT CHANGE UP (ref 0–0.2)
BASOPHILS NFR BLD AUTO: 0 % — SIGNIFICANT CHANGE UP (ref 0–2)
BILIRUB SERPL-MCNC: 0.2 MG/DL — SIGNIFICANT CHANGE UP (ref 0.2–1.2)
BILIRUB UR-MCNC: NEGATIVE — SIGNIFICANT CHANGE UP
BLD GP AB SCN SERPL QL: NEGATIVE — SIGNIFICANT CHANGE UP
BUN SERPL-MCNC: 8 MG/DL — SIGNIFICANT CHANGE UP (ref 7–23)
CALCIUM SERPL-MCNC: 8.7 MG/DL — SIGNIFICANT CHANGE UP (ref 8.4–10.5)
CHLORIDE SERPL-SCNC: 100 MMOL/L — SIGNIFICANT CHANGE UP (ref 96–108)
CO2 SERPL-SCNC: 27 MMOL/L — SIGNIFICANT CHANGE UP (ref 22–31)
COLOR SPEC: SIGNIFICANT CHANGE UP
CREAT SERPL-MCNC: 0.91 MG/DL — SIGNIFICANT CHANGE UP (ref 0.5–1.3)
DIFF PNL FLD: NEGATIVE — SIGNIFICANT CHANGE UP
EGFR: 88 ML/MIN/1.73M2 — SIGNIFICANT CHANGE UP
EOSINOPHIL # BLD AUTO: 0 K/UL — SIGNIFICANT CHANGE UP (ref 0–0.5)
EOSINOPHIL NFR BLD AUTO: 0 % — SIGNIFICANT CHANGE UP (ref 0–6)
EPI CELLS # UR: 3 /HPF — SIGNIFICANT CHANGE UP
GLUCOSE BLDC GLUCOMTR-MCNC: 110 MG/DL — HIGH (ref 70–99)
GLUCOSE BLDC GLUCOMTR-MCNC: 116 MG/DL — HIGH (ref 70–99)
GLUCOSE BLDC GLUCOMTR-MCNC: 130 MG/DL — HIGH (ref 70–99)
GLUCOSE BLDC GLUCOMTR-MCNC: 141 MG/DL — HIGH (ref 70–99)
GLUCOSE SERPL-MCNC: 102 MG/DL — HIGH (ref 70–99)
GLUCOSE UR QL: NEGATIVE — SIGNIFICANT CHANGE UP
HCT VFR BLD CALC: 28.6 % — LOW (ref 39–50)
HGB BLD-MCNC: 9.3 G/DL — LOW (ref 13–17)
HYALINE CASTS # UR AUTO: 1 /LPF — SIGNIFICANT CHANGE UP (ref 0–2)
KETONES UR-MCNC: NEGATIVE — SIGNIFICANT CHANGE UP
LDH SERPL L TO P-CCNC: 186 U/L — SIGNIFICANT CHANGE UP (ref 50–242)
LEUKOCYTE ESTERASE UR-ACNC: NEGATIVE — SIGNIFICANT CHANGE UP
LYMPHOCYTES # BLD AUTO: 0.8 K/UL — LOW (ref 1–3.3)
LYMPHOCYTES # BLD AUTO: 9.6 % — LOW (ref 13–44)
MAGNESIUM SERPL-MCNC: 1.7 MG/DL — SIGNIFICANT CHANGE UP (ref 1.6–2.6)
MANUAL SMEAR VERIFICATION: SIGNIFICANT CHANGE UP
MCHC RBC-ENTMCNC: 25.9 PG — LOW (ref 27–34)
MCHC RBC-ENTMCNC: 32.5 GM/DL — SIGNIFICANT CHANGE UP (ref 32–36)
MCV RBC AUTO: 79.7 FL — LOW (ref 80–100)
MONOCYTES # BLD AUTO: 0.58 K/UL — SIGNIFICANT CHANGE UP (ref 0–0.9)
MONOCYTES NFR BLD AUTO: 6.9 % — SIGNIFICANT CHANGE UP (ref 2–14)
NEUTROPHILS # BLD AUTO: 6.89 K/UL — SIGNIFICANT CHANGE UP (ref 1.8–7.4)
NEUTROPHILS NFR BLD AUTO: 82.6 % — HIGH (ref 43–77)
NITRITE UR-MCNC: NEGATIVE — SIGNIFICANT CHANGE UP
PH UR: 7 — SIGNIFICANT CHANGE UP (ref 5–8)
PHOSPHATE SERPL-MCNC: 2.6 MG/DL — SIGNIFICANT CHANGE UP (ref 2.5–4.5)
PLAT MORPH BLD: ABNORMAL
PLATELET # BLD AUTO: 47 K/UL — LOW (ref 150–400)
POTASSIUM SERPL-MCNC: 4.1 MMOL/L — SIGNIFICANT CHANGE UP (ref 3.5–5.3)
POTASSIUM SERPL-SCNC: 4.1 MMOL/L — SIGNIFICANT CHANGE UP (ref 3.5–5.3)
PROT SERPL-MCNC: 6.2 G/DL — SIGNIFICANT CHANGE UP (ref 6–8.3)
PROT UR-MCNC: ABNORMAL
RBC # BLD: 3.59 M/UL — LOW (ref 4.2–5.8)
RBC # FLD: 14.1 % — SIGNIFICANT CHANGE UP (ref 10.3–14.5)
RBC BLD AUTO: SIGNIFICANT CHANGE UP
RBC CASTS # UR COMP ASSIST: 0 /HPF — SIGNIFICANT CHANGE UP (ref 0–4)
RH IG SCN BLD-IMP: POSITIVE — SIGNIFICANT CHANGE UP
SODIUM SERPL-SCNC: 134 MMOL/L — LOW (ref 135–145)
SP GR SPEC: 1.01 — SIGNIFICANT CHANGE UP (ref 1.01–1.02)
TOXIC GRANULES BLD QL SMEAR: PRESENT — SIGNIFICANT CHANGE UP
UROBILINOGEN FLD QL: NEGATIVE — SIGNIFICANT CHANGE UP
VARIANT LYMPHS # BLD: 0.9 % — SIGNIFICANT CHANGE UP (ref 0–6)
WBC # BLD: 8.34 K/UL — SIGNIFICANT CHANGE UP (ref 3.8–10.5)
WBC # FLD AUTO: 8.34 K/UL — SIGNIFICANT CHANGE UP (ref 3.8–10.5)
WBC UR QL: 5 /HPF — SIGNIFICANT CHANGE UP (ref 0–5)

## 2022-11-11 PROCEDURE — 71045 X-RAY EXAM CHEST 1 VIEW: CPT | Mod: 26

## 2022-11-11 PROCEDURE — ZZZZZ: CPT

## 2022-11-11 RX ORDER — POLYETHYLENE GLYCOL 3350 17 G/17G
17 POWDER, FOR SOLUTION ORAL ONCE
Refills: 0 | Status: COMPLETED | OUTPATIENT
Start: 2022-11-11 | End: 2022-11-11

## 2022-11-11 RX ORDER — CEFEPIME 1 G/1
2000 INJECTION, POWDER, FOR SOLUTION INTRAMUSCULAR; INTRAVENOUS EVERY 8 HOURS
Refills: 0 | Status: COMPLETED | OUTPATIENT
Start: 2022-11-11 | End: 2022-11-11

## 2022-11-11 RX ADMIN — Medication 1 APPLICATION(S): at 17:47

## 2022-11-11 RX ADMIN — NYSTATIN CREAM 1 APPLICATION(S): 100000 CREAM TOPICAL at 05:24

## 2022-11-11 RX ADMIN — Medication 1 TABLET(S): at 12:20

## 2022-11-11 RX ADMIN — Medication 10 MILLILITER(S): at 16:08

## 2022-11-11 RX ADMIN — Medication 1 LOZENGE: at 07:39

## 2022-11-11 RX ADMIN — CEFEPIME 100 MILLIGRAM(S): 1 INJECTION, POWDER, FOR SOLUTION INTRAMUSCULAR; INTRAVENOUS at 20:49

## 2022-11-11 RX ADMIN — NYSTATIN CREAM 1 APPLICATION(S): 100000 CREAM TOPICAL at 12:24

## 2022-11-11 RX ADMIN — Medication 10 MILLILITER(S): at 07:39

## 2022-11-11 RX ADMIN — SENNA PLUS 2 TABLET(S): 8.6 TABLET ORAL at 20:49

## 2022-11-11 RX ADMIN — POLYETHYLENE GLYCOL 3350 17 GRAM(S): 17 POWDER, FOR SOLUTION ORAL at 12:21

## 2022-11-11 RX ADMIN — Medication 5 MILLILITER(S): at 00:21

## 2022-11-11 RX ADMIN — TAMSULOSIN HYDROCHLORIDE 0.4 MILLIGRAM(S): 0.4 CAPSULE ORAL at 22:45

## 2022-11-11 RX ADMIN — Medication 650 MILLIGRAM(S): at 20:15

## 2022-11-11 RX ADMIN — Medication 400 MILLIGRAM(S): at 20:48

## 2022-11-11 RX ADMIN — NYSTATIN CREAM 1 APPLICATION(S): 100000 CREAM TOPICAL at 20:49

## 2022-11-11 RX ADMIN — Medication 10 MILLILITER(S): at 20:43

## 2022-11-11 RX ADMIN — Medication 400 MILLIGRAM(S): at 12:22

## 2022-11-11 RX ADMIN — Medication 650 MILLIGRAM(S): at 19:45

## 2022-11-11 RX ADMIN — URSODIOL 300 MILLIGRAM(S): 250 TABLET, FILM COATED ORAL at 05:25

## 2022-11-11 RX ADMIN — Medication 1 LOZENGE: at 23:02

## 2022-11-11 RX ADMIN — Medication 1 MILLIGRAM(S): at 12:20

## 2022-11-11 RX ADMIN — Medication 1 APPLICATION(S): at 05:24

## 2022-11-11 RX ADMIN — Medication 1 LOZENGE: at 12:20

## 2022-11-11 RX ADMIN — Medication 1 LOZENGE: at 20:43

## 2022-11-11 RX ADMIN — FINASTERIDE 5 MILLIGRAM(S): 5 TABLET, FILM COATED ORAL at 12:20

## 2022-11-11 RX ADMIN — FLUCONAZOLE 400 MILLIGRAM(S): 150 TABLET ORAL at 12:20

## 2022-11-11 RX ADMIN — Medication 25 MILLIGRAM(S): at 20:49

## 2022-11-11 RX ADMIN — AMLODIPINE BESYLATE 10 MILLIGRAM(S): 2.5 TABLET ORAL at 05:24

## 2022-11-11 RX ADMIN — PANTOPRAZOLE SODIUM 40 MILLIGRAM(S): 20 TABLET, DELAYED RELEASE ORAL at 05:25

## 2022-11-11 RX ADMIN — Medication 10 MILLILITER(S): at 12:19

## 2022-11-11 RX ADMIN — ONDANSETRON 8 MILLIGRAM(S): 8 TABLET, FILM COATED ORAL at 20:50

## 2022-11-11 RX ADMIN — CHLORHEXIDINE GLUCONATE 1 APPLICATION(S): 213 SOLUTION TOPICAL at 07:40

## 2022-11-11 RX ADMIN — Medication 5 MILLILITER(S): at 16:08

## 2022-11-11 RX ADMIN — Medication 5 MILLILITER(S): at 20:43

## 2022-11-11 RX ADMIN — URSODIOL 300 MILLIGRAM(S): 250 TABLET, FILM COATED ORAL at 17:48

## 2022-11-11 RX ADMIN — Medication 10 MILLILITER(S): at 00:22

## 2022-11-11 RX ADMIN — Medication 5 MILLILITER(S): at 23:02

## 2022-11-11 RX ADMIN — HEPARIN SODIUM 3.12 UNIT(S)/HR: 5000 INJECTION INTRAVENOUS; SUBCUTANEOUS at 21:07

## 2022-11-11 RX ADMIN — ONDANSETRON 8 MILLIGRAM(S): 8 TABLET, FILM COATED ORAL at 05:24

## 2022-11-11 RX ADMIN — Medication 1 LOZENGE: at 16:08

## 2022-11-11 RX ADMIN — SODIUM CHLORIDE 50 MILLILITER(S): 9 INJECTION INTRAMUSCULAR; INTRAVENOUS; SUBCUTANEOUS at 21:07

## 2022-11-11 RX ADMIN — Medication 1 LOZENGE: at 00:22

## 2022-11-11 RX ADMIN — Medication 5 MILLILITER(S): at 12:19

## 2022-11-11 RX ADMIN — Medication 10 MILLILITER(S): at 23:03

## 2022-11-11 RX ADMIN — Medication 5 MILLILITER(S): at 07:39

## 2022-11-11 RX ADMIN — Medication 400 MILLIGRAM(S): at 05:24

## 2022-11-11 RX ADMIN — CEFEPIME 100 MILLIGRAM(S): 1 INJECTION, POWDER, FOR SOLUTION INTRAMUSCULAR; INTRAVENOUS at 05:25

## 2022-11-11 RX ADMIN — CEFEPIME 100 MILLIGRAM(S): 1 INJECTION, POWDER, FOR SOLUTION INTRAMUSCULAR; INTRAVENOUS at 12:23

## 2022-11-11 RX ADMIN — LORATADINE 10 MILLIGRAM(S): 10 TABLET ORAL at 12:22

## 2022-11-11 NOTE — CHART NOTE - NSCHARTNOTESELECT_GEN_ALL_CORE
Nutrition Services
BMTU NP Infusion Note/Event Note
Event Note
Fever/Event Note
Nutrition Services
Nutrition Services

## 2022-11-11 NOTE — PROVIDER CONTACT NOTE (OTHER) - ACTION/TREATMENT ORDERED:
loperamide given po . will continue to monitor.
pt. was seen and examined by provider,continue Cefepime dose for tonight. will monitor.
tessalon perle 100mgs po given with little relief. will continue to monitor.
bld drawn on 2 ports white and blue, UA, C&S done, cefepime 2gms given stat. tylenol 650mgs given po.still waiting for chest x-ray to be done. will monitor.

## 2022-11-11 NOTE — PROVIDER CONTACT NOTE (OTHER) - ASSESSMENT
pt. alert and oriented x4

## 2022-11-11 NOTE — CHART NOTE - NSCHARTNOTEFT_GEN_A_CORE
MEDICINE PA    Notified by RN patient with temperature 100.6F tympanic. Seen and examined patient at bedside. Patient is alert, nontoxic appearing and in NAD. Denies HA, CP, SOB, cough, N/V, or abd pain.    VITAL SIGNS:  T(C): 37.6 (11-11-22 @ 20:58), Max: 38.1 (11-11-22 @ 19:45)  HR: 107 (11-11-22 @ 20:58) (99 - 117)  BP: 131/60 (11-11-22 @ 20:58) (103/57 - 133/56)  RR: 18 (11-11-22 @ 20:58) (18 - 19)  SpO2: 97% (11-11-22 @ 20:58) (95% - 98%)  Wt(kg): --      LABORATORY:                          9.3    8.34  )-----------( 47       ( 11 Nov 2022 07:17 )             28.6       11-11    134<L>  |  100  |  8   ----------------------------<  102<H>  4.1   |  27  |  0.91    Ca    8.7      11 Nov 2022 07:17  Phos  2.6     11-11  Mg     1.7     11-11    TPro  6.2  /  Alb  3.3  /  TBili  0.2  /  DBili  x   /  AST  16  /  ALT  15  /  AlkPhos  124<H>  11-11          MICROBIOLOGY:   Culture - Blood (11.04.22 @ 00:31)    Specimen Source: .Blood Blood    Culture Results:   No Growth Final    Culture - Blood (11.04.22 @ 00:31)    Specimen Source: .Blood Blood    Culture Results:   No Growth Final    Culture - Urine (11.06.22 @ 16:04)    Specimen Source: Clean Catch Clean Catch (Midstream)    Culture Results:   <10,000 CFU/mL Normal Urogenital Lauren      RADIOLOGY:  < from: Xray Chest 1 View- PORTABLE-Routine (Xray Chest 1 View- PORTABLE-Routine .) (11.04.22 @ 09:20) >      ACC: [id number] EXAM:  XR CHEST PORTABLE ROUTINE 1V                          PROCEDURE DATE:  11/04/2022          INTERPRETATION:  INDICATION: Multiple myeloma. Is febrile.    COMPARISON: 1/12/22    Technique: AP radiograph of the chest    FINDINGS:  Right-sided central venous catheter with tip at SVC/RA junction.  Heart/Vascular: Difficult to assess heart size in this projection.  Pulmonary: No focal infiltrates. No pleural effusion or pneumothorax.  Bones: No acute bony finding.    Impression:  Clear lungs.        --- End of Report ---    RONALD CAN MD; Attending Radiologist  This document has been electronically signed. Nov 4 2022  2:57PM    < end of copied text >            PHYSICAL EXAM:    Constitutional: AOx3. NAD.    Respiratory: clear lungs bilaterally. No wheezing, rhonchi, or crackles.    Cardiovascular: S1 S2. No murmurs.    Gastrointestinal: BS X4 active. soft. nontender.    Extremities/Vascular: +2 pulses bilaterally. No BLE edema.      ASSESSMENT/PLAN:   HPI:  This is a 72 year old male with multiple myeloma admitted for an autologous pbsct with high dose Melphalan prep regimen. Hematologic history as follows: Initially presented in 11/2021 with anemia and back pain, diagnosed with multiple myeloma 12/2021.A bone marrow biopsy at that time showed 70% involvement with monoclonal plasma cells (IgG kappa), improved to 1-2% involvement with bone marrow biopsy 5/2022 after treatment with RVD x 6. He has no complaints on admission. Other pertinent medical history: BPH, HTN, DVT, DM, CVA, HTN.  (24 Oct 2022 13:08)        1) Fever  -tylenol and cooling measures prn for pyrexia  -BC x2, UA/UC  -CXR  - c/w antibiotics overnight- patient on last dose cefepime. Will d/w primary team in AM if further plans to continue antibiotics  - c/w monitoring overnight  - F/U primary team in AM        Sania Lewis PA-C  Spectra #[id number]

## 2022-11-11 NOTE — PROVIDER CONTACT NOTE (OTHER) - REASON
temp 100.4, first spike
temp 38.1
pt. c/o cough
pt. is having diarrhea and stool for C- diff was sent and it was negative on the 10/31/22

## 2022-11-11 NOTE — PROVIDER CONTACT NOTE (OTHER) - RECOMMENDATIONS
notify provider, bld cultures x2 activated and drawn from white , and blue port, tylenol 650mgs po, provider d/c cipro, ordered UA, Urine C&S, and to start IV cefepime 2gms IV, Chest X-ray.
notify provider, tylenol 650mgs po , draw 2 sets of bld cultures doneas ordered .UA, Urine C&S done.Chest X-ray done as ordered.
notify provider, KATELYN Flores ordered tessalon perle 100mgs po x1
notify provider, and KATELYN Tuttle ordered loperamide 2mgs po x 1

## 2022-11-11 NOTE — PROGRESS NOTE ADULT - CRITICAL CARE ATTENDING COMMENT
72 year old male with multiple myeloma diagnosed 12/20/21 s/p RVD x 6 admitted for autologous pbsct with high dose melphalan prep regimen (dose reduced to 70mg / m2 for age)    Day + 14...some soft stool..feeling better this am..increased saliva improved...wbc recovering..repeat urine cx less than 10,000...enterobacter sensitive to cefapime    1. Admit to BMTU   2. -3 hours prior to Melphalan start hydration: D5NS at 200ml /hr and continue 24 hours post Melphalan infusion  3. Day – 3 & day -2 - Melphalan 70mg/m2  IV   4. Strict I&O, daily weights, prn diuresis   5. Day -1 rest day. At 2200 on day – 1, start transplant hydration 1/2NS + 50mEq NaHCO3- (may substitute [id number] if bicarb not available)  6. Day 0 – infuse HPC product. 4 hours after infusion of cells start Zarxio 5 micrograms / kg (actual weight) . Continue through engraftment.   7. On day 0, + 1, + 2-give Kepivance 60micrograms / kg (actual weight).  8. VOD prophylaxis - low dose heparin gtt (dosed at 100 units / kg / day), glutamine supplementation, Actigall BID   9. PCP prophylaxis - Bactrim DS through day -2    10. Antiviral prophylaxis - Acyclovir 400mg po TID to start day -1   11. Antifungal prophylaxis- Diflucan 400 mg po daily.  12. GI prophylaxis - Protonix po QD   13. Antibacterial prophylaxis -  ANC < 500, started Cipro 500mg po BID.  febrile, pan cx, CXR and changed Cipro to Cefepime 2g IV q 8 hours. Continue until count recovery  14. Kepivance for prevention of mucositis- days 0, +1, +2  15. Aggressive mouth care and skin care as per protocol..senna, miralax  prn  16. The patient is aware of his diagnosis...he does not want to be reminded of it every day..  17 wbc recovering..d/c early next week.  18. daughter on phone 11/10/22...d/c instruction started..some nausea today...re inforced that this is quite normal and that it may continue for several weeks after d/c 72 year old male with multiple myeloma diagnosed 12/20/21 s/p RVD x 6 admitted for autologous pbsct with high dose melphalan prep regimen (dose reduced to 70mg / m2 for age)    Day + 15...some soft stool..feeling better this am..increased saliva improved...wbc recovering..repeat urine cx less than 10,000...enterobacter sensitive to cefapime..course completed after today...d/c plan for monday..home preparation in progress...monitor for temps off antibiotics    1. Admit to BMTU   2. -3 hours prior to Melphalan start hydration: D5NS at 200ml /hr and continue 24 hours post Melphalan infusion  3. Day – 3 & day -2 - Melphalan 70mg/m2  IV   4. Strict I&O, daily weights, prn diuresis   5. Day -1 rest day. At 2200 on day – 1, start transplant hydration 1/2NS + 50mEq NaHCO3- (may substitute [id number] if bicarb not available)  6. Day 0 – infuse HPC product. 4 hours after infusion of cells start Zarxio 5 micrograms / kg (actual weight) . Continue through engraftment. OFF  7. On day 0, + 1, + 2-give Kepivance 60micrograms / kg (actual weight).  8. VOD prophylaxis - low dose heparin gtt (dosed at 100 units / kg / day), glutamine supplementation, Actigall BID   9. PCP prophylaxis - Bactrim DS through day -2    10. Antiviral prophylaxis - Acyclovir 400mg po TID to start day -1   11. Antifungal prophylaxis- Diflucan 400 mg po daily.  12. GI prophylaxis - Protonix po QD   13. Antibacterial prophylaxis -  ANC < 500, started Cipro 500mg po BID.  febrile, pan cx, CXR and changed Cipro to Cefepime 2g IV q 8 hours. Continue until count recovery  14. Kepivance for prevention of mucositis- days 0, +1, +2  15. Aggressive mouth care and skin care as per protocol..senna, miralax  prn  16. The patient is aware of his diagnosis...he does not want to be reminded of it every day..  17 wbc recovering..d/c early next week.  18. daughter on phone 11/10/22...d/c instruction started..some nausea today...re inforced that this is quite normal and that it may continue for several weeks after d/c

## 2022-11-11 NOTE — PROGRESS NOTE ADULT - SUBJECTIVE AND OBJECTIVE BOX
[facility] Transplant Team                                                      Critical / Counseling Time Provided: 30 minutes                                                                                                                                                        Chief Complaint: Autologous peripheral blood stem cell transplant with high dose Melphalan prep regimen for treatment of multiple myeloma    S: Patient seen and examined with [facility] Transplant Team:   + nausea   + fatigue   All other ROS negative       O: Vitals:   Vital Signs Last 24 Hrs  T(C): 37.4 (2022 05:30), Max: 37.4 (10 Nov 2022 17:32)  T(F): 99.3 (2022 05:30), Max: 99.3 (10 Nov 2022 17:32)  HR: 106 (2022 05:30) (90 - 113)  BP: 114/59 (2022 05:30) (103/57 - 135/72)  BP(mean): --  RR: 18 (2022 05:30) (18 - 19)  SpO2: 97% (2022 05:30) (96% - 100%)    Parameters below as of 2022 05:30  Patient On (Oxygen Delivery Method): room air      Admit weight: 74.9kg   Daily     Daily Weight in k (10 Nov 2022 09:55)    Intake / Output:   11-10 @ 07:01  -   @ 07:00  --------------------------------------------------------  IN: 2697.9 mL / OUT: 2030 mL / NET: 667.9 mL        PE:   Oropharynx: scattered erythema no ulcerations  Oral Mucositis:                                                     Grade NA  CVS: S1, S2 RRR   Lungs: CTA throughout bilaterally   Abdomen: + BS x 4, soft, NT, ND   Extremities: no edema  Gastric Mucositis:      -                                            Grade: n/a  Intestinal Mucositis:    -                                          Grade: n/a  Skin: no rash   TLC: CDI   Neuro: A&Ox3   Pain: denies        Cultures:         Radiology:       Meds:   Antimicrobials:   acyclovir   Oral Tab/Cap 400 milliGRAM(s) Oral every 8 hours  cefepime   IVPB 2000 milliGRAM(s) IV Intermittent every 8 hours  clotrimazole Lozenge 1 Lozenge Oral five times a day  fluconAZOLE   Tablet 400 milliGRAM(s) Oral daily      Heme / Onc:   heparin  Infusion 312 Unit(s)/Hr IV Continuous <Continuous>      GI:  aluminum hydroxide/magnesium hydroxide/simethicone Suspension 30 milliLiter(s) Oral every 6 hours PRN  pantoprazole    Tablet 40 milliGRAM(s) Oral before breakfast  polyethylene glycol 3350 17 Gram(s) Oral daily PRN  senna 2 Tablet(s) Oral at bedtime  sodium bicarbonate Mouth Rinse 10 milliLiter(s) Swish and Spit five times a day  ursodiol Capsule 300 milliGRAM(s) Oral two times a day      Cardiovascular:   amLODIPine   Tablet 10 milliGRAM(s) Oral daily  hydrALAZINE 25 milliGRAM(s) Oral three times a day      Other medications:   acetaminophen     Tablet .. 650 milliGRAM(s) Oral every 6 hours  AQUAPHOR (petrolatum Ointment) 1 Application(s) Topical two times a day  Biotene Dry Mouth Oral Rinse 5 milliLiter(s) Swish and Spit five times a day  chlorhexidine 4% Liquid 1 Application(s) Topical <User Schedule>  dextrose 5%. 1000 milliLiter(s) IV Continuous <Continuous>  dextrose 5%. 1000 milliLiter(s) IV Continuous <Continuous>  dextrose 50% Injectable 25 Gram(s) IV Push once  dextrose 50% Injectable 12.5 Gram(s) IV Push once  dextrose 50% Injectable 25 Gram(s) IV Push once  finasteride 5 milliGRAM(s) Oral daily  folic acid 1 milliGRAM(s) Oral daily  glucagon  Injectable 1 milliGRAM(s) IntraMuscular once  insulin lispro (ADMELOG) corrective regimen sliding scale   SubCutaneous three times a day before meals  insulin lispro (ADMELOG) corrective regimen sliding scale   SubCutaneous at bedtime  loratadine 10 milliGRAM(s) Oral daily  LORazepam   Injectable 1 milliGRAM(s) IV Push every 24 hours  multivitamin 1 Tablet(s) Oral daily  nystatin Powder 1 Application(s) Topical three times a day  sodium chloride 0.9%. 1000 milliLiter(s) IV Continuous <Continuous>  tamsulosin 0.4 milliGRAM(s) Oral at bedtime      PRN:   acetaminophen     Tablet .. 650 milliGRAM(s) Oral every 6 hours PRN  aluminum hydroxide/magnesium hydroxide/simethicone Suspension 30 milliLiter(s) Oral every 6 hours PRN  dextrose Oral Gel 15 Gram(s) Oral once PRN  metoclopramide Injectable 10 milliGRAM(s) IV Push every 6 hours PRN  ondansetron Injectable 8 milliGRAM(s) IV Push every 8 hours PRN  polyethylene glycol 3350 17 Gram(s) Oral daily PRN  sodium chloride 0.9% lock flush 10 milliLiter(s) IV Push every 1 hour PRN      A/P: 72 year old male with multiple myeloma diagnosed 19 s/p RVD x 6 admitted for autologous pbsct with high dose melphalan prep regimen (dose reduced to 70mg / m2 for age)  Day + 15  10/25- melphalan ; continue melphalan hydration for 24 hours post infusion of last dose. Strict I&O, daily weights, prn diuresis   10/27- [facility] transplant today; continue transplant hydration for 24 hours post infusion of cells   10/28 weight gain, I>O, lasix 60 mg iv x1; constipated, Lactulose now and PRN  - Parrish. Started on cipro 10/31/22. If T >/= 38C, pan cx, CXR and change cipro to cefepime 2g IV q 8 hours    Febrile overnight, cipro switched to Cefepime; BC(-), urine culture (+) GNR Enterobacter cloacae complex; follow up sensitivities    pending repeat urine culture  - repeat urine cx from  < 10K normal urogenital kunal. Continue Cefepime for 7 days   - engrafted 22 - continue cefepime for a 7 day course, continue zarxio for now     1. Infectious Disease:   acyclovir   Oral Tab/Cap 400 milliGRAM(s) Oral every 8 hours  cefepime   IVPB 2000 milliGRAM(s) IV Intermittent every 8 hours  clotrimazole Lozenge 1 Lozenge Oral five times a day  fluconAZOLE   Tablet 400 milliGRAM(s) Oral daily    2. VOD Prophylaxis: Actigall, Glutamine, Heparin (dosed at 100 units / kg / day)     3. GI Prophylaxis:    pantoprazole    Tablet 40 milliGRAM(s) Oral before breakfast    4. Mouthcare - NS / NaHCO3 rinses, Mycelex, Biotene; Skin care     5. GVHD prophylaxis - n/a     6. Transfuse & replete electrolytes prn     7. IV hydration, daily weights, strict I&O, prn diuresis     8. PO intake as tolerated, nutrition follow up as needed, MVI, folic acid     9. Antiemetics, anti-diarrhea medications:   metoclopramide Injectable 10 milliGRAM(s) IV Push every 6 hours PRN  ondansetron Injectable 8 milliGRAM(s) IV Push every 8 hours PRN  LORazepam   Injectable 1 milliGRAM(s) IV Push every 24 hours    10. OOB as tolerated, physical therapy consult if needed     11. Monitor coags / fibrinogen 2x week, vitamin K as needed     12. Monitor closely for clinical changes, monitor for fevers     13. Emotional support provided, plan of care discussed and questions addressed     14. Patient education done regarding plan of care, restrictions and discharge planning     15. Continue regular social work input     I have written the above note for Dr. Gonzales who performed service with me in the room.   Shayla Lundberg NP-C ([phone])    I have seen and examined patient with NP, I agree with above note as scribed.                              [facility] Transplant Team                                                      Critical / Counseling Time Provided: 30 minutes                                                                                                                                                        Chief Complaint: Autologous peripheral blood stem cell transplant with high dose Melphalan prep regimen for treatment of multiple myeloma    S: Patient seen and examined with [facility] Team:   + decreased appetite  + no BM x 3 days    + fatigue and generalized weakness   + intermittent cough with mucous, slight improved. Vomited  from gaging with mucous yesterday  +rashes improved   All other ROS negative       O: Vitals:   Vital Signs Last 24 Hrs  T(C): 37.4 (2022 05:30), Max: 37.4 (10 Nov 2022 17:32)  T(F): 99.3 (2022 05:30), Max: 99.3 (10 Nov 2022 17:32)  HR: 106 (:30) (90 - 113)  BP: 114/59 (:30) (103/57 - 135/72)  BP(mean): --  RR: 18 (:30) (18 - 19)  SpO2: 97% (:30) (96% - 100%)    Parameters below as of 2022 05:30  Patient On (Oxygen Delivery Method): room air      Admit weight: 74.9kg   Daily   70.4 today  Daily Weight in k (10 Nov 2022 09:55)    Intake / Output:   11-10 @ 07:01  -   @ 07:00  --------------------------------------------------------  IN: 2697.9 mL / OUT: 2030 mL / NET: 667.9 mL        PE:   Oropharynx: scattered erythema no ulcerations  Oral Mucositis:                                                     Grade NA  CVS: S1, S2 RRR   Lungs: CTA throughout bilaterally   Abdomen: + BS x 4, soft, NT, ND   Extremities: no edema  Gastric Mucositis:      -                                            Grade: n/a  Intestinal Mucositis:    -                                          Grade: n/a  Skin: no rash   TLC: CDI   Neuro: A&Ox3   Pain: denies    LABS:                          9.3    8.34  )-----------( 47       ( 2022 07:17 )             28.6         Mean Cell Volume : 79.7 fl  Mean Cell Hemoglobin : 25.9 pg  Mean Cell Hemoglobin Concentration : 32.5 gm/dL  Auto Neutrophil # : 6.89 K/uL  Auto Lymphocyte # : 0.80 K/uL  Auto Monocyte # : 0.58 K/uL  Auto Eosinophil # : 0.00 K/uL  Auto Basophil # : 0.00 K/uL  Auto Neutrophil % : 82.6 %  Auto Lymphocyte % : 9.6 %  Auto Monocyte % : 6.9 %  Auto Eosinophil % : 0.0 %  Auto Basophil % : 0.0 %          134<L>  |  100  |  8   ----------------------------<  102<H>  4.1   |  27  |  0.91    Ca    8.7      2022 07:17  Phos  2.6       Mg     1.7         TPro  6.2  /  Alb  3.3  /  TBili  0.2  /  DBili  x   /  AST  16  /  ALT  15  /  AlkPhos  124<H>        PT/INR - ( 10 Nov 2022 07:34 )   PT: 12.2 sec;   INR: 1.05 ratio         PTT - ( 10 Nov 2022 07:34 )  PTT:32.1 sec          Cultures:     Culture - Urine (22 @ 16:04)    Specimen Source: Clean Catch Clean Catch (Midstream)    Culture Results:   <10,000 CFU/mL Normal Urogenital Kunal      Radiology:     < from: Xray Chest 1 View- PORTABLE-Routine (Xray Chest 1 View- PORTABLE-Routine .) (22 @ 09:20) >  Impression:  Clear lungs.      Meds:   Antimicrobials:   acyclovir   Oral Tab/Cap 400 milliGRAM(s) Oral every 8 hours  cefepime   IVPB 2000 milliGRAM(s) IV Intermittent every 8 hours  clotrimazole Lozenge 1 Lozenge Oral five times a day  fluconAZOLE   Tablet 400 milliGRAM(s) Oral daily      Heme / Onc:   heparin  Infusion 312 Unit(s)/Hr IV Continuous <Continuous>      GI:  aluminum hydroxide/magnesium hydroxide/simethicone Suspension 30 milliLiter(s) Oral every 6 hours PRN  pantoprazole    Tablet 40 milliGRAM(s) Oral before breakfast  polyethylene glycol 3350 17 Gram(s) Oral daily PRN  senna 2 Tablet(s) Oral at bedtime  sodium bicarbonate Mouth Rinse 10 milliLiter(s) Swish and Spit five times a day  ursodiol Capsule 300 milliGRAM(s) Oral two times a day      Cardiovascular:   amLODIPine   Tablet 10 milliGRAM(s) Oral daily  hydrALAZINE 25 milliGRAM(s) Oral three times a day      Other medications:   acetaminophen     Tablet .. 650 milliGRAM(s) Oral every 6 hours  AQUAPHOR (petrolatum Ointment) 1 Application(s) Topical two times a day  Biotene Dry Mouth Oral Rinse 5 milliLiter(s) Swish and Spit five times a day  chlorhexidine 4% Liquid 1 Application(s) Topical <User Schedule>  dextrose 5%. 1000 milliLiter(s) IV Continuous <Continuous>  dextrose 5%. 1000 milliLiter(s) IV Continuous <Continuous>  dextrose 50% Injectable 25 Gram(s) IV Push once  dextrose 50% Injectable 12.5 Gram(s) IV Push once  dextrose 50% Injectable 25 Gram(s) IV Push once  finasteride 5 milliGRAM(s) Oral daily  folic acid 1 milliGRAM(s) Oral daily  glucagon  Injectable 1 milliGRAM(s) IntraMuscular once  insulin lispro (ADMELOG) corrective regimen sliding scale   SubCutaneous three times a day before meals  insulin lispro (ADMELOG) corrective regimen sliding scale   SubCutaneous at bedtime  loratadine 10 milliGRAM(s) Oral daily  LORazepam   Injectable 1 milliGRAM(s) IV Push every 24 hours  multivitamin 1 Tablet(s) Oral daily  nystatin Powder 1 Application(s) Topical three times a day  sodium chloride 0.9%. 1000 milliLiter(s) IV Continuous <Continuous>  tamsulosin 0.4 milliGRAM(s) Oral at bedtime      PRN:   acetaminophen     Tablet .. 650 milliGRAM(s) Oral every 6 hours PRN  aluminum hydroxide/magnesium hydroxide/simethicone Suspension 30 milliLiter(s) Oral every 6 hours PRN  dextrose Oral Gel 15 Gram(s) Oral once PRN  metoclopramide Injectable 10 milliGRAM(s) IV Push every 6 hours PRN  ondansetron Injectable 8 milliGRAM(s) IV Push every 8 hours PRN  polyethylene glycol 3350 17 Gram(s) Oral daily PRN  sodium chloride 0.9% lock flush 10 milliLiter(s) IV Push every 1 hour PRN      A/P: 72 year old male with multiple myeloma diagnosed 19 s/p RVD x 6 admitted for autologous pbsct with high dose melphalan prep regimen (dose reduced to 70mg / m2 for age)  Day + 15  10/25- melphalan /; continue melphalan hydration for 24 hours post infusion of last dose. Strict I&O, daily weights, prn diuresis   10/27- [facility] transplant today; continue transplant hydration for 24 hours post infusion of cells   10/28 weight gain, I>O, lasix 60 mg iv x1; constipated, Lactulose now and PRN  - Parrish. Started on cipro 10/31/22. If T >/= 38C, pan cx, CXR and change cipro to cefepime 2g IV q 8 hours    Febrile overnight, cipro switched to Cefepime; BC(-), urine culture (+) GNR Enterobacter cloacae complex; follow up sensitivities    pending repeat urine culture  - repeat urine cx from  < 10K normal urogenital kunal. Continue Cefepime for 7 days   - engrafted 22 - continue cefepime for a 7 day course, continue zarxio for now   Engrafted, off Zarxio    1. Infectious Disease:   acyclovir   Oral Tab/Cap 400 milliGRAM(s) Oral every 8 hours  cefepime   IVPB 2000 milliGRAM(s) IV Intermittent every 8 hours  clotrimazole Lozenge 1 Lozenge Oral five times a day  fluconAZOLE   Tablet 400 milliGRAM(s) Oral daily  Pancx CXR if fever     2. VOD Prophylaxis: Actigall, Glutamine, Heparin (dosed at 100 units / kg / day)     3. GI Prophylaxis:    pantoprazole    Tablet 40 milliGRAM(s) Oral before breakfast  Continue Senna 2 tabs QHS and Miralax x1  for constipation     4. Mouthcare - NS / NaHCO3 rinses, Mycelex, Biotene; Skin care     5. GVHD prophylaxis - n/a     6. Transfuse & replete electrolytes prn     7. IV hydration, daily weights, strict I&O, prn diuresis     8. PO intake as tolerated, nutrition follow up as needed, MVI, folic acid     9. Antiemetics, anti-diarrhea medications:   metoclopramide Injectable 10 milliGRAM(s) IV Push every 6 hours PRN  ondansetron Injectable 8 milliGRAM(s) IV Push every 8 hours PRN  LORazepam   Injectable 1 milliGRAM(s) IV Push every 24 hours    10. OOB as tolerated, physical therapy consult if needed     11. Monitor coags / fibrinogen 2x week, vitamin K as needed     12. Monitor closely for clinical changes, monitor for fevers     13. Emotional support provided, plan of care discussed and questions addressed     14. Patient education done regarding plan of care, restrictions and discharge planning     15. Continue regular social work input     I have written the above note for Dr. Gonzales who performed service with me in the room.   Shayla Lundberg NP-C ([phone])    I have seen and examined patient with NP, I agree with above note as scribed.

## 2022-11-11 NOTE — PROVIDER CONTACT NOTE (OTHER) - SITUATION
pt. c/o of diarrhea and stool c- diff was sent 10/31/22 and it was negative
pt. had a temp 100.4 pt. is on cipro PO
pt. had a temp 38.1 and pt. is  suppose to be going home Monday, and they ordered to D/C cefepime, then re started it again and twoo doses of cefepime will be given tonight's dose
pt. c/o cough

## 2022-11-12 LAB
ALBUMIN SERPL ELPH-MCNC: 3.3 G/DL — SIGNIFICANT CHANGE UP (ref 3.3–5)
ALP SERPL-CCNC: 122 U/L — HIGH (ref 40–120)
ALT FLD-CCNC: 16 U/L — SIGNIFICANT CHANGE UP (ref 10–45)
ANION GAP SERPL CALC-SCNC: 9 MMOL/L — SIGNIFICANT CHANGE UP (ref 5–17)
AST SERPL-CCNC: 14 U/L — SIGNIFICANT CHANGE UP (ref 10–40)
BASOPHILS # BLD AUTO: 0 K/UL — SIGNIFICANT CHANGE UP (ref 0–0.2)
BASOPHILS NFR BLD AUTO: 0 % — SIGNIFICANT CHANGE UP (ref 0–2)
BILIRUB SERPL-MCNC: 0.2 MG/DL — SIGNIFICANT CHANGE UP (ref 0.2–1.2)
BUN SERPL-MCNC: 8 MG/DL — SIGNIFICANT CHANGE UP (ref 7–23)
CALCIUM SERPL-MCNC: 8.6 MG/DL — SIGNIFICANT CHANGE UP (ref 8.4–10.5)
CHLORIDE SERPL-SCNC: 99 MMOL/L — SIGNIFICANT CHANGE UP (ref 96–108)
CO2 SERPL-SCNC: 26 MMOL/L — SIGNIFICANT CHANGE UP (ref 22–31)
CREAT SERPL-MCNC: 0.85 MG/DL — SIGNIFICANT CHANGE UP (ref 0.5–1.3)
EGFR: 91 ML/MIN/1.73M2 — SIGNIFICANT CHANGE UP
EOSINOPHIL # BLD AUTO: 0 K/UL — SIGNIFICANT CHANGE UP (ref 0–0.5)
EOSINOPHIL NFR BLD AUTO: 0 % — SIGNIFICANT CHANGE UP (ref 0–6)
GLUCOSE BLDC GLUCOMTR-MCNC: 111 MG/DL — HIGH (ref 70–99)
GLUCOSE BLDC GLUCOMTR-MCNC: 115 MG/DL — HIGH (ref 70–99)
GLUCOSE BLDC GLUCOMTR-MCNC: 116 MG/DL — HIGH (ref 70–99)
GLUCOSE BLDC GLUCOMTR-MCNC: 98 MG/DL — SIGNIFICANT CHANGE UP (ref 70–99)
GLUCOSE SERPL-MCNC: 103 MG/DL — HIGH (ref 70–99)
HCT VFR BLD CALC: 28.8 % — LOW (ref 39–50)
HGB BLD-MCNC: 9.5 G/DL — LOW (ref 13–17)
LDH SERPL L TO P-CCNC: 184 U/L — SIGNIFICANT CHANGE UP (ref 50–242)
LYMPHOCYTES # BLD AUTO: 0.61 K/UL — LOW (ref 1–3.3)
LYMPHOCYTES # BLD AUTO: 8 % — LOW (ref 13–44)
MAGNESIUM SERPL-MCNC: 1.7 MG/DL — SIGNIFICANT CHANGE UP (ref 1.6–2.6)
MANUAL SMEAR VERIFICATION: SIGNIFICANT CHANGE UP
MCHC RBC-ENTMCNC: 25.9 PG — LOW (ref 27–34)
MCHC RBC-ENTMCNC: 33 GM/DL — SIGNIFICANT CHANGE UP (ref 32–36)
MCV RBC AUTO: 78.5 FL — LOW (ref 80–100)
METAMYELOCYTES # FLD: 2.6 % — HIGH (ref 0–0)
MONOCYTES # BLD AUTO: 0.95 K/UL — HIGH (ref 0–0.9)
MONOCYTES NFR BLD AUTO: 12.4 % — SIGNIFICANT CHANGE UP (ref 2–14)
NEUTROPHILS # BLD AUTO: 5.91 K/UL — SIGNIFICANT CHANGE UP (ref 1.8–7.4)
NEUTROPHILS NFR BLD AUTO: 75.2 % — SIGNIFICANT CHANGE UP (ref 43–77)
NEUTS BAND # BLD: 1.8 % — SIGNIFICANT CHANGE UP (ref 0–8)
PHOSPHATE SERPL-MCNC: 2.2 MG/DL — LOW (ref 2.5–4.5)
PLAT MORPH BLD: NORMAL — SIGNIFICANT CHANGE UP
PLATELET # BLD AUTO: 68 K/UL — LOW (ref 150–400)
POTASSIUM SERPL-MCNC: 4.1 MMOL/L — SIGNIFICANT CHANGE UP (ref 3.5–5.3)
POTASSIUM SERPL-SCNC: 4.1 MMOL/L — SIGNIFICANT CHANGE UP (ref 3.5–5.3)
PROT SERPL-MCNC: 6.2 G/DL — SIGNIFICANT CHANGE UP (ref 6–8.3)
RBC # BLD: 3.67 M/UL — LOW (ref 4.2–5.8)
RBC # FLD: 14.4 % — SIGNIFICANT CHANGE UP (ref 10.3–14.5)
RBC BLD AUTO: SIGNIFICANT CHANGE UP
SODIUM SERPL-SCNC: 134 MMOL/L — LOW (ref 135–145)
TOXIC GRANULES BLD QL SMEAR: PRESENT — SIGNIFICANT CHANGE UP
WBC # BLD: 7.68 K/UL — SIGNIFICANT CHANGE UP (ref 3.8–10.5)
WBC # FLD AUTO: 7.68 K/UL — SIGNIFICANT CHANGE UP (ref 3.8–10.5)

## 2022-11-12 PROCEDURE — 99233 SBSQ HOSP IP/OBS HIGH 50: CPT

## 2022-11-12 RX ORDER — POTASSIUM PHOSPHATE, MONOBASIC POTASSIUM PHOSPHATE, DIBASIC 236; 224 MG/ML; MG/ML
15 INJECTION, SOLUTION INTRAVENOUS ONCE
Refills: 0 | Status: COMPLETED | OUTPATIENT
Start: 2022-11-12 | End: 2022-11-12

## 2022-11-12 RX ADMIN — Medication 10 MILLILITER(S): at 20:03

## 2022-11-12 RX ADMIN — LORATADINE 10 MILLIGRAM(S): 10 TABLET ORAL at 12:07

## 2022-11-12 RX ADMIN — Medication 10 MILLILITER(S): at 12:07

## 2022-11-12 RX ADMIN — TAMSULOSIN HYDROCHLORIDE 0.4 MILLIGRAM(S): 0.4 CAPSULE ORAL at 21:54

## 2022-11-12 RX ADMIN — NYSTATIN CREAM 1 APPLICATION(S): 100000 CREAM TOPICAL at 05:34

## 2022-11-12 RX ADMIN — Medication 1 APPLICATION(S): at 05:34

## 2022-11-12 RX ADMIN — AMLODIPINE BESYLATE 10 MILLIGRAM(S): 2.5 TABLET ORAL at 05:33

## 2022-11-12 RX ADMIN — Medication 650 MILLIGRAM(S): at 18:19

## 2022-11-12 RX ADMIN — Medication 25 MILLIGRAM(S): at 05:33

## 2022-11-12 RX ADMIN — Medication 25 MILLIGRAM(S): at 13:07

## 2022-11-12 RX ADMIN — POLYETHYLENE GLYCOL 3350 17 GRAM(S): 17 POWDER, FOR SOLUTION ORAL at 08:57

## 2022-11-12 RX ADMIN — URSODIOL 300 MILLIGRAM(S): 250 TABLET, FILM COATED ORAL at 17:40

## 2022-11-12 RX ADMIN — Medication 650 MILLIGRAM(S): at 06:00

## 2022-11-12 RX ADMIN — ONDANSETRON 8 MILLIGRAM(S): 8 TABLET, FILM COATED ORAL at 05:34

## 2022-11-12 RX ADMIN — Medication 650 MILLIGRAM(S): at 06:30

## 2022-11-12 RX ADMIN — NYSTATIN CREAM 1 APPLICATION(S): 100000 CREAM TOPICAL at 21:55

## 2022-11-12 RX ADMIN — Medication 5 MILLILITER(S): at 16:05

## 2022-11-12 RX ADMIN — Medication 10 MILLILITER(S): at 16:04

## 2022-11-12 RX ADMIN — Medication 400 MILLIGRAM(S): at 13:07

## 2022-11-12 RX ADMIN — Medication 400 MILLIGRAM(S): at 05:33

## 2022-11-12 RX ADMIN — SENNA PLUS 2 TABLET(S): 8.6 TABLET ORAL at 21:54

## 2022-11-12 RX ADMIN — Medication 1 TABLET(S): at 12:07

## 2022-11-12 RX ADMIN — Medication 400 MILLIGRAM(S): at 21:54

## 2022-11-12 RX ADMIN — POTASSIUM PHOSPHATE, MONOBASIC POTASSIUM PHOSPHATE, DIBASIC 62.5 MILLIMOLE(S): 236; 224 INJECTION, SOLUTION INTRAVENOUS at 08:53

## 2022-11-12 RX ADMIN — PANTOPRAZOLE SODIUM 40 MILLIGRAM(S): 20 TABLET, DELAYED RELEASE ORAL at 05:33

## 2022-11-12 RX ADMIN — Medication 1 LOZENGE: at 19:56

## 2022-11-12 RX ADMIN — Medication 1 LOZENGE: at 12:08

## 2022-11-12 RX ADMIN — Medication 650 MILLIGRAM(S): at 17:49

## 2022-11-12 RX ADMIN — Medication 5 MILLILITER(S): at 19:56

## 2022-11-12 RX ADMIN — FINASTERIDE 5 MILLIGRAM(S): 5 TABLET, FILM COATED ORAL at 12:07

## 2022-11-12 RX ADMIN — Medication 25 MILLIGRAM(S): at 21:54

## 2022-11-12 RX ADMIN — Medication 5 MILLILITER(S): at 12:09

## 2022-11-12 RX ADMIN — Medication 1 LOZENGE: at 08:49

## 2022-11-12 RX ADMIN — Medication 1 MILLIGRAM(S): at 12:07

## 2022-11-12 RX ADMIN — Medication 10 MILLILITER(S): at 08:49

## 2022-11-12 RX ADMIN — Medication 1 LOZENGE: at 16:05

## 2022-11-12 RX ADMIN — Medication 5 MILLILITER(S): at 08:49

## 2022-11-12 RX ADMIN — Medication 1 APPLICATION(S): at 17:40

## 2022-11-12 RX ADMIN — NYSTATIN CREAM 1 APPLICATION(S): 100000 CREAM TOPICAL at 13:07

## 2022-11-12 RX ADMIN — URSODIOL 300 MILLIGRAM(S): 250 TABLET, FILM COATED ORAL at 05:33

## 2022-11-12 RX ADMIN — CHLORHEXIDINE GLUCONATE 1 APPLICATION(S): 213 SOLUTION TOPICAL at 08:50

## 2022-11-12 NOTE — PROGRESS NOTE ADULT - SUBJECTIVE AND OBJECTIVE BOX
[facility] Transplant Team                                                      Critical / Counseling Time Provided: 30 minutes                                                                                                                                                        Chief Complaint:     S: Patient seen and examined with [facility] Transplant Team:   Denies mouth / tongue / throat pain, dyspnea, cough, nausea, vomiting, diarrhea, abdominal pain     O: Vitals:   Vital Signs Last 24 Hrs  T(C): 37.6 (2022 05:30), Max: 38.1 (2022 19:45)  T(F): 99.7 (2022 05:30), Max: 100.6 (2022 19:45)  HR: 112 (2022 05:30) (102 - 117)  BP: 120/49 (2022 05:30) (112/67 - 133/56)  BP(mean): --  RR: 19 (2022 05:30) (18 - 19)  SpO2: 97% (:30) (95% - 98%)    Parameters below as of 2022 05:30  Patient On (Oxygen Delivery Method): room air        Admit weight:   Daily     Daily Weight in k.4 (2022 09:15)    Intake / Output:   11-10 @ 07: @ 07:00  --------------------------------------------------------  IN: 2697.9 mL / OUT: 2030 mL / NET: 667.9 mL     @ 07: @ 06:57  --------------------------------------------------------  IN: 2831.1 mL / OUT: 3310 mL / NET: -478.9 mL          PE:   Oropharynx:   Oral Mucositis:                                                        Grade:   CVS:   Lungs:   Abdomen:  Extremities:   Gastric Mucositis:                                                  Grade:   Intestinal Mucositis:                                              Grade:   Skin:   TLC:   Neuro:   Pain:     Labs:   CBC Full  -  ( 2022 07:17 )  WBC Count : 8.34 K/uL  Hemoglobin : 9.3 g/dL  Hematocrit : 28.6 %  Platelet Count - Automated : 47 K/uL  Mean Cell Volume : 79.7 fl  Mean Cell Hemoglobin : 25.9 pg  Mean Cell Hemoglobin Concentration : 32.5 gm/dL  Auto Neutrophil # : 6.89 K/uL  Auto Lymphocyte # : 0.80 K/uL  Auto Monocyte # : 0.58 K/uL  Auto Eosinophil # : 0.00 K/uL  Auto Basophil # : 0.00 K/uL  Auto Neutrophil % : 82.6 %  Auto Lymphocyte % : 9.6 %  Auto Monocyte % : 6.9 %  Auto Eosinophil % : 0.0 %  Auto Basophil % : 0.0 %                          9.3    8.34  )-----------( 47       ( 2022 07:17 )             28.6         134<L>  |  100  |  8   ----------------------------<  102<H>  4.1   |  27  |  0.91    Ca    8.7      2022 07:17  Phos  2.6       Mg     1.7         TPro  6.2  /  Alb  3.3  /  TBili  0.2  /  DBili  x   /  AST  16  /  ALT  15  /  AlkPhos  124<H>      PT/INR - ( 10 Nov 2022 07:34 )   PT: 12.2 sec;   INR: 1.05 ratio         PTT - ( 10 Nov 2022 07:34 )  PTT:32.1 sec  LIVER FUNCTIONS - ( 2022 07:17 )  Alb: 3.3 g/dL / Pro: 6.2 g/dL / ALK PHOS: 124 U/L / ALT: 15 U/L / AST: 16 U/L / GGT: x           Lactate Dehydrogenase, Serum: 186 U/L ( @ 07:17)          Karnofsky / Lansky Scale:   GVHD:   Skin:   Liver:   Gut:   Overall Grade:       Cultures:         Radiology:       Meds:   Antimicrobials:   acyclovir   Oral Tab/Cap 400 milliGRAM(s) Oral every 8 hours  clotrimazole Lozenge 1 Lozenge Oral five times a day  fluconAZOLE   Tablet 400 milliGRAM(s) Oral daily      Heme / Onc:   heparin  Infusion 312 Unit(s)/Hr IV Continuous <Continuous>      GI:  aluminum hydroxide/magnesium hydroxide/simethicone Suspension 30 milliLiter(s) Oral every 6 hours PRN  pantoprazole    Tablet 40 milliGRAM(s) Oral before breakfast  polyethylene glycol 3350 17 Gram(s) Oral daily PRN  senna 2 Tablet(s) Oral at bedtime  sodium bicarbonate Mouth Rinse 10 milliLiter(s) Swish and Spit five times a day  ursodiol Capsule 300 milliGRAM(s) Oral two times a day      Cardiovascular:   amLODIPine   Tablet 10 milliGRAM(s) Oral daily  hydrALAZINE 25 milliGRAM(s) Oral three times a day      Immunologic:       Other medications:   acetaminophen     Tablet .. 650 milliGRAM(s) Oral every 6 hours  AQUAPHOR (petrolatum Ointment) 1 Application(s) Topical two times a day  Biotene Dry Mouth Oral Rinse 5 milliLiter(s) Swish and Spit five times a day  chlorhexidine 4% Liquid 1 Application(s) Topical <User Schedule>  dextrose 5%. 1000 milliLiter(s) IV Continuous <Continuous>  dextrose 5%. 1000 milliLiter(s) IV Continuous <Continuous>  dextrose 50% Injectable 25 Gram(s) IV Push once  dextrose 50% Injectable 12.5 Gram(s) IV Push once  dextrose 50% Injectable 25 Gram(s) IV Push once  finasteride 5 milliGRAM(s) Oral daily  folic acid 1 milliGRAM(s) Oral daily  glucagon  Injectable 1 milliGRAM(s) IntraMuscular once  insulin lispro (ADMELOG) corrective regimen sliding scale   SubCutaneous three times a day before meals  insulin lispro (ADMELOG) corrective regimen sliding scale   SubCutaneous at bedtime  loratadine 10 milliGRAM(s) Oral daily  LORazepam   Injectable 1 milliGRAM(s) IV Push every 24 hours  multivitamin 1 Tablet(s) Oral daily  nystatin Powder 1 Application(s) Topical three times a day  sodium chloride 0.9%. 1000 milliLiter(s) IV Continuous <Continuous>  tamsulosin 0.4 milliGRAM(s) Oral at bedtime      PRN:   acetaminophen     Tablet .. 650 milliGRAM(s) Oral every 6 hours PRN  aluminum hydroxide/magnesium hydroxide/simethicone Suspension 30 milliLiter(s) Oral every 6 hours PRN  dextrose Oral Gel 15 Gram(s) Oral once PRN  metoclopramide Injectable 10 milliGRAM(s) IV Push every 6 hours PRN  ondansetron Injectable 8 milliGRAM(s) IV Push every 8 hours PRN  polyethylene glycol 3350 17 Gram(s) Oral daily PRN  sodium chloride 0.9% lock flush 10 milliLiter(s) IV Push every 1 hour PRN      A/P:   ___ year old ___  with a history of ______________________  Pre / Status Post :  Autologous / Allogeneic PBSCT / BMT day ____________    1. Infectious Disease:   Fluconazole, Acyclovir     2. VOD Prophylaxis: Actigall, Glutamine, Heparin (dosed at 100 units / kg / day)     3. GI Prophylaxis:  Protonix    4. Mouthcare - NS / NaHCO3 rinses, Mycelex, Caphosol, skin care     5. GVHD prophylaxis     6. Transfuse & replete electrolytes prn     7. IV hydration, daily weights, strict I&O, prn diuresis     8. PO intake as tolerated, nutrition follow up as needed, MVI, folic acid     9. Antiemetics, anti-diarrhea medications:   Reglan, Ativan    10. OOB as tolerated, physical therapy consult if needed     11. Monitor coags / fibrinogen 2x week, vitamin K as needed     12. Monitor closely for clinical changes, monitor for fevers     13. Emotional support provided, plan of care discussed with patient and family, questions addressed     14. Patient education done regarding chemotherapy prep, plan of care, restrictions and discharge planning     15. Continue regular social work input     I have written the above note for Dr. Matos who performed service with me in the room.   Daiana Lugo  NP-C ([phone])    I have seen and examined patient with NP, I agree with above note as scribed.

## 2022-11-12 NOTE — PROGRESS NOTE ADULT - CRITICAL CARE ATTENDING COMMENT
72 year old male with multiple myeloma diagnosed 12/20/21 s/p RVD x 6 admitted for autologous pbsct with high dose melphalan prep regimen (dose reduced to 70mg / m2 for age)    Day + 15...some soft stool..feeling better this am..increased saliva improved...wbc recovering..repeat urine cx less than 10,000...enterobacter sensitive to cefapime..course completed after today...d/c plan for monday..home preparation in progress...monitor for temps off antibiotics    1. Admit to BMTU   2. -3 hours prior to Melphalan start hydration: D5NS at 200ml /hr and continue 24 hours post Melphalan infusion  3. Day – 3 & day -2 - Melphalan 70mg/m2  IV   4. Strict I&O, daily weights, prn diuresis   5. Day -1 rest day. At 2200 on day – 1, start transplant hydration 1/2NS + 50mEq NaHCO3- (may substitute [id number] if bicarb not available)  6. Day 0 – infuse HPC product. 4 hours after infusion of cells start Zarxio 5 micrograms / kg (actual weight) . Continue through engraftment. OFF  7. On day 0, + 1, + 2-give Kepivance 60micrograms / kg (actual weight).  8. VOD prophylaxis - low dose heparin gtt (dosed at 100 units / kg / day), glutamine supplementation, Actigall BID   9. PCP prophylaxis - Bactrim DS through day -2    10. Antiviral prophylaxis - Acyclovir 400mg po TID to start day -1   11. Antifungal prophylaxis- Diflucan 400 mg po daily.  12. GI prophylaxis - Protonix po QD   13. Antibacterial prophylaxis -  ANC < 500, started Cipro 500mg po BID.  febrile, pan cx, CXR and changed Cipro to Cefepime 2g IV q 8 hours. Continue until count recovery  14. Kepivance for prevention of mucositis- days 0, +1, +2  15. Aggressive mouth care and skin care as per protocol..senna, miralax  prn  16. The patient is aware of his diagnosis...he does not want to be reminded of it every day..  17 wbc recovering..d/c early next week.  18. daughter on phone 11/10/22...d/c instruction started..some nausea today...re inforced that this is quite normal and that it may continue for several weeks after d/c

## 2022-11-12 NOTE — PROGRESS NOTE ADULT - NS ATTEND AMEND GEN_ALL_CORE FT
Vital Signs Last 24 Hrs  T(C): 37.6 (12 Nov 2022 05:30), Max: 38.1 (11 Nov 2022 19:45)  T(F): 99.7 (12 Nov 2022 05:30), Max: 100.6 (11 Nov 2022 19:45)  HR: 112 (12 Nov 2022 05:30) (102 - 117)  BP: 120/49 (12 Nov 2022 05:30) (112/67 - 133/56)  BP(mean): --  RR: 19 (12 Nov 2022 05:30) (18 - 19)  SpO2: 97% (12 Nov 2022 05:30) (95% - 98%)    Parameters below as of 12 Nov 2022 05:30  Patient On (Oxygen Delivery Method): room air    MEDICATIONS  (STANDING):  acetaminophen     Tablet .. 650 milliGRAM(s) Oral every 6 hours  acyclovir   Oral Tab/Cap 400 milliGRAM(s) Oral every 8 hours  amLODIPine   Tablet 10 milliGRAM(s) Oral daily  AQUAPHOR (petrolatum Ointment) 1 Application(s) Topical two times a day  Biotene Dry Mouth Oral Rinse 5 milliLiter(s) Swish and Spit five times a day  chlorhexidine 4% Liquid 1 Application(s) Topical <User Schedule>  clotrimazole Lozenge 1 Lozenge Oral five times a day  dextrose 5%. 1000 milliLiter(s) (50 mL/Hr) IV Continuous <Continuous>  dextrose 5%. 1000 milliLiter(s) (100 mL/Hr) IV Continuous <Continuous>  dextrose 50% Injectable 25 Gram(s) IV Push once  dextrose 50% Injectable 12.5 Gram(s) IV Push once  dextrose 50% Injectable 25 Gram(s) IV Push once  finasteride 5 milliGRAM(s) Oral daily  fluconAZOLE   Tablet 400 milliGRAM(s) Oral daily  folic acid 1 milliGRAM(s) Oral daily  glucagon  Injectable 1 milliGRAM(s) IntraMuscular once  heparin  Infusion 312 Unit(s)/Hr (3.12 mL/Hr) IV Continuous <Continuous>  hydrALAZINE 25 milliGRAM(s) Oral three times a day  insulin lispro (ADMELOG) corrective regimen sliding scale   SubCutaneous three times a day before meals  insulin lispro (ADMELOG) corrective regimen sliding scale   SubCutaneous at bedtime  loratadine 10 milliGRAM(s) Oral daily  LORazepam   Injectable 1 milliGRAM(s) IV Push every 24 hours  multivitamin 1 Tablet(s) Oral daily  nystatin Powder 1 Application(s) Topical three times a day  pantoprazole    Tablet 40 milliGRAM(s) Oral before breakfast  senna 2 Tablet(s) Oral at bedtime  sodium bicarbonate Mouth Rinse 10 milliLiter(s) Swish and Spit five times a day  sodium chloride 0.9%. 1000 milliLiter(s) (50 mL/Hr) IV Continuous <Continuous>  tamsulosin 0.4 milliGRAM(s) Oral at bedtime  ursodiol Capsule 300 milliGRAM(s) Oral two times a day      72 year old day +16 autoBMT using a reduced dose melpalan prepartive regmient for MM.  COurse complicated by enterobacter UIT.    Plan:  Heme: PLT goal > 10,000; Hgb goal > 7/0g/dL  Count recovery.  D/C folate        ID: acyclovir, fluconazole, bactrim, cefepime (D/C'ed 11/11/22)    Nutrition: tolerating PO. D/C MVI, D/C ursodiol     DVT prophylaxis: ambulaiton    Over 35 minutes were spent in direct patient care and care coordination. Vital Signs Last 24 Hrs  T(C): 37.6 (12 Nov 2022 05:30), Max: 38.1 (11 Nov 2022 19:45)  T(F): 99.7 (12 Nov 2022 05:30), Max: 100.6 (11 Nov 2022 19:45)  HR: 112 (12 Nov 2022 05:30) (102 - 117)  BP: 120/49 (12 Nov 2022 05:30) (112/67 - 133/56)  BP(mean): --  RR: 19 (12 Nov 2022 05:30) (18 - 19)  SpO2: 97% (12 Nov 2022 05:30) (95% - 98%)    Parameters below as of 12 Nov 2022 05:30  Patient On (Oxygen Delivery Method): room air    MEDICATIONS  (STANDING):  acetaminophen     Tablet .. 650 milliGRAM(s) Oral every 6 hours  acyclovir   Oral Tab/Cap 400 milliGRAM(s) Oral every 8 hours  amLODIPine   Tablet 10 milliGRAM(s) Oral daily  AQUAPHOR (petrolatum Ointment) 1 Application(s) Topical two times a day  Biotene Dry Mouth Oral Rinse 5 milliLiter(s) Swish and Spit five times a day  chlorhexidine 4% Liquid 1 Application(s) Topical <User Schedule>  clotrimazole Lozenge 1 Lozenge Oral five times a day  dextrose 5%. 1000 milliLiter(s) (50 mL/Hr) IV Continuous <Continuous>  dextrose 5%. 1000 milliLiter(s) (100 mL/Hr) IV Continuous <Continuous>  dextrose 50% Injectable 25 Gram(s) IV Push once  dextrose 50% Injectable 12.5 Gram(s) IV Push once  dextrose 50% Injectable 25 Gram(s) IV Push once  finasteride 5 milliGRAM(s) Oral daily  fluconAZOLE   Tablet 400 milliGRAM(s) Oral daily  folic acid 1 milliGRAM(s) Oral daily  glucagon  Injectable 1 milliGRAM(s) IntraMuscular once  heparin  Infusion 312 Unit(s)/Hr (3.12 mL/Hr) IV Continuous <Continuous>  hydrALAZINE 25 milliGRAM(s) Oral three times a day  insulin lispro (ADMELOG) corrective regimen sliding scale   SubCutaneous three times a day before meals  insulin lispro (ADMELOG) corrective regimen sliding scale   SubCutaneous at bedtime  loratadine 10 milliGRAM(s) Oral daily  LORazepam   Injectable 1 milliGRAM(s) IV Push every 24 hours  multivitamin 1 Tablet(s) Oral daily  nystatin Powder 1 Application(s) Topical three times a day  pantoprazole    Tablet 40 milliGRAM(s) Oral before breakfast  senna 2 Tablet(s) Oral at bedtime  sodium bicarbonate Mouth Rinse 10 milliLiter(s) Swish and Spit five times a day  sodium chloride 0.9%. 1000 milliLiter(s) (50 mL/Hr) IV Continuous <Continuous>  tamsulosin 0.4 milliGRAM(s) Oral at bedtime  ursodiol Capsule 300 milliGRAM(s) Oral two times a day      72 year old day +16 autoBMT using a reduced dose melphalan preparative regiment for MM.  Course complicated by enterobacter UTI and mild nausea.     Plan:  Heme: PLT goal > 10,000; Hgb goal > 7/0g/dL  Count recovery.  D/C folate      ID: acyclovir, bactrim, cefepime (D/C'ed 11/11/22), D/C fluconazole    Nutrition: tolerating PO. D/C MVI, D/C ursodiol     DVT prophylaxis: ambulaiton    Over 35 minutes were spent in direct patient care and care coordination.

## 2022-11-12 NOTE — PROGRESS NOTE ADULT - SUBJECTIVE AND OBJECTIVE BOX
[facility] Transplant Team                                                      Critical / Counseling Time Provided: 30 minutes      Chief Complaint: Autologous peripheral blood stem cell transplant with high dose Melphalan prep regimen for treatment of multiple myeloma    S: Patient seen and examined with [facility] Transplant Team:     + fatigue and generalized weakness   Constipation resolved    All other ROS negative                                                                                                                                                     O: Vitals:   Vital Signs Last 24 Hrs  T(C): 37.5 (2022 13:35), Max: 38.1 (2022 19:45)  T(F): 99.5 (2022 13:35), Max: 100.6 (2022 19:45)  HR: 103 (:35) (103 - 117)  BP: 130/71 (2022 13:35) (112/67 - 133/56)  BP(mean): --  RR: 18 (2022 13:35) (18 - 19)  SpO2: 96% (:35) (95% - 98%)    Parameters below as of 2022 13:35  Patient On (Oxygen Delivery Method): room air    Admit weight: 74.9kg   Daily Weight in k.7 kg    Intake / Output:    @ 07: @ 07:00  --------------------------------------------------------  IN: 2831.1 mL / OUT: 3310 mL / NET: -478.9 mL     @ 07: @ 15:47  --------------------------------------------------------  IN: 613 mL / OUT: 670 mL / NET: -57 mL    PE:   Oropharynx: scattered erythema no ulcerations  Oral Mucositis:                                                     Grade NA  CVS: S1, S2 RRR   Lungs: CTA throughout bilaterally   Abdomen: + BS x 4, soft, NT, ND   Extremities: no edema  Gastric Mucositis:      -                                            Grade: n/a  Intestinal Mucositis:    -                                          Grade: n/a  Skin: no rash   TLC: CDI   Neuro: A&Ox3   Pain: denies    Labs:   CBC Full  -  ( 2022 06:55 )  WBC Count : 7.68 K/uL  Hemoglobin : 9.5 g/dL  Hematocrit : 28.8 %  Platelet Count - Automated : 68 K/uL  Mean Cell Volume : 78.5 fl  Mean Cell Hemoglobin : 25.9 pg  Mean Cell Hemoglobin Concentration : 33.0 gm/dL  Auto Neutrophil # : 5.91 K/uL  Auto Lymphocyte # : 0.61 K/uL  Auto Monocyte # : 0.95 K/uL  Auto Eosinophil # : 0.00 K/uL  Auto Basophil # : 0.00 K/uL  Auto Neutrophil % : 75.2 %  Auto Lymphocyte % : 8.0 %  Auto Monocyte % : 12.4 %  Auto Eosinophil % : 0.0 %  Auto Basophil % : 0.0 %                          9.5    7.68  )-----------( 68       ( 2022 06:55 )             28.8     -    134<L>  |  99  |  8   ----------------------------<  103<H>  4.1   |  26  |  0.85    Ca    8.6      2022 06:53  Phos  2.2     11-  Mg     1.7         TPro  6.2  /  Alb  3.3  /  TBili  0.2  /  DBili  x   /  AST  14  /  ALT  16  /  AlkPhos  122<H>      LIVER FUNCTIONS - ( 2022 06:53 )  Alb: 3.3 g/dL / Pro: 6.2 g/dL / ALK PHOS: 122 U/L / ALT: 16 U/L / AST: 14 U/L / GGT: x           Lactate Dehydrogenase, Serum: 184 U/L ( @ 06:53)    Cultures:   Culture - Urine (22 @ 16:04)    Specimen Source: Clean Catch Clean Catch (Midstream)    Culture Results:   <10,000 CFU/mL Normal Urogenital Kunal    Culture - Blood (22 @ 00:31)    Specimen Source: .Blood Blood    Culture Results:   No Growth Final    Radiology:   Xray Chest 1 View- PORTABLE-Urgent (Xray Chest 1 View- PORTABLE-Urgent .) (22 @ 20:56) >  No acute radiographic cardiopulmonary pathology.    Meds:   Antimicrobials:   acyclovir   Oral Tab/Cap 400 milliGRAM(s) Oral every 8 hours  clotrimazole Lozenge 1 Lozenge Oral five times a day    Heme / Onc:   heparin  Infusion 312 Unit(s)/Hr IV Continuous <Continuous>    GI:  aluminum hydroxide/magnesium hydroxide/simethicone Suspension 30 milliLiter(s) Oral every 6 hours PRN  pantoprazole    Tablet 40 milliGRAM(s) Oral before breakfast  polyethylene glycol 3350 17 Gram(s) Oral daily PRN  senna 2 Tablet(s) Oral at bedtime  sodium bicarbonate Mouth Rinse 10 milliLiter(s) Swish and Spit five times a day  ursodiol Capsule 300 milliGRAM(s) Oral two times a day    Cardiovascular:   amLODIPine   Tablet 10 milliGRAM(s) Oral daily  hydrALAZINE 25 milliGRAM(s) Oral three times a day    Other medications:   acetaminophen     Tablet .. 650 milliGRAM(s) Oral every 6 hours  AQUAPHOR (petrolatum Ointment) 1 Application(s) Topical two times a day  Biotene Dry Mouth Oral Rinse 5 milliLiter(s) Swish and Spit five times a day  chlorhexidine 4% Liquid 1 Application(s) Topical <User Schedule>  dextrose 5%. 1000 milliLiter(s) IV Continuous <Continuous>  dextrose 5%. 1000 milliLiter(s) IV Continuous <Continuous>  dextrose 50% Injectable 25 Gram(s) IV Push once  dextrose 50% Injectable 12.5 Gram(s) IV Push once  dextrose 50% Injectable 25 Gram(s) IV Push once  finasteride 5 milliGRAM(s) Oral daily  folic acid 1 milliGRAM(s) Oral daily  glucagon  Injectable 1 milliGRAM(s) IntraMuscular once  insulin lispro (ADMELOG) corrective regimen sliding scale   SubCutaneous three times a day before meals  insulin lispro (ADMELOG) corrective regimen sliding scale   SubCutaneous at bedtime  loratadine 10 milliGRAM(s) Oral daily  LORazepam   Injectable 1 milliGRAM(s) IV Push every 24 hours  multivitamin 1 Tablet(s) Oral daily  nystatin Powder 1 Application(s) Topical three times a day  sodium chloride 0.9%. 1000 milliLiter(s) IV Continuous <Continuous>  tamsulosin 0.4 milliGRAM(s) Oral at bedtime    PRN:   acetaminophen     Tablet .. 650 milliGRAM(s) Oral every 6 hours PRN  aluminum hydroxide/magnesium hydroxide/simethicone Suspension 30 milliLiter(s) Oral every 6 hours PRN  dextrose Oral Gel 15 Gram(s) Oral once PRN  metoclopramide Injectable 10 milliGRAM(s) IV Push every 6 hours PRN  ondansetron Injectable 8 milliGRAM(s) IV Push every 8 hours PRN  polyethylene glycol 3350 17 Gram(s) Oral daily PRN  sodium chloride 0.9% lock flush 10 milliLiter(s) IV Push every 1 hour PRN    A/P:    72 year old male with multiple myeloma diagnosed 19 s/p RVD x 6 admitted for autologous pbsct with high dose melphalan prep regimen (dose reduced to 70mg / m2 for age)  Day + 16  10/25- melphalan ; continue melphalan hydration for 24 hours post infusion of last dose. Strict I&O, daily weights, prn diuresis   10/27- [facility] transplant today; continue transplant hydration for 24 hours post infusion of cells   10/28 weight gain, I>O, lasix 60 mg iv x1; constipated, Lactulose now and PRN  - Parrish. Started on cipro 10/31/22. If T >/= 38C, pan cx, CXR and change cipro to cefepime 2g IV q 8 hours    Febrile overnight, cipro switched to Cefepime; BC(-), urine culture (+) GNR Enterobacter cloacae complex; follow up sensitivities    pending repeat urine culture  - repeat urine cx from  < 10K normal urogenital kunal. Continue Cefepime for 7 days   - engrafted 22 - continue cefepime for a 7 day course, continue zarxio for now   Engrafted, off Zarxio  - Febrile O/N, will f/u bcx    1. Infectious Disease:   acyclovir   Oral Tab/Cap 400 milliGRAM(s) Oral every 8 hours  clotrimazole Lozenge 1 Lozenge Oral five times a day    2. VOD Prophylaxis: Actigall, Glutamine, Heparin (dosed at 100 units / kg / day)     3. GI Prophylaxis:    pantoprazole    Tablet 40 milliGRAM(s) Oral before breakfast  Continue Senna 2 tabs QHS and Miralax x1  for constipation     4. Mouth care - NS / NaHCO3 rinses, Mycelex, Biotene; Skin care     5. GVHD prophylaxis - n/a     6. Transfuse & replete electrolytes prn   Hypophosphatemia - Replete phos    7. IV hydration, daily weights, strict I&O, prn diuresis     8. PO intake as tolerated, nutrition follow up as needed, MVI, folic acid     9. Antiemetics, anti-diarrhea medications:   metoclopramide Injectable 10 milliGRAM(s) IV Push every 6 hours PRN  ondansetron Injectable 8 milliGRAM(s) IV Push every 8 hours PRN  LORazepam   Injectable 1 milliGRAM(s) IV Push every 24 hours    10. OOB as tolerated, physical therapy consult if needed     11. Monitor coags / fibrinogen 2x week, vitamin K as needed     12. Monitor closely for clinical changes, monitor for fevers     13. Emotional support provided, plan of care discussed and questions addressed     14. Patient education done regarding plan of care, restrictions and discharge planning     15. Continue regular social work input     I have written the above note for Dr. Matos who performed service with me in the room.   Daiana Lugo  NP-C ([phone])    I have seen and examined patient with NP, I agree with above note as scribed.                    [facility] Transplant Team                                                      Critical / Counseling Time Provided: 30 minutes      Chief Complaint: Autologous peripheral blood stem cell transplant with high dose Melphalan prep regimen for treatment of multiple myeloma    S: Patient seen and examined with [facility] Transplant Team:     + fatigue and generalized weakness   Constipation resolved    All other ROS negative                                                                                                                                                     O: Vitals:   Vital Signs Last 24 Hrs  T(C): 37.5 (2022 13:35), Max: 38.1 (2022 19:45)  T(F): 99.5 (2022 13:35), Max: 100.6 (2022 19:45)  HR: 103 (:35) (103 - 117)  BP: 130/71 (:35) (112/67 - 133/56)  BP(mean): --  RR: 18 (:35) (18 - 19)  SpO2: 96% (:35) (95% - 98%)    Parameters below as of 2022 13:35  Patient On (Oxygen Delivery Method): room air    Admit weight: 74.9kg   Daily Weight in k.7 kg    Intake / Output:    @ 07: @ 07:00  --------------------------------------------------------  IN: 2831.1 mL / OUT: 3310 mL / NET: -478.9 mL     @ 07: @ 15:47  --------------------------------------------------------  IN: 613 mL / OUT: 670 mL / NET: -57 mL    PE:   Oropharynx: Clear oropharynx  Oral Mucositis:                                                     Grade NA  CVS: S1, S2 RRR   Lungs: CTA throughout bilaterally   Abdomen: + BS x 4, soft, NT, ND   Extremities: no edema  Gastric Mucositis:      -                                            Grade: n/a  Intestinal Mucositis:    -                                          Grade: n/a  Skin: no rash   TLC: CDI   Neuro: A&Ox3   Pain: denies    Labs:   CBC Full  -  ( 2022 06:55 )  WBC Count : 7.68 K/uL  Hemoglobin : 9.5 g/dL  Hematocrit : 28.8 %  Platelet Count - Automated : 68 K/uL  Mean Cell Volume : 78.5 fl  Mean Cell Hemoglobin : 25.9 pg  Mean Cell Hemoglobin Concentration : 33.0 gm/dL  Auto Neutrophil # : 5.91 K/uL  Auto Lymphocyte # : 0.61 K/uL  Auto Monocyte # : 0.95 K/uL  Auto Eosinophil # : 0.00 K/uL  Auto Basophil # : 0.00 K/uL  Auto Neutrophil % : 75.2 %  Auto Lymphocyte % : 8.0 %  Auto Monocyte % : 12.4 %  Auto Eosinophil % : 0.0 %  Auto Basophil % : 0.0 %                          9.5    7.68  )-----------( 68       ( 2022 06:55 )             28.8     -    134<L>  |  99  |  8   ----------------------------<  103<H>  4.1   |  26  |  0.85    Ca    8.6      2022 06:53  Phos  2.2     11-  Mg     1.7         TPro  6.2  /  Alb  3.3  /  TBili  0.2  /  DBili  x   /  AST  14  /  ALT  16  /  AlkPhos  122<H>      LIVER FUNCTIONS - ( 2022 06:53 )  Alb: 3.3 g/dL / Pro: 6.2 g/dL / ALK PHOS: 122 U/L / ALT: 16 U/L / AST: 14 U/L / GGT: x           Lactate Dehydrogenase, Serum: 184 U/L ( @ 06:53)    Cultures:   Culture - Urine (22 @ 16:04)    Specimen Source: Clean Catch Clean Catch (Midstream)    Culture Results:   <10,000 CFU/mL Normal Urogenital Kunal    Culture - Blood (22 @ 00:31)    Specimen Source: .Blood Blood    Culture Results:   No Growth Final    Radiology:   Xray Chest 1 View- PORTABLE-Urgent (Xray Chest 1 View- PORTABLE-Urgent .) (22 @ 20:56) >  No acute radiographic cardiopulmonary pathology.    Meds:   Antimicrobials:   acyclovir   Oral Tab/Cap 400 milliGRAM(s) Oral every 8 hours  clotrimazole Lozenge 1 Lozenge Oral five times a day    Heme / Onc:   heparin  Infusion 312 Unit(s)/Hr IV Continuous <Continuous>    GI:  aluminum hydroxide/magnesium hydroxide/simethicone Suspension 30 milliLiter(s) Oral every 6 hours PRN  pantoprazole    Tablet 40 milliGRAM(s) Oral before breakfast  polyethylene glycol 3350 17 Gram(s) Oral daily PRN  senna 2 Tablet(s) Oral at bedtime  sodium bicarbonate Mouth Rinse 10 milliLiter(s) Swish and Spit five times a day  ursodiol Capsule 300 milliGRAM(s) Oral two times a day    Cardiovascular:   amLODIPine   Tablet 10 milliGRAM(s) Oral daily  hydrALAZINE 25 milliGRAM(s) Oral three times a day    Other medications:   acetaminophen     Tablet .. 650 milliGRAM(s) Oral every 6 hours  AQUAPHOR (petrolatum Ointment) 1 Application(s) Topical two times a day  Biotene Dry Mouth Oral Rinse 5 milliLiter(s) Swish and Spit five times a day  chlorhexidine 4% Liquid 1 Application(s) Topical <User Schedule>  dextrose 5%. 1000 milliLiter(s) IV Continuous <Continuous>  dextrose 5%. 1000 milliLiter(s) IV Continuous <Continuous>  dextrose 50% Injectable 25 Gram(s) IV Push once  dextrose 50% Injectable 12.5 Gram(s) IV Push once  dextrose 50% Injectable 25 Gram(s) IV Push once  finasteride 5 milliGRAM(s) Oral daily  folic acid 1 milliGRAM(s) Oral daily  glucagon  Injectable 1 milliGRAM(s) IntraMuscular once  insulin lispro (ADMELOG) corrective regimen sliding scale   SubCutaneous three times a day before meals  insulin lispro (ADMELOG) corrective regimen sliding scale   SubCutaneous at bedtime  loratadine 10 milliGRAM(s) Oral daily  LORazepam   Injectable 1 milliGRAM(s) IV Push every 24 hours  multivitamin 1 Tablet(s) Oral daily  nystatin Powder 1 Application(s) Topical three times a day  sodium chloride 0.9%. 1000 milliLiter(s) IV Continuous <Continuous>  tamsulosin 0.4 milliGRAM(s) Oral at bedtime    PRN:   acetaminophen     Tablet .. 650 milliGRAM(s) Oral every 6 hours PRN  aluminum hydroxide/magnesium hydroxide/simethicone Suspension 30 milliLiter(s) Oral every 6 hours PRN  dextrose Oral Gel 15 Gram(s) Oral once PRN  metoclopramide Injectable 10 milliGRAM(s) IV Push every 6 hours PRN  ondansetron Injectable 8 milliGRAM(s) IV Push every 8 hours PRN  polyethylene glycol 3350 17 Gram(s) Oral daily PRN  sodium chloride 0.9% lock flush 10 milliLiter(s) IV Push every 1 hour PRN    A/P:    72 year old male with multiple myeloma diagnosed 19 s/p RVD x 6 admitted for autologous pbsct with high dose melphalan prep regimen (dose reduced to 70mg / m2 for age)  Day + 16  10/25- melphalan ; continue melphalan hydration for 24 hours post infusion of last dose. Strict I&O, daily weights, prn diuresis   10/27- [facility]PC transplant today; continue transplant hydration for 24 hours post infusion of cells   10/28 weight gain, I>O, lasix 60 mg iv x1; constipated, Lactulose now and PRN  - Parrish. Started on cipro 10/31/22. If T >/= 38C, pan cx, CXR and change cipro to cefepime 2g IV q 8 hours    Febrile overnight, cipro switched to Cefepime; BC(-), urine culture (+) GNR Enterobacter cloacae complex; follow up sensitivities    pending repeat urine culture  - repeat urine cx from  < 10K normal urogenital kunal. Continue Cefepime for 7 days   - engrafted 22 - continue cefepime for a 7 day course, continue zarxio for now   Engrafted, off Zarxio  - Febrile O/N, will f/u bcx    1. Infectious Disease:   acyclovir   Oral Tab/Cap 400 milliGRAM(s) Oral every 8 hours  clotrimazole Lozenge 1 Lozenge Oral five times a day    2. VOD Prophylaxis: Actigall, Glutamine, Heparin (dosed at 100 units / kg / day)     3. GI Prophylaxis:    pantoprazole    Tablet 40 milliGRAM(s) Oral before breakfast  Continue Senna 2 tabs QHS and Miralax x1  for constipation     4. Mouth care - NS / NaHCO3 rinses, Mycelex, Biotene; Skin care     5. GVHD prophylaxis - n/a     6. Transfuse & replete electrolytes prn   Hypophosphatemia - Replete phos    7. IV hydration, daily weights, strict I&O, prn diuresis     8. PO intake as tolerated, nutrition follow up as needed, MVI, folic acid     9. Antiemetics, anti-diarrhea medications:   metoclopramide Injectable 10 milliGRAM(s) IV Push every 6 hours PRN  ondansetron Injectable 8 milliGRAM(s) IV Push every 8 hours PRN  LORazepam   Injectable 1 milliGRAM(s) IV Push every 24 hours    10. OOB as tolerated, physical therapy consult if needed     11. Monitor coags / fibrinogen 2x week, vitamin K as needed     12. Monitor closely for clinical changes, monitor for fevers     13. Emotional support provided, plan of care discussed and questions addressed     14. Patient education done regarding plan of care, restrictions and discharge planning     15. Continue regular social work input     I have written the above note for Dr. Matos who performed service with me in the room.   Daiana Lugo  NP-C ([phone])    I have seen and examined patient with NP, I agree with above note as scribed.

## 2022-11-13 LAB
ALBUMIN SERPL ELPH-MCNC: 3.5 G/DL — SIGNIFICANT CHANGE UP (ref 3.3–5)
ALP SERPL-CCNC: 120 U/L — SIGNIFICANT CHANGE UP (ref 40–120)
ALT FLD-CCNC: 16 U/L — SIGNIFICANT CHANGE UP (ref 10–45)
ANION GAP SERPL CALC-SCNC: 11 MMOL/L — SIGNIFICANT CHANGE UP (ref 5–17)
AST SERPL-CCNC: 17 U/L — SIGNIFICANT CHANGE UP (ref 10–40)
BASOPHILS # BLD AUTO: 0.04 K/UL — SIGNIFICANT CHANGE UP (ref 0–0.2)
BASOPHILS NFR BLD AUTO: 0.8 % — SIGNIFICANT CHANGE UP (ref 0–2)
BILIRUB SERPL-MCNC: 0.2 MG/DL — SIGNIFICANT CHANGE UP (ref 0.2–1.2)
BUN SERPL-MCNC: 6 MG/DL — LOW (ref 7–23)
CALCIUM SERPL-MCNC: 8.9 MG/DL — SIGNIFICANT CHANGE UP (ref 8.4–10.5)
CHLORIDE SERPL-SCNC: 97 MMOL/L — SIGNIFICANT CHANGE UP (ref 96–108)
CO2 SERPL-SCNC: 25 MMOL/L — SIGNIFICANT CHANGE UP (ref 22–31)
CREAT SERPL-MCNC: 0.87 MG/DL — SIGNIFICANT CHANGE UP (ref 0.5–1.3)
CULTURE RESULTS: NO GROWTH — SIGNIFICANT CHANGE UP
EGFR: 91 ML/MIN/1.73M2 — SIGNIFICANT CHANGE UP
EOSINOPHIL # BLD AUTO: 0 K/UL — SIGNIFICANT CHANGE UP (ref 0–0.5)
EOSINOPHIL NFR BLD AUTO: 0 % — SIGNIFICANT CHANGE UP (ref 0–6)
GLUCOSE BLDC GLUCOMTR-MCNC: 100 MG/DL — HIGH (ref 70–99)
GLUCOSE BLDC GLUCOMTR-MCNC: 109 MG/DL — HIGH (ref 70–99)
GLUCOSE BLDC GLUCOMTR-MCNC: 117 MG/DL — HIGH (ref 70–99)
GLUCOSE BLDC GLUCOMTR-MCNC: 136 MG/DL — HIGH (ref 70–99)
GLUCOSE SERPL-MCNC: 100 MG/DL — HIGH (ref 70–99)
HCT VFR BLD CALC: 31.2 % — LOW (ref 39–50)
HGB BLD-MCNC: 10.1 G/DL — LOW (ref 13–17)
LDH SERPL L TO P-CCNC: 202 U/L — SIGNIFICANT CHANGE UP (ref 50–242)
LYMPHOCYTES # BLD AUTO: 0.58 K/UL — LOW (ref 1–3.3)
LYMPHOCYTES # BLD AUTO: 12.2 % — LOW (ref 13–44)
MAGNESIUM SERPL-MCNC: 1.8 MG/DL — SIGNIFICANT CHANGE UP (ref 1.6–2.6)
MANUAL SMEAR VERIFICATION: SIGNIFICANT CHANGE UP
MCHC RBC-ENTMCNC: 25.7 PG — LOW (ref 27–34)
MCHC RBC-ENTMCNC: 32.4 GM/DL — SIGNIFICANT CHANGE UP (ref 32–36)
MCV RBC AUTO: 79.4 FL — LOW (ref 80–100)
MONOCYTES # BLD AUTO: 1.03 K/UL — HIGH (ref 0–0.9)
MONOCYTES NFR BLD AUTO: 21.7 % — HIGH (ref 2–14)
NEUTROPHILS # BLD AUTO: 3.01 K/UL — SIGNIFICANT CHANGE UP (ref 1.8–7.4)
NEUTROPHILS NFR BLD AUTO: 63.5 % — SIGNIFICANT CHANGE UP (ref 43–77)
NRBC # BLD: 1 /100 — HIGH (ref 0–0)
PHOSPHATE SERPL-MCNC: 3.2 MG/DL — SIGNIFICANT CHANGE UP (ref 2.5–4.5)
PLAT MORPH BLD: NORMAL — SIGNIFICANT CHANGE UP
PLATELET # BLD AUTO: 91 K/UL — LOW (ref 150–400)
POTASSIUM SERPL-MCNC: 4.1 MMOL/L — SIGNIFICANT CHANGE UP (ref 3.5–5.3)
POTASSIUM SERPL-SCNC: 4.1 MMOL/L — SIGNIFICANT CHANGE UP (ref 3.5–5.3)
PROMYELOCYTES # FLD: 0.9 % — HIGH (ref 0–0)
PROT SERPL-MCNC: 6.6 G/DL — SIGNIFICANT CHANGE UP (ref 6–8.3)
RBC # BLD: 3.93 M/UL — LOW (ref 4.2–5.8)
RBC # FLD: 14.6 % — HIGH (ref 10.3–14.5)
RBC BLD AUTO: SIGNIFICANT CHANGE UP
SODIUM SERPL-SCNC: 133 MMOL/L — LOW (ref 135–145)
SPECIMEN SOURCE: SIGNIFICANT CHANGE UP
VARIANT LYMPHS # BLD: 0.9 % — SIGNIFICANT CHANGE UP (ref 0–6)
WBC # BLD: 4.74 K/UL — SIGNIFICANT CHANGE UP (ref 3.8–10.5)
WBC # FLD AUTO: 4.74 K/UL — SIGNIFICANT CHANGE UP (ref 3.8–10.5)

## 2022-11-13 PROCEDURE — 99233 SBSQ HOSP IP/OBS HIGH 50: CPT

## 2022-11-13 RX ORDER — ENOXAPARIN SODIUM 100 MG/ML
40 INJECTION SUBCUTANEOUS ONCE
Refills: 0 | Status: COMPLETED | OUTPATIENT
Start: 2022-11-13 | End: 2022-11-13

## 2022-11-13 RX ADMIN — Medication 5 MILLILITER(S): at 12:58

## 2022-11-13 RX ADMIN — Medication 1 LOZENGE: at 12:58

## 2022-11-13 RX ADMIN — Medication 10 MILLILITER(S): at 16:01

## 2022-11-13 RX ADMIN — URSODIOL 300 MILLIGRAM(S): 250 TABLET, FILM COATED ORAL at 17:01

## 2022-11-13 RX ADMIN — CHLORHEXIDINE GLUCONATE 1 APPLICATION(S): 213 SOLUTION TOPICAL at 07:21

## 2022-11-13 RX ADMIN — Medication 650 MILLIGRAM(S): at 18:19

## 2022-11-13 RX ADMIN — Medication 1 LOZENGE: at 16:00

## 2022-11-13 RX ADMIN — Medication 25 MILLIGRAM(S): at 13:00

## 2022-11-13 RX ADMIN — Medication 25 MILLIGRAM(S): at 06:39

## 2022-11-13 RX ADMIN — Medication 5 MILLILITER(S): at 08:40

## 2022-11-13 RX ADMIN — URSODIOL 300 MILLIGRAM(S): 250 TABLET, FILM COATED ORAL at 06:38

## 2022-11-13 RX ADMIN — Medication 400 MILLIGRAM(S): at 21:50

## 2022-11-13 RX ADMIN — AMLODIPINE BESYLATE 10 MILLIGRAM(S): 2.5 TABLET ORAL at 06:38

## 2022-11-13 RX ADMIN — FINASTERIDE 5 MILLIGRAM(S): 5 TABLET, FILM COATED ORAL at 12:57

## 2022-11-13 RX ADMIN — TAMSULOSIN HYDROCHLORIDE 0.4 MILLIGRAM(S): 0.4 CAPSULE ORAL at 21:50

## 2022-11-13 RX ADMIN — LORATADINE 10 MILLIGRAM(S): 10 TABLET ORAL at 12:58

## 2022-11-13 RX ADMIN — Medication 5 MILLILITER(S): at 00:21

## 2022-11-13 RX ADMIN — Medication 10 MILLILITER(S): at 08:40

## 2022-11-13 RX ADMIN — Medication 10 MILLILITER(S): at 12:58

## 2022-11-13 RX ADMIN — Medication 1 TABLET(S): at 12:57

## 2022-11-13 RX ADMIN — NYSTATIN CREAM 1 APPLICATION(S): 100000 CREAM TOPICAL at 13:01

## 2022-11-13 RX ADMIN — Medication 5 MILLILITER(S): at 20:20

## 2022-11-13 RX ADMIN — Medication 650 MILLIGRAM(S): at 17:49

## 2022-11-13 RX ADMIN — Medication 10 MILLILITER(S): at 00:23

## 2022-11-13 RX ADMIN — Medication 1 LOZENGE: at 08:39

## 2022-11-13 RX ADMIN — Medication 1 LOZENGE: at 20:20

## 2022-11-13 RX ADMIN — Medication 1 LOZENGE: at 00:21

## 2022-11-13 RX ADMIN — PANTOPRAZOLE SODIUM 40 MILLIGRAM(S): 20 TABLET, DELAYED RELEASE ORAL at 06:39

## 2022-11-13 RX ADMIN — ENOXAPARIN SODIUM 40 MILLIGRAM(S): 100 INJECTION SUBCUTANEOUS at 10:25

## 2022-11-13 RX ADMIN — Medication 400 MILLIGRAM(S): at 06:38

## 2022-11-13 RX ADMIN — NYSTATIN CREAM 1 APPLICATION(S): 100000 CREAM TOPICAL at 07:21

## 2022-11-13 RX ADMIN — NYSTATIN CREAM 1 APPLICATION(S): 100000 CREAM TOPICAL at 22:05

## 2022-11-13 RX ADMIN — Medication 5 MILLILITER(S): at 16:00

## 2022-11-13 RX ADMIN — Medication 1 APPLICATION(S): at 07:21

## 2022-11-13 RX ADMIN — Medication 10 MILLILITER(S): at 21:01

## 2022-11-13 RX ADMIN — Medication 1 APPLICATION(S): at 17:02

## 2022-11-13 RX ADMIN — Medication 400 MILLIGRAM(S): at 13:00

## 2022-11-13 NOTE — PROGRESS NOTE ADULT - SUBJECTIVE AND OBJECTIVE BOX
HPC Transplant Team                                                      Critical / Counseling Time Provided: 30 minutes                                                                                                                                                            Chief Complaint: Autologous peripheral blood stem cell transplant with high dose Melphalan prep regimen for treatment of multiple myeloma    S: Patient seen and examined with HPC Transplant Team:     + fatigue and generalized weakness   Constipation resolved    All other ROS negative           O: Vitals:   Vital Signs Last 24 Hrs  T(C): 36.9 (2022 06:00), Max: 38.1 (2022 17:49)  T(F): 98.4 (2022 06:00), Max: 100.6 (2022 17:49)  HR: 112 (2022 06:00) (103 - 116)  BP: 121/66 (2022 06:00) (121/66 - 132/65)  BP(mean): --  RR: 18 (2022 06:00) (18 - 18)  SpO2: 98% (2022 06:00) (96% - 98%)    Parameters below as of 2022 22:05  Patient On (Oxygen Delivery Method): room air      Admit weight:   Daily     Daily Weight in k.7 (2022 09:45)    Intake / Output:    @ 07:01  -  -13 @ 07:00  --------------------------------------------------------  IN: 2174 mL / OUT: 2869 mL / NET: -695 mL      PE:   Oropharynx: Clear oropharynx  Oral Mucositis:                                                     Grade NA  CVS: S1, S2 RRR   Lungs: CTA throughout bilaterally   Abdomen: + BS x 4, soft, NT, ND   Extremities: no edema  Gastric Mucositis:      -                                            Grade: n/a  Intestinal Mucositis:    -                                          Grade: n/a  Skin: no rash   TLC: CDI   Neuro: A&Ox3   Pain: denies      Labs:   CBC Full  -  ( 2022 07:17 )  WBC Count : 4.74 K/uL  Hemoglobin : 10.1 g/dL  Hematocrit : 31.2 %  Platelet Count - Automated : 91 K/uL  Mean Cell Volume : 79.4 fl  Mean Cell Hemoglobin : 25.7 pg  Mean Cell Hemoglobin Concentration : 32.4 gm/dL  Auto Neutrophil # : x  Auto Lymphocyte # : x  Auto Monocyte # : x  Auto Eosinophil # : x  Auto Basophil # : x  Auto Neutrophil % : x  Auto Lymphocyte % : x  Auto Monocyte % : x  Auto Eosinophil % : x  Auto Basophil % : x                          10.1   4.74  )-----------( 91       ( 2022 07:17 )             31.2     11-12    134<L>  |  99  |  8   ----------------------------<  103<H>  4.1   |  26  |  0.85    Ca    8.6      2022 06:53  Phos  2.2     11-12  Mg     1.7     -12    TPro  6.2  /  Alb  3.3  /  TBili  0.2  /  DBili  x   /  AST  14  /  ALT  16  /  AlkPhos  122<H>  -      LIVER FUNCTIONS - ( 2022 06:53 )  Alb: 3.3 g/dL / Pro: 6.2 g/dL / ALK PHOS: 122 U/L / ALT: 16 U/L / AST: 14 U/L / GGT: x             Cultures:         Radiology:       Meds:   Antimicrobials:   acyclovir   Oral Tab/Cap 400 milliGRAM(s) Oral every 8 hours  clotrimazole Lozenge 1 Lozenge Oral five times a day      Heme / Onc:   heparin  Infusion 312 Unit(s)/Hr IV Continuous <Continuous>      GI:  aluminum hydroxide/magnesium hydroxide/simethicone Suspension 30 milliLiter(s) Oral every 6 hours PRN  pantoprazole    Tablet 40 milliGRAM(s) Oral before breakfast  polyethylene glycol 3350 17 Gram(s) Oral daily PRN  senna 2 Tablet(s) Oral at bedtime  sodium bicarbonate Mouth Rinse 10 milliLiter(s) Swish and Spit five times a day  ursodiol Capsule 300 milliGRAM(s) Oral two times a day      Cardiovascular:   amLODIPine   Tablet 10 milliGRAM(s) Oral daily  hydrALAZINE 25 milliGRAM(s) Oral three times a day        Other medications:   acetaminophen     Tablet .. 650 milliGRAM(s) Oral every 6 hours  AQUAPHOR (petrolatum Ointment) 1 Application(s) Topical two times a day  Biotene Dry Mouth Oral Rinse 5 milliLiter(s) Swish and Spit five times a day  chlorhexidine 4% Liquid 1 Application(s) Topical <User Schedule>  dextrose 5%. 1000 milliLiter(s) IV Continuous <Continuous>  dextrose 5%. 1000 milliLiter(s) IV Continuous <Continuous>  dextrose 50% Injectable 25 Gram(s) IV Push once  dextrose 50% Injectable 12.5 Gram(s) IV Push once  dextrose 50% Injectable 25 Gram(s) IV Push once  finasteride 5 milliGRAM(s) Oral daily  folic acid 1 milliGRAM(s) Oral daily  glucagon  Injectable 1 milliGRAM(s) IntraMuscular once  insulin lispro (ADMELOG) corrective regimen sliding scale   SubCutaneous three times a day before meals  insulin lispro (ADMELOG) corrective regimen sliding scale   SubCutaneous at bedtime  loratadine 10 milliGRAM(s) Oral daily  LORazepam   Injectable 1 milliGRAM(s) IV Push every 24 hours  multivitamin 1 Tablet(s) Oral daily  nystatin Powder 1 Application(s) Topical three times a day  sodium chloride 0.9%. 1000 milliLiter(s) IV Continuous <Continuous>  tamsulosin 0.4 milliGRAM(s) Oral at bedtime      PRN:   acetaminophen     Tablet .. 650 milliGRAM(s) Oral every 6 hours PRN  aluminum hydroxide/magnesium hydroxide/simethicone Suspension 30 milliLiter(s) Oral every 6 hours PRN  dextrose Oral Gel 15 Gram(s) Oral once PRN  metoclopramide Injectable 10 milliGRAM(s) IV Push every 6 hours PRN  ondansetron Injectable 8 milliGRAM(s) IV Push every 8 hours PRN  polyethylene glycol 3350 17 Gram(s) Oral daily PRN  sodium chloride 0.9% lock flush 10 milliLiter(s) IV Push every 1 hour PRN      A/P:   72 year old male with multiple myeloma diagnosed 21 s/p RVD x 6 admitted for autologous pbsct with high dose melphalan prep regimen (dose reduced to 70mg / m2 for age)  Day + 17  10/25- melphalan /; continue melphalan hydration for 24 hours post infusion of last dose. Strict I&O, daily weights, prn diuresis   10/27- HPC transplant today; continue transplant hydration for 24 hours post infusion of cells   10/28 weight gain, I>O, lasix 60 mg iv x1; constipated, Lactulose now and PRN  - Parrish. Started on cipro 10/31/22. If T >/= 38C, pan cx, CXR and change cipro to cefepime 2g IV q 8 hours    Febrile overnight, cipro switched to Cefepime; BC(-), urine culture (+) GNR Enterobacter cloacae complex; follow up sensitivities    pending repeat urine culture  - repeat urine cx from  < 10K normal urogenital kunal. Continue Cefepime for 7 days   - engrafted 22 - continue cefepime for a 7 day course, continue zarxio for now   Engrafted, off Zarxio  - Febrile O/N, will f/u bcx    1. Infectious Disease:   acyclovir   Oral Tab/Cap 400 milliGRAM(s) Oral every 8 hours  clotrimazole Lozenge 1 Lozenge Oral five times a day    2. VOD Prophylaxis: Actigall, Glutamine, Heparin (dosed at 100 units / kg / day)     3. GI Prophylaxis:    pantoprazole    Tablet 40 milliGRAM(s) Oral before breakfast  Continue Senna 2 tabs QHS and Miralax x1  for constipation     4. Mouth care - NS / NaHCO3 rinses, Mycelex, Biotene; Skin care     5. GVHD prophylaxis - n/a     6. Transfuse & replete electrolytes prn   Hypophosphatemia - Replete phos    7. IV hydration, daily weights, strict I&O, prn diuresis     8. PO intake as tolerated, nutrition follow up as needed, MVI, folic acid     9. Antiemetics, anti-diarrhea medications:   metoclopramide Injectable 10 milliGRAM(s) IV Push every 6 hours PRN  ondansetron Injectable 8 milliGRAM(s) IV Push every 8 hours PRN  LORazepam   Injectable 1 milliGRAM(s) IV Push every 24 hours    10. OOB as tolerated, physical therapy consult if needed     11. Monitor coags / fibrinogen 2x week, vitamin K as needed     12. Monitor closely for clinical changes, monitor for fevers     13. Emotional support provided, plan of care discussed and questions addressed     14. Patient education done regarding plan of care, restrictions and discharge planning     15. Continue regular social work input     I have written the above note for Dr. Persaud who performed service with me in the room.   Daiana Lugo NP-C ([phone])    I have seen and examined patient with NP, I agree with above note as scribed.                    HPC Transplant Team                                                      Critical / Counseling Time Provided: 30 minutes                                                                                                                                                        Chief Complaint: Autologous peripheral blood stem cell transplant with high dose Melphalan prep regimen for treatment of multiple myeloma    S: Patient seen and examined with HPC Transplant Team:     + fatigue and generalized weakness   + poor appetite    All other ROS negative         O: Vitals:   Vital Signs Last 24 Hrs  T(C): 36.9 (2022 06:00), Max: 38.1 (2022 17:49)  T(F): 98.4 (2022 06:00), Max: 100.6 (2022 17:49)  HR: 112 (2022 06:00) (103 - 116)  BP: 121/66 (2022 06:00) (121/66 - 132/65)  BP(mean): --  RR: 18 (2022 06:00) (18 - 18)  SpO2: 98% (2022 06:00) (96% - 98%)    Parameters below as of 2022 22:05  Patient On (Oxygen Delivery Method): room air    Admit weight: 74.9kg   Daily Weight in k.3 kg    Intake / Output:    @ 07:01  -  13 @ 07:00  --------------------------------------------------------  IN: 2174 mL / OUT: 2869 mL / NET: -695 mL    PE:   Oropharynx: Clear oropharynx  Oral mucositis:                                                Grade NA  CVS: S1, S2 RRR   Lungs: CTA throughout bilaterally   Abdomen: + BS x 4, soft, NT, ND   Extremities: no edema  Gastric Mucositis:      -                                            Grade: n/a  Intestinal Mucositis:    -                                          Grade: n/a  Skin: no rash   TLC: CDI   Neuro: A&Ox3   Pain: denies    Labs:   CBC Full  -  ( 2022 07:17 )  WBC Count : 4.74 K/uL  Hemoglobin : 10.1 g/dL  Hematocrit : 31.2 %  Platelet Count - Automated : 91 K/uL  Mean Cell Volume : 79.4 fl  Mean Cell Hemoglobin : 25.7 pg  Mean Cell Hemoglobin Concentration : 32.4 gm/dL  Auto Neutrophil # : x  Auto Lymphocyte # : x  Auto Monocyte # : x  Auto Eosinophil # : x  Auto Basophil # : x  Auto Neutrophil % : x  Auto Lymphocyte % : x  Auto Monocyte % : x  Auto Eosinophil % : x  Auto Basophil % : x                          10.1   4.74  )-----------( 91       ( 2022 07:17 )             31.2     11-12    134<L>  |  99  |  8   ----------------------------<  103<H>  4.1   |  26  |  0.85    Ca    8.6      2022 06:53  Phos  2.2     11-12  Mg     1.7     -    TPro  6.2  /  Alb  3.3  /  TBili  0.2  /  DBili  x   /  AST  14  /  ALT  16  /  AlkPhos  122<H>  11-12    LIVER FUNCTIONS - ( 2022 06:53 )  Alb: 3.3 g/dL / Pro: 6.2 g/dL / ALK PHOS: 122 U/L / ALT: 16 U/L / AST: 14 U/L / GGT: x           Cultures:   Culture - Urine (22 @ 21:09)    Specimen Source: Clean Catch Clean Catch (Midstream)    Culture Results:   No growth    Culture - Blood (22 @ 19:50)    Specimen Source: .Blood Blood    Culture Results:   No growth to date.    Radiology:   Xray Chest 1 View- PORTABLE-Urgent (Xray Chest 1 View- PORTABLE-Urgent .) (22 @ 20:56) >  No acute radiographic cardiopulmonary pathology.    Meds:   Antimicrobials:   acyclovir   Oral Tab/Cap 400 milliGRAM(s) Oral every 8 hours  clotrimazole Lozenge 1 Lozenge Oral five times a day    GI:  aluminum hydroxide/magnesium hydroxide/simethicone Suspension 30 milliLiter(s) Oral every 6 hours PRN  pantoprazole    Tablet 40 milliGRAM(s) Oral before breakfast  polyethylene glycol 3350 17 Gram(s) Oral daily PRN  senna 2 Tablet(s) Oral at bedtime  sodium bicarbonate Mouth Rinse 10 milliLiter(s) Swish and Spit five times a day  ursodiol Capsule 300 milliGRAM(s) Oral two times a day    Cardiovascular:   amLODIPine   Tablet 10 milliGRAM(s) Oral daily  hydrALAZINE 25 milliGRAM(s) Oral three times a day    Other medications:   acetaminophen     Tablet .. 650 milliGRAM(s) Oral every 6 hours  AQUAPHOR (petrolatum Ointment) 1 Application(s) Topical two times a day  Biotene Dry Mouth Oral Rinse 5 milliLiter(s) Swish and Spit five times a day  chlorhexidine 4% Liquid 1 Application(s) Topical <User Schedule>  dextrose 5%. 1000 milliLiter(s) IV Continuous <Continuous>  dextrose 5%. 1000 milliLiter(s) IV Continuous <Continuous>  dextrose 50% Injectable 25 Gram(s) IV Push once  dextrose 50% Injectable 12.5 Gram(s) IV Push once  dextrose 50% Injectable 25 Gram(s) IV Push once  finasteride 5 milliGRAM(s) Oral daily  glucagon  Injectable 1 milliGRAM(s) IntraMuscular once  insulin lispro (ADMELOG) corrective regimen sliding scale   SubCutaneous three times a day before meals  insulin lispro (ADMELOG) corrective regimen sliding scale   SubCutaneous at bedtime  loratadine 10 milliGRAM(s) Oral daily  LORazepam   Injectable 1 milliGRAM(s) IV Push every 24 hours  multivitamin 1 Tablet(s) Oral daily  nystatin Powder 1 Application(s) Topical three times a day  sodium chloride 0.9%. 1000 milliLiter(s) IV Continuous <Continuous>  tamsulosin 0.4 milliGRAM(s) Oral at bedtime    PRN:   acetaminophen     Tablet .. 650 milliGRAM(s) Oral every 6 hours PRN  aluminum hydroxide/magnesium hydroxide/simethicone Suspension 30 milliLiter(s) Oral every 6 hours PRN  dextrose Oral Gel 15 Gram(s) Oral once PRN  metoclopramide Injectable 10 milliGRAM(s) IV Push every 6 hours PRN  ondansetron Injectable 8 milliGRAM(s) IV Push every 8 hours PRN  polyethylene glycol 3350 17 Gram(s) Oral daily PRN  sodium chloride 0.9% lock flush 10 milliLiter(s) IV Push every 1 hour PRN    A/P:   72 year old male with multiple myeloma diagnosed 19 s/p RVD x 6 admitted for autologous pbsct with high dose melphalan prep regimen (dose reduced to 70mg / m2 for age)  Day + 17  10/25- melphalan ; continue melphalan hydration for 24 hours post infusion of last dose. Strict I&O, daily weights, prn diuresis   10/27- HPC transplant today; continue transplant hydration for 24 hours post infusion of cells   10/28 weight gain, I>O, lasix 60 mg iv x1; constipated, Lactulose now and PRN  - Parrish. Started on cipro 10/31/22. If T >/= 38C, pan cx, CXR and change cipro to cefepime 2g IV q 8 hours    Febrile overnight, cipro switched to Cefepime; BC(-), urine culture (+) GNR Enterobacter cloacae complex; follow up sensitivities    pending repeat urine culture  - repeat urine cx from  < 10K normal urogenital kunal. Continue Cefepime for 7 days   - engrafted 22 - continue cefepime for a 7 day course, continue zarxio for now   Engrafted, off Zarxio  - Febrile O/N, will f/u bcx, Ucx (-)  - D/Desmond Heparin, Lovenox 40 mg x1 dose given, off.  - Plan for discharge home tomorrow.    1. Infectious Disease:   acyclovir   Oral Tab/Cap 400 milliGRAM(s) Oral every 8 hours  clotrimazole Lozenge 1 Lozenge Oral five times a day    2. VOD Prophylaxis: Actigall, Glutamine, Lovenox     3. GI Prophylaxis:    pantoprazole    Tablet 40 milliGRAM(s) Oral before breakfast  Continue Senna 2 tabs QHS and Miralax x1  for constipation     4. Mouth care - NS / NaHCO3 rinses, Mycelex, Biotene; Skin care     5. GVHD prophylaxis - n/a     6. Transfuse & replete electrolytes prn     7. IV hydration, daily weights, strict I&O, prn diuresis     8. PO intake as tolerated, nutrition follow up as needed, MVI, folic acid     9. Antiemetics, anti-diarrhea medications:   metoclopramide Injectable 10 milliGRAM(s) IV Push every 6 hours PRN  ondansetron Injectable 8 milliGRAM(s) IV Push every 8 hours PRN  LORazepam   Injectable 1 milliGRAM(s) IV Push every 24 hours    10. OOB as tolerated, physical therapy consult if needed     11. Monitor coags / fibrinogen 2x week, vitamin K as needed     12. Monitor closely for clinical changes, monitor for fevers     13. Emotional support provided, plan of care discussed and questions addressed     14. Patient education done regarding plan of care, restrictions and discharge planning     15. Continue regular social work input     I have written the above note for Dr. Matos who performed service with me in the room.   Daiana Lugo  NP-C ([phone])    I have seen and examined patient with NP, I agree with above note as scribed.

## 2022-11-13 NOTE — PROGRESS NOTE ADULT - NS ATTEND AMEND GEN_ALL_CORE FT
Vital Signs Last 24 Hrs  T(C): 37.2 (12 Nov 2022 22:05), Max: 38.1 (12 Nov 2022 17:49)  T(F): 99 (12 Nov 2022 22:05), Max: 100.6 (12 Nov 2022 17:49)  HR: 107 (12 Nov 2022 22:05) (103 - 112)  BP: 132/65 (12 Nov 2022 22:05) (120/49 - 132/65)  BP(mean): --  RR: 18 (12 Nov 2022 22:05) (18 - 19)  SpO2: 97% (12 Nov 2022 22:05) (96% - 98%)    Parameters below as of 12 Nov 2022 22:05  Patient On (Oxygen Delivery Method): room air    MEDICATIONS  (STANDING):  acetaminophen     Tablet .. 650 milliGRAM(s) Oral every 6 hours  acyclovir   Oral Tab/Cap 400 milliGRAM(s) Oral every 8 hours  amLODIPine   Tablet 10 milliGRAM(s) Oral daily  AQUAPHOR (petrolatum Ointment) 1 Application(s) Topical two times a day  Biotene Dry Mouth Oral Rinse 5 milliLiter(s) Swish and Spit five times a day  chlorhexidine 4% Liquid 1 Application(s) Topical <User Schedule>  clotrimazole Lozenge 1 Lozenge Oral five times a day  dextrose 5%. 1000 milliLiter(s) (50 mL/Hr) IV Continuous <Continuous>  dextrose 5%. 1000 milliLiter(s) (100 mL/Hr) IV Continuous <Continuous>  dextrose 50% Injectable 25 Gram(s) IV Push once  dextrose 50% Injectable 12.5 Gram(s) IV Push once  dextrose 50% Injectable 25 Gram(s) IV Push once  finasteride 5 milliGRAM(s) Oral daily  folic acid 1 milliGRAM(s) Oral daily  glucagon  Injectable 1 milliGRAM(s) IntraMuscular once  heparin  Infusion 312 Unit(s)/Hr (3.12 mL/Hr) IV Continuous <Continuous>  hydrALAZINE 25 milliGRAM(s) Oral three times a day  insulin lispro (ADMELOG) corrective regimen sliding scale   SubCutaneous three times a day before meals  insulin lispro (ADMELOG) corrective regimen sliding scale   SubCutaneous at bedtime  loratadine 10 milliGRAM(s) Oral daily  LORazepam   Injectable 1 milliGRAM(s) IV Push every 24 hours  multivitamin 1 Tablet(s) Oral daily  nystatin Powder 1 Application(s) Topical three times a day  pantoprazole    Tablet 40 milliGRAM(s) Oral before breakfast  senna 2 Tablet(s) Oral at bedtime  sodium bicarbonate Mouth Rinse 10 milliLiter(s) Swish and Spit five times a day  sodium chloride 0.9%. 1000 milliLiter(s) (50 mL/Hr) IV Continuous <Continuous>  tamsulosin 0.4 milliGRAM(s) Oral at bedtime  ursodiol Capsule 300 milliGRAM(s) Oral two times a day        72 year old day +17 autoBMT using a reduced dose melphalan preparative regiment for MM.  Course complicated by enterobacter UTI and mild nausea.     Plan:  Heme: PLT goal > 10,000; Hgb goal > 7/0g/dL  Count recovery.  D/C folate      ID: acyclovir, bactrim, cefepime (D/C'ed 11/11/22),    Nutrition: tolerating PO.    DVT prophylaxis: ambulation    Over 35 minutes were spent in direct patient care and care coordination. Vital Signs Last 24 Hrs  T(C): 37.2 (12 Nov 2022 22:05), Max: 38.1 (12 Nov 2022 17:49)  T(F): 99 (12 Nov 2022 22:05), Max: 100.6 (12 Nov 2022 17:49)  HR: 107 (12 Nov 2022 22:05) (103 - 112)  BP: 132/65 (12 Nov 2022 22:05) (120/49 - 132/65)  BP(mean): --  RR: 18 (12 Nov 2022 22:05) (18 - 19)  SpO2: 97% (12 Nov 2022 22:05) (96% - 98%)    Parameters below as of 12 Nov 2022 22:05  Patient On (Oxygen Delivery Method): room air    MEDICATIONS  (STANDING):  acetaminophen     Tablet .. 650 milliGRAM(s) Oral every 6 hours  acyclovir   Oral Tab/Cap 400 milliGRAM(s) Oral every 8 hours  amLODIPine   Tablet 10 milliGRAM(s) Oral daily  AQUAPHOR (petrolatum Ointment) 1 Application(s) Topical two times a day  Biotene Dry Mouth Oral Rinse 5 milliLiter(s) Swish and Spit five times a day  chlorhexidine 4% Liquid 1 Application(s) Topical <User Schedule>  clotrimazole Lozenge 1 Lozenge Oral five times a day  dextrose 5%. 1000 milliLiter(s) (50 mL/Hr) IV Continuous <Continuous>  dextrose 5%. 1000 milliLiter(s) (100 mL/Hr) IV Continuous <Continuous>  dextrose 50% Injectable 25 Gram(s) IV Push once  dextrose 50% Injectable 12.5 Gram(s) IV Push once  dextrose 50% Injectable 25 Gram(s) IV Push once  finasteride 5 milliGRAM(s) Oral daily  folic acid 1 milliGRAM(s) Oral daily  glucagon  Injectable 1 milliGRAM(s) IntraMuscular once  heparin  Infusion 312 Unit(s)/Hr (3.12 mL/Hr) IV Continuous <Continuous>  hydrALAZINE 25 milliGRAM(s) Oral three times a day  insulin lispro (ADMELOG) corrective regimen sliding scale   SubCutaneous three times a day before meals  insulin lispro (ADMELOG) corrective regimen sliding scale   SubCutaneous at bedtime  loratadine 10 milliGRAM(s) Oral daily  LORazepam   Injectable 1 milliGRAM(s) IV Push every 24 hours  multivitamin 1 Tablet(s) Oral daily  nystatin Powder 1 Application(s) Topical three times a day  pantoprazole    Tablet 40 milliGRAM(s) Oral before breakfast  senna 2 Tablet(s) Oral at bedtime  sodium bicarbonate Mouth Rinse 10 milliLiter(s) Swish and Spit five times a day  sodium chloride 0.9%. 1000 milliLiter(s) (50 mL/Hr) IV Continuous <Continuous>  tamsulosin 0.4 milliGRAM(s) Oral at bedtime  ursodiol Capsule 300 milliGRAM(s) Oral two times a day    72 year old day +17 autoBMT using a reduced dose melphalan preparative regiment for MM.  Course complicated by enterobacter UTI and mild nausea, low grade non-neutropenic fever (active).      Plan:  Heme: PLT goal > 10,000; Hgb goal > 7/0g/dL  Count recovery.  D/C folate      ID: acyclovir, bactrim, cefepime (D/C'ed 11/11/22),  Blood Cs (11/11/22): NTD    Nutrition: tolerating PO.    DVT prophylaxis: ambulation    Over 35 minutes were spent in direct patient care and care coordination. Vital Signs Last 24 Hrs  T(C): 37.2 (12 Nov 2022 22:05), Max: 38.1 (12 Nov 2022 17:49)  T(F): 99 (12 Nov 2022 22:05), Max: 100.6 (12 Nov 2022 17:49)  HR: 107 (12 Nov 2022 22:05) (103 - 112)  BP: 132/65 (12 Nov 2022 22:05) (120/49 - 132/65)  BP(mean): --  RR: 18 (12 Nov 2022 22:05) (18 - 19)  SpO2: 97% (12 Nov 2022 22:05) (96% - 98%)    Parameters below as of 12 Nov 2022 22:05  Patient On (Oxygen Delivery Method): room air    MEDICATIONS  (STANDING):  acetaminophen     Tablet .. 650 milliGRAM(s) Oral every 6 hours  acyclovir   Oral Tab/Cap 400 milliGRAM(s) Oral every 8 hours  amLODIPine   Tablet 10 milliGRAM(s) Oral daily  AQUAPHOR (petrolatum Ointment) 1 Application(s) Topical two times a day  Biotene Dry Mouth Oral Rinse 5 milliLiter(s) Swish and Spit five times a day  chlorhexidine 4% Liquid 1 Application(s) Topical <User Schedule>  clotrimazole Lozenge 1 Lozenge Oral five times a day  dextrose 5%. 1000 milliLiter(s) (50 mL/Hr) IV Continuous <Continuous>  dextrose 5%. 1000 milliLiter(s) (100 mL/Hr) IV Continuous <Continuous>  dextrose 50% Injectable 25 Gram(s) IV Push once  dextrose 50% Injectable 12.5 Gram(s) IV Push once  dextrose 50% Injectable 25 Gram(s) IV Push once  finasteride 5 milliGRAM(s) Oral daily  folic acid 1 milliGRAM(s) Oral daily  glucagon  Injectable 1 milliGRAM(s) IntraMuscular once  heparin  Infusion 312 Unit(s)/Hr (3.12 mL/Hr) IV Continuous <Continuous>  hydrALAZINE 25 milliGRAM(s) Oral three times a day  insulin lispro (ADMELOG) corrective regimen sliding scale   SubCutaneous three times a day before meals  insulin lispro (ADMELOG) corrective regimen sliding scale   SubCutaneous at bedtime  loratadine 10 milliGRAM(s) Oral daily  LORazepam   Injectable 1 milliGRAM(s) IV Push every 24 hours  multivitamin 1 Tablet(s) Oral daily  nystatin Powder 1 Application(s) Topical three times a day  pantoprazole    Tablet 40 milliGRAM(s) Oral before breakfast  senna 2 Tablet(s) Oral at bedtime  sodium bicarbonate Mouth Rinse 10 milliLiter(s) Swish and Spit five times a day  sodium chloride 0.9%. 1000 milliLiter(s) (50 mL/Hr) IV Continuous <Continuous>  tamsulosin 0.4 milliGRAM(s) Oral at bedtime  ursodiol Capsule 300 milliGRAM(s) Oral two times a day    72 year old day +17 autoBMT using a reduced dose melphalan preparative regiment for MM.  Course complicated by enterobacter UTI and mild nausea, low grade non-neutropenic fever (active).      Plan:  Heme: PLT goal > 10,000; Hgb goal > 7/0g/dL  Count recovery.  D/C folate      ID: acyclovir, bactrim, cefepime (D/C'ed 11/11/22),  Blood Cs (11/11/22): NTD    Nutrition: tolerating PO.    DVT prophylaxis: as Mr. Tam is not getting out of bed and not ambulating, change to STD DVT prophylaxis for internal medicine with LWHx until discharge.      Over 35 minutes were spent in direct patient care and care coordination.    Over 35 minutes were spent in direct patient care and care coordination.

## 2022-11-14 LAB
ALBUMIN SERPL ELPH-MCNC: 3.3 G/DL — SIGNIFICANT CHANGE UP (ref 3.3–5)
ALP SERPL-CCNC: 114 U/L — SIGNIFICANT CHANGE UP (ref 40–120)
ALT FLD-CCNC: 16 U/L — SIGNIFICANT CHANGE UP (ref 10–45)
ANION GAP SERPL CALC-SCNC: 11 MMOL/L — SIGNIFICANT CHANGE UP (ref 5–17)
ANISOCYTOSIS BLD QL: SLIGHT — SIGNIFICANT CHANGE UP
APPEARANCE UR: CLEAR — SIGNIFICANT CHANGE UP
APTT BLD: 33 SEC — SIGNIFICANT CHANGE UP (ref 27.5–35.5)
AST SERPL-CCNC: 20 U/L — SIGNIFICANT CHANGE UP (ref 10–40)
BACTERIA # UR AUTO: NEGATIVE — SIGNIFICANT CHANGE UP
BASOPHILS # BLD AUTO: 0.03 K/UL — SIGNIFICANT CHANGE UP (ref 0–0.2)
BASOPHILS NFR BLD AUTO: 0.9 % — SIGNIFICANT CHANGE UP (ref 0–2)
BILIRUB DIRECT SERPL-MCNC: <0.1 MG/DL — SIGNIFICANT CHANGE UP (ref 0–0.3)
BILIRUB INDIRECT FLD-MCNC: >0.1 MG/DL — LOW (ref 0.2–1)
BILIRUB SERPL-MCNC: 0.2 MG/DL — SIGNIFICANT CHANGE UP (ref 0.2–1.2)
BILIRUB UR-MCNC: NEGATIVE — SIGNIFICANT CHANGE UP
BLD GP AB SCN SERPL QL: NEGATIVE — SIGNIFICANT CHANGE UP
BUN SERPL-MCNC: 8 MG/DL — SIGNIFICANT CHANGE UP (ref 7–23)
CALCIUM SERPL-MCNC: 8.3 MG/DL — LOW (ref 8.4–10.5)
CHLORIDE SERPL-SCNC: 98 MMOL/L — SIGNIFICANT CHANGE UP (ref 96–108)
CO2 SERPL-SCNC: 25 MMOL/L — SIGNIFICANT CHANGE UP (ref 22–31)
COLOR SPEC: SIGNIFICANT CHANGE UP
CREAT SERPL-MCNC: 0.8 MG/DL — SIGNIFICANT CHANGE UP (ref 0.5–1.3)
DIFF PNL FLD: NEGATIVE — SIGNIFICANT CHANGE UP
EGFR: 93 ML/MIN/1.73M2 — SIGNIFICANT CHANGE UP
EOSINOPHIL # BLD AUTO: 0 K/UL — SIGNIFICANT CHANGE UP (ref 0–0.5)
EOSINOPHIL NFR BLD AUTO: 0 % — SIGNIFICANT CHANGE UP (ref 0–6)
EPI CELLS # UR: 2 /HPF — SIGNIFICANT CHANGE UP
FIBRINOGEN PPP-MCNC: 311 MG/DL — SIGNIFICANT CHANGE UP (ref 200–445)
GLUCOSE BLDC GLUCOMTR-MCNC: 101 MG/DL — HIGH (ref 70–99)
GLUCOSE BLDC GLUCOMTR-MCNC: 105 MG/DL — HIGH (ref 70–99)
GLUCOSE BLDC GLUCOMTR-MCNC: 124 MG/DL — HIGH (ref 70–99)
GLUCOSE BLDC GLUCOMTR-MCNC: 125 MG/DL — HIGH (ref 70–99)
GLUCOSE SERPL-MCNC: 103 MG/DL — HIGH (ref 70–99)
GLUCOSE UR QL: NEGATIVE — SIGNIFICANT CHANGE UP
HCT VFR BLD CALC: 31.6 % — LOW (ref 39–50)
HGB BLD-MCNC: 10.2 G/DL — LOW (ref 13–17)
HYALINE CASTS # UR AUTO: 2 /LPF — SIGNIFICANT CHANGE UP (ref 0–2)
INR BLD: 1.13 RATIO — SIGNIFICANT CHANGE UP (ref 0.88–1.16)
KETONES UR-MCNC: NEGATIVE — SIGNIFICANT CHANGE UP
LDH SERPL L TO P-CCNC: 212 U/L — SIGNIFICANT CHANGE UP (ref 50–242)
LEUKOCYTE ESTERASE UR-ACNC: NEGATIVE — SIGNIFICANT CHANGE UP
LYMPHOCYTES # BLD AUTO: 0.5 K/UL — LOW (ref 1–3.3)
LYMPHOCYTES # BLD AUTO: 15.2 % — SIGNIFICANT CHANGE UP (ref 13–44)
MACROCYTES BLD QL: SLIGHT — SIGNIFICANT CHANGE UP
MAGNESIUM SERPL-MCNC: 1.8 MG/DL — SIGNIFICANT CHANGE UP (ref 1.6–2.6)
MANUAL SMEAR VERIFICATION: SIGNIFICANT CHANGE UP
MCHC RBC-ENTMCNC: 25.8 PG — LOW (ref 27–34)
MCHC RBC-ENTMCNC: 32.3 GM/DL — SIGNIFICANT CHANGE UP (ref 32–36)
MCV RBC AUTO: 79.8 FL — LOW (ref 80–100)
MONOCYTES # BLD AUTO: 1.42 K/UL — HIGH (ref 0–0.9)
MONOCYTES NFR BLD AUTO: 42.8 % — HIGH (ref 2–14)
NEUTROPHILS # BLD AUTO: 1.33 K/UL — LOW (ref 1.8–7.4)
NEUTROPHILS NFR BLD AUTO: 40.2 % — LOW (ref 43–77)
NITRITE UR-MCNC: NEGATIVE — SIGNIFICANT CHANGE UP
PH UR: 7 — SIGNIFICANT CHANGE UP (ref 5–8)
PHOSPHATE SERPL-MCNC: 2.7 MG/DL — SIGNIFICANT CHANGE UP (ref 2.5–4.5)
PLAT MORPH BLD: NORMAL — SIGNIFICANT CHANGE UP
PLATELET # BLD AUTO: 97 K/UL — LOW (ref 150–400)
POTASSIUM SERPL-MCNC: 4.1 MMOL/L — SIGNIFICANT CHANGE UP (ref 3.5–5.3)
POTASSIUM SERPL-SCNC: 4.1 MMOL/L — SIGNIFICANT CHANGE UP (ref 3.5–5.3)
PROT SERPL-MCNC: 6.4 G/DL — SIGNIFICANT CHANGE UP (ref 6–8.3)
PROT UR-MCNC: ABNORMAL
PROTHROM AB SERPL-ACNC: 13 SEC — SIGNIFICANT CHANGE UP (ref 10.5–13.4)
RBC # BLD: 3.96 M/UL — LOW (ref 4.2–5.8)
RBC # FLD: 14.6 % — HIGH (ref 10.3–14.5)
RBC BLD AUTO: ABNORMAL
RBC CASTS # UR COMP ASSIST: 1 /HPF — SIGNIFICANT CHANGE UP (ref 0–4)
RH IG SCN BLD-IMP: POSITIVE — SIGNIFICANT CHANGE UP
SODIUM SERPL-SCNC: 134 MMOL/L — LOW (ref 135–145)
SP GR SPEC: 1.01 — SIGNIFICANT CHANGE UP (ref 1.01–1.02)
UROBILINOGEN FLD QL: NEGATIVE — SIGNIFICANT CHANGE UP
VARIANT LYMPHS # BLD: 0.9 % — SIGNIFICANT CHANGE UP (ref 0–6)
WBC # BLD: 3.32 K/UL — LOW (ref 3.8–10.5)
WBC # FLD AUTO: 3.32 K/UL — LOW (ref 3.8–10.5)
WBC UR QL: 3 /HPF — SIGNIFICANT CHANGE UP (ref 0–5)

## 2022-11-14 PROCEDURE — 99291 CRITICAL CARE FIRST HOUR: CPT

## 2022-11-14 RX ORDER — FOLIC ACID 0.8 MG
1 TABLET ORAL DAILY
Refills: 0 | Status: DISCONTINUED | OUTPATIENT
Start: 2022-11-14 | End: 2022-11-15

## 2022-11-14 RX ORDER — FLUCONAZOLE 150 MG/1
400 TABLET ORAL DAILY
Refills: 0 | Status: DISCONTINUED | OUTPATIENT
Start: 2022-11-14 | End: 2022-11-15

## 2022-11-14 RX ADMIN — Medication 1 MILLIGRAM(S): at 12:20

## 2022-11-14 RX ADMIN — Medication 400 MILLIGRAM(S): at 12:22

## 2022-11-14 RX ADMIN — Medication 5 MILLILITER(S): at 16:18

## 2022-11-14 RX ADMIN — Medication 1 LOZENGE: at 07:44

## 2022-11-14 RX ADMIN — NYSTATIN CREAM 1 APPLICATION(S): 100000 CREAM TOPICAL at 21:52

## 2022-11-14 RX ADMIN — Medication 10 MILLILITER(S): at 07:44

## 2022-11-14 RX ADMIN — Medication 1 APPLICATION(S): at 06:59

## 2022-11-14 RX ADMIN — Medication 10 MILLILITER(S): at 00:09

## 2022-11-14 RX ADMIN — Medication 1 TABLET(S): at 12:20

## 2022-11-14 RX ADMIN — NYSTATIN CREAM 1 APPLICATION(S): 100000 CREAM TOPICAL at 06:59

## 2022-11-14 RX ADMIN — Medication 25 MILLIGRAM(S): at 06:55

## 2022-11-14 RX ADMIN — Medication 400 MILLIGRAM(S): at 21:52

## 2022-11-14 RX ADMIN — Medication 1 LOZENGE: at 21:52

## 2022-11-14 RX ADMIN — NYSTATIN CREAM 1 APPLICATION(S): 100000 CREAM TOPICAL at 12:23

## 2022-11-14 RX ADMIN — TAMSULOSIN HYDROCHLORIDE 0.4 MILLIGRAM(S): 0.4 CAPSULE ORAL at 21:51

## 2022-11-14 RX ADMIN — URSODIOL 300 MILLIGRAM(S): 250 TABLET, FILM COATED ORAL at 17:22

## 2022-11-14 RX ADMIN — Medication 400 MILLIGRAM(S): at 06:55

## 2022-11-14 RX ADMIN — Medication 10 MILLILITER(S): at 12:19

## 2022-11-14 RX ADMIN — PANTOPRAZOLE SODIUM 40 MILLIGRAM(S): 20 TABLET, DELAYED RELEASE ORAL at 06:55

## 2022-11-14 RX ADMIN — FINASTERIDE 5 MILLIGRAM(S): 5 TABLET, FILM COATED ORAL at 12:21

## 2022-11-14 RX ADMIN — Medication 5 MILLILITER(S): at 12:19

## 2022-11-14 RX ADMIN — Medication 5 MILLILITER(S): at 07:44

## 2022-11-14 RX ADMIN — FLUCONAZOLE 400 MILLIGRAM(S): 150 TABLET ORAL at 12:20

## 2022-11-14 RX ADMIN — AMLODIPINE BESYLATE 10 MILLIGRAM(S): 2.5 TABLET ORAL at 06:55

## 2022-11-14 RX ADMIN — Medication 10 MILLILITER(S): at 16:18

## 2022-11-14 RX ADMIN — Medication 10 MILLILITER(S): at 21:52

## 2022-11-14 RX ADMIN — Medication 25 MILLIGRAM(S): at 13:24

## 2022-11-14 RX ADMIN — CHLORHEXIDINE GLUCONATE 1 APPLICATION(S): 213 SOLUTION TOPICAL at 06:59

## 2022-11-14 RX ADMIN — Medication 1 LOZENGE: at 12:19

## 2022-11-14 RX ADMIN — Medication 1 LOZENGE: at 00:16

## 2022-11-14 RX ADMIN — Medication 5 MILLILITER(S): at 00:16

## 2022-11-14 RX ADMIN — LORATADINE 10 MILLIGRAM(S): 10 TABLET ORAL at 12:20

## 2022-11-14 RX ADMIN — URSODIOL 300 MILLIGRAM(S): 250 TABLET, FILM COATED ORAL at 06:55

## 2022-11-14 RX ADMIN — Medication 1 LOZENGE: at 16:19

## 2022-11-14 RX ADMIN — Medication 5 MILLILITER(S): at 21:52

## 2022-11-14 RX ADMIN — Medication 1 APPLICATION(S): at 17:22

## 2022-11-14 NOTE — PROGRESS NOTE ADULT - SUBJECTIVE AND OBJECTIVE BOX
HPC Transplant Team                                                      Critical / Counseling Time Provided: 30 minutes                                                                                                                                                        Chief Complaint: Autologous peripheral blood stem cell transplant with high dose Melphalan prep regimen for treatment of multiple myeloma    S: Patient seen and examined with HPC Transplant Team:     All other ROS negative     O: Vitals:   Vital Signs Last 24 Hrs  T(C): 37.8 (2022 06:03), Max: 38.2 (2022 17:42)  T(F): 100 (2022 06:03), Max: 100.8 (2022 17:42)  HR: 102 (2022 06:03) (102 - 118)  BP: 122/70 (2022 06:03) (103/54 - 130/71)  BP(mean): --  RR: 18 (2022 06:03) (18 - 18)  SpO2: 98% (2022 06:03) (96% - 99%)    Parameters below as of 2022 21:47  Patient On (Oxygen Delivery Method): room air      Admit weight: 74.9kg   Daily Weight in k.3 (2022 09:23)  Today's weight:     Intake / Output:   -13 @ 07:01  -  -14 @ 07:00  --------------------------------------------------------  IN: 2157 mL / OUT: 1490 mL / NET: 667 mL      PE:   Oropharynx: Clear oropharynx  Oral mucositis:                                                Grade NA  CVS: S1, S2 RRR   Lungs: CTA throughout bilaterally   Abdomen: + BS x 4, soft, NT, ND   Extremities: no edema  Gastric Mucositis:      -                                            Grade: n/a  Intestinal Mucositis:    -                                          Grade: n/a  Skin: no rash   TLC: CDI   Neuro: A&Ox3   Pain: denies    Labs:   CBC Full  -  ( 2022 07:17 )  WBC Count : 3.32 K/uL  Hemoglobin : 10.2 g/dL  Hematocrit : 31.6 %  Platelet Count - Automated : 97 K/uL  Mean Cell Volume : 79.8 fl  Mean Cell Hemoglobin : 25.8 pg  Mean Cell Hemoglobin Concentration : 32.3 gm/dL  Auto Neutrophil # : x  Auto Lymphocyte # : x  Auto Monocyte # : x  Auto Eosinophil # : x  Auto Basophil # : x  Auto Neutrophil % : x  Auto Lymphocyte % : x  Auto Monocyte % : x  Auto Eosinophil % : x  Auto Basophil % : x                          10.2   3.32  )-----------( 97       ( 2022 07:17 )             31.6       Cultures:   Culture Results:   No growth (22 @ 21:09)    Culture Results:   No growth to date. (22 @ 19:50)    Culture Results:   No growth to date. (22 @ 19:35)    Culture Results:   <10,000 CFU/mL Normal Urogenital Kunal (22 @ 16:04)    Culture Results:   No growth to date. (22 @ 00:31)    Culture Results:   No growth to date. (22 @ 00:31)    Culture Results:   >100,000 CFU/ml Enterobacter cloacae complex (22 @ 00:31)    Radiology:   ACC: [id number] EXAM:  XR CHEST PORTABLE ROUTINE 1V                        PROCEDURE DATE:  2022    Impression:  Clear lungs.      Meds:   Antimicrobials:   acyclovir   Oral Tab/Cap 400 milliGRAM(s) Oral every 8 hours  clotrimazole Lozenge 1 Lozenge Oral five times a day  fluconAZOLE   Tablet 400 milliGRAM(s) Oral daily      Heme / Onc:       GI:  aluminum hydroxide/magnesium hydroxide/simethicone Suspension 30 milliLiter(s) Oral every 6 hours PRN  pantoprazole    Tablet 40 milliGRAM(s) Oral before breakfast  polyethylene glycol 3350 17 Gram(s) Oral daily PRN  senna 2 Tablet(s) Oral at bedtime  sodium bicarbonate Mouth Rinse 10 milliLiter(s) Swish and Spit five times a day  ursodiol Capsule 300 milliGRAM(s) Oral two times a day      Cardiovascular:   amLODIPine   Tablet 10 milliGRAM(s) Oral daily  hydrALAZINE 25 milliGRAM(s) Oral three times a day      Immunologic:       Other medications:   acetaminophen     Tablet .. 650 milliGRAM(s) Oral every 6 hours  AQUAPHOR (petrolatum Ointment) 1 Application(s) Topical two times a day  Biotene Dry Mouth Oral Rinse 5 milliLiter(s) Swish and Spit five times a day  chlorhexidine 4% Liquid 1 Application(s) Topical <User Schedule>  dextrose 5%. 1000 milliLiter(s) IV Continuous <Continuous>  dextrose 5%. 1000 milliLiter(s) IV Continuous <Continuous>  dextrose 50% Injectable 25 Gram(s) IV Push once  dextrose 50% Injectable 12.5 Gram(s) IV Push once  dextrose 50% Injectable 25 Gram(s) IV Push once  finasteride 5 milliGRAM(s) Oral daily  glucagon  Injectable 1 milliGRAM(s) IntraMuscular once  insulin lispro (ADMELOG) corrective regimen sliding scale   SubCutaneous at bedtime  insulin lispro (ADMELOG) corrective regimen sliding scale   SubCutaneous three times a day before meals  loratadine 10 milliGRAM(s) Oral daily  LORazepam   Injectable 1 milliGRAM(s) IV Push every 24 hours  multivitamin 1 Tablet(s) Oral daily  nystatin Powder 1 Application(s) Topical three times a day  sodium chloride 0.9%. 1000 milliLiter(s) IV Continuous <Continuous>  tamsulosin 0.4 milliGRAM(s) Oral at bedtime      PRN:   acetaminophen     Tablet .. 650 milliGRAM(s) Oral every 6 hours PRN  aluminum hydroxide/magnesium hydroxide/simethicone Suspension 30 milliLiter(s) Oral every 6 hours PRN  dextrose Oral Gel 15 Gram(s) Oral once PRN  metoclopramide Injectable 10 milliGRAM(s) IV Push every 6 hours PRN  ondansetron Injectable 8 milliGRAM(s) IV Push every 8 hours PRN  polyethylene glycol 3350 17 Gram(s) Oral daily PRN  sodium chloride 0.9% lock flush 10 milliLiter(s) IV Push every 1 hour PRN      A/P: 72 year old male with multiple myeloma diagnosed 19 s/p RVD x 6 admitted for autologous pbsct with high dose melphalan prep regimen (dose reduced to 70mg / m2 for age)  Day + 18  10/25- melphalan /; continue melphalan hydration for 24 hours post infusion of last dose. Strict I&O, daily weights, prn diuresis   10/27- HPC transplant today; continue transplant hydration for 24 hours post infusion of cells   10/28 weight gain, I>O, lasix 60 mg iv x1; constipated, Lactulose now and PRN  - Parrish. Started on cipro 10/31/22. If T >/= 38C, pan cx, CXR and change cipro to cefepime 2g IV q 8 hours    Febrile overnight, cipro switched to Cefepime; BC(-), urine culture (+) GNR Enterobacter cloacae complex; follow up sensitivities    pending repeat urine culture  - repeat urine cx from  < 10K normal urogenital kunal. Continue Cefepime for 7 days   - engrafted 22 - continue cefepime for a 7 day course, continue zarxio for now   Engrafted, off Zarxio  - Febrile O/N, will f/u bcx, Ucx (-)  - D/Desmond Heparin, Lovenox 40 mg x1 dose given, off.  - Plan for discharge home tomorrow.    1. Infectious Disease:   acyclovir   Oral Tab/Cap 400 milliGRAM(s) Oral every 8 hours  clotrimazole Lozenge 1 Lozenge Oral five times a day  fluconAZOLE   Tablet 400 milliGRAM(s) Oral daily    2. VOD Prophylaxis: Actigall, Glutamine    3. GI Prophylaxis:   pantoprazole    Tablet 40 milliGRAM(s) Oral before breakfast    4. Mouthcare - NS / NaHCO3 rinses, Mycelex, Biotene; Skin care     5. GVHD prophylaxis - n/a     6. Transfuse & replete electrolytes prn     7. IV hydration, daily weights, strict I&O, prn diuresis     8. PO intake as tolerated, nutrition follow up as needed, MVI, folic acid     9. Antiemetics, anti-diarrhea medications:   metoclopramide Injectable 10 milliGRAM(s) IV Push every 6 hours PRN  ondansetron Injectable 8 milliGRAM(s) IV Push every 8 hours PRN  LORazepam   Injectable 1 milliGRAM(s) IV Push every 24 hours    10. OOB as tolerated, physical therapy consult if needed     11. Monitor coags / fibrinogen 2x week, vitamin K as needed     12. Monitor closely for clinical changes, monitor for fevers     13. Emotional support provided, plan of care discussed and questions addressed     14. Patient education done regarding plan of care, restrictions and discharge planning     15. Continue regular social work input     I have written the above note for Dr. Gonzales who performed service with me in the room.   Shayla SUMMERS ([phone])    I have seen and examined patient with NP, I agree with above note as scribed.                    HPC Transplant Team                                                      Critical / Counseling Time Provided: 30 minutes                                                                                                                                                        Chief Complaint: Autologous peripheral blood stem cell transplant with high dose Melphalan prep regimen for treatment of multiple myeloma    S: Patient seen and examined with HPC Transplant Team:     All other ROS negative     O: Vitals:   Vital Signs Last 24 Hrs  T(C): 37.8 (2022 06:03), Max: 38.2 (2022 17:42)  T(F): 100 (2022 06:03), Max: 100.8 (2022 17:42)  HR: 102 (2022 06:03) (102 - 118)  BP: 122/70 (2022 06:03) (103/54 - 130/71)  BP(mean): --  RR: 18 (2022 06:03) (18 - 18)  SpO2: 98% (2022 06:03) (96% - 99%)    Parameters below as of 2022 21:47  Patient On (Oxygen Delivery Method): room air      Admit weight: 74.9kg   Daily Weight in k.3 (2022 09:23)  Today's weight:     Intake / Output:   -13 @ 07:01  -  -14 @ 07:00  --------------------------------------------------------  IN: 2157 mL / OUT: 1490 mL / NET: 667 mL      PE:   Oropharynx: Clear oropharynx  Oral mucositis:                                                Grade NA  CVS: S1, S2 RRR   Lungs: CTA throughout bilaterally   Abdomen: + BS x 4, soft, NT, ND   Extremities: no edema  Gastric Mucositis:      -                                            Grade: n/a  Intestinal Mucositis:    -                                          Grade: n/a  Skin: no rash   TLC: CDI   Neuro: A&Ox3   Pain: denies    Labs:   CBC Full  -  ( 2022 07:17 )  WBC Count : 3.32 K/uL  Hemoglobin : 10.2 g/dL  Hematocrit : 31.6 %  Platelet Count - Automated : 97 K/uL  Mean Cell Volume : 79.8 fl  Mean Cell Hemoglobin : 25.8 pg  Mean Cell Hemoglobin Concentration : 32.3 gm/dL  Auto Neutrophil # : x  Auto Lymphocyte # : x  Auto Monocyte # : x  Auto Eosinophil # : x  Auto Basophil # : x  Auto Neutrophil % : x  Auto Lymphocyte % : x  Auto Monocyte % : x  Auto Eosinophil % : x  Auto Basophil % : x                          10.2   3.32  )-----------( 97       ( 2022 07:17 )             31.6     -14    134<L>  |  98  |  8   ----------------------------<  103<H>  4.1   |  25  |  0.80    Ca    8.3<L>      2022 07:17  Phos  2.7       Mg     1.8         TPro  6.4  /  Alb  3.3  /  TBili  0.2  /  DBili  <0.1  /  AST  20  /  ALT  16  /  AlkPhos  114      Cultures:   Culture Results:   No growth (22 @ 21:09)    Culture Results:   No growth to date. (22 @ 19:50)    Culture Results:   No growth to date. (22 @ 19:35)    Culture Results:   <10,000 CFU/mL Normal Urogenital Kunal (22 @ 16:04)    Culture Results:   No growth to date. (22 @ 00:31)    Culture Results:   No growth to date. (22 @ 00:31)    Culture Results:   >100,000 CFU/ml Enterobacter cloacae complex (22 @ 00:31)    Radiology:   ACC: [id number] EXAM:  XR CHEST PORTABLE ROUTINE 1V                        PROCEDURE DATE:  2022    Impression:  Clear lungs.      Meds:   Antimicrobials:   acyclovir   Oral Tab/Cap 400 milliGRAM(s) Oral every 8 hours  clotrimazole Lozenge 1 Lozenge Oral five times a day  fluconAZOLE   Tablet 400 milliGRAM(s) Oral daily      Heme / Onc:       GI:  aluminum hydroxide/magnesium hydroxide/simethicone Suspension 30 milliLiter(s) Oral every 6 hours PRN  pantoprazole    Tablet 40 milliGRAM(s) Oral before breakfast  polyethylene glycol 3350 17 Gram(s) Oral daily PRN  senna 2 Tablet(s) Oral at bedtime  sodium bicarbonate Mouth Rinse 10 milliLiter(s) Swish and Spit five times a day  ursodiol Capsule 300 milliGRAM(s) Oral two times a day      Cardiovascular:   amLODIPine   Tablet 10 milliGRAM(s) Oral daily  hydrALAZINE 25 milliGRAM(s) Oral three times a day      Immunologic:       Other medications:   acetaminophen     Tablet .. 650 milliGRAM(s) Oral every 6 hours  AQUAPHOR (petrolatum Ointment) 1 Application(s) Topical two times a day  Biotene Dry Mouth Oral Rinse 5 milliLiter(s) Swish and Spit five times a day  chlorhexidine 4% Liquid 1 Application(s) Topical <User Schedule>  dextrose 5%. 1000 milliLiter(s) IV Continuous <Continuous>  dextrose 5%. 1000 milliLiter(s) IV Continuous <Continuous>  dextrose 50% Injectable 25 Gram(s) IV Push once  dextrose 50% Injectable 12.5 Gram(s) IV Push once  dextrose 50% Injectable 25 Gram(s) IV Push once  finasteride 5 milliGRAM(s) Oral daily  glucagon  Injectable 1 milliGRAM(s) IntraMuscular once  insulin lispro (ADMELOG) corrective regimen sliding scale   SubCutaneous at bedtime  insulin lispro (ADMELOG) corrective regimen sliding scale   SubCutaneous three times a day before meals  loratadine 10 milliGRAM(s) Oral daily  LORazepam   Injectable 1 milliGRAM(s) IV Push every 24 hours  multivitamin 1 Tablet(s) Oral daily  nystatin Powder 1 Application(s) Topical three times a day  sodium chloride 0.9%. 1000 milliLiter(s) IV Continuous <Continuous>  tamsulosin 0.4 milliGRAM(s) Oral at bedtime      PRN:   acetaminophen     Tablet .. 650 milliGRAM(s) Oral every 6 hours PRN  aluminum hydroxide/magnesium hydroxide/simethicone Suspension 30 milliLiter(s) Oral every 6 hours PRN  dextrose Oral Gel 15 Gram(s) Oral once PRN  metoclopramide Injectable 10 milliGRAM(s) IV Push every 6 hours PRN  ondansetron Injectable 8 milliGRAM(s) IV Push every 8 hours PRN  polyethylene glycol 3350 17 Gram(s) Oral daily PRN  sodium chloride 0.9% lock flush 10 milliLiter(s) IV Push every 1 hour PRN      A/P: 72 year old male with multiple myeloma diagnosed 19 s/p RVD x 6 admitted for autologous pbsct with high dose melphalan prep regimen (dose reduced to 70mg / m2 for age)  Day + 18  10/25- melphalan /; continue melphalan hydration for 24 hours post infusion of last dose. Strict I&O, daily weights, prn diuresis   10/27- HPC transplant today; continue transplant hydration for 24 hours post infusion of cells   10/28 weight gain, I>O, lasix 60 mg iv x1; constipated, Lactulose now and PRN  - Parrish. Started on cipro 10/31/22. If T >/= 38C, pan cx, CXR and change cipro to cefepime 2g IV q 8 hours    Febrile overnight, cipro switched to Cefepime; BC(-), urine culture (+) GNR Enterobacter cloacae complex; follow up sensitivities    pending repeat urine culture  - repeat urine cx from  < 10K normal urogenital kunal. Continue Cefepime for 7 days   - engrafted 22 - continue cefepime for a 7 day course, continue zarxio for now   Engrafted, off Zarxio  - Febrile O/N, will f/u bcx, Ucx (-)  - D/Desmond Heparin, Lovenox 40 mg x1 dose given, off.  - Plan for discharge home tomorrow.    1. Infectious Disease:   acyclovir   Oral Tab/Cap 400 milliGRAM(s) Oral every 8 hours  clotrimazole Lozenge 1 Lozenge Oral five times a day  fluconAZOLE   Tablet 400 milliGRAM(s) Oral daily    2. VOD Prophylaxis: Actigall, Glutamine    3. GI Prophylaxis:   pantoprazole    Tablet 40 milliGRAM(s) Oral before breakfast    4. Mouthcare - NS / NaHCO3 rinses, Mycelex, Biotene; Skin care     5. GVHD prophylaxis - n/a     6. Transfuse & replete electrolytes prn     7. IV hydration, daily weights, strict I&O, prn diuresis     8. PO intake as tolerated, nutrition follow up as needed, MVI, folic acid     9. Antiemetics, anti-diarrhea medications:   metoclopramide Injectable 10 milliGRAM(s) IV Push every 6 hours PRN  ondansetron Injectable 8 milliGRAM(s) IV Push every 8 hours PRN  LORazepam   Injectable 1 milliGRAM(s) IV Push every 24 hours    10. OOB as tolerated, physical therapy consult if needed     11. Monitor coags / fibrinogen 2x week, vitamin K as needed     12. Monitor closely for clinical changes, monitor for fevers     13. Emotional support provided, plan of care discussed and questions addressed     14. Patient education done regarding plan of care, restrictions and discharge planning     15. Continue regular social work input     I have written the above note for Dr. Gonzales who performed service with me in the room.   Shayla Lundberg NP-C ([phone])    I have seen and examined patient with NP, I agree with above note as scribed.                    HPC Transplant Team                                                      Critical / Counseling Time Provided: 30 minutes                                                                                                                                                        Chief Complaint: Autologous peripheral blood stem cell transplant with high dose Melphalan prep regimen for treatment of multiple myeloma    S: Patient seen and examined with HPC Transplant Team:   + fatigue - feels "not bad"   All other ROS negative     O: Vitals:   Vital Signs Last 24 Hrs  T(C): 37.8 (2022 06:03), Max: 38.2 (2022 17:42)  T(F): 100 (2022 06:03), Max: 100.8 (2022 17:42)  HR: 102 (2022 06:03) (102 - 118)  BP: 122/70 (2022 06:03) (103/54 - 130/71)  BP(mean): --  RR: 18 (2022 06:03) (18 - 18)  SpO2: 98% (2022 06:03) (96% - 99%)    Parameters below as of 2022 21:47  Patient On (Oxygen Delivery Method): room air      Admit weight: 74.9kg   Daily Weight in k.3 (2022 09:23)  Today's weight: 69.1kg     Intake / Output:    @ 07:01  -   @ 07:00  --------------------------------------------------------  IN: 2157 mL / OUT: 1490 mL / NET: 667 mL      PE:   Oropharynx: Clear oropharynx  Oral mucositis:                                                Grade NA  CVS: S1, S2 RRR   Lungs: CTA throughout bilaterally   Abdomen: + BS x 4, soft, NT, ND   Extremities: no edema  Gastric Mucositis:      -                                            Grade: n/a  Intestinal Mucositis:    -                                          Grade: n/a  Skin: no rash   TLC: CDI   Neuro: A&Ox3   Pain: denies    Labs:   CBC Full  -  ( 2022 07:17 )  WBC Count : 3.32 K/uL  Hemoglobin : 10.2 g/dL  Hematocrit : 31.6 %  Platelet Count - Automated : 97 K/uL  Mean Cell Volume : 79.8 fl  Mean Cell Hemoglobin : 25.8 pg  Mean Cell Hemoglobin Concentration : 32.3 gm/dL  Auto Neutrophil # : x  Auto Lymphocyte # : x  Auto Monocyte # : x  Auto Eosinophil # : x  Auto Basophil # : x  Auto Neutrophil % : x  Auto Lymphocyte % : x  Auto Monocyte % : x  Auto Eosinophil % : x  Auto Basophil % : x                          10.2   3.32  )-----------( 97       ( 2022 07:17 )             31.6         134<L>  |  98  |  8   ----------------------------<  103<H>  4.1   |  25  |  0.80    Ca    8.3<L>      2022 07:17  Phos  2.7       Mg     1.8         TPro  6.4  /  Alb  3.3  /  TBili  0.2  /  DBili  <0.1  /  AST  20  /  ALT  16  /  AlkPhos  114      Cultures:   Culture Results:   No growth (22 @ 21:09)    Culture Results:   No growth to date. (22 @ 19:50)    Culture Results:   No growth to date. (22 @ 19:35)    Culture Results:   <10,000 CFU/mL Normal Urogenital Kunal (22 @ 16:04)    Culture Results:   No growth to date. (22 @ 00:31)    Culture Results:   No growth to date. (22 @ 00:31)    Culture Results:   >100,000 CFU/ml Enterobacter cloacae complex (22 @ 00:31)    Radiology:   ACC: [id number] EXAM:  XR CHEST PORTABLE ROUTINE 1V                        PROCEDURE DATE:  2022    Impression:  Clear lungs.      Meds:   Antimicrobials:   acyclovir   Oral Tab/Cap 400 milliGRAM(s) Oral every 8 hours  clotrimazole Lozenge 1 Lozenge Oral five times a day  fluconAZOLE   Tablet 400 milliGRAM(s) Oral daily      Heme / Onc:       GI:  aluminum hydroxide/magnesium hydroxide/simethicone Suspension 30 milliLiter(s) Oral every 6 hours PRN  pantoprazole    Tablet 40 milliGRAM(s) Oral before breakfast  polyethylene glycol 3350 17 Gram(s) Oral daily PRN  senna 2 Tablet(s) Oral at bedtime  sodium bicarbonate Mouth Rinse 10 milliLiter(s) Swish and Spit five times a day  ursodiol Capsule 300 milliGRAM(s) Oral two times a day      Cardiovascular:   amLODIPine   Tablet 10 milliGRAM(s) Oral daily  hydrALAZINE 25 milliGRAM(s) Oral three times a day      Immunologic:       Other medications:   acetaminophen     Tablet .. 650 milliGRAM(s) Oral every 6 hours  AQUAPHOR (petrolatum Ointment) 1 Application(s) Topical two times a day  Biotene Dry Mouth Oral Rinse 5 milliLiter(s) Swish and Spit five times a day  chlorhexidine 4% Liquid 1 Application(s) Topical <User Schedule>  dextrose 5%. 1000 milliLiter(s) IV Continuous <Continuous>  dextrose 5%. 1000 milliLiter(s) IV Continuous <Continuous>  dextrose 50% Injectable 25 Gram(s) IV Push once  dextrose 50% Injectable 12.5 Gram(s) IV Push once  dextrose 50% Injectable 25 Gram(s) IV Push once  finasteride 5 milliGRAM(s) Oral daily  glucagon  Injectable 1 milliGRAM(s) IntraMuscular once  insulin lispro (ADMELOG) corrective regimen sliding scale   SubCutaneous at bedtime  insulin lispro (ADMELOG) corrective regimen sliding scale   SubCutaneous three times a day before meals  loratadine 10 milliGRAM(s) Oral daily  LORazepam   Injectable 1 milliGRAM(s) IV Push every 24 hours  multivitamin 1 Tablet(s) Oral daily  nystatin Powder 1 Application(s) Topical three times a day  sodium chloride 0.9%. 1000 milliLiter(s) IV Continuous <Continuous>  tamsulosin 0.4 milliGRAM(s) Oral at bedtime      PRN:   acetaminophen     Tablet .. 650 milliGRAM(s) Oral every 6 hours PRN  aluminum hydroxide/magnesium hydroxide/simethicone Suspension 30 milliLiter(s) Oral every 6 hours PRN  dextrose Oral Gel 15 Gram(s) Oral once PRN  metoclopramide Injectable 10 milliGRAM(s) IV Push every 6 hours PRN  ondansetron Injectable 8 milliGRAM(s) IV Push every 8 hours PRN  polyethylene glycol 3350 17 Gram(s) Oral daily PRN  sodium chloride 0.9% lock flush 10 milliLiter(s) IV Push every 1 hour PRN      A/P: 72 year old male with multiple myeloma diagnosed 21 s/p RVD x 6 admitted for autologous pbsct with high dose melphalan prep regimen (dose reduced to 70mg / m2 for age)  Day + 18  10/25- melphalan ; continue melphalan hydration for 24 hours post infusion of last dose. Strict I&O, daily weights, prn diuresis   10/27- HPC transplant today; continue transplant hydration for 24 hours post infusion of cells   10/28 weight gain, I>O, lasix 60 mg iv x1; constipated, Lactulose now and PRN  - Parrish. Started on cipro 10/31/22. If T >/= 38C, pan cx, CXR and change cipro to cefepime 2g IV q 8 hours    Febrile overnight, cipro switched to Cefepime; BC(-), urine culture (+) GNR Enterobacter cloacae complex; follow up sensitivities    pending repeat urine culture  - repeat urine cx from  < 10K normal urogenital kunal. Continue Cefepime for 7 days   - engrafted 22 - continue cefepime for a 7 day course, continue zarxio for now   Engrafted, off Zarxio  - Febrile O/N, will f/u bcx, Ucx (-)  - D/Desmond Heparin, Lovenox 40 mg x1 dose given, off.  - Plan for discharge home tomorrow.    1. Infectious Disease:   acyclovir   Oral Tab/Cap 400 milliGRAM(s) Oral every 8 hours  clotrimazole Lozenge 1 Lozenge Oral five times a day  fluconAZOLE   Tablet 400 milliGRAM(s) Oral daily    2. VOD Prophylaxis: Actigall, Glutamine    3. GI Prophylaxis:   pantoprazole    Tablet 40 milliGRAM(s) Oral before breakfast    4. Mouthcare - NS / NaHCO3 rinses, Mycelex, Biotene; Skin care     5. GVHD prophylaxis - n/a     6. Transfuse & replete electrolytes prn     7. IV hydration, daily weights, strict I&O, prn diuresis     8. PO intake as tolerated, nutrition follow up as needed, MVI, folic acid     9. Antiemetics, anti-diarrhea medications:   metoclopramide Injectable 10 milliGRAM(s) IV Push every 6 hours PRN  ondansetron Injectable 8 milliGRAM(s) IV Push every 8 hours PRN  LORazepam   Injectable 1 milliGRAM(s) IV Push every 24 hours    10. OOB as tolerated, physical therapy consult if needed     11. Monitor coags / fibrinogen 2x week, vitamin K as needed     12. Monitor closely for clinical changes, monitor for fevers     13. Emotional support provided, plan of care discussed and questions addressed     14. Patient education done regarding plan of care, restrictions and discharge planning     15. Continue regular social work input     I have written the above note for Dr. Gonzales who performed service with me in the room.   Shayla Lundberg NP-C ([phone])    I have seen and examined patient with NP, I agree with above note as scribed.

## 2022-11-14 NOTE — PROGRESS NOTE ADULT - CRITICAL CARE ATTENDING COMMENT
72 year old male with multiple myeloma diagnosed 12/20/21 s/p RVD x 6 admitted for autologous pbsct with high dose melphalan prep regimen (dose reduced to 70mg / m2 for age)    Day + 18...some soft stool..feeling better this am..increased saliva improved...wbc recovering..repeat urine cx less than 10,000...enterobacter sensitive to cefapime..course completed after today...d/c plan for monday..home preparation in progress...monitor for temps off antibiotics    1. Admit to BMTU   2. -3 hours prior to Melphalan start hydration: D5NS at 200ml /hr and continue 24 hours post Melphalan infusion  3. Day – 3 & day -2 - Melphalan 70mg/m2  IV   4. Strict I&O, daily weights, prn diuresis   5. Day -1 rest day. At 2200 on day – 1, start transplant hydration 1/2NS + 50mEq NaHCO3- (may substitute [id number] if bicarb not available)  6. Day 0 – infuse HPC product. 4 hours after infusion of cells start Zarxio 5 micrograms / kg (actual weight) . Continue through engraftment. OFF  7. On day 0, + 1, + 2-give Kepivance 60micrograms / kg (actual weight).  8. VOD prophylaxis - low dose heparin gtt (dosed at 100 units / kg / day), glutamine supplementation, Actigall BID   9. PCP prophylaxis - Bactrim DS through day -2    10. Antiviral prophylaxis - Acyclovir 400mg po TID to start day -1   11. Antifungal prophylaxis- Diflucan 400 mg po daily.  12. GI prophylaxis - Protonix po QD   13. Antibacterial prophylaxis -  ANC < 500, started Cipro 500mg po BID.  febrile, pan cx, CXR and changed Cipro to Cefepime 2g IV q 8 hours. Continue until count recovery  14. Kepivance for prevention of mucositis- days 0, +1, +2  15. Aggressive mouth care and skin care as per protocol..senna, miralax  prn  16. The patient is aware of his diagnosis...he does not want to be reminded of it every day..  17 wbc recovering..d/c early next week.  18. daughter on phone 11/10/22...d/c instruction started..some nausea today...re inforced that this is quite normal and that it may continue for several weeks after d/c. 72 year old male with multiple myeloma diagnosed 12/20/21 s/p RVD x 6 admitted for autologous pbsct with high dose melphalan prep regimen (dose reduced to 70mg / m2 for age)    Day + 18...some soft stool..feeling better this am..increased saliva improved...wbc recovering..repeat urine cx less than 10,000...enterobacter sensitive to cefapime..course completed after today...d/c plan for monday..home preparation in progress...monitor for temps off antibiotics    1. Admit to BMTU   2. -3 hours prior to Melphalan start hydration: D5NS at 200ml /hr and continue 24 hours post Melphalan infusion  3. Day – 3 & day -2 - Melphalan 70mg/m2  IV   4. Strict I&O, daily weights, prn diuresis   5. Day -1 rest day. At 2200 on day – 1, start transplant hydration 1/2NS + 50mEq NaHCO3- (may substitute [id number] if bicarb not available)  6. Day 0 – infuse HPC product. 4 hours after infusion of cells start Zarxio 5 micrograms / kg (actual weight) . Continue through engraftment. OFF  7. On day 0, + 1, + 2-give Kepivance 60micrograms / kg (actual weight).  8. VOD prophylaxis - low dose heparin gtt (dosed at 100 units / kg / day), glutamine supplementation, Actigall BID   9. PCP prophylaxis - Bactrim DS through day -2    10. Antiviral prophylaxis - Acyclovir 400mg po TID to start day -1   11. Antifungal prophylaxis- Diflucan 400 mg po daily.  12. GI prophylaxis - Protonix po QD   13. Antibacterial prophylaxis -  ANC < 500, started Cipro 500mg po BID.  febrile, pan cx, CXR and changed Cipro to Cefepime 2g IV q 8 hours. Continue until count recovery and course complete...d/c'd friday  14. Kepivance for prevention of mucositis- days 0, +1, +2  15. Aggressive mouth care and skin care as per protocol..senna, miralax  prn  16. The patient is aware of his diagnosis...he does not want to be reminded of it every day..  17 wbc recovering..d/c tues...low grade temp late yesterday am  18. daughter on phone 11/14/22...d/c instruction continued...re inforced that n/v/d  is quite normal and that it may continue for several weeks after d/c.

## 2022-11-15 ENCOUNTER — TRANSCRIPTION ENCOUNTER (OUTPATIENT)
Age: 72
End: 2022-11-15

## 2022-11-15 VITALS
SYSTOLIC BLOOD PRESSURE: 109 MMHG | HEART RATE: 108 BPM | DIASTOLIC BLOOD PRESSURE: 75 MMHG | OXYGEN SATURATION: 98 % | TEMPERATURE: 98 F | RESPIRATION RATE: 18 BRPM

## 2022-11-15 LAB
ALBUMIN SERPL ELPH-MCNC: 3.3 G/DL — SIGNIFICANT CHANGE UP (ref 3.3–5)
ALP SERPL-CCNC: 102 U/L — SIGNIFICANT CHANGE UP (ref 40–120)
ALT FLD-CCNC: 17 U/L — SIGNIFICANT CHANGE UP (ref 10–45)
ANION GAP SERPL CALC-SCNC: 9 MMOL/L — SIGNIFICANT CHANGE UP (ref 5–17)
AST SERPL-CCNC: 19 U/L — SIGNIFICANT CHANGE UP (ref 10–40)
BASOPHILS # BLD AUTO: 0 K/UL — SIGNIFICANT CHANGE UP (ref 0–0.2)
BASOPHILS NFR BLD AUTO: 0 % — SIGNIFICANT CHANGE UP (ref 0–2)
BILIRUB SERPL-MCNC: 0.2 MG/DL — SIGNIFICANT CHANGE UP (ref 0.2–1.2)
BUN SERPL-MCNC: 12 MG/DL — SIGNIFICANT CHANGE UP (ref 7–23)
CALCIUM SERPL-MCNC: 8.2 MG/DL — LOW (ref 8.4–10.5)
CHLORIDE SERPL-SCNC: 100 MMOL/L — SIGNIFICANT CHANGE UP (ref 96–108)
CO2 SERPL-SCNC: 25 MMOL/L — SIGNIFICANT CHANGE UP (ref 22–31)
CREAT SERPL-MCNC: 0.91 MG/DL — SIGNIFICANT CHANGE UP (ref 0.5–1.3)
CULTURE RESULTS: SIGNIFICANT CHANGE UP
EGFR: 88 ML/MIN/1.73M2 — SIGNIFICANT CHANGE UP
EOSINOPHIL # BLD AUTO: 0 K/UL — SIGNIFICANT CHANGE UP (ref 0–0.5)
EOSINOPHIL NFR BLD AUTO: 0 % — SIGNIFICANT CHANGE UP (ref 0–6)
GLUCOSE BLDC GLUCOMTR-MCNC: 127 MG/DL — HIGH (ref 70–99)
GLUCOSE BLDC GLUCOMTR-MCNC: 128 MG/DL — HIGH (ref 70–99)
GLUCOSE BLDC GLUCOMTR-MCNC: 142 MG/DL — HIGH (ref 70–99)
GLUCOSE SERPL-MCNC: 98 MG/DL — SIGNIFICANT CHANGE UP (ref 70–99)
HCT VFR BLD CALC: 30.5 % — LOW (ref 39–50)
HGB BLD-MCNC: 9.8 G/DL — LOW (ref 13–17)
LDH SERPL L TO P-CCNC: 213 U/L — SIGNIFICANT CHANGE UP (ref 50–242)
LYMPHOCYTES # BLD AUTO: 0.61 K/UL — LOW (ref 1–3.3)
LYMPHOCYTES # BLD AUTO: 22 % — SIGNIFICANT CHANGE UP (ref 13–44)
MAGNESIUM SERPL-MCNC: 1.9 MG/DL — SIGNIFICANT CHANGE UP (ref 1.6–2.6)
MANUAL SMEAR VERIFICATION: SIGNIFICANT CHANGE UP
MCHC RBC-ENTMCNC: 25.7 PG — LOW (ref 27–34)
MCHC RBC-ENTMCNC: 32.1 GM/DL — SIGNIFICANT CHANGE UP (ref 32–36)
MCV RBC AUTO: 79.8 FL — LOW (ref 80–100)
MONOCYTES # BLD AUTO: 1.38 K/UL — HIGH (ref 0–0.9)
MONOCYTES NFR BLD AUTO: 49.6 % — HIGH (ref 2–14)
NEUTROPHILS # BLD AUTO: 0.76 K/UL — LOW (ref 1.8–7.4)
NEUTROPHILS NFR BLD AUTO: 26.6 % — LOW (ref 43–77)
NEUTS BAND # BLD: 0.9 % — SIGNIFICANT CHANGE UP (ref 0–8)
PHOSPHATE SERPL-MCNC: 2.8 MG/DL — SIGNIFICANT CHANGE UP (ref 2.5–4.5)
PLAT MORPH BLD: NORMAL — SIGNIFICANT CHANGE UP
PLATELET # BLD AUTO: 109 K/UL — LOW (ref 150–400)
POTASSIUM SERPL-MCNC: 4.3 MMOL/L — SIGNIFICANT CHANGE UP (ref 3.5–5.3)
POTASSIUM SERPL-SCNC: 4.3 MMOL/L — SIGNIFICANT CHANGE UP (ref 3.5–5.3)
PROT SERPL-MCNC: 6.1 G/DL — SIGNIFICANT CHANGE UP (ref 6–8.3)
RBC # BLD: 3.82 M/UL — LOW (ref 4.2–5.8)
RBC # FLD: 15 % — HIGH (ref 10.3–14.5)
RBC BLD AUTO: SIGNIFICANT CHANGE UP
SODIUM SERPL-SCNC: 134 MMOL/L — LOW (ref 135–145)
SPECIMEN SOURCE: SIGNIFICANT CHANGE UP
TOXIC GRANULES BLD QL SMEAR: PRESENT — SIGNIFICANT CHANGE UP
VARIANT LYMPHS # BLD: 0.9 % — SIGNIFICANT CHANGE UP (ref 0–6)
WBC # BLD: 2.78 K/UL — LOW (ref 3.8–10.5)
WBC # FLD AUTO: 2.78 K/UL — LOW (ref 3.8–10.5)

## 2022-11-15 PROCEDURE — 76937 US GUIDE VASCULAR ACCESS: CPT

## 2022-11-15 PROCEDURE — 81378 HLA I & II TYPING HR: CPT

## 2022-11-15 PROCEDURE — 87324 CLOSTRIDIUM AG IA: CPT

## 2022-11-15 PROCEDURE — 97530 THERAPEUTIC ACTIVITIES: CPT

## 2022-11-15 PROCEDURE — 82248 BILIRUBIN DIRECT: CPT

## 2022-11-15 PROCEDURE — 85610 PROTHROMBIN TIME: CPT

## 2022-11-15 PROCEDURE — 36430 TRANSFUSION BLD/BLD COMPNT: CPT

## 2022-11-15 PROCEDURE — 36556 INSERT NON-TUNNEL CV CATH: CPT

## 2022-11-15 PROCEDURE — 80053 COMPREHEN METABOLIC PANEL: CPT

## 2022-11-15 PROCEDURE — 84100 ASSAY OF PHOSPHORUS: CPT

## 2022-11-15 PROCEDURE — 82962 GLUCOSE BLOOD TEST: CPT

## 2022-11-15 PROCEDURE — 83735 ASSAY OF MAGNESIUM: CPT

## 2022-11-15 PROCEDURE — 36415 COLL VENOUS BLD VENIPUNCTURE: CPT

## 2022-11-15 PROCEDURE — P9037: CPT

## 2022-11-15 PROCEDURE — 81382 HLA II TYPING 1 LOC HR: CPT

## 2022-11-15 PROCEDURE — 83615 LACTATE (LD) (LDH) ENZYME: CPT

## 2022-11-15 PROCEDURE — 86850 RBC ANTIBODY SCREEN: CPT

## 2022-11-15 PROCEDURE — 86901 BLOOD TYPING SEROLOGIC RH(D): CPT

## 2022-11-15 PROCEDURE — 85025 COMPLETE CBC W/AUTO DIFF WBC: CPT

## 2022-11-15 PROCEDURE — C1894: CPT

## 2022-11-15 PROCEDURE — 38207 CRYOPRESERVE STEM CELLS: CPT

## 2022-11-15 PROCEDURE — 81001 URINALYSIS AUTO W/SCOPE: CPT

## 2022-11-15 PROCEDURE — 99238 HOSP IP/OBS DSCHRG MGMT 30/<: CPT

## 2022-11-15 PROCEDURE — 82955 ASSAY OF G6PD ENZYME: CPT

## 2022-11-15 PROCEDURE — 86900 BLOOD TYPING SEROLOGIC ABO: CPT

## 2022-11-15 PROCEDURE — 97116 GAIT TRAINING THERAPY: CPT

## 2022-11-15 PROCEDURE — C1769: CPT

## 2022-11-15 PROCEDURE — 87077 CULTURE AEROBIC IDENTIFY: CPT

## 2022-11-15 PROCEDURE — 87086 URINE CULTURE/COLONY COUNT: CPT

## 2022-11-15 PROCEDURE — 93005 ELECTROCARDIOGRAM TRACING: CPT

## 2022-11-15 PROCEDURE — 87186 SC STD MICRODIL/AGAR DIL: CPT

## 2022-11-15 PROCEDURE — 82785 ASSAY OF IGE: CPT

## 2022-11-15 PROCEDURE — 38209 WASH HARVEST STEM CELLS: CPT

## 2022-11-15 PROCEDURE — 85730 THROMBOPLASTIN TIME PARTIAL: CPT

## 2022-11-15 PROCEDURE — 83036 HEMOGLOBIN GLYCOSYLATED A1C: CPT

## 2022-11-15 PROCEDURE — 87449 NOS EACH ORGANISM AG IA: CPT

## 2022-11-15 PROCEDURE — P9100: CPT

## 2022-11-15 PROCEDURE — 77001 FLUOROGUIDE FOR VEIN DEVICE: CPT

## 2022-11-15 PROCEDURE — 85384 FIBRINOGEN ACTIVITY: CPT

## 2022-11-15 PROCEDURE — 85045 AUTOMATED RETICULOCYTE COUNT: CPT

## 2022-11-15 PROCEDURE — 71045 X-RAY EXAM CHEST 1 VIEW: CPT

## 2022-11-15 PROCEDURE — 82784 ASSAY IGA/IGD/IGG/IGM EACH: CPT

## 2022-11-15 PROCEDURE — 87040 BLOOD CULTURE FOR BACTERIA: CPT

## 2022-11-15 PROCEDURE — 97110 THERAPEUTIC EXERCISES: CPT

## 2022-11-15 PROCEDURE — 97162 PT EVAL MOD COMPLEX 30 MIN: CPT

## 2022-11-15 RX ORDER — FILGRASTIM 480MCG/1.6
480 VIAL (ML) INJECTION ONCE
Refills: 0 | Status: COMPLETED | OUTPATIENT
Start: 2022-11-15 | End: 2022-11-15

## 2022-11-15 RX ADMIN — Medication 1 APPLICATION(S): at 17:50

## 2022-11-15 RX ADMIN — Medication 10 MILLILITER(S): at 00:21

## 2022-11-15 RX ADMIN — Medication 5 MILLILITER(S): at 00:21

## 2022-11-15 RX ADMIN — Medication 5 MILLILITER(S): at 16:33

## 2022-11-15 RX ADMIN — Medication 1 LOZENGE: at 00:22

## 2022-11-15 RX ADMIN — NYSTATIN CREAM 1 APPLICATION(S): 100000 CREAM TOPICAL at 14:31

## 2022-11-15 RX ADMIN — Medication 5 MILLILITER(S): at 12:53

## 2022-11-15 RX ADMIN — FLUCONAZOLE 400 MILLIGRAM(S): 150 TABLET ORAL at 12:55

## 2022-11-15 RX ADMIN — AMLODIPINE BESYLATE 10 MILLIGRAM(S): 2.5 TABLET ORAL at 06:09

## 2022-11-15 RX ADMIN — URSODIOL 300 MILLIGRAM(S): 250 TABLET, FILM COATED ORAL at 06:09

## 2022-11-15 RX ADMIN — LORATADINE 10 MILLIGRAM(S): 10 TABLET ORAL at 12:58

## 2022-11-15 RX ADMIN — Medication 480 MICROGRAM(S): at 12:23

## 2022-11-15 RX ADMIN — CHLORHEXIDINE GLUCONATE 1 APPLICATION(S): 213 SOLUTION TOPICAL at 09:53

## 2022-11-15 RX ADMIN — NYSTATIN CREAM 1 APPLICATION(S): 100000 CREAM TOPICAL at 06:10

## 2022-11-15 RX ADMIN — Medication 5 MILLILITER(S): at 09:51

## 2022-11-15 RX ADMIN — Medication 10 MILLILITER(S): at 16:32

## 2022-11-15 RX ADMIN — Medication 400 MILLIGRAM(S): at 16:31

## 2022-11-15 RX ADMIN — Medication 1 LOZENGE: at 09:53

## 2022-11-15 RX ADMIN — Medication 400 MILLIGRAM(S): at 06:09

## 2022-11-15 RX ADMIN — PANTOPRAZOLE SODIUM 40 MILLIGRAM(S): 20 TABLET, DELAYED RELEASE ORAL at 06:09

## 2022-11-15 RX ADMIN — Medication 1 APPLICATION(S): at 06:10

## 2022-11-15 RX ADMIN — Medication 10 MILLILITER(S): at 12:54

## 2022-11-15 RX ADMIN — URSODIOL 300 MILLIGRAM(S): 250 TABLET, FILM COATED ORAL at 17:49

## 2022-11-15 RX ADMIN — SODIUM CHLORIDE 50 MILLILITER(S): 9 INJECTION INTRAMUSCULAR; INTRAVENOUS; SUBCUTANEOUS at 00:22

## 2022-11-15 RX ADMIN — Medication 1 TABLET(S): at 12:59

## 2022-11-15 RX ADMIN — Medication 1 LOZENGE: at 16:33

## 2022-11-15 RX ADMIN — Medication 10 MILLILITER(S): at 09:52

## 2022-11-15 RX ADMIN — FINASTERIDE 5 MILLIGRAM(S): 5 TABLET, FILM COATED ORAL at 12:59

## 2022-11-15 RX ADMIN — Medication 1 MILLIGRAM(S): at 12:56

## 2022-11-15 RX ADMIN — Medication 1 LOZENGE: at 12:55

## 2022-11-15 NOTE — DISCHARGE NOTE PROVIDER - NSDCMRMEDTOKEN_GEN_ALL_CORE_FT
acyclovir 400 mg oral tablet: 1 tab(s) orally every 8 hours  atovaquone 750 mg/5 mL oral suspension: 5 milliliter(s) orally 2 times a day  Flomax 0.4 mg oral capsule: 1 cap(s) orally once a day (at bedtime)  fluconazole 200 mg oral tablet: 2 tab(s) orally once a day  folic acid 1 mg oral tablet: 1 tab(s) orally once a day  hydrALAZINE 25 mg oral tablet: 1 tab(s) orally 3 times a day  metoprolol tartrate 25 mg oral tablet: 1 tab(s) orally 2 times a day   Multiple Vitamins oral tablet: 1 tab(s) orally once a day  Norvasc 10 mg oral tablet: 1 tab(s) orally once a day  ondansetron 8 mg oral tablet: 1 tab(s) orally 3 times a day, As Needed  pantoprazole 40 mg oral delayed release tablet: 1 tab(s) orally once a day (before a meal)  Proscar 5 mg oral tablet: 1 tab(s) orally once a day

## 2022-11-15 NOTE — DISCHARGE NOTE PROVIDER - NSDCCPCAREPLAN_GEN_ALL_CORE_FT
PRINCIPAL DISCHARGE DIAGNOSIS  Diagnosis: Stem cells transplant status  Assessment and Plan of Treatment: 1. Diet and activities as per [facility] discharge guidelines and safe food handling guidelines. NO RESTAURANT OR TAKE OUT FOOD AT THIS TIME, ONLY HOME COOKED PREPARED/FROZEN FOODS. You are allowed to have fresh baked pizza right out of the oven. This is the ONLY takeout food at this time.  2. Notify your physician if bleeding; swelling; persistent nausea and vomiting; unable to urinate; pain not relieved by medications; fever; numbness, tingling; excessive diarrhea, inability to tolerate liquids or foods; increased irritability or sluggishness, rash        SECONDARY DISCHARGE DIAGNOSES  Diagnosis: Need for prophylactic measure  Assessment and Plan of Treatment: 1. Continue your prophylactic medications as directed by Dr. Gonzales   Acyclovir - antiviral prophylaxis   Fluconazole - antifungal prophylaxis  Atovaquone - PCP prophylaxis   Protonix - GI prophylaxis

## 2022-11-15 NOTE — PROGRESS NOTE ADULT - CRITICAL CARE ATTENDING COMMENT
72 year old male with multiple myeloma diagnosed 12/20/21 s/p RVD x 6 admitted for autologous pbsct with high dose melphalan prep regimen (dose reduced to 70mg / m2 for age)    Day + 19...some soft stool..feeling better this am..increased saliva improved...wbc recovering..repeat urine cx less than 10,000...enterobacter sensitive to cefapime..course completed after today...d/c plan for monday..home preparation in progress...monitor for temps off antibiotics    1. Admit to BMTU   2. -3 hours prior to Melphalan start hydration: D5NS at 200ml /hr and continue 24 hours post Melphalan infusion  3. Day – 3 & day -2 - Melphalan 70mg/m2  IV   4. Strict I&O, daily weights, prn diuresis   5. Day -1 rest day. At 2200 on day – 1, start transplant hydration 1/2NS + 50mEq NaHCO3- (may substitute [id number] if bicarb not available)  6. Day 0 – infuse HPC product. 4 hours after infusion of cells start Zarxio 5 micrograms / kg (actual weight) . Continue through engraftment. OFF  7. On day 0, + 1, + 2-give Kepivance 60micrograms / kg (actual weight).  8. VOD prophylaxis - low dose heparin gtt (dosed at 100 units / kg / day), glutamine supplementation, Actigall BID   9. PCP prophylaxis - Bactrim DS through day -2    10. Antiviral prophylaxis - Acyclovir 400mg po TID to start day -1   11. Antifungal prophylaxis- Diflucan 400 mg po daily.  12. GI prophylaxis - Protonix po QD   13. Antibacterial prophylaxis -  ANC < 500, started Cipro 500mg po BID.  febrile, pan cx, CXR and changed Cipro to Cefepime 2g IV q 8 hours. Continue until count recovery and course complete...d/c'd friday  14. Kepivance for prevention of mucositis- days 0, +1, +2  15. Aggressive mouth care and skin care as per protocol..senna, miralax  prn  16. The patient is aware of his diagnosis...he does not want to be reminded of it every day..  17 wbc recovering..d/c tues...low grade temp late yesterday am  18. daughter on phone 11/14/22...d/c instruction continued...re inforced that n/v/d  is quite normal and that it may continue for several weeks after d/c. 72 year old male with multiple myeloma diagnosed 12/20/21 s/p RVD x 6 admitted for autologous pbsct with high dose melphalan prep regimen (dose reduced to 70mg / m2 for age)    Day + 19...some soft stool..feeling better this am..increased saliva improved...wbc recovering..repeat urine cx less than 10,000...enterobacter sensitive to cefapime..course completed after today...d/c plan for monday..home preparation in progress...monitor for temps off antibiotics    1. Admit to BMTU   2. -3 hours prior to Melphalan start hydration: D5NS at 200ml /hr and continue 24 hours post Melphalan infusion  3. Day – 3 & day -2 - Melphalan 70mg/m2  IV   4. Strict I&O, daily weights, prn diuresis   5. Day -1 rest day. At 2200 on day – 1, start transplant hydration 1/2NS + 50mEq NaHCO3- (may substitute [id number] if bicarb not available)  6. Day 0 – infuse HPC product. 4 hours after infusion of cells start Zarxio 5 micrograms / kg (actual weight) . Continue through engraftment. OFF  7. On day 0, + 1, + 2-give Kepivance 60micrograms / kg (actual weight).  8. VOD prophylaxis - low dose heparin gtt (dosed at 100 units / kg / day), glutamine supplementation, Actigall BID   9. PCP prophylaxis - Bactrim DS through day -2    10. Antiviral prophylaxis - Acyclovir 400mg po TID to start day -1   11. Antifungal prophylaxis- Diflucan 400 mg po daily.  12. GI prophylaxis - Protonix po QD   13. Antibacterial prophylaxis -  ANC < 500, started Cipro 500mg po BID.  febrile, pan cx, CXR and changed Cipro to Cefepime 2g IV q 8 hours. Continue until count recovery and course complete...d/c'd friday  14. Kepivance for prevention of mucositis- days 0, +1, +2  15. Aggressive mouth care and skin care as per protocol..senna, miralax  prn  16. The patient is aware of his diagnosis...he does not want to be reminded of it every day..  17 wbc recovering..d/c tues...low grade temp late yesterday am  18. daughter on phone 11/14/22, 11/15/22...d/c instruction continued...re inforced that n/v/d  is quite normal and that it may continue for several weeks after d/c...will give zarxio prior to d/c home...will resume anticoagulation on friday 11/18/22 if plts above 100

## 2022-11-15 NOTE — PROGRESS NOTE ADULT - SUBJECTIVE AND OBJECTIVE BOX
[facility] Transplant Team                                                      Critical / Counseling Time Provided: 30 minutes                                                                                                                                                        Chief Complaint: Autologous peripheral blood stem cell transplant with high dose Melphalan prep regimen for treatment of multiple myeloma    S: Patient seen and examined with [facility]PC Transplant Team:     All other ROS negative     O: Vitals:   Vital Signs Last 24 Hrs  T(C): 37.4 (15 Nov 2022 05:20), Max: 37.7 (2022 18:08)  T(F): 99.3 (15 Nov 2022 05:20), Max: 99.9 (2022 18:08)  HR: 105 (15 Nov 2022 05:20) (97 - 111)  BP: 118/66 (15 Nov 2022 05:20) (110/68 - 124/74)  BP(mean): --  RR: 18 (15 Nov 2022 05:20) (18 - 18)  SpO2: 97% (15 Nov 2022 05:20) (97% - 97%)    Parameters below as of 15 Nov 2022 05:20  Patient On (Oxygen Delivery Method): room air    Admit weight: 74.9kg   Daily Weight in k.1 (2022 09:07)  Today's weight:     Intake / Output:    @ 07:01  -  15 @ 07:00  --------------------------------------------------------  IN: 1957 mL / OUT: 1940 mL / NET: 17 mL        PE:   Oropharynx: Clear oropharynx  Oral mucositis:                                                Grade NA  CVS: S1, S2 RRR   Lungs: CTA throughout bilaterally   Abdomen: + BS x 4, soft, NT, ND   Extremities: no edema  Gastric Mucositis:      -                                            Grade: n/a  Intestinal Mucositis:    -                                          Grade: n/a  Skin: no rash   TLC: CDI   Neuro: A&Ox3   Pain: denies    Labs:     Cultures:   Culture Results:   No growth (22 @ 21:09)    Culture Results:   No growth to date. (22 @ 19:50)    Culture Results:   No growth to date. (22 @ 19:35)    Culture Results:   <10,000 CFU/mL Normal Urogenital Kunal (22 @ 16:04)    Culture Results:   No growth to date. (22 @ 00:31)    Culture Results:   No growth to date. (22 @ 00:31)    Culture Results:   >100,000 CFU/ml Enterobacter cloacae complex (22 @ 00:31)    Radiology:   ACC: [id number] EXAM:  XR CHEST PORTABLE ROUTINE 1V                        PROCEDURE DATE:  2022    Impression:  Clear lungs.    Meds:   Antimicrobials:   acyclovir   Oral Tab/Cap 400 milliGRAM(s) Oral every 8 hours  clotrimazole Lozenge 1 Lozenge Oral five times a day  fluconAZOLE   Tablet 400 milliGRAM(s) Oral daily      Heme / Onc:       GI:  aluminum hydroxide/magnesium hydroxide/simethicone Suspension 30 milliLiter(s) Oral every 6 hours PRN  pantoprazole    Tablet 40 milliGRAM(s) Oral before breakfast  polyethylene glycol 3350 17 Gram(s) Oral daily PRN  senna 2 Tablet(s) Oral at bedtime  sodium bicarbonate Mouth Rinse 10 milliLiter(s) Swish and Spit five times a day  ursodiol Capsule 300 milliGRAM(s) Oral two times a day      Cardiovascular:   amLODIPine   Tablet 10 milliGRAM(s) Oral daily  hydrALAZINE 25 milliGRAM(s) Oral three times a day      Immunologic:       Other medications:   acetaminophen     Tablet .. 650 milliGRAM(s) Oral every 6 hours  AQUAPHOR (petrolatum Ointment) 1 Application(s) Topical two times a day  Biotene Dry Mouth Oral Rinse 5 milliLiter(s) Swish and Spit five times a day  chlorhexidine 4% Liquid 1 Application(s) Topical <User Schedule>  dextrose 5%. 1000 milliLiter(s) IV Continuous <Continuous>  dextrose 5%. 1000 milliLiter(s) IV Continuous <Continuous>  dextrose 50% Injectable 25 Gram(s) IV Push once  dextrose 50% Injectable 12.5 Gram(s) IV Push once  dextrose 50% Injectable 25 Gram(s) IV Push once  finasteride 5 milliGRAM(s) Oral daily  folic acid 1 milliGRAM(s) Oral daily  glucagon  Injectable 1 milliGRAM(s) IntraMuscular once  insulin lispro (ADMELOG) corrective regimen sliding scale   SubCutaneous at bedtime  insulin lispro (ADMELOG) corrective regimen sliding scale   SubCutaneous three times a day before meals  loratadine 10 milliGRAM(s) Oral daily  LORazepam   Injectable 1 milliGRAM(s) IV Push every 24 hours  multivitamin 1 Tablet(s) Oral daily  nystatin Powder 1 Application(s) Topical three times a day  sodium chloride 0.9%. 1000 milliLiter(s) IV Continuous <Continuous>  tamsulosin 0.4 milliGRAM(s) Oral at bedtime      PRN:   acetaminophen     Tablet .. 650 milliGRAM(s) Oral every 6 hours PRN  aluminum hydroxide/magnesium hydroxide/simethicone Suspension 30 milliLiter(s) Oral every 6 hours PRN  dextrose Oral Gel 15 Gram(s) Oral once PRN  metoclopramide Injectable 10 milliGRAM(s) IV Push every 6 hours PRN  ondansetron Injectable 8 milliGRAM(s) IV Push every 8 hours PRN  polyethylene glycol 3350 17 Gram(s) Oral daily PRN  sodium chloride 0.9% lock flush 10 milliLiter(s) IV Push every 1 hour PRN    A/P: 72 year old male with multiple myeloma diagnosed 19 s/p RVD x 6 admitted for autologous pbsct with high dose melphalan prep regimen (dose reduced to 70mg / m2 for age)  Day + 19  10/25- melphalan ; continue melphalan hydration for 24 hours post infusion of last dose. Strict I&O, daily weights, prn diuresis   10/27- HPC transplant today; continue transplant hydration for 24 hours post infusion of cells   10/28 weight gain, I>O, lasix 60 mg iv x1; constipated, Lactulose now and PRN  - Parrish. Started on cipro 10/31/22. If T >/= 38C, pan cx, CXR and change cipro to cefepime 2g IV q 8 hours    Febrile overnight, cipro switched to Cefepime; BC(-), urine culture (+) GNR Enterobacter cloacae complex; follow up sensitivities    pending repeat urine culture  - repeat urine cx from  < 10K normal urogenital kunal. Continue Cefepime for 7 days   - engrafted 22 - continue cefepime for a 7 day course, continue zarxio for now   Engrafted, off Zarxio  11/12- Febrile O/N, will f/u bcx, Ucx (-)  - D/Desmond Heparin, Lovenox 40 mg x1 dose given, off.  - Plan for discharge home tomorrow.  11/15- discharge planning - afebrile x 24 hours     1. Infectious Disease:   acyclovir   Oral Tab/Cap 400 milliGRAM(s) Oral every 8 hours  clotrimazole Lozenge 1 Lozenge Oral five times a day  fluconAZOLE   Tablet 400 milliGRAM(s) Oral daily    2. VOD Prophylaxis: Actigall, Glutamine    3. GI Prophylaxis:   pantoprazole    Tablet 40 milliGRAM(s) Oral before breakfast    4. Mouthcare - NS / NaHCO3 rinses, Mycelex, Biotene; Skin care     5. GVHD prophylaxis - n/a     6. Transfuse & replete electrolytes prn     7. IV hydration, daily weights, strict I&O, prn diuresis     8. PO intake as tolerated, nutrition follow up as needed, MVI, folic acid     9. Antiemetics, anti-diarrhea medications:   metoclopramide Injectable 10 milliGRAM(s) IV Push every 6 hours PRN  ondansetron Injectable 8 milliGRAM(s) IV Push every 8 hours PRN  LORazepam   Injectable 1 milliGRAM(s) IV Push every 24 hours    10. OOB as tolerated, physical therapy consult if needed     11. Monitor coags / fibrinogen 2x week, vitamin K as needed     12. Monitor closely for clinical changes, monitor for fevers     13. Emotional support provided, plan of care discussed and questions addressed     14. Patient education done regarding plan of care, restrictions and discharge planning     15. Continue regular social work input     I have written the above note for Dr. Gonzales who performed service with me in the room.   Shayla Lundberg NP-C ([phone])    I have seen and examined patient with NP, I agree with above note as scribed.                    [facility] Transplant Team                                                      Critical / Counseling Time Provided: 30 minutes                                                                                                                                                        Chief Complaint: Autologous peripheral blood stem cell transplant with high dose Melphalan prep regimen for treatment of multiple myeloma    S: Patient seen and examined with [facility] Transplant Team:     All other ROS negative     O: Vitals:   Vital Signs Last 24 Hrs  T(C): 37.4 (15 Nov 2022 05:20), Max: 37.7 (2022 18:08)  T(F): 99.3 (15 Nov 2022 05:20), Max: 99.9 (2022 18:08)  HR: 105 (15 Nov 2022 05:20) (97 - 111)  BP: 118/66 (15 Nov 2022 05:20) (110/68 - 124/74)  BP(mean): --  RR: 18 (15 Nov 2022 05:20) (18 - 18)  SpO2: 97% (15 Nov 2022 05:20) (97% - 97%)    Parameters below as of 15 Nov 2022 05:20  Patient On (Oxygen Delivery Method): room air    Admit weight: 74.9kg   Daily Weight in k.1 (2022 09:07)  Today's weight:     Intake / Output:    @ 07:01  -  15 @ 07:00  --------------------------------------------------------  IN: 1957 mL / OUT: 1940 mL / NET: 17 mL        PE:   Oropharynx: Clear oropharynx  Oral mucositis:                                                Grade NA  CVS: S1, S2 RRR   Lungs: CTA throughout bilaterally   Abdomen: + BS x 4, soft, NT, ND   Extremities: no edema  Gastric Mucositis:      -                                            Grade: n/a  Intestinal Mucositis:    -                                          Grade: n/a  Skin: no rash   TLC: CDI   Neuro: A&Ox3   Pain: denies    Labs:                         9.8    2.78  )-----------( 109      ( 15 Nov 2022 07:39 )             30.5     11-15    134<L>  |  100  |  12  ----------------------------<  98  4.3   |  25  |  0.91    Ca    8.2<L>      15 2022 07:30  Phos  2.8     11-15  Mg     1.9     11-15    TPro  6.1  /  Alb  3.3  /  TBili  0.2  /  DBili  x   /  AST  19  /  ALT  17  /  AlkPhos  102  11-15    Cultures:   Culture Results:   No growth (22 @ 21:09)    Culture Results:   No growth to date. (22 @ 19:50)    Culture Results:   No growth to date. (22 @ 19:35)    Culture Results:   <10,000 CFU/mL Normal Urogenital Kunal (22 @ 16:04)    Culture Results:   No growth to date. (22 @ 00:31)    Culture Results:   No growth to date. (22 @ 00:31)    Culture Results:   >100,000 CFU/ml Enterobacter cloacae complex (22 @ 00:31)    Radiology:   ACC: [id number] EXAM:  XR CHEST PORTABLE ROUTINE 1V                        PROCEDURE DATE:  2022    Impression:  Clear lungs.    Meds:   Antimicrobials:   acyclovir   Oral Tab/Cap 400 milliGRAM(s) Oral every 8 hours  clotrimazole Lozenge 1 Lozenge Oral five times a day  fluconAZOLE   Tablet 400 milliGRAM(s) Oral daily      Heme / Onc:       GI:  aluminum hydroxide/magnesium hydroxide/simethicone Suspension 30 milliLiter(s) Oral every 6 hours PRN  pantoprazole    Tablet 40 milliGRAM(s) Oral before breakfast  polyethylene glycol 3350 17 Gram(s) Oral daily PRN  senna 2 Tablet(s) Oral at bedtime  sodium bicarbonate Mouth Rinse 10 milliLiter(s) Swish and Spit five times a day  ursodiol Capsule 300 milliGRAM(s) Oral two times a day      Cardiovascular:   amLODIPine   Tablet 10 milliGRAM(s) Oral daily  hydrALAZINE 25 milliGRAM(s) Oral three times a day      Immunologic:       Other medications:   acetaminophen     Tablet .. 650 milliGRAM(s) Oral every 6 hours  AQUAPHOR (petrolatum Ointment) 1 Application(s) Topical two times a day  Biotene Dry Mouth Oral Rinse 5 milliLiter(s) Swish and Spit five times a day  chlorhexidine 4% Liquid 1 Application(s) Topical <User Schedule>  dextrose 5%. 1000 milliLiter(s) IV Continuous <Continuous>  dextrose 5%. 1000 milliLiter(s) IV Continuous <Continuous>  dextrose 50% Injectable 25 Gram(s) IV Push once  dextrose 50% Injectable 12.5 Gram(s) IV Push once  dextrose 50% Injectable 25 Gram(s) IV Push once  finasteride 5 milliGRAM(s) Oral daily  folic acid 1 milliGRAM(s) Oral daily  glucagon  Injectable 1 milliGRAM(s) IntraMuscular once  insulin lispro (ADMELOG) corrective regimen sliding scale   SubCutaneous at bedtime  insulin lispro (ADMELOG) corrective regimen sliding scale   SubCutaneous three times a day before meals  loratadine 10 milliGRAM(s) Oral daily  LORazepam   Injectable 1 milliGRAM(s) IV Push every 24 hours  multivitamin 1 Tablet(s) Oral daily  nystatin Powder 1 Application(s) Topical three times a day  sodium chloride 0.9%. 1000 milliLiter(s) IV Continuous <Continuous>  tamsulosin 0.4 milliGRAM(s) Oral at bedtime      PRN:   acetaminophen     Tablet .. 650 milliGRAM(s) Oral every 6 hours PRN  aluminum hydroxide/magnesium hydroxide/simethicone Suspension 30 milliLiter(s) Oral every 6 hours PRN  dextrose Oral Gel 15 Gram(s) Oral once PRN  metoclopramide Injectable 10 milliGRAM(s) IV Push every 6 hours PRN  ondansetron Injectable 8 milliGRAM(s) IV Push every 8 hours PRN  polyethylene glycol 3350 17 Gram(s) Oral daily PRN  sodium chloride 0.9% lock flush 10 milliLiter(s) IV Push every 1 hour PRN    A/P: 72 year old male with multiple myeloma diagnosed 19 s/p RVD x 6 admitted for autologous pbsct with high dose melphalan prep regimen (dose reduced to 70mg / m2 for age)  Day + 19  10/25- melphalan 2/2; continue melphalan hydration for 24 hours post infusion of last dose. Strict I&O, daily weights, prn diuresis   10/27- HPC transplant today; continue transplant hydration for 24 hours post infusion of cells   10/28 weight gain, I>O, lasix 60 mg iv x1; constipated, Lactulose now and PRN  - Parrish. Started on cipro 10/31/22. If T >/= 38C, pan cx, CXR and change cipro to cefepime 2g IV q 8 hours    Febrile overnight, cipro switched to Cefepime; BC(-), urine culture (+) GNR Enterobacter cloacae complex; follow up sensitivities    pending repeat urine culture  - repeat urine cx from  < 10K normal urogenital kunal. Continue Cefepime for 7 days   - engrafted 22 - continue cefepime for a 7 day course, continue zarxio for now   Engrafted, off Zarxio  - Febrile O/N, will f/u bcx, Ucx (-)  - D/Desmond Heparin, Lovenox 40 mg x1 dose given, off.  - Plan for discharge home tomorrow.  11/15- discharge planning - afebrile x 24 hours     1. Infectious Disease:   acyclovir   Oral Tab/Cap 400 milliGRAM(s) Oral every 8 hours  clotrimazole Lozenge 1 Lozenge Oral five times a day  fluconAZOLE   Tablet 400 milliGRAM(s) Oral daily    2. VOD Prophylaxis: Actigall, Glutamine    3. GI Prophylaxis:   pantoprazole    Tablet 40 milliGRAM(s) Oral before breakfast    4. Mouthcare - NS / NaHCO3 rinses, Mycelex, Biotene; Skin care     5. GVHD prophylaxis - n/a     6. Transfuse & replete electrolytes prn     7. IV hydration, daily weights, strict I&O, prn diuresis     8. PO intake as tolerated, nutrition follow up as needed, MVI, folic acid     9. Antiemetics, anti-diarrhea medications:   metoclopramide Injectable 10 milliGRAM(s) IV Push every 6 hours PRN  ondansetron Injectable 8 milliGRAM(s) IV Push every 8 hours PRN  LORazepam   Injectable 1 milliGRAM(s) IV Push every 24 hours    10. OOB as tolerated, physical therapy consult if needed     11. Monitor coags / fibrinogen 2x week, vitamin K as needed     12. Monitor closely for clinical changes, monitor for fevers     13. Emotional support provided, plan of care discussed and questions addressed     14. Patient education done regarding plan of care, restrictions and discharge planning     15. Continue regular social work input     I have written the above note for Dr. Gonzales who performed service with me in the room.   Shayla Lundberg NP-C ([phone])    I have seen and examined patient with NP, I agree with above note as scribed.                    [facility] Transplant Team                                                      Critical / Counseling Time Provided: 30 minutes                                                                                                                                                        Chief Complaint: Autologous peripheral blood stem cell transplant with high dose Melphalan prep regimen for treatment of multiple myeloma    S: Patient seen and examined with [facility] Transplant Team:   No complaints today - feels ready for discharge  Meds reviewed with daughter Brenda by Dr. Gonzales   All other ROS negative     O: Vitals:   Vital Signs Last 24 Hrs  T(C): 37.4 (15 Nov 2022 05:20), Max: 37.7 (2022 18:08)  T(F): 99.3 (15 Nov 2022 05:20), Max: 99.9 (2022 18:08)  HR: 105 (15 Nov 2022 05:20) (97 - 111)  BP: 118/66 (15 Nov 2022 05:20) (110/68 - 124/74)  BP(mean): --  RR: 18 (15 Nov 2022 05:20) (18 - 18)  SpO2: 97% (15 Nov 2022 05:20) (97% - 97%)    Parameters below as of 15 Nov 2022 05:20  Patient On (Oxygen Delivery Method): room air    Admit weight: 74.9kg   Daily Weight in k.1 (2022 09:07)      Intake / Output:    @ 07:01  -  11-15 @ 07:00  --------------------------------------------------------  IN: 1957 mL / OUT: 1940 mL / NET: 17 mL        PE:   Oropharynx: Clear oropharynx  Oral mucositis:                                                Grade NA  CVS: S1, S2 RRR   Lungs: CTA throughout bilaterally   Abdomen: + BS x 4, soft, NT, ND   Extremities: no edema  Gastric Mucositis:      -                                            Grade: n/a  Intestinal Mucositis:    -                                          Grade: n/a  Skin: no rash   TLC: CDI   Neuro: A&Ox3   Pain: denies    Labs:                         9.8    2.78  )-----------( 109      ( 15 Nov 2022 07:39 )             30.5     11-15    134<L>  |  100  |  12  ----------------------------<  98  4.3   |  25  |  0.91    Ca    8.2<L>      15 2022 07:30  Phos  2.8     11-15  Mg     1.9     11-15    TPro  6.1  /  Alb  3.3  /  TBili  0.2  /  DBili  x   /  AST  19  /  ALT  17  /  AlkPhos  102  11-15    Cultures:   Culture Results:   No growth (22 @ 21:09)    Culture Results:   No growth to date. (22 @ 19:50)    Culture Results:   No growth to date. (22 @ 19:35)    Culture Results:   <10,000 CFU/mL Normal Urogenital Kunal (22 @ 16:04)    Culture Results:   No growth to date. (22 @ 00:31)    Culture Results:   No growth to date. (22 @ 00:31)    Culture Results:   >100,000 CFU/ml Enterobacter cloacae complex (22 @ 00:31)    Radiology:   ACC: [id number] EXAM:  XR CHEST PORTABLE ROUTINE 1V                        PROCEDURE DATE:  2022    Impression:  Clear lungs.    Meds:   Antimicrobials:   acyclovir   Oral Tab/Cap 400 milliGRAM(s) Oral every 8 hours  clotrimazole Lozenge 1 Lozenge Oral five times a day  fluconAZOLE   Tablet 400 milliGRAM(s) Oral daily      Heme / Onc:       GI:  aluminum hydroxide/magnesium hydroxide/simethicone Suspension 30 milliLiter(s) Oral every 6 hours PRN  pantoprazole    Tablet 40 milliGRAM(s) Oral before breakfast  polyethylene glycol 3350 17 Gram(s) Oral daily PRN  senna 2 Tablet(s) Oral at bedtime  sodium bicarbonate Mouth Rinse 10 milliLiter(s) Swish and Spit five times a day  ursodiol Capsule 300 milliGRAM(s) Oral two times a day      Cardiovascular:   amLODIPine   Tablet 10 milliGRAM(s) Oral daily  hydrALAZINE 25 milliGRAM(s) Oral three times a day      Immunologic:       Other medications:   acetaminophen     Tablet .. 650 milliGRAM(s) Oral every 6 hours  AQUAPHOR (petrolatum Ointment) 1 Application(s) Topical two times a day  Biotene Dry Mouth Oral Rinse 5 milliLiter(s) Swish and Spit five times a day  chlorhexidine 4% Liquid 1 Application(s) Topical <User Schedule>  dextrose 5%. 1000 milliLiter(s) IV Continuous <Continuous>  dextrose 5%. 1000 milliLiter(s) IV Continuous <Continuous>  dextrose 50% Injectable 25 Gram(s) IV Push once  dextrose 50% Injectable 12.5 Gram(s) IV Push once  dextrose 50% Injectable 25 Gram(s) IV Push once  finasteride 5 milliGRAM(s) Oral daily  folic acid 1 milliGRAM(s) Oral daily  glucagon  Injectable 1 milliGRAM(s) IntraMuscular once  insulin lispro (ADMELOG) corrective regimen sliding scale   SubCutaneous at bedtime  insulin lispro (ADMELOG) corrective regimen sliding scale   SubCutaneous three times a day before meals  loratadine 10 milliGRAM(s) Oral daily  LORazepam   Injectable 1 milliGRAM(s) IV Push every 24 hours  multivitamin 1 Tablet(s) Oral daily  nystatin Powder 1 Application(s) Topical three times a day  sodium chloride 0.9%. 1000 milliLiter(s) IV Continuous <Continuous>  tamsulosin 0.4 milliGRAM(s) Oral at bedtime      PRN:   acetaminophen     Tablet .. 650 milliGRAM(s) Oral every 6 hours PRN  aluminum hydroxide/magnesium hydroxide/simethicone Suspension 30 milliLiter(s) Oral every 6 hours PRN  dextrose Oral Gel 15 Gram(s) Oral once PRN  metoclopramide Injectable 10 milliGRAM(s) IV Push every 6 hours PRN  ondansetron Injectable 8 milliGRAM(s) IV Push every 8 hours PRN  polyethylene glycol 3350 17 Gram(s) Oral daily PRN  sodium chloride 0.9% lock flush 10 milliLiter(s) IV Push every 1 hour PRN    A/P: 72 year old male with multiple myeloma diagnosed 19 s/p RVD x 6 admitted for autologous pbsct with high dose melphalan prep regimen (dose reduced to 70mg / m2 for age)  Day + 19  10/25- melphalan ; continue melphalan hydration for 24 hours post infusion of last dose. Strict I&O, daily weights, prn diuresis   10/27- HPC transplant today; continue transplant hydration for 24 hours post infusion of cells   10/28 weight gain, I>O, lasix 60 mg iv x1; constipated, Lactulose now and PRN  - Parrish. Started on cipro 10/31/22. If T >/= 38C, pan cx, CXR and change cipro to cefepime 2g IV q 8 hours    Febrile overnight, cipro switched to Cefepime; BC(-), urine culture (+) GNR Enterobacter cloacae complex; follow up sensitivities    pending repeat urine culture  - repeat urine cx from  < 10K normal urogenital kunal. Continue Cefepime for 7 days   - engrafted 22 - continue cefepime for a 7 day course, continue zarxio for now   Engrafted, off Zarxio  - Febrile O/N, will f/u bcx, Ucx (-)  - D/Desmond Heparin, Lovenox 40 mg x1 dose given, off.  - Plan for discharge home tomorrow.  11/15- discharge planning - afebrile x 24 hours ; zarxio 480mcg SQ x 1 for     1. Infectious Disease:   acyclovir   Oral Tab/Cap 400 milliGRAM(s) Oral every 8 hours  clotrimazole Lozenge 1 Lozenge Oral five times a day  fluconAZOLE   Tablet 400 milliGRAM(s) Oral daily    2. VOD Prophylaxis: Actigall, Glutamine    3. GI Prophylaxis:   pantoprazole    Tablet 40 milliGRAM(s) Oral before breakfast    4. Mouthcare - NS / NaHCO3 rinses, Mycelex, Biotene; Skin care     5. GVHD prophylaxis - n/a     6. Transfuse & replete electrolytes prn     7. IV hydration, daily weights, strict I&O, prn diuresis     8. PO intake as tolerated, nutrition follow up as needed, MVI, folic acid     9. Antiemetics, anti-diarrhea medications:   metoclopramide Injectable 10 milliGRAM(s) IV Push every 6 hours PRN  ondansetron Injectable 8 milliGRAM(s) IV Push every 8 hours PRN  LORazepam   Injectable 1 milliGRAM(s) IV Push every 24 hours    10. OOB as tolerated, physical therapy consult if needed     11. Monitor coags / fibrinogen 2x week, vitamin K as needed     12. Monitor closely for clinical changes, monitor for fevers     13. Emotional support provided, plan of care discussed and questions addressed     14. Patient education done regarding plan of care, restrictions and discharge planning     15. Continue regular social work input     I have written the above note for Dr. Gonzales who performed service with me in the room.   Shayla Lundberg NP-C ([phone])    I have seen and examined patient with NP, I agree with above note as scribed.

## 2022-11-15 NOTE — DISCHARGE NOTE NURSING/CASE MANAGEMENT/SOCIAL WORK - PATIENT PORTAL LINK FT
You can access the FollowMyHealth Patient Portal offered by [facility] by registering at the following website: [email or web address]. By joining [employer]’s FollowMyHealth portal, you will also be able to view your health information using other applications (apps) compatible with our system.

## 2022-11-15 NOTE — DISCHARGE NOTE PROVIDER - HOSPITAL COURSE
This is a 72 year old male with multiple myeloma admitted for an autologous peripheral blood stem cell transplant with high dose Melphalan prep regimen. Hematologic history as follows: Initially presented in 11/2021 with anemia and back pain, diagnosed with multiple myeloma 12/2021.A bone marrow biopsy at that time showed 70% involvement with monoclonal plasma cells (IgG kappa), improved to 1-2% involvement with bone marrow biopsy 5/2022 after treatment with RVD x 6.     Upon admission, a TLC was placed in IR. Mr. Cespedes received IV hydration, pain management, nutritional support and antibacterial / antiviral / antifungal / PCP / GI and VOD (SOS) prophylaxis. Labs were monitored on a daily basis, and he received transfusional support and electrolyte repletion as needed.     An admission urinalysis was positive for nitrites and leukocyte esterase and he was started on prophylactic ciprofloxacin. On 11/4/22, his urine culture grew gram negative rods (>100K enterobacter cloacae). The ciprofloxacin was broadened to cefepime. Mr. Cespedes experienced pancytopenia related to the high dose chemotherapy prep regimen, and neutropenic fever. Blood cultures and CXR were negative. Repeat urine culture on 11/11/22 was negative.     On 10/27/22, after pre-medication, Mr. Cespedes received 304ml of autologous, pooled, thawed, washed, mobilized, plasma reduced, HPC apheresis over approximately 1 hour. Cell counts as follows:   Total MNCs (x 10^8/kg) = 6.86  CD34+ cells (x10^6/kg)= 5.85  Cell viability (%) = 76%  He tolerated the infusion well with no adverse reactions noted.     Early engraftment was noted on 11/5/22. Engraftment was noted on 11/8/22. The daily zarxio was discontinued on 11/10/22. Cefepime was discontinued on 11/11/22.     Currently, Mr. Cespedes is stable for discharge home with outpatient follow up at the [facility].

## 2022-11-15 NOTE — PROGRESS NOTE ADULT - CRITICAL CARE SERVICES PROVIDED
Patient is critically ill, requiring critical care services.

## 2022-11-15 NOTE — DISCHARGE NOTE PROVIDER - DETAILS OF MALNUTRITION DIAGNOSIS/DIAGNOSES
This patient has been assessed with a concern for Malnutrition and was treated during this hospitalization for the following Nutrition diagnosis/diagnoses:     -  11/09/2022: Severe protein-calorie malnutrition   -  10/25/2022: Moderate protein-calorie malnutrition

## 2022-11-15 NOTE — DISCHARGE NOTE NURSING/CASE MANAGEMENT/SOCIAL WORK - NSDCFUADDAPPT_GEN_ALL_CORE_FT
You have the following appointments at the [facility]:   Fri 11/18/22 @ 10:30AM with Pushpa Ojeda NP   Fri 11/25/22 @ 10:30AM with Pushpa Jenkinsurs 12/01/22 @ 1:30PM with Laura Bear NP

## 2022-11-15 NOTE — DISCHARGE NOTE NURSING/CASE MANAGEMENT/SOCIAL WORK - NSDCPEFALRISK_GEN_ALL_CORE
For information on Fall & Injury Prevention, visit: [email or web address] OR  [email or web address] OR  https://www.cdc.gov/steadi/patient.html

## 2022-11-15 NOTE — PROGRESS NOTE ADULT - REASON FOR ADMISSION
Autologous peripheral blood stem cell transplant with high dose Melphalan prep regimen for treatment of multiple myeloma

## 2022-11-15 NOTE — DISCHARGE NOTE NURSING/CASE MANAGEMENT/SOCIAL WORK - FLU SEASON?
Patient with recent flare of Crohn's colitis, followed by Dr. Grant. Was started on Prednisone 30mg daily and Lialda on 5/14/18. Has continued weight loss, worsening fatigue, diarrhea, nausea, and abdominal pain. Has vomited a few times. No blood in stool. Urine appears concentrated.    >> Instructed to stop Lialda for lack of benefit and potential hypersensitivity to mesalamine.  >> Instructed to proceed to [facility] to ensure no electrolyte disturbances, dehydration, kidney injury, etc.    Patient has appointment with Dr. Grant tomorrow afternoon.   Yes...

## 2022-11-15 NOTE — DISCHARGE NOTE PROVIDER - INSTRUCTIONS
Diet and activities as per [facility] discharge guidelines and safe food handling guidelines. NO RESTAURANT OR TAKE OUT FOOD AT THIS TIME, ONLY HOME COOKED PREPARED/FROZEN FOODS. You are allowed to have fresh baked pizza right out of the oven. This is the ONLY takeout food at this time.

## 2022-11-15 NOTE — PROGRESS NOTE ADULT - NUTRITIONAL ASSESSMENT
This patient has been assessed with a concern for Malnutrition and has been determined to have a diagnosis/diagnoses of Moderate protein-calorie malnutrition.    This patient is being managed with:   Diet Regular-  GlutaSolve(Glutamine) 15gm pkg     Qty per Day:  1  Supplement Feeding Modality:  Oral  Glucerna Shake Cans or Servings Per Day:  1       Frequency:  Daily  Entered: Oct 25 2022  4:07PM    
This patient has been assessed with a concern for Malnutrition and has been determined to have a diagnosis/diagnoses of Moderate protein-calorie malnutrition.    This patient is being managed with:   Diet Regular-  GlutaSolve(Glutamine) 15gm pkg     Qty per Day:  1  Supplement Feeding Modality:  Oral  Glucerna Shake Cans or Servings Per Day:  1       Frequency:  Daily  Entered: Oct 25 2022  4:07PM    Diet Regular-  GlutaSolve(Glutamine) 15gm pkg     Qty per Day:  1  Entered: Oct 24 2022 10:18AM    The following pending diet order is being considered for treatment of Moderate protein-calorie malnutrition:null
This patient has been assessed with a concern for Malnutrition and has been determined to have a diagnosis/diagnoses of Severe protein-calorie malnutrition.    This patient is being managed with:   Diet Regular-  GlutaSolve(Glutamine) 15gm pkg     Qty per Day:  1  Supplement Feeding Modality:  Oral  Glucerna Shake Cans or Servings Per Day:  1       Frequency:  Daily  Entered: Oct 25 2022  4:07PM    
This patient has been assessed with a concern for Malnutrition and has been determined to have a diagnosis/diagnoses of Moderate protein-calorie malnutrition.    This patient is being managed with:   Diet Regular-  GlutaSolve(Glutamine) 15gm pkg     Qty per Day:  1  Supplement Feeding Modality:  Oral  Glucerna Shake Cans or Servings Per Day:  1       Frequency:  Daily  Entered: Oct 25 2022  4:07PM    
This patient has been assessed with a concern for Malnutrition and has been determined to have a diagnosis/diagnoses of Severe protein-calorie malnutrition.    This patient is being managed with:   Diet Regular-  GlutaSolve(Glutamine) 15gm pkg     Qty per Day:  1  Supplement Feeding Modality:  Oral  Glucerna Shake Cans or Servings Per Day:  1       Frequency:  Daily  Entered: Oct 25 2022  4:07PM    
This patient has been assessed with a concern for Malnutrition and has been determined to have a diagnosis/diagnoses of Moderate protein-calorie malnutrition.    This patient is being managed with:   Diet Regular-  GlutaSolve(Glutamine) 15gm pkg     Qty per Day:  1  Supplement Feeding Modality:  Oral  Glucerna Shake Cans or Servings Per Day:  1       Frequency:  Daily  Entered: Oct 25 2022  4:07PM    
This patient has been assessed with a concern for Malnutrition and has been determined to have a diagnosis/diagnoses of Severe protein-calorie malnutrition.    This patient is being managed with:   Diet Regular-  Soft and Bite Sized (SOFTBTSZ)  GlutaSolve(Glutamine) 15gm pkg     Qty per Day:  1  Supplement Feeding Modality:  Oral  Glucerna Shake Cans or Servings Per Day:  1       Frequency:  Daily  Entered: Nov 13 2022 10:43AM    
This patient has been assessed with a concern for Malnutrition and has been determined to have a diagnosis/diagnoses of Moderate protein-calorie malnutrition.    This patient is being managed with:   Diet Regular-  GlutaSolve(Glutamine) 15gm pkg     Qty per Day:  1  Supplement Feeding Modality:  Oral  Glucerna Shake Cans or Servings Per Day:  1       Frequency:  Daily  Entered: Oct 25 2022  4:07PM    
This patient has been assessed with a concern for Malnutrition and has been determined to have a diagnosis/diagnoses of Moderate protein-calorie malnutrition.    This patient is being managed with:   Diet Regular-  GlutaSolve(Glutamine) 15gm pkg     Qty per Day:  1  Supplement Feeding Modality:  Oral  Glucerna Shake Cans or Servings Per Day:  1       Frequency:  Daily  Entered: Oct 25 2022  4:07PM    
This patient has been assessed with a concern for Malnutrition and has been determined to have a diagnosis/diagnoses of Severe protein-calorie malnutrition.    This patient is being managed with:   Diet Regular-  GlutaSolve(Glutamine) 15gm pkg     Qty per Day:  1  Supplement Feeding Modality:  Oral  Glucerna Shake Cans or Servings Per Day:  1       Frequency:  Daily  Entered: Oct 25 2022  4:07PM    
This patient has been assessed with a concern for Malnutrition and has been determined to have a diagnosis/diagnoses of Severe protein-calorie malnutrition.    This patient is being managed with:   Diet Regular-  GlutaSolve(Glutamine) 15gm pkg     Qty per Day:  1  Supplement Feeding Modality:  Oral  Glucerna Shake Cans or Servings Per Day:  1       Frequency:  Daily  Entered: Oct 25 2022  4:07PM    
This patient has been assessed with a concern for Malnutrition and has been determined to have a diagnosis/diagnoses of Severe protein-calorie malnutrition.    This patient is being managed with:   Diet Regular-  Soft and Bite Sized (SOFTBTSZ)  GlutaSolve(Glutamine) 15gm pkg     Qty per Day:  1  Supplement Feeding Modality:  Oral  Glucerna Shake Cans or Servings Per Day:  1       Frequency:  Daily  Entered: Nov 13 2022 10:43AM

## 2022-11-15 NOTE — DISCHARGE NOTE PROVIDER - NSDCFUADDAPPT_GEN_ALL_CORE_FT
You have the following appointments at the [facility]:   Fri 11/18/22 @ 10:30AM with Pushpa Ojeda NP   Fri 11/25/22 @ 10:30AM with Pushpa Jenkinsurs 12/01/22 @ 1:30PM with Laura Bear NP

## 2022-11-15 NOTE — DISCHARGE NOTE NURSING/CASE MANAGEMENT/SOCIAL WORK - NSSCTYPOFSERV_GEN_ALL_CORE
Visiting nurse for medication reinforcement and post transplant education. Also Physical Therapy in the home. Start of Care 11/16/22.

## 2022-11-16 RX ORDER — ONDANSETRON 8 MG/1
8 TABLET ORAL
Qty: 90 | Refills: 2 | Status: ACTIVE | COMMUNITY
Start: 2022-11-16

## 2022-11-16 RX ORDER — TAMSULOSIN HYDROCHLORIDE 0.4 MG/1
0.4 CAPSULE ORAL
Qty: 90 | Refills: 1 | Status: DISCONTINUED | COMMUNITY
Start: 2018-09-05 | End: 2022-11-16

## 2022-11-16 RX ORDER — ASPIRIN 81 MG/1
81 TABLET ORAL DAILY
Qty: 30 | Refills: 3 | Status: DISCONTINUED | COMMUNITY
Start: 2022-10-17 | End: 2022-11-16

## 2022-11-16 RX ORDER — FINASTERIDE 5 MG/1
5 TABLET, FILM COATED ORAL
Qty: 90 | Refills: 1 | Status: DISCONTINUED | COMMUNITY
Start: 2018-06-19 | End: 2022-11-16

## 2022-11-16 RX ORDER — HYDRALAZINE HYDROCHLORIDE 25 MG/1
25 TABLET ORAL
Refills: 0 | Status: DISCONTINUED | COMMUNITY
End: 2022-11-16

## 2022-11-17 LAB
CULTURE RESULTS: SIGNIFICANT CHANGE UP
CULTURE RESULTS: SIGNIFICANT CHANGE UP
SPECIMEN SOURCE: SIGNIFICANT CHANGE UP
SPECIMEN SOURCE: SIGNIFICANT CHANGE UP

## 2022-11-18 ENCOUNTER — RESULT REVIEW (OUTPATIENT)
Age: 72
End: 2022-11-18

## 2022-11-18 ENCOUNTER — APPOINTMENT (OUTPATIENT)
Dept: HEMATOLOGY ONCOLOGY | Facility: CLINIC | Age: 72
End: 2022-11-18

## 2022-11-18 VITALS
TEMPERATURE: 97.4 F | SYSTOLIC BLOOD PRESSURE: 123 MMHG | RESPIRATION RATE: 16 BRPM | BODY MASS INDEX: 23.96 KG/M2 | OXYGEN SATURATION: 99 % | HEART RATE: 116 BPM | DIASTOLIC BLOOD PRESSURE: 79 MMHG | WEIGHT: 153 LBS

## 2022-11-18 LAB
ALBUMIN SERPL ELPH-MCNC: 4.1 G/DL
ALP BLD-CCNC: 155 U/L
ALT SERPL-CCNC: 16 U/L
ANION GAP SERPL CALC-SCNC: 11 MMOL/L
AST SERPL-CCNC: 22 U/L
BASOPHILS # BLD AUTO: 0 K/UL — SIGNIFICANT CHANGE UP (ref 0–0.2)
BASOPHILS NFR BLD AUTO: 0 % — SIGNIFICANT CHANGE UP (ref 0–2)
BILIRUB SERPL-MCNC: 0.2 MG/DL
BUN SERPL-MCNC: 8 MG/DL
CALCIUM SERPL-MCNC: 9.1 MG/DL
CHLORIDE SERPL-SCNC: 100 MMOL/L
CO2 SERPL-SCNC: 26 MMOL/L
CREAT SERPL-MCNC: 1.06 MG/DL
CULTURE RESULTS: SIGNIFICANT CHANGE UP
CULTURE RESULTS: SIGNIFICANT CHANGE UP
EGFR: 74 ML/MIN/1.73M2
EOSINOPHIL # BLD AUTO: 0 K/UL — SIGNIFICANT CHANGE UP (ref 0–0.5)
EOSINOPHIL NFR BLD AUTO: 0 % — SIGNIFICANT CHANGE UP (ref 0–6)
GLUCOSE SERPL-MCNC: 207 MG/DL
HCT VFR BLD CALC: 36.2 % — LOW (ref 39–50)
HGB BLD-MCNC: 11.4 G/DL — LOW (ref 13–17)
LDH SERPL-CCNC: 326 U/L
LYMPHOCYTES # BLD AUTO: 1.44 K/UL — SIGNIFICANT CHANGE UP (ref 1–3.3)
LYMPHOCYTES # BLD AUTO: 16.5 % — SIGNIFICANT CHANGE UP (ref 13–44)
MAGNESIUM SERPL-MCNC: 2 MG/DL
MCHC RBC-ENTMCNC: 25.2 PG — LOW (ref 27–34)
MCHC RBC-ENTMCNC: 31.5 G/DL — LOW (ref 32–36)
MCV RBC AUTO: 80.1 FL — SIGNIFICANT CHANGE UP (ref 80–100)
MONOCYTES # BLD AUTO: 1.7 K/UL — HIGH (ref 0–0.9)
MONOCYTES NFR BLD AUTO: 19.5 % — HIGH (ref 2–14)
NEUTROPHILS # BLD AUTO: 5.57 K/UL — SIGNIFICANT CHANGE UP (ref 1.8–7.4)
NEUTROPHILS NFR BLD AUTO: 64 % — SIGNIFICANT CHANGE UP (ref 43–77)
NRBC # BLD: 0 /100 — SIGNIFICANT CHANGE UP (ref 0–0)
NRBC # BLD: SIGNIFICANT CHANGE UP /100 WBCS (ref 0–0)
PLAT MORPH BLD: NORMAL — SIGNIFICANT CHANGE UP
PLATELET # BLD AUTO: 159 K/UL — SIGNIFICANT CHANGE UP (ref 150–400)
POIKILOCYTOSIS BLD QL AUTO: SLIGHT — SIGNIFICANT CHANGE UP
POTASSIUM SERPL-SCNC: 4.7 MMOL/L
PROT SERPL-MCNC: 7.2 G/DL
RBC # BLD: 4.52 M/UL — SIGNIFICANT CHANGE UP (ref 4.2–5.8)
RBC # FLD: 15.5 % — HIGH (ref 10.3–14.5)
RBC BLD AUTO: ABNORMAL
SODIUM SERPL-SCNC: 137 MMOL/L
SPECIMEN SOURCE: SIGNIFICANT CHANGE UP
SPECIMEN SOURCE: SIGNIFICANT CHANGE UP
TARGETS BLD QL SMEAR: SLIGHT — SIGNIFICANT CHANGE UP
WBC # BLD: 8.7 K/UL — SIGNIFICANT CHANGE UP (ref 3.8–10.5)
WBC # FLD AUTO: 8.7 K/UL — SIGNIFICANT CHANGE UP (ref 3.8–10.5)

## 2022-11-18 PROCEDURE — 99214 OFFICE O/P EST MOD 30 MIN: CPT

## 2022-11-18 RX ORDER — APIXABAN 5 MG/1
5 TABLET, FILM COATED ORAL
Qty: 30 | Refills: 3 | Status: ACTIVE | COMMUNITY
Start: 2022-10-17

## 2022-11-18 NOTE — REASON FOR VISIT
[Follow-Up Visit] : a follow-up visit for [Family Member] : family member [[employer] ] : provided by [employer]   [Time Spent: ____ minutes] : Total time spent using  services: [unfilled] minutes. The patient's primary language is not English thus required  services. [FreeTextEntry2] : Multiple Myeloma  [Interpreters_IDNumber] : [id number] [Interpreters_FullName] : Nancy [TWNoteComboBox1] : Elenita

## 2022-11-18 NOTE — REVIEW OF SYSTEMS
[Negative] : Allergic/Immunologic [Fatigue] : fatigue [Fever] : no fever [Chills] : no chills [Night Sweats] : no night sweats [Recent Change In Weight] : ~T no recent weight change [Abdominal Pain] : no abdominal pain [Vomiting] : no vomiting [Constipation] : no constipation [Diarrhea] : no diarrhea [Joint Pain] : no joint pain [Joint Stiffness] : no joint stiffness [Muscle Pain] : no muscle pain [Muscle Weakness] : no muscle weakness [FreeTextEntry7] : Occasional nausea [FreeTextEntry9] : Generalized weakness

## 2022-11-18 NOTE — HISTORY OF PRESENT ILLNESS
[de-identified] : Dx Multiple myeloma Dec 2021..presented with anemia and back pain nov 2021\par bm bx 70% involvement with monoclonal plasma cells IgG kappa...repeat bm bx May 2022 with 1 to 2 % involvement\par mspike 8 grams\par PET confirmed lytic lesions\par will confirmvcreat and calcium were normal upon dx\par s/p rvd x 6....now holding rev...pending decision on transplant\par \par 11/18/22: S/P Autologous Stem Cell Transplant on 10/27/22.Discharge summary as follows:\par Discharge Date    15-Nov-2022\par Admission Date    24-Oct-2022 \par Reason for Admission:Autologous peripheral blood stem cell transplant with high\par dose Melphalan prep regimen for treatment of multiple myeloma\par Hospital Course   \par This is a 72 year old male with multiple myeloma admitted for an autologous\par peripheral blood stem cell transplant with high dose Melphalan prep regimen.\par Hematologic history as follows: Initially presented in 11/2021 with anemia and\par back pain, diagnosed with multiple myeloma 12/2021.A bone marrow biopsy at that\par time showed 70% involvement with monoclonal plasma cells (IgG kappa), improved\par to 1-2% involvement with bone marrow biopsy 5/2022 after treatment with RVD x\par 6.\par Upon admission, a TLC was placed in IR. Mr. Cespedes received IV hydration, pain\par management, nutritional support and antibacterial / antiviral / antifungal /\par PCP / GI and VOD (SOS) prophylaxis. Labs were monitored on a daily basis, and\par he received transfusional support and electrolyte repletion as needed.\par An admission urinalysis was positive for nitrites and leukocyte esterase and he\par was started on prophylactic ciprofloxacin. On 11/4/22, his urine culture grew\par gram negative rods (>100K enterobacter cloacae). The ciprofloxacin was\par broadened to cefepime. Mr. Cespedes experienced pancytopenia related to the high\par dose chemotherapy prep regimen, and neutropenic fever. Blood cultures and CXR\par were negative. Repeat urine culture on 11/11/22 was negative.\par On 10/27/22, after pre-medication, Mr. Cespedes received 304ml of autologous,\par pooled, thawed, washed, mobilized, plasma reduced, HPC apheresis over\par approximately 1 hour. Cell counts as follows:\par Total MNCs (x 10^8/kg) = 6.86\par CD34+ cells (x10^6/kg)= 5.85\par Cell viability (%) = 76%\par He tolerated the infusion well with no adverse reactions noted.\par Early engraftment was noted on 11/5/22. Engraftment was noted on 11/8/22. The\par daily zarxio was discontinued on 11/10/22. Cefepime was discontinued on\par 11/11/22.\par Currently, Mr. Cespedes is stable for discharge home with outpatient follow up\par at the [facility].\par \par \par \par  [de-identified] : Patient here with his daughter for f/u to  initial eval for high dose chemotherapy and auto stem cell transplant for MM...currently off treatment with velcade and dex...rev also on hold pending decision for auto transplant...he returns for [place]...\par \par 10/7/22: Patient is here for a teaching visit prior to stem cell mobilization.He is accompanied by his daughter Brenda.Denies fever, chills,SOB,chest pain,edema,rash,mouth sores,nausea,vomiting, diarrhea or any signs of active bleeding. \par \par On 10/17/22, patient presents for a pre admission visit. Overall patient is well and offers no acute concerns. Denies fever, chills, nausea, vomiting, diarrhea, rash, mouth sores, dysuria or any signs of active bleeding. Denies SOB, chest pain or B/L LE edema. \par \par 11/18/22: Patient is here for a follow up visit post Autologous Stem Cell Transplant. Today is + 22.Initially [employer]  was used to assist with today's visit and later his daughter Brenda joined the visit.Brenda was the  for rest of the visit.Denies fever, chills, SOB,chest pain,dysuria,vomiting, diarrhea,mouth sores,edema or any signs of active bleeding. He states that he has occasional nausea relieved with Zofran.He also states that he has poor appetite and generalized weakness.  [FreeTextEntry1] : 71 yo also  being treated for asymptomatic  positve quantiferon gold test for tb done by Dr. Mcdaniel  grew up  in [place]  no history of TB or exposure .. normal chest xray.\par

## 2022-11-18 NOTE — ASSESSMENT
[FreeTextEntry1] : 71 yo male with hx of BPH, HTN, DM, DVT, CVA and MM who presents for f/u to initial eval for high dose chemo and auto stem cell transplant...receiving induction therapy with good response..BM BX from May reviewed...VGPR likely.....I had another  extensive discussion with the pt and his step daughter....he lives with her and his wife...regarding the risks, benefits, alternatives, logistics and rationale for auto stem cell transplant. Mobilization with GF vs chemo plus GF discussed...Goal for stem cell collection of approx 5 million per kg discussed...need for shiley catheter discussed...3 week hospital stay and 6 week recovery discussed..jose 200 prep....pre testing and orientation discussed...literature and consents provided for review....quests addressed...pt is leaning towards moving forward..I do believe he would be a good candidate pending vital oragn function...testing in progress...will arrange for orientation and sw eval.....he has good family support ....will f/u with pt upon appt for growth factor teaching.....will d/w Dr Goldberg...\par \par 10/7/22:\par Mr. Cespedes was seen for a teaching visit pre stem cell Mobilization\par CBC reviewed;counts stable\par He will receive Zarxio 780 mcg subcutaneous daily 10/7/22 -10/11/22\par Explained to patient and his daughter Brenda how to administer Zarxio.Patient stated that Zarxio will be administered by Brenda.Most common side effects explained.Today's dose of Zarxio administered by ELLIOT Cook\par Shiley catheter and stem cell collection has been scheduled on 10/11/22 at [facility]\par He will have COVID -19 PCR testing on 10/8/22\par Recommended Coy  to take Claritin daily for 5 days starting today (10/7/22)\par May take Tylenol PRN for pain or fever\par Avoid Advil, Aleve or Aspirin\par Encouraged to increase calcium in diet over the next few days\par Questions and concerns addressed\par Reassurance provided\par Tentative hospital admission on 10/24/22 and Auto stem cell transplant on 10/27/22\par \par 10/17/22\par Patient presents for a pre admission visit. Status post stem collection in preparation for Auto PBSCT. \par CBC stable and reviewed with patient. No indication for transfusion.\par Kepivance scheduled for 10/21/22 and 10/22/22. Admission labs and COVID 19 PCR will be sent on 10/21/22.\par Admission at [facility] on 10/24/22.\par Transplant date of 10/28/22.\par Disease time of transplant: OK\par \par Discontinue blood thinners on 10/21/22.\par Questions and concerns addressed. Reassurance provided.\par Medication list reconciled.\par Return on 10/21/22 for Kepivance and admission labs/COVID 19 PCR. \par \par 11/18/22:\par Multiple Myeloma (C90.00)\par S/P Autologous Stem Cell Transplant (Z94.84)\par \par 1)MM\par  S/P Autologous PBSCT on 10/27/22\par  Labs sent today\par  Send labs with every visit\par \par 2) Heme\par    Counts stable, no transfusion requirements today\par    WBC -8.7,H/H- 11.4/36.2, Platelets -159 ,ANC -5.57\par    Continue  Multiple Vitamin and Folic acid \par  \par 3) ID\par    Continue prophylaxis\par    Acyclovir 400 mg 1 tab orally every 8 hours \par    Atovaquone 750 mg/5 mL oral suspension 5 milliliter 2 times a day\par    Diflucan 200 mg 2 tablets once a day \par    Post  transplant restrictions reviewed and reinforced in light of COVID-19 Pandemic\par \par 4) GI\par     Protonix 40 mg oral delayed release tablet 1 tab once a day\par     Zofran as needed for nausea \par \par 5) H/O DVT\par     Restart Eliquis 5 mg daily today (11/18/22)\par \par 6) Hypertension\par     Hydralazine 25 mg 1 tab three times a day\par     Metoprolol Tartrate 25 mg 1 tab 2 times a day\par     Norvasc 10 mg 1 tab once a day\par \par 7) BPH\par     Flomax 0.4 mg 1 cap once a day\par     Proscar 5 mg 1 tab once a day\par \par 8) Dryness of Skin\par      Apply Aquaphor to moisturize skin\par       \par 9) Plan\par   -Educated about plan of care,all questions and concerns addressed\par  - Advised to call office with any acute concerns\par  - Advised to have small frequent meals/snacks\par   -Reinstated to avoid crowd\par  -Reinstated dietary restrictions: No restaurant or take out food at this time, only home cooked, prepared/frozen         food. Allowed to have fresh baked pizza right out of the oven which is the ONLY takeout food at this time.\par  - Additional Instructions: Notify if bleeding; swelling; persistent nausea and vomiting; unable to  urinate; pain not        relieved by medications; fever; numbness, tingling; excessive diarrhea, inability to tolerate liquids  or foods;           increased irritability or sluggishness, rash\par  -Follow up with me in one week\par \par I examined patient under Dr. Gonzales's supervision and Dr. Gonzales agrees to plan of care as listed above\par \par \par \par

## 2022-11-18 NOTE — PHYSICAL EXAM
[Restricted in physically strenuous activity but ambulatory and able to carry out work of a light or sedentary nature] : Status 1- Restricted in physically strenuous activity but ambulatory and able to carry out work of a light or sedentary nature, e.g., light house work, office work [Normal] : affect appropriate [de-identified] : Dryness

## 2022-11-23 ENCOUNTER — OUTPATIENT (OUTPATIENT)
Dept: OUTPATIENT SERVICES | Facility: HOSPITAL | Age: 72
LOS: 1 days | Discharge: HOME | End: 2022-11-23

## 2022-11-23 DIAGNOSIS — Z98.89 OTHER SPECIFIED POSTPROCEDURAL STATES: Chronic | ICD-10-CM

## 2022-11-23 DIAGNOSIS — C90.00 MULTIPLE MYELOMA NOT HAVING ACHIEVED REMISSION: ICD-10-CM

## 2022-11-25 ENCOUNTER — APPOINTMENT (OUTPATIENT)
Dept: RADIOLOGY | Facility: IMAGING CENTER | Age: 72
End: 2022-11-25

## 2022-11-25 ENCOUNTER — RESULT REVIEW (OUTPATIENT)
Age: 72
End: 2022-11-25

## 2022-11-25 ENCOUNTER — OUTPATIENT (OUTPATIENT)
Dept: OUTPATIENT SERVICES | Facility: HOSPITAL | Age: 72
LOS: 1 days | End: 2022-11-25
Payer: COMMERCIAL

## 2022-11-25 ENCOUNTER — APPOINTMENT (OUTPATIENT)
Dept: HEMATOLOGY ONCOLOGY | Facility: CLINIC | Age: 72
End: 2022-11-25

## 2022-11-25 VITALS
SYSTOLIC BLOOD PRESSURE: 121 MMHG | OXYGEN SATURATION: 98 % | HEIGHT: 67 IN | TEMPERATURE: 98.1 F | DIASTOLIC BLOOD PRESSURE: 68 MMHG | RESPIRATION RATE: 16 BRPM | HEART RATE: 112 BPM

## 2022-11-25 DIAGNOSIS — Z98.89 OTHER SPECIFIED POSTPROCEDURAL STATES: Chronic | ICD-10-CM

## 2022-11-25 DIAGNOSIS — Z00.8 ENCOUNTER FOR OTHER GENERAL EXAMINATION: ICD-10-CM

## 2022-11-25 LAB
ALBUMIN SERPL ELPH-MCNC: 3.9 G/DL
ALP BLD-CCNC: 128 U/L
ALT SERPL-CCNC: 12 U/L
ANION GAP SERPL CALC-SCNC: 12 MMOL/L
AST SERPL-CCNC: 25 U/L
BASOPHILS # BLD AUTO: 0.05 K/UL — SIGNIFICANT CHANGE UP (ref 0–0.2)
BASOPHILS NFR BLD AUTO: 1.2 % — SIGNIFICANT CHANGE UP (ref 0–2)
BILIRUB SERPL-MCNC: 0.3 MG/DL
BUN SERPL-MCNC: 10 MG/DL
CALCIUM SERPL-MCNC: 9.4 MG/DL
CHLORIDE SERPL-SCNC: 101 MMOL/L
CO2 SERPL-SCNC: 24 MMOL/L
CREAT SERPL-MCNC: 1.25 MG/DL
EGFR: 60 ML/MIN/1.73M2
EOSINOPHIL # BLD AUTO: 0.11 K/UL — SIGNIFICANT CHANGE UP (ref 0–0.5)
EOSINOPHIL NFR BLD AUTO: 2.5 % — SIGNIFICANT CHANGE UP (ref 0–6)
GLUCOSE SERPL-MCNC: 164 MG/DL
HCT VFR BLD CALC: 36.7 % — LOW (ref 39–50)
HGB BLD-MCNC: 11.6 G/DL — LOW (ref 13–17)
IMM GRANULOCYTES NFR BLD AUTO: 0.2 % — SIGNIFICANT CHANGE UP (ref 0–0.9)
LDH SERPL-CCNC: 352 U/L
LYMPHOCYTES # BLD AUTO: 1.72 K/UL — SIGNIFICANT CHANGE UP (ref 1–3.3)
LYMPHOCYTES # BLD AUTO: 39.7 % — SIGNIFICANT CHANGE UP (ref 13–44)
MAGNESIUM SERPL-MCNC: 2 MG/DL
MCHC RBC-ENTMCNC: 25.8 PG — LOW (ref 27–34)
MCHC RBC-ENTMCNC: 31.6 G/DL — LOW (ref 32–36)
MCV RBC AUTO: 81.6 FL — SIGNIFICANT CHANGE UP (ref 80–100)
MONOCYTES # BLD AUTO: 0.74 K/UL — SIGNIFICANT CHANGE UP (ref 0–0.9)
MONOCYTES NFR BLD AUTO: 17.1 % — HIGH (ref 2–14)
NEUTROPHILS # BLD AUTO: 1.7 K/UL — LOW (ref 1.8–7.4)
NEUTROPHILS NFR BLD AUTO: 39.3 % — LOW (ref 43–77)
NRBC # BLD: 0 /100 WBCS — SIGNIFICANT CHANGE UP (ref 0–0)
PLATELET # BLD AUTO: 254 K/UL — SIGNIFICANT CHANGE UP (ref 150–400)
POTASSIUM SERPL-SCNC: 4.7 MMOL/L
PROT SERPL-MCNC: 7 G/DL
RBC # BLD: 4.5 M/UL — SIGNIFICANT CHANGE UP (ref 4.2–5.8)
RBC # FLD: 16.1 % — HIGH (ref 10.3–14.5)
SODIUM SERPL-SCNC: 136 MMOL/L
WBC # BLD: 4.33 K/UL — SIGNIFICANT CHANGE UP (ref 3.8–10.5)
WBC # FLD AUTO: 4.33 K/UL — SIGNIFICANT CHANGE UP (ref 3.8–10.5)

## 2022-11-25 PROCEDURE — 99214 OFFICE O/P EST MOD 30 MIN: CPT

## 2022-11-25 PROCEDURE — 72050 X-RAY EXAM NECK SPINE 4/5VWS: CPT

## 2022-11-25 PROCEDURE — 72050 X-RAY EXAM NECK SPINE 4/5VWS: CPT | Mod: 26

## 2022-11-25 NOTE — PHYSICAL EXAM
[Restricted in physically strenuous activity but ambulatory and able to carry out work of a light or sedentary nature] : Status 1- Restricted in physically strenuous activity but ambulatory and able to carry out work of a light or sedentary nature, e.g., light house work, office work [Normal] : affect appropriate [de-identified] : A [de-identified] : Dryness

## 2022-11-25 NOTE — ASSESSMENT
[FreeTextEntry1] : 73 yo male with hx of BPH, HTN, DM, DVT, CVA and MM who presents for f/u to initial eval for high dose chemo and auto stem cell transplant...receiving induction therapy with good response..BM BX from May reviewed...VGPR likely.....I had another  extensive discussion with the pt and his step daughter....he lives with her and his wife...regarding the risks, benefits, alternatives, logistics and rationale for auto stem cell transplant. Mobilization with GF vs chemo plus GF discussed...Goal for stem cell collection of approx 5 million per kg discussed...need for shiley catheter discussed...3 week hospital stay and 6 week recovery discussed..jose 200 prep....pre testing and orientation discussed...literature and consents provided for review....quests addressed...pt is leaning towards moving forward..I do believe he would be a good candidate pending vital oragn function...testing in progress...will arrange for orientation and sw eval.....he has good family support ....will f/u with pt upon appt for growth factor teaching.....will d/w Dr Goldberg...\par \par 10/7/22:\par Mr. Cespedes was seen for a teaching visit pre stem cell Mobilization\par CBC reviewed;counts stable\par He will receive Zarxio 780 mcg subcutaneous daily 10/7/22 -10/11/22\par Explained to patient and his daughter Brenda how to administer Zarxio.Patient stated that Zarxio will be administered by Brenda.Most common side effects explained.Today's dose of Zarxio administered by ELLIOT Cook\par Shiley catheter and stem cell collection has been scheduled on 10/11/22 at [facility]\par He will have COVID -19 PCR testing on 10/8/22\par Recommended Coy  to take Claritin daily for 5 days starting today (10/7/22)\par May take Tylenol PRN for pain or fever\par Avoid Advil, Aleve or Aspirin\par Encouraged to increase calcium in diet over the next few days\par Questions and concerns addressed\par Reassurance provided\par Tentative hospital admission on 10/24/22 and Auto stem cell transplant on 10/27/22\par \par 10/17/22\par Patient presents for a pre admission visit. Status post stem collection in preparation for Auto PBSCT. \par CBC stable and reviewed with patient. No indication for transfusion.\par Kepivance scheduled for 10/21/22 and 10/22/22. Admission labs and COVID 19 PCR will be sent on 10/21/22.\par Admission at [facility] on 10/24/22.\par Transplant date of 10/28/22.\par Disease time of transplant: [place]\par \par Discontinue blood thinners on 10/21/22.\par Questions and concerns addressed. Reassurance provided.\par Medication list reconciled.\par Return on 10/21/22 for Kepivance and admission labs/COVID 19 PCR. \par \par 11/25/22:\par Multiple Myeloma (C90.00)\par S/P Autologous Stem Cell Transplant (Z94.84)\par \par 1)MM\par  S/P Autologous PBSCT on 10/27/22\par  Labs sent today\par  Send labs with every visit\par \par 2) Heme\par    Counts stable, no transfusion requirements today\par    WBC -4.33,H/H- 11.6/36.7, Platelets -254 ,ANC -1.70\par    Continue  Multiple Vitamin and Folic acid \par  \par 3) ID\par    Continue prophylaxis\par    Acyclovir 400 mg 1 tab orally every 8 hours \par    Atovaquone 750 mg/5 mL oral suspension 5 milliliter 2 times a day\par    Diflucan 200 mg 2 tablets once a day \par    Post  transplant restrictions reviewed and reinforced in light of COVID-19 Pandemic\par \par 4) GI\par    Protonix 40 mg oral delayed release tablet 1 tab once a day\par    Zofran as needed for nausea \par    Dexamethasone 2 mg daily for poor appetite\par \par 5) H/O DVT\par     Continue Eliquis 5 mg daily (restarted on 11/18/22)\par \par 6) Hypertension\par     Hydralazine 25 mg 1 tab three times a day\par     Metoprolol Tartrate 25 mg 1 tab 2 times a day\par     Norvasc 10 mg 1 tab once a day\par \par 7) BPH\par     Flomax 0.4 mg 1 cap once a day\par     Proscar 5 mg 1 tab once a day\par \par 8) Dryness of Skin\par      Apply Aquaphor to moisturize skin\par \par 9) Syncope/Fall\par     Follow up with Cariology\par    Xray cervical spine\par \par 10) Generalized weakness\par     Advised to use cane for walking\par    Advised daughter to have someone with him while he walks and go to bathroom\par    Continue home PT\par       \par 11) Plan\par   -Follow up on Xray cervical spine\par   -Educated about plan of care,all questions and concerns addressed\par  - Advised to call office with any acute concerns\par  - Advised to have small frequent meals/snacks\par   -Reinstated to avoid crowd\par  -Reinstated dietary restrictions: No restaurant or take out food at this time, only home cooked, prepared/frozen         food. Allowed to have fresh baked pizza right out of the oven which is the ONLY takeout food at this time.\par  - Additional Instructions: Notify if bleeding; swelling; persistent nausea and vomiting; unable to  urinate; pain not        relieved by medications; fever; numbness, tingling; excessive diarrhea, inability to tolerate liquids  or foods;             increased irritability or sluggishness, rash\par  -Follow up with LINH Sanchez in one week\par \par I examined patient under Dr. Gonzales's supervision and Dr. Gonzales agrees to plan of care as listed above\par \par \par \par

## 2022-11-25 NOTE — REASON FOR VISIT
[Follow-Up Visit] : a follow-up visit for [Family Member] : family member [[employer] ] : provided by [employer]   [Time Spent: ____ minutes] : Total time spent using  services: [unfilled] minutes. The patient's primary language is not English thus required  services. [FreeTextEntry2] : Multiple Myeloma  [Interpreters_IDNumber] : [id number] [Interpreters_FullName] : [place] [TWNoteComboBox1] : Elenita

## 2022-11-25 NOTE — REVIEW OF SYSTEMS
[Fatigue] : fatigue [Negative] : Allergic/Immunologic [Fever] : no fever [Chills] : no chills [Night Sweats] : no night sweats [Recent Change In Weight] : ~T no recent weight change [Abdominal Pain] : no abdominal pain [Vomiting] : no vomiting [Constipation] : no constipation [Diarrhea] : no diarrhea [Joint Pain] : no joint pain [Joint Stiffness] : joint stiffness [Muscle Pain] : no muscle pain [Muscle Weakness] : no muscle weakness [FreeTextEntry7] : Poor appetite [FreeTextEntry9] : Generalized weakness,stiffness of neck

## 2022-11-25 NOTE — HISTORY OF PRESENT ILLNESS
[de-identified] : Dx Multiple myeloma Dec 2021..presented with anemia and back pain nov 2021\par bm bx 70% involvement with monoclonal plasma cells IgG kappa...repeat bm bx May 2022 with 1 to 2 % involvement\par mspike 8 grams\par PET confirmed lytic lesions\par will confirmvcreat and calcium were normal upon dx\par s/p rvd x 6....now holding rev...pending decision on transplant\par \par 11/18/22: S/P Autologous Stem Cell Transplant on 10/27/22.Discharge summary as follows:\par Discharge Date    15-Nov-2022\par Admission Date    24-Oct-2022 \par Reason for Admission:Autologous peripheral blood stem cell transplant with high\par dose Melphalan prep regimen for treatment of multiple myeloma\par Hospital Course   \par This is a 72 year old male with multiple myeloma admitted for an autologous\par peripheral blood stem cell transplant with high dose Melphalan prep regimen.\par Hematologic history as follows: Initially presented in 11/2021 with anemia and\par back pain, diagnosed with multiple myeloma 12/2021.A bone marrow biopsy at that\par time showed 70% involvement with monoclonal plasma cells (IgG kappa), improved\par to 1-2% involvement with bone marrow biopsy 5/2022 after treatment with RVD x\par 6.\par Upon admission, a TLC was placed in IR. Mr. Cespedes received IV hydration, pain\par management, nutritional support and antibacterial / antiviral / antifungal /\par PCP / GI and VOD (SOS) prophylaxis. Labs were monitored on a daily basis, and\par he received transfusional support and electrolyte repletion as needed.\par An admission urinalysis was positive for nitrites and leukocyte esterase and he\par was started on prophylactic ciprofloxacin. On 11/4/22, his urine culture grew\par gram negative rods (>100K enterobacter cloacae). The ciprofloxacin was\par broadened to cefepime. Mr. Cespedes experienced pancytopenia related to the high\par dose chemotherapy prep regimen, and neutropenic fever. Blood cultures and CXR\par were negative. Repeat urine culture on 11/11/22 was negative.\par On 10/27/22, after pre-medication, Mr. Cespedes received 304ml of autologous,\par pooled, thawed, washed, mobilized, plasma reduced, HPC apheresis over\par approximately 1 hour. Cell counts as follows:\par Total MNCs (x 10^8/kg) = 6.86\par CD34+ cells (x10^6/kg)= 5.85\par Cell viability (%) = 76%\par He tolerated the infusion well with no adverse reactions noted.\par Early engraftment was noted on 11/5/22. Engraftment was noted on 11/8/22. The\par daily zarxio was discontinued on 11/10/22. Cefepime was discontinued on\par 11/11/22.\par Currently, Mr. Cespedes is stable for discharge home with outpatient follow up\par at the [facility].\par \par \par \par  [de-identified] : Patient here with his daughter for f/u to  initial eval for high dose chemotherapy and auto stem cell transplant for MM...currently off treatment with velcade and dex...rev also on hold pending decision for auto transplant...he returns for [place]...\par \par 10/7/22: Patient is here for a teaching visit prior to stem cell mobilization.He is accompanied by his daughter Brenda.Denies fever, chills,SOB,chest pain,edema,rash,mouth sores,nausea,vomiting, diarrhea or any signs of active bleeding. \par \par On 10/17/22, patient presents for a pre admission visit. Overall patient is well and offers no acute concerns. Denies fever, chills, nausea, vomiting, diarrhea, rash, mouth sores, dysuria or any signs of active bleeding. Denies SOB, chest pain or B/L LE edema. \par \par 11/18/22: Patient is here for a follow up visit post Autologous Stem Cell Transplant. Today is + 22.Initially [employer]  was used to assist with today's visit and later his daughter Brenda joined the visit.Brenda was the  for rest of the visit.Denies fever, chills, SOB,chest pain,dysuria,vomiting, diarrhea,mouth sores,edema or any signs of active bleeding. He states that he has occasional nausea relieved with Zofran.He also states that he has poor appetite and generalized weakness. \par \par 11/25/22:Patient is seen for a follow up visit.Today is Day +29.[employer]  [place] # [id number] assisted in the beginning for today's visit and later patient's daughter ,Brenda joined the visit and she was the  for the rest of the visit with patient's consent.He is on a wheel chair today.He felt dizzy yesterday while brushing teeth and had a syncopal episode.Daughter Brenda found him sitting on the floor but patient states that he fell down and when he regained consciousness he pulled himself to sitting position.He has mild stiffness of neck today;denies pain, edema or hitting head.He continues to have poor appetite and has generalized weakness.Denies fever, chills, SOB,chest pain, rash,mouth sores,nausea,vomiting, diarrhea or any signs of active bleeding. [FreeTextEntry1] : 71 yo also  being treated for asymptomatic  positve quantiferon gold test for tb done by Dr. Mcdaniel  grew up  in [place]  no history of TB or exposure .. normal chest xray.\par

## 2022-12-01 ENCOUNTER — APPOINTMENT (OUTPATIENT)
Dept: HEMATOLOGY ONCOLOGY | Facility: CLINIC | Age: 72
End: 2022-12-01

## 2022-12-01 ENCOUNTER — RESULT REVIEW (OUTPATIENT)
Age: 72
End: 2022-12-01

## 2022-12-01 VITALS
SYSTOLIC BLOOD PRESSURE: 100 MMHG | HEART RATE: 113 BPM | TEMPERATURE: 98 F | DIASTOLIC BLOOD PRESSURE: 65 MMHG | OXYGEN SATURATION: 98 % | RESPIRATION RATE: 16 BRPM

## 2022-12-01 DIAGNOSIS — K59.00 CONSTIPATION, UNSPECIFIED: ICD-10-CM

## 2022-12-01 LAB
BASOPHILS # BLD AUTO: 0.02 K/UL — SIGNIFICANT CHANGE UP (ref 0–0.2)
BASOPHILS NFR BLD AUTO: 0.6 % — SIGNIFICANT CHANGE UP (ref 0–2)
EOSINOPHIL # BLD AUTO: 0.12 K/UL — SIGNIFICANT CHANGE UP (ref 0–0.5)
EOSINOPHIL NFR BLD AUTO: 3.3 % — SIGNIFICANT CHANGE UP (ref 0–6)
HCT VFR BLD CALC: 40.5 % — SIGNIFICANT CHANGE UP (ref 39–50)
HGB BLD-MCNC: 12.9 G/DL — LOW (ref 13–17)
IMM GRANULOCYTES NFR BLD AUTO: 0 % — SIGNIFICANT CHANGE UP (ref 0–0.9)
LYMPHOCYTES # BLD AUTO: 1.13 K/UL — SIGNIFICANT CHANGE UP (ref 1–3.3)
LYMPHOCYTES # BLD AUTO: 31.5 % — SIGNIFICANT CHANGE UP (ref 13–44)
MCHC RBC-ENTMCNC: 25.8 PG — LOW (ref 27–34)
MCHC RBC-ENTMCNC: 31.9 G/DL — LOW (ref 32–36)
MCV RBC AUTO: 81 FL — SIGNIFICANT CHANGE UP (ref 80–100)
MONOCYTES # BLD AUTO: 0.42 K/UL — SIGNIFICANT CHANGE UP (ref 0–0.9)
MONOCYTES NFR BLD AUTO: 11.7 % — SIGNIFICANT CHANGE UP (ref 2–14)
NEUTROPHILS # BLD AUTO: 1.9 K/UL — SIGNIFICANT CHANGE UP (ref 1.8–7.4)
NEUTROPHILS NFR BLD AUTO: 52.9 % — SIGNIFICANT CHANGE UP (ref 43–77)
NRBC # BLD: 0 /100 WBCS — SIGNIFICANT CHANGE UP (ref 0–0)
PLATELET # BLD AUTO: 261 K/UL — SIGNIFICANT CHANGE UP (ref 150–400)
RBC # BLD: 5 M/UL — SIGNIFICANT CHANGE UP (ref 4.2–5.8)
RBC # FLD: 15.9 % — HIGH (ref 10.3–14.5)
WBC # BLD: 3.59 K/UL — LOW (ref 3.8–10.5)
WBC # FLD AUTO: 3.59 K/UL — LOW (ref 3.8–10.5)

## 2022-12-01 PROCEDURE — 99214 OFFICE O/P EST MOD 30 MIN: CPT

## 2022-12-01 RX ORDER — ATOVAQUONE 750 MG/5ML
750 SUSPENSION ORAL
Qty: 300 | Refills: 3 | Status: ACTIVE | COMMUNITY
Start: 2022-11-16 | End: 1900-01-01

## 2022-12-01 RX ORDER — PANTOPRAZOLE 40 MG/1
40 TABLET, DELAYED RELEASE ORAL DAILY
Qty: 30 | Refills: 3 | Status: ACTIVE | COMMUNITY
Start: 2022-11-16 | End: 1900-01-01

## 2022-12-01 RX ORDER — HYDRALAZINE HYDROCHLORIDE 25 MG/1
25 TABLET ORAL 3 TIMES DAILY
Qty: 90 | Refills: 3 | Status: ACTIVE | COMMUNITY
Start: 2022-11-16 | End: 1900-01-01

## 2022-12-01 RX ORDER — ACYCLOVIR 400 MG/1
400 TABLET ORAL EVERY 8 HOURS
Qty: 90 | Refills: 0 | Status: ACTIVE | COMMUNITY
Start: 2022-11-04 | End: 1900-01-01

## 2022-12-01 RX ORDER — AMLODIPINE BESYLATE 10 MG/1
10 TABLET ORAL DAILY
Qty: 30 | Refills: 3 | Status: ACTIVE | COMMUNITY
Start: 2022-11-16 | End: 1900-01-01

## 2022-12-01 RX ORDER — FOLIC ACID 1 MG/1
1 TABLET ORAL DAILY
Qty: 30 | Refills: 3 | Status: ACTIVE | COMMUNITY
Start: 2022-11-16 | End: 1900-01-01

## 2022-12-02 LAB
ALBUMIN SERPL ELPH-MCNC: 4 G/DL
ALP BLD-CCNC: 115 U/L
ALT SERPL-CCNC: 19 U/L
ANION GAP SERPL CALC-SCNC: 13 MMOL/L
AST SERPL-CCNC: 23 U/L
BILIRUB SERPL-MCNC: 0.2 MG/DL
BUN SERPL-MCNC: 26 MG/DL
CALCIUM SERPL-MCNC: 9.6 MG/DL
CHLORIDE SERPL-SCNC: 100 MMOL/L
CO2 SERPL-SCNC: 24 MMOL/L
CREAT SERPL-MCNC: 1.22 MG/DL
EGFR: 62 ML/MIN/1.73M2
GLUCOSE SERPL-MCNC: 245 MG/DL
LDH SERPL-CCNC: 290 U/L
MAGNESIUM SERPL-MCNC: 1.8 MG/DL
POTASSIUM SERPL-SCNC: 4.8 MMOL/L
PROT SERPL-MCNC: 7 G/DL
SODIUM SERPL-SCNC: 138 MMOL/L

## 2022-12-04 NOTE — PHYSICAL EXAM
[Restricted in physically strenuous activity but ambulatory and able to carry out work of a light or sedentary nature] : Status 1- Restricted in physically strenuous activity but ambulatory and able to carry out work of a light or sedentary nature, e.g., light house work, office work [Normal] : affect appropriate [de-identified] : Dryness

## 2022-12-04 NOTE — REVIEW OF SYSTEMS
[Fatigue] : fatigue [Joint Stiffness] : joint stiffness [Negative] : Allergic/Immunologic [Fever] : no fever [Chills] : no chills [Night Sweats] : no night sweats [Recent Change In Weight] : ~T no recent weight change [Abdominal Pain] : no abdominal pain [Vomiting] : no vomiting [Constipation] : constipation [Diarrhea] : no diarrhea [Joint Pain] : no joint pain [Muscle Pain] : no muscle pain [Muscle Weakness] : no muscle weakness [FreeTextEntry7] :  appetite improving  [FreeTextEntry9] : Generalized weakness,stiffness of neck

## 2022-12-04 NOTE — REASON FOR VISIT
[Follow-Up Visit] : a follow-up visit for [Family Member] : family member [[employer] ] : provided by [employer]   [FreeTextEntry2] : Multiple Myeloma  [TWNoteComboBox1] : Elenita

## 2022-12-04 NOTE — ASSESSMENT
[FreeTextEntry1] : 73 yo male with hx of BPH, HTN, DM, DVT, CVA and MM who presents for f/u to initial eval for high dose chemo and auto stem cell transplant...receiving induction therapy with good response..BM BX from May reviewed...VGPR likely.....I had another  extensive discussion with the pt and his step daughter....he lives with her and his wife...regarding the risks, benefits, alternatives, logistics and rationale for auto stem cell transplant. Mobilization with GF vs chemo plus GF discussed...Goal for stem cell collection of approx 5 million per kg discussed...need for shiley catheter discussed...3 week hospital stay and 6 week recovery discussed..jose 200 prep....pre testing and orientation discussed...literature and consents provided for review....quests addressed...pt is leaning towards moving forward..I do believe he would be a good candidate pending vital oragn function...testing in progress...will arrange for orientation and sw eval.....he has good family support ....will f/u with pt upon appt for growth factor teaching.....will d/w Dr Goldberg...\par \par 10/7/22:\par Mr. Cespedes was seen for a teaching visit pre stem cell Mobilization\par CBC reviewed;counts stable\par He will receive Zarxio 780 mcg subcutaneous daily 10/7/22 -10/11/22\par Explained to patient and his daughter Brenda how to administer Zarxio.Patient stated that Zarxio will be administered by Brenda.Most common side effects explained.Today's dose of Zarxio administered by ELLIOT Cook\par Shiley catheter and stem cell collection has been scheduled on 10/11/22 at [facility]\par He will have COVID -19 PCR testing on 10/8/22\par Recommended Coy  to take Claritin daily for 5 days starting today (10/7/22)\par May take Tylenol PRN for pain or fever\par Avoid Advil, Aleve or Aspirin\par Encouraged to increase calcium in diet over the next few days\par Questions and concerns addressed\par Reassurance provided\par Tentative hospital admission on 10/24/22 and Auto stem cell transplant on 10/27/22\par \par 10/17/22\par Patient presents for a pre admission visit. Status post stem collection in preparation for Auto PBSCT. \par CBC stable and reviewed with patient. No indication for transfusion.\par Kepivance scheduled for 10/21/22 and 10/22/22. Admission labs and COVID 19 PCR will be sent on 10/21/22.\par Admission at [facility] on 10/24/22.\par Transplant date of 10/28/22.\par Disease time of transplant: [place]\par \par Discontinue blood thinners on 10/21/22.\par Questions and concerns addressed. Reassurance provided.\par Medication list reconciled.\par Return on 10/21/22 for Kepivance and admission labs/COVID 19 PCR. \par \par 12/1/22:\par Multiple Myeloma (C90.00)\par S/P Autologous Stem Cell Transplant (Z94.84)\par \par 1)MM\par  S/P Autologous PBSCT on 10/27/22\par  Labs sent today\par  Send labs with every visit\par \par 2) Heme\par    Counts stable, no transfusion requirements today\par    Continue  Multiple Vitamin and Folic acid \par  \par 3) ID\par    Continue prophylaxis\par    Acyclovir 400 mg 1 tab orally every 8 hours \par    Atovaquone 750 mg/5 mL oral suspension 5 milliliter 2 times a day\par    Diflucan 200 mg 2 tablets once a day \par    Post  transplant restrictions reviewed and reinforced in light of COVID-19 Pandemic\par \par 4) GI\par    Protonix 40 mg oral delayed release tablet 1 tab once a day\par    Zofran as needed for nausea \par    Dexamethasone 2 mg daily for poor appetite\par    Lactulose prn for constipation\par \par 5) H/O DVT\par     Continue Eliquis 5 mg daily (restarted on 11/18/22)\par \par 6) Hypertension\par     Hydralazine 25 mg 1 tab three times a day\par     Metoprolol Tartrate 25 mg 1 tab 2 times a day\par     Norvasc 10 mg 1 tab once a day\par \par 7) BPH\par     Flomax 0.4 mg 1 cap once a day\par     Proscar 5 mg 1 tab once a day\par \par 8) Dryness of Skin\par      Apply Aquaphor to moisturize skin\par \par 9) Syncope/Fall\par     Follow up with Cariology\par    Xray cervical spine reviewed \par \par 10) Generalized weakness\par     Advised to use cane for walking\par    Advised daughter to have someone with him while he walks and go to bathroom\par    Continue home PT\par       \par 11) Plan\par   -Educated about plan of care,all questions and concerns addressed\par  - Advised to call office with any acute concerns\par  - Advised to have small frequent meals/snacks\par   -Reinstated to avoid crowd\par  -Reinstated dietary restrictions: No restaurant or take out food at this time, only home cooked, prepared/frozen         food. Allowed to have fresh baked pizza right out of the oven which is the ONLY takeout food at this time.\par  - Additional Instructions: Notify if bleeding; swelling; persistent nausea and vomiting; unable to  urinate; pain not        relieved by medications; fever; numbness, tingling; excessive diarrhea, inability to tolerate liquids  or foods;             increased irritability or sluggishness, rash\par  -Follow up in one week\par \par I examined patient under Dr. Gonzales's supervision and Dr. Gonzales agrees to plan of care as listed above\par \par \par \par

## 2022-12-04 NOTE — HISTORY OF PRESENT ILLNESS
[de-identified] : Dx Multiple myeloma Dec 2021..presented with anemia and back pain nov 2021\par bm bx 70% involvement with monoclonal plasma cells IgG kappa...repeat bm bx May 2022 with 1 to 2 % involvement\par mspike 8 grams\par PET confirmed lytic lesions\par will confirmvcreat and calcium were normal upon dx\par s/p rvd x 6....now holding rev...pending decision on transplant\par \par 11/18/22: S/P Autologous Stem Cell Transplant on 10/27/22.Discharge summary as follows:\par Discharge Date    15-Nov-2022\par Admission Date    24-Oct-2022 \par Reason for Admission:Autologous peripheral blood stem cell transplant with high\par dose Melphalan prep regimen for treatment of multiple myeloma\par Hospital Course   \par This is a 72 year old male with multiple myeloma admitted for an autologous\par peripheral blood stem cell transplant with high dose Melphalan prep regimen.\par Hematologic history as follows: Initially presented in 11/2021 with anemia and\par back pain, diagnosed with multiple myeloma 12/2021.A bone marrow biopsy at that\par time showed 70% involvement with monoclonal plasma cells (IgG kappa), improved\par to 1-2% involvement with bone marrow biopsy 5/2022 after treatment with RVD x\par 6.\par Upon admission, a TLC was placed in IR. Mr. Cespedes received IV hydration, pain\par management, nutritional support and antibacterial / antiviral / antifungal /\par PCP / GI and VOD (SOS) prophylaxis. Labs were monitored on a daily basis, and\par he received transfusional support and electrolyte repletion as needed.\par An admission urinalysis was positive for nitrites and leukocyte esterase and he\par was started on prophylactic ciprofloxacin. On 11/4/22, his urine culture grew\par gram negative rods (>100K enterobacter cloacae). The ciprofloxacin was\par broadened to cefepime. Mr. Cespedes experienced pancytopenia related to the high\par dose chemotherapy prep regimen, and neutropenic fever. Blood cultures and CXR\par were negative. Repeat urine culture on 11/11/22 was negative.\par On 10/27/22, after pre-medication, Mr. Cespedes received 304ml of autologous,\par pooled, thawed, washed, mobilized, plasma reduced, HPC apheresis over\par approximately 1 hour. Cell counts as follows:\par Total MNCs (x 10^8/kg) = 6.86\par CD34+ cells (x10^6/kg)= 5.85\par Cell viability (%) = 76%\par He tolerated the infusion well with no adverse reactions noted.\par Early engraftment was noted on 11/5/22. Engraftment was noted on 11/8/22. The\par daily zarxio was discontinued on 11/10/22. Cefepime was discontinued on\par 11/11/22.\par Currently, Mr. Cespedes is stable for discharge home with outpatient follow up\par at the [facility].\par \par \par \par  [de-identified] : Patient here with his daughter for f/u to  initial eval for high dose chemotherapy and auto stem cell transplant for MM...currently off treatment with velcade and dex...rev also on hold pending decision for auto transplant...he returns for [place]...\par \par 10/7/22: Patient is here for a teaching visit prior to stem cell mobilization.He is accompanied by his daughter Brenda.Denies fever, chills,SOB,chest pain,edema,rash,mouth sores,nausea,vomiting, diarrhea or any signs of active bleeding. \par \par On 10/17/22, patient presents for a pre admission visit. Overall patient is well and offers no acute concerns. Denies fever, chills, nausea, vomiting, diarrhea, rash, mouth sores, dysuria or any signs of active bleeding. Denies SOB, chest pain or B/L LE edema. \par \par 11/18/22: Patient is here for a follow up visit post Autologous Stem Cell Transplant. Today is + 22.Initially [employer]  was used to assist with today's visit and later his daughter Brenda joined the visit.Brenda was the  for rest of the visit.Denies fever, chills, SOB,chest pain,dysuria,vomiting, diarrhea,mouth sores,edema or any signs of active bleeding. He states that he has occasional nausea relieved with Zofran.He also states that he has poor appetite and generalized weakness. \par \par 11/25/22:Patient is seen for a follow up visit.Today is Day +29.[employer]  [place] # [id number] assisted in the beginning for today's visit and later patient's daughter ,Brenda joined the visit and she was the  for the rest of the visit with patient's consent.He is on a wheel chair today.He felt dizzy yesterday while brushing teeth and had a syncopal episode.Daughter Brenda found him sitting on the floor but patient states that he fell down and when he regained consciousness he pulled himself to sitting position.He has mild stiffness of neck today;denies pain, edema or hitting head.He continues to have poor appetite and has generalized weakness.Denies fever, chills, SOB,chest pain, rash,mouth sores,nausea,vomiting, diarrhea or any signs of active bleeding.\par \par On 12/1/22 visit, day + 35 post transplant. Overall well and only complaint today is constipation. Denies fever, chills, SOB,chest pain, rash,mouth sores,nausea,vomiting, diarrhea or any signs of active bleeding. Remains compliant with post transplant medications. Remains compliant with post transplant diet and crowd restrictions.  [FreeTextEntry1] : 73 yo also  being treated for asymptomatic  positve quantiferon gold test for tb done by Dr. Mcdaniel  grew up  in [place]  no history of TB or exposure .. normal chest xray.\par

## 2022-12-08 ENCOUNTER — RESULT REVIEW (OUTPATIENT)
Age: 72
End: 2022-12-08

## 2022-12-08 ENCOUNTER — APPOINTMENT (OUTPATIENT)
Dept: HEMATOLOGY ONCOLOGY | Facility: CLINIC | Age: 72
End: 2022-12-08

## 2022-12-08 VITALS
WEIGHT: 145.48 LBS | SYSTOLIC BLOOD PRESSURE: 103 MMHG | RESPIRATION RATE: 16 BRPM | DIASTOLIC BLOOD PRESSURE: 70 MMHG | TEMPERATURE: 96.4 F | OXYGEN SATURATION: 97 % | BODY MASS INDEX: 22.79 KG/M2 | HEART RATE: 96 BPM

## 2022-12-08 DIAGNOSIS — Z78.9 OTHER SPECIFIED HEALTH STATUS: ICD-10-CM

## 2022-12-08 LAB
ALBUMIN SERPL ELPH-MCNC: 4.5 G/DL
ALP BLD-CCNC: 116 U/L
ALT SERPL-CCNC: 25 U/L
ANION GAP SERPL CALC-SCNC: 15 MMOL/L
AST SERPL-CCNC: 21 U/L
BASOPHILS # BLD AUTO: 0.01 K/UL — SIGNIFICANT CHANGE UP (ref 0–0.2)
BASOPHILS NFR BLD AUTO: 0.2 % — SIGNIFICANT CHANGE UP (ref 0–2)
BILIRUB SERPL-MCNC: 0.3 MG/DL
BUN SERPL-MCNC: 19 MG/DL
CALCIUM SERPL-MCNC: 10.1 MG/DL
CHLORIDE SERPL-SCNC: 96 MMOL/L
CO2 SERPL-SCNC: 24 MMOL/L
CREAT SERPL-MCNC: 1.13 MG/DL
EGFR: 68 ML/MIN/1.73M2
EOSINOPHIL # BLD AUTO: 0.03 K/UL — SIGNIFICANT CHANGE UP (ref 0–0.5)
EOSINOPHIL NFR BLD AUTO: 0.7 % — SIGNIFICANT CHANGE UP (ref 0–6)
GLUCOSE SERPL-MCNC: 234 MG/DL
HCT VFR BLD CALC: 41.3 % — SIGNIFICANT CHANGE UP (ref 39–50)
HGB BLD-MCNC: 13.2 G/DL — SIGNIFICANT CHANGE UP (ref 13–17)
IMM GRANULOCYTES NFR BLD AUTO: 0.2 % — SIGNIFICANT CHANGE UP (ref 0–0.9)
LDH SERPL-CCNC: 224 U/L
LYMPHOCYTES # BLD AUTO: 1.22 K/UL — SIGNIFICANT CHANGE UP (ref 1–3.3)
LYMPHOCYTES # BLD AUTO: 30 % — SIGNIFICANT CHANGE UP (ref 13–44)
MAGNESIUM SERPL-MCNC: 1.7 MG/DL
MCHC RBC-ENTMCNC: 25.9 PG — LOW (ref 27–34)
MCHC RBC-ENTMCNC: 32 G/DL — SIGNIFICANT CHANGE UP (ref 32–36)
MCV RBC AUTO: 81.1 FL — SIGNIFICANT CHANGE UP (ref 80–100)
MONOCYTES # BLD AUTO: 0.39 K/UL — SIGNIFICANT CHANGE UP (ref 0–0.9)
MONOCYTES NFR BLD AUTO: 9.6 % — SIGNIFICANT CHANGE UP (ref 2–14)
NEUTROPHILS # BLD AUTO: 2.41 K/UL — SIGNIFICANT CHANGE UP (ref 1.8–7.4)
NEUTROPHILS NFR BLD AUTO: 59.3 % — SIGNIFICANT CHANGE UP (ref 43–77)
NRBC # BLD: 0 /100 WBCS — SIGNIFICANT CHANGE UP (ref 0–0)
PLATELET # BLD AUTO: 331 K/UL — SIGNIFICANT CHANGE UP (ref 150–400)
POTASSIUM SERPL-SCNC: 4.6 MMOL/L
PROT SERPL-MCNC: 7.5 G/DL
RBC # BLD: 5.09 M/UL — SIGNIFICANT CHANGE UP (ref 4.2–5.8)
RBC # FLD: 16.7 % — HIGH (ref 10.3–14.5)
SODIUM SERPL-SCNC: 135 MMOL/L
WBC # BLD: 4.07 K/UL — SIGNIFICANT CHANGE UP (ref 3.8–10.5)
WBC # FLD AUTO: 4.07 K/UL — SIGNIFICANT CHANGE UP (ref 3.8–10.5)

## 2022-12-08 PROCEDURE — 99215 OFFICE O/P EST HI 40 MIN: CPT

## 2022-12-08 NOTE — ASSESSMENT
[FreeTextEntry1] : 73 yo male with hx of BPH, HTN, DM, DVT, CVA and MM who presents for f/u to initial eval for high dose chemo and auto stem cell transplant...receiving induction therapy with good response..BM BX from May reviewed...VGPR likely.....I had another  extensive discussion with the pt and his step daughter....he lives with her and his wife...regarding the risks, benefits, alternatives, logistics and rationale for auto stem cell transplant. Mobilization with GF vs chemo plus GF discussed...Goal for stem cell collection of approx 5 million per kg discussed...need for shiley catheter discussed...3 week hospital stay and 6 week recovery discussed..jose 200 prep....pre testing and orientation discussed...literature and consents provided for review....quests addressed...pt is leaning towards moving forward..I do believe he would be a good candidate pending vital oragn function...testing in progress...will arrange for orientation and sw eval.....he has good family support ....will f/u with pt upon appt for growth factor teaching.....will d/w Dr Goldberg...\par \par 10/7/22:\par Mr. Cespedes was seen for a teaching visit pre stem cell Mobilization\par CBC reviewed;counts stable\par He will receive Zarxio 780 mcg subcutaneous daily 10/7/22 -10/11/22\par Explained to patient and his daughter Brenda how to administer Zarxio.Patient stated that Zarxio will be administered by Brenda.Most common side effects explained.Today's dose of Zarxio administered by ELLIOT Cook\par Shiley catheter and stem cell collection has been scheduled on 10/11/22 at [facility]\par He will have COVID -19 PCR testing on 10/8/22\par Recommended Coy  to take Claritin daily for 5 days starting today (10/7/22)\par May take Tylenol PRN for pain or fever\par Avoid Advil, Aleve or Aspirin\par Encouraged to increase calcium in diet over the next few days\par Questions and concerns addressed\par Reassurance provided\par Tentative hospital admission on 10/24/22 and Auto stem cell transplant on 10/27/22\par \par 10/17/22\par Patient presents for a pre admission visit. Status post stem collection in preparation for Auto PBSCT. \par CBC stable and reviewed with patient. No indication for transfusion.\par Kepivance scheduled for 10/21/22 and 10/22/22. Admission labs and COVID 19 PCR will be sent on 10/21/22.\par Admission at [facility] on 10/24/22.\par Transplant date of 10/28/22.\par Disease time of transplant: [place]\par \par Discontinue blood thinners on 10/21/22.\par Questions and concerns addressed. Reassurance provided.\par Medication list reconciled.\par Return on 10/21/22 for Kepivance and admission labs/COVID 19 PCR. \par \par 12/8/22:\par Multiple Myeloma (C90.00)\par S/P Autologous Stem Cell Transplant (Z94.84)\par \par 1)MM\par  S/P Autologous PBSCT on 10/27/22\par  Labs sent today\par  Send labs with every visit\par \par 2) Heme\par    Counts stable, no transfusion requirements today\par    Continue  Multiple Vitamin and Folic acid \par  \par 3) ID\par    Continue prophylaxis\par    Acyclovir 400 mg 1 tab orally every 8 hours \par    Atovaquone 750 mg/5 mL oral suspension 5 milliliter 2 times a day\par    Diflucan 200 mg 2 tablets once a day \par    Post  transplant restrictions reviewed and reinforced in light of COVID-19 Pandemic\par \par 4) GI\par    Protonix 40 mg oral delayed release tablet 1 tab once a day\par    Zofran as needed for nausea \par    Dexamethasone 2 mg daily for poor appetite..may decrease next visit\par    Lactulose prn for constipation\par \par 5) H/O DVT\par     Continue Eliquis 5 mg daily (restarted on 11/18/22)\par \par 6) Hypertension\par     Hydralazine 25 mg 1 tab three times a day\par     Metoprolol Tartrate 25 mg 1 tab 2 times a day\par     Norvasc 10 mg 1 tab once a day\par \par 7) BPH\par     Flomax 0.4 mg 1 cap once a day\par     Proscar 5 mg 1 tab once a day\par \par 8) Dryness of Skin\par      Apply Aquaphor to moisturize skin\par \par 9) Syncope/Fall\par     Follow up with Cariology\par    Xray cervical spine reviewed \par \par 10) Generalized weakness\par     Advised to use cane for walking\par    Advised daughter to have someone with him while he walks and go to bathroom\par    Continue home PT\par       \par 11) Plan\par   -Educated about plan of care,all questions and concerns addressed\par  - Advised to call office with any acute concerns\par  - Advised to have small frequent meals/snacks\par   -Reinstated to avoid crowd\par  -Reinstated dietary restrictions: No restaurant or take out food at this time, only home cooked, prepared/frozen         food. Allowed to have fresh baked pizza right out of the oven which is the ONLY takeout food at this time.\par  - Additional Instructions: Notify if bleeding; swelling; persistent nausea and vomiting; unable to  urinate; pain not        relieved by medications; fever; numbness, tingling; excessive diarrhea, inability to tolerate liquids  or foods;             increased irritability or sluggishness, rash\par  -Follow up in one week\par \par \par \par \par

## 2022-12-08 NOTE — PHYSICAL EXAM
[Normal] : affect appropriate [Ambulatory and capable of all self care but unable to carry out any work activities] : Status 2- Ambulatory and capable of all self care but unable to carry out any work activities. Up and about more than 50% of waking hours [de-identified] : Dryness

## 2022-12-08 NOTE — REVIEW OF SYSTEMS
[Fatigue] : fatigue [Constipation] : constipation [Negative] : Allergic/Immunologic [Fever] : no fever [Chills] : no chills [Night Sweats] : no night sweats [Recent Change In Weight] : ~T no recent weight change [Abdominal Pain] : no abdominal pain [Vomiting] : no vomiting [Diarrhea] : no diarrhea [Joint Pain] : no joint pain [Joint Stiffness] : no joint stiffness [Muscle Pain] : no muscle pain [Muscle Weakness] : no muscle weakness [FreeTextEntry7] :  appetite improving  [FreeTextEntry9] : Generalized weakness,stiffness of neck improved

## 2022-12-08 NOTE — HISTORY OF PRESENT ILLNESS
[de-identified] : Dx Multiple myeloma Dec 2021..presented with anemia and back pain nov 2021\par bm bx 70% involvement with monoclonal plasma cells IgG kappa...repeat bm bx May 2022 with 1 to 2 % involvement\par mspike 8 grams\par PET confirmed lytic lesions\par will confirmvcreat and calcium were normal upon dx\par s/p rvd x 6....now holding rev...pending decision on transplant\par \par 11/18/22: S/P Autologous Stem Cell Transplant on 10/27/22.Discharge summary as follows:\par Discharge Date    15-Nov-2022\par Admission Date    24-Oct-2022 \par Reason for Admission:Autologous peripheral blood stem cell transplant with high\par dose Melphalan prep regimen for treatment of multiple myeloma\par Hospital Course   \par This is a 72 year old male with multiple myeloma admitted for an autologous\par peripheral blood stem cell transplant with high dose Melphalan prep regimen.\par Hematologic history as follows: Initially presented in 11/2021 with anemia and\par back pain, diagnosed with multiple myeloma 12/2021.A bone marrow biopsy at that\par time showed 70% involvement with monoclonal plasma cells (IgG kappa), improved\par to 1-2% involvement with bone marrow biopsy 5/2022 after treatment with RVD x\par 6.\par Upon admission, a TLC was placed in IR. Mr. Cespedes received IV hydration, pain\par management, nutritional support and antibacterial / antiviral / antifungal /\par PCP / GI and VOD (SOS) prophylaxis. Labs were monitored on a daily basis, and\par he received transfusional support and electrolyte repletion as needed.\par An admission urinalysis was positive for nitrites and leukocyte esterase and he\par was started on prophylactic ciprofloxacin. On 11/4/22, his urine culture grew\par gram negative rods (>100K enterobacter cloacae). The ciprofloxacin was\par broadened to cefepime. Mr. Cespedes experienced pancytopenia related to the high\par dose chemotherapy prep regimen, and neutropenic fever. Blood cultures and CXR\par were negative. Repeat urine culture on 11/11/22 was negative.\par On 10/27/22, after pre-medication, Mr. Cespedes received 304ml of autologous,\par pooled, thawed, washed, mobilized, plasma reduced, HPC apheresis over\par approximately 1 hour. Cell counts as follows:\par Total MNCs (x 10^8/kg) = 6.86\par CD34+ cells (x10^6/kg)= 5.85\par Cell viability (%) = 76%\par He tolerated the infusion well with no adverse reactions noted.\par Early engraftment was noted on 11/5/22. Engraftment was noted on 11/8/22. The\par daily zarxio was discontinued on 11/10/22. Cefepime was discontinued on\par 11/11/22.\par Currently, Mr. Cespedes is stable for discharge home with outpatient follow up\par at the [facility].\par \par \par \par  [de-identified] : Patient here with his daughter for f/u to  initial eval for high dose chemotherapy and auto stem cell transplant for MM...currently off treatment with velcade and dex...rev also on hold pending decision for auto transplant...he returns for [place]...\par \par 10/7/22: Patient is here for a teaching visit prior to stem cell mobilization.He is accompanied by his daughter Brenda.Denies fever, chills,SOB,chest pain,edema,rash,mouth sores,nausea,vomiting, diarrhea or any signs of active bleeding. \par \par On 10/17/22, patient presents for a pre admission visit. Overall patient is well and offers no acute concerns. Denies fever, chills, nausea, vomiting, diarrhea, rash, mouth sores, dysuria or any signs of active bleeding. Denies SOB, chest pain or B/L LE edema. \par \par 11/18/22: Patient is here for a follow up visit post Autologous Stem Cell Transplant. Today is + 22.Initially [employer]  was used to assist with today's visit and later his daughter Brenda joined the visit.Brenda was the  for rest of the visit.Denies fever, chills, SOB,chest pain,dysuria,vomiting, diarrhea,mouth sores,edema or any signs of active bleeding. He states that he has occasional nausea relieved with Zofran.He also states that he has poor appetite and generalized weakness. \par \par 11/25/22:Patient is seen for a follow up visit.Today is Day +29.[employer]  [place] # [id number] assisted in the beginning for today's visit and later patient's daughter ,Brenda joined the visit and she was the  for the rest of the visit with patient's consent.He is on a wheel chair today.He felt dizzy yesterday while brushing teeth and had a syncopal episode.Daughter Brenda found him sitting on the floor but patient states that he fell down and when he regained consciousness he pulled himself to sitting position.He has mild stiffness of neck today;denies pain, edema or hitting head.He continues to have poor appetite and has generalized weakness.Denies fever, chills, SOB,chest pain, rash,mouth sores,nausea,vomiting, diarrhea or any signs of active bleeding.\par \par On 12/8/22 visit, day + 42 post transplant. Overall well and only complaint today is constipation. Denies fever, chills, SOB,chest pain, rash,mouth sores,nausea,vomiting, diarrhea or any signs of active bleeding. Remains compliant with post transplant medications. Remains compliant with post transplant diet and crowd restrictions. Better with low dose dex [FreeTextEntry1] : 71 yo also  being treated for asymptomatic  positve quantiferon gold test for tb done by Dr. Mcdaniel  grew up  in [place]  no history of TB or exposure .. normal chest xray.\par

## 2022-12-14 ENCOUNTER — APPOINTMENT (OUTPATIENT)
Dept: HEMATOLOGY ONCOLOGY | Facility: CLINIC | Age: 72
End: 2022-12-14

## 2022-12-15 ENCOUNTER — APPOINTMENT (OUTPATIENT)
Dept: HEMATOLOGY ONCOLOGY | Facility: CLINIC | Age: 72
End: 2022-12-15

## 2022-12-15 ENCOUNTER — RESULT REVIEW (OUTPATIENT)
Age: 72
End: 2022-12-15

## 2022-12-15 VITALS
WEIGHT: 139.33 LBS | DIASTOLIC BLOOD PRESSURE: 72 MMHG | HEART RATE: 116 BPM | BODY MASS INDEX: 21.82 KG/M2 | OXYGEN SATURATION: 98 % | TEMPERATURE: 96.4 F | SYSTOLIC BLOOD PRESSURE: 105 MMHG | RESPIRATION RATE: 16 BRPM

## 2022-12-15 LAB
BASOPHILS # BLD AUTO: 0.01 K/UL — SIGNIFICANT CHANGE UP (ref 0–0.2)
BASOPHILS NFR BLD AUTO: 0.2 % — SIGNIFICANT CHANGE UP (ref 0–2)
EOSINOPHIL # BLD AUTO: 0.01 K/UL — SIGNIFICANT CHANGE UP (ref 0–0.5)
EOSINOPHIL NFR BLD AUTO: 0.2 % — SIGNIFICANT CHANGE UP (ref 0–6)
HCT VFR BLD CALC: 46.5 % — SIGNIFICANT CHANGE UP (ref 39–50)
HGB BLD-MCNC: 14.5 G/DL — SIGNIFICANT CHANGE UP (ref 13–17)
IMM GRANULOCYTES NFR BLD AUTO: 0.2 % — SIGNIFICANT CHANGE UP (ref 0–0.9)
LYMPHOCYTES # BLD AUTO: 1.25 K/UL — SIGNIFICANT CHANGE UP (ref 1–3.3)
LYMPHOCYTES # BLD AUTO: 25.4 % — SIGNIFICANT CHANGE UP (ref 13–44)
MCHC RBC-ENTMCNC: 25.8 PG — LOW (ref 27–34)
MCHC RBC-ENTMCNC: 31.2 G/DL — LOW (ref 32–36)
MCV RBC AUTO: 82.9 FL — SIGNIFICANT CHANGE UP (ref 80–100)
MONOCYTES # BLD AUTO: 0.38 K/UL — SIGNIFICANT CHANGE UP (ref 0–0.9)
MONOCYTES NFR BLD AUTO: 7.7 % — SIGNIFICANT CHANGE UP (ref 2–14)
NEUTROPHILS # BLD AUTO: 3.26 K/UL — SIGNIFICANT CHANGE UP (ref 1.8–7.4)
NEUTROPHILS NFR BLD AUTO: 66.3 % — SIGNIFICANT CHANGE UP (ref 43–77)
NRBC # BLD: 0 /100 WBCS — SIGNIFICANT CHANGE UP (ref 0–0)
PLATELET # BLD AUTO: 277 K/UL — SIGNIFICANT CHANGE UP (ref 150–400)
RBC # BLD: 5.61 M/UL — SIGNIFICANT CHANGE UP (ref 4.2–5.8)
RBC # FLD: 17.8 % — HIGH (ref 10.3–14.5)
WBC # BLD: 4.92 K/UL — SIGNIFICANT CHANGE UP (ref 3.8–10.5)
WBC # FLD AUTO: 4.92 K/UL — SIGNIFICANT CHANGE UP (ref 3.8–10.5)

## 2022-12-15 PROCEDURE — 99214 OFFICE O/P EST MOD 30 MIN: CPT

## 2022-12-15 NOTE — PHYSICAL EXAM
[Ambulatory and capable of all self care but unable to carry out any work activities] : Status 2- Ambulatory and capable of all self care but unable to carry out any work activities. Up and about more than 50% of waking hours [Normal] : affect appropriate [de-identified] : Dryness

## 2022-12-15 NOTE — ASSESSMENT
[FreeTextEntry1] : 71 yo male with hx of BPH, HTN, DM, DVT, CVA and MM who presents for f/u to initial eval for high dose chemo and auto stem cell transplant...receiving induction therapy with good response..BM BX from May reviewed...VGPR likely.....I had another  extensive discussion with the pt and his step daughter....he lives with her and his wife...regarding the risks, benefits, alternatives, logistics and rationale for auto stem cell transplant. Mobilization with GF vs chemo plus GF discussed...Goal for stem cell collection of approx 5 million per kg discussed...need for shiley catheter discussed...3 week hospital stay and 6 week recovery discussed..jose 200 prep....pre testing and orientation discussed...literature and consents provided for review....quests addressed...pt is leaning towards moving forward..I do believe he would be a good candidate pending vital oragn function...testing in progress...will arrange for orientation and sw eval.....he has good family support ....will f/u with pt upon appt for growth factor teaching.....will d/w Dr Goldberg...\par \par 10/7/22:\par Mr. Cespedes was seen for a teaching visit pre stem cell Mobilization\par CBC reviewed;counts stable\par He will receive Zarxio 780 mcg subcutaneous daily 10/7/22 -10/11/22\par Explained to patient and his daughter Brenda how to administer Zarxio.Patient stated that Zarxio will be administered by Brenda.Most common side effects explained.Today's dose of Zarxio administered by ELLIOT Cook\par Shiley catheter and stem cell collection has been scheduled on 10/11/22 at [facility]\par He will have COVID -19 PCR testing on 10/8/22\par Recommended Coy  to take Claritin daily for 5 days starting today (10/7/22)\par May take Tylenol PRN for pain or fever\par Avoid Advil, Aleve or Aspirin\par Encouraged to increase calcium in diet over the next few days\par Questions and concerns addressed\par Reassurance provided\par Tentative hospital admission on 10/24/22 and Auto stem cell transplant on 10/27/22\par \par 10/17/22\par Patient presents for a pre admission visit. Status post stem collection in preparation for Auto PBSCT. \par CBC stable and reviewed with patient. No indication for transfusion.\par Kepivance scheduled for 10/21/22 and 10/22/22. Admission labs and COVID 19 PCR will be sent on 10/21/22.\par Admission at [facility] on 10/24/22.\par Transplant date of 10/28/22.\par Disease time of transplant: [place]\par \par Discontinue blood thinners on 10/21/22.\par Questions and concerns addressed. Reassurance provided.\par Medication list reconciled.\par Return on 10/21/22 for Kepivance and admission labs/COVID 19 PCR. \par \par 12/15/22:\par Multiple Myeloma (C90.00)\par S/P Autologous Stem Cell Transplant (Z94.84)\par \par 1)MM\par  S/P Autologous PBSCT on 10/27/22\par  Labs sent today\par  Send labs with every visit\par \par 2) Heme\par    Counts stable, no transfusion requirements today\par    Continue  Multiple Vitamin and Folic acid \par  \par 3) ID\par    Continue prophylaxis\par    Acyclovir 400 mg 1 tab orally every 8 hours \par    Atovaquone 750 mg/5 mL oral suspension 5 milliliter 2 times a day\par    Diflucan 200 mg 2 tablets once a day \par    Post  transplant restrictions reviewed and reinforced in light of COVID-19 Pandemic\par \par 4) GI\par    Protonix 40 mg oral delayed release tablet 1 tab once a day\par    Zofran as needed for nausea \par    Decrease Dexamethasone from  2 mg daily to 1 mg daily for poor appetite...appetite improved\par    Lactulose PRN for constipation\par    MiraLAX 1 x day for constipation\par    Colace 3 x day\par \par 5) H/O DVT\par     Continue Eliquis 5 mg daily (restarted on 11/18/22)\par \par 6) Hypertension\par     Hydralazine 25 mg 1 tab three times a day\par     Metoprolol Tartrate 25 mg 1 tab 2 times a day\par     Norvasc 10 mg 1 tab once a day\par \par 7) BPH\par     Flomax 0.4 mg 1 cap once a day\par     Proscar 5 mg 1 tab once a day\par \par 8) Dryness of Skin\par      Apply Aquaphor to moisturize skin\par \par 9) Syncope/Fall\par     Follow up with Cariology\par     Xray cervical spine reviewed \par \par 10) Generalized weakness\par     Advised to use cane for walking\par    Advised daughter to have someone with him while he walks and goes to bathroom\par    Continue home PT -  will send new referral  for home PT\par       \par 11) Plan\par   -Increase fluid intake,take laxatives and stool softner as prescribed for constipation\par   -Safety precautions to be  taken while walking and changing positions\par   -Educated about plan of care,all questions and concerns addressed\par  - Advised to call office with any acute concerns\par  - Advised to have small frequent meals/snacks\par   -Reinstated to avoid crowd\par  -Reinstated dietary restrictions: No restaurant or take out food at this time, only home cooked, prepared/frozen         food. Allowed to have fresh baked pizza right out of the oven which is the ONLY takeout food at this time.\par  - Additional Instructions: Notify if bleeding; swelling; persistent nausea and vomiting; unable to  urinate; pain not        relieved by medications; fever; numbness, tingling; excessive diarrhea, inability to tolerate liquids  or foods;             increased irritability or sluggishness, rash\par  -Follow up in one week\par \par I examined patient under Dr. Gonzales's supervision and Dr. Gonzales agrees to plan of care as listed above\par \par \par \par \par \par

## 2022-12-15 NOTE — HISTORY OF PRESENT ILLNESS
[de-identified] : Dx Multiple myeloma Dec 2021..presented with anemia and back pain nov 2021\par bm bx 70% involvement with monoclonal plasma cells IgG kappa...repeat bm bx May 2022 with 1 to 2 % involvement\par mspike 8 grams\par PET confirmed lytic lesions\par will confirmvcreat and calcium were normal upon dx\par s/p rvd x 6....now holding rev...pending decision on transplant\par \par 11/18/22: S/P Autologous Stem Cell Transplant on 10/27/22.Discharge summary as follows:\par Discharge Date    15-Nov-2022\par Admission Date    24-Oct-2022 \par Reason for Admission:Autologous peripheral blood stem cell transplant with high\par dose Melphalan prep regimen for treatment of multiple myeloma\par Hospital Course   \par This is a 72 year old male with multiple myeloma admitted for an autologous\par peripheral blood stem cell transplant with high dose Melphalan prep regimen.\par Hematologic history as follows: Initially presented in 11/2021 with anemia and\par back pain, diagnosed with multiple myeloma 12/2021.A bone marrow biopsy at that\par time showed 70% involvement with monoclonal plasma cells (IgG kappa), improved\par to 1-2% involvement with bone marrow biopsy 5/2022 after treatment with RVD x\par 6.\par Upon admission, a TLC was placed in IR. Mr. Cespedes received IV hydration, pain\par management, nutritional support and antibacterial / antiviral / antifungal /\par PCP / GI and VOD (SOS) prophylaxis. Labs were monitored on a daily basis, and\par he received transfusional support and electrolyte repletion as needed.\par An admission urinalysis was positive for nitrites and leukocyte esterase and he\par was started on prophylactic ciprofloxacin. On 11/4/22, his urine culture grew\par gram negative rods (>100K enterobacter cloacae). The ciprofloxacin was\par broadened to cefepime. Mr. Cespedes experienced pancytopenia related to the high\par dose chemotherapy prep regimen, and neutropenic fever. Blood cultures and CXR\par were negative. Repeat urine culture on 11/11/22 was negative.\par On 10/27/22, after pre-medication, Mr. Cespedes received 304ml of autologous,\par pooled, thawed, washed, mobilized, plasma reduced, HPC apheresis over\par approximately 1 hour. Cell counts as follows:\par Total MNCs (x 10^8/kg) = 6.86\par CD34+ cells (x10^6/kg)= 5.85\par Cell viability (%) = 76%\par He tolerated the infusion well with no adverse reactions noted.\par Early engraftment was noted on 11/5/22. Engraftment was noted on 11/8/22. The\par daily zarxio was discontinued on 11/10/22. Cefepime was discontinued on\par 11/11/22.\par Currently, Mr. Cespedes is stable for discharge home with outpatient follow up\par at the [facility].\par \par \par \par  [de-identified] : Patient here with his daughter for f/u to  initial eval for high dose chemotherapy and auto stem cell transplant for MM...currently off treatment with velcade and dex...rev also on hold pending decision for auto transplant...he returns for [place]...\par \par 10/7/22: Patient is here for a teaching visit prior to stem cell mobilization.He is accompanied by his daughter Brenda.Denies fever, chills,SOB,chest pain,edema,rash,mouth sores,nausea,vomiting, diarrhea or any signs of active bleeding. \par \par On 10/17/22, patient presents for a pre admission visit. Overall patient is well and offers no acute concerns. Denies fever, chills, nausea, vomiting, diarrhea, rash, mouth sores, dysuria or any signs of active bleeding. Denies SOB, chest pain or B/L LE edema. \par \par 11/18/22: Patient is here for a follow up visit post Autologous Stem Cell Transplant. Today is + 22.Initially [employer]  was used to assist with today's visit and later his daughter Brenda joined the visit.Brenda was the  for rest of the visit.Denies fever, chills, SOB,chest pain,dysuria,vomiting, diarrhea,mouth sores,edema or any signs of active bleeding. He states that he has occasional nausea relieved with Zofran.He also states that he has poor appetite and generalized weakness. \par \par 11/25/22:Patient is seen for a follow up visit.Today is Day +29.[employer]  [place] # [id number] assisted in the beginning for today's visit and later patient's daughter ,Brenda joined the visit and she was the  for the rest of the visit with patient's consent.He is on a wheel chair today.He felt dizzy yesterday while brushing teeth and had a syncopal episode.Daughter Brenda found him sitting on the floor but patient states that he fell down and when he regained consciousness he pulled himself to sitting position.He has mild stiffness of neck today;denies pain, edema or hitting head.He continues to have poor appetite and has generalized weakness.Denies fever, chills, SOB,chest pain, rash,mouth sores,nausea,vomiting, diarrhea or any signs of active bleeding.\par \par On 12/8/22 visit, day + 42 post transplant. Overall well and only complaint today is constipation. Denies fever, chills, SOB,chest pain, rash,mouth sores,nausea,vomiting, diarrhea or any signs of active bleeding. Remains compliant with post transplant medications. Remains compliant with post transplant diet and crowd restrictions. Better with low dose dex\par \par 12/15/22: Today is Day +49 post stem cell transplant.He is accompanied by his daughter Brenda.Denies fever, chills, SOB,chest pain, dysuria,nausea,vomiting, diarrhea, rash, mouth sores,edema or any signs of active bleeding.He states that he continues to heave generalized weakness and uses a cane to walk. Home PT ended this week.He has constipation despite taking Lactulose. [FreeTextEntry1] : 71 yo also  being treated for asymptomatic  positve quantiferon gold test for tb done by Dr. Mcdaniel  grew up  in [place]  no history of TB or exposure .. normal chest xray.\par

## 2022-12-16 LAB
ALBUMIN SERPL ELPH-MCNC: 4.9 G/DL
ALP BLD-CCNC: 119 U/L
ALT SERPL-CCNC: 31 U/L
ANION GAP SERPL CALC-SCNC: 19 MMOL/L
AST SERPL-CCNC: 20 U/L
BILIRUB SERPL-MCNC: 0.4 MG/DL
BUN SERPL-MCNC: 29 MG/DL
CALCIUM SERPL-MCNC: 10.2 MG/DL
CHLORIDE SERPL-SCNC: 95 MMOL/L
CO2 SERPL-SCNC: 23 MMOL/L
CREAT SERPL-MCNC: 1.31 MG/DL
EGFR: 57 ML/MIN/1.73M2
GLUCOSE SERPL-MCNC: 249 MG/DL
LDH SERPL-CCNC: 222 U/L
MAGNESIUM SERPL-MCNC: 2.4 MG/DL
POTASSIUM SERPL-SCNC: 5.3 MMOL/L
PROT SERPL-MCNC: 7.8 G/DL
SODIUM SERPL-SCNC: 137 MMOL/L

## 2022-12-21 ENCOUNTER — RESULT REVIEW (OUTPATIENT)
Age: 72
End: 2022-12-21

## 2022-12-21 ENCOUNTER — APPOINTMENT (OUTPATIENT)
Dept: HEMATOLOGY ONCOLOGY | Facility: CLINIC | Age: 72
End: 2022-12-21

## 2022-12-21 ENCOUNTER — LABORATORY RESULT (OUTPATIENT)
Age: 72
End: 2022-12-21

## 2022-12-21 VITALS
DIASTOLIC BLOOD PRESSURE: 72 MMHG | WEIGHT: 139 LBS | RESPIRATION RATE: 16 BRPM | TEMPERATURE: 96.9 F | OXYGEN SATURATION: 98 % | HEART RATE: 107 BPM | SYSTOLIC BLOOD PRESSURE: 119 MMHG | BODY MASS INDEX: 21.77 KG/M2

## 2022-12-21 LAB
BASOPHILS # BLD AUTO: 0.02 K/UL — SIGNIFICANT CHANGE UP (ref 0–0.2)
BASOPHILS NFR BLD AUTO: 0.3 % — SIGNIFICANT CHANGE UP (ref 0–2)
EOSINOPHIL # BLD AUTO: 0.15 K/UL — SIGNIFICANT CHANGE UP (ref 0–0.5)
EOSINOPHIL NFR BLD AUTO: 2.5 % — SIGNIFICANT CHANGE UP (ref 0–6)
HCT VFR BLD CALC: 42.1 % — SIGNIFICANT CHANGE UP (ref 39–50)
HGB BLD-MCNC: 13.9 G/DL — SIGNIFICANT CHANGE UP (ref 13–17)
IMM GRANULOCYTES NFR BLD AUTO: 0.5 % — SIGNIFICANT CHANGE UP (ref 0–0.9)
LYMPHOCYTES # BLD AUTO: 1.68 K/UL — SIGNIFICANT CHANGE UP (ref 1–3.3)
LYMPHOCYTES # BLD AUTO: 28.3 % — SIGNIFICANT CHANGE UP (ref 13–44)
MCHC RBC-ENTMCNC: 26.4 PG — LOW (ref 27–34)
MCHC RBC-ENTMCNC: 33 G/DL — SIGNIFICANT CHANGE UP (ref 32–36)
MCV RBC AUTO: 79.9 FL — LOW (ref 80–100)
MONOCYTES # BLD AUTO: 0.48 K/UL — SIGNIFICANT CHANGE UP (ref 0–0.9)
MONOCYTES NFR BLD AUTO: 8.1 % — SIGNIFICANT CHANGE UP (ref 2–14)
NEUTROPHILS # BLD AUTO: 3.58 K/UL — SIGNIFICANT CHANGE UP (ref 1.8–7.4)
NEUTROPHILS NFR BLD AUTO: 60.3 % — SIGNIFICANT CHANGE UP (ref 43–77)
NRBC # BLD: 0 /100 WBCS — SIGNIFICANT CHANGE UP (ref 0–0)
PLATELET # BLD AUTO: 211 K/UL — SIGNIFICANT CHANGE UP (ref 150–400)
RBC # BLD: 5.27 M/UL — SIGNIFICANT CHANGE UP (ref 4.2–5.8)
RBC # FLD: 17.4 % — HIGH (ref 10.3–14.5)
WBC # BLD: 5.94 K/UL — SIGNIFICANT CHANGE UP (ref 3.8–10.5)
WBC # FLD AUTO: 5.94 K/UL — SIGNIFICANT CHANGE UP (ref 3.8–10.5)

## 2022-12-21 PROCEDURE — 99215 OFFICE O/P EST HI 40 MIN: CPT

## 2022-12-21 NOTE — PHYSICAL EXAM
[Ambulatory and capable of all self care but unable to carry out any work activities] : Status 2- Ambulatory and capable of all self care but unable to carry out any work activities. Up and about more than 50% of waking hours [Normal] : affect appropriate [de-identified] : Dryness

## 2022-12-21 NOTE — HISTORY OF PRESENT ILLNESS
[de-identified] : Dx Multiple myeloma Dec 2021..presented with anemia and back pain nov 2021\par bm bx 70% involvement with monoclonal plasma cells IgG kappa...repeat bm bx May 2022 with 1 to 2 % involvement\par mspike 8 grams\par PET confirmed lytic lesions\par will confirmvcreat and calcium were normal upon dx\par s/p rvd x 6....now holding rev...pending decision on transplant\par \par 11/18/22: S/P Autologous Stem Cell Transplant on 10/27/22.Discharge summary as follows:\par Discharge Date    15-Nov-2022\par Admission Date    24-Oct-2022 \par Reason for Admission:Autologous peripheral blood stem cell transplant with high\par dose Melphalan prep regimen for treatment of multiple myeloma\par Hospital Course   \par This is a 72 year old male with multiple myeloma admitted for an autologous\par peripheral blood stem cell transplant with high dose Melphalan prep regimen.\par Hematologic history as follows: Initially presented in 11/2021 with anemia and\par back pain, diagnosed with multiple myeloma 12/2021.A bone marrow biopsy at that\par time showed 70% involvement with monoclonal plasma cells (IgG kappa), improved\par to 1-2% involvement with bone marrow biopsy 5/2022 after treatment with RVD x\par 6.\par Upon admission, a TLC was placed in IR. Mr. Cespedes received IV hydration, pain\par management, nutritional support and antibacterial / antiviral / antifungal /\par PCP / GI and VOD (SOS) prophylaxis. Labs were monitored on a daily basis, and\par he received transfusional support and electrolyte repletion as needed.\par An admission urinalysis was positive for nitrites and leukocyte esterase and he\par was started on prophylactic ciprofloxacin. On 11/4/22, his urine culture grew\par gram negative rods (>100K enterobacter cloacae). The ciprofloxacin was\par broadened to cefepime. Mr. Cespedes experienced pancytopenia related to the high\par dose chemotherapy prep regimen, and neutropenic fever. Blood cultures and CXR\par were negative. Repeat urine culture on 11/11/22 was negative.\par On 10/27/22, after pre-medication, Mr. Cespedes received 304ml of autologous,\par pooled, thawed, washed, mobilized, plasma reduced, HPC apheresis over\par approximately 1 hour. Cell counts as follows:\par Total MNCs (x 10^8/kg) = 6.86\par CD34+ cells (x10^6/kg)= 5.85\par Cell viability (%) = 76%\par He tolerated the infusion well with no adverse reactions noted.\par Early engraftment was noted on 11/5/22. Engraftment was noted on 11/8/22. The\par daily zarxio was discontinued on 11/10/22. Cefepime was discontinued on\par 11/11/22.\par Currently, Mr. Cespedes is stable for discharge home with outpatient follow up\par at the [facility].\par \par \par \par  [de-identified] : Patient here with his daughter for f/u to  initial eval for high dose chemotherapy and auto stem cell transplant for MM...currently off treatment with velcade and dex...rev also on hold pending decision for auto transplant...he returns for [place]...\par \par 10/7/22: Patient is here for a teaching visit prior to stem cell mobilization.He is accompanied by his daughter Brenda.Denies fever, chills,SOB,chest pain,edema,rash,mouth sores,nausea,vomiting, diarrhea or any signs of active bleeding. \par \par On 10/17/22, patient presents for a pre admission visit. Overall patient is well and offers no acute concerns. Denies fever, chills, nausea, vomiting, diarrhea, rash, mouth sores, dysuria or any signs of active bleeding. Denies SOB, chest pain or B/L LE edema. \par \par 11/18/22: Patient is here for a follow up visit post Autologous Stem Cell Transplant. Today is + 22.Initially [employer]  was used to assist with today's visit and later his daughter Brenda joined the visit.Brenda was the  for rest of the visit.Denies fever, chills, SOB,chest pain,dysuria,vomiting, diarrhea,mouth sores,edema or any signs of active bleeding. He states that he has occasional nausea relieved with Zofran.He also states that he has poor appetite and generalized weakness. \par \par 11/25/22:Patient is seen for a follow up visit.Today is Day +29.[employer]  [place] # [id number] assisted in the beginning for today's visit and later patient's daughter ,Brenda joined the visit and she was the  for the rest of the visit with patient's consent.He is on a wheel chair today.He felt dizzy yesterday while brushing teeth and had a syncopal episode.Daughter Brenda found him sitting on the floor but patient states that he fell down and when he regained consciousness he pulled himself to sitting position.He has mild stiffness of neck today;denies pain, edema or hitting head.He continues to have poor appetite and has generalized weakness.Denies fever, chills, SOB,chest pain, rash,mouth sores,nausea,vomiting, diarrhea or any signs of active bleeding.\par \par On 12/8/22 visit, day + 42 post transplant. Overall well and only complaint today is constipation. Denies fever, chills, SOB,chest pain, rash,mouth sores,nausea,vomiting, diarrhea or any signs of active bleeding. Remains compliant with post transplant medications. Remains compliant with post transplant diet and crowd restrictions. Better with low dose dex\par \par 12/21//22:  post stem cell transplant.He is accompanied by his daughter Brenda.Denies fever, chills, SOB,chest pain, dysuria,nausea,vomiting, diarrhea, rash, mouth sores,edema or any signs of active bleeding.He states that he feels great... improved generalized weakness ...he  uses a cane to walk. Home PT ended this week.He has constipation improved  taking Lactulose. [FreeTextEntry1] : 73 yo also  being treated for asymptomatic  positve quantiferon gold test for tb done by Dr. Mcdaniel  grew up  in [place]  no history of TB or exposure .. normal chest xray.\par

## 2022-12-21 NOTE — ASSESSMENT
[FreeTextEntry1] : 73 yo male with hx of BPH, HTN, DM, DVT, CVA and MM who presents for f/u to initial eval for high dose chemo and auto stem cell transplant...receiving induction therapy with good response..BM BX from May reviewed...VGPR likely.....I had another  extensive discussion with the pt and his step daughter....he lives with her and his wife...regarding the risks, benefits, alternatives, logistics and rationale for auto stem cell transplant. Mobilization with GF vs chemo plus GF discussed...Goal for stem cell collection of approx 5 million per kg discussed...need for shiley catheter discussed...3 week hospital stay and 6 week recovery discussed..jose 200 prep....pre testing and orientation discussed...literature and consents provided for review....quests addressed...pt is leaning towards moving forward..I do believe he would be a good candidate pending vital oragn function...testing in progress...will arrange for orientation and sw eval.....he has good family support ....will f/u with pt upon appt for growth factor teaching.....will d/w Dr Goldberg...\par \par 10/7/22:\par Mr. Cespedes was seen for a teaching visit pre stem cell Mobilization\par CBC reviewed;counts stable\par He will receive Zarxio 780 mcg subcutaneous daily 10/7/22 -10/11/22\par Explained to patient and his daughter Brenda how to administer Zarxio.Patient stated that Zarxio will be administered by Brenda.Most common side effects explained.Today's dose of Zarxio administered by ELLIOT Cook\par Shiley catheter and stem cell collection has been scheduled on 10/11/22 at [facility]\par He will have COVID -19 PCR testing on 10/8/22\par Recommended Coy  to take Claritin daily for 5 days starting today (10/7/22)\par May take Tylenol PRN for pain or fever\par Avoid Advil, Aleve or Aspirin\par Encouraged to increase calcium in diet over the next few days\par Questions and concerns addressed\par Reassurance provided\par Tentative hospital admission on 10/24/22 and Auto stem cell transplant on 10/27/22\par \par 10/17/22\par Patient presents for a pre admission visit. Status post stem collection in preparation for Auto PBSCT. \par CBC stable and reviewed with patient. No indication for transfusion.\par Kepivance scheduled for 10/21/22 and 10/22/22. Admission labs and COVID 19 PCR will be sent on 10/21/22.\par Admission at [facility] on 10/24/22.\par Transplant date of 10/28/22.\par Disease time of transplant: [place]\par \par Discontinue blood thinners on 10/21/22.\par Questions and concerns addressed. Reassurance provided.\par Medication list reconciled.\par Return on 10/21/22 for Kepivance and admission labs/COVID 19 PCR. \par \par 12/21/22:\par Multiple Myeloma (C90.00)\par S/P Autologous Stem Cell Transplant (Z94.84)\par \par 1)MM\par  S/P Autologous PBSCT on 10/27/22\par  Labs sent today\par  Send labs with every visit\par restaging at 4 to 6 months discussed\par \par 2) Heme\par    Counts stable, no transfusion requirements today\par    Continue  Multiple Vitamin and Folic acid \par  \par 3) ID\par    Continue prophylaxis\par    Acyclovir 400 mg 1 tab orally every 8 hours \par    Atovaquone 750 mg/5 mL oral suspension 5 milliliter 2 times a day\par    Diflucan 200 mg 2 tablets once a day ..2 months\par    Post  transplant restrictions reviewed and reinforced in light of COVID-19 Pandemic\par \par 4) GI\par    Protonix 40 mg oral delayed release tablet 1 tab once a day\par    Zofran as needed for nausea \par    Decrease Dexamethasone from  2 mg daily to 1 mg daily for poor appetite...appetite improved..now change to QOD..and continue taper next visit to two days a week\par    Lactulose PRN for constipation\par    MiraLAX 1 x day for constipation\par    Colace 3 x day\par \par 5) H/O DVT\par     Continue Eliquis 5 mg daily (restarted on 11/18/22)\par \par 6) Hypertension\par     Hydralazine 25 mg 1 tab three times a day\par     Metoprolol Tartrate 25 mg 1 tab 2 times a day\par     Norvasc 10 mg 1 tab once a day\par \par 7) BPH\par     Flomax 0.4 mg 1 cap once a day\par     Proscar 5 mg 1 tab once a day\par \par 8) Dryness of Skin\par      Apply Aquaphor to moisturize skin\par \par 9) Syncope/Fall\par     Follow up with Cariology\par     Xray cervical spine reviewed \par \par 10) Generalized weakness\par     Advised to use cane for walking\par    Advised daughter to have someone with him while he walks and goes to bathroom\par    Continue home PT -  will send new referral  for home PT\par       \par 11) Plan\par   -Increase fluid intake,take laxatives and stool softner as prescribed for constipation\par   -Safety precautions to be  taken while walking and changing positions\par   -Educated about plan of care,all questions and concerns addressed\par  - Advised to call office with any acute concerns\par  - Advised to have small frequent meals/snacks\par   -Reinstated to avoid crowd\par  -Reinstated dietary restrictions: No restaurant or take out food at this time, only home cooked, prepared/frozen         food. Allowed to have fresh baked pizza right out of the oven which is the ONLY takeout food at this time.\par  - Additional Instructions: Notify if bleeding; swelling; persistent nausea and vomiting; unable to  urinate; pain not        relieved by medications; fever; numbness, tingling; excessive diarrhea, inability to tolerate liquids  or foods;             increased irritability or sluggishness, rash\par  -Follow up in 2 weeks\par \par \par \par \par \par \par

## 2023-01-06 ENCOUNTER — RESULT REVIEW (OUTPATIENT)
Age: 73
End: 2023-01-06

## 2023-01-06 ENCOUNTER — APPOINTMENT (OUTPATIENT)
Dept: HEMATOLOGY ONCOLOGY | Facility: CLINIC | Age: 73
End: 2023-01-06
Payer: MEDICARE

## 2023-01-06 ENCOUNTER — APPOINTMENT (OUTPATIENT)
Dept: HEMATOLOGY ONCOLOGY | Facility: CLINIC | Age: 73
End: 2023-01-06

## 2023-01-06 VITALS
SYSTOLIC BLOOD PRESSURE: 122 MMHG | BODY MASS INDEX: 23.89 KG/M2 | RESPIRATION RATE: 16 BRPM | OXYGEN SATURATION: 99 % | WEIGHT: 152.56 LBS | DIASTOLIC BLOOD PRESSURE: 72 MMHG | HEART RATE: 90 BPM | TEMPERATURE: 97 F

## 2023-01-06 LAB
ALBUMIN SERPL ELPH-MCNC: 3.9 G/DL
ALP BLD-CCNC: 91 U/L
ALT SERPL-CCNC: 27 U/L
ANION GAP SERPL CALC-SCNC: 12 MMOL/L
AST SERPL-CCNC: 22 U/L
BASOPHILS # BLD AUTO: 0.02 K/UL — SIGNIFICANT CHANGE UP (ref 0–0.2)
BASOPHILS NFR BLD AUTO: 0.5 % — SIGNIFICANT CHANGE UP (ref 0–2)
BILIRUB SERPL-MCNC: 0.3 MG/DL
BUN SERPL-MCNC: 13 MG/DL
CALCIUM SERPL-MCNC: 9.4 MG/DL
CHLORIDE SERPL-SCNC: 104 MMOL/L
CO2 SERPL-SCNC: 24 MMOL/L
CREAT SERPL-MCNC: 1.07 MG/DL
EGFR: 73 ML/MIN/1.73M2
EOSINOPHIL # BLD AUTO: 0.08 K/UL — SIGNIFICANT CHANGE UP (ref 0–0.5)
EOSINOPHIL NFR BLD AUTO: 2 % — SIGNIFICANT CHANGE UP (ref 0–6)
GLUCOSE SERPL-MCNC: 178 MG/DL
HCT VFR BLD CALC: 36.9 % — LOW (ref 39–50)
HGB BLD-MCNC: 11.9 G/DL — LOW (ref 13–17)
IMM GRANULOCYTES NFR BLD AUTO: 0.2 % — SIGNIFICANT CHANGE UP (ref 0–0.9)
LDH SERPL-CCNC: 260 U/L
LYMPHOCYTES # BLD AUTO: 2.4 K/UL — SIGNIFICANT CHANGE UP (ref 1–3.3)
LYMPHOCYTES # BLD AUTO: 58.5 % — HIGH (ref 13–44)
MAGNESIUM SERPL-MCNC: 2 MG/DL
MCHC RBC-ENTMCNC: 27.3 PG — SIGNIFICANT CHANGE UP (ref 27–34)
MCHC RBC-ENTMCNC: 32.2 G/DL — SIGNIFICANT CHANGE UP (ref 32–36)
MCV RBC AUTO: 84.6 FL — SIGNIFICANT CHANGE UP (ref 80–100)
MONOCYTES # BLD AUTO: 0.36 K/UL — SIGNIFICANT CHANGE UP (ref 0–0.9)
MONOCYTES NFR BLD AUTO: 8.8 % — SIGNIFICANT CHANGE UP (ref 2–14)
NEUTROPHILS # BLD AUTO: 1.23 K/UL — LOW (ref 1.8–7.4)
NEUTROPHILS NFR BLD AUTO: 30 % — LOW (ref 43–77)
NRBC # BLD: 0 /100 WBCS — SIGNIFICANT CHANGE UP (ref 0–0)
PLATELET # BLD AUTO: 107 K/UL — LOW (ref 150–400)
POTASSIUM SERPL-SCNC: 4.1 MMOL/L
PROT SERPL-MCNC: 6.5 G/DL
RBC # BLD: 4.36 M/UL — SIGNIFICANT CHANGE UP (ref 4.2–5.8)
RBC # FLD: 18.5 % — HIGH (ref 10.3–14.5)
SODIUM SERPL-SCNC: 140 MMOL/L
WBC # BLD: 4.1 K/UL — SIGNIFICANT CHANGE UP (ref 3.8–10.5)
WBC # FLD AUTO: 4.1 K/UL — SIGNIFICANT CHANGE UP (ref 3.8–10.5)

## 2023-01-06 PROCEDURE — 99214 OFFICE O/P EST MOD 30 MIN: CPT

## 2023-01-06 NOTE — PHYSICAL EXAM
[Ambulatory and capable of all self care but unable to carry out any work activities] : Status 2- Ambulatory and capable of all self care but unable to carry out any work activities. Up and about more than 50% of waking hours [Normal] : full range of motion and no deformities appreciated [de-identified] : Uses a cane to walk [de-identified] : Dryness

## 2023-01-06 NOTE — HISTORY OF PRESENT ILLNESS
[de-identified] : Dx Multiple myeloma Dec 2021..presented with anemia and back pain nov 2021\par bm bx 70% involvement with monoclonal plasma cells IgG kappa...repeat bm bx May 2022 with 1 to 2 % involvement\par mspike 8 grams\par PET confirmed lytic lesions\par will confirmvcreat and calcium were normal upon dx\par s/p rvd x 6....now holding rev...pending decision on transplant\par \par 11/18/22: S/P Autologous Stem Cell Transplant on 10/27/22.Discharge summary as follows:\par Discharge Date    15-Nov-2022\par Admission Date    24-Oct-2022 \par Reason for Admission:Autologous peripheral blood stem cell transplant with high\par dose Melphalan prep regimen for treatment of multiple myeloma\par Hospital Course   \par This is a 72 year old male with multiple myeloma admitted for an autologous\par peripheral blood stem cell transplant with high dose Melphalan prep regimen.\par Hematologic history as follows: Initially presented in 11/2021 with anemia and\par back pain, diagnosed with multiple myeloma 12/2021.A bone marrow biopsy at that\par time showed 70% involvement with monoclonal plasma cells (IgG kappa), improved\par to 1-2% involvement with bone marrow biopsy 5/2022 after treatment with RVD x\par 6.\par Upon admission, a TLC was placed in IR. Mr. Cespedes received IV hydration, pain\par management, nutritional support and antibacterial / antiviral / antifungal /\par PCP / GI and VOD (SOS) prophylaxis. Labs were monitored on a daily basis, and\par he received transfusional support and electrolyte repletion as needed.\par An admission urinalysis was positive for nitrites and leukocyte esterase and he\par was started on prophylactic ciprofloxacin. On 11/4/22, his urine culture grew\par gram negative rods (>100K enterobacter cloacae). The ciprofloxacin was\par broadened to cefepime. Mr. Cespedes experienced pancytopenia related to the high\par dose chemotherapy prep regimen, and neutropenic fever. Blood cultures and CXR\par were negative. Repeat urine culture on 11/11/22 was negative.\par On 10/27/22, after pre-medication, Mr. Cespedes received 304ml of autologous,\par pooled, thawed, washed, mobilized, plasma reduced, HPC apheresis over\par approximately 1 hour. Cell counts as follows:\par Total MNCs (x 10^8/kg) = 6.86\par CD34+ cells (x10^6/kg)= 5.85\par Cell viability (%) = 76%\par He tolerated the infusion well with no adverse reactions noted.\par Early engraftment was noted on 11/5/22. Engraftment was noted on 11/8/22. The\par daily zarxio was discontinued on 11/10/22. Cefepime was discontinued on\par 11/11/22.\par Currently, Mr. Cespedes is stable for discharge home with outpatient follow up\par at the [facility].\par \par \par \par  [de-identified] : Patient here with his daughter for f/u to  initial eval for high dose chemotherapy and auto stem cell transplant for MM...currently off treatment with velcade and dex...rev also on hold pending decision for auto transplant...he returns for [place]...\par \par 10/7/22: Patient is here for a teaching visit prior to stem cell mobilization.He is accompanied by his daughter Brenda.Denies fever, chills,SOB,chest pain,edema,rash,mouth sores,nausea,vomiting, diarrhea or any signs of active bleeding. \par \par On 10/17/22, patient presents for a pre admission visit. Overall patient is well and offers no acute concerns. Denies fever, chills, nausea, vomiting, diarrhea, rash, mouth sores, dysuria or any signs of active bleeding. Denies SOB, chest pain or B/L LE edema. \par \par 11/18/22: Patient is here for a follow up visit post Autologous Stem Cell Transplant. Today is + 22.Initially [employer]  was used to assist with today's visit and later his daughter Brenda joined the visit.Brenda was the  for rest of the visit.Denies fever, chills, SOB,chest pain,dysuria,vomiting, diarrhea,mouth sores,edema or any signs of active bleeding. He states that he has occasional nausea relieved with Zofran.He also states that he has poor appetite and generalized weakness. \par \par 11/25/22:Patient is seen for a follow up visit.Today is Day +29.[employer]  [place] # [id number] assisted in the beginning for today's visit and later patient's daughter ,Brenda joined the visit and she was the  for the rest of the visit with patient's consent.He is on a wheel chair today.He felt dizzy yesterday while brushing teeth and had a syncopal episode.Daughter Brenda found him sitting on the floor but patient states that he fell down and when he regained consciousness he pulled himself to sitting position.He has mild stiffness of neck today;denies pain, edema or hitting head.He continues to have poor appetite and has generalized weakness.Denies fever, chills, SOB,chest pain, rash,mouth sores,nausea,vomiting, diarrhea or any signs of active bleeding.\par \par On 12/8/22 visit, day + 42 post transplant. Overall well and only complaint today is constipation. Denies fever, chills, SOB,chest pain, rash,mouth sores,nausea,vomiting, diarrhea or any signs of active bleeding. Remains compliant with post transplant medications. Remains compliant with post transplant diet and crowd restrictions. Better with low dose dex\par \par 12/21//22:  post stem cell transplant.He is accompanied by his daughter Brenda.Denies fever, chills, SOB,chest pain, dysuria,nausea,vomiting, diarrhea, rash, mouth sores,edema or any signs of active bleeding.He states that he feels great... improved generalized weakness ...he  uses a cane to walk. Home PT ended this week.He has constipation improved  taking Lactulose.\par \par 1/6/23: Today is Day +71 post stem cell transplant.He is accompanied by his daughter Brenda.Brenda was the  for today's visit with patient's consent.Denies fever,chills, SOB,chest pain, rash,mouth sores, nausea, vomiting, diarrhea,edema or any signs of active bleeding. He states that he feels stronger than before but still uses a cane for assistance. [FreeTextEntry1] : 73 yo also  being treated for asymptomatic  positve quantiferon gold test for tb done by Dr. Mcdaniel  grew up  in [place]  no history of TB or exposure .. normal chest xray.\par

## 2023-01-06 NOTE — ASSESSMENT
[FreeTextEntry1] : 73 yo male with hx of BPH, HTN, DM, DVT, CVA and MM who presents for f/u to initial eval for high dose chemo and auto stem cell transplant...receiving induction therapy with good response..BM BX from May reviewed...VGPR likely.....I had another  extensive discussion with the pt and his step daughter....he lives with her and his wife...regarding the risks, benefits, alternatives, logistics and rationale for auto stem cell transplant. Mobilization with GF vs chemo plus GF discussed...Goal for stem cell collection of approx 5 million per kg discussed...need for shiley catheter discussed...3 week hospital stay and 6 week recovery discussed..jose 200 prep....pre testing and orientation discussed...literature and consents provided for review....quests addressed...pt is leaning towards moving forward..I do believe he would be a good candidate pending vital oragn function...testing in progress...will arrange for orientation and sw eval.....he has good family support ....will f/u with pt upon appt for growth factor teaching.....will d/w Dr Goldberg...\par \par 10/7/22:\par Mr. Cespedes was seen for a teaching visit pre stem cell Mobilization\par CBC reviewed;counts stable\par He will receive Zarxio 780 mcg subcutaneous daily 10/7/22 -10/11/22\par Explained to patient and his daughter Brenda how to administer Zarxio.Patient stated that Zarxio will be administered by Brenda.Most common side effects explained.Today's dose of Zarxio administered by ELLIOT Cook\par Shiley catheter and stem cell collection has been scheduled on 10/11/22 at [facility]\par He will have COVID -19 PCR testing on 10/8/22\par Recommended Coy  to take Claritin daily for 5 days starting today (10/7/22)\par May take Tylenol PRN for pain or fever\par Avoid Advil, Aleve or Aspirin\par Encouraged to increase calcium in diet over the next few days\par Questions and concerns addressed\par Reassurance provided\par Tentative hospital admission on 10/24/22 and Auto stem cell transplant on 10/27/22\par \par 10/17/22\par Patient presents for a pre admission visit. Status post stem collection in preparation for Auto PBSCT. \par CBC stable and reviewed with patient. No indication for transfusion.\par Kepivance scheduled for 10/21/22 and 10/22/22. Admission labs and COVID 19 PCR will be sent on 10/21/22.\par Admission at [facility] on 10/24/22.\par Transplant date of 10/28/22.\par Disease time of transplant: [place]\par \par Discontinue blood thinners on 10/21/22.\par Questions and concerns addressed. Reassurance provided.\par Medication list reconciled.\par Return on 10/21/22 for Kepivance and admission labs/COVID 19 PCR. \par \par 1/6/23:\par Multiple Myeloma (C90.00)\par S/P Autologous Stem Cell Transplant (Z94.84)\par \par 1)MM\par  S/P Autologous PBSCT on 10/27/22\par  Labs sent today\par  CBC reviewed;no transfusion requirement today\par  Send labs with every visit\par  Restaging at 4 to 6 months discussed\par \par 2) Heme\par    Counts stable, no transfusion requirements today\par    Continue  Multiple Vitamin and Folic acid \par  \par 3) ID\par    Continue prophylaxis\par    Acyclovir 400 mg 1 tab orally every 8 hours \par    Atovaquone 750 mg/5 mL oral suspension 5 milliliter 2 times a day\par    Diflucan 200 mg 2 tablets once a day ..2 months\par    Post  transplant restrictions reviewed and reinforced in light of COVID-19 Pandemic\par \par 4) GI\par    Protonix 40 mg oral delayed release tablet 1 tab once a day\par    Zofran as needed for nausea \par   Dexamethasone 1 mg QOD for poor appetite...appetite improved..now..... take on Monday,Thursday next week        and STOP\par   Lactulose PRN for constipation\par   MiraLAX 1 x day for constipation\par   Colace 3 x day\par \par 5) H/O DVT\par     Continue Eliquis 5 mg daily (restarted on 11/18/22)\par \par 6) Hypertension\par     Hydralazine 25 mg 1 tab three times a day\par     Metoprolol Tartrate 25 mg 1 tab 2 times a day\par     Norvasc 10 mg 1 tab once a day\par \par 7) BPH\par     Flomax 0.4 mg 1 cap once a day\par     Proscar 5 mg 1 tab once a day\par \par 8) Dryness of Skin\par      Apply Aquaphor to moisturize skin\par \par 9) Syncope/Fall\par     Follow up with Cardiology\par     Xray cervical spine reviewed \par \par 10) Generalized weakness\par      Advised to use cane for walking\par     Advised daughter to have someone with him while he walks and goes to bathroom\par           \par 11) Plan\par   -Safety precautions to be  taken while walking and changing positions\par   -Educated about plan of care,all questions and concerns addressed\par  - Advised to call office with any acute concerns\par  - Advised to have small frequent meals/snacks\par   -Reinstated to avoid crowd\par  -Reinstated dietary restrictions: No restaurant or take out food at this time, only home cooked, prepared/frozen         food. Allowed to have fresh baked pizza right out of the oven which is the ONLY takeout food at this time.\par  - Additional Instructions: Notify if bleeding; swelling; persistent nausea and vomiting; unable to  urinate; pain not        relieved by medications; fever; numbness, tingling; excessive diarrhea, inability to tolerate liquids  or foods;             increased irritability or sluggishness, rash\par  -Follow up in 2 weeks\par \par I examined patient under Dr. Gonzales's supervision and Dr. Gonzales agrees to plan of care as listed above\par \par \par \par \par \par \par \par

## 2023-01-06 NOTE — REVIEW OF SYSTEMS
[Fever] : no fever [Chills] : no chills [Night Sweats] : no night sweats [Fatigue] : no fatigue [Recent Change In Weight] : ~T recent weight change [Abdominal Pain] : no abdominal pain [Vomiting] : no vomiting [Constipation] : no constipation [Diarrhea] : no diarrhea [Joint Pain] : no joint pain [Joint Stiffness] : no joint stiffness [Muscle Pain] : no muscle pain [Muscle Weakness] : no muscle weakness [Negative] : Gastrointestinal [FreeTextEntry2] : Weight gain [FreeTextEntry7] :  appetite improving  [FreeTextEntry9] : Generalized weakness improved,uses a cane to walk

## 2023-01-06 NOTE — REASON FOR VISIT
[Follow-Up Visit] : a follow-up visit for [Family Member] : family member [Patient Declined  Services] : - None: Patient declined  services [FreeTextEntry2] : Multiple Myeloma  [TWNoteComboBox1] : Elenita

## 2023-01-18 ENCOUNTER — APPOINTMENT (OUTPATIENT)
Dept: HEMATOLOGY ONCOLOGY | Facility: CLINIC | Age: 73
End: 2023-01-18
Payer: MEDICARE

## 2023-01-18 ENCOUNTER — RESULT REVIEW (OUTPATIENT)
Age: 73
End: 2023-01-18

## 2023-01-18 ENCOUNTER — LABORATORY RESULT (OUTPATIENT)
Age: 73
End: 2023-01-18

## 2023-01-18 VITALS
OXYGEN SATURATION: 99 % | BODY MASS INDEX: 24.31 KG/M2 | SYSTOLIC BLOOD PRESSURE: 127 MMHG | HEART RATE: 63 BPM | RESPIRATION RATE: 16 BRPM | WEIGHT: 155.18 LBS | TEMPERATURE: 97 F | DIASTOLIC BLOOD PRESSURE: 76 MMHG

## 2023-01-18 LAB
ALBUMIN SERPL ELPH-MCNC: 3.8 G/DL
ALP BLD-CCNC: 103 U/L
ALT SERPL-CCNC: 14 U/L
ANION GAP SERPL CALC-SCNC: 10 MMOL/L
AST SERPL-CCNC: 17 U/L
BASOPHILS # BLD AUTO: 0.02 K/UL — SIGNIFICANT CHANGE UP (ref 0–0.2)
BASOPHILS NFR BLD AUTO: 0.5 % — SIGNIFICANT CHANGE UP (ref 0–2)
BILIRUB SERPL-MCNC: 0.3 MG/DL
BUN SERPL-MCNC: 18 MG/DL
CALCIUM SERPL-MCNC: 9.4 MG/DL
CHLORIDE SERPL-SCNC: 105 MMOL/L
CO2 SERPL-SCNC: 27 MMOL/L
CREAT SERPL-MCNC: 1.1 MG/DL
EGFR: 70 ML/MIN/1.73M2
EOSINOPHIL # BLD AUTO: 0.08 K/UL — SIGNIFICANT CHANGE UP (ref 0–0.5)
EOSINOPHIL NFR BLD AUTO: 1.9 % — SIGNIFICANT CHANGE UP (ref 0–6)
GLUCOSE SERPL-MCNC: 138 MG/DL
HCT VFR BLD CALC: 37.5 % — LOW (ref 39–50)
HGB BLD-MCNC: 11.6 G/DL — LOW (ref 13–17)
IMM GRANULOCYTES NFR BLD AUTO: 0 % — SIGNIFICANT CHANGE UP (ref 0–0.9)
LDH SERPL-CCNC: 234 U/L
LYMPHOCYTES # BLD AUTO: 1.88 K/UL — SIGNIFICANT CHANGE UP (ref 1–3.3)
LYMPHOCYTES # BLD AUTO: 44.2 % — HIGH (ref 13–44)
MAGNESIUM SERPL-MCNC: 2.3 MG/DL
MCHC RBC-ENTMCNC: 26.7 PG — LOW (ref 27–34)
MCHC RBC-ENTMCNC: 30.9 G/DL — LOW (ref 32–36)
MCV RBC AUTO: 86.2 FL — SIGNIFICANT CHANGE UP (ref 80–100)
MONOCYTES # BLD AUTO: 0.61 K/UL — SIGNIFICANT CHANGE UP (ref 0–0.9)
MONOCYTES NFR BLD AUTO: 14.4 % — HIGH (ref 2–14)
NEUTROPHILS # BLD AUTO: 1.66 K/UL — LOW (ref 1.8–7.4)
NEUTROPHILS NFR BLD AUTO: 39 % — LOW (ref 43–77)
NRBC # BLD: 0 /100 WBCS — SIGNIFICANT CHANGE UP (ref 0–0)
PLATELET # BLD AUTO: 131 K/UL — LOW (ref 150–400)
POTASSIUM SERPL-SCNC: 4.5 MMOL/L
PROT SERPL-MCNC: 6.5 G/DL
RBC # BLD: 4.35 M/UL — SIGNIFICANT CHANGE UP (ref 4.2–5.8)
RBC # FLD: 17.5 % — HIGH (ref 10.3–14.5)
SODIUM SERPL-SCNC: 142 MMOL/L
WBC # BLD: 4.25 K/UL — SIGNIFICANT CHANGE UP (ref 3.8–10.5)
WBC # FLD AUTO: 4.25 K/UL — SIGNIFICANT CHANGE UP (ref 3.8–10.5)

## 2023-01-18 PROCEDURE — 99214 OFFICE O/P EST MOD 30 MIN: CPT

## 2023-01-18 RX ORDER — LACTULOSE 10 G/15ML
10 SOLUTION ORAL TWICE DAILY
Qty: 1 | Refills: 1 | Status: DISCONTINUED | COMMUNITY
Start: 2022-12-01 | End: 2023-01-18

## 2023-01-20 LAB
APPEARANCE: ABNORMAL
BILIRUBIN URINE: NEGATIVE
BLOOD URINE: ABNORMAL
COLOR: ABNORMAL
GLUCOSE QUALITATIVE U: ABNORMAL
KETONES URINE: NEGATIVE
LEUKOCYTE ESTERASE URINE: ABNORMAL
NITRITE URINE: NEGATIVE
PH URINE: 6.5
PROTEIN URINE: ABNORMAL
SPECIFIC GRAVITY URINE: 1.02
UROBILINOGEN URINE: NORMAL

## 2023-01-20 NOTE — HISTORY OF PRESENT ILLNESS
[de-identified] : Dx Multiple myeloma Dec 2021..presented with anemia and back pain nov 2021\par bm bx 70% involvement with monoclonal plasma cells IgG kappa...repeat bm bx May 2022 with 1 to 2 % involvement\par mspike 8 grams\par PET confirmed lytic lesions\par will confirmvcreat and calcium were normal upon dx\par s/p rvd x 6....now holding rev...pending decision on transplant\par \par 11/18/22: S/P Autologous Stem Cell Transplant on 10/27/22.Discharge summary as follows:\par Discharge Date    15-Nov-2022\par Admission Date    24-Oct-2022 \par Reason for Admission:Autologous peripheral blood stem cell transplant with high\par dose Melphalan prep regimen for treatment of multiple myeloma\par Hospital Course   \par This is a 72 year old male with multiple myeloma admitted for an autologous\par peripheral blood stem cell transplant with high dose Melphalan prep regimen.\par Hematologic history as follows: Initially presented in 11/2021 with anemia and\par back pain, diagnosed with multiple myeloma 12/2021.A bone marrow biopsy at that\par time showed 70% involvement with monoclonal plasma cells (IgG kappa), improved\par to 1-2% involvement with bone marrow biopsy 5/2022 after treatment with RVD x\par 6.\par Upon admission, a TLC was placed in IR. Mr. Cespedes received IV hydration, pain\par management, nutritional support and antibacterial / antiviral / antifungal /\par PCP / GI and VOD (SOS) prophylaxis. Labs were monitored on a daily basis, and\par he received transfusional support and electrolyte repletion as needed.\par An admission urinalysis was positive for nitrites and leukocyte esterase and he\par was started on prophylactic ciprofloxacin. On 11/4/22, his urine culture grew\par gram negative rods (>100K enterobacter cloacae). The ciprofloxacin was\par broadened to cefepime. Mr. Cespedes experienced pancytopenia related to the high\par dose chemotherapy prep regimen, and neutropenic fever. Blood cultures and CXR\par were negative. Repeat urine culture on 11/11/22 was negative.\par On 10/27/22, after pre-medication, Mr. Cespedes received 304ml of autologous,\par pooled, thawed, washed, mobilized, plasma reduced, HPC apheresis over\par approximately 1 hour. Cell counts as follows:\par Total MNCs (x 10^8/kg) = 6.86\par CD34+ cells (x10^6/kg)= 5.85\par Cell viability (%) = 76%\par He tolerated the infusion well with no adverse reactions noted.\par Early engraftment was noted on 11/5/22. Engraftment was noted on 11/8/22. The\par daily zarxio was discontinued on 11/10/22. Cefepime was discontinued on\par 11/11/22.\par Currently, Mr. Cespedes is stable for discharge home with outpatient follow up\par at the [facility].\par \par \par \par  [de-identified] : Patient here with his daughter for f/u to  initial eval for high dose chemotherapy and auto stem cell transplant for MM...currently off treatment with velcade and dex...rev also on hold pending decision for auto transplant...he returns for [place]...\par \par 10/7/22: Patient is here for a teaching visit prior to stem cell mobilization.He is accompanied by his daughter Brenda.Denies fever, chills,SOB,chest pain,edema,rash,mouth sores,nausea,vomiting, diarrhea or any signs of active bleeding. \par \par On 10/17/22, patient presents for a pre admission visit. Overall patient is well and offers no acute concerns. Denies fever, chills, nausea, vomiting, diarrhea, rash, mouth sores, dysuria or any signs of active bleeding. Denies SOB, chest pain or B/L LE edema. \par \par 11/18/22: Patient is here for a follow up visit post Autologous Stem Cell Transplant. Today is + 22.Initially [employer]  was used to assist with today's visit and later his daughter Brenda joined the visit.Brenda was the  for rest of the visit.Denies fever, chills, SOB,chest pain,dysuria,vomiting, diarrhea,mouth sores,edema or any signs of active bleeding. He states that he has occasional nausea relieved with Zofran.He also states that he has poor appetite and generalized weakness. \par \par 11/25/22:Patient is seen for a follow up visit.Today is Day +29.[employer]  [place] # [id number] assisted in the beginning for today's visit and later patient's daughter ,Brenda joined the visit and she was the  for the rest of the visit with patient's consent.He is on a wheel chair today.He felt dizzy yesterday while brushing teeth and had a syncopal episode.Daughter Brenda found him sitting on the floor but patient states that he fell down and when he regained consciousness he pulled himself to sitting position.He has mild stiffness of neck today;denies pain, edema or hitting head.He continues to have poor appetite and has generalized weakness.Denies fever, chills, SOB,chest pain, rash,mouth sores,nausea,vomiting, diarrhea or any signs of active bleeding.\par \par On 12/8/22 visit, day + 42 post transplant. Overall well and only complaint today is constipation. Denies fever, chills, SOB,chest pain, rash,mouth sores,nausea,vomiting, diarrhea or any signs of active bleeding. Remains compliant with post transplant medications. Remains compliant with post transplant diet and crowd restrictions. Better with low dose dex\par \par 12/21//22:  post stem cell transplant.He is accompanied by his daughter Brenda.Denies fever, chills, SOB,chest pain, dysuria,nausea,vomiting, diarrhea, rash, mouth sores,edema or any signs of active bleeding.He states that he feels great... improved generalized weakness ...he  uses a cane to walk. Home PT ended this week.He has constipation improved  taking Lactulose.\par \par 1/6/23: Today is Day +71 post stem cell transplant.He is accompanied by his daughter Brenda.Brenda was the  for today's visit with patient's consent.Denies fever,chills, SOB,chest pain, rash,mouth sores, nausea, vomiting, diarrhea,edema or any signs of active bleeding. He states that he feels stronger than before but still uses a cane for assistance.\par \par 1/18/23: Today is Day + 83 post stem cell transplant. He is accompanied by his daughter Brenda. Brenda was the  for today's visit with patient's consent. Overall patient is well. Complains of polyuria but, denies dysuria. Denies fever, chills, nausea, vomiting, diarrhea, rash, mouth sores or any signs of active bleeding. Denies SOB, chest pain or B/L LE edema. Remains compliant with post transplant medications. Remains compliant with post transplant diet and crowd restrictions.  [FreeTextEntry1] : 71 yo also  being treated for asymptomatic  positve quantiferon gold test for tb done by Dr. Mcdaniel  grew up  in [place]  no history of TB or exposure .. normal chest xray.\par

## 2023-01-20 NOTE — ASSESSMENT
[FreeTextEntry1] : 1/18/23:\par Multiple Myeloma (C90.00)\par S/P Autologous Stem Cell Transplant (Z94.84)\par \par 1)MM\par  S/P Autologous PBSCT on 10/27/22\par  Labs sent today\par  CBC reviewed;no transfusion requirement today\par  Send labs with every visit\par  Restaging at 4 to 6 months discussed\par \par 2) Heme\par    Counts stable, no transfusion requirements today\par    1/18/23: WBC 4.25  H/H 11.6/37.5    ANC 1.66\par    Continue  Multiple Vitamin and Folic acid \par  \par 3) ID\par    Continue prophylaxis\par    Acyclovir 400 mg 1 tab orally every 8 hours \par    Atovaquone 750 mg/5 mL oral suspension 5 milliliter 2 times a day\par    Diflucan 200 mg 2 tablets once a day ..2 months- DISCONTINUED\par \par \par 4) GI\par    Protonix 40 mg oral delayed release tablet 1 tab once a day\par    Zofran as needed for nausea \par   Dexamethasone 1 mg QOD for poor appetite...appetite improved..now..... take on Monday,Thursday next week        and DISCONTINUED \par   Lactulose PRN for constipation\par   MiraLAX 1 x day for constipation\par   Colace 3 x day\par \par 5) H/O DVT\par     Continue Eliquis 5 mg daily (restarted on 11/18/22)\par \par 6) Hypertension\par     Hydralazine 25 mg 1 tab three times a day\par     Metoprolol Tartrate 25 mg 1 tab 2 times a day\par     Norvasc 10 mg 1 tab once a day\par \par 7) BPH\par     Flomax 0.4 mg 1 cap once a day\par     Proscar 5 mg 1 tab once a day\par \par 8) Dryness of Skin\par      Apply Aquaphor to moisturize skin\par \par 9) Syncope/Fall\par     Follow up with Cardiology\par     Xray cervical spine reviewed \par     RESOLVED\par \par 10) Generalized weakness\par      Advised to use cane for walking\par     Advised daughter to have someone with him while he walks and goes to bathroom\par     \par 11) Polyuria\par      Urinalysis and urine culture sent on 1/18/23- follow up on result.      \par \par 12) Plan\par   -Safety precautions to be  taken while walking and changing positions\par   -Educated about plan of care,all questions and concerns addressed\par  - Advised to call office with any acute concerns\par  - Advised to have small frequent meals/snacks\par   -Reinstated to avoid crowd\par  -Patient may have take out food from a reputable restaurant as of 1/18/23.  \par  - Additional Instructions: Notify if bleeding; swelling; persistent nausea and vomiting; unable to  urinate; pain not        relieved by medications; fever; numbness, tingling; excessive diarrhea, inability to tolerate liquids  or foods;             increased irritability or sluggishness, rash\par  -Follow up in 2 weeks with Dr Goznales\par \par I examined patient under Dr. Gonzales's supervision and Dr. Gonzales agrees to plan of care as listed above\par \par \par \par \par \par \par \par

## 2023-01-20 NOTE — REVIEW OF SYSTEMS
[Negative] : Allergic/Immunologic [Fever] : no fever [Chills] : no chills [Night Sweats] : no night sweats [Fatigue] : no fatigue [Recent Change In Weight] : ~T no recent weight change [Abdominal Pain] : no abdominal pain [Vomiting] : no vomiting [Constipation] : no constipation [Diarrhea] : no diarrhea [Dysuria] : no dysuria [Incontinence] : no incontinence [Joint Pain] : no joint pain [Joint Stiffness] : no joint stiffness [Muscle Pain] : no muscle pain [Muscle Weakness] : no muscle weakness [FreeTextEntry7] :  appetite improving  [FreeTextEntry8] :  polyuria [FreeTextEntry9] : Generalized weakness improved,uses a cane to walk

## 2023-01-20 NOTE — PHYSICAL EXAM
[Ambulatory and capable of all self care but unable to carry out any work activities] : Status 2- Ambulatory and capable of all self care but unable to carry out any work activities. Up and about more than 50% of waking hours [Normal] : affect appropriate [de-identified] : Uses a cane to walk [de-identified] : Dryness

## 2023-01-25 ENCOUNTER — OUTPATIENT (OUTPATIENT)
Dept: OUTPATIENT SERVICES | Facility: HOSPITAL | Age: 73
LOS: 1 days | Discharge: HOME | End: 2023-01-25

## 2023-01-25 DIAGNOSIS — C90.00 MULTIPLE MYELOMA NOT HAVING ACHIEVED REMISSION: ICD-10-CM

## 2023-01-25 DIAGNOSIS — Z98.89 OTHER SPECIFIED POSTPROCEDURAL STATES: Chronic | ICD-10-CM

## 2023-02-03 ENCOUNTER — RESULT REVIEW (OUTPATIENT)
Age: 73
End: 2023-02-03

## 2023-02-03 ENCOUNTER — APPOINTMENT (OUTPATIENT)
Dept: HEMATOLOGY ONCOLOGY | Facility: CLINIC | Age: 73
End: 2023-02-03
Payer: MEDICARE

## 2023-02-03 ENCOUNTER — APPOINTMENT (OUTPATIENT)
Dept: HEMATOLOGY ONCOLOGY | Facility: CLINIC | Age: 73
End: 2023-02-03

## 2023-02-03 VITALS
SYSTOLIC BLOOD PRESSURE: 155 MMHG | TEMPERATURE: 96.8 F | BODY MASS INDEX: 24.69 KG/M2 | WEIGHT: 157.63 LBS | HEART RATE: 83 BPM | DIASTOLIC BLOOD PRESSURE: 77 MMHG | RESPIRATION RATE: 16 BRPM | OXYGEN SATURATION: 99 %

## 2023-02-03 LAB
ALBUMIN SERPL ELPH-MCNC: 4 G/DL
ALP BLD-CCNC: 116 U/L
ALT SERPL-CCNC: 16 U/L
ANION GAP SERPL CALC-SCNC: 13 MMOL/L
APPEARANCE: CLEAR
AST SERPL-CCNC: 21 U/L
BACTERIA: NEGATIVE
BASOPHILS # BLD AUTO: 0.05 K/UL — SIGNIFICANT CHANGE UP (ref 0–0.2)
BASOPHILS NFR BLD AUTO: 0.8 % — SIGNIFICANT CHANGE UP (ref 0–2)
BILIRUB SERPL-MCNC: 0.2 MG/DL
BILIRUBIN URINE: NEGATIVE
BLOOD URINE: NEGATIVE
BUN SERPL-MCNC: 12 MG/DL
CALCIUM SERPL-MCNC: 9.5 MG/DL
CHLORIDE SERPL-SCNC: 104 MMOL/L
CO2 SERPL-SCNC: 23 MMOL/L
COLOR: NORMAL
CREAT SERPL-MCNC: 0.98 MG/DL
EGFR: 81 ML/MIN/1.73M2
EOSINOPHIL # BLD AUTO: 0.4 K/UL — SIGNIFICANT CHANGE UP (ref 0–0.5)
EOSINOPHIL NFR BLD AUTO: 6 % — SIGNIFICANT CHANGE UP (ref 0–6)
GLUCOSE QUALITATIVE U: ABNORMAL
GLUCOSE SERPL-MCNC: 200 MG/DL
HCT VFR BLD CALC: 36 % — LOW (ref 39–50)
HGB BLD-MCNC: 11.5 G/DL — LOW (ref 13–17)
HYALINE CASTS: 0 /LPF
IMM GRANULOCYTES NFR BLD AUTO: 0.3 % — SIGNIFICANT CHANGE UP (ref 0–0.9)
KETONES URINE: NEGATIVE
LDH SERPL-CCNC: 266 U/L
LEUKOCYTE ESTERASE URINE: NEGATIVE
LYMPHOCYTES # BLD AUTO: 2.37 K/UL — SIGNIFICANT CHANGE UP (ref 1–3.3)
LYMPHOCYTES # BLD AUTO: 35.6 % — SIGNIFICANT CHANGE UP (ref 13–44)
MAGNESIUM SERPL-MCNC: 2 MG/DL
MCHC RBC-ENTMCNC: 27.1 PG — SIGNIFICANT CHANGE UP (ref 27–34)
MCHC RBC-ENTMCNC: 31.9 G/DL — LOW (ref 32–36)
MCV RBC AUTO: 84.7 FL — SIGNIFICANT CHANGE UP (ref 80–100)
MICROSCOPIC-UA: NORMAL
MONOCYTES # BLD AUTO: 0.65 K/UL — SIGNIFICANT CHANGE UP (ref 0–0.9)
MONOCYTES NFR BLD AUTO: 9.8 % — SIGNIFICANT CHANGE UP (ref 2–14)
NEUTROPHILS # BLD AUTO: 3.17 K/UL — SIGNIFICANT CHANGE UP (ref 1.8–7.4)
NEUTROPHILS NFR BLD AUTO: 47.5 % — SIGNIFICANT CHANGE UP (ref 43–77)
NITRITE URINE: NEGATIVE
NRBC # BLD: 0 /100 WBCS — SIGNIFICANT CHANGE UP (ref 0–0)
PH URINE: 6
PLATELET # BLD AUTO: 188 K/UL — SIGNIFICANT CHANGE UP (ref 150–400)
POTASSIUM SERPL-SCNC: 4.1 MMOL/L
PROT SERPL-MCNC: 6.4 G/DL
PROTEIN URINE: NEGATIVE
RBC # BLD: 4.25 M/UL — SIGNIFICANT CHANGE UP (ref 4.2–5.8)
RBC # FLD: 16.8 % — HIGH (ref 10.3–14.5)
RED BLOOD CELLS URINE: 1 /HPF
SODIUM SERPL-SCNC: 141 MMOL/L
SPECIFIC GRAVITY URINE: 1.02
SQUAMOUS EPITHELIAL CELLS: 0 /HPF
UROBILINOGEN URINE: NORMAL
WBC # BLD: 6.66 K/UL — SIGNIFICANT CHANGE UP (ref 3.8–10.5)
WBC # FLD AUTO: 6.66 K/UL — SIGNIFICANT CHANGE UP (ref 3.8–10.5)
WHITE BLOOD CELLS URINE: 1 /HPF

## 2023-02-03 PROCEDURE — 99214 OFFICE O/P EST MOD 30 MIN: CPT

## 2023-02-03 NOTE — HISTORY OF PRESENT ILLNESS
[de-identified] : Dx Multiple myeloma Dec 2021..presented with anemia and back pain nov 2021\par bm bx 70% involvement with monoclonal plasma cells IgG kappa...repeat bm bx May 2022 with 1 to 2 % involvement\par mspike 8 grams\par PET confirmed lytic lesions\par will confirmvcreat and calcium were normal upon dx\par s/p rvd x 6....now holding rev...pending decision on transplant\par \par 11/18/22: S/P Autologous Stem Cell Transplant on 10/27/22.Discharge summary as follows:\par Discharge Date    15-Nov-2022\par Admission Date    24-Oct-2022 \par Reason for Admission:Autologous peripheral blood stem cell transplant with high\par dose Melphalan prep regimen for treatment of multiple myeloma\par Hospital Course   \par This is a 72 year old male with multiple myeloma admitted for an autologous\par peripheral blood stem cell transplant with high dose Melphalan prep regimen.\par Hematologic history as follows: Initially presented in 11/2021 with anemia and\par back pain, diagnosed with multiple myeloma 12/2021.A bone marrow biopsy at that\par time showed 70% involvement with monoclonal plasma cells (IgG kappa), improved\par to 1-2% involvement with bone marrow biopsy 5/2022 after treatment with RVD x\par 6.\par Upon admission, a TLC was placed in IR. Mr. Cespedes received IV hydration, pain\par management, nutritional support and antibacterial / antiviral / antifungal /\par PCP / GI and VOD (SOS) prophylaxis. Labs were monitored on a daily basis, and\par he received transfusional support and electrolyte repletion as needed.\par An admission urinalysis was positive for nitrites and leukocyte esterase and he\par was started on prophylactic ciprofloxacin. On 11/4/22, his urine culture grew\par gram negative rods (>100K enterobacter cloacae). The ciprofloxacin was\par broadened to cefepime. Mr. Cespedes experienced pancytopenia related to the high\par dose chemotherapy prep regimen, and neutropenic fever. Blood cultures and CXR\par were negative. Repeat urine culture on 11/11/22 was negative.\par On 10/27/22, after pre-medication, Mr. Cespedes received 304ml of autologous,\par pooled, thawed, washed, mobilized, plasma reduced, HPC apheresis over\par approximately 1 hour. Cell counts as follows:\par Total MNCs (x 10^8/kg) = 6.86\par CD34+ cells (x10^6/kg)= 5.85\par Cell viability (%) = 76%\par He tolerated the infusion well with no adverse reactions noted.\par Early engraftment was noted on 11/5/22. Engraftment was noted on 11/8/22. The\par daily zarxio was discontinued on 11/10/22. Cefepime was discontinued on\par 11/11/22.\par Currently, Mr. Cespedes is stable for discharge home with outpatient follow up\par at the [facility].\par \par \par \par  [de-identified] : Patient here with his daughter for f/u to  initial eval for high dose chemotherapy and auto stem cell transplant for MM...currently off treatment with velcade and dex...rev also on hold pending decision for auto transplant...he returns for [place]...\par \par 10/7/22: Patient is here for a teaching visit prior to stem cell mobilization.He is accompanied by his daughter Brenda.Denies fever, chills,SOB,chest pain,edema,rash,mouth sores,nausea,vomiting, diarrhea or any signs of active bleeding. \par \par On 10/17/22, patient presents for a pre admission visit. Overall patient is well and offers no acute concerns. Denies fever, chills, nausea, vomiting, diarrhea, rash, mouth sores, dysuria or any signs of active bleeding. Denies SOB, chest pain or B/L LE edema. \par \par 11/18/22: Patient is here for a follow up visit post Autologous Stem Cell Transplant. Today is + 22.Initially [employer]  was used to assist with today's visit and later his daughter Brenda joined the visit.Brenda was the  for rest of the visit.Denies fever, chills, SOB,chest pain,dysuria,vomiting, diarrhea,mouth sores,edema or any signs of active bleeding. He states that he has occasional nausea relieved with Zofran.He also states that he has poor appetite and generalized weakness. \par \par 11/25/22:Patient is seen for a follow up visit.Today is Day +29.[employer]  [place] # [id number] assisted in the beginning for today's visit and later patient's daughter ,Brenda joined the visit and she was the  for the rest of the visit with patient's consent.He is on a wheel chair today.He felt dizzy yesterday while brushing teeth and had a syncopal episode.Daughter Brenda found him sitting on the floor but patient states that he fell down and when he regained consciousness he pulled himself to sitting position.He has mild stiffness of neck today;denies pain, edema or hitting head.He continues to have poor appetite and has generalized weakness.Denies fever, chills, SOB,chest pain, rash,mouth sores,nausea,vomiting, diarrhea or any signs of active bleeding.\par \par On 12/8/22 visit, day + 42 post transplant. Overall well and only complaint today is constipation. Denies fever, chills, SOB,chest pain, rash,mouth sores,nausea,vomiting, diarrhea or any signs of active bleeding. Remains compliant with post transplant medications. Remains compliant with post transplant diet and crowd restrictions. Better with low dose dex\par \par 12/21//22:  post stem cell transplant.He is accompanied by his daughter Brenda.Denies fever, chills, SOB,chest pain, dysuria,nausea,vomiting, diarrhea, rash, mouth sores,edema or any signs of active bleeding.He states that he feels great... improved generalized weakness ...he  uses a cane to walk. Home PT ended this week.He has constipation improved  taking Lactulose.\par \par 1/6/23: Today is Day +71 post stem cell transplant.He is accompanied by his daughter Brenda.Brenda was the  for today's visit with patient's consent.Denies fever,chills, SOB,chest pain, rash,mouth sores, nausea, vomiting, diarrhea,edema or any signs of active bleeding. He states that he feels stronger than before but still uses a cane for assistance.\par \par 1/18/23: Today is Day + 83 post stem cell transplant. He is accompanied by his daughter Brenda. Brenda was the  for today's visit with patient's consent. Overall patient is well. Complains of polyuria but, denies dysuria. Denies fever, chills, nausea, vomiting, diarrhea, rash, mouth sores or any signs of active bleeding. Denies SOB, chest pain or B/L LE edema. Remains compliant with post transplant medications. Remains compliant with post transplant diet and crowd restrictions. \par \par 2/3/23: Patient is here for a follow up visit .He is accompanied by his daughter Brenda who is the  for today's visit with patient's consent.Today is Day +99 post stem cell transplant.Denies fever, chills, SOB,chest pain, nausea, vomiting, diarrhea,rash, mouth sores,edema or any signs of active bleeding.He states that he completed Ciprofloxacin but still continues to have frequency of urination.Denies burning or pain on urination. [FreeTextEntry1] : 73 yo also  being treated for asymptomatic  positve quantiferon gold test for tb done by Dr. Mcdaniel  grew up  in [place]  no history of TB or exposure .. normal chest xray.\par

## 2023-02-03 NOTE — ASSESSMENT
[FreeTextEntry1] : 2/3/23:\par Multiple Myeloma (C90.00)\par S/P Autologous Stem Cell Transplant (Z94.84)\par \par 1)MM\par  S/P Autologous PBSCT on 10/27/22\par  Labs sent today\par  CBC reviewed;no transfusion requirement today\par  Send labs with every visit\par  Restaging at 4 to 6 months discussed\par \par 2) Heme\par    Counts stable, no transfusion requirements today\par    2/3//23: WBC 6.66  H/H 11.5/36.0    ANC 3.17\par    Continue  Multiple Vitamin and Folic acid \par  \par 3) ID\par    Continue prophylaxis\par    Acyclovir 400 mg 1 tab orally every 8 hours \par    Atovaquone 750 mg/5 mL oral suspension 5 milliliter 2 times a day\par    Diflucan 200 mg 2 tablets once a day ..2 months- DISCONTINUED\par \par \par 4) GI\par    Protonix 40 mg oral delayed release tablet 1 tab once a day\par    Zofran as needed for nausea \par   Dexamethasone 1 mg QOD for poor appetite...appetite improved..now.....DISCONTINUED on 1/18/23\par   Lactulose PRN for constipation\par   MiraLAX 1 x day for constipation\par   Colace 3 x day\par \par 5) H/O DVT\par     Continue Eliquis 5 mg daily (restarted on 11/18/22)\par \par 6) Hypertension\par     Hydralazine 25 mg 1 tab three times a day\par     Metoprolol Tartrate 25 mg 1 tab 2 times a day\par     Norvasc 10 mg 1 tab once a day\par     Monitor BP at home\par \par 7) BPH\par     Flomax 0.4 mg 1 cap once a day\par     Proscar 5 mg 1 tab once a day\par \par 8) Dryness of Skin-RESOLVED\par      Apply Aquaphor to moisturize skin\par \par 9) Syncope/Fall\par     Follow up with Cardiology\par     Xray cervical spine reviewed \par     RESOLVED\par \par 10) Generalized weakness- IMPROVING\par      Advised to use cane for walking\par     Advised daughter to have someone with him while he walks and goes to bathroom\par     \par 11) Polyuria\par      Urinalysis and urine culture sent on 1/18/23- completed Ciprofloxacin\par      Urinalysis and Urine culture sent today (2/3/23) -follow up on result  \par      Advised to f/u with Urology \par \par 12) Plan\par   -Monitor BP at home\par  -Follow up with Urology\par   -Safety precautions to be  taken while walking and changing positions\par   -Educated about plan of care,all questions and concerns addressed\par  - Advised to call office with any acute concerns\par  - Advised to have small frequent meals/snacks\par   -Reinstated to avoid crowd\par  -Patient may have take out food from a reputable restaurant as of 1/18/23.  \par  - Additional Instructions: Notify if bleeding; swelling; persistent nausea and vomiting; unable to  urinate; pain not        relieved by medications; fever; numbness, tingling; excessive diarrhea, inability to tolerate liquids  or foods;             increased irritability or sluggishness, rash\par  -Follow up in 2 weeks\par \par I examined patient under Dr. Gonzales's supervision and Dr. Gonzales agrees to plan of care as listed above\par \par \par \par \par \par \par \par

## 2023-02-03 NOTE — REASON FOR VISIT
[Follow-Up Visit] : a follow-up visit for [Family Member] : family member [Ad Hoc ] : provided by an ad hoc  [FreeTextEntry2] : Multiple Myeloma  [Interpreters_Relationshiptopatient] : Daughter Brenda [TWNoteComboBox1] : Elenita

## 2023-02-06 LAB — BACTERIA UR CULT: NORMAL

## 2023-02-17 ENCOUNTER — RESULT REVIEW (OUTPATIENT)
Age: 73
End: 2023-02-17

## 2023-02-17 ENCOUNTER — APPOINTMENT (OUTPATIENT)
Dept: HEMATOLOGY ONCOLOGY | Facility: CLINIC | Age: 73
End: 2023-02-17
Payer: MEDICARE

## 2023-02-17 VITALS
SYSTOLIC BLOOD PRESSURE: 128 MMHG | WEIGHT: 162.04 LBS | HEART RATE: 67 BPM | OXYGEN SATURATION: 97 % | TEMPERATURE: 96.8 F | BODY MASS INDEX: 25.38 KG/M2 | RESPIRATION RATE: 16 BRPM | DIASTOLIC BLOOD PRESSURE: 76 MMHG

## 2023-02-17 LAB
ALBUMIN SERPL ELPH-MCNC: 4.3 G/DL
ALP BLD-CCNC: 113 U/L
ALT SERPL-CCNC: 24 U/L
ANION GAP SERPL CALC-SCNC: 11 MMOL/L
AST SERPL-CCNC: 27 U/L
BASOPHILS # BLD AUTO: 0.03 K/UL — SIGNIFICANT CHANGE UP (ref 0–0.2)
BASOPHILS NFR BLD AUTO: 0.6 % — SIGNIFICANT CHANGE UP (ref 0–2)
BILIRUB SERPL-MCNC: 0.4 MG/DL
BUN SERPL-MCNC: 14 MG/DL
CALCIUM SERPL-MCNC: 9.7 MG/DL
CHLORIDE SERPL-SCNC: 104 MMOL/L
CO2 SERPL-SCNC: 26 MMOL/L
CREAT SERPL-MCNC: 0.97 MG/DL
EGFR: 82 ML/MIN/1.73M2
EOSINOPHIL # BLD AUTO: 0.23 K/UL — SIGNIFICANT CHANGE UP (ref 0–0.5)
EOSINOPHIL NFR BLD AUTO: 4.8 % — SIGNIFICANT CHANGE UP (ref 0–6)
GLUCOSE SERPL-MCNC: 127 MG/DL
HCT VFR BLD CALC: 39.4 % — SIGNIFICANT CHANGE UP (ref 39–50)
HGB BLD-MCNC: 12 G/DL — LOW (ref 13–17)
IMM GRANULOCYTES NFR BLD AUTO: 0.2 % — SIGNIFICANT CHANGE UP (ref 0–0.9)
LDH SERPL-CCNC: 232 U/L
LYMPHOCYTES # BLD AUTO: 2.1 K/UL — SIGNIFICANT CHANGE UP (ref 1–3.3)
LYMPHOCYTES # BLD AUTO: 43.6 % — SIGNIFICANT CHANGE UP (ref 13–44)
MAGNESIUM SERPL-MCNC: 2.1 MG/DL
MCHC RBC-ENTMCNC: 26.8 PG — LOW (ref 27–34)
MCHC RBC-ENTMCNC: 30.5 G/DL — LOW (ref 32–36)
MCV RBC AUTO: 88.1 FL — SIGNIFICANT CHANGE UP (ref 80–100)
MONOCYTES # BLD AUTO: 0.46 K/UL — SIGNIFICANT CHANGE UP (ref 0–0.9)
MONOCYTES NFR BLD AUTO: 9.5 % — SIGNIFICANT CHANGE UP (ref 2–14)
NEUTROPHILS # BLD AUTO: 1.99 K/UL — SIGNIFICANT CHANGE UP (ref 1.8–7.4)
NEUTROPHILS NFR BLD AUTO: 41.3 % — LOW (ref 43–77)
NRBC # BLD: 0 /100 WBCS — SIGNIFICANT CHANGE UP (ref 0–0)
PLATELET # BLD AUTO: 224 K/UL — SIGNIFICANT CHANGE UP (ref 150–400)
POTASSIUM SERPL-SCNC: 4.5 MMOL/L
PROT SERPL-MCNC: 6.6 G/DL
RBC # BLD: 4.47 M/UL — SIGNIFICANT CHANGE UP (ref 4.2–5.8)
RBC # FLD: 16.3 % — HIGH (ref 10.3–14.5)
SODIUM SERPL-SCNC: 141 MMOL/L
WBC # BLD: 4.82 K/UL — SIGNIFICANT CHANGE UP (ref 3.8–10.5)
WBC # FLD AUTO: 4.82 K/UL — SIGNIFICANT CHANGE UP (ref 3.8–10.5)

## 2023-02-17 PROCEDURE — 99214 OFFICE O/P EST MOD 30 MIN: CPT

## 2023-02-17 NOTE — HISTORY OF PRESENT ILLNESS
[de-identified] : Dx Multiple myeloma Dec 2021..presented with anemia and back pain nov 2021\par bm bx 70% involvement with monoclonal plasma cells IgG kappa...repeat bm bx May 2022 with 1 to 2 % involvement\par mspike 8 grams\par PET confirmed lytic lesions\par will confirmvcreat and calcium were normal upon dx\par s/p rvd x 6....now holding rev...pending decision on transplant\par \par 11/18/22: S/P Autologous Stem Cell Transplant on 10/27/22.Discharge summary as follows:\par Discharge Date    15-Nov-2022\par Admission Date    24-Oct-2022 \par Reason for Admission:Autologous peripheral blood stem cell transplant with high\par dose Melphalan prep regimen for treatment of multiple myeloma\par Hospital Course   \par This is a 72 year old male with multiple myeloma admitted for an autologous\par peripheral blood stem cell transplant with high dose Melphalan prep regimen.\par Hematologic history as follows: Initially presented in 11/2021 with anemia and\par back pain, diagnosed with multiple myeloma 12/2021.A bone marrow biopsy at that\par time showed 70% involvement with monoclonal plasma cells (IgG kappa), improved\par to 1-2% involvement with bone marrow biopsy 5/2022 after treatment with RVD x\par 6.\par Upon admission, a TLC was placed in IR. Mr. Cespedes received IV hydration, pain\par management, nutritional support and antibacterial / antiviral / antifungal /\par PCP / GI and VOD (SOS) prophylaxis. Labs were monitored on a daily basis, and\par he received transfusional support and electrolyte repletion as needed.\par An admission urinalysis was positive for nitrites and leukocyte esterase and he\par was started on prophylactic ciprofloxacin. On 11/4/22, his urine culture grew\par gram negative rods (>100K enterobacter cloacae). The ciprofloxacin was\par broadened to cefepime. Mr. Cespedes experienced pancytopenia related to the high\par dose chemotherapy prep regimen, and neutropenic fever. Blood cultures and CXR\par were negative. Repeat urine culture on 11/11/22 was negative.\par On 10/27/22, after pre-medication, Mr. Cespedes received 304ml of autologous,\par pooled, thawed, washed, mobilized, plasma reduced, HPC apheresis over\par approximately 1 hour. Cell counts as follows:\par Total MNCs (x 10^8/kg) = 6.86\par CD34+ cells (x10^6/kg)= 5.85\par Cell viability (%) = 76%\par He tolerated the infusion well with no adverse reactions noted.\par Early engraftment was noted on 11/5/22. Engraftment was noted on 11/8/22. The\par daily zarxio was discontinued on 11/10/22. Cefepime was discontinued on\par 11/11/22.\par Currently, Mr. Cespedes is stable for discharge home with outpatient follow up\par at the [facility].\par \par \par \par  [de-identified] : Patient here with his daughter for f/u to  initial eval for high dose chemotherapy and auto stem cell transplant for MM...currently off treatment with velcade and dex...rev also on hold pending decision for auto transplant...he returns for [place]...\par \par 10/7/22: Patient is here for a teaching visit prior to stem cell mobilization.He is accompanied by his daughter Brenda.Denies fever, chills,SOB,chest pain,edema,rash,mouth sores,nausea,vomiting, diarrhea or any signs of active bleeding. \par \par On 10/17/22, patient presents for a pre admission visit. Overall patient is well and offers no acute concerns. Denies fever, chills, nausea, vomiting, diarrhea, rash, mouth sores, dysuria or any signs of active bleeding. Denies SOB, chest pain or B/L LE edema. \par \par 11/18/22: Patient is here for a follow up visit post Autologous Stem Cell Transplant. Today is + 22.Initially [employer]  was used to assist with today's visit and later his daughter Brenda joined the visit.Brenda was the  for rest of the visit.Denies fever, chills, SOB,chest pain,dysuria,vomiting, diarrhea,mouth sores,edema or any signs of active bleeding. He states that he has occasional nausea relieved with Zofran.He also states that he has poor appetite and generalized weakness. \par \par 11/25/22:Patient is seen for a follow up visit.Today is Day +29.[employer]  [place] # [id number] assisted in the beginning for today's visit and later patient's daughter ,Brenda joined the visit and she was the  for the rest of the visit with patient's consent.He is on a wheel chair today.He felt dizzy yesterday while brushing teeth and had a syncopal episode.Daughter Brenda found him sitting on the floor but patient states that he fell down and when he regained consciousness he pulled himself to sitting position.He has mild stiffness of neck today;denies pain, edema or hitting head.He continues to have poor appetite and has generalized weakness.Denies fever, chills, SOB,chest pain, rash,mouth sores,nausea,vomiting, diarrhea or any signs of active bleeding.\par \par On 12/8/22 visit, day + 42 post transplant. Overall well and only complaint today is constipation. Denies fever, chills, SOB,chest pain, rash,mouth sores,nausea,vomiting, diarrhea or any signs of active bleeding. Remains compliant with post transplant medications. Remains compliant with post transplant diet and crowd restrictions. Better with low dose dex\par \par 12/21//22:  post stem cell transplant.He is accompanied by his daughter Brenda.Denies fever, chills, SOB,chest pain, dysuria,nausea,vomiting, diarrhea, rash, mouth sores,edema or any signs of active bleeding.He states that he feels great... improved generalized weakness ...he  uses a cane to walk. Home PT ended this week.He has constipation improved  taking Lactulose.\par \par 1/6/23: Today is Day +71 post stem cell transplant.He is accompanied by his daughter Brenda.Brenda was the  for today's visit with patient's consent.Denies fever,chills, SOB,chest pain, rash,mouth sores, nausea, vomiting, diarrhea,edema or any signs of active bleeding. He states that he feels stronger than before but still uses a cane for assistance.\par \par 1/18/23: Today is Day + 83 post stem cell transplant. He is accompanied by his daughter Brenda. Brenda was the  for today's visit with patient's consent. Overall patient is well. Complains of polyuria but, denies dysuria. Denies fever, chills, nausea, vomiting, diarrhea, rash, mouth sores or any signs of active bleeding. Denies SOB, chest pain or B/L LE edema. Remains compliant with post transplant medications. Remains compliant with post transplant diet and crowd restrictions. \par \par 2/3/23: Patient is here for a follow up visit .He is accompanied by his daughter Brenda who is the  for today's visit with patient's consent.Today is Day +99 post stem cell transplant.Denies fever, chills, SOB,chest pain, nausea, vomiting, diarrhea,rash, mouth sores,edema or any signs of active bleeding.He states that he completed Ciprofloxacin but still continues to have frequency of urination.Denies burning or pain on urination.\par \par 2/17/23:Patient is seen for a follow up visit.Today is Day +113 post stem cell transplant.He is accompanied by his daughter Brenda.Overall well with no acute concerns.Denies fever, chills,cough, dysuria,nausea,vomiting, diarrhea, rash, mouth sores, edema,SOB,chest pain,or any signs of active bleeding. [FreeTextEntry1] : 73 yo also  being treated for asymptomatic  positve quantiferon gold test for tb done by Dr. Mcdaniel  grew up  in [place]  no history of TB or exposure .. normal chest xray.\par

## 2023-02-17 NOTE — REVIEW OF SYSTEMS
[Fever] : no fever [Chills] : no chills [Night Sweats] : no night sweats [Fatigue] : no fatigue [Recent Change In Weight] : ~T no recent weight change [Abdominal Pain] : no abdominal pain [Vomiting] : no vomiting [Constipation] : no constipation [Diarrhea] : no diarrhea [Dysuria] : no dysuria [Incontinence] : no incontinence [Joint Pain] : no joint pain [Joint Stiffness] : no joint stiffness [Muscle Pain] : no muscle pain [Muscle Weakness] : no muscle weakness [Negative] : Musculoskeletal [FreeTextEntry7] :  appetite improving  [FreeTextEntry8] :  polyuria

## 2023-02-17 NOTE — ASSESSMENT
[FreeTextEntry1] : 2/3/23:\par Multiple Myeloma (C90.00)\par S/P Autologous Stem Cell Transplant (Z94.84)\par \par 1)MM\par  S/P Autologous PBSCT on 10/27/22\par  Labs sent today\par  CBC reviewed;no transfusion requirement today\par  Send labs with every visit\par  Restaging at 4 to 6 months discussed\par \par 2) Heme\par    Counts stable, no transfusion requirements today\par    2/17/23: WBC 4.82  H/H 12.0/39.4    ANC 1.99\par    Continue  Multiple Vitamin and Folic acid \par  \par 3) ID\par    Continue prophylaxis\par    Acyclovir 400 mg 1 tab orally every 8 hours \par    Atovaquone 750 mg/5 mL oral suspension 5 milliliter 2 times a day\par    Diflucan 200 mg 2 tablets once a day ..2 months- DISCONTINUED\par \par \par 4) GI\par   Protonix 40 mg oral delayed release tablet 1 tab once a day\par   Zofran as needed for nausea \par   Dexamethasone 1 mg QOD for poor appetite...appetite improved..now.....DISCONTINUED on 1/18/23\par   Lactulose PRN for constipation\par   MiraLAX 1 x day for constipation\par   Colace 3 x day\par \par 5) H/O DVT\par     Continue Eliquis 5 mg daily (restarted on 11/18/22)\par \par 6) Hypertension\par     Hydralazine 25 mg 1 tab three times a day\par     Metoprolol Tartrate 25 mg 1 tab 2 times a day\par     Norvasc 10 mg 1 tab once a day\par     Monitor BP at home\par \par 7) BPH\par     Flomax 0.4 mg 1 cap once a day\par     Proscar 5 mg 1 tab once a day\par \par 8) Dryness of Skin-RESOLVED\par      Apply Aquaphor to moisturize skin\par \par 9) Syncope/Fall\par     Follow up with Cardiology\par     Xray cervical spine reviewed \par     RESOLVED\par \par 10) Generalized weakness- RESOLVED\par       \par 11) Polyuria\par      Urinalysis and urine culture sent on 1/18/23- completed Ciprofloxacin\par      Urinalysis and Urine culture sent today (2/3/23) -Negative for UTI\par      Advised to f/u with Urology \par \par 12) Plan\par   -Monitor BP at home\par  -Follow up with Urology\par   -Safety precautions to be  taken while walking and changing positions\par   -Educated about plan of care,all questions and concerns addressed\par  - Advised to call office with any acute concerns\par  - Advised to have small frequent meals/snacks\par   -Reinstated to avoid crowd\par  -Patient may have take out food from a reputable restaurant as of 1/18/23  \par  - Additional Instructions: Notify if bleeding; swelling; persistent nausea and vomiting; unable to  urinate; pain not        relieved by medications; fever; numbness, tingling; excessive diarrhea, inability to tolerate liquids  or foods;             increased irritability or sluggishness, rash\par  -Follow up in 2 weeks with Dr. Gonzales\par \par I examined patient under Dr. Gonzales's supervision and Dr. Gonzales agrees to plan of care as listed above\par \par \par \par \par \par \par \par

## 2023-02-20 NOTE — ED PROVIDER NOTE - NS_EDPROVIDERDISPOUSERTYPE_ED_A_ED
Returned patient call, still had not received her PAT call with her time.  Time provided.  
Improved
Attending Attestation (For Attendings USE Only)...

## 2023-03-01 ENCOUNTER — RESULT REVIEW (OUTPATIENT)
Age: 73
End: 2023-03-01

## 2023-03-01 ENCOUNTER — APPOINTMENT (OUTPATIENT)
Dept: HEMATOLOGY ONCOLOGY | Facility: CLINIC | Age: 73
End: 2023-03-01
Payer: MEDICARE

## 2023-03-01 VITALS
BODY MASS INDEX: 25.45 KG/M2 | WEIGHT: 162.48 LBS | HEART RATE: 62 BPM | OXYGEN SATURATION: 99 % | DIASTOLIC BLOOD PRESSURE: 83 MMHG | RESPIRATION RATE: 16 BRPM | SYSTOLIC BLOOD PRESSURE: 148 MMHG | TEMPERATURE: 97 F

## 2023-03-01 LAB
ALBUMIN SERPL ELPH-MCNC: 4.6 G/DL
ALP BLD-CCNC: 121 U/L
ALT SERPL-CCNC: 17 U/L
ANION GAP SERPL CALC-SCNC: 13 MMOL/L
AST SERPL-CCNC: 22 U/L
B2 MICROGLOB SERPL-MCNC: 2.8 MG/L
BASOPHILS # BLD AUTO: 0.03 K/UL — SIGNIFICANT CHANGE UP (ref 0–0.2)
BASOPHILS NFR BLD AUTO: 0.6 % — SIGNIFICANT CHANGE UP (ref 0–2)
BILIRUB SERPL-MCNC: 0.4 MG/DL
BUN SERPL-MCNC: 22 MG/DL
CALCIUM SERPL-MCNC: 9.8 MG/DL
CHLORIDE SERPL-SCNC: 105 MMOL/L
CO2 SERPL-SCNC: 24 MMOL/L
CREAT SERPL-MCNC: 1.04 MG/DL
EGFR: 75 ML/MIN/1.73M2
EOSINOPHIL # BLD AUTO: 0.28 K/UL — SIGNIFICANT CHANGE UP (ref 0–0.5)
EOSINOPHIL NFR BLD AUTO: 6 % — SIGNIFICANT CHANGE UP (ref 0–6)
GLUCOSE SERPL-MCNC: 118 MG/DL
HCT VFR BLD CALC: 39.1 % — SIGNIFICANT CHANGE UP (ref 39–50)
HGB BLD-MCNC: 12.2 G/DL — LOW (ref 13–17)
IMM GRANULOCYTES NFR BLD AUTO: 0 % — SIGNIFICANT CHANGE UP (ref 0–0.9)
LDH SERPL-CCNC: 220 U/L
LYMPHOCYTES # BLD AUTO: 2.06 K/UL — SIGNIFICANT CHANGE UP (ref 1–3.3)
LYMPHOCYTES # BLD AUTO: 44.1 % — HIGH (ref 13–44)
MAGNESIUM SERPL-MCNC: 2.2 MG/DL
MCHC RBC-ENTMCNC: 27.5 PG — SIGNIFICANT CHANGE UP (ref 27–34)
MCHC RBC-ENTMCNC: 31.2 G/DL — LOW (ref 32–36)
MCV RBC AUTO: 88.3 FL — SIGNIFICANT CHANGE UP (ref 80–100)
MONOCYTES # BLD AUTO: 0.45 K/UL — SIGNIFICANT CHANGE UP (ref 0–0.9)
MONOCYTES NFR BLD AUTO: 9.6 % — SIGNIFICANT CHANGE UP (ref 2–14)
NEUTROPHILS # BLD AUTO: 1.85 K/UL — SIGNIFICANT CHANGE UP (ref 1.8–7.4)
NEUTROPHILS NFR BLD AUTO: 39.7 % — LOW (ref 43–77)
NRBC # BLD: 0 /100 WBCS — SIGNIFICANT CHANGE UP (ref 0–0)
PLATELET # BLD AUTO: 191 K/UL — SIGNIFICANT CHANGE UP (ref 150–400)
POTASSIUM SERPL-SCNC: 4.5 MMOL/L
PROT SERPL-MCNC: 6.7 G/DL
RBC # BLD: 4.43 M/UL — SIGNIFICANT CHANGE UP (ref 4.2–5.8)
RBC # FLD: 15.4 % — HIGH (ref 10.3–14.5)
SODIUM SERPL-SCNC: 143 MMOL/L
WBC # BLD: 4.67 K/UL — SIGNIFICANT CHANGE UP (ref 3.8–10.5)
WBC # FLD AUTO: 4.67 K/UL — SIGNIFICANT CHANGE UP (ref 3.8–10.5)

## 2023-03-01 PROCEDURE — 99215 OFFICE O/P EST HI 40 MIN: CPT

## 2023-03-01 NOTE — REVIEW OF SYSTEMS
[Negative] : Allergic/Immunologic [Fever] : no fever [Chills] : no chills [Night Sweats] : no night sweats [Fatigue] : no fatigue [Recent Change In Weight] : ~T no recent weight change [Abdominal Pain] : no abdominal pain [Vomiting] : no vomiting [Constipation] : no constipation [Diarrhea] : no diarrhea [Dysuria] : no dysuria [Incontinence] : no incontinence [Joint Pain] : no joint pain [Joint Stiffness] : no joint stiffness [Muscle Pain] : no muscle pain [Muscle Weakness] : no muscle weakness [FreeTextEntry7] :  appetite improving  [FreeTextEntry8] :  polyuria

## 2023-03-01 NOTE — HISTORY OF PRESENT ILLNESS
[de-identified] : Dx Multiple myeloma Dec 2021..presented with anemia and back pain nov 2021\par bm bx 70% involvement with monoclonal plasma cells IgG kappa...repeat bm bx May 2022 with 1 to 2 % involvement\par mspike 8 grams\par PET confirmed lytic lesions\par will confirmvcreat and calcium were normal upon dx\par s/p rvd x 6....now holding rev...pending decision on transplant\par \par 11/18/22: S/P Autologous Stem Cell Transplant on 10/27/22.Discharge summary as follows:\par Discharge Date    15-Nov-2022\par Admission Date    24-Oct-2022 \par Reason for Admission:Autologous peripheral blood stem cell transplant with high\par dose Melphalan prep regimen for treatment of multiple myeloma\par Hospital Course   \par This is a 72 year old male with multiple myeloma admitted for an autologous\par peripheral blood stem cell transplant with high dose Melphalan prep regimen.\par Hematologic history as follows: Initially presented in 11/2021 with anemia and\par back pain, diagnosed with multiple myeloma 12/2021.A bone marrow biopsy at that\par time showed 70% involvement with monoclonal plasma cells (IgG kappa), improved\par to 1-2% involvement with bone marrow biopsy 5/2022 after treatment with RVD x\par 6.\par Upon admission, a TLC was placed in IR. Mr. Cespedes received IV hydration, pain\par management, nutritional support and antibacterial / antiviral / antifungal /\par PCP / GI and VOD (SOS) prophylaxis. Labs were monitored on a daily basis, and\par he received transfusional support and electrolyte repletion as needed.\par An admission urinalysis was positive for nitrites and leukocyte esterase and he\par was started on prophylactic ciprofloxacin. On 11/4/22, his urine culture grew\par gram negative rods (>100K enterobacter cloacae). The ciprofloxacin was\par broadened to cefepime. Mr. Cespedes experienced pancytopenia related to the high\par dose chemotherapy prep regimen, and neutropenic fever. Blood cultures and CXR\par were negative. Repeat urine culture on 11/11/22 was negative.\par On 10/27/22, after pre-medication, Mr. Cespedes received 304ml of autologous,\par pooled, thawed, washed, mobilized, plasma reduced, HPC apheresis over\par approximately 1 hour. Cell counts as follows:\par Total MNCs (x 10^8/kg) = 6.86\par CD34+ cells (x10^6/kg)= 5.85\par Cell viability (%) = 76%\par He tolerated the infusion well with no adverse reactions noted.\par Early engraftment was noted on 11/5/22. Engraftment was noted on 11/8/22. The\par daily zarxio was discontinued on 11/10/22. Cefepime was discontinued on\par 11/11/22.\par Currently, Mr. Cespedes is stable for discharge home with outpatient follow up\par at the [facility].\par \par \par \par  [de-identified] : Patient here with his daughter for f/u to  initial eval for high dose chemotherapy and auto stem cell transplant for MM...currently off treatment with velcade and dex...rev also on hold pending decision for auto transplant...he returns for [place]...\par \par 10/7/22: Patient is here for a teaching visit prior to stem cell mobilization.He is accompanied by his daughter Brenda.Denies fever, chills,SOB,chest pain,edema,rash,mouth sores,nausea,vomiting, diarrhea or any signs of active bleeding. \par \par On 10/17/22, patient presents for a pre admission visit. Overall patient is well and offers no acute concerns. Denies fever, chills, nausea, vomiting, diarrhea, rash, mouth sores, dysuria or any signs of active bleeding. Denies SOB, chest pain or B/L LE edema. \par \par 11/18/22: Patient is here for a follow up visit post Autologous Stem Cell Transplant. Today is + 22.Initially [employer]  was used to assist with today's visit and later his daughter Brenda joined the visit.Brenda was the  for rest of the visit.Denies fever, chills, SOB,chest pain,dysuria,vomiting, diarrhea,mouth sores,edema or any signs of active bleeding. He states that he has occasional nausea relieved with Zofran.He also states that he has poor appetite and generalized weakness. \par \par 11/25/22:Patient is seen for a follow up visit.Today is Day +29.[employer]  [place] # [id number] assisted in the beginning for today's visit and later patient's daughter ,Brenda joined the visit and she was the  for the rest of the visit with patient's consent.He is on a wheel chair today.He felt dizzy yesterday while brushing teeth and had a syncopal episode.Daughter Brenda found him sitting on the floor but patient states that he fell down and when he regained consciousness he pulled himself to sitting position.He has mild stiffness of neck today;denies pain, edema or hitting head.He continues to have poor appetite and has generalized weakness.Denies fever, chills, SOB,chest pain, rash,mouth sores,nausea,vomiting, diarrhea or any signs of active bleeding.\par \par On 12/8/22 visit, day + 42 post transplant. Overall well and only complaint today is constipation. Denies fever, chills, SOB,chest pain, rash,mouth sores,nausea,vomiting, diarrhea or any signs of active bleeding. Remains compliant with post transplant medications. Remains compliant with post transplant diet and crowd restrictions. Better with low dose dex\par \par 12/21//22:  post stem cell transplant.He is accompanied by his daughter Brenda.Denies fever, chills, SOB,chest pain, dysuria,nausea,vomiting, diarrhea, rash, mouth sores,edema or any signs of active bleeding.He states that he feels great... improved generalized weakness ...he  uses a cane to walk. Home PT ended this week.He has constipation improved  taking Lactulose.\par \par 1/6/23: Today is Day +71 post stem cell transplant.He is accompanied by his daughter Brenda.Brenda was the  for today's visit with patient's consent.Denies fever,chills, SOB,chest pain, rash,mouth sores, nausea, vomiting, diarrhea,edema or any signs of active bleeding. He states that he feels stronger than before but still uses a cane for assistance.\par \par 1/18/23: Today is Day + 83 post stem cell transplant. He is accompanied by his daughter Brenda. Brenda was the  for today's visit with patient's consent. Overall patient is well. Complains of polyuria but, denies dysuria. Denies fever, chills, nausea, vomiting, diarrhea, rash, mouth sores or any signs of active bleeding. Denies SOB, chest pain or B/L LE edema. Remains compliant with post transplant medications. Remains compliant with post transplant diet and crowd restrictions. \par \par 2/3/23: Patient is here for a follow up visit .He is accompanied by his daughter Brenda who is the  for today's visit with patient's consent.Today is Day +99 post stem cell transplant.Denies fever, chills, SOB,chest pain, nausea, vomiting, diarrhea,rash, mouth sores,edema or any signs of active bleeding.He states that he completed Ciprofloxacin but still continues to have frequency of urination.Denies burning or pain on urination.\par \par 2/17/23:Patient is seen for a follow up visit.Today is Day +113 post stem cell transplant.He is accompanied by his daughter Brenda.Overall well with no acute concerns.Denies fever, chills,cough, dysuria,nausea,vomiting, diarrhea, rash, mouth sores, edema,SOB,chest pain,or any signs of active bleeding.\par \par 3/1/23 Here for f/u..step daughter at side...overall doing well aside from frequent urination [FreeTextEntry1] : 71 yo also  being treated for asymptomatic  positve quantiferon gold test for tb done by Dr. Mcdaniel  grew up  in [place]  no history of TB or exposure .. normal chest xray.\par

## 2023-03-05 LAB
ALBUMIN MFR SERPL ELPH: 62.8 %
ALBUMIN SERPL-MCNC: 4.2 G/DL
ALBUMIN/GLOB SERPL: 1.7 RATIO
ALPHA1 GLOB MFR SERPL ELPH: 4.2 %
ALPHA1 GLOB SERPL ELPH-MCNC: 0.3 G/DL
ALPHA2 GLOB MFR SERPL ELPH: 10.5 %
ALPHA2 GLOB SERPL ELPH-MCNC: 0.7 G/DL
B-GLOBULIN MFR SERPL ELPH: 9.2 %
B-GLOBULIN SERPL ELPH-MCNC: 0.6 G/DL
DEPRECATED KAPPA LC FREE/LAMBDA SER: 1.6 RATIO
GAMMA GLOB FLD ELPH-MCNC: 0.9 G/DL
GAMMA GLOB MFR SERPL ELPH: 13.3 %
IGA SER QL IEP: 58 MG/DL
IGG SER QL IEP: 989 MG/DL
IGM SER QL IEP: 55 MG/DL
INTERPRETATION SERPL IEP-IMP: NORMAL
KAPPA LC CSF-MCNC: 1.54 MG/DL
KAPPA LC SERPL-MCNC: 2.46 MG/DL
M PROTEIN MFR SERPL ELPH: 2.1 %
M PROTEIN SPEC IFE-MCNC: NORMAL
MONOCLON BAND OBS SERPL: 0.1 G/DL
PROT SERPL-MCNC: 6.7 G/DL
PROT SERPL-MCNC: 6.7 G/DL

## 2023-03-17 ENCOUNTER — RESULT REVIEW (OUTPATIENT)
Age: 73
End: 2023-03-17

## 2023-03-17 ENCOUNTER — APPOINTMENT (OUTPATIENT)
Dept: HEMATOLOGY ONCOLOGY | Facility: CLINIC | Age: 73
End: 2023-03-17
Payer: MEDICARE

## 2023-03-17 VITALS
TEMPERATURE: 97.3 F | RESPIRATION RATE: 16 BRPM | WEIGHT: 163.58 LBS | OXYGEN SATURATION: 98 % | HEART RATE: 73 BPM | DIASTOLIC BLOOD PRESSURE: 87 MMHG | BODY MASS INDEX: 25.62 KG/M2 | SYSTOLIC BLOOD PRESSURE: 167 MMHG

## 2023-03-17 LAB
ALBUMIN SERPL ELPH-MCNC: 4.6 G/DL
ALP BLD-CCNC: 116 U/L
ALT SERPL-CCNC: 22 U/L
ANION GAP SERPL CALC-SCNC: 14 MMOL/L
AST SERPL-CCNC: 22 U/L
B2 MICROGLOB SERPL-MCNC: 2.8 MG/L
BASOPHILS # BLD AUTO: 0.03 K/UL — SIGNIFICANT CHANGE UP (ref 0–0.2)
BASOPHILS NFR BLD AUTO: 0.7 % — SIGNIFICANT CHANGE UP (ref 0–2)
BILIRUB SERPL-MCNC: 0.4 MG/DL
BUN SERPL-MCNC: 19 MG/DL
CALCIUM SERPL-MCNC: 9.7 MG/DL
CHLORIDE SERPL-SCNC: 101 MMOL/L
CO2 SERPL-SCNC: 24 MMOL/L
CREAT SERPL-MCNC: 1.06 MG/DL
EGFR: 74 ML/MIN/1.73M2
EOSINOPHIL # BLD AUTO: 0.13 K/UL — SIGNIFICANT CHANGE UP (ref 0–0.5)
EOSINOPHIL NFR BLD AUTO: 3 % — SIGNIFICANT CHANGE UP (ref 0–6)
GLUCOSE SERPL-MCNC: 138 MG/DL
HCT VFR BLD CALC: 41.9 % — SIGNIFICANT CHANGE UP (ref 39–50)
HGB BLD-MCNC: 13.2 G/DL — SIGNIFICANT CHANGE UP (ref 13–17)
IMM GRANULOCYTES NFR BLD AUTO: 0 % — SIGNIFICANT CHANGE UP (ref 0–0.9)
LDH SERPL-CCNC: 200 U/L
LYMPHOCYTES # BLD AUTO: 2.21 K/UL — SIGNIFICANT CHANGE UP (ref 1–3.3)
LYMPHOCYTES # BLD AUTO: 50.5 % — HIGH (ref 13–44)
MAGNESIUM SERPL-MCNC: 2 MG/DL
MCHC RBC-ENTMCNC: 27.6 PG — SIGNIFICANT CHANGE UP (ref 27–34)
MCHC RBC-ENTMCNC: 31.5 G/DL — LOW (ref 32–36)
MCV RBC AUTO: 87.5 FL — SIGNIFICANT CHANGE UP (ref 80–100)
MONOCYTES # BLD AUTO: 0.41 K/UL — SIGNIFICANT CHANGE UP (ref 0–0.9)
MONOCYTES NFR BLD AUTO: 9.4 % — SIGNIFICANT CHANGE UP (ref 2–14)
NEUTROPHILS # BLD AUTO: 1.6 K/UL — LOW (ref 1.8–7.4)
NEUTROPHILS NFR BLD AUTO: 36.4 % — LOW (ref 43–77)
NRBC # BLD: 0 /100 WBCS — SIGNIFICANT CHANGE UP (ref 0–0)
PLATELET # BLD AUTO: 168 K/UL — SIGNIFICANT CHANGE UP (ref 150–400)
POTASSIUM SERPL-SCNC: 4.2 MMOL/L
PROT SERPL-MCNC: 6.8 G/DL
RBC # BLD: 4.79 M/UL — SIGNIFICANT CHANGE UP (ref 4.2–5.8)
RBC # FLD: 14.1 % — SIGNIFICANT CHANGE UP (ref 10.3–14.5)
SODIUM SERPL-SCNC: 139 MMOL/L
WBC # BLD: 4.38 K/UL — SIGNIFICANT CHANGE UP (ref 3.8–10.5)
WBC # FLD AUTO: 4.38 K/UL — SIGNIFICANT CHANGE UP (ref 3.8–10.5)

## 2023-03-17 PROCEDURE — 99214 OFFICE O/P EST MOD 30 MIN: CPT

## 2023-03-17 NOTE — HISTORY OF PRESENT ILLNESS
[de-identified] : Dx Multiple myeloma Dec 2021..presented with anemia and back pain nov 2021\par bm bx 70% involvement with monoclonal plasma cells IgG kappa...repeat bm bx May 2022 with 1 to 2 % involvement\par mspike 8 grams\par PET confirmed lytic lesions\par will confirmvcreat and calcium were normal upon dx\par s/p rvd x 6....now holding rev...pending decision on transplant\par \par 11/18/22: S/P Autologous Stem Cell Transplant on 10/27/22.Discharge summary as follows:\par Discharge Date    15-Nov-2022\par Admission Date    24-Oct-2022 \par Reason for Admission:Autologous peripheral blood stem cell transplant with high\par dose Melphalan prep regimen for treatment of multiple myeloma\par Hospital Course   \par This is a 72 year old male with multiple myeloma admitted for an autologous\par peripheral blood stem cell transplant with high dose Melphalan prep regimen.\par Hematologic history as follows: Initially presented in 11/2021 with anemia and\par back pain, diagnosed with multiple myeloma 12/2021.A bone marrow biopsy at that\par time showed 70% involvement with monoclonal plasma cells (IgG kappa), improved\par to 1-2% involvement with bone marrow biopsy 5/2022 after treatment with RVD x\par 6.\par Upon admission, a TLC was placed in IR. Mr. Cespedes received IV hydration, pain\par management, nutritional support and antibacterial / antiviral / antifungal /\par PCP / GI and VOD (SOS) prophylaxis. Labs were monitored on a daily basis, and\par he received transfusional support and electrolyte repletion as needed.\par An admission urinalysis was positive for nitrites and leukocyte esterase and he\par was started on prophylactic ciprofloxacin. On 11/4/22, his urine culture grew\par gram negative rods (>100K enterobacter cloacae). The ciprofloxacin was\par broadened to cefepime. Mr. Cespedes experienced pancytopenia related to the high\par dose chemotherapy prep regimen, and neutropenic fever. Blood cultures and CXR\par were negative. Repeat urine culture on 11/11/22 was negative.\par On 10/27/22, after pre-medication, Mr. Cespedes received 304ml of autologous,\par pooled, thawed, washed, mobilized, plasma reduced, HPC apheresis over\par approximately 1 hour. Cell counts as follows:\par Total MNCs (x 10^8/kg) = 6.86\par CD34+ cells (x10^6/kg)= 5.85\par Cell viability (%) = 76%\par He tolerated the infusion well with no adverse reactions noted.\par Early engraftment was noted on 11/5/22. Engraftment was noted on 11/8/22. The\par daily zarxio was discontinued on 11/10/22. Cefepime was discontinued on\par 11/11/22.\par Currently, Mr. Cespedes is stable for discharge home with outpatient follow up\par at the [facility].\par \par \par \par  [de-identified] : Patient here with his daughter for f/u to  initial eval for high dose chemotherapy and auto stem cell transplant for MM...currently off treatment with velcade and dex...rev also on hold pending decision for auto transplant...he returns for [place]...\par \par 10/7/22: Patient is here for a teaching visit prior to stem cell mobilization.He is accompanied by his daughter Brenda.Denies fever, chills,SOB,chest pain,edema,rash,mouth sores,nausea,vomiting, diarrhea or any signs of active bleeding. \par \par On 10/17/22, patient presents for a pre admission visit. Overall patient is well and offers no acute concerns. Denies fever, chills, nausea, vomiting, diarrhea, rash, mouth sores, dysuria or any signs of active bleeding. Denies SOB, chest pain or B/L LE edema. \par \par 11/18/22: Patient is here for a follow up visit post Autologous Stem Cell Transplant. Today is + 22.Initially [employer]  was used to assist with today's visit and later his daughter Brenda joined the visit.Brenda was the  for rest of the visit.Denies fever, chills, SOB,chest pain,dysuria,vomiting, diarrhea,mouth sores,edema or any signs of active bleeding. He states that he has occasional nausea relieved with Zofran.He also states that he has poor appetite and generalized weakness. \par \par 11/25/22:Patient is seen for a follow up visit.Today is Day +29.[employer]  [place] # [id number] assisted in the beginning for today's visit and later patient's daughter ,Brenda joined the visit and she was the  for the rest of the visit with patient's consent.He is on a wheel chair today.He felt dizzy yesterday while brushing teeth and had a syncopal episode.Daughter Brenda found him sitting on the floor but patient states that he fell down and when he regained consciousness he pulled himself to sitting position.He has mild stiffness of neck today;denies pain, edema or hitting head.He continues to have poor appetite and has generalized weakness.Denies fever, chills, SOB,chest pain, rash,mouth sores,nausea,vomiting, diarrhea or any signs of active bleeding.\par \par On 12/8/22 visit, day + 42 post transplant. Overall well and only complaint today is constipation. Denies fever, chills, SOB,chest pain, rash,mouth sores,nausea,vomiting, diarrhea or any signs of active bleeding. Remains compliant with post transplant medications. Remains compliant with post transplant diet and crowd restrictions. Better with low dose dex\par \par 12/21//22:  post stem cell transplant.He is accompanied by his daughter Brenda.Denies fever, chills, SOB,chest pain, dysuria,nausea,vomiting, diarrhea, rash, mouth sores,edema or any signs of active bleeding.He states that he feels great... improved generalized weakness ...he  uses a cane to walk. Home PT ended this week.He has constipation improved  taking Lactulose.\par \par 1/6/23: Today is Day +71 post stem cell transplant.He is accompanied by his daughter Brenda.Brenda was the  for today's visit with patient's consent.Denies fever,chills, SOB,chest pain, rash,mouth sores, nausea, vomiting, diarrhea,edema or any signs of active bleeding. He states that he feels stronger than before but still uses a cane for assistance.\par \par 1/18/23: Today is Day + 83 post stem cell transplant. He is accompanied by his daughter Brenda. Brenda was the  for today's visit with patient's consent. Overall patient is well. Complains of polyuria but, denies dysuria. Denies fever, chills, nausea, vomiting, diarrhea, rash, mouth sores or any signs of active bleeding. Denies SOB, chest pain or B/L LE edema. Remains compliant with post transplant medications. Remains compliant with post transplant diet and crowd restrictions. \par \par 2/3/23: Patient is here for a follow up visit .He is accompanied by his daughter Brenda who is the  for today's visit with patient's consent.Today is Day +99 post stem cell transplant.Denies fever, chills, SOB,chest pain, nausea, vomiting, diarrhea,rash, mouth sores,edema or any signs of active bleeding.He states that he completed Ciprofloxacin but still continues to have frequency of urination.Denies burning or pain on urination.\par \par 2/17/23:Patient is seen for a follow up visit.Today is Day +113 post stem cell transplant.He is accompanied by his daughter Brenda.Overall well with no acute concerns.Denies fever, chills,cough, dysuria,nausea,vomiting, diarrhea, rash, mouth sores, edema,SOB,chest pain,or any signs of active bleeding.\par \par 3/1/23 Here for f/u..step daughter at side...overall doing well aside from frequent urination\par \par 3/17/23: Today is Day +141 post stem cell transplant.Denies fever, chills, SOB,blurred vision,chest pain,mouth sores, nausea, vomiting, diarrhea,dysuria,rash,edema or any signs of active bleeding.Daughter Brenda at his side is the  for today's visit with patient's consent. today ,states that he did not take BP med this morning. [FreeTextEntry1] : 73 yo also  being treated for asymptomatic  positve quantiferon gold test for tb done by Dr. Mcdaniel  grew up  in [place]  no history of TB or exposure .. normal chest xray.\par

## 2023-03-17 NOTE — ASSESSMENT
[FreeTextEntry1] : 3/17/23:\par Multiple Myeloma (C90.00)\par S/P Autologous Stem Cell Transplant (Z94.84)\par \par 1)MM\par  S/P Autologous PBSCT on 10/27/22\par  Labs including Myeloma blood work sent today\par  CBC reviewed;no transfusion requirement today\par  Send labs with every visit\par  Restaging at 4 to 6 months discussed..pt dropped off  24 hr urine today\par \par 2) Heme\par    Counts stable, no transfusion requirements today\par    Continue  Multiple Vitamin and Folic acid \par  \par 3) ID\par    Continue prophylaxis until 6 months post\par    Acyclovir 400 mg 1 tab orally every 8 hours \par    Atovaquone 750 mg/5 mL oral suspension 5 milliliter 2 times a day\par    Diflucan 200 mg 2 tablets once a day ..2 months- DISCONTINUED\par \par \par 4) GI\par   Protonix 40 mg oral delayed release tablet 1 tab once a day\par   Zofran as needed for nausea \par   Dexamethasone 1 mg QOD for poor appetite...appetite improved..now.....DISCONTINUED on 1/18/23\par   Lactulose PRN for constipation\par   MiraLAX 1 x day for constipation\par   Colace 3 x day\par \par 5) H/O DVT\par     Continue Eliquis 5 mg daily (restarted on 11/18/22)\par \par 6) Hypertension\par     Hydralazine 25 mg 1 tab three times a day\par     Metoprolol Tartrate 25 mg 1 tab 2 times a day\par     Norvasc 10 mg 1 tab once a day\par     Monitor BP at home\par \par 7) BPH, urinary frequency...sees uro\par     Flomax 0.4 mg 1 cap once a day\par     Proscar 5 mg 1 tab once a day\par \par 8) Dryness of Skin-RESOLVED\par      Apply Aquaphor to moisturize skin\par \par 9) Syncope/Fall\par     Follow up with Cardiology\par     Xray cervical spine reviewed \par     RESOLVED\par \par 10) Generalized weakness- RESOLVED\par       \par 11) Polyuria\par      Urinalysis and urine culture sent on 1/18/23- completed Ciprofloxacin\par      Urinalysis and Urine culture sent today (2/3/23) -Negative for UTI\par      Advised to f/u with Urology \par \par 12) Plan\par   -Monitor BP at home\par  -Follow up with Urology\par  -Educated about plan of care,all questions and concerns addressed\par  - Advised to call office with any acute concerns\par  - Advised to have small frequent meals/snacks\par  -Reinstated to avoid crowds given COVID\par  -Patient may have take out food from a reputable restaurant as of 1/18/23  \par  - Additional Instructions: Notify if bleeding; swelling; persistent nausea and vomiting; unable to  urinate; pain not        relieved by medications; fever; numbness, tingling; excessive diarrhea, inability to tolerate liquids  or foods;             increased irritability or sluggishness, rash\par  -Follow up in 2 weeks with Dr. Gonzales\par -Also will arrange for f/u skeletal survey \par \par I examined patient under Dr. Gonzales's supervision and Dr. Gonzales agrees to plan of care as listed above\par \par \par \par \par \par \par \par \par \par

## 2023-03-17 NOTE — REVIEW OF SYSTEMS
[Negative] : Allergic/Immunologic [Fever] : no fever [Chills] : no chills [Night Sweats] : no night sweats [Fatigue] : no fatigue [Recent Change In Weight] : ~T no recent weight change [Abdominal Pain] : no abdominal pain [Vomiting] : no vomiting [Constipation] : no constipation [Diarrhea] : no diarrhea [Dysuria] : no dysuria [Incontinence] : no incontinence [Joint Pain] : no joint pain [Joint Stiffness] : no joint stiffness [Muscle Pain] : no muscle pain [Muscle Weakness] : no muscle weakness [FreeTextEntry8] :  polyuria

## 2023-03-18 LAB
CREAT 24H UR-MCNC: 0.6 G/24 H
CREAT 24H UR-MCNC: 0.6 G/24 H
CREAT ?TM UR-MCNC: 54 MG/DL
CREAT ?TM UR-MCNC: 54 MG/DL
PROT 24H UR-MRATE: 8 MG/DL
PROT ?TM UR-MCNC: 24 HR
PROT ?TM UR-MCNC: 24 HR
PROT UR-MCNC: 88 MG/24 H
SPECIMEN VOL 24H UR: 1100 ML
SPECIMEN VOL 24H UR: 1100 ML

## 2023-03-21 LAB
ALBUMIN MFR SERPL ELPH: 65.6 %
ALBUMIN SERPL-MCNC: 4.5 G/DL
ALBUMIN/GLOB SERPL: 2 RATIO
ALPHA1 GLOB MFR SERPL ELPH: 3.6 %
ALPHA1 GLOB SERPL ELPH-MCNC: 0.2 G/DL
ALPHA2 GLOB MFR SERPL ELPH: 9.3 %
ALPHA2 GLOB SERPL ELPH-MCNC: 0.6 G/DL
B-GLOBULIN MFR SERPL ELPH: 8.9 %
B-GLOBULIN SERPL ELPH-MCNC: 0.6 G/DL
DEPRECATED KAPPA LC FREE/LAMBDA SER: 1.47 RATIO
GAMMA GLOB FLD ELPH-MCNC: 0.9 G/DL
GAMMA GLOB MFR SERPL ELPH: 12.6 %
IGA SER QL IEP: 56 MG/DL
IGG SER QL IEP: 990 MG/DL
IGM SER QL IEP: 48 MG/DL
INTERPRETATION SERPL IEP-IMP: NORMAL
KAPPA LC CSF-MCNC: 1.57 MG/DL
KAPPA LC SERPL-MCNC: 2.31 MG/DL
M PROTEIN MFR SERPL ELPH: 2.4 %
M PROTEIN SPEC IFE-MCNC: NORMAL
MONOCLON BAND OBS SERPL: 0.2 G/DL
PROT SERPL-MCNC: 6.8 G/DL
PROT SERPL-MCNC: 6.8 G/DL

## 2023-03-24 LAB
CREAT 24H UR-MCNC: NORMAL G/24 H
CREATININE UR (MAYO): 54 MG/DL
KAPPA LC 24H UR QL: NORMAL MG/DL
PROT PATTERN 24H UR ELPH-IMP: NORMAL
PROT UR-MCNC: 8 MG/DL
PROT UR-MCNC: 8 MG/DL
U PROTEIN QNT CALCULATION: NORMAL MG/24 H

## 2023-03-26 ENCOUNTER — OUTPATIENT (OUTPATIENT)
Dept: OUTPATIENT SERVICES | Facility: HOSPITAL | Age: 73
LOS: 1 days | Discharge: HOME | End: 2023-03-26

## 2023-03-26 DIAGNOSIS — Z98.89 OTHER SPECIFIED POSTPROCEDURAL STATES: Chronic | ICD-10-CM

## 2023-03-26 DIAGNOSIS — C90.00 MULTIPLE MYELOMA NOT HAVING ACHIEVED REMISSION: ICD-10-CM

## 2023-03-30 ENCOUNTER — APPOINTMENT (OUTPATIENT)
Dept: HEMATOLOGY ONCOLOGY | Facility: CLINIC | Age: 73
End: 2023-03-30
Payer: MEDICARE

## 2023-03-30 ENCOUNTER — RESULT REVIEW (OUTPATIENT)
Age: 73
End: 2023-03-30

## 2023-03-30 VITALS
TEMPERATURE: 97.1 F | HEART RATE: 75 BPM | RESPIRATION RATE: 16 BRPM | DIASTOLIC BLOOD PRESSURE: 80 MMHG | OXYGEN SATURATION: 98 % | SYSTOLIC BLOOD PRESSURE: 160 MMHG | WEIGHT: 164.22 LBS | BODY MASS INDEX: 25.72 KG/M2

## 2023-03-30 DIAGNOSIS — I82.409 ACUTE EMBOLISM AND THROMBOSIS OF UNSPECIFIED DEEP VEINS OF UNSPECIFIED LOWER EXTREMITY: ICD-10-CM

## 2023-03-30 DIAGNOSIS — Z86.79 PERSONAL HISTORY OF OTHER DISEASES OF THE CIRCULATORY SYSTEM: ICD-10-CM

## 2023-03-30 DIAGNOSIS — Z86.718 PERSONAL HISTORY OF OTHER VENOUS THROMBOSIS AND EMBOLISM: ICD-10-CM

## 2023-03-30 LAB
ALBUMIN SERPL ELPH-MCNC: 4.5 G/DL
ALP BLD-CCNC: 117 U/L
ALT SERPL-CCNC: 27 U/L
ANION GAP SERPL CALC-SCNC: 13 MMOL/L
AST SERPL-CCNC: 31 U/L
BASOPHILS # BLD AUTO: 0.03 K/UL — SIGNIFICANT CHANGE UP (ref 0–0.2)
BASOPHILS NFR BLD AUTO: 0.7 % — SIGNIFICANT CHANGE UP (ref 0–2)
BILIRUB SERPL-MCNC: 0.3 MG/DL
BUN SERPL-MCNC: 21 MG/DL
CALCIUM SERPL-MCNC: 10 MG/DL
CHLORIDE SERPL-SCNC: 102 MMOL/L
CO2 SERPL-SCNC: 25 MMOL/L
CREAT SERPL-MCNC: 1.1 MG/DL
EGFR: 70 ML/MIN/1.73M2
EOSINOPHIL # BLD AUTO: 0.11 K/UL — SIGNIFICANT CHANGE UP (ref 0–0.5)
EOSINOPHIL NFR BLD AUTO: 2.4 % — SIGNIFICANT CHANGE UP (ref 0–6)
GLUCOSE SERPL-MCNC: 155 MG/DL
HCT VFR BLD CALC: 42.5 % — SIGNIFICANT CHANGE UP (ref 39–50)
HGB BLD-MCNC: 13.3 G/DL — SIGNIFICANT CHANGE UP (ref 13–17)
IMM GRANULOCYTES NFR BLD AUTO: 0 % — SIGNIFICANT CHANGE UP (ref 0–0.9)
LYMPHOCYTES # BLD AUTO: 2.07 K/UL — SIGNIFICANT CHANGE UP (ref 1–3.3)
LYMPHOCYTES # BLD AUTO: 46.1 % — HIGH (ref 13–44)
MAGNESIUM SERPL-MCNC: 2.2 MG/DL
MCHC RBC-ENTMCNC: 27 PG — SIGNIFICANT CHANGE UP (ref 27–34)
MCHC RBC-ENTMCNC: 31.3 G/DL — LOW (ref 32–36)
MCV RBC AUTO: 86.4 FL — SIGNIFICANT CHANGE UP (ref 80–100)
MONOCYTES # BLD AUTO: 0.43 K/UL — SIGNIFICANT CHANGE UP (ref 0–0.9)
MONOCYTES NFR BLD AUTO: 9.6 % — SIGNIFICANT CHANGE UP (ref 2–14)
NEUTROPHILS # BLD AUTO: 1.85 K/UL — SIGNIFICANT CHANGE UP (ref 1.8–7.4)
NEUTROPHILS NFR BLD AUTO: 41.2 % — LOW (ref 43–77)
NRBC # BLD: 0 /100 WBCS — SIGNIFICANT CHANGE UP (ref 0–0)
PLATELET # BLD AUTO: 170 K/UL — SIGNIFICANT CHANGE UP (ref 150–400)
POTASSIUM SERPL-SCNC: 4.8 MMOL/L
PROT SERPL-MCNC: 7 G/DL
RBC # BLD: 4.92 M/UL — SIGNIFICANT CHANGE UP (ref 4.2–5.8)
RBC # FLD: 14 % — SIGNIFICANT CHANGE UP (ref 10.3–14.5)
SODIUM SERPL-SCNC: 140 MMOL/L
WBC # BLD: 4.49 K/UL — SIGNIFICANT CHANGE UP (ref 3.8–10.5)
WBC # FLD AUTO: 4.49 K/UL — SIGNIFICANT CHANGE UP (ref 3.8–10.5)

## 2023-03-30 PROCEDURE — 99215 OFFICE O/P EST HI 40 MIN: CPT

## 2023-03-31 PROBLEM — Z86.79 HISTORY OF HYPERTENSION: Status: RESOLVED | Noted: 2018-06-21 | Resolved: 2023-03-31

## 2023-03-31 LAB — LDH SERPL-CCNC: 238 U/L

## 2023-03-31 NOTE — HISTORY OF PRESENT ILLNESS
[de-identified] : Dx Multiple myeloma Dec 2021..presented with anemia and back pain nov 2021\par bm bx 70% involvement with monoclonal plasma cells IgG kappa...repeat bm bx May 2022 with 1 to 2 % involvement\par mspike 8 grams\par PET confirmed lytic lesions\par will confirmvcreat and calcium were normal upon dx\par s/p rvd x 6....now holding rev...pending decision on transplant\par \par 11/18/22: S/P Autologous Stem Cell Transplant on 10/27/22.Discharge summary as follows:\par Discharge Date    15-Nov-2022\par Admission Date    24-Oct-2022 \par Reason for Admission:Autologous peripheral blood stem cell transplant with high\par dose Melphalan prep regimen for treatment of multiple myeloma\par Hospital Course   \par This is a 72 year old male with multiple myeloma admitted for an autologous\par peripheral blood stem cell transplant with high dose Melphalan prep regimen.\par Hematologic history as follows: Initially presented in 11/2021 with anemia and\par back pain, diagnosed with multiple myeloma 12/2021.A bone marrow biopsy at that\par time showed 70% involvement with monoclonal plasma cells (IgG kappa), improved\par to 1-2% involvement with bone marrow biopsy 5/2022 after treatment with RVD x\par 6.\par Upon admission, a TLC was placed in IR. Mr. Cespedes received IV hydration, pain\par management, nutritional support and antibacterial / antiviral / antifungal /\par PCP / GI and VOD (SOS) prophylaxis. Labs were monitored on a daily basis, and\par he received transfusional support and electrolyte repletion as needed.\par An admission urinalysis was positive for nitrites and leukocyte esterase and he\par was started on prophylactic ciprofloxacin. On 11/4/22, his urine culture grew\par gram negative rods (>100K enterobacter cloacae). The ciprofloxacin was\par broadened to cefepime. Mr. Cespedes experienced pancytopenia related to the high\par dose chemotherapy prep regimen, and neutropenic fever. Blood cultures and CXR\par were negative. Repeat urine culture on 11/11/22 was negative.\par On 10/27/22, after pre-medication, Mr. Cespedes received 304ml of autologous,\par pooled, thawed, washed, mobilized, plasma reduced, HPC apheresis over\par approximately 1 hour. Cell counts as follows:\par Total MNCs (x 10^8/kg) = 6.86\par CD34+ cells (x10^6/kg)= 5.85\par Cell viability (%) = 76%\par He tolerated the infusion well with no adverse reactions noted.\par Early engraftment was noted on 11/5/22. Engraftment was noted on 11/8/22. The\par daily zarxio was discontinued on 11/10/22. Cefepime was discontinued on\par 11/11/22.\par Currently, Mr. Cespedes is stable for discharge home with outpatient follow up\par at the [facility].\par \par \par \par  [de-identified] : Patient here with his daughter for f/u to  initial eval for high dose chemotherapy and auto stem cell transplant for MM...currently off treatment with velcade and dex...rev also on hold pending decision for auto transplant...he returns for [place]...\par \par 10/7/22: Patient is here for a teaching visit prior to stem cell mobilization.He is accompanied by his daughter Brenda.Denies fever, chills,SOB,chest pain,edema,rash,mouth sores,nausea,vomiting, diarrhea or any signs of active bleeding. \par \par On 10/17/22, patient presents for a pre admission visit. Overall patient is well and offers no acute concerns. Denies fever, chills, nausea, vomiting, diarrhea, rash, mouth sores, dysuria or any signs of active bleeding. Denies SOB, chest pain or B/L LE edema. \par \par 11/18/22: Patient is here for a follow up visit post Autologous Stem Cell Transplant. Today is + 22.Initially [employer]  was used to assist with today's visit and later his daughter Brenda joined the visit.Brenda was the  for rest of the visit.Denies fever, chills, SOB,chest pain,dysuria,vomiting, diarrhea,mouth sores,edema or any signs of active bleeding. He states that he has occasional nausea relieved with Zofran.He also states that he has poor appetite and generalized weakness. \par \par 11/25/22:Patient is seen for a follow up visit.Today is Day +29.[employer]  [place] # [id number] assisted in the beginning for today's visit and later patient's daughter ,Brenda joined the visit and she was the  for the rest of the visit with patient's consent.He is on a wheel chair today.He felt dizzy yesterday while brushing teeth and had a syncopal episode.Daughter Brenda found him sitting on the floor but patient states that he fell down and when he regained consciousness he pulled himself to sitting position.He has mild stiffness of neck today;denies pain, edema or hitting head.He continues to have poor appetite and has generalized weakness.Denies fever, chills, SOB,chest pain, rash,mouth sores,nausea,vomiting, diarrhea or any signs of active bleeding.\par \par On 12/8/22 visit, day + 42 post transplant. Overall well and only complaint today is constipation. Denies fever, chills, SOB,chest pain, rash,mouth sores,nausea,vomiting, diarrhea or any signs of active bleeding. Remains compliant with post transplant medications. Remains compliant with post transplant diet and crowd restrictions. Better with low dose dex\par \par 12/21//22:  post stem cell transplant.He is accompanied by his daughter Brenda.Denies fever, chills, SOB,chest pain, dysuria,nausea,vomiting, diarrhea, rash, mouth sores,edema or any signs of active bleeding.He states that he feels great... improved generalized weakness ...he  uses a cane to walk. Home PT ended this week.He has constipation improved  taking Lactulose.\par \par 1/6/23: Today is Day +71 post stem cell transplant.He is accompanied by his daughter Brenda.Brenda was the  for today's visit with patient's consent.Denies fever,chills, SOB,chest pain, rash,mouth sores, nausea, vomiting, diarrhea,edema or any signs of active bleeding. He states that he feels stronger than before but still uses a cane for assistance.\par \par 1/18/23: Today is Day + 83 post stem cell transplant. He is accompanied by his daughter Brenda. Brenda was the  for today's visit with patient's consent. Overall patient is well. Complains of polyuria but, denies dysuria. Denies fever, chills, nausea, vomiting, diarrhea, rash, mouth sores or any signs of active bleeding. Denies SOB, chest pain or B/L LE edema. Remains compliant with post transplant medications. Remains compliant with post transplant diet and crowd restrictions. \par \par 2/3/23: Patient is here for a follow up visit .He is accompanied by his daughter Brenda who is the  for today's visit with patient's consent.Today is Day +99 post stem cell transplant.Denies fever, chills, SOB,chest pain, nausea, vomiting, diarrhea,rash, mouth sores,edema or any signs of active bleeding.He states that he completed Ciprofloxacin but still continues to have frequency of urination.Denies burning or pain on urination.\par \par 2/17/23:Patient is seen for a follow up visit.Today is Day +113 post stem cell transplant.He is accompanied by his daughter Brenda.Overall well with no acute concerns.Denies fever, chills,cough, dysuria,nausea,vomiting, diarrhea, rash, mouth sores, edema,SOB,chest pain,or any signs of active bleeding.\par \par 3/1/23 Here for f/u..step daughter at side...overall doing well aside from frequent urination\par \par 3/17/23: Today is Day +141 post stem cell transplant.Denies fever, chills, SOB,blurred vision,chest pain,mouth sores, nausea, vomiting, diarrhea,dysuria,rash,edema or any signs of active bleeding.Daughter Brenda at his side is the  for today's visit with patient's consent. today ,states that he did not take BP med this morning.\par \par 3/30/23 Here for f/u, pt with continued urinary complaints. step daughter at side..continues to get stronger post auto [FreeTextEntry1] : 73 yo also  being treated for asymptomatic  positve quantiferon gold test for tb done by Dr. Mcdaniel  grew up  in [place]  no history of TB or exposure .. normal chest xray.\par

## 2023-03-31 NOTE — REVIEW OF SYSTEMS
[Negative] : Allergic/Immunologic [Fever] : no fever [Chills] : no chills [Night Sweats] : no night sweats [Fatigue] : no fatigue [Recent Change In Weight] : ~T no recent weight change [Abdominal Pain] : no abdominal pain [Vomiting] : no vomiting [Constipation] : no constipation [Diarrhea] : no diarrhea [Dysuria] : no dysuria [Incontinence] : no incontinence [Joint Pain] : no joint pain [Joint Stiffness] : no joint stiffness [Muscle Pain] : no muscle pain [Muscle Weakness] : no muscle weakness [FreeTextEntry8] :  polyuria Yes

## 2023-03-31 NOTE — ASSESSMENT
[FreeTextEntry1] : 3/30/23:\par Multiple Myeloma (C90.00)\par S/P Autologous Stem Cell Transplant (Z94.84)\par \par 1)MM\par  S/P Autologous PBSCT on 10/27/22\par  Labs including Myeloma blood work sent today..small spike noted with prior labs\par  CBC reviewed;no transfusion requirement today\par  Send labs with every visit\par  Restaging at 4 to 6 months discussed..pt dropped off  24 hr urine last visit\par maintenance with low dose rev discussed\par \par 2) Heme\par    Counts stable, no transfusion requirements today\par    Continue  Multiple Vitamin and Folic acid \par  \par 3) ID\par    Continue prophylaxis until 6 months post..can stop\par    Acyclovir 400 mg 1 tab orally every 8 hours \par    Atovaquone 750 mg/5 mL oral suspension 5 milliliter 2 times a day\par    Diflucan 200 mg 2 tablets once a day ..2 months- DISCONTINUED\par 1 year post transplant vaccines reviewed as well as repeating the initial covid series...after 90 days post auto..pt has not done so yet\par \par \par 4) GI\par   Protonix 40 mg oral delayed release tablet 1 tab once a day\par   Zofran as needed for nausea \par   Dexamethasone 1 mg QOD for poor appetite...appetite improved..now.....DISCONTINUED on 1/18/23\par   Lactulose PRN for constipation\par   MiraLAX 1 x day for constipation\par   Colace 3 x day\par \par 5) H/O DVT\par     Continue Eliquis 5 mg daily (restarted on 11/18/22)\par \par 6) Hypertension\par     Hydralazine 25 mg 1 tab three times a day\par     Metoprolol Tartrate 25 mg 1 tab 2 times a day\par     Norvasc 10 mg 1 tab once a day\par     Monitor BP at home\par \par 7) BPH, urinary frequency...sees uro..will send urine cx...\par     Flomax 0.4 mg 1 cap once a day\par     Proscar 5 mg 1 tab once a day\par \par 8) Dryness of Skin-RESOLVED\par      Apply Aquaphor to moisturize skin\par \par 9) Syncope/Fall\par     Follow up with Cardiology\par     Xray cervical spine reviewed \par     RESOLVED\par \par 10) Generalized weakness- RESOLVED\par       \par 11) Polyuria\par      Urinalysis and urine culture sent on 1/18/23- completed Ciprofloxacin\par      Urinalysis and Urine culture sent today (2/3/23) -Negative for UTI\par      Advised to f/u with Urology \par \par 12) Plan\par   -Monitor BP at home\par  -Follow up with Urology\par  -Educated about plan of care,all questions and concerns addressed\par  - Advised to call office with any acute concerns\par  - Advised to have small frequent meals/snacks\par  -Reinstated to avoid crowds given COVID\par  -Patient may have take out food from a reputable restaurant as of 1/18/23  \par  - Additional Instructions: Notify if bleeding; swelling; persistent nausea and vomiting; unable to  urinate; pain not        relieved by medications; fever; numbness, tingling; excessive diarrhea, inability to tolerate liquids  or foods;             increased irritability or sluggishness, rash\par  -Follow up in 4 weeks ...advised to f/u with Dr Mcdaniel to discuss further revlimid maintenance\par -Also will arrange for f/u skeletal survey \par \par \par \par \par \par \par \par \par \par \par

## 2023-04-03 LAB — BACTERIA UR CULT: NORMAL

## 2023-04-12 ENCOUNTER — APPOINTMENT (OUTPATIENT)
Dept: UROLOGY | Facility: CLINIC | Age: 73
End: 2023-04-12
Payer: MEDICARE

## 2023-04-12 PROCEDURE — 99214 OFFICE O/P EST MOD 30 MIN: CPT | Mod: 95

## 2023-04-12 NOTE — HISTORY OF PRESENT ILLNESS
[Home] : at home, [unfilled] , at the time of the visit. [Medical Office: ([facility])___] : at the medical office located in  [Family Member] : family member [Verbal consent obtained from patient] : the patient, [unfilled] [FreeTextEntry1] : The patient-doctor relationship has been established in a face to face fashion via real time video/audio HIPAA compliant communication using telemedicine software.  He has requested care to be assessed and treated through telemedicine. He understands that there maybe limitations in this process and that he may need further follow up care in the office and/or hospital setting.\par \par Very pleasant 72-year-old gentleman who presents for follow-up of BPH, recent contaminated urine culture.  He feels well.  He continues to take tamsulosin and finasteride with improvement in his urinary symptoms.  He denies dysuria.  No hematuria.  No flank pain or suprapubic pain.  Recent urine culture was contaminated.

## 2023-04-12 NOTE — ASSESSMENT
[FreeTextEntry1] : Very pleasant 72-year-old gentleman who presents for follow-up of BPH with urinary obstruction, incomplete bladder emptying, recent contaminated urine culture\par -We discussed potential etiologies of a contaminated urine culture\par -Repeat urine culture at this time\par -Continue tamsulosin–refill sent to the pharmacy\par -Continue finasteride–refill sent to pharmacy\par -Urinalysis from 2/2023 demonstrates 1 red blood cell per high-powered field, negative nitrites, negative leukocyte Estrace\par -Creatinine 1.1\par -Follow-up in 6 months or sooner if necessary

## 2023-04-15 ENCOUNTER — APPOINTMENT (OUTPATIENT)
Dept: RADIOLOGY | Facility: IMAGING CENTER | Age: 73
End: 2023-04-15
Payer: MEDICARE

## 2023-04-15 ENCOUNTER — OUTPATIENT (OUTPATIENT)
Dept: OUTPATIENT SERVICES | Facility: HOSPITAL | Age: 73
LOS: 1 days | End: 2023-04-15
Payer: COMMERCIAL

## 2023-04-15 DIAGNOSIS — C90.00 MULTIPLE MYELOMA NOT HAVING ACHIEVED REMISSION: ICD-10-CM

## 2023-04-15 DIAGNOSIS — Z98.89 OTHER SPECIFIED POSTPROCEDURAL STATES: Chronic | ICD-10-CM

## 2023-04-15 PROCEDURE — 77075 RADEX OSSEOUS SURVEY COMPL: CPT | Mod: 26

## 2023-04-15 PROCEDURE — 77075 RADEX OSSEOUS SURVEY COMPL: CPT

## 2023-04-16 NOTE — ED PROVIDER NOTE - CCCP TRG CHIEF CMPLNT
low hemoglobin [Cardiac Failure] : cardiac failure [Arrhythmia/ECG Abnorrmalities] : arrhythmia/ECG abnormalities [Structural Heart and Valve Disease] : structural heart and valve disease [Hyperlipidemia] : hyperlipidemia [Hypertension] : hypertension

## 2023-04-21 ENCOUNTER — APPOINTMENT (OUTPATIENT)
Dept: HEMATOLOGY ONCOLOGY | Facility: CLINIC | Age: 73
End: 2023-04-21
Payer: MEDICARE

## 2023-04-21 ENCOUNTER — RESULT REVIEW (OUTPATIENT)
Age: 73
End: 2023-04-21

## 2023-04-21 VITALS
WEIGHT: 163.36 LBS | OXYGEN SATURATION: 99 % | RESPIRATION RATE: 16 BRPM | DIASTOLIC BLOOD PRESSURE: 84 MMHG | SYSTOLIC BLOOD PRESSURE: 155 MMHG | TEMPERATURE: 98.8 F | HEART RATE: 82 BPM | BODY MASS INDEX: 25.59 KG/M2

## 2023-04-21 DIAGNOSIS — R05.9 COUGH, UNSPECIFIED: ICD-10-CM

## 2023-04-21 LAB
ALBUMIN SERPL ELPH-MCNC: 4.5 G/DL
ALP BLD-CCNC: 137 U/L
ALT SERPL-CCNC: 18 U/L
ANION GAP SERPL CALC-SCNC: 14 MMOL/L
AST SERPL-CCNC: 21 U/L
BASOPHILS # BLD AUTO: 0.03 K/UL — SIGNIFICANT CHANGE UP (ref 0–0.2)
BASOPHILS NFR BLD AUTO: 0.6 % — SIGNIFICANT CHANGE UP (ref 0–2)
BILIRUB SERPL-MCNC: 0.2 MG/DL
BUN SERPL-MCNC: 18 MG/DL
CALCIUM SERPL-MCNC: 10.5 MG/DL
CHLORIDE SERPL-SCNC: 101 MMOL/L
CO2 SERPL-SCNC: 26 MMOL/L
CREAT SERPL-MCNC: 1.11 MG/DL
EGFR: 70 ML/MIN/1.73M2
EOSINOPHIL # BLD AUTO: 0.1 K/UL — SIGNIFICANT CHANGE UP (ref 0–0.5)
EOSINOPHIL NFR BLD AUTO: 2.1 % — SIGNIFICANT CHANGE UP (ref 0–6)
GLUCOSE SERPL-MCNC: 147 MG/DL
HCT VFR BLD CALC: 43.5 % — SIGNIFICANT CHANGE UP (ref 39–50)
HGB BLD-MCNC: 13.4 G/DL — SIGNIFICANT CHANGE UP (ref 13–17)
IMM GRANULOCYTES NFR BLD AUTO: 0 % — SIGNIFICANT CHANGE UP (ref 0–0.9)
LDH SERPL-CCNC: 199 U/L
LYMPHOCYTES # BLD AUTO: 2.41 K/UL — SIGNIFICANT CHANGE UP (ref 1–3.3)
LYMPHOCYTES # BLD AUTO: 50.2 % — HIGH (ref 13–44)
MAGNESIUM SERPL-MCNC: 2.3 MG/DL
MCHC RBC-ENTMCNC: 26.2 PG — LOW (ref 27–34)
MCHC RBC-ENTMCNC: 30.8 G/DL — LOW (ref 32–36)
MCV RBC AUTO: 85 FL — SIGNIFICANT CHANGE UP (ref 80–100)
MONOCYTES # BLD AUTO: 0.47 K/UL — SIGNIFICANT CHANGE UP (ref 0–0.9)
MONOCYTES NFR BLD AUTO: 9.8 % — SIGNIFICANT CHANGE UP (ref 2–14)
NEUTROPHILS # BLD AUTO: 1.79 K/UL — LOW (ref 1.8–7.4)
NEUTROPHILS NFR BLD AUTO: 37.3 % — LOW (ref 43–77)
NRBC # BLD: 0 /100 WBCS — SIGNIFICANT CHANGE UP (ref 0–0)
PLATELET # BLD AUTO: 213 K/UL — SIGNIFICANT CHANGE UP (ref 150–400)
POTASSIUM SERPL-SCNC: 4.6 MMOL/L
PROT SERPL-MCNC: 7.5 G/DL
RAPID RVP RESULT: NOT DETECTED
RBC # BLD: 5.12 M/UL — SIGNIFICANT CHANGE UP (ref 4.2–5.8)
RBC # FLD: 13.7 % — SIGNIFICANT CHANGE UP (ref 10.3–14.5)
SARS-COV-2 RNA PNL RESP NAA+PROBE: NOT DETECTED
SODIUM SERPL-SCNC: 142 MMOL/L
WBC # BLD: 4.8 K/UL — SIGNIFICANT CHANGE UP (ref 3.8–10.5)
WBC # FLD AUTO: 4.8 K/UL — SIGNIFICANT CHANGE UP (ref 3.8–10.5)

## 2023-04-21 PROCEDURE — 99214 OFFICE O/P EST MOD 30 MIN: CPT

## 2023-04-21 RX ORDER — METOPROLOL TARTRATE 25 MG/1
25 TABLET, FILM COATED ORAL TWICE DAILY
Qty: 60 | Refills: 2 | Status: ACTIVE | COMMUNITY
Start: 2022-11-16 | End: 1900-01-01

## 2023-04-21 RX ORDER — BENZOCAINE AND DEXTROMETHORPHAN HYDROBROMIDE 7.5; 5 MG/1; MG/1
5-7.5 LOZENGE ORAL
Qty: 1 | Refills: 0 | Status: ACTIVE | COMMUNITY
Start: 2023-04-21 | End: 1900-01-01

## 2023-04-21 RX ORDER — PNV NO.95/FERROUS FUM/FOLIC AC 28MG-0.8MG
TABLET ORAL DAILY
Qty: 30 | Refills: 3 | Status: ACTIVE | COMMUNITY
Start: 2022-11-16 | End: 1900-01-01

## 2023-04-21 NOTE — HISTORY OF PRESENT ILLNESS
[de-identified] : Dx Multiple myeloma Dec 2021..presented with anemia and back pain nov 2021\par bm bx 70% involvement with monoclonal plasma cells IgG kappa...repeat bm bx May 2022 with 1 to 2 % involvement\par mspike 8 grams\par PET confirmed lytic lesions\par will confirmvcreat and calcium were normal upon dx\par s/p rvd x 6....now holding rev...pending decision on transplant\par \par 11/18/22: S/P Autologous Stem Cell Transplant on 10/27/22.Discharge summary as follows:\par Discharge Date    15-Nov-2022\par Admission Date    24-Oct-2022 \par Reason for Admission:Autologous peripheral blood stem cell transplant with high\par dose Melphalan prep regimen for treatment of multiple myeloma\par Hospital Course   \par This is a 72 year old male with multiple myeloma admitted for an autologous\par peripheral blood stem cell transplant with high dose Melphalan prep regimen.\par Hematologic history as follows: Initially presented in 11/2021 with anemia and\par back pain, diagnosed with multiple myeloma 12/2021.A bone marrow biopsy at that\par time showed 70% involvement with monoclonal plasma cells (IgG kappa), improved\par to 1-2% involvement with bone marrow biopsy 5/2022 after treatment with RVD x\par 6.\par Upon admission, a TLC was placed in IR. Mr. Cespedes received IV hydration, pain\par management, nutritional support and antibacterial / antiviral / antifungal /\par PCP / GI and VOD (SOS) prophylaxis. Labs were monitored on a daily basis, and\par he received transfusional support and electrolyte repletion as needed.\par An admission urinalysis was positive for nitrites and leukocyte esterase and he\par was started on prophylactic ciprofloxacin. On 11/4/22, his urine culture grew\par gram negative rods (>100K enterobacter cloacae). The ciprofloxacin was\par broadened to cefepime. Mr. Cespedes experienced pancytopenia related to the high\par dose chemotherapy prep regimen, and neutropenic fever. Blood cultures and CXR\par were negative. Repeat urine culture on 11/11/22 was negative.\par On 10/27/22, after pre-medication, Mr. Cespedes received 304ml of autologous,\par pooled, thawed, washed, mobilized, plasma reduced, HPC apheresis over\par approximately 1 hour. Cell counts as follows:\par Total MNCs (x 10^8/kg) = 6.86\par CD34+ cells (x10^6/kg)= 5.85\par Cell viability (%) = 76%\par He tolerated the infusion well with no adverse reactions noted.\par Early engraftment was noted on 11/5/22. Engraftment was noted on 11/8/22. The\par daily zarxio was discontinued on 11/10/22. Cefepime was discontinued on\par 11/11/22.\par Currently, Mr. Cespedes is stable for discharge home with outpatient follow up\par at the [facility].\par \par \par \par  [de-identified] : Patient here with his daughter for f/u to  initial eval for high dose chemotherapy and auto stem cell transplant for MM...currently off treatment with velcade and dex...rev also on hold pending decision for auto transplant...he returns for [place]...\par \par 10/7/22: Patient is here for a teaching visit prior to stem cell mobilization.He is accompanied by his daughter Brenda.Denies fever, chills,SOB,chest pain,edema,rash,mouth sores,nausea,vomiting, diarrhea or any signs of active bleeding. \par \par On 10/17/22, patient presents for a pre admission visit. Overall patient is well and offers no acute concerns. Denies fever, chills, nausea, vomiting, diarrhea, rash, mouth sores, dysuria or any signs of active bleeding. Denies SOB, chest pain or B/L LE edema. \par \par 11/18/22: Patient is here for a follow up visit post Autologous Stem Cell Transplant. Today is + 22.Initially [employer]  was used to assist with today's visit and later his daughter Brenda joined the visit.Brenda was the  for rest of the visit.Denies fever, chills, SOB,chest pain,dysuria,vomiting, diarrhea,mouth sores,edema or any signs of active bleeding. He states that he has occasional nausea relieved with Zofran.He also states that he has poor appetite and generalized weakness. \par \par 11/25/22:Patient is seen for a follow up visit.Today is Day +29.[employer]  [place] # [id number] assisted in the beginning for today's visit and later patient's daughter ,Brenda joined the visit and she was the  for the rest of the visit with patient's consent.He is on a wheel chair today.He felt dizzy yesterday while brushing teeth and had a syncopal episode.Daughter Brenda found him sitting on the floor but patient states that he fell down and when he regained consciousness he pulled himself to sitting position.He has mild stiffness of neck today;denies pain, edema or hitting head.He continues to have poor appetite and has generalized weakness.Denies fever, chills, SOB,chest pain, rash,mouth sores,nausea,vomiting, diarrhea or any signs of active bleeding.\par \par On 12/8/22 visit, day + 42 post transplant. Overall well and only complaint today is constipation. Denies fever, chills, SOB,chest pain, rash,mouth sores,nausea,vomiting, diarrhea or any signs of active bleeding. Remains compliant with post transplant medications. Remains compliant with post transplant diet and crowd restrictions. Better with low dose dex\par \par 12/21//22:  post stem cell transplant.He is accompanied by his daughter Brenda.Denies fever, chills, SOB,chest pain, dysuria,nausea,vomiting, diarrhea, rash, mouth sores,edema or any signs of active bleeding.He states that he feels great... improved generalized weakness ...he  uses a cane to walk. Home PT ended this week.He has constipation improved  taking Lactulose.\par \par 1/6/23: Today is Day +71 post stem cell transplant.He is accompanied by his daughter Brenda.Brenda was the  for today's visit with patient's consent.Denies fever,chills, SOB,chest pain, rash,mouth sores, nausea, vomiting, diarrhea,edema or any signs of active bleeding. He states that he feels stronger than before but still uses a cane for assistance.\par \par 1/18/23: Today is Day + 83 post stem cell transplant. He is accompanied by his daughter Brenda. Brenda was the  for today's visit with patient's consent. Overall patient is well. Complains of polyuria but, denies dysuria. Denies fever, chills, nausea, vomiting, diarrhea, rash, mouth sores or any signs of active bleeding. Denies SOB, chest pain or B/L LE edema. Remains compliant with post transplant medications. Remains compliant with post transplant diet and crowd restrictions. \par \par 2/3/23: Patient is here for a follow up visit .He is accompanied by his daughter Brenda who is the  for today's visit with patient's consent.Today is Day +99 post stem cell transplant.Denies fever, chills, SOB,chest pain, nausea, vomiting, diarrhea,rash, mouth sores,edema or any signs of active bleeding.He states that he completed Ciprofloxacin but still continues to have frequency of urination.Denies burning or pain on urination.\par \par 2/17/23:Patient is seen for a follow up visit.Today is Day +113 post stem cell transplant.He is accompanied by his daughter Brenda.Overall well with no acute concerns.Denies fever, chills,cough, dysuria,nausea,vomiting, diarrhea, rash, mouth sores, edema,SOB,chest pain,or any signs of active bleeding.\par \par 3/1/23 Here for f/u..step daughter at side...overall doing well aside from frequent urination\par \par 3/17/23: Today is Day +141 post stem cell transplant.Denies fever, chills, SOB,blurred vision,chest pain,mouth sores, nausea, vomiting, diarrhea,dysuria,rash,edema or any signs of active bleeding.Daughter Brenda at his side is the  for today's visit with patient's consent. today ,states that he did not take BP med this morning.\par \par 3/30/23 Here for f/u, pt with continued urinary complaints. step daughter at side..continues to get stronger post auto\par \par 4/21/23:Patient is seen for a follow up visit.He is accompanied by his daughter Brenda.He states that he has a dry cough for the past few days.Denies fever, chills, SOB,chest pain, edema,dysuria,nausea, vomiting, diarrhea or any signs of active bleeding. [FreeTextEntry1] : 73 yo also  being treated for asymptomatic  positve quantiferon gold test for tb done by Dr. Mcdaniel  grew up  in [place]  no history of TB or exposure .. normal chest xray.\par

## 2023-04-21 NOTE — ASSESSMENT
[FreeTextEntry1] : 3/30/23:\par Multiple Myeloma (C90.00)\par S/P Autologous Stem Cell Transplant (Z94.84)\par \par 1)MM\par  S/P Autologous PBSCT on 10/27/22\par  Labs including Myeloma blood work sent today..small spike noted with prior labs\par  CBC reviewed;no transfusion requirement today\par  Send labs with every visit\par  Restaging done at 4 to 6 months -CR\par  Maintenance with low dose rev discussed\par \par 2) Heme\par    Counts stable, no transfusion requirements today\par    Continue  Multiple Vitamin and Folic acid \par  \par 3) ID\par    Continue prophylaxis until 6 months post..can stop\par    Acyclovir 400 mg 1 tab orally every 8 hours \par    Atovaquone 750 mg/5 mL oral suspension 5 milliliter 2 times a day\par    Diflucan 200 mg 2 tablets once a day ..2 months- DISCONTINUED\par 1 year post transplant vaccines reviewed as well as repeating the initial covid series...after 90 days post auto..pt has not done so yet\par \par \par 4) GI\par   Protonix 40 mg oral delayed release tablet 1 tab once a day\par   Zofran as needed for nausea \par   Dexamethasone 1 mg QOD for poor appetite...appetite improved..now.....DISCONTINUED on 1/18/23\par   Lactulose PRN for constipation\par   MiraLAX 1 x day for constipation\par   Colace 3 x day\par \par 5) H/O DVT\par     Continue Eliquis 5 mg daily (restarted on 11/18/22)\par \par 6) Hypertension\par     Hydralazine 25 mg 1 tab three times a day\par     Metoprolol Tartrate 25 mg 1 tab 2 times a day\par     Norvasc 10 mg 1 tab once a day\par     Monitor BP at home\par \par 7) BPH, urinary frequency...sees uro..will send urine cx...\par     Flomax 0.4 mg 1 cap once a day\par     Proscar 5 mg 1 tab once a day\par \par 8) Dryness of Skin-RESOLVED\par      Apply Aquaphor to moisturize skin\par \par 9) Syncope/Fall\par     Follow up with Cardiology\par     Xray cervical spine reviewed \par     RESOLVED\par \par 10) Generalized weakness- RESOLVED\par       \par 11) Polyuria\par      Urinalysis and urine culture sent on 1/18/23- completed Ciprofloxacin\par      Urinalysis and Urine culture sent today (2/3/23) -Negative for UTI\par      Followed up  with Urology -repeat urine culture ordered by urology as previous specimen was contaminated\par \par 12) Plan\par   -Monitor BP at home\par  -Follow up with Urology\par  -Dry Cough:Follow up on RVP+ COVID testing done today;Advised to take OTC Claritin, Delsym/Cepacol lozenge       for cough\par  -Educated about plan of care,all questions and concerns addressed\par  - Advised to call office with any acute concerns\par  - Advised to have small frequent meals/snacks\par  -Reinstated to avoid crowds given COVID\par  -Patient may have take out food from a reputable restaurant as of 1/18/23  \par  - Additional Instructions: Notify if bleeding; swelling; persistent nausea and vomiting; unable to  urinate; pain not        relieved by medications; fever; numbness, tingling; excessive diarrhea, inability to tolerate liquids  or foods;             increased irritability or sluggishness, rash\par -.Has a  f/u with Dr Mcdaniel  on 5/10/23 to discuss further Revlimid maintenance\par  -Follow up with Dr. Gonzales in 4 weeks \par \par I examined patient under Dr. Gonzales's supervision and Dr. Gonzales agrees to plan of care as listed above\par \par \par \par \par \par \par \par \par \par \par \par

## 2023-04-21 NOTE — REVIEW OF SYSTEMS
[Negative] : Allergic/Immunologic [Fever] : no fever [Chills] : no chills [Night Sweats] : no night sweats [Fatigue] : no fatigue [Recent Change In Weight] : ~T no recent weight change [Shortness Of Breath] : no shortness of breath [Wheezing] : no wheezing [Cough] : cough [SOB on Exertion] : no shortness of breath during exertion [Abdominal Pain] : no abdominal pain [Vomiting] : no vomiting [Constipation] : no constipation [Diarrhea] : no diarrhea [Dysuria] : no dysuria [Incontinence] : no incontinence [Joint Pain] : no joint pain [Joint Stiffness] : no joint stiffness [Muscle Pain] : no muscle pain [Muscle Weakness] : no muscle weakness [FreeTextEntry6] : Dry cough [FreeTextEntry8] :  polyuria

## 2023-04-24 LAB — BACTERIA UR CULT: NORMAL

## 2023-05-25 ENCOUNTER — APPOINTMENT (OUTPATIENT)
Dept: HEMATOLOGY ONCOLOGY | Facility: CLINIC | Age: 73
End: 2023-05-25
Payer: MEDICARE

## 2023-05-25 ENCOUNTER — RESULT REVIEW (OUTPATIENT)
Age: 73
End: 2023-05-25

## 2023-05-25 VITALS
HEART RATE: 56 BPM | TEMPERATURE: 96.6 F | RESPIRATION RATE: 16 BRPM | OXYGEN SATURATION: 98 % | BODY MASS INDEX: 25.55 KG/M2 | WEIGHT: 163.14 LBS | SYSTOLIC BLOOD PRESSURE: 143 MMHG | DIASTOLIC BLOOD PRESSURE: 73 MMHG

## 2023-05-25 LAB
ALBUMIN SERPL ELPH-MCNC: 4.8 G/DL
ALP BLD-CCNC: 125 U/L
ALT SERPL-CCNC: 23 U/L
ANION GAP SERPL CALC-SCNC: 13 MMOL/L
AST SERPL-CCNC: 20 U/L
B2 MICROGLOB SERPL-MCNC: 2.8 MG/L
BASOPHILS # BLD AUTO: 0.02 K/UL — SIGNIFICANT CHANGE UP (ref 0–0.2)
BASOPHILS NFR BLD AUTO: 0.4 % — SIGNIFICANT CHANGE UP (ref 0–2)
BILIRUB SERPL-MCNC: 0.4 MG/DL
BUN SERPL-MCNC: 21 MG/DL
CALCIUM SERPL-MCNC: 10 MG/DL
CHLORIDE SERPL-SCNC: 104 MMOL/L
CO2 SERPL-SCNC: 26 MMOL/L
CREAT SERPL-MCNC: 1.14 MG/DL
EGFR: 67 ML/MIN/1.73M2
EOSINOPHIL # BLD AUTO: 0.11 K/UL — SIGNIFICANT CHANGE UP (ref 0–0.5)
EOSINOPHIL NFR BLD AUTO: 2.2 % — SIGNIFICANT CHANGE UP (ref 0–6)
GLUCOSE SERPL-MCNC: 134 MG/DL
HCT VFR BLD CALC: 46.3 % — SIGNIFICANT CHANGE UP (ref 39–50)
HGB BLD-MCNC: 14.2 G/DL — SIGNIFICANT CHANGE UP (ref 13–17)
IMM GRANULOCYTES NFR BLD AUTO: 0.2 % — SIGNIFICANT CHANGE UP (ref 0–0.9)
LDH SERPL-CCNC: 209 U/L
LYMPHOCYTES # BLD AUTO: 2.25 K/UL — SIGNIFICANT CHANGE UP (ref 1–3.3)
LYMPHOCYTES # BLD AUTO: 45.7 % — HIGH (ref 13–44)
MAGNESIUM SERPL-MCNC: 2.2 MG/DL
MCHC RBC-ENTMCNC: 25.8 PG — LOW (ref 27–34)
MCHC RBC-ENTMCNC: 30.7 G/DL — LOW (ref 32–36)
MCV RBC AUTO: 84.2 FL — SIGNIFICANT CHANGE UP (ref 80–100)
MONOCYTES # BLD AUTO: 0.47 K/UL — SIGNIFICANT CHANGE UP (ref 0–0.9)
MONOCYTES NFR BLD AUTO: 9.6 % — SIGNIFICANT CHANGE UP (ref 2–14)
NEUTROPHILS # BLD AUTO: 2.06 K/UL — SIGNIFICANT CHANGE UP (ref 1.8–7.4)
NEUTROPHILS NFR BLD AUTO: 41.9 % — LOW (ref 43–77)
NRBC # BLD: 0 /100 WBCS — SIGNIFICANT CHANGE UP (ref 0–0)
PLATELET # BLD AUTO: 169 K/UL — SIGNIFICANT CHANGE UP (ref 150–400)
POTASSIUM SERPL-SCNC: 4.5 MMOL/L
PROT SERPL-MCNC: 7.3 G/DL
RBC # BLD: 5.5 M/UL — SIGNIFICANT CHANGE UP (ref 4.2–5.8)
RBC # FLD: 15.5 % — HIGH (ref 10.3–14.5)
SODIUM SERPL-SCNC: 143 MMOL/L
WBC # BLD: 4.92 K/UL — SIGNIFICANT CHANGE UP (ref 3.8–10.5)
WBC # FLD AUTO: 4.92 K/UL — SIGNIFICANT CHANGE UP (ref 3.8–10.5)

## 2023-05-25 PROCEDURE — 99214 OFFICE O/P EST MOD 30 MIN: CPT

## 2023-05-25 NOTE — HISTORY OF PRESENT ILLNESS
[de-identified] : Dx Multiple myeloma Dec 2021..presented with anemia and back pain nov 2021\par bm bx 70% involvement with monoclonal plasma cells IgG kappa...repeat bm bx May 2022 with 1 to 2 % involvement\par mspike 8 grams\par PET confirmed lytic lesions\par will confirmvcreat and calcium were normal upon dx\par s/p rvd x 6....now holding rev...pending decision on transplant\par \par 11/18/22: S/P Autologous Stem Cell Transplant on 10/27/22.Discharge summary as follows:\par Discharge Date    15-Nov-2022\par Admission Date    24-Oct-2022 \par Reason for Admission:Autologous peripheral blood stem cell transplant with high\par dose Melphalan prep regimen for treatment of multiple myeloma\par Hospital Course   \par This is a 72 year old male with multiple myeloma admitted for an autologous\par peripheral blood stem cell transplant with high dose Melphalan prep regimen.\par Hematologic history as follows: Initially presented in 11/2021 with anemia and\par back pain, diagnosed with multiple myeloma 12/2021.A bone marrow biopsy at that\par time showed 70% involvement with monoclonal plasma cells (IgG kappa), improved\par to 1-2% involvement with bone marrow biopsy 5/2022 after treatment with RVD x\par 6.\par Upon admission, a TLC was placed in IR. Mr. Cespedse received IV hydration, pain\par management, nutritional support and antibacterial / antiviral / antifungal /\par PCP / GI and VOD (SOS) prophylaxis. Labs were monitored on a daily basis, and\par he received transfusional support and electrolyte repletion as needed.\par An admission urinalysis was positive for nitrites and leukocyte esterase and he\par was started on prophylactic ciprofloxacin. On 11/4/22, his urine culture grew\par gram negative rods (>100K enterobacter cloacae). The ciprofloxacin was\par broadened to cefepime. Mr. Cespedes experienced pancytopenia related to the high\par dose chemotherapy prep regimen, and neutropenic fever. Blood cultures and CXR\par were negative. Repeat urine culture on 11/11/22 was negative.\par On 10/27/22, after pre-medication, Mr. Cespedes received 304ml of autologous,\par pooled, thawed, washed, mobilized, plasma reduced, HPC apheresis over\par approximately 1 hour. Cell counts as follows:\par Total MNCs (x 10^8/kg) = 6.86\par CD34+ cells (x10^6/kg)= 5.85\par Cell viability (%) = 76%\par He tolerated the infusion well with no adverse reactions noted.\par Early engraftment was noted on 11/5/22. Engraftment was noted on 11/8/22. The\par daily zarxio was discontinued on 11/10/22. Cefepime was discontinued on\par 11/11/22.\par Currently, Mr. Cespedes is stable for discharge home with outpatient follow up\par at the [facility].\par \par \par \par  [de-identified] : Patient here with his daughter for f/u to  initial eval for high dose chemotherapy and auto stem cell transplant for MM...currently off treatment with velcade and dex...rev also on hold pending decision for auto transplant...he returns for [place]...\par \par 10/7/22: Patient is here for a teaching visit prior to stem cell mobilization.He is accompanied by his daughter Brenda.Denies fever, chills,SOB,chest pain,edema,rash,mouth sores,nausea,vomiting, diarrhea or any signs of active bleeding. \par \par On 10/17/22, patient presents for a pre admission visit. Overall patient is well and offers no acute concerns. Denies fever, chills, nausea, vomiting, diarrhea, rash, mouth sores, dysuria or any signs of active bleeding. Denies SOB, chest pain or B/L LE edema. \par \par 11/18/22: Patient is here for a follow up visit post Autologous Stem Cell Transplant. Today is + 22.Initially [employer]  was used to assist with today's visit and later his daughter Brenda joined the visit.Brenda was the  for rest of the visit.Denies fever, chills, SOB,chest pain,dysuria,vomiting, diarrhea,mouth sores,edema or any signs of active bleeding. He states that he has occasional nausea relieved with Zofran.He also states that he has poor appetite and generalized weakness. \par \par 11/25/22:Patient is seen for a follow up visit.Today is Day +29.[employer]  [place] # [id number] assisted in the beginning for today's visit and later patient's daughter ,Brenda joined the visit and she was the  for the rest of the visit with patient's consent.He is on a wheel chair today.He felt dizzy yesterday while brushing teeth and had a syncopal episode.Daughter Brenda found him sitting on the floor but patient states that he fell down and when he regained consciousness he pulled himself to sitting position.He has mild stiffness of neck today;denies pain, edema or hitting head.He continues to have poor appetite and has generalized weakness.Denies fever, chills, SOB,chest pain, rash,mouth sores,nausea,vomiting, diarrhea or any signs of active bleeding.\par \par On 12/8/22 visit, day + 42 post transplant. Overall well and only complaint today is constipation. Denies fever, chills, SOB,chest pain, rash,mouth sores,nausea,vomiting, diarrhea or any signs of active bleeding. Remains compliant with post transplant medications. Remains compliant with post transplant diet and crowd restrictions. Better with low dose dex\par \par 12/21//22:  post stem cell transplant.He is accompanied by his daughter Brenda.Denies fever, chills, SOB,chest pain, dysuria,nausea,vomiting, diarrhea, rash, mouth sores,edema or any signs of active bleeding.He states that he feels great... improved generalized weakness ...he  uses a cane to walk. Home PT ended this week.He has constipation improved  taking Lactulose.\par \par 1/6/23: Today is Day +71 post stem cell transplant.He is accompanied by his daughter Brenda.Brenda was the  for today's visit with patient's consent.Denies fever,chills, SOB,chest pain, rash,mouth sores, nausea, vomiting, diarrhea,edema or any signs of active bleeding. He states that he feels stronger than before but still uses a cane for assistance.\par \par 1/18/23: Today is Day + 83 post stem cell transplant. He is accompanied by his daughter Brenda. Brenda was the  for today's visit with patient's consent. Overall patient is well. Complains of polyuria but, denies dysuria. Denies fever, chills, nausea, vomiting, diarrhea, rash, mouth sores or any signs of active bleeding. Denies SOB, chest pain or B/L LE edema. Remains compliant with post transplant medications. Remains compliant with post transplant diet and crowd restrictions. \par \par 2/3/23: Patient is here for a follow up visit .He is accompanied by his daughter Brenda who is the  for today's visit with patient's consent.Today is Day +99 post stem cell transplant.Denies fever, chills, SOB,chest pain, nausea, vomiting, diarrhea,rash, mouth sores,edema or any signs of active bleeding.He states that he completed Ciprofloxacin but still continues to have frequency of urination.Denies burning or pain on urination.\par \par 2/17/23:Patient is seen for a follow up visit.Today is Day +113 post stem cell transplant.He is accompanied by his daughter Brenda.Overall well with no acute concerns.Denies fever, chills,cough, dysuria,nausea,vomiting, diarrhea, rash, mouth sores, edema,SOB,chest pain,or any signs of active bleeding.\par \par 3/1/23 Here for f/u..step daughter at side...overall doing well aside from frequent urination\par \par 3/17/23: Today is Day +141 post stem cell transplant.Denies fever, chills, SOB,blurred vision,chest pain,mouth sores, nausea, vomiting, diarrhea,dysuria,rash,edema or any signs of active bleeding.Daughter Brenda at his side is the  for today's visit with patient's consent. today ,states that he did not take BP med this morning.\par \par 3/30/23 Here for f/u, pt with continued urinary complaints. step daughter at side..continues to get stronger post auto\par \par 4/21/23:Patient is seen for a follow up visit.He is accompanied by his daughter Brenda.He states that he has a dry cough for the past few days.Denies fever, chills, SOB,chest pain, edema,dysuria,nausea, vomiting, diarrhea or any signs of active bleeding. [FreeTextEntry1] : 73 yo also  being treated for asymptomatic  positve quantiferon gold test for tb done by Dr. Mcdaniel  grew up  in [place]  no history of TB or exposure .. normal chest xray.\par

## 2023-05-25 NOTE — REVIEW OF SYSTEMS
[Cough] : cough [Negative] : Allergic/Immunologic [Fever] : no fever [Chills] : no chills [Night Sweats] : no night sweats [Fatigue] : no fatigue [Recent Change In Weight] : ~T no recent weight change [Shortness Of Breath] : no shortness of breath [Wheezing] : no wheezing [SOB on Exertion] : no shortness of breath during exertion [Abdominal Pain] : no abdominal pain [Vomiting] : no vomiting [Constipation] : no constipation [Dysuria] : no dysuria [Incontinence] : no incontinence [Joint Pain] : no joint pain [Joint Stiffness] : no joint stiffness [Muscle Pain] : no muscle pain [Muscle Weakness] : no muscle weakness [FreeTextEntry6] : Dry cough [FreeTextEntry8] :  polyuria

## 2023-05-28 LAB
ALBUMIN MFR SERPL ELPH: 61.7 %
ALBUMIN SERPL-MCNC: 4.5 G/DL
ALBUMIN/GLOB SERPL: 1.6 RATIO
ALPHA1 GLOB MFR SERPL ELPH: 4 %
ALPHA1 GLOB SERPL ELPH-MCNC: 0.3 G/DL
ALPHA2 GLOB MFR SERPL ELPH: 9.9 %
ALPHA2 GLOB SERPL ELPH-MCNC: 0.7 G/DL
B-GLOBULIN MFR SERPL ELPH: 9.3 %
B-GLOBULIN SERPL ELPH-MCNC: 0.7 G/DL
DEPRECATED KAPPA LC FREE/LAMBDA SER: 1.58 RATIO
GAMMA GLOB FLD ELPH-MCNC: 1.1 G/DL
GAMMA GLOB MFR SERPL ELPH: 15.1 %
IGA SER QL IEP: 89 MG/DL
IGG SER QL IEP: 1277 MG/DL
IGM SER QL IEP: 65 MG/DL
INTERPRETATION SERPL IEP-IMP: NORMAL
KAPPA LC CSF-MCNC: 1.94 MG/DL
KAPPA LC SERPL-MCNC: 3.07 MG/DL
M PROTEIN MFR SERPL ELPH: 2.8 %
M PROTEIN SPEC IFE-MCNC: NORMAL
MONOCLON BAND OBS SERPL: 0.2 G/DL
PROT SERPL-MCNC: 7.3 G/DL
PROT SERPL-MCNC: 7.3 G/DL

## 2023-06-13 ENCOUNTER — OUTPATIENT (OUTPATIENT)
Dept: OUTPATIENT SERVICES | Facility: HOSPITAL | Age: 73
LOS: 1 days | Discharge: HOME | End: 2023-06-13

## 2023-06-13 DIAGNOSIS — C90.00 MULTIPLE MYELOMA NOT HAVING ACHIEVED REMISSION: ICD-10-CM

## 2023-06-13 DIAGNOSIS — Z98.89 OTHER SPECIFIED POSTPROCEDURAL STATES: Chronic | ICD-10-CM

## 2023-06-23 ENCOUNTER — RESULT REVIEW (OUTPATIENT)
Age: 73
End: 2023-06-23

## 2023-06-23 ENCOUNTER — APPOINTMENT (OUTPATIENT)
Dept: HEMATOLOGY ONCOLOGY | Facility: CLINIC | Age: 73
End: 2023-06-23
Payer: MEDICARE

## 2023-06-23 VITALS
DIASTOLIC BLOOD PRESSURE: 70 MMHG | OXYGEN SATURATION: 99 % | RESPIRATION RATE: 16 BRPM | TEMPERATURE: 97.3 F | SYSTOLIC BLOOD PRESSURE: 123 MMHG | WEIGHT: 165.32 LBS | BODY MASS INDEX: 25.9 KG/M2 | HEART RATE: 60 BPM

## 2023-06-23 LAB
ALBUMIN SERPL ELPH-MCNC: 4.6 G/DL
ALP BLD-CCNC: 115 U/L
ALT SERPL-CCNC: 26 U/L
ANION GAP SERPL CALC-SCNC: 13 MMOL/L
AST SERPL-CCNC: 23 U/L
BASOPHILS # BLD AUTO: 0.01 K/UL — SIGNIFICANT CHANGE UP (ref 0–0.2)
BASOPHILS NFR BLD AUTO: 0.2 % — SIGNIFICANT CHANGE UP (ref 0–2)
BILIRUB SERPL-MCNC: 0.4 MG/DL
BUN SERPL-MCNC: 19 MG/DL
CALCIUM SERPL-MCNC: 9.6 MG/DL
CHLORIDE SERPL-SCNC: 107 MMOL/L
CO2 SERPL-SCNC: 26 MMOL/L
CREAT SERPL-MCNC: 1.25 MG/DL
EGFR: 60 ML/MIN/1.73M2
EOSINOPHIL # BLD AUTO: 0.15 K/UL — SIGNIFICANT CHANGE UP (ref 0–0.5)
EOSINOPHIL NFR BLD AUTO: 3 % — SIGNIFICANT CHANGE UP (ref 0–6)
GLUCOSE SERPL-MCNC: 104 MG/DL
HCT VFR BLD CALC: 40.1 % — SIGNIFICANT CHANGE UP (ref 39–50)
HGB BLD-MCNC: 12.6 G/DL — LOW (ref 13–17)
IMM GRANULOCYTES NFR BLD AUTO: 0.2 % — SIGNIFICANT CHANGE UP (ref 0–0.9)
LDH SERPL-CCNC: 190 U/L
LYMPHOCYTES # BLD AUTO: 2.07 K/UL — SIGNIFICANT CHANGE UP (ref 1–3.3)
LYMPHOCYTES # BLD AUTO: 41.2 % — SIGNIFICANT CHANGE UP (ref 13–44)
MAGNESIUM SERPL-MCNC: 2.2 MG/DL
MCHC RBC-ENTMCNC: 26.1 PG — LOW (ref 27–34)
MCHC RBC-ENTMCNC: 31.4 G/DL — LOW (ref 32–36)
MCV RBC AUTO: 83.2 FL — SIGNIFICANT CHANGE UP (ref 80–100)
MONOCYTES # BLD AUTO: 0.35 K/UL — SIGNIFICANT CHANGE UP (ref 0–0.9)
MONOCYTES NFR BLD AUTO: 7 % — SIGNIFICANT CHANGE UP (ref 2–14)
NEUTROPHILS # BLD AUTO: 2.44 K/UL — SIGNIFICANT CHANGE UP (ref 1.8–7.4)
NEUTROPHILS NFR BLD AUTO: 48.4 % — SIGNIFICANT CHANGE UP (ref 43–77)
NRBC # BLD: 0 /100 WBCS — SIGNIFICANT CHANGE UP (ref 0–0)
PLATELET # BLD AUTO: 174 K/UL — SIGNIFICANT CHANGE UP (ref 150–400)
POTASSIUM SERPL-SCNC: 4.1 MMOL/L
PROT SERPL-MCNC: 6.8 G/DL
RBC # BLD: 4.82 M/UL — SIGNIFICANT CHANGE UP (ref 4.2–5.8)
RBC # FLD: 16.3 % — HIGH (ref 10.3–14.5)
SODIUM SERPL-SCNC: 146 MMOL/L
WBC # BLD: 5.03 K/UL — SIGNIFICANT CHANGE UP (ref 3.8–10.5)
WBC # FLD AUTO: 5.03 K/UL — SIGNIFICANT CHANGE UP (ref 3.8–10.5)

## 2023-06-23 PROCEDURE — 99214 OFFICE O/P EST MOD 30 MIN: CPT

## 2023-06-23 NOTE — ASSESSMENT
[FreeTextEntry1] : 6/23/23:\par Multiple Myeloma (C90.00)\par S/P Autologous Stem Cell Transplant (Z94.84)\par \par 1)MM\par  S/P Autologous PBSCT on 10/27/22\par  Labs including Myeloma blood work sent today..small spike noted with prior labs\par  CBC reviewed;no transfusion requirement today\par  Send labs with every visit\par  Restaging done at 4 to 6 months -CR\par  Maintenance with low dose rev discussed....Started Revlimid 10 mg 21 days on 7 days off  maintenance by Dr. Mcdaniel on  6/14/23\par \par 2) Heme\par    Counts stable, no transfusion requirements today\par    Continue  Multiple Vitamin and Folic acid \par  \par 3) ID\par    Continue prophylaxis until 6 months post..can stop\par    Acyclovir 400 mg 1 tab orally every 8 hours \par    Atovaquone 750 mg/5 mL oral suspension 5 milliliter 2 times a day\par    Diflucan 200 mg 2 tablets once a day ..2 months- DISCONTINUED\par 1 year post transplant vaccines reviewed as well as repeating the initial covid series...after 90 days post auto..pt has not done so yet\par \par 4) GI\par   Protonix 40 mg oral delayed release tablet 1 tab once a day\par   Zofran as needed for nausea \par   Dexamethasone 1 mg QOD for poor appetite...appetite improved..now.....DISCONTINUED on 1/18/23\par   Lactulose PRN for constipation\par   MiraLAX 1 x day for constipation\par   Colace 3 x day\par \par 5) H/O DVT\par     Continue Eliquis 5 mg daily (restarted on 11/18/22)\par \par 6) Hypertension\par     Hydralazine 25 mg 1 tab three times a day\par     Metoprolol Tartrate 25 mg 1 tab 2 times a day\par     Norvasc 10 mg 1 tab once a day\par     Monitor BP at home\par \par 7) BPH, urinary frequency...sees uro..will send urine cx...\par     Flomax 0.4 mg 1 cap once a day\par     Proscar 5 mg 1 tab once a day\par \par 8) Dryness of Skin-RESOLVED\par      Apply Aquaphor to moisturize skin\par \par 9) Syncope/Fall\par     Follow up with Cardiology\par     Xray cervical spine reviewed \par     RESOLVED\par \par 10) Generalized weakness- RESOLVED\par       \par 11) Polyuria\par      Urinalysis and urine culture sent on 1/18/23- completed Ciprofloxacin\par      Urinalysis and Urine culture sent today (2/3/23) -Negative for UTI\par      F/U with Urology\par \par 12) Plan\par   -Monitor BP at home\par  -Follow up with Urology\par  -Educated about plan of care,all questions and concerns addressed\par  - Advised to call office with any acute concerns\par  - Advised to have small frequent meals/snacks\par  -Reinstated to avoid crowds given COVID\par  -Patient may have take out food from a reputable restaurant as of 1/18/23  \par  - Additional Instructions: Notify if bleeding; swelling; persistent nausea and vomiting; unable to  urinate; pain not        relieved by medications; fever; numbness, tingling; excessive diarrhea, inability to tolerate liquids  or foods;             increased irritability or sluggishness, rash\par -.Has a  f/u with Dr Mcdaniel  on 5/10/23 to discuss further Revlimid maintenance\par  -Follow up with Dr. Gonzales in October\par -Will schedule for post transplant vaccines starting first week of November\par \par I examined patient under Dr. Gonzales's supervision and Dr. Gonzales agrees to plan of care as listed above\par \par \par \par \par \par \par \par \par \par \par \par

## 2023-06-23 NOTE — HISTORY OF PRESENT ILLNESS
[de-identified] : Dx Multiple myeloma Dec 2021..presented with anemia and back pain nov 2021\par bm bx 70% involvement with monoclonal plasma cells IgG kappa...repeat bm bx May 2022 with 1 to 2 % involvement\par mspike 8 grams\par PET confirmed lytic lesions\par will confirmvcreat and calcium were normal upon dx\par s/p rvd x 6....now holding rev...pending decision on transplant\par \par 11/18/22: S/P Autologous Stem Cell Transplant on 10/27/22.Discharge summary as follows:\par Discharge Date    15-Nov-2022\par Admission Date    24-Oct-2022 \par Reason for Admission:Autologous peripheral blood stem cell transplant with high\par dose Melphalan prep regimen for treatment of multiple myeloma\par Hospital Course   \par This is a 72 year old male with multiple myeloma admitted for an autologous\par peripheral blood stem cell transplant with high dose Melphalan prep regimen.\par Hematologic history as follows: Initially presented in 11/2021 with anemia and\par back pain, diagnosed with multiple myeloma 12/2021.A bone marrow biopsy at that\par time showed 70% involvement with monoclonal plasma cells (IgG kappa), improved\par to 1-2% involvement with bone marrow biopsy 5/2022 after treatment with RVD x\par 6.\par Upon admission, a TLC was placed in IR. Mr. Cespedes received IV hydration, pain\par management, nutritional support and antibacterial / antiviral / antifungal /\par PCP / GI and VOD (SOS) prophylaxis. Labs were monitored on a daily basis, and\par he received transfusional support and electrolyte repletion as needed.\par An admission urinalysis was positive for nitrites and leukocyte esterase and he\par was started on prophylactic ciprofloxacin. On 11/4/22, his urine culture grew\par gram negative rods (>100K enterobacter cloacae). The ciprofloxacin was\par broadened to cefepime. Mr. Cespedes experienced pancytopenia related to the high\par dose chemotherapy prep regimen, and neutropenic fever. Blood cultures and CXR\par were negative. Repeat urine culture on 11/11/22 was negative.\par On 10/27/22, after pre-medication, Mr. Cespedes received 304ml of autologous,\par pooled, thawed, washed, mobilized, plasma reduced, HPC apheresis over\par approximately 1 hour. Cell counts as follows:\par Total MNCs (x 10^8/kg) = 6.86\par CD34+ cells (x10^6/kg)= 5.85\par Cell viability (%) = 76%\par He tolerated the infusion well with no adverse reactions noted.\par Early engraftment was noted on 11/5/22. Engraftment was noted on 11/8/22. The\par daily zarxio was discontinued on 11/10/22. Cefepime was discontinued on\par 11/11/22.\par Currently, Mr. Cespedes is stable for discharge home with outpatient follow up\par at the [facility].\par \par \par \par  [de-identified] : Patient here with his daughter for f/u to  initial eval for high dose chemotherapy and auto stem cell transplant for MM...currently off treatment with velcade and dex...rev also on hold pending decision for auto transplant...he returns for [place]...\par \par 10/7/22: Patient is here for a teaching visit prior to stem cell mobilization.He is accompanied by his daughter Brenda.Denies fever, chills,SOB,chest pain,edema,rash,mouth sores,nausea,vomiting, diarrhea or any signs of active bleeding. \par \par On 10/17/22, patient presents for a pre admission visit. Overall patient is well and offers no acute concerns. Denies fever, chills, nausea, vomiting, diarrhea, rash, mouth sores, dysuria or any signs of active bleeding. Denies SOB, chest pain or B/L LE edema. \par \par 11/18/22: Patient is here for a follow up visit post Autologous Stem Cell Transplant. Today is + 22.Initially [employer]  was used to assist with today's visit and later his daughter Brenda joined the visit.Brenda was the  for rest of the visit.Denies fever, chills, SOB,chest pain,dysuria,vomiting, diarrhea,mouth sores,edema or any signs of active bleeding. He states that he has occasional nausea relieved with Zofran.He also states that he has poor appetite and generalized weakness. \par \par 11/25/22:Patient is seen for a follow up visit.Today is Day +29.[employer]  [place] # [id number] assisted in the beginning for today's visit and later patient's daughter ,Brenda joined the visit and she was the  for the rest of the visit with patient's consent.He is on a wheel chair today.He felt dizzy yesterday while brushing teeth and had a syncopal episode.Daughter Brenda found him sitting on the floor but patient states that he fell down and when he regained consciousness he pulled himself to sitting position.He has mild stiffness of neck today;denies pain, edema or hitting head.He continues to have poor appetite and has generalized weakness.Denies fever, chills, SOB,chest pain, rash,mouth sores,nausea,vomiting, diarrhea or any signs of active bleeding.\par \par On 12/8/22 visit, day + 42 post transplant. Overall well and only complaint today is constipation. Denies fever, chills, SOB,chest pain, rash,mouth sores,nausea,vomiting, diarrhea or any signs of active bleeding. Remains compliant with post transplant medications. Remains compliant with post transplant diet and crowd restrictions. Better with low dose dex\par \par 12/21//22:  post stem cell transplant.He is accompanied by his daughter Brenda.Denies fever, chills, SOB,chest pain, dysuria,nausea,vomiting, diarrhea, rash, mouth sores,edema or any signs of active bleeding.He states that he feels great... improved generalized weakness ...he  uses a cane to walk. Home PT ended this week.He has constipation improved  taking Lactulose.\par \par 1/6/23: Today is Day +71 post stem cell transplant.He is accompanied by his daughter Brenda.Brenda was the  for today's visit with patient's consent.Denies fever,chills, SOB,chest pain, rash,mouth sores, nausea, vomiting, diarrhea,edema or any signs of active bleeding. He states that he feels stronger than before but still uses a cane for assistance.\par \par 1/18/23: Today is Day + 83 post stem cell transplant. He is accompanied by his daughter Brenda. Brenda was the  for today's visit with patient's consent. Overall patient is well. Complains of polyuria but, denies dysuria. Denies fever, chills, nausea, vomiting, diarrhea, rash, mouth sores or any signs of active bleeding. Denies SOB, chest pain or B/L LE edema. Remains compliant with post transplant medications. Remains compliant with post transplant diet and crowd restrictions. \par \par 2/3/23: Patient is here for a follow up visit .He is accompanied by his daughter Brenda who is the  for today's visit with patient's consent.Today is Day +99 post stem cell transplant.Denies fever, chills, SOB,chest pain, nausea, vomiting, diarrhea,rash, mouth sores,edema or any signs of active bleeding.He states that he completed Ciprofloxacin but still continues to have frequency of urination.Denies burning or pain on urination.\par \par 2/17/23:Patient is seen for a follow up visit.Today is Day +113 post stem cell transplant.He is accompanied by his daughter Brenda.Overall well with no acute concerns.Denies fever, chills,cough, dysuria,nausea,vomiting, diarrhea, rash, mouth sores, edema,SOB,chest pain,or any signs of active bleeding.\par \par 3/1/23 Here for f/u..step daughter at side...overall doing well aside from frequent urination\par \par 3/17/23: Today is Day +141 post stem cell transplant.Denies fever, chills, SOB,blurred vision,chest pain,mouth sores, nausea, vomiting, diarrhea,dysuria,rash,edema or any signs of active bleeding.Daughter Brenda at his side is the  for today's visit with patient's consent. today ,states that he did not take BP med this morning.\par \par 3/30/23 Here for f/u, pt with continued urinary complaints. step daughter at side..continues to get stronger post auto\par \par 4/21/23:Patient is seen for a follow up visit.He is accompanied by his daughter Brenda.He states that he has a dry cough for the past few days.Denies fever, chills, SOB,chest pain, edema,dysuria,nausea, vomiting, diarrhea or any signs of active bleeding.\par \par 6/23/23:Patient is seen for a follow up visit. His daughter Brenda was on the phone in the beginning of the visit.Denies fever, chills, SOB,chest pain, edema,nausea,vomiting, diarrhea,mouth sores, rash pr any signs of active bleeding.He states that his prostate bothers him at times,takes Finasteride 5 mg  daily.\par \par \par  [FreeTextEntry1] : 71 yo also  being treated for asymptomatic  positve quantiferon gold test for tb done by Dr. Mcdaniel  grew up  in [facility]  no history of TB or exposure .. normal chest xray.He is off TB treatment\par

## 2023-07-19 NOTE — PHARMACOTHERAPY INTERVENTION NOTE - OUTCOME
seen and eaxmined  vsstable  onbed comfortable  still c/o abd pain  no nausea or vomiting    denies cp or palp   abd soft mild epigastri, lt middle and lower q tenderness   no villatoro sign  labs noted creat 1.68    ekg nsr   a/p abd pain all over but mild epigastric tenderness  intestinal ischemia unlikely  although has uncontrolled DM    no palp  ekg no afib  lactate on adm nml    no wbc   surgery and GI on board  going for MRI  and egd  on friday    endocrino accepted

## 2023-07-24 ENCOUNTER — TRANSCRIPTION ENCOUNTER (OUTPATIENT)
Age: 73
End: 2023-07-24

## 2023-10-07 RX ORDER — CIPROFLOXACIN HYDROCHLORIDE 250 MG/1
250 TABLET, FILM COATED ORAL
Qty: 14 | Refills: 0 | Status: ACTIVE | COMMUNITY
Start: 2023-01-20 | End: 1900-01-01

## 2023-10-09 RX ORDER — CIPROFLOXACIN HYDROCHLORIDE 500 MG/1
500 TABLET, FILM COATED ORAL TWICE DAILY
Qty: 14 | Refills: 0 | Status: ACTIVE | COMMUNITY
Start: 2023-10-09 | End: 1900-01-01

## 2023-10-11 ENCOUNTER — OUTPATIENT (OUTPATIENT)
Dept: OUTPATIENT SERVICES | Facility: HOSPITAL | Age: 73
LOS: 1 days | Discharge: HOME | End: 2023-10-11

## 2023-10-11 DIAGNOSIS — Z98.89 OTHER SPECIFIED POSTPROCEDURAL STATES: Chronic | ICD-10-CM

## 2023-10-11 DIAGNOSIS — C90.00 MULTIPLE MYELOMA NOT HAVING ACHIEVED REMISSION: ICD-10-CM

## 2023-10-12 ENCOUNTER — RESULT REVIEW (OUTPATIENT)
Age: 73
End: 2023-10-12

## 2023-10-12 ENCOUNTER — APPOINTMENT (OUTPATIENT)
Dept: HEMATOLOGY ONCOLOGY | Facility: CLINIC | Age: 73
End: 2023-10-12
Payer: MEDICARE

## 2023-10-12 VITALS
BODY MASS INDEX: 25.9 KG/M2 | WEIGHT: 165.32 LBS | TEMPERATURE: 96.6 F | DIASTOLIC BLOOD PRESSURE: 76 MMHG | SYSTOLIC BLOOD PRESSURE: 132 MMHG | OXYGEN SATURATION: 96 % | RESPIRATION RATE: 16 BRPM | HEART RATE: 62 BPM

## 2023-10-12 DIAGNOSIS — I10 ESSENTIAL (PRIMARY) HYPERTENSION: ICD-10-CM

## 2023-10-12 DIAGNOSIS — Z86.73 PERSONAL HISTORY OF TRANSIENT ISCHEMIC ATTACK (TIA), AND CEREBRAL INFARCTION W/OUT RESIDUAL DEFICITS: ICD-10-CM

## 2023-10-12 LAB
ALBUMIN SERPL ELPH-MCNC: 4.6 G/DL
ALP BLD-CCNC: 133 U/L
ALT SERPL-CCNC: 63 U/L
ANION GAP SERPL CALC-SCNC: 14 MMOL/L
AST SERPL-CCNC: 48 U/L
B2 MICROGLOB SERPL-MCNC: 3 MG/L
BASOPHILS # BLD AUTO: 0.04 K/UL — SIGNIFICANT CHANGE UP (ref 0–0.2)
BASOPHILS NFR BLD AUTO: 0.8 % — SIGNIFICANT CHANGE UP (ref 0–2)
BILIRUB SERPL-MCNC: 0.3 MG/DL
BUN SERPL-MCNC: 22 MG/DL
CALCIUM SERPL-MCNC: 9.9 MG/DL
CD16+CD56+ CELLS # BLD: 287 CELLS/UL
CD16+CD56+ CELLS NFR BLD: 16 %
CD19 CELLS NFR BLD: 246 CELLS/UL
CD3 CELLS # BLD: 1214 CELLS/UL
CD3 CELLS NFR BLD: 69 %
CD3+CD4+ CELLS # BLD: 436 CELLS/UL
CD3+CD4+ CELLS NFR BLD: 25 %
CD3+CD4+ CELLS/CD3+CD8+ CLL SPEC: 0.57 RATIO
CD3+CD8+ CELLS # SPEC: 762 CELLS/UL
CD3+CD8+ CELLS NFR BLD: 44 %
CELLS.CD3-CD19+/CELLS IN BLOOD: 14 %
CHLORIDE SERPL-SCNC: 106 MMOL/L
CO2 SERPL-SCNC: 25 MMOL/L
CREAT SERPL-MCNC: 1.08 MG/DL
EGFR: 72 ML/MIN/1.73M2
EOSINOPHIL # BLD AUTO: 0.21 K/UL — SIGNIFICANT CHANGE UP (ref 0–0.5)
EOSINOPHIL NFR BLD AUTO: 4.1 % — SIGNIFICANT CHANGE UP (ref 0–6)
GLUCOSE SERPL-MCNC: 133 MG/DL
HCT VFR BLD CALC: 41.7 % — SIGNIFICANT CHANGE UP (ref 39–50)
HGB BLD-MCNC: 13.3 G/DL — SIGNIFICANT CHANGE UP (ref 13–17)
IMM GRANULOCYTES NFR BLD AUTO: 0.2 % — SIGNIFICANT CHANGE UP (ref 0–0.9)
LDH SERPL-CCNC: 203 U/L
LYMPHOCYTES # BLD AUTO: 1.81 K/UL — SIGNIFICANT CHANGE UP (ref 1–3.3)
LYMPHOCYTES # BLD AUTO: 35 % — SIGNIFICANT CHANGE UP (ref 13–44)
MAGNESIUM SERPL-MCNC: 2.3 MG/DL
MCHC RBC-ENTMCNC: 27.1 PG — SIGNIFICANT CHANGE UP (ref 27–34)
MCHC RBC-ENTMCNC: 31.9 G/DL — LOW (ref 32–36)
MCV RBC AUTO: 84.9 FL — SIGNIFICANT CHANGE UP (ref 80–100)
MONOCYTES # BLD AUTO: 0.46 K/UL — SIGNIFICANT CHANGE UP (ref 0–0.9)
MONOCYTES NFR BLD AUTO: 8.9 % — SIGNIFICANT CHANGE UP (ref 2–14)
NEUTROPHILS # BLD AUTO: 2.64 K/UL — SIGNIFICANT CHANGE UP (ref 1.8–7.4)
NEUTROPHILS NFR BLD AUTO: 51 % — SIGNIFICANT CHANGE UP (ref 43–77)
NRBC # BLD: 0 /100 WBCS — SIGNIFICANT CHANGE UP (ref 0–0)
PLATELET # BLD AUTO: 165 K/UL — SIGNIFICANT CHANGE UP (ref 150–400)
POTASSIUM SERPL-SCNC: 4.4 MMOL/L
PROT SERPL-MCNC: 7.3 G/DL
RBC # BLD: 4.91 M/UL — SIGNIFICANT CHANGE UP (ref 4.2–5.8)
RBC # FLD: 14.7 % — HIGH (ref 10.3–14.5)
SODIUM SERPL-SCNC: 145 MMOL/L
WBC # BLD: 5.17 K/UL — SIGNIFICANT CHANGE UP (ref 3.8–10.5)
WBC # FLD AUTO: 5.17 K/UL — SIGNIFICANT CHANGE UP (ref 3.8–10.5)

## 2023-10-12 PROCEDURE — 99215 OFFICE O/P EST HI 40 MIN: CPT

## 2023-10-13 LAB
FERRITIN SERPL-MCNC: 83 NG/ML
T3 SERPL-MCNC: 115 NG/DL
T4 SERPL-MCNC: 8.1 UG/DL
TESTOST SERPL-MCNC: 358 NG/DL
TSH SERPL-ACNC: 2.99 UIU/ML

## 2023-10-16 LAB
ALBUMIN MFR SERPL ELPH: 58.7 %
ALBUMIN SERPL-MCNC: 4.3 G/DL
ALBUMIN/GLOB SERPL: 1.4 RATIO
ALPHA1 GLOB MFR SERPL ELPH: 4.1 %
ALPHA1 GLOB SERPL ELPH-MCNC: 0.3 G/DL
ALPHA2 GLOB MFR SERPL ELPH: 10.3 %
ALPHA2 GLOB SERPL ELPH-MCNC: 0.8 G/DL
B-GLOBULIN MFR SERPL ELPH: 9.8 %
B-GLOBULIN SERPL ELPH-MCNC: 0.7 G/DL
DEPRECATED KAPPA LC FREE/LAMBDA SER: 1.66 RATIO
GAMMA GLOB FLD ELPH-MCNC: 1.2 G/DL
GAMMA GLOB MFR SERPL ELPH: 17.1 %
IGA SER QL IEP: 152 MG/DL
IGG SER QL IEP: 1359 MG/DL
IGM SER QL IEP: 40 MG/DL
INTERPRETATION SERPL IEP-IMP: NORMAL
KAPPA LC CSF-MCNC: 2.26 MG/DL
KAPPA LC SERPL-MCNC: 3.76 MG/DL
M PROTEIN MFR SERPL ELPH: 3.8 %
M PROTEIN SPEC IFE-MCNC: NORMAL
MONOCLON BAND OBS SERPL: 0.3 G/DL
PROT SERPL-MCNC: 7.3 G/DL
PROT SERPL-MCNC: 7.3 G/DL

## 2023-11-16 ENCOUNTER — APPOINTMENT (OUTPATIENT)
Dept: UROLOGY | Facility: CLINIC | Age: 73
End: 2023-11-16
Payer: MEDICARE

## 2023-11-16 DIAGNOSIS — R35.1 NOCTURIA: ICD-10-CM

## 2023-11-16 DIAGNOSIS — R33.9 RETENTION OF URINE, UNSPECIFIED: ICD-10-CM

## 2023-11-16 PROCEDURE — 99214 OFFICE O/P EST MOD 30 MIN: CPT | Mod: 95

## 2023-11-17 RX ORDER — FINASTERIDE 5 MG/1
5 TABLET, FILM COATED ORAL DAILY
Qty: 90 | Refills: 3 | Status: ACTIVE | COMMUNITY
Start: 2022-11-16 | End: 1900-01-01

## 2023-11-17 RX ORDER — TAMSULOSIN HYDROCHLORIDE 0.4 MG/1
0.4 CAPSULE ORAL
Qty: 90 | Refills: 1 | Status: ACTIVE | COMMUNITY
Start: 2022-11-16 | End: 1900-01-01

## 2023-11-28 ENCOUNTER — OUTPATIENT (OUTPATIENT)
Dept: OUTPATIENT SERVICES | Facility: HOSPITAL | Age: 73
LOS: 1 days | Discharge: HOME | End: 2023-11-28

## 2023-11-28 DIAGNOSIS — C90.00 MULTIPLE MYELOMA NOT HAVING ACHIEVED REMISSION: ICD-10-CM

## 2023-11-28 DIAGNOSIS — Z98.89 OTHER SPECIFIED POSTPROCEDURAL STATES: Chronic | ICD-10-CM

## 2023-12-13 ENCOUNTER — APPOINTMENT (OUTPATIENT)
Dept: UROLOGY | Facility: CLINIC | Age: 73
End: 2023-12-13
Payer: MEDICARE

## 2023-12-13 VITALS
WEIGHT: 165 LBS | TEMPERATURE: 97.7 F | BODY MASS INDEX: 25.9 KG/M2 | HEIGHT: 67 IN | OXYGEN SATURATION: 95 % | DIASTOLIC BLOOD PRESSURE: 69 MMHG | HEART RATE: 62 BPM | SYSTOLIC BLOOD PRESSURE: 139 MMHG

## 2023-12-13 DIAGNOSIS — E11.9 TYPE 2 DIABETES MELLITUS W/OUT COMPLICATIONS: ICD-10-CM

## 2023-12-13 DIAGNOSIS — N13.8 BENIGN PROSTATIC HYPERPLASIA WITH LOWER URINARY TRACT SYMPMS: ICD-10-CM

## 2023-12-13 DIAGNOSIS — N40.1 BENIGN PROSTATIC HYPERPLASIA WITH LOWER URINARY TRACT SYMPMS: ICD-10-CM

## 2023-12-13 PROCEDURE — 99214 OFFICE O/P EST MOD 30 MIN: CPT

## 2023-12-13 NOTE — ASSESSMENT
[FreeTextEntry1] : Very pleasant 73-year-old gentleman who presents for follow-up of BPH, history of recurrent urinary tract infections, incomplete bladder emptying, new complaint of perineal discomfort, rectal irritation -We discussed potential etiologies of rectal irritation -I suggested that he follow-up with a gastroenterologist -Urinalysis -Urine culture -Creatinine 1.08 -Start Cipro given concern for prostatitis -Follow-up in 2 to 3 months

## 2023-12-13 NOTE — PHYSICAL EXAM
[Normal Appearance] : normal appearance [Well Groomed] : well groomed [General Appearance - In No Acute Distress] : no acute distress [Edema] : no peripheral edema [Respiration, Rhythm And Depth] : normal respiratory rhythm and effort [Exaggerated Use Of Accessory Muscles For Inspiration] : no accessory muscle use [Abdomen Soft] : soft [Abdomen Tenderness] : non-tender [Costovertebral Angle Tenderness] : no ~M costovertebral angle tenderness [Urinary Bladder Findings] : the bladder was normal on palpation [No Prostate Nodules] : no prostate nodules [Normal Station and Gait] : the gait and station were normal for the patient's age [] : no rash [No Focal Deficits] : no focal deficits [Oriented To Time, Place, And Person] : oriented to person, place, and time [Affect] : the affect was normal [Mood] : the mood was normal [No Palpable Adenopathy] : no palpable adenopathy

## 2023-12-13 NOTE — HISTORY OF PRESENT ILLNESS
[FreeTextEntry1] : Very pleasant 73-year-old gentleman who presents for follow-up of BPH with urinary obstruction and history of UTIs with new complaint of perineal discomfort.  He feels well.  He continues to take tamsulosin and finasteride with improvement in his urinary symptoms.  He denies dysuria.  No hematuria.  No flank pain or suprapubic pain.  Reports that he is urinating well.  PVR today 165 cc.  Today he reports dark-colored stools at times and pain in his rectal area.

## 2023-12-14 ENCOUNTER — APPOINTMENT (OUTPATIENT)
Dept: INFUSION THERAPY | Facility: HOSPITAL | Age: 73
End: 2023-12-14

## 2023-12-20 LAB
APPEARANCE: CLEAR
BACTERIA UR CULT: ABNORMAL
BACTERIA: NEGATIVE /HPF
BILIRUBIN URINE: NEGATIVE
BLOOD URINE: NEGATIVE
CAST: 0 /LPF
COLOR: YELLOW
EPITHELIAL CELLS: 1 /HPF
GLUCOSE QUALITATIVE U: 100 MG/DL
KETONES URINE: NEGATIVE MG/DL
LEUKOCYTE ESTERASE URINE: ABNORMAL
MICROSCOPIC-UA: NORMAL
NITRITE URINE: NEGATIVE
PH URINE: 6
PROTEIN URINE: 30 MG/DL
RED BLOOD CELLS URINE: 4 /HPF
REVIEW: NORMAL
SPECIFIC GRAVITY URINE: 1.02
UROBILINOGEN URINE: 0.2 MG/DL
WHITE BLOOD CELLS URINE: 9 /HPF
YEAST-LIKE CELLS: PRESENT

## 2024-01-03 ENCOUNTER — APPOINTMENT (OUTPATIENT)
Dept: INFUSION THERAPY | Facility: HOSPITAL | Age: 74
End: 2024-01-03

## 2024-01-10 ENCOUNTER — APPOINTMENT (OUTPATIENT)
Dept: INFUSION THERAPY | Facility: HOSPITAL | Age: 74
End: 2024-01-10

## 2024-02-10 NOTE — DISCHARGE NOTE PROVIDER - NSDCHHATTENDCERT_GEN_ALL_CORE
1727 John will be in for US order      Sandy Mcfadden, EMT  02/10/24 1721    
My signature below certifies that the above stated patient is homebound and upon completion of the Face-To-Face encounter, has the need for intermittent skilled nursing, physical therapy and/or speech or occupational therapy services in their home for their current diagnosis as outlined in their initial plan of care. These services will continue to be monitored by myself or another physician.

## 2024-02-13 ENCOUNTER — OUTPATIENT (OUTPATIENT)
Dept: OUTPATIENT SERVICES | Facility: HOSPITAL | Age: 74
LOS: 1 days | Discharge: HOME | End: 2024-02-13

## 2024-02-13 DIAGNOSIS — Z98.89 OTHER SPECIFIED POSTPROCEDURAL STATES: Chronic | ICD-10-CM

## 2024-02-13 DIAGNOSIS — C90.00 MULTIPLE MYELOMA NOT HAVING ACHIEVED REMISSION: ICD-10-CM

## 2024-02-15 ENCOUNTER — APPOINTMENT (OUTPATIENT)
Dept: HEMATOLOGY ONCOLOGY | Facility: CLINIC | Age: 74
End: 2024-02-15

## 2024-02-22 ENCOUNTER — APPOINTMENT (OUTPATIENT)
Dept: INFUSION THERAPY | Facility: HOSPITAL | Age: 74
End: 2024-02-22

## 2024-02-23 DIAGNOSIS — Z94.84 STEM CELLS TRANSPLANT STATUS: ICD-10-CM

## 2024-02-23 DIAGNOSIS — Z23 ENCOUNTER FOR IMMUNIZATION: ICD-10-CM

## 2024-02-29 ENCOUNTER — APPOINTMENT (OUTPATIENT)
Dept: INFUSION THERAPY | Facility: HOSPITAL | Age: 74
End: 2024-02-29

## 2024-04-08 ENCOUNTER — OUTPATIENT (OUTPATIENT)
Dept: OUTPATIENT SERVICES | Facility: HOSPITAL | Age: 74
LOS: 1 days | Discharge: HOME | End: 2024-04-08

## 2024-04-08 DIAGNOSIS — Z98.89 OTHER SPECIFIED POSTPROCEDURAL STATES: Chronic | ICD-10-CM

## 2024-04-08 DIAGNOSIS — C90.00 MULTIPLE MYELOMA NOT HAVING ACHIEVED REMISSION: ICD-10-CM

## 2024-04-29 ENCOUNTER — APPOINTMENT (OUTPATIENT)
Dept: INFUSION THERAPY | Facility: HOSPITAL | Age: 74
End: 2024-04-29

## 2024-04-30 DIAGNOSIS — Z23 ENCOUNTER FOR IMMUNIZATION: ICD-10-CM

## 2024-04-30 DIAGNOSIS — Z94.84 STEM CELLS TRANSPLANT STATUS: ICD-10-CM

## 2024-05-06 ENCOUNTER — APPOINTMENT (OUTPATIENT)
Dept: INFUSION THERAPY | Facility: HOSPITAL | Age: 74
End: 2024-05-06

## 2024-05-17 RX ORDER — FLUCONAZOLE 100 MG/1
100 TABLET ORAL DAILY
Qty: 5 | Refills: 0 | Status: ACTIVE | COMMUNITY
Start: 2023-12-20 | End: 1900-01-01

## 2024-05-25 NOTE — ASSESSMENT
[FreeTextEntry1] : 3/1//23:\par Multiple Myeloma (C90.00)\par S/P Autologous Stem Cell Transplant (Z94.84)\par \par 1)MM\par  S/P Autologous PBSCT on 10/27/22\par  Labs sent today\par  CBC reviewed;no transfusion requirement today\par  Send labs with every visit\par  Restaging at 4 to 6 months discussed..pt asked to bring 24 hr urine next visit\par \par 2) Heme\par    Counts stable, no transfusion requirements today\par    Continue  Multiple Vitamin and Folic acid \par  \par 3) ID\par    Continue prophylaxis until 6 months post\par    Acyclovir 400 mg 1 tab orally every 8 hours \par    Atovaquone 750 mg/5 mL oral suspension 5 milliliter 2 times a day\par    Diflucan 200 mg 2 tablets once a day ..2 months- DISCONTINUED\par \par \par 4) GI\par   Protonix 40 mg oral delayed release tablet 1 tab once a day\par   Zofran as needed for nausea \par   Dexamethasone 1 mg QOD for poor appetite...appetite improved..now.....DISCONTINUED on 1/18/23\par   Lactulose PRN for constipation\par   MiraLAX 1 x day for constipation\par   Colace 3 x day\par \par 5) H/O DVT\par     Continue Eliquis 5 mg daily (restarted on 11/18/22)\par \par 6) Hypertension\par     Hydralazine 25 mg 1 tab three times a day\par     Metoprolol Tartrate 25 mg 1 tab 2 times a day\par     Norvasc 10 mg 1 tab once a day\par     Monitor BP at home\par \par 7) BPH, urinary frequency...sees uro\par     Flomax 0.4 mg 1 cap once a day\par     Proscar 5 mg 1 tab once a day\par \par 8) Dryness of Skin-RESOLVED\par      Apply Aquaphor to moisturize skin\par \par 9) Syncope/Fall\par     Follow up with Cardiology\par     Xray cervical spine reviewed \par     RESOLVED\par \par 10) Generalized weakness- RESOLVED\par       \par 11) Polyuria\par      Urinalysis and urine culture sent on 1/18/23- completed Ciprofloxacin\par      Urinalysis and Urine culture sent today (2/3/23) -Negative for UTI\par      Advised to f/u with Urology \par \par 12) Plan\par   -Monitor BP at home\par  -Follow up with Urology\par   -Safety precautions to be  taken while walking and changing positions\par   -Educated about plan of care,all questions and concerns addressed\par  - Advised to call office with any acute concerns\par  - Advised to have small frequent meals/snacks\par   -Reinstated to avoid crowds given covid\par  -Patient may have take out food from a reputable restaurant as of 1/18/23  \par  - Additional Instructions: Notify if bleeding; swelling; persistent nausea and vomiting; unable to  urinate; pain not        relieved by medications; fever; numbness, tingling; excessive diarrhea, inability to tolerate liquids  or foods;             increased irritability or sluggishness, rash\par  -Follow up in 2 weeks...send MM labs then\par -also will arrange for f/u skeletal survey \par \par \par \par \par \par \par \par \par 
 Symptoms

## 2024-05-29 ENCOUNTER — APPOINTMENT (OUTPATIENT)
Dept: UROLOGY | Facility: CLINIC | Age: 74
End: 2024-05-29
Payer: MEDICARE

## 2024-05-29 ENCOUNTER — NON-APPOINTMENT (OUTPATIENT)
Age: 74
End: 2024-05-29

## 2024-05-29 ENCOUNTER — TRANSCRIPTION ENCOUNTER (OUTPATIENT)
Age: 74
End: 2024-05-29

## 2024-05-29 VITALS
HEIGHT: 67 IN | SYSTOLIC BLOOD PRESSURE: 133 MMHG | TEMPERATURE: 96.8 F | OXYGEN SATURATION: 96 % | WEIGHT: 165 LBS | HEART RATE: 94 BPM | BODY MASS INDEX: 25.9 KG/M2 | DIASTOLIC BLOOD PRESSURE: 78 MMHG

## 2024-05-29 DIAGNOSIS — R30.0 DYSURIA: ICD-10-CM

## 2024-05-29 DIAGNOSIS — N39.0 URINARY TRACT INFECTION, SITE NOT SPECIFIED: ICD-10-CM

## 2024-05-29 DIAGNOSIS — R33.9 RETENTION OF URINE, UNSPECIFIED: ICD-10-CM

## 2024-05-29 DIAGNOSIS — R35.0 FREQUENCY OF MICTURITION: ICD-10-CM

## 2024-05-29 DIAGNOSIS — R82.90 UNSPECIFIED ABNORMAL FINDINGS IN URINE: ICD-10-CM

## 2024-05-29 PROCEDURE — G2211 COMPLEX E/M VISIT ADD ON: CPT

## 2024-05-29 PROCEDURE — 99214 OFFICE O/P EST MOD 30 MIN: CPT

## 2024-05-29 RX ORDER — SULFAMETHOXAZOLE AND TRIMETHOPRIM 800; 160 MG/1; MG/1
800-160 TABLET ORAL TWICE DAILY
Qty: 14 | Refills: 0 | Status: ACTIVE | COMMUNITY
Start: 2024-05-29 | End: 1900-01-01

## 2024-05-29 NOTE — ASSESSMENT
[FreeTextEntry1] : Very pleasant 73-year-old gentleman who presents for follow-up of BPH, history of recurrent urinary tract infections, incomplete bladder emptying, new complaint of weakness and subjective fevers   Plan -UA - Ucx -Creatinine 1.08 - prescription for bactrim given history of a bacterial urinary tract infection in the past and incomplete improvement in his symptoms with Diflucan - RTC in 1month   Patient is being seen today for evaluation and management of a chronic and longitudinal ongoing condition and I am of the primary treating physician

## 2024-05-29 NOTE — HISTORY OF PRESENT ILLNESS
[FreeTextEntry1] : Very pleasant 73-year-old gentleman who presents for follow-up of BPH with urinary obstruction and history of UTIs presenting for follow-up.  Since his last visit, he was initially doing well but this last week has been having increased weakness and subjective fevers. His perineal discomfort resolved but he has still been experiencing increased frequency. Voiding about 7-10x a day and 5-6x at night. No dysuria or hematuria. Notes monday 1x subjective fevers and today overall sensation of weakness. Also endorses SOB when walking to the office but is breathing comfortably at rest. Due to this, diflucan was sent given his most recent culture with no improvement in his symptoms.   today.

## 2024-05-30 LAB
APPEARANCE: ABNORMAL
BACTERIA: ABNORMAL /HPF
BILIRUBIN URINE: NEGATIVE
BLOOD URINE: ABNORMAL
COLOR: YELLOW
GLUCOSE QUALITATIVE U: NEGATIVE MG/DL
HYALINE CASTS: PRESENT
KETONES URINE: NEGATIVE MG/DL
LEUKOCYTE ESTERASE URINE: ABNORMAL
MICROSCOPIC-UA: NORMAL
NITRITE URINE: NEGATIVE
PH URINE: 5.5
PROTEIN URINE: 100 MG/DL
RED BLOOD CELLS URINE: 50 /HPF
SPECIFIC GRAVITY URINE: 1.01
UROBILINOGEN URINE: 0.2 MG/DL
WHITE BLOOD CELLS URINE: >998 /HPF

## 2024-06-14 ENCOUNTER — APPOINTMENT (OUTPATIENT)
Dept: UROLOGY | Facility: CLINIC | Age: 74
End: 2024-06-14

## 2024-06-18 ENCOUNTER — OUTPATIENT (OUTPATIENT)
Dept: OUTPATIENT SERVICES | Facility: HOSPITAL | Age: 74
LOS: 1 days | Discharge: HOME | End: 2024-06-18

## 2024-06-18 DIAGNOSIS — Z98.89 OTHER SPECIFIED POSTPROCEDURAL STATES: Chronic | ICD-10-CM

## 2024-06-18 DIAGNOSIS — Z94.84 STEM CELLS TRANSPLANT STATUS: ICD-10-CM

## 2024-06-18 DIAGNOSIS — C90.00 MULTIPLE MYELOMA NOT HAVING ACHIEVED REMISSION: ICD-10-CM

## 2024-06-20 ENCOUNTER — APPOINTMENT (OUTPATIENT)
Dept: HEMATOLOGY ONCOLOGY | Facility: CLINIC | Age: 74
End: 2024-06-20

## 2024-07-30 ENCOUNTER — APPOINTMENT (OUTPATIENT)
Dept: UROLOGY | Facility: CLINIC | Age: 74
End: 2024-07-30
Payer: MEDICARE

## 2024-07-30 VITALS
HEIGHT: 67 IN | HEART RATE: 66 BPM | BODY MASS INDEX: 25.9 KG/M2 | OXYGEN SATURATION: 98 % | TEMPERATURE: 96.8 F | WEIGHT: 165 LBS | DIASTOLIC BLOOD PRESSURE: 63 MMHG | SYSTOLIC BLOOD PRESSURE: 141 MMHG

## 2024-07-30 DIAGNOSIS — N40.1 BENIGN PROSTATIC HYPERPLASIA WITH LOWER URINARY TRACT SYMPMS: ICD-10-CM

## 2024-07-30 DIAGNOSIS — R35.0 FREQUENCY OF MICTURITION: ICD-10-CM

## 2024-07-30 DIAGNOSIS — R35.1 NOCTURIA: ICD-10-CM

## 2024-07-30 DIAGNOSIS — N13.8 BENIGN PROSTATIC HYPERPLASIA WITH LOWER URINARY TRACT SYMPMS: ICD-10-CM

## 2024-07-30 DIAGNOSIS — N39.0 URINARY TRACT INFECTION, SITE NOT SPECIFIED: ICD-10-CM

## 2024-07-30 PROCEDURE — 99213 OFFICE O/P EST LOW 20 MIN: CPT

## 2024-07-30 PROCEDURE — G2211 COMPLEX E/M VISIT ADD ON: CPT

## 2024-07-30 NOTE — ASSESSMENT
[FreeTextEntry1] : Very pleasant 73 year old gentleman who presents for follow up of BPH, recurrent UTIs -Continue tamsulosin- refill sent to the pharmacy -Continue finasteride- refill sent to the pharmacy -F/U in 6 months  Patient is being seen today for evaluation and management of a chronic and longitudinal ongoing condition and I am of the primary treating physician

## 2024-07-30 NOTE — HISTORY OF PRESENT ILLNESS
[FreeTextEntry1] : Very pleasant 73-year-old gentleman who presents for follow-up of BPH with urinary obstruction and history of UTIs.  He now feels well.  He continues to take tamsulosin and finasteride with improvement in his urinary symptoms.  He denies dysuria.  No hematuria.  No flank pain or suprapubic pain.  Reports that he is urinating well.

## 2024-08-05 LAB — BACTERIA UR CULT: ABNORMAL

## 2024-10-09 ENCOUNTER — TRANSCRIPTION ENCOUNTER (OUTPATIENT)
Age: 74
End: 2024-10-09

## 2024-11-16 ENCOUNTER — OUTPATIENT (OUTPATIENT)
Dept: OUTPATIENT SERVICES | Facility: HOSPITAL | Age: 74
LOS: 1 days | Discharge: HOME | End: 2024-11-16

## 2024-11-16 DIAGNOSIS — C90.00 MULTIPLE MYELOMA NOT HAVING ACHIEVED REMISSION: ICD-10-CM

## 2024-11-16 DIAGNOSIS — Z98.89 OTHER SPECIFIED POSTPROCEDURAL STATES: Chronic | ICD-10-CM

## 2024-11-18 DIAGNOSIS — Z94.84 STEM CELLS TRANSPLANT STATUS: ICD-10-CM

## 2024-11-18 DIAGNOSIS — C90.00 MULTIPLE MYELOMA NOT HAVING ACHIEVED REMISSION: ICD-10-CM

## 2024-11-20 ENCOUNTER — APPOINTMENT (OUTPATIENT)
Dept: HEMATOLOGY ONCOLOGY | Facility: CLINIC | Age: 74
End: 2024-11-20

## 2025-01-29 ENCOUNTER — APPOINTMENT (OUTPATIENT)
Dept: UROLOGY | Facility: CLINIC | Age: 75
End: 2025-01-29

## 2025-06-06 ENCOUNTER — APPOINTMENT (OUTPATIENT)
Dept: UROLOGY | Facility: CLINIC | Age: 75
End: 2025-06-06
Payer: MEDICARE

## 2025-06-06 VITALS
DIASTOLIC BLOOD PRESSURE: 70 MMHG | BODY MASS INDEX: 25.9 KG/M2 | HEIGHT: 67 IN | HEART RATE: 69 BPM | WEIGHT: 165 LBS | SYSTOLIC BLOOD PRESSURE: 125 MMHG

## 2025-06-06 PROCEDURE — 99214 OFFICE O/P EST MOD 30 MIN: CPT

## 2025-06-06 PROCEDURE — G2211 COMPLEX E/M VISIT ADD ON: CPT

## 2025-06-09 LAB
ANION GAP SERPL CALC-SCNC: 12 MMOL/L
APPEARANCE: CLEAR
BACTERIA UR CULT: ABNORMAL
BACTERIA: NEGATIVE /HPF
BILIRUBIN URINE: NEGATIVE
BLOOD URINE: NEGATIVE
BUN SERPL-MCNC: 23 MG/DL
CALCIUM SERPL-MCNC: 9 MG/DL
CAST: 0 /LPF
CHLORIDE SERPL-SCNC: 108 MMOL/L
CO2 SERPL-SCNC: 22 MMOL/L
COLOR: YELLOW
CREAT SERPL-MCNC: 1.29 MG/DL
EGFRCR SERPLBLD CKD-EPI 2021: 57 ML/MIN/1.73M2
EPITHELIAL CELLS: 2 /HPF
GLUCOSE QUALITATIVE U: NEGATIVE MG/DL
GLUCOSE SERPL-MCNC: 136 MG/DL
KETONES URINE: NEGATIVE MG/DL
LEUKOCYTE ESTERASE URINE: ABNORMAL
MICROSCOPIC-UA: NORMAL
NITRITE URINE: NEGATIVE
PH URINE: 6
POTASSIUM SERPL-SCNC: 4.5 MMOL/L
PROTEIN URINE: 30 MG/DL
RED BLOOD CELLS URINE: 0 /HPF
SODIUM SERPL-SCNC: 142 MMOL/L
SPECIFIC GRAVITY URINE: 1.02
UROBILINOGEN URINE: 0.2 MG/DL
WHITE BLOOD CELLS URINE: 6 /HPF